# Patient Record
Sex: FEMALE | Race: OTHER | HISPANIC OR LATINO
[De-identification: names, ages, dates, MRNs, and addresses within clinical notes are randomized per-mention and may not be internally consistent; named-entity substitution may affect disease eponyms.]

---

## 2020-01-01 ENCOUNTER — APPOINTMENT (OUTPATIENT)
Dept: GYNECOLOGIC ONCOLOGY | Facility: CLINIC | Age: 25
End: 2020-01-01

## 2020-01-01 ENCOUNTER — LABORATORY RESULT (OUTPATIENT)
Age: 25
End: 2020-01-01

## 2020-01-01 ENCOUNTER — EMERGENCY (EMERGENCY)
Facility: HOSPITAL | Age: 25
LOS: 1 days | Discharge: HOME | End: 2020-01-01
Attending: EMERGENCY MEDICINE
Payer: MEDICAID

## 2020-01-01 ENCOUNTER — RESULT REVIEW (OUTPATIENT)
Age: 25
End: 2020-01-01

## 2020-01-01 ENCOUNTER — TRANSCRIPTION ENCOUNTER (OUTPATIENT)
Age: 25
End: 2020-01-01

## 2020-01-01 ENCOUNTER — OUTPATIENT (OUTPATIENT)
Dept: OUTPATIENT SERVICES | Facility: HOSPITAL | Age: 25
LOS: 1 days | Discharge: HOME | End: 2020-01-01
Payer: MEDICAID

## 2020-01-01 ENCOUNTER — OUTPATIENT (OUTPATIENT)
Dept: OUTPATIENT SERVICES | Facility: HOSPITAL | Age: 25
LOS: 1 days | Discharge: HOME | End: 2020-01-01

## 2020-01-01 ENCOUNTER — APPOINTMENT (OUTPATIENT)
Age: 25
End: 2020-01-01

## 2020-01-01 ENCOUNTER — INPATIENT (INPATIENT)
Facility: HOSPITAL | Age: 25
LOS: 57 days | Discharge: HOME | DRG: 829 | End: 2020-05-17
Attending: INTERNAL MEDICINE
Payer: MEDICAID

## 2020-01-01 ENCOUNTER — INPATIENT (INPATIENT)
Facility: HOSPITAL | Age: 25
LOS: 9 days | DRG: 871 | End: 2020-06-05
Attending: INTERNAL MEDICINE | Admitting: STUDENT IN AN ORGANIZED HEALTH CARE EDUCATION/TRAINING PROGRAM
Payer: MEDICAID

## 2020-01-01 ENCOUNTER — APPOINTMENT (OUTPATIENT)
Dept: CT IMAGING | Facility: HOSPITAL | Age: 25
End: 2020-01-01

## 2020-01-01 ENCOUNTER — APPOINTMENT (OUTPATIENT)
Age: 25
End: 2020-01-01
Payer: MEDICAID

## 2020-01-01 ENCOUNTER — APPOINTMENT (OUTPATIENT)
Dept: OBGYN | Facility: CLINIC | Age: 25
End: 2020-01-01

## 2020-01-01 VITALS
SYSTOLIC BLOOD PRESSURE: 60 MMHG | DIASTOLIC BLOOD PRESSURE: 32 MMHG | HEART RATE: 118 BPM | OXYGEN SATURATION: 95 % | RESPIRATION RATE: 32 BRPM | TEMPERATURE: 98 F

## 2020-01-01 VITALS
DIASTOLIC BLOOD PRESSURE: 82 MMHG | HEART RATE: 158 BPM | OXYGEN SATURATION: 91 % | HEIGHT: 64 IN | RESPIRATION RATE: 16 BRPM | TEMPERATURE: 101 F | SYSTOLIC BLOOD PRESSURE: 115 MMHG | WEIGHT: 160.06 LBS

## 2020-01-01 VITALS
RESPIRATION RATE: 18 BRPM | HEART RATE: 111 BPM | TEMPERATURE: 98 F | OXYGEN SATURATION: 99 % | DIASTOLIC BLOOD PRESSURE: 80 MMHG | SYSTOLIC BLOOD PRESSURE: 119 MMHG

## 2020-01-01 VITALS
SYSTOLIC BLOOD PRESSURE: 108 MMHG | TEMPERATURE: 98 F | HEART RATE: 81 BPM | DIASTOLIC BLOOD PRESSURE: 71 MMHG | RESPIRATION RATE: 17 BRPM | OXYGEN SATURATION: 99 %

## 2020-01-01 VITALS
RESPIRATION RATE: 18 BRPM | DIASTOLIC BLOOD PRESSURE: 77 MMHG | OXYGEN SATURATION: 98 % | WEIGHT: 197.09 LBS | HEART RATE: 107 BPM | HEIGHT: 64 IN | SYSTOLIC BLOOD PRESSURE: 127 MMHG | TEMPERATURE: 98 F

## 2020-01-01 VITALS — WEIGHT: 163.8 LBS

## 2020-01-01 DIAGNOSIS — A49.1 STREPTOCOCCAL INFECTION, UNSPECIFIED SITE: ICD-10-CM

## 2020-01-01 DIAGNOSIS — R41.82 ALTERED MENTAL STATUS, UNSPECIFIED: ICD-10-CM

## 2020-01-01 DIAGNOSIS — R11.10 VOMITING, UNSPECIFIED: ICD-10-CM

## 2020-01-01 DIAGNOSIS — R47.1 DYSARTHRIA AND ANARTHRIA: ICD-10-CM

## 2020-01-01 DIAGNOSIS — E83.52 HYPERCALCEMIA: ICD-10-CM

## 2020-01-01 DIAGNOSIS — Z71.89 OTHER SPECIFIED COUNSELING: ICD-10-CM

## 2020-01-01 DIAGNOSIS — R11.0 NAUSEA: ICD-10-CM

## 2020-01-01 DIAGNOSIS — Z29.9 ENCOUNTER FOR PROPHYLACTIC MEASURES, UNSPECIFIED: ICD-10-CM

## 2020-01-01 DIAGNOSIS — Z51.5 ENCOUNTER FOR PALLIATIVE CARE: ICD-10-CM

## 2020-01-01 DIAGNOSIS — N94.89 OTHER SPECIFIED CONDITIONS ASSOCIATED WITH FEMALE GENITAL ORGANS AND MENSTRUAL CYCLE: ICD-10-CM

## 2020-01-01 DIAGNOSIS — C79.9 SECONDARY MALIGNANT NEOPLASM OF UNSPECIFIED SITE: ICD-10-CM

## 2020-01-01 DIAGNOSIS — E55.9 VITAMIN D DEFICIENCY, UNSPECIFIED: ICD-10-CM

## 2020-01-01 DIAGNOSIS — R00.0 TACHYCARDIA, UNSPECIFIED: ICD-10-CM

## 2020-01-01 DIAGNOSIS — R10.9 UNSPECIFIED ABDOMINAL PAIN: ICD-10-CM

## 2020-01-01 DIAGNOSIS — R06.03 ACUTE RESPIRATORY DISTRESS: ICD-10-CM

## 2020-01-01 DIAGNOSIS — R10.84 GENERALIZED ABDOMINAL PAIN: ICD-10-CM

## 2020-01-01 DIAGNOSIS — F41.9 ANXIETY DISORDER, UNSPECIFIED: ICD-10-CM

## 2020-01-01 DIAGNOSIS — G89.3 NEOPLASM RELATED PAIN (ACUTE) (CHRONIC): ICD-10-CM

## 2020-01-01 DIAGNOSIS — A41.9 SEPSIS, UNSPECIFIED ORGANISM: ICD-10-CM

## 2020-01-01 DIAGNOSIS — E87.1 HYPO-OSMOLALITY AND HYPONATREMIA: ICD-10-CM

## 2020-01-01 DIAGNOSIS — E87.5 HYPERKALEMIA: ICD-10-CM

## 2020-01-01 DIAGNOSIS — K59.00 CONSTIPATION, UNSPECIFIED: ICD-10-CM

## 2020-01-01 DIAGNOSIS — C7A.8 OTHER MALIGNANT NEUROENDOCRINE TUMORS: ICD-10-CM

## 2020-01-01 DIAGNOSIS — Z78.9 OTHER SPECIFIED HEALTH STATUS: ICD-10-CM

## 2020-01-01 DIAGNOSIS — K59.01 SLOW TRANSIT CONSTIPATION: ICD-10-CM

## 2020-01-01 DIAGNOSIS — T45.1X5A NAUSEA: ICD-10-CM

## 2020-01-01 DIAGNOSIS — C80.1 MALIGNANT (PRIMARY) NEOPLASM, UNSPECIFIED: ICD-10-CM

## 2020-01-01 DIAGNOSIS — R18.0 MALIGNANT ASCITES: ICD-10-CM

## 2020-01-01 DIAGNOSIS — J96.01 ACUTE RESPIRATORY FAILURE WITH HYPOXIA: ICD-10-CM

## 2020-01-01 DIAGNOSIS — R50.9 FEVER, UNSPECIFIED: ICD-10-CM

## 2020-01-01 LAB
% ALBUMIN: 35.2 % — SIGNIFICANT CHANGE UP
% ALPHA 1: 11.4 % — SIGNIFICANT CHANGE UP
% ALPHA 2: 13.2 % — SIGNIFICANT CHANGE UP
% BETA: 13.1 % — SIGNIFICANT CHANGE UP
% GAMMA: 27.1 % — SIGNIFICANT CHANGE UP
-  AMIKACIN: SIGNIFICANT CHANGE UP
-  AMPICILLIN/SULBACTAM: SIGNIFICANT CHANGE UP
-  AMPICILLIN: SIGNIFICANT CHANGE UP
-  AZTREONAM: SIGNIFICANT CHANGE UP
-  CEFAZOLIN: SIGNIFICANT CHANGE UP
-  CEFEPIME: SIGNIFICANT CHANGE UP
-  CEFOXITIN: SIGNIFICANT CHANGE UP
-  CEFTRIAXONE: SIGNIFICANT CHANGE UP
-  CIPROFLOXACIN: SIGNIFICANT CHANGE UP
-  DAPTOMYCIN: SIGNIFICANT CHANGE UP
-  ERTAPENEM: SIGNIFICANT CHANGE UP
-  GENTAMICIN: SIGNIFICANT CHANGE UP
-  IMIPENEM: SIGNIFICANT CHANGE UP
-  LEVOFLOXACIN: SIGNIFICANT CHANGE UP
-  LINEZOLID: SIGNIFICANT CHANGE UP
-  MEROPENEM: SIGNIFICANT CHANGE UP
-  NITROFURANTOIN: SIGNIFICANT CHANGE UP
-  PIPERACILLIN/TAZOBACTAM: SIGNIFICANT CHANGE UP
-  TETRACYCLINE: SIGNIFICANT CHANGE UP
-  TIGECYCLINE: SIGNIFICANT CHANGE UP
-  TOBRAMYCIN: SIGNIFICANT CHANGE UP
-  TRIMETHOPRIM/SULFAMETHOXAZOLE: SIGNIFICANT CHANGE UP
-  VANCOMYCIN: SIGNIFICANT CHANGE UP
24R-OH-CALCIDIOL SERPL-MCNC: <5 NG/ML — LOW (ref 30–80)
AFP-TM SERPL-MCNC: <1.8 NG/ML — SIGNIFICANT CHANGE UP
ALBUMIN FLD-MCNC: 1.6 G/DL — SIGNIFICANT CHANGE UP
ALBUMIN FLD-MCNC: 1.6 G/DL — SIGNIFICANT CHANGE UP
ALBUMIN FLD-MCNC: 1.9 G/DL — SIGNIFICANT CHANGE UP
ALBUMIN SERPL ELPH-MCNC: 2 G/DL — LOW (ref 3.3–5)
ALBUMIN SERPL ELPH-MCNC: 2.1 G/DL — LOW (ref 3.3–5)
ALBUMIN SERPL ELPH-MCNC: 2.1 G/DL — LOW (ref 3.3–5)
ALBUMIN SERPL ELPH-MCNC: 2.2 G/DL — LOW (ref 3.3–5)
ALBUMIN SERPL ELPH-MCNC: 2.2 G/DL — LOW (ref 3.6–5.5)
ALBUMIN SERPL ELPH-MCNC: 2.3 G/DL — LOW (ref 3.3–5)
ALBUMIN SERPL ELPH-MCNC: 2.4 G/DL — LOW (ref 3.3–5)
ALBUMIN SERPL ELPH-MCNC: 2.5 G/DL — LOW (ref 3.3–5)
ALBUMIN SERPL ELPH-MCNC: 2.6 G/DL — LOW (ref 3.3–5)
ALBUMIN SERPL ELPH-MCNC: 2.7 G/DL — LOW (ref 3.3–5)
ALBUMIN SERPL ELPH-MCNC: 2.8 G/DL
ALBUMIN SERPL ELPH-MCNC: 2.8 G/DL — LOW (ref 3.3–5)
ALBUMIN SERPL ELPH-MCNC: 2.9 G/DL — LOW (ref 3.3–5)
ALBUMIN SERPL ELPH-MCNC: 2.9 G/DL — LOW (ref 3.3–5)
ALBUMIN SERPL ELPH-MCNC: 3.5 G/DL — SIGNIFICANT CHANGE UP (ref 3.3–5)
ALBUMIN SERPL ELPH-MCNC: 3.8 G/DL — SIGNIFICANT CHANGE UP (ref 3.3–5)
ALBUMIN/GLOB SERPL ELPH: 0.5 RATIO — SIGNIFICANT CHANGE UP
ALP BLD-CCNC: 269 U/L
ALP SERPL-CCNC: 112 U/L — SIGNIFICANT CHANGE UP (ref 40–120)
ALP SERPL-CCNC: 115 U/L — SIGNIFICANT CHANGE UP (ref 40–120)
ALP SERPL-CCNC: 116 U/L — SIGNIFICANT CHANGE UP (ref 40–120)
ALP SERPL-CCNC: 129 U/L — HIGH (ref 40–120)
ALP SERPL-CCNC: 143 U/L — HIGH (ref 40–120)
ALP SERPL-CCNC: 156 U/L — HIGH (ref 40–120)
ALP SERPL-CCNC: 180 U/L — HIGH (ref 40–120)
ALP SERPL-CCNC: 182 U/L — HIGH (ref 40–120)
ALP SERPL-CCNC: 183 U/L — HIGH (ref 40–120)
ALP SERPL-CCNC: 185 U/L — HIGH (ref 40–120)
ALP SERPL-CCNC: 193 U/L — HIGH (ref 40–120)
ALP SERPL-CCNC: 205 U/L — HIGH (ref 40–120)
ALP SERPL-CCNC: 205 U/L — HIGH (ref 40–120)
ALP SERPL-CCNC: 206 U/L — HIGH (ref 40–120)
ALP SERPL-CCNC: 208 U/L — HIGH (ref 40–120)
ALP SERPL-CCNC: 216 U/L — HIGH (ref 40–120)
ALP SERPL-CCNC: 220 U/L — HIGH (ref 40–120)
ALP SERPL-CCNC: 224 U/L — HIGH (ref 40–120)
ALP SERPL-CCNC: 225 U/L — HIGH (ref 40–120)
ALP SERPL-CCNC: 233 U/L — HIGH (ref 40–120)
ALP SERPL-CCNC: 245 U/L — HIGH (ref 40–120)
ALP SERPL-CCNC: 259 U/L — HIGH (ref 40–120)
ALP SERPL-CCNC: 260 U/L — HIGH (ref 40–120)
ALP SERPL-CCNC: 261 U/L — HIGH (ref 40–120)
ALP SERPL-CCNC: 262 U/L — HIGH (ref 40–120)
ALP SERPL-CCNC: 263 U/L — HIGH (ref 40–120)
ALP SERPL-CCNC: 264 U/L — HIGH (ref 40–120)
ALP SERPL-CCNC: 264 U/L — HIGH (ref 40–120)
ALP SERPL-CCNC: 279 U/L — HIGH (ref 40–120)
ALP SERPL-CCNC: 290 U/L — HIGH (ref 40–120)
ALP SERPL-CCNC: 294 U/L — HIGH (ref 40–120)
ALP SERPL-CCNC: 305 U/L — HIGH (ref 40–120)
ALP SERPL-CCNC: 320 U/L — HIGH (ref 40–120)
ALP SERPL-CCNC: 327 U/L — HIGH (ref 40–120)
ALP SERPL-CCNC: 334 U/L — HIGH (ref 40–120)
ALP SERPL-CCNC: 343 U/L — HIGH (ref 40–120)
ALP SERPL-CCNC: 358 U/L — HIGH (ref 40–120)
ALP SERPL-CCNC: 359 U/L — HIGH (ref 40–120)
ALP SERPL-CCNC: 360 U/L — HIGH (ref 40–120)
ALP SERPL-CCNC: 361 U/L — HIGH (ref 40–120)
ALP SERPL-CCNC: 382 U/L — HIGH (ref 40–120)
ALP SERPL-CCNC: 383 U/L — HIGH (ref 40–120)
ALP SERPL-CCNC: 386 U/L — HIGH (ref 40–120)
ALP SERPL-CCNC: 387 U/L — HIGH (ref 40–120)
ALP SERPL-CCNC: 401 U/L — HIGH (ref 40–120)
ALP SERPL-CCNC: 412 U/L — HIGH (ref 40–120)
ALP SERPL-CCNC: 439 U/L — HIGH (ref 40–120)
ALP SERPL-CCNC: 464 U/L — HIGH (ref 40–120)
ALP SERPL-CCNC: 486 U/L — HIGH (ref 40–120)
ALP SERPL-CCNC: 491 U/L — HIGH (ref 40–120)
ALP SERPL-CCNC: 65 U/L — SIGNIFICANT CHANGE UP (ref 40–120)
ALPHA1 GLOB SERPL ELPH-MCNC: 0.7 G/DL — HIGH (ref 0.1–0.4)
ALPHA2 GLOB SERPL ELPH-MCNC: 0.8 G/DL — SIGNIFICANT CHANGE UP (ref 0.5–1)
ALT FLD-CCNC: 10 U/L — SIGNIFICANT CHANGE UP (ref 10–45)
ALT FLD-CCNC: 11 U/L — SIGNIFICANT CHANGE UP (ref 10–45)
ALT FLD-CCNC: 12 U/L — SIGNIFICANT CHANGE UP (ref 10–45)
ALT FLD-CCNC: 17 U/L — SIGNIFICANT CHANGE UP (ref 10–45)
ALT FLD-CCNC: 19 U/L — SIGNIFICANT CHANGE UP (ref 10–45)
ALT FLD-CCNC: 21 U/L — SIGNIFICANT CHANGE UP (ref 10–45)
ALT FLD-CCNC: 22 U/L — SIGNIFICANT CHANGE UP (ref 10–45)
ALT FLD-CCNC: 5 U/L — LOW (ref 10–45)
ALT FLD-CCNC: 6 U/L — LOW (ref 10–45)
ALT FLD-CCNC: 7 U/L — LOW (ref 10–45)
ALT FLD-CCNC: 8 U/L — LOW (ref 10–45)
ALT FLD-CCNC: 9 U/L — LOW (ref 10–45)
ALT FLD-CCNC: <5 U/L — LOW (ref 10–45)
ALT SERPL-CCNC: 36 U/L
AMMONIA BLD-MCNC: 47 UMOL/L — SIGNIFICANT CHANGE UP (ref 11–55)
AMMONIA BLD-MCNC: 55 UMOL/L — SIGNIFICANT CHANGE UP (ref 11–55)
ANION GAP SERPL CALC-SCNC: 10 MMOL/L — SIGNIFICANT CHANGE UP (ref 5–17)
ANION GAP SERPL CALC-SCNC: 11 MMOL/L — SIGNIFICANT CHANGE UP (ref 5–17)
ANION GAP SERPL CALC-SCNC: 12 MMOL/L — SIGNIFICANT CHANGE UP (ref 5–17)
ANION GAP SERPL CALC-SCNC: 13 MMOL/L — SIGNIFICANT CHANGE UP (ref 5–17)
ANION GAP SERPL CALC-SCNC: 14 MMOL/L — SIGNIFICANT CHANGE UP (ref 5–17)
ANION GAP SERPL CALC-SCNC: 15 MMOL/L — SIGNIFICANT CHANGE UP (ref 5–17)
ANION GAP SERPL CALC-SCNC: 16 MMOL/L — SIGNIFICANT CHANGE UP (ref 5–17)
ANION GAP SERPL CALC-SCNC: 17 MMOL/L
ANION GAP SERPL CALC-SCNC: 7 MMOL/L — SIGNIFICANT CHANGE UP (ref 5–17)
ANION GAP SERPL CALC-SCNC: 9 MMOL/L — SIGNIFICANT CHANGE UP (ref 5–17)
ANISOCYTOSIS BLD QL: SIGNIFICANT CHANGE UP
ANISOCYTOSIS BLD QL: SLIGHT — SIGNIFICANT CHANGE UP
APPEARANCE UR: ABNORMAL
APPEARANCE UR: CLEAR — SIGNIFICANT CHANGE UP
APTT BLD: 25.7 SEC — LOW (ref 27.5–36.3)
APTT BLD: 31.8 SEC — SIGNIFICANT CHANGE UP (ref 27.5–36.3)
APTT BLD: 32.4 SEC — SIGNIFICANT CHANGE UP (ref 27.5–36.3)
APTT BLD: 33.3 SEC — SIGNIFICANT CHANGE UP (ref 27.5–36.3)
APTT BLD: 33.5 SEC — SIGNIFICANT CHANGE UP (ref 27.5–36.3)
APTT BLD: 34.8 SEC — SIGNIFICANT CHANGE UP (ref 27.5–36.3)
AST SERPL-CCNC: 14 U/L — SIGNIFICANT CHANGE UP (ref 10–40)
AST SERPL-CCNC: 16 U/L — SIGNIFICANT CHANGE UP (ref 10–40)
AST SERPL-CCNC: 16 U/L — SIGNIFICANT CHANGE UP (ref 10–40)
AST SERPL-CCNC: 18 U/L — SIGNIFICANT CHANGE UP (ref 10–40)
AST SERPL-CCNC: 19 U/L — SIGNIFICANT CHANGE UP (ref 10–40)
AST SERPL-CCNC: 22 U/L — SIGNIFICANT CHANGE UP (ref 10–40)
AST SERPL-CCNC: 23 U/L — SIGNIFICANT CHANGE UP (ref 10–40)
AST SERPL-CCNC: 24 U/L — SIGNIFICANT CHANGE UP (ref 10–40)
AST SERPL-CCNC: 24 U/L — SIGNIFICANT CHANGE UP (ref 10–40)
AST SERPL-CCNC: 25 U/L — SIGNIFICANT CHANGE UP (ref 10–40)
AST SERPL-CCNC: 25 U/L — SIGNIFICANT CHANGE UP (ref 10–40)
AST SERPL-CCNC: 27 U/L — SIGNIFICANT CHANGE UP (ref 10–40)
AST SERPL-CCNC: 27 U/L — SIGNIFICANT CHANGE UP (ref 10–40)
AST SERPL-CCNC: 28 U/L — SIGNIFICANT CHANGE UP (ref 10–40)
AST SERPL-CCNC: 29 U/L — SIGNIFICANT CHANGE UP (ref 10–40)
AST SERPL-CCNC: 30 U/L — SIGNIFICANT CHANGE UP (ref 10–40)
AST SERPL-CCNC: 31 U/L — SIGNIFICANT CHANGE UP (ref 10–40)
AST SERPL-CCNC: 32 U/L — SIGNIFICANT CHANGE UP (ref 10–40)
AST SERPL-CCNC: 32 U/L — SIGNIFICANT CHANGE UP (ref 10–40)
AST SERPL-CCNC: 33 U/L — SIGNIFICANT CHANGE UP (ref 10–40)
AST SERPL-CCNC: 34 U/L — SIGNIFICANT CHANGE UP (ref 10–40)
AST SERPL-CCNC: 35 U/L — SIGNIFICANT CHANGE UP (ref 10–40)
AST SERPL-CCNC: 36 U/L — SIGNIFICANT CHANGE UP (ref 10–40)
AST SERPL-CCNC: 37 U/L — SIGNIFICANT CHANGE UP (ref 10–40)
AST SERPL-CCNC: 38 U/L — SIGNIFICANT CHANGE UP (ref 10–40)
AST SERPL-CCNC: 40 U/L — SIGNIFICANT CHANGE UP (ref 10–40)
AST SERPL-CCNC: 41 U/L — HIGH (ref 10–40)
AST SERPL-CCNC: 43 U/L — HIGH (ref 10–40)
AST SERPL-CCNC: 43 U/L — HIGH (ref 10–40)
AST SERPL-CCNC: 46 U/L — HIGH (ref 10–40)
AST SERPL-CCNC: 53 U/L — HIGH (ref 10–40)
AST SERPL-CCNC: 60 U/L — HIGH (ref 10–40)
AST SERPL-CCNC: 60 U/L — HIGH (ref 10–40)
AST SERPL-CCNC: 78 U/L — HIGH (ref 10–40)
AST SERPL-CCNC: 90 U/L — HIGH (ref 10–40)
AST SERPL-CCNC: 96 U/L
B PERT IGG+IGM PNL SER: ABNORMAL
B-GLOBULIN SERPL ELPH-MCNC: 0.8 G/DL — SIGNIFICANT CHANGE UP (ref 0.5–1)
BACTERIA # UR AUTO: NEGATIVE — SIGNIFICANT CHANGE UP
BACTERIA # UR AUTO: SIGNIFICANT CHANGE UP
BASOPHILS # BLD AUTO: 0 K/UL — SIGNIFICANT CHANGE UP (ref 0–0.2)
BASOPHILS # BLD AUTO: 0.01 K/UL — SIGNIFICANT CHANGE UP (ref 0–0.2)
BASOPHILS # BLD AUTO: 0.02 K/UL — SIGNIFICANT CHANGE UP (ref 0–0.2)
BASOPHILS # BLD AUTO: 0.03 K/UL — SIGNIFICANT CHANGE UP (ref 0–0.2)
BASOPHILS # BLD AUTO: 0.04 K/UL — SIGNIFICANT CHANGE UP (ref 0–0.2)
BASOPHILS # BLD AUTO: 0.05 K/UL — SIGNIFICANT CHANGE UP (ref 0–0.2)
BASOPHILS # BLD AUTO: 0.05 K/UL — SIGNIFICANT CHANGE UP (ref 0–0.2)
BASOPHILS # BLD AUTO: 0.06 K/UL — SIGNIFICANT CHANGE UP (ref 0–0.2)
BASOPHILS # BLD AUTO: 0.07 K/UL — SIGNIFICANT CHANGE UP (ref 0–0.2)
BASOPHILS # BLD AUTO: 0.07 K/UL — SIGNIFICANT CHANGE UP (ref 0–0.2)
BASOPHILS # BLD AUTO: 0.09 K/UL
BASOPHILS # BLD AUTO: 0.12 K/UL — SIGNIFICANT CHANGE UP (ref 0–0.2)
BASOPHILS # BLD AUTO: 0.13 K/UL — SIGNIFICANT CHANGE UP (ref 0–0.2)
BASOPHILS # BLD AUTO: 0.15 K/UL — SIGNIFICANT CHANGE UP (ref 0–0.2)
BASOPHILS # BLD AUTO: 0.19 K/UL — SIGNIFICANT CHANGE UP (ref 0–0.2)
BASOPHILS # BLD AUTO: 0.2 K/UL — SIGNIFICANT CHANGE UP (ref 0–0.2)
BASOPHILS NFR BLD AUTO: 0 % — SIGNIFICANT CHANGE UP (ref 0–2)
BASOPHILS NFR BLD AUTO: 0.1 % — SIGNIFICANT CHANGE UP (ref 0–2)
BASOPHILS NFR BLD AUTO: 0.2 % — SIGNIFICANT CHANGE UP (ref 0–2)
BASOPHILS NFR BLD AUTO: 0.3 % — SIGNIFICANT CHANGE UP (ref 0–2)
BASOPHILS NFR BLD AUTO: 0.8 % — SIGNIFICANT CHANGE UP (ref 0–2)
BASOPHILS NFR BLD AUTO: 0.9 %
BASOPHILS NFR BLD AUTO: 0.9 % — SIGNIFICANT CHANGE UP (ref 0–2)
BILIRUB DIRECT SERPL-MCNC: 0.7 MG/DL — HIGH (ref 0–0.2)
BILIRUB DIRECT SERPL-MCNC: 0.8 MG/DL — HIGH (ref 0–0.2)
BILIRUB DIRECT SERPL-MCNC: 1 MG/DL — HIGH (ref 0–0.2)
BILIRUB DIRECT SERPL-MCNC: 1 MG/DL — HIGH (ref 0–0.2)
BILIRUB DIRECT SERPL-MCNC: 1.1 MG/DL — HIGH (ref 0–0.2)
BILIRUB INDIRECT FLD-MCNC: 0.4 MG/DL — SIGNIFICANT CHANGE UP (ref 0.2–1)
BILIRUB SERPL-MCNC: 0.2 MG/DL — SIGNIFICANT CHANGE UP (ref 0.2–1.2)
BILIRUB SERPL-MCNC: 0.2 MG/DL — SIGNIFICANT CHANGE UP (ref 0.2–1.2)
BILIRUB SERPL-MCNC: 0.3 MG/DL
BILIRUB SERPL-MCNC: 0.3 MG/DL — SIGNIFICANT CHANGE UP (ref 0.2–1.2)
BILIRUB SERPL-MCNC: 0.4 MG/DL — SIGNIFICANT CHANGE UP (ref 0.2–1.2)
BILIRUB SERPL-MCNC: 0.5 MG/DL — SIGNIFICANT CHANGE UP (ref 0.2–1.2)
BILIRUB SERPL-MCNC: 0.6 MG/DL — SIGNIFICANT CHANGE UP (ref 0.2–1.2)
BILIRUB SERPL-MCNC: 0.7 MG/DL — SIGNIFICANT CHANGE UP (ref 0.2–1.2)
BILIRUB SERPL-MCNC: 0.8 MG/DL — SIGNIFICANT CHANGE UP (ref 0.2–1.2)
BILIRUB SERPL-MCNC: 0.9 MG/DL — SIGNIFICANT CHANGE UP (ref 0.2–1.2)
BILIRUB SERPL-MCNC: 1 MG/DL — SIGNIFICANT CHANGE UP (ref 0.2–1.2)
BILIRUB SERPL-MCNC: 1.1 MG/DL — SIGNIFICANT CHANGE UP (ref 0.2–1.2)
BILIRUB SERPL-MCNC: 1.2 MG/DL — SIGNIFICANT CHANGE UP (ref 0.2–1.2)
BILIRUB SERPL-MCNC: 1.4 MG/DL — HIGH (ref 0.2–1.2)
BILIRUB SERPL-MCNC: 1.4 MG/DL — HIGH (ref 0.2–1.2)
BILIRUB SERPL-MCNC: 1.5 MG/DL — HIGH (ref 0.2–1.2)
BILIRUB SERPL-MCNC: 1.6 MG/DL — HIGH (ref 0.2–1.2)
BILIRUB UR-MCNC: NEGATIVE — SIGNIFICANT CHANGE UP
BLD GP AB SCN SERPL QL: NEGATIVE — SIGNIFICANT CHANGE UP
BUN SERPL-MCNC: 10 MG/DL — SIGNIFICANT CHANGE UP (ref 7–23)
BUN SERPL-MCNC: 11 MG/DL — SIGNIFICANT CHANGE UP (ref 7–23)
BUN SERPL-MCNC: 11 MG/DL — SIGNIFICANT CHANGE UP (ref 7–23)
BUN SERPL-MCNC: 12 MG/DL — SIGNIFICANT CHANGE UP (ref 7–23)
BUN SERPL-MCNC: 13 MG/DL — SIGNIFICANT CHANGE UP (ref 7–23)
BUN SERPL-MCNC: 17 MG/DL — SIGNIFICANT CHANGE UP (ref 7–23)
BUN SERPL-MCNC: 20 MG/DL — SIGNIFICANT CHANGE UP (ref 7–23)
BUN SERPL-MCNC: 22 MG/DL — SIGNIFICANT CHANGE UP (ref 7–23)
BUN SERPL-MCNC: 4 MG/DL — LOW (ref 7–23)
BUN SERPL-MCNC: 5 MG/DL — LOW (ref 7–23)
BUN SERPL-MCNC: 6 MG/DL
BUN SERPL-MCNC: 6 MG/DL — LOW (ref 7–23)
BUN SERPL-MCNC: 7 MG/DL — SIGNIFICANT CHANGE UP (ref 7–23)
BUN SERPL-MCNC: 8 MG/DL — SIGNIFICANT CHANGE UP (ref 7–23)
BUN SERPL-MCNC: 9 MG/DL — SIGNIFICANT CHANGE UP (ref 7–23)
BUN SERPL-MCNC: <4 MG/DL — LOW (ref 7–23)
C DIFF GDH STL QL: NEGATIVE — SIGNIFICANT CHANGE UP
C DIFF GDH STL QL: SIGNIFICANT CHANGE UP
CA-I BLD-SCNC: 1.64 MMOL/L — CRITICAL HIGH (ref 1.12–1.3)
CA-I BLD-SCNC: 1.66 MMOL/L — CRITICAL HIGH (ref 1.12–1.3)
CA-I BLD-SCNC: 1.67 MMOL/L — CRITICAL HIGH (ref 1.12–1.3)
CA-I BLD-SCNC: 1.7 MMOL/L — CRITICAL HIGH (ref 1.12–1.3)
CALCIUM 24H UR-MRATE: 80 MG/24 H — SIGNIFICANT CHANGE UP (ref 50–150)
CALCIUM SERPL-MCNC: 10 MG/DL — SIGNIFICANT CHANGE UP (ref 8.4–10.5)
CALCIUM SERPL-MCNC: 10 MG/DL — SIGNIFICANT CHANGE UP (ref 8.4–10.5)
CALCIUM SERPL-MCNC: 10.1 MG/DL — SIGNIFICANT CHANGE UP (ref 8.4–10.5)
CALCIUM SERPL-MCNC: 10.1 MG/DL — SIGNIFICANT CHANGE UP (ref 8.4–10.5)
CALCIUM SERPL-MCNC: 10.3 MG/DL — SIGNIFICANT CHANGE UP (ref 8.4–10.5)
CALCIUM SERPL-MCNC: 10.5 MG/DL — SIGNIFICANT CHANGE UP (ref 8.4–10.5)
CALCIUM SERPL-MCNC: 10.7 MG/DL — HIGH (ref 8.4–10.5)
CALCIUM SERPL-MCNC: 10.8 MG/DL — HIGH (ref 8.4–10.5)
CALCIUM SERPL-MCNC: 10.9 MG/DL — HIGH (ref 8.4–10.5)
CALCIUM SERPL-MCNC: 11.1 MG/DL — HIGH (ref 8.4–10.5)
CALCIUM SERPL-MCNC: 11.3 MG/DL — HIGH (ref 8.4–10.5)
CALCIUM SERPL-MCNC: 11.3 MG/DL — HIGH (ref 8.4–10.5)
CALCIUM SERPL-MCNC: 11.4 MG/DL — HIGH (ref 8.4–10.5)
CALCIUM SERPL-MCNC: 11.4 MG/DL — HIGH (ref 8.4–10.5)
CALCIUM SERPL-MCNC: 11.5 MG/DL — HIGH (ref 8.4–10.5)
CALCIUM SERPL-MCNC: 11.6 MG/DL — HIGH (ref 8.4–10.5)
CALCIUM SERPL-MCNC: 11.7 MG/DL — HIGH (ref 8.4–10.5)
CALCIUM SERPL-MCNC: 11.8 MG/DL — HIGH (ref 8.4–10.5)
CALCIUM SERPL-MCNC: 11.8 MG/DL — HIGH (ref 8.4–10.5)
CALCIUM SERPL-MCNC: 12.1 MG/DL — HIGH (ref 8.4–10.5)
CALCIUM SERPL-MCNC: 12.1 MG/DL — HIGH (ref 8.4–10.5)
CALCIUM SERPL-MCNC: 12.3 MG/DL — HIGH (ref 8.4–10.5)
CALCIUM SERPL-MCNC: 7.8 MG/DL — LOW (ref 8.4–10.5)
CALCIUM SERPL-MCNC: 7.9 MG/DL — LOW (ref 8.4–10.5)
CALCIUM SERPL-MCNC: 7.9 MG/DL — LOW (ref 8.4–10.5)
CALCIUM SERPL-MCNC: 8.1 MG/DL — LOW (ref 8.4–10.5)
CALCIUM SERPL-MCNC: 8.2 MG/DL — LOW (ref 8.4–10.5)
CALCIUM SERPL-MCNC: 8.2 MG/DL — LOW (ref 8.4–10.5)
CALCIUM SERPL-MCNC: 8.5 MG/DL — SIGNIFICANT CHANGE UP (ref 8.4–10.5)
CALCIUM SERPL-MCNC: 8.5 MG/DL — SIGNIFICANT CHANGE UP (ref 8.4–10.5)
CALCIUM SERPL-MCNC: 8.6 MG/DL — SIGNIFICANT CHANGE UP (ref 8.4–10.5)
CALCIUM SERPL-MCNC: 8.6 MG/DL — SIGNIFICANT CHANGE UP (ref 8.4–10.5)
CALCIUM SERPL-MCNC: 8.7 MG/DL — SIGNIFICANT CHANGE UP (ref 8.4–10.5)
CALCIUM SERPL-MCNC: 8.8 MG/DL — SIGNIFICANT CHANGE UP (ref 8.4–10.5)
CALCIUM SERPL-MCNC: 8.9 MG/DL — SIGNIFICANT CHANGE UP (ref 8.4–10.5)
CALCIUM SERPL-MCNC: 9 MG/DL — SIGNIFICANT CHANGE UP (ref 8.4–10.5)
CALCIUM SERPL-MCNC: 9.1 MG/DL — SIGNIFICANT CHANGE UP (ref 8.4–10.5)
CALCIUM SERPL-MCNC: 9.1 MG/DL — SIGNIFICANT CHANGE UP (ref 8.4–10.5)
CALCIUM SERPL-MCNC: 9.2 MG/DL — SIGNIFICANT CHANGE UP (ref 8.4–10.5)
CALCIUM SERPL-MCNC: 9.2 MG/DL — SIGNIFICANT CHANGE UP (ref 8.4–10.5)
CALCIUM SERPL-MCNC: 9.3 MG/DL
CALCIUM SERPL-MCNC: 9.3 MG/DL — SIGNIFICANT CHANGE UP (ref 8.4–10.5)
CALCIUM SERPL-MCNC: 9.3 MG/DL — SIGNIFICANT CHANGE UP (ref 8.4–10.5)
CALCIUM SERPL-MCNC: 9.4 MG/DL — SIGNIFICANT CHANGE UP (ref 8.4–10.5)
CALCIUM SERPL-MCNC: 9.5 MG/DL — SIGNIFICANT CHANGE UP (ref 8.4–10.5)
CALCIUM SERPL-MCNC: 9.6 MG/DL — SIGNIFICANT CHANGE UP (ref 8.4–10.5)
CALCIUM SERPL-MCNC: 9.6 MG/DL — SIGNIFICANT CHANGE UP (ref 8.4–10.5)
CALCIUM SERPL-MCNC: 9.7 MG/DL — SIGNIFICANT CHANGE UP (ref 8.4–10.5)
CALCIUM SERPL-MCNC: 9.7 MG/DL — SIGNIFICANT CHANGE UP (ref 8.4–10.5)
CALCIUM SERPL-MCNC: 9.8 MG/DL — SIGNIFICANT CHANGE UP (ref 8.4–10.5)
CALCIUM SERPL-MCNC: 9.9 MG/DL — SIGNIFICANT CHANGE UP (ref 8.4–10.5)
CALCIUM SERPL-MCNC: 9.9 MG/DL — SIGNIFICANT CHANGE UP (ref 8.4–10.5)
CANCER AG125 SERPL-ACNC: 1065 U/ML — HIGH
CANCER AG125 SERPL-ACNC: 1163 U/ML — HIGH
CANCER AG125 SERPL-ACNC: 598 U/ML — HIGH
CANCER AG125 SERPL-ACNC: 619 U/ML — HIGH
CANCER AG125 SERPL-ACNC: 888 U/ML
CANCER AG19-9 SERPL-ACNC: 123 U/ML — HIGH
CANCER AG19-9 SERPL-ACNC: 370 U/ML — HIGH
CANCER AG27-29 SERPL-ACNC: 70.8 U/ML — HIGH (ref 0–38.6)
CEA SERPL-MCNC: 44.7 NG/ML — HIGH (ref 0–3.8)
CEA SERPL-MCNC: 70.9 NG/ML — HIGH (ref 0–3.8)
CEA SERPL-MCNC: 74.2 NG/ML — HIGH (ref 0–3.8)
CEA SERPL-MCNC: 83.7 NG/ML
CEA SERPL-MCNC: 90.7 NG/ML — HIGH (ref 0–3.8)
CEA SERPL-MCNC: 91.6 NG/ML — HIGH (ref 0–3.8)
CHLORIDE SERPL-SCNC: 100 MMOL/L — SIGNIFICANT CHANGE UP (ref 96–108)
CHLORIDE SERPL-SCNC: 101 MMOL/L — SIGNIFICANT CHANGE UP (ref 96–108)
CHLORIDE SERPL-SCNC: 102 MMOL/L — SIGNIFICANT CHANGE UP (ref 96–108)
CHLORIDE SERPL-SCNC: 103 MMOL/L — SIGNIFICANT CHANGE UP (ref 96–108)
CHLORIDE SERPL-SCNC: 104 MMOL/L — SIGNIFICANT CHANGE UP (ref 96–108)
CHLORIDE SERPL-SCNC: 104 MMOL/L — SIGNIFICANT CHANGE UP (ref 96–108)
CHLORIDE SERPL-SCNC: 90 MMOL/L — LOW (ref 96–108)
CHLORIDE SERPL-SCNC: 90 MMOL/L — LOW (ref 96–108)
CHLORIDE SERPL-SCNC: 91 MMOL/L — LOW (ref 96–108)
CHLORIDE SERPL-SCNC: 92 MMOL/L
CHLORIDE SERPL-SCNC: 92 MMOL/L — LOW (ref 96–108)
CHLORIDE SERPL-SCNC: 93 MMOL/L — LOW (ref 96–108)
CHLORIDE SERPL-SCNC: 94 MMOL/L — LOW (ref 96–108)
CHLORIDE SERPL-SCNC: 95 MMOL/L — LOW (ref 96–108)
CHLORIDE SERPL-SCNC: 96 MMOL/L — SIGNIFICANT CHANGE UP (ref 96–108)
CHLORIDE SERPL-SCNC: 97 MMOL/L — SIGNIFICANT CHANGE UP (ref 96–108)
CHLORIDE SERPL-SCNC: 98 MMOL/L — SIGNIFICANT CHANGE UP (ref 96–108)
CHLORIDE SERPL-SCNC: 99 MMOL/L — SIGNIFICANT CHANGE UP (ref 96–108)
CHLORIDE UR-SCNC: <35 MMOL/L — SIGNIFICANT CHANGE UP
CK MB BLD-MCNC: 14.1 % — HIGH (ref 0–3.5)
CK MB BLD-MCNC: 14.4 % — HIGH (ref 0–3.5)
CK MB CFR SERPL CALC: 15 NG/ML — HIGH (ref 0–3.8)
CK MB CFR SERPL CALC: 15.2 NG/ML — HIGH (ref 0–3.8)
CK SERPL-CCNC: 104 U/L — SIGNIFICANT CHANGE UP (ref 25–170)
CK SERPL-CCNC: 108 U/L — SIGNIFICANT CHANGE UP (ref 25–170)
CO2 SERPL-SCNC: 19 MMOL/L — LOW (ref 22–31)
CO2 SERPL-SCNC: 20 MMOL/L — LOW (ref 22–31)
CO2 SERPL-SCNC: 20 MMOL/L — LOW (ref 22–31)
CO2 SERPL-SCNC: 21 MMOL/L — LOW (ref 22–31)
CO2 SERPL-SCNC: 21 MMOL/L — LOW (ref 22–31)
CO2 SERPL-SCNC: 22 MMOL/L — SIGNIFICANT CHANGE UP (ref 22–31)
CO2 SERPL-SCNC: 23 MMOL/L — SIGNIFICANT CHANGE UP (ref 22–31)
CO2 SERPL-SCNC: 24 MMOL/L — SIGNIFICANT CHANGE UP (ref 22–31)
CO2 SERPL-SCNC: 25 MMOL/L — SIGNIFICANT CHANGE UP (ref 22–31)
CO2 SERPL-SCNC: 26 MMOL/L — SIGNIFICANT CHANGE UP (ref 22–31)
CO2 SERPL-SCNC: 27 MMOL/L — SIGNIFICANT CHANGE UP (ref 22–31)
CO2 SERPL-SCNC: 28 MMOL/L
CO2 SERPL-SCNC: 28 MMOL/L — SIGNIFICANT CHANGE UP (ref 22–31)
CO2 SERPL-SCNC: 29 MMOL/L — SIGNIFICANT CHANGE UP (ref 22–31)
CO2 SERPL-SCNC: 30 MMOL/L — SIGNIFICANT CHANGE UP (ref 22–31)
CO2 SERPL-SCNC: 31 MMOL/L — SIGNIFICANT CHANGE UP (ref 22–31)
CO2 SERPL-SCNC: 32 MMOL/L — HIGH (ref 22–31)
COLLECT DURATION TIME UR: 24 HR — SIGNIFICANT CHANGE UP
COLOR FLD: SIGNIFICANT CHANGE UP
COLOR SPEC: ABNORMAL
COLOR SPEC: SIGNIFICANT CHANGE UP
COLOR SPEC: YELLOW — SIGNIFICANT CHANGE UP
COMMENT - FLUIDS: SIGNIFICANT CHANGE UP
COMMENT - URINE: SIGNIFICANT CHANGE UP
CORTIS AM PEAK SERPL-MCNC: 28.4 UG/DL — HIGH (ref 6–18.4)
CREAT ?TM UR-MCNC: 112 MG/DL — SIGNIFICANT CHANGE UP
CREAT ?TM UR-MCNC: 47 MG/DL — SIGNIFICANT CHANGE UP
CREAT ?TM UR-MCNC: 72 MG/DL — SIGNIFICANT CHANGE UP
CREAT SERPL-MCNC: 0.34 MG/DL — LOW (ref 0.5–1.3)
CREAT SERPL-MCNC: 0.36 MG/DL — LOW (ref 0.5–1.3)
CREAT SERPL-MCNC: 0.37 MG/DL — LOW (ref 0.5–1.3)
CREAT SERPL-MCNC: 0.37 MG/DL — LOW (ref 0.5–1.3)
CREAT SERPL-MCNC: 0.39 MG/DL — LOW (ref 0.5–1.3)
CREAT SERPL-MCNC: 0.4 MG/DL — LOW (ref 0.5–1.3)
CREAT SERPL-MCNC: 0.4 MG/DL — LOW (ref 0.5–1.3)
CREAT SERPL-MCNC: 0.42 MG/DL — LOW (ref 0.5–1.3)
CREAT SERPL-MCNC: 0.43 MG/DL — LOW (ref 0.5–1.3)
CREAT SERPL-MCNC: 0.43 MG/DL — LOW (ref 0.5–1.3)
CREAT SERPL-MCNC: 0.44 MG/DL — LOW (ref 0.5–1.3)
CREAT SERPL-MCNC: 0.44 MG/DL — LOW (ref 0.5–1.3)
CREAT SERPL-MCNC: 0.45 MG/DL — LOW (ref 0.5–1.3)
CREAT SERPL-MCNC: 0.45 MG/DL — LOW (ref 0.5–1.3)
CREAT SERPL-MCNC: 0.46 MG/DL — LOW (ref 0.5–1.3)
CREAT SERPL-MCNC: 0.47 MG/DL — LOW (ref 0.5–1.3)
CREAT SERPL-MCNC: 0.48 MG/DL — LOW (ref 0.5–1.3)
CREAT SERPL-MCNC: 0.49 MG/DL — LOW (ref 0.5–1.3)
CREAT SERPL-MCNC: 0.5 MG/DL
CREAT SERPL-MCNC: 0.5 MG/DL — SIGNIFICANT CHANGE UP (ref 0.5–1.3)
CREAT SERPL-MCNC: 0.51 MG/DL — SIGNIFICANT CHANGE UP (ref 0.5–1.3)
CREAT SERPL-MCNC: 0.51 MG/DL — SIGNIFICANT CHANGE UP (ref 0.5–1.3)
CREAT SERPL-MCNC: 0.53 MG/DL — SIGNIFICANT CHANGE UP (ref 0.5–1.3)
CREAT SERPL-MCNC: 0.53 MG/DL — SIGNIFICANT CHANGE UP (ref 0.5–1.3)
CREAT SERPL-MCNC: 0.55 MG/DL — SIGNIFICANT CHANGE UP (ref 0.5–1.3)
CREAT SERPL-MCNC: 0.55 MG/DL — SIGNIFICANT CHANGE UP (ref 0.5–1.3)
CREAT SERPL-MCNC: 0.56 MG/DL — SIGNIFICANT CHANGE UP (ref 0.5–1.3)
CREAT SERPL-MCNC: 0.56 MG/DL — SIGNIFICANT CHANGE UP (ref 0.5–1.3)
CREAT SERPL-MCNC: 0.58 MG/DL — SIGNIFICANT CHANGE UP (ref 0.5–1.3)
CREAT SERPL-MCNC: 0.59 MG/DL — SIGNIFICANT CHANGE UP (ref 0.5–1.3)
CREAT SERPL-MCNC: 0.62 MG/DL — SIGNIFICANT CHANGE UP (ref 0.5–1.3)
CREAT SERPL-MCNC: 0.63 MG/DL — SIGNIFICANT CHANGE UP (ref 0.5–1.3)
CREAT SERPL-MCNC: 0.63 MG/DL — SIGNIFICANT CHANGE UP (ref 0.5–1.3)
CREAT SERPL-MCNC: 0.64 MG/DL — SIGNIFICANT CHANGE UP (ref 0.5–1.3)
CREAT SERPL-MCNC: 0.65 MG/DL — SIGNIFICANT CHANGE UP (ref 0.5–1.3)
CREAT SERPL-MCNC: 0.66 MG/DL — SIGNIFICANT CHANGE UP (ref 0.5–1.3)
CREAT SERPL-MCNC: 0.67 MG/DL — SIGNIFICANT CHANGE UP (ref 0.5–1.3)
CREAT SERPL-MCNC: 0.69 MG/DL — SIGNIFICANT CHANGE UP (ref 0.5–1.3)
CREAT SERPL-MCNC: 0.69 MG/DL — SIGNIFICANT CHANGE UP (ref 0.5–1.3)
CREAT SERPL-MCNC: 0.7 MG/DL — SIGNIFICANT CHANGE UP (ref 0.5–1.3)
CREAT SERPL-MCNC: 0.72 MG/DL — SIGNIFICANT CHANGE UP (ref 0.5–1.3)
CREAT SERPL-MCNC: 0.72 MG/DL — SIGNIFICANT CHANGE UP (ref 0.5–1.3)
CREAT SERPL-MCNC: 0.74 MG/DL — SIGNIFICANT CHANGE UP (ref 0.5–1.3)
CREAT SERPL-MCNC: 0.75 MG/DL — SIGNIFICANT CHANGE UP (ref 0.5–1.3)
CREAT SERPL-MCNC: 0.75 MG/DL — SIGNIFICANT CHANGE UP (ref 0.5–1.3)
CREAT SERPL-MCNC: 0.81 MG/DL — SIGNIFICANT CHANGE UP (ref 0.5–1.3)
CREAT SERPL-MCNC: 0.84 MG/DL — SIGNIFICANT CHANGE UP (ref 0.5–1.3)
CREAT SERPL-MCNC: 0.87 MG/DL — SIGNIFICANT CHANGE UP (ref 0.5–1.3)
CREAT SERPL-MCNC: 0.88 MG/DL — SIGNIFICANT CHANGE UP (ref 0.5–1.3)
CREAT SERPL-MCNC: 0.88 MG/DL — SIGNIFICANT CHANGE UP (ref 0.5–1.3)
CREAT SERPL-MCNC: 0.91 MG/DL — SIGNIFICANT CHANGE UP (ref 0.5–1.3)
CREAT SERPL-MCNC: 0.93 MG/DL — SIGNIFICANT CHANGE UP (ref 0.5–1.3)
CREAT SERPL-MCNC: 1.05 MG/DL — SIGNIFICANT CHANGE UP (ref 0.5–1.3)
CREAT SERPL-MCNC: 1.08 MG/DL — SIGNIFICANT CHANGE UP (ref 0.5–1.3)
CREAT SERPL-MCNC: 1.18 MG/DL — SIGNIFICANT CHANGE UP (ref 0.5–1.3)
CREAT SERPL-MCNC: 1.35 MG/DL — HIGH (ref 0.5–1.3)
CREAT SERPL-MCNC: 1.35 MG/DL — HIGH (ref 0.5–1.3)
CREAT SERPL-MCNC: 1.42 MG/DL — HIGH (ref 0.5–1.3)
CREAT SERPL-MCNC: <0.3 MG/DL — LOW (ref 0.5–1.3)
CRP SERPL-MCNC: 38.85 MG/DL — HIGH (ref 0–0.4)
CULTURE RESULTS: NO GROWTH — SIGNIFICANT CHANGE UP
CULTURE RESULTS: SIGNIFICANT CHANGE UP
D DIMER BLD IA.RAPID-MCNC: 2282 NG/ML DDU — HIGH
DACRYOCYTES BLD QL SMEAR: SLIGHT — SIGNIFICANT CHANGE UP
DIFF PNL FLD: ABNORMAL
DIFF PNL FLD: NEGATIVE — SIGNIFICANT CHANGE UP
ELLIPTOCYTES BLD QL SMEAR: SLIGHT — SIGNIFICANT CHANGE UP
EOSINOPHIL # BLD AUTO: 0 K/UL
EOSINOPHIL # BLD AUTO: 0 K/UL — SIGNIFICANT CHANGE UP (ref 0–0.5)
EOSINOPHIL # BLD AUTO: 0.01 K/UL — SIGNIFICANT CHANGE UP (ref 0–0.5)
EOSINOPHIL # BLD AUTO: 0.03 K/UL — SIGNIFICANT CHANGE UP (ref 0–0.5)
EOSINOPHIL # BLD AUTO: 0.04 K/UL — SIGNIFICANT CHANGE UP (ref 0–0.5)
EOSINOPHIL # BLD AUTO: 0.07 K/UL — SIGNIFICANT CHANGE UP (ref 0–0.5)
EOSINOPHIL # BLD AUTO: 0.07 K/UL — SIGNIFICANT CHANGE UP (ref 0–0.5)
EOSINOPHIL # BLD AUTO: 0.09 K/UL — SIGNIFICANT CHANGE UP (ref 0–0.5)
EOSINOPHIL # BLD AUTO: 0.11 K/UL — SIGNIFICANT CHANGE UP (ref 0–0.5)
EOSINOPHIL # BLD AUTO: 0.11 K/UL — SIGNIFICANT CHANGE UP (ref 0–0.5)
EOSINOPHIL # BLD AUTO: 0.14 K/UL — SIGNIFICANT CHANGE UP (ref 0–0.5)
EOSINOPHIL # BLD AUTO: 0.15 K/UL — SIGNIFICANT CHANGE UP (ref 0–0.5)
EOSINOPHIL # BLD AUTO: 0.19 K/UL — SIGNIFICANT CHANGE UP (ref 0–0.5)
EOSINOPHIL # BLD AUTO: 0.21 K/UL — SIGNIFICANT CHANGE UP (ref 0–0.5)
EOSINOPHIL # BLD AUTO: 0.23 K/UL — SIGNIFICANT CHANGE UP (ref 0–0.5)
EOSINOPHIL # BLD AUTO: 0.23 K/UL — SIGNIFICANT CHANGE UP (ref 0–0.5)
EOSINOPHIL # BLD AUTO: 0.28 K/UL — SIGNIFICANT CHANGE UP (ref 0–0.5)
EOSINOPHIL # BLD AUTO: 0.32 K/UL — SIGNIFICANT CHANGE UP (ref 0–0.5)
EOSINOPHIL # BLD AUTO: 0.39 K/UL — SIGNIFICANT CHANGE UP (ref 0–0.5)
EOSINOPHIL # BLD AUTO: 0.42 K/UL — SIGNIFICANT CHANGE UP (ref 0–0.5)
EOSINOPHIL # BLD AUTO: 0.67 K/UL — HIGH (ref 0–0.5)
EOSINOPHIL # FLD: 1 % — SIGNIFICANT CHANGE UP
EOSINOPHIL NFR BLD AUTO: 0 %
EOSINOPHIL NFR BLD AUTO: 0 % — SIGNIFICANT CHANGE UP (ref 0–6)
EOSINOPHIL NFR BLD AUTO: 0.1 % — SIGNIFICANT CHANGE UP (ref 0–6)
EOSINOPHIL NFR BLD AUTO: 0.1 % — SIGNIFICANT CHANGE UP (ref 0–6)
EOSINOPHIL NFR BLD AUTO: 0.2 % — SIGNIFICANT CHANGE UP (ref 0–6)
EOSINOPHIL NFR BLD AUTO: 0.2 % — SIGNIFICANT CHANGE UP (ref 0–6)
EOSINOPHIL NFR BLD AUTO: 0.3 % — SIGNIFICANT CHANGE UP (ref 0–6)
EOSINOPHIL NFR BLD AUTO: 0.6 % — SIGNIFICANT CHANGE UP (ref 0–6)
EOSINOPHIL NFR BLD AUTO: 0.7 % — SIGNIFICANT CHANGE UP (ref 0–6)
EOSINOPHIL NFR BLD AUTO: 0.7 % — SIGNIFICANT CHANGE UP (ref 0–6)
EOSINOPHIL NFR BLD AUTO: 0.8 % — SIGNIFICANT CHANGE UP (ref 0–6)
EOSINOPHIL NFR BLD AUTO: 0.9 % — SIGNIFICANT CHANGE UP (ref 0–6)
EOSINOPHIL NFR BLD AUTO: 1.7 % — SIGNIFICANT CHANGE UP (ref 0–6)
EOSINOPHIL NFR BLD AUTO: 1.8 % — SIGNIFICANT CHANGE UP (ref 0–6)
EOSINOPHIL NFR BLD AUTO: 1.8 % — SIGNIFICANT CHANGE UP (ref 0–6)
EOSINOPHIL NFR BLD AUTO: 2 % — SIGNIFICANT CHANGE UP (ref 0–6)
EOSINOPHIL NFR BLD AUTO: 2.6 % — SIGNIFICANT CHANGE UP (ref 0–6)
EPI CELLS # UR: 0 /HPF — SIGNIFICANT CHANGE UP
EPI CELLS # UR: 1 /HPF — SIGNIFICANT CHANGE UP
EPI CELLS # UR: 1 /HPF — SIGNIFICANT CHANGE UP
EPI CELLS # UR: 1 /HPF — SIGNIFICANT CHANGE UP (ref 0–5)
EPI CELLS # UR: 4 /HPF — SIGNIFICANT CHANGE UP
EPI CELLS # UR: 4 — SIGNIFICANT CHANGE UP
ERYTHROCYTE [SEDIMENTATION RATE] IN BLOOD: 120 MM/HR — HIGH (ref 0–15)
FERRITIN SERPL-MCNC: 163 NG/ML — HIGH (ref 15–150)
FERRITIN SERPL-MCNC: 9297 NG/ML — HIGH (ref 15–150)
FIBRINOGEN PPP-MCNC: 844 MG/DL — HIGH (ref 350–510)
FLUID INTAKE SUBSTANCE CLASS: SIGNIFICANT CHANGE UP
FLUID SEGMENTED GRANULOCYTES: 13 % — SIGNIFICANT CHANGE UP
FLUID SEGMENTED GRANULOCYTES: 35 % — SIGNIFICANT CHANGE UP
FLUID SEGMENTED GRANULOCYTES: 7 % — SIGNIFICANT CHANGE UP
FOLATE SERPL-MCNC: 10 NG/ML — SIGNIFICANT CHANGE UP
FOLATE+VIT B12 SERBLD-IMP: 3 % — SIGNIFICANT CHANGE UP
GAMMA GLOBULIN: 1.7 G/DL — HIGH (ref 0.6–1.6)
GAS PNL BLDV: SIGNIFICANT CHANGE UP
GIANT PLATELETS BLD QL SMEAR: PRESENT — SIGNIFICANT CHANGE UP
GLUCOSE BLDC GLUCOMTR-MCNC: 135 MG/DL — HIGH (ref 70–99)
GLUCOSE BLDC GLUCOMTR-MCNC: 98 MG/DL — SIGNIFICANT CHANGE UP (ref 70–99)
GLUCOSE FLD-MCNC: 105 MG/DL — SIGNIFICANT CHANGE UP
GLUCOSE FLD-MCNC: 107 MG/DL — SIGNIFICANT CHANGE UP
GLUCOSE FLD-MCNC: 43 MG/DL — SIGNIFICANT CHANGE UP
GLUCOSE SERPL-MCNC: 100 MG/DL — HIGH (ref 70–99)
GLUCOSE SERPL-MCNC: 101 MG/DL — HIGH (ref 70–99)
GLUCOSE SERPL-MCNC: 102 MG/DL — HIGH (ref 70–99)
GLUCOSE SERPL-MCNC: 103 MG/DL — HIGH (ref 70–99)
GLUCOSE SERPL-MCNC: 104 MG/DL — HIGH (ref 70–99)
GLUCOSE SERPL-MCNC: 105 MG/DL — HIGH (ref 70–99)
GLUCOSE SERPL-MCNC: 106 MG/DL — HIGH (ref 70–99)
GLUCOSE SERPL-MCNC: 107 MG/DL — HIGH (ref 70–99)
GLUCOSE SERPL-MCNC: 107 MG/DL — HIGH (ref 70–99)
GLUCOSE SERPL-MCNC: 109 MG/DL — HIGH (ref 70–99)
GLUCOSE SERPL-MCNC: 110 MG/DL — HIGH (ref 70–99)
GLUCOSE SERPL-MCNC: 111 MG/DL — HIGH (ref 70–99)
GLUCOSE SERPL-MCNC: 112 MG/DL — HIGH (ref 70–99)
GLUCOSE SERPL-MCNC: 112 MG/DL — HIGH (ref 70–99)
GLUCOSE SERPL-MCNC: 114 MG/DL — HIGH (ref 70–99)
GLUCOSE SERPL-MCNC: 114 MG/DL — HIGH (ref 70–99)
GLUCOSE SERPL-MCNC: 121 MG/DL — HIGH (ref 70–99)
GLUCOSE SERPL-MCNC: 122 MG/DL — HIGH (ref 70–99)
GLUCOSE SERPL-MCNC: 124 MG/DL — HIGH (ref 70–99)
GLUCOSE SERPL-MCNC: 138 MG/DL — HIGH (ref 70–99)
GLUCOSE SERPL-MCNC: 138 MG/DL — HIGH (ref 70–99)
GLUCOSE SERPL-MCNC: 140 MG/DL — HIGH (ref 70–99)
GLUCOSE SERPL-MCNC: 58 MG/DL
GLUCOSE SERPL-MCNC: 76 MG/DL — SIGNIFICANT CHANGE UP (ref 70–99)
GLUCOSE SERPL-MCNC: 79 MG/DL — SIGNIFICANT CHANGE UP (ref 70–99)
GLUCOSE SERPL-MCNC: 80 MG/DL — SIGNIFICANT CHANGE UP (ref 70–99)
GLUCOSE SERPL-MCNC: 85 MG/DL — SIGNIFICANT CHANGE UP (ref 70–99)
GLUCOSE SERPL-MCNC: 86 MG/DL — SIGNIFICANT CHANGE UP (ref 70–99)
GLUCOSE SERPL-MCNC: 87 MG/DL — SIGNIFICANT CHANGE UP (ref 70–99)
GLUCOSE SERPL-MCNC: 87 MG/DL — SIGNIFICANT CHANGE UP (ref 70–99)
GLUCOSE SERPL-MCNC: 88 MG/DL — SIGNIFICANT CHANGE UP (ref 70–99)
GLUCOSE SERPL-MCNC: 89 MG/DL — SIGNIFICANT CHANGE UP (ref 70–99)
GLUCOSE SERPL-MCNC: 91 MG/DL — SIGNIFICANT CHANGE UP (ref 70–99)
GLUCOSE SERPL-MCNC: 92 MG/DL — SIGNIFICANT CHANGE UP (ref 70–99)
GLUCOSE SERPL-MCNC: 92 MG/DL — SIGNIFICANT CHANGE UP (ref 70–99)
GLUCOSE SERPL-MCNC: 93 MG/DL — SIGNIFICANT CHANGE UP (ref 70–99)
GLUCOSE SERPL-MCNC: 94 MG/DL — SIGNIFICANT CHANGE UP (ref 70–99)
GLUCOSE SERPL-MCNC: 95 MG/DL — SIGNIFICANT CHANGE UP (ref 70–99)
GLUCOSE SERPL-MCNC: 96 MG/DL — SIGNIFICANT CHANGE UP (ref 70–99)
GLUCOSE SERPL-MCNC: 97 MG/DL — SIGNIFICANT CHANGE UP (ref 70–99)
GLUCOSE SERPL-MCNC: 98 MG/DL — SIGNIFICANT CHANGE UP (ref 70–99)
GLUCOSE SERPL-MCNC: 98 MG/DL — SIGNIFICANT CHANGE UP (ref 70–99)
GLUCOSE SERPL-MCNC: 99 MG/DL — SIGNIFICANT CHANGE UP (ref 70–99)
GLUCOSE SERPL-MCNC: 99 MG/DL — SIGNIFICANT CHANGE UP (ref 70–99)
GLUCOSE UR QL: NEGATIVE — SIGNIFICANT CHANGE UP
GRAM STN FLD: SIGNIFICANT CHANGE UP
GRAN CASTS # UR COMP ASSIST: ABNORMAL /LPF
HCG SERPL-ACNC: 10.4 MIU/ML — HIGH
HCG SERPL-ACNC: 102.7 MIU/ML — HIGH
HCG SERPL-ACNC: 104 MIU/ML — HIGH
HCG SERPL-ACNC: 168.6 MIU/ML — HIGH
HCG UR QL: NEGATIVE — SIGNIFICANT CHANGE UP
HCG-TM SERPL-ACNC: 54 MIU/ML — HIGH
HCT VFR BLD CALC: 22.8 % — LOW (ref 34.5–45)
HCT VFR BLD CALC: 22.8 % — LOW (ref 34.5–45)
HCT VFR BLD CALC: 23.2 % — LOW (ref 34.5–45)
HCT VFR BLD CALC: 23.5 % — LOW (ref 34.5–45)
HCT VFR BLD CALC: 23.7 % — LOW (ref 34.5–45)
HCT VFR BLD CALC: 24.3 % — LOW (ref 34.5–45)
HCT VFR BLD CALC: 24.4 % — LOW (ref 34.5–45)
HCT VFR BLD CALC: 24.6 % — LOW (ref 34.5–45)
HCT VFR BLD CALC: 24.8 % — LOW (ref 34.5–45)
HCT VFR BLD CALC: 24.8 % — LOW (ref 34.5–45)
HCT VFR BLD CALC: 24.9 % — LOW (ref 34.5–45)
HCT VFR BLD CALC: 25 % — LOW (ref 34.5–45)
HCT VFR BLD CALC: 25.1 % — LOW (ref 34.5–45)
HCT VFR BLD CALC: 25.1 % — LOW (ref 34.5–45)
HCT VFR BLD CALC: 25.2 % — LOW (ref 34.5–45)
HCT VFR BLD CALC: 25.3 % — LOW (ref 34.5–45)
HCT VFR BLD CALC: 25.3 % — LOW (ref 34.5–45)
HCT VFR BLD CALC: 25.5 % — LOW (ref 34.5–45)
HCT VFR BLD CALC: 25.6 % — LOW (ref 34.5–45)
HCT VFR BLD CALC: 25.8 % — LOW (ref 34.5–45)
HCT VFR BLD CALC: 26 % — LOW (ref 34.5–45)
HCT VFR BLD CALC: 26.1 % — LOW (ref 34.5–45)
HCT VFR BLD CALC: 26.1 % — LOW (ref 34.5–45)
HCT VFR BLD CALC: 26.2 % — LOW (ref 34.5–45)
HCT VFR BLD CALC: 26.3 % — LOW (ref 34.5–45)
HCT VFR BLD CALC: 26.3 % — LOW (ref 34.5–45)
HCT VFR BLD CALC: 26.4 % — LOW (ref 34.5–45)
HCT VFR BLD CALC: 26.5 % — LOW (ref 34.5–45)
HCT VFR BLD CALC: 26.6 % — LOW (ref 34.5–45)
HCT VFR BLD CALC: 26.7 % — LOW (ref 34.5–45)
HCT VFR BLD CALC: 26.7 % — LOW (ref 34.5–45)
HCT VFR BLD CALC: 26.8 % — LOW (ref 34.5–45)
HCT VFR BLD CALC: 26.8 % — LOW (ref 34.5–45)
HCT VFR BLD CALC: 26.9 % — LOW (ref 34.5–45)
HCT VFR BLD CALC: 27.1 % — LOW (ref 34.5–45)
HCT VFR BLD CALC: 27.1 % — LOW (ref 34.5–45)
HCT VFR BLD CALC: 27.2 % — LOW (ref 34.5–45)
HCT VFR BLD CALC: 27.2 % — LOW (ref 34.5–45)
HCT VFR BLD CALC: 27.3 % — LOW (ref 34.5–45)
HCT VFR BLD CALC: 27.3 % — LOW (ref 34.5–45)
HCT VFR BLD CALC: 27.5 % — LOW (ref 34.5–45)
HCT VFR BLD CALC: 27.6 % — LOW (ref 34.5–45)
HCT VFR BLD CALC: 27.7 % — LOW (ref 34.5–45)
HCT VFR BLD CALC: 27.8 % — LOW (ref 34.5–45)
HCT VFR BLD CALC: 27.9 % — LOW (ref 34.5–45)
HCT VFR BLD CALC: 28.3 % — LOW (ref 34.5–45)
HCT VFR BLD CALC: 28.4 % — LOW (ref 34.5–45)
HCT VFR BLD CALC: 28.6 % — LOW (ref 34.5–45)
HCT VFR BLD CALC: 28.7 % — LOW (ref 34.5–45)
HCT VFR BLD CALC: 28.9 % — LOW (ref 34.5–45)
HCT VFR BLD CALC: 29.2 % — LOW (ref 34.5–45)
HCT VFR BLD CALC: 29.4 % — LOW (ref 34.5–45)
HCT VFR BLD CALC: 29.7 % — LOW (ref 34.5–45)
HCT VFR BLD CALC: 29.8 % — LOW (ref 34.5–45)
HCT VFR BLD CALC: 30 % — LOW (ref 34.5–45)
HCT VFR BLD CALC: 30 % — LOW (ref 34.5–45)
HCT VFR BLD CALC: 30.2 % — LOW (ref 34.5–45)
HCT VFR BLD CALC: 30.3 % — LOW (ref 34.5–45)
HCT VFR BLD CALC: 31.5 %
HCT VFR BLD CALC: 32.1 % — LOW (ref 34.5–45)
HEMOGLOBIN INTERPRETATION: SIGNIFICANT CHANGE UP
HEMOGLOBIN INTERPRETATION: SIGNIFICANT CHANGE UP
HGB A MFR BLD: 92.8 % — LOW (ref 95.8–98)
HGB A MFR BLD: 93.3 % — LOW (ref 95.8–98)
HGB A2 MFR BLD: 2.5 % — SIGNIFICANT CHANGE UP (ref 2–3.2)
HGB A2 MFR BLD: 2.6 % — SIGNIFICANT CHANGE UP (ref 2–3.2)
HGB BLD-MCNC: 6.7 G/DL — CRITICAL LOW (ref 11.5–15.5)
HGB BLD-MCNC: 6.8 G/DL — CRITICAL LOW (ref 11.5–15.5)
HGB BLD-MCNC: 6.9 G/DL — CRITICAL LOW (ref 11.5–15.5)
HGB BLD-MCNC: 7 G/DL — CRITICAL LOW (ref 11.5–15.5)
HGB BLD-MCNC: 7.1 G/DL — LOW (ref 11.5–15.5)
HGB BLD-MCNC: 7.1 G/DL — LOW (ref 11.5–15.5)
HGB BLD-MCNC: 7.2 G/DL — LOW (ref 11.5–15.5)
HGB BLD-MCNC: 7.3 G/DL — LOW (ref 11.5–15.5)
HGB BLD-MCNC: 7.4 G/DL — LOW (ref 11.5–15.5)
HGB BLD-MCNC: 7.5 G/DL — LOW (ref 11.5–15.5)
HGB BLD-MCNC: 7.6 G/DL — LOW (ref 11.5–15.5)
HGB BLD-MCNC: 7.7 G/DL — LOW (ref 11.5–15.5)
HGB BLD-MCNC: 7.8 G/DL — LOW (ref 11.5–15.5)
HGB BLD-MCNC: 7.9 G/DL — LOW (ref 11.5–15.5)
HGB BLD-MCNC: 8 G/DL — LOW (ref 11.5–15.5)
HGB BLD-MCNC: 8.1 G/DL — LOW (ref 11.5–15.5)
HGB BLD-MCNC: 8.2 G/DL — LOW (ref 11.5–15.5)
HGB BLD-MCNC: 8.3 G/DL — LOW (ref 11.5–15.5)
HGB BLD-MCNC: 8.4 G/DL — LOW (ref 11.5–15.5)
HGB BLD-MCNC: 8.4 G/DL — LOW (ref 11.5–15.5)
HGB BLD-MCNC: 8.5 G/DL — LOW (ref 11.5–15.5)
HGB BLD-MCNC: 8.6 G/DL — LOW (ref 11.5–15.5)
HGB BLD-MCNC: 8.6 G/DL — LOW (ref 11.5–15.5)
HGB BLD-MCNC: 8.7 G/DL — LOW (ref 11.5–15.5)
HGB BLD-MCNC: 8.8 G/DL — LOW (ref 11.5–15.5)
HGB BLD-MCNC: 8.9 G/DL — LOW (ref 11.5–15.5)
HGB BLD-MCNC: 9 G/DL — LOW (ref 11.5–15.5)
HGB BLD-MCNC: 9.1 G/DL
HGB BLD-MCNC: 9.3 G/DL — LOW (ref 11.5–15.5)
HGB BLD-MCNC: 9.3 G/DL — LOW (ref 11.5–15.5)
HGB S BLD QL: NEGATIVE — SIGNIFICANT CHANGE UP
HGB S MFR BLD: 4.1 % — HIGH
HGB S MFR BLD: 4.7 % — HIGH
HPIV4 RNA SPEC QL NAA+PROBE: DETECTED
HYALINE CASTS # UR AUTO: 0 /LPF — SIGNIFICANT CHANGE UP (ref 0–2)
HYALINE CASTS # UR AUTO: 0 /LPF — SIGNIFICANT CHANGE UP (ref 0–2)
HYALINE CASTS # UR AUTO: 3 /LPF — SIGNIFICANT CHANGE UP (ref 0–7)
HYALINE CASTS # UR AUTO: 8 /LPF — HIGH (ref 0–7)
HYPOCHROMIA BLD QL: SIGNIFICANT CHANGE UP
HYPOCHROMIA BLD QL: SLIGHT — SIGNIFICANT CHANGE UP
IMM GRANULOCYTES NFR BLD AUTO: 0.5 % — SIGNIFICANT CHANGE UP (ref 0–1.5)
IMM GRANULOCYTES NFR BLD AUTO: 0.7 % — SIGNIFICANT CHANGE UP (ref 0–1.5)
IMM GRANULOCYTES NFR BLD AUTO: 0.9 % — SIGNIFICANT CHANGE UP (ref 0–1.5)
IMM GRANULOCYTES NFR BLD AUTO: 0.9 % — SIGNIFICANT CHANGE UP (ref 0–1.5)
IMM GRANULOCYTES NFR BLD AUTO: 1 % — SIGNIFICANT CHANGE UP (ref 0–1.5)
IMM GRANULOCYTES NFR BLD AUTO: 1 % — SIGNIFICANT CHANGE UP (ref 0–1.5)
IMM GRANULOCYTES NFR BLD AUTO: 1.1 % — SIGNIFICANT CHANGE UP (ref 0–1.5)
IMM GRANULOCYTES NFR BLD AUTO: 1.1 % — SIGNIFICANT CHANGE UP (ref 0–1.5)
IMM GRANULOCYTES NFR BLD AUTO: 1.2 % — SIGNIFICANT CHANGE UP (ref 0–1.5)
IMM GRANULOCYTES NFR BLD AUTO: 1.2 % — SIGNIFICANT CHANGE UP (ref 0–1.5)
IMM GRANULOCYTES NFR BLD AUTO: 1.3 % — SIGNIFICANT CHANGE UP (ref 0–1.5)
IMM GRANULOCYTES NFR BLD AUTO: 1.3 % — SIGNIFICANT CHANGE UP (ref 0–1.5)
IMM GRANULOCYTES NFR BLD AUTO: 1.5 % — SIGNIFICANT CHANGE UP (ref 0–1.5)
IMM GRANULOCYTES NFR BLD AUTO: 1.5 % — SIGNIFICANT CHANGE UP (ref 0–1.5)
IMM GRANULOCYTES NFR BLD AUTO: 1.6 % — HIGH (ref 0–1.5)
IMM GRANULOCYTES NFR BLD AUTO: 1.9 % — HIGH (ref 0–1.5)
IMM GRANULOCYTES NFR BLD AUTO: 2 % — HIGH (ref 0–1.5)
IMM GRANULOCYTES NFR BLD AUTO: 2.6 % — HIGH (ref 0–1.5)
IMM GRANULOCYTES NFR BLD AUTO: 2.7 % — HIGH (ref 0–1.5)
IMM GRANULOCYTES NFR BLD AUTO: 3.4 % — HIGH (ref 0–1.5)
IMM GRANULOCYTES NFR BLD AUTO: 3.5 % — HIGH (ref 0–1.5)
IMM GRANULOCYTES NFR BLD AUTO: 3.5 % — HIGH (ref 0–1.5)
IMM GRANULOCYTES NFR BLD AUTO: 4 % — HIGH (ref 0–1.5)
IMM GRANULOCYTES NFR BLD AUTO: 4.4 % — HIGH (ref 0–1.5)
IMM GRANULOCYTES NFR BLD AUTO: 4.7 % — HIGH (ref 0–1.5)
INHIBIN B SERUM: 11 PG/ML — SIGNIFICANT CHANGE UP
INR BLD: 1.02 RATIO — SIGNIFICANT CHANGE UP (ref 0.88–1.16)
INR BLD: 1.05 RATIO — SIGNIFICANT CHANGE UP (ref 0.88–1.16)
INR BLD: 1.07 RATIO — SIGNIFICANT CHANGE UP (ref 0.88–1.16)
INR BLD: 1.08 RATIO — SIGNIFICANT CHANGE UP (ref 0.88–1.16)
INR BLD: 1.11 RATIO — SIGNIFICANT CHANGE UP (ref 0.88–1.16)
INR BLD: 1.12 RATIO — SIGNIFICANT CHANGE UP (ref 0.88–1.16)
INR BLD: 1.13 RATIO — SIGNIFICANT CHANGE UP (ref 0.88–1.16)
INR BLD: 1.14 RATIO — SIGNIFICANT CHANGE UP (ref 0.88–1.16)
INR BLD: 1.14 RATIO — SIGNIFICANT CHANGE UP (ref 0.88–1.16)
INR BLD: 1.16 RATIO — SIGNIFICANT CHANGE UP (ref 0.88–1.16)
INR BLD: 1.17 RATIO — HIGH (ref 0.88–1.16)
INR BLD: 1.18 RATIO — HIGH (ref 0.88–1.16)
INR BLD: 1.19 RATIO — HIGH (ref 0.88–1.16)
INR BLD: 1.2 RATIO — HIGH (ref 0.88–1.16)
INR BLD: 1.2 RATIO — HIGH (ref 0.88–1.16)
INR BLD: 1.21 RATIO — HIGH (ref 0.88–1.16)
INR BLD: 1.23 RATIO — HIGH (ref 0.88–1.16)
INR BLD: 1.24 RATIO — HIGH (ref 0.88–1.16)
INR BLD: 1.25 RATIO — HIGH (ref 0.88–1.16)
INR BLD: 1.26 RATIO — HIGH (ref 0.88–1.16)
INR BLD: 1.26 RATIO — HIGH (ref 0.88–1.16)
INR BLD: 1.27 RATIO — HIGH (ref 0.88–1.16)
INR BLD: 1.27 RATIO — HIGH (ref 0.88–1.16)
INR BLD: 1.29 RATIO — HIGH (ref 0.88–1.16)
INR BLD: 1.3 RATIO — HIGH (ref 0.88–1.16)
INR BLD: 1.3 RATIO — HIGH (ref 0.88–1.16)
INR BLD: 1.34 RATIO — HIGH (ref 0.88–1.16)
INR BLD: 1.36 RATIO — HIGH (ref 0.88–1.16)
INR BLD: 1.36 RATIO — HIGH (ref 0.88–1.16)
INR BLD: 1.39 RATIO — HIGH (ref 0.88–1.16)
INR BLD: 1.42 RATIO — HIGH (ref 0.88–1.16)
INR BLD: 1.47 RATIO — HIGH (ref 0.88–1.16)
INR BLD: 1.49 RATIO — HIGH (ref 0.88–1.16)
INR BLD: 1.52 RATIO — HIGH (ref 0.88–1.16)
INR BLD: 1.54 RATIO — HIGH (ref 0.88–1.16)
INR BLD: 1.56 RATIO — HIGH (ref 0.88–1.16)
INR BLD: 1.57 RATIO — HIGH (ref 0.88–1.16)
INR BLD: 1.65 RATIO — HIGH (ref 0.88–1.16)
INR BLD: 1.97 RATIO — HIGH (ref 0.88–1.16)
INTERPRETATION SERPL IFE-IMP: SIGNIFICANT CHANGE UP
IRON SATN MFR SERPL: 10 % — LOW (ref 14–50)
IRON SATN MFR SERPL: 23 UG/DL — LOW (ref 30–160)
IRON SATN MFR SERPL: 45 UG/DL — SIGNIFICANT CHANGE UP (ref 30–160)
IRON SATN MFR SERPL: 63 % — HIGH (ref 14–50)
KETONES UR-MCNC: ABNORMAL
KETONES UR-MCNC: NEGATIVE — SIGNIFICANT CHANGE UP
KETONES UR-MCNC: SIGNIFICANT CHANGE UP
KETONES UR-MCNC: SIGNIFICANT CHANGE UP
LACTATE SERPL-SCNC: 1.9 MMOL/L — SIGNIFICANT CHANGE UP (ref 0.7–2)
LACTATE SERPL-SCNC: 4.4 MMOL/L — CRITICAL HIGH (ref 0.7–2)
LACTATE SERPL-SCNC: 5.4 MMOL/L — CRITICAL HIGH (ref 0.7–2)
LDH SERPL L TO P-CCNC: 1046 U/L — HIGH (ref 50–242)
LDH SERPL L TO P-CCNC: 168 U/L — SIGNIFICANT CHANGE UP (ref 50–242)
LDH SERPL L TO P-CCNC: 176 U/L — SIGNIFICANT CHANGE UP (ref 50–242)
LDH SERPL L TO P-CCNC: 191 U/L — SIGNIFICANT CHANGE UP (ref 50–242)
LDH SERPL L TO P-CCNC: 196 U/L — SIGNIFICANT CHANGE UP (ref 50–242)
LDH SERPL L TO P-CCNC: 223 U/L — SIGNIFICANT CHANGE UP (ref 50–242)
LDH SERPL L TO P-CCNC: 251 U/L — HIGH (ref 50–242)
LDH SERPL L TO P-CCNC: 251 U/L — HIGH (ref 50–242)
LDH SERPL L TO P-CCNC: 263 U/L — HIGH (ref 50–242)
LDH SERPL L TO P-CCNC: 280 U/L — HIGH (ref 50–242)
LDH SERPL L TO P-CCNC: 280 U/L — HIGH (ref 50–242)
LDH SERPL L TO P-CCNC: 282 U/L — HIGH (ref 50–242)
LDH SERPL L TO P-CCNC: 290 U/L — HIGH (ref 50–242)
LDH SERPL L TO P-CCNC: 292 U/L — SIGNIFICANT CHANGE UP
LDH SERPL L TO P-CCNC: 295 U/L — HIGH (ref 50–242)
LDH SERPL L TO P-CCNC: 297 U/L — HIGH (ref 50–242)
LDH SERPL L TO P-CCNC: 298 U/L — HIGH (ref 50–242)
LDH SERPL L TO P-CCNC: 303 U/L — HIGH (ref 50–242)
LDH SERPL L TO P-CCNC: 306 U/L — HIGH (ref 50–242)
LDH SERPL L TO P-CCNC: 320 U/L — HIGH (ref 50–242)
LDH SERPL L TO P-CCNC: 325 U/L — HIGH (ref 50–242)
LDH SERPL L TO P-CCNC: 342 U/L — HIGH (ref 50–242)
LDH SERPL L TO P-CCNC: 342 U/L — HIGH (ref 50–242)
LDH SERPL L TO P-CCNC: 343 U/L — HIGH (ref 50–242)
LDH SERPL L TO P-CCNC: 358 U/L — HIGH (ref 50–242)
LDH SERPL L TO P-CCNC: 360 U/L — HIGH (ref 50–242)
LDH SERPL L TO P-CCNC: 365 U/L — HIGH (ref 50–242)
LDH SERPL L TO P-CCNC: 367 U/L — SIGNIFICANT CHANGE UP
LDH SERPL L TO P-CCNC: 391 U/L — HIGH (ref 50–242)
LDH SERPL L TO P-CCNC: 399 U/L — HIGH (ref 50–242)
LDH SERPL L TO P-CCNC: 414 U/L — HIGH (ref 50–242)
LDH SERPL L TO P-CCNC: 415 U/L — SIGNIFICANT CHANGE UP
LDH SERPL L TO P-CCNC: 423 U/L — HIGH (ref 50–242)
LDH SERPL L TO P-CCNC: 446 U/L — HIGH (ref 50–242)
LDH SERPL L TO P-CCNC: 451 U/L — HIGH (ref 50–242)
LDH SERPL L TO P-CCNC: 477 U/L — HIGH (ref 50–242)
LDH SERPL L TO P-CCNC: 478 U/L — HIGH (ref 50–242)
LDH SERPL L TO P-CCNC: 479 U/L — HIGH (ref 50–242)
LDH SERPL L TO P-CCNC: 479 U/L — HIGH (ref 50–242)
LDH SERPL L TO P-CCNC: 480 U/L — HIGH (ref 50–242)
LDH SERPL L TO P-CCNC: 492 U/L — HIGH (ref 50–242)
LDH SERPL L TO P-CCNC: 496 U/L — HIGH (ref 50–242)
LDH SERPL L TO P-CCNC: 504 U/L — HIGH (ref 50–242)
LDH SERPL L TO P-CCNC: 527 U/L — HIGH (ref 50–242)
LDH SERPL L TO P-CCNC: 532 U/L — HIGH (ref 50–242)
LDH SERPL L TO P-CCNC: 622 U/L — HIGH (ref 50–242)
LDH SERPL L TO P-CCNC: 622 U/L — HIGH (ref 50–242)
LDH SERPL L TO P-CCNC: 666 U/L — HIGH (ref 50–242)
LEUKOCYTE ESTERASE UR-ACNC: ABNORMAL
LEUKOCYTE ESTERASE UR-ACNC: NEGATIVE — SIGNIFICANT CHANGE UP
LG PLATELETS BLD QL AUTO: SLIGHT — SIGNIFICANT CHANGE UP
LG PLATELETS BLD QL AUTO: SLIGHT — SIGNIFICANT CHANGE UP
LIDOCAIN IGE QN: 32 U/L — SIGNIFICANT CHANGE UP (ref 7–60)
LYMPHOCYTES # BLD AUTO: 0.23 K/UL — LOW (ref 1–3.3)
LYMPHOCYTES # BLD AUTO: 0.24 K/UL — LOW (ref 1–3.3)
LYMPHOCYTES # BLD AUTO: 0.25 K/UL — LOW (ref 1–3.3)
LYMPHOCYTES # BLD AUTO: 0.43 K/UL — LOW (ref 1–3.3)
LYMPHOCYTES # BLD AUTO: 0.47 K/UL — LOW (ref 1–3.3)
LYMPHOCYTES # BLD AUTO: 0.56 K/UL — LOW (ref 1–3.3)
LYMPHOCYTES # BLD AUTO: 0.58 K/UL — LOW (ref 1–3.3)
LYMPHOCYTES # BLD AUTO: 0.58 K/UL — LOW (ref 1–3.3)
LYMPHOCYTES # BLD AUTO: 0.62 K/UL — LOW (ref 1–3.3)
LYMPHOCYTES # BLD AUTO: 0.69 K/UL — LOW (ref 1–3.3)
LYMPHOCYTES # BLD AUTO: 0.75 K/UL — LOW (ref 1–3.3)
LYMPHOCYTES # BLD AUTO: 0.75 K/UL — LOW (ref 1–3.3)
LYMPHOCYTES # BLD AUTO: 0.77 K/UL — LOW (ref 1–3.3)
LYMPHOCYTES # BLD AUTO: 0.78 K/UL — LOW (ref 1–3.3)
LYMPHOCYTES # BLD AUTO: 0.81 K/UL — LOW (ref 1–3.3)
LYMPHOCYTES # BLD AUTO: 0.83 K/UL — LOW (ref 1–3.3)
LYMPHOCYTES # BLD AUTO: 0.84 K/UL — LOW (ref 1–3.3)
LYMPHOCYTES # BLD AUTO: 0.87 K/UL — LOW (ref 1–3.3)
LYMPHOCYTES # BLD AUTO: 0.92 K/UL — LOW (ref 1–3.3)
LYMPHOCYTES # BLD AUTO: 0.93 K/UL — LOW (ref 1–3.3)
LYMPHOCYTES # BLD AUTO: 0.95 K/UL — LOW (ref 1–3.3)
LYMPHOCYTES # BLD AUTO: 0.97 K/UL — LOW (ref 1–3.3)
LYMPHOCYTES # BLD AUTO: 0.99 K/UL — LOW (ref 1–3.3)
LYMPHOCYTES # BLD AUTO: 1.02 K/UL — SIGNIFICANT CHANGE UP (ref 1–3.3)
LYMPHOCYTES # BLD AUTO: 1.05 K/UL — SIGNIFICANT CHANGE UP (ref 1–3.3)
LYMPHOCYTES # BLD AUTO: 1.07 K/UL
LYMPHOCYTES # BLD AUTO: 1.09 K/UL — SIGNIFICANT CHANGE UP (ref 1–3.3)
LYMPHOCYTES # BLD AUTO: 1.12 K/UL — SIGNIFICANT CHANGE UP (ref 1–3.3)
LYMPHOCYTES # BLD AUTO: 1.23 K/UL — SIGNIFICANT CHANGE UP (ref 1–3.3)
LYMPHOCYTES # BLD AUTO: 1.24 K/UL — SIGNIFICANT CHANGE UP (ref 1–3.3)
LYMPHOCYTES # BLD AUTO: 1.25 K/UL — SIGNIFICANT CHANGE UP (ref 1–3.3)
LYMPHOCYTES # BLD AUTO: 1.25 K/UL — SIGNIFICANT CHANGE UP (ref 1–3.3)
LYMPHOCYTES # BLD AUTO: 1.28 K/UL — SIGNIFICANT CHANGE UP (ref 1–3.3)
LYMPHOCYTES # BLD AUTO: 1.3 K/UL — SIGNIFICANT CHANGE UP (ref 1–3.3)
LYMPHOCYTES # BLD AUTO: 1.3 K/UL — SIGNIFICANT CHANGE UP (ref 1–3.3)
LYMPHOCYTES # BLD AUTO: 1.37 K/UL — SIGNIFICANT CHANGE UP (ref 1–3.3)
LYMPHOCYTES # BLD AUTO: 1.47 K/UL — SIGNIFICANT CHANGE UP (ref 1–3.3)
LYMPHOCYTES # BLD AUTO: 1.53 K/UL — SIGNIFICANT CHANGE UP (ref 1–3.3)
LYMPHOCYTES # BLD AUTO: 1.58 K/UL — SIGNIFICANT CHANGE UP (ref 1–3.3)
LYMPHOCYTES # BLD AUTO: 1.65 K/UL — SIGNIFICANT CHANGE UP (ref 1–3.3)
LYMPHOCYTES # BLD AUTO: 1.7 % — LOW (ref 13–44)
LYMPHOCYTES # BLD AUTO: 1.7 % — LOW (ref 13–44)
LYMPHOCYTES # BLD AUTO: 1.7 K/UL — SIGNIFICANT CHANGE UP (ref 1–3.3)
LYMPHOCYTES # BLD AUTO: 1.7 K/UL — SIGNIFICANT CHANGE UP (ref 1–3.3)
LYMPHOCYTES # BLD AUTO: 1.75 K/UL — SIGNIFICANT CHANGE UP (ref 1–3.3)
LYMPHOCYTES # BLD AUTO: 1.79 K/UL — SIGNIFICANT CHANGE UP (ref 1–3.3)
LYMPHOCYTES # BLD AUTO: 1.8 % — LOW (ref 13–44)
LYMPHOCYTES # BLD AUTO: 1.8 K/UL — SIGNIFICANT CHANGE UP (ref 1–3.3)
LYMPHOCYTES # BLD AUTO: 1.81 K/UL — SIGNIFICANT CHANGE UP (ref 1–3.3)
LYMPHOCYTES # BLD AUTO: 1.81 K/UL — SIGNIFICANT CHANGE UP (ref 1–3.3)
LYMPHOCYTES # BLD AUTO: 1.88 K/UL — SIGNIFICANT CHANGE UP (ref 1–3.3)
LYMPHOCYTES # BLD AUTO: 1.93 K/UL — SIGNIFICANT CHANGE UP (ref 1–3.3)
LYMPHOCYTES # BLD AUTO: 1.95 K/UL — SIGNIFICANT CHANGE UP (ref 1–3.3)
LYMPHOCYTES # BLD AUTO: 10 % — LOW (ref 13–44)
LYMPHOCYTES # BLD AUTO: 10.1 % — LOW (ref 13–44)
LYMPHOCYTES # BLD AUTO: 10.3 % — LOW (ref 13–44)
LYMPHOCYTES # BLD AUTO: 10.6 % — LOW (ref 13–44)
LYMPHOCYTES # BLD AUTO: 10.7 % — LOW (ref 13–44)
LYMPHOCYTES # BLD AUTO: 11.9 % — LOW (ref 13–44)
LYMPHOCYTES # BLD AUTO: 12.2 % — LOW (ref 13–44)
LYMPHOCYTES # BLD AUTO: 12.3 % — LOW (ref 13–44)
LYMPHOCYTES # BLD AUTO: 13.1 % — SIGNIFICANT CHANGE UP (ref 13–44)
LYMPHOCYTES # BLD AUTO: 13.7 % — SIGNIFICANT CHANGE UP (ref 13–44)
LYMPHOCYTES # BLD AUTO: 14 % — SIGNIFICANT CHANGE UP (ref 13–44)
LYMPHOCYTES # BLD AUTO: 14.3 % — SIGNIFICANT CHANGE UP (ref 13–44)
LYMPHOCYTES # BLD AUTO: 14.8 % — SIGNIFICANT CHANGE UP (ref 13–44)
LYMPHOCYTES # BLD AUTO: 15 % — SIGNIFICANT CHANGE UP (ref 13–44)
LYMPHOCYTES # BLD AUTO: 17.9 % — SIGNIFICANT CHANGE UP (ref 13–44)
LYMPHOCYTES # BLD AUTO: 19.4 % — SIGNIFICANT CHANGE UP (ref 13–44)
LYMPHOCYTES # BLD AUTO: 2 % — LOW (ref 13–44)
LYMPHOCYTES # BLD AUTO: 2.04 K/UL — SIGNIFICANT CHANGE UP (ref 1–3.3)
LYMPHOCYTES # BLD AUTO: 2.04 K/UL — SIGNIFICANT CHANGE UP (ref 1–3.3)
LYMPHOCYTES # BLD AUTO: 2.06 K/UL — SIGNIFICANT CHANGE UP (ref 1–3.3)
LYMPHOCYTES # BLD AUTO: 2.16 K/UL — SIGNIFICANT CHANGE UP (ref 1–3.3)
LYMPHOCYTES # BLD AUTO: 2.34 K/UL — SIGNIFICANT CHANGE UP (ref 1–3.3)
LYMPHOCYTES # BLD AUTO: 2.41 K/UL — SIGNIFICANT CHANGE UP (ref 1–3.3)
LYMPHOCYTES # BLD AUTO: 2.72 K/UL — SIGNIFICANT CHANGE UP (ref 1–3.3)
LYMPHOCYTES # BLD AUTO: 2.85 K/UL — SIGNIFICANT CHANGE UP (ref 1–3.3)
LYMPHOCYTES # BLD AUTO: 25.2 % — SIGNIFICANT CHANGE UP (ref 13–44)
LYMPHOCYTES # BLD AUTO: 3.2 % — LOW (ref 13–44)
LYMPHOCYTES # BLD AUTO: 3.5 % — LOW (ref 13–44)
LYMPHOCYTES # BLD AUTO: 3.5 % — LOW (ref 13–44)
LYMPHOCYTES # BLD AUTO: 4.4 % — LOW (ref 13–44)
LYMPHOCYTES # BLD AUTO: 4.8 % — LOW (ref 13–44)
LYMPHOCYTES # BLD AUTO: 5.1 % — LOW (ref 13–44)
LYMPHOCYTES # BLD AUTO: 5.2 % — LOW (ref 13–44)
LYMPHOCYTES # BLD AUTO: 5.3 % — LOW (ref 13–44)
LYMPHOCYTES # BLD AUTO: 5.5 % — LOW (ref 13–44)
LYMPHOCYTES # BLD AUTO: 5.7 % — LOW (ref 13–44)
LYMPHOCYTES # BLD AUTO: 5.7 % — LOW (ref 13–44)
LYMPHOCYTES # BLD AUTO: 5.9 % — LOW (ref 13–44)
LYMPHOCYTES # BLD AUTO: 6 % — LOW (ref 13–44)
LYMPHOCYTES # BLD AUTO: 6.1 % — LOW (ref 13–44)
LYMPHOCYTES # BLD AUTO: 6.4 % — LOW (ref 13–44)
LYMPHOCYTES # BLD AUTO: 6.7 % — LOW (ref 13–44)
LYMPHOCYTES # BLD AUTO: 6.8 % — LOW (ref 13–44)
LYMPHOCYTES # BLD AUTO: 6.9 % — LOW (ref 13–44)
LYMPHOCYTES # BLD AUTO: 7 % — LOW (ref 13–44)
LYMPHOCYTES # BLD AUTO: 7.2 % — LOW (ref 13–44)
LYMPHOCYTES # BLD AUTO: 7.6 % — LOW (ref 13–44)
LYMPHOCYTES # BLD AUTO: 7.8 % — LOW (ref 13–44)
LYMPHOCYTES # BLD AUTO: 7.9 % — LOW (ref 13–44)
LYMPHOCYTES # BLD AUTO: 8 % — LOW (ref 13–44)
LYMPHOCYTES # BLD AUTO: 8 % — LOW (ref 13–44)
LYMPHOCYTES # BLD AUTO: 8.6 % — LOW (ref 13–44)
LYMPHOCYTES # BLD AUTO: 8.6 % — LOW (ref 13–44)
LYMPHOCYTES # BLD AUTO: 8.8 % — LOW (ref 13–44)
LYMPHOCYTES # BLD AUTO: 9 % — LOW (ref 13–44)
LYMPHOCYTES # BLD AUTO: 9.2 % — LOW (ref 13–44)
LYMPHOCYTES # FLD: 36 % — SIGNIFICANT CHANGE UP
LYMPHOCYTES # FLD: 43 % — SIGNIFICANT CHANGE UP
LYMPHOCYTES # FLD: 66 % — SIGNIFICANT CHANGE UP
LYMPHOCYTES NFR BLD AUTO: 11 %
MACROCYTES BLD QL: SLIGHT — SIGNIFICANT CHANGE UP
MACROCYTES BLD QL: SLIGHT — SIGNIFICANT CHANGE UP
MAGNESIUM SERPL-MCNC: 1.4 MG/DL — LOW (ref 1.6–2.6)
MAGNESIUM SERPL-MCNC: 1.5 MG/DL — LOW (ref 1.6–2.6)
MAGNESIUM SERPL-MCNC: 1.6 MG/DL — SIGNIFICANT CHANGE UP (ref 1.6–2.6)
MAGNESIUM SERPL-MCNC: 1.7 MG/DL — SIGNIFICANT CHANGE UP (ref 1.6–2.6)
MAGNESIUM SERPL-MCNC: 1.8 MG/DL — SIGNIFICANT CHANGE UP (ref 1.6–2.6)
MAGNESIUM SERPL-MCNC: 1.9 MG/DL — SIGNIFICANT CHANGE UP (ref 1.6–2.6)
MAGNESIUM SERPL-MCNC: 2 MG/DL — SIGNIFICANT CHANGE UP (ref 1.6–2.6)
MAGNESIUM SERPL-MCNC: 2.1 MG/DL — SIGNIFICANT CHANGE UP (ref 1.6–2.6)
MAGNESIUM SERPL-MCNC: 2.2 MG/DL — SIGNIFICANT CHANGE UP (ref 1.6–2.6)
MAGNESIUM SERPL-MCNC: 2.3 MG/DL — SIGNIFICANT CHANGE UP (ref 1.6–2.6)
MAGNESIUM SERPL-MCNC: 2.3 MG/DL — SIGNIFICANT CHANGE UP (ref 1.6–2.6)
MAGNESIUM SERPL-MCNC: 2.4 MG/DL — SIGNIFICANT CHANGE UP (ref 1.6–2.6)
MAGNESIUM SERPL-MCNC: 2.6 MG/DL — SIGNIFICANT CHANGE UP (ref 1.6–2.6)
MAN DIFF?: NORMAL
MANUAL SMEAR VERIFICATION: SIGNIFICANT CHANGE UP
MCHC RBC-ENTMCNC: 22 PG — LOW (ref 27–34)
MCHC RBC-ENTMCNC: 22.1 PG — LOW (ref 27–34)
MCHC RBC-ENTMCNC: 22.3 PG — LOW (ref 27–34)
MCHC RBC-ENTMCNC: 22.4 PG — LOW (ref 27–34)
MCHC RBC-ENTMCNC: 22.5 PG — LOW (ref 27–34)
MCHC RBC-ENTMCNC: 22.6 PG — LOW (ref 27–34)
MCHC RBC-ENTMCNC: 22.8 PG — LOW (ref 27–34)
MCHC RBC-ENTMCNC: 22.9 PG — LOW (ref 27–34)
MCHC RBC-ENTMCNC: 22.9 PG — LOW (ref 27–34)
MCHC RBC-ENTMCNC: 23.2 PG — LOW (ref 27–34)
MCHC RBC-ENTMCNC: 23.3 PG — LOW (ref 27–34)
MCHC RBC-ENTMCNC: 23.4 PG — LOW (ref 27–34)
MCHC RBC-ENTMCNC: 23.4 PG — LOW (ref 27–34)
MCHC RBC-ENTMCNC: 23.5 PG — LOW (ref 27–34)
MCHC RBC-ENTMCNC: 23.5 PG — LOW (ref 27–34)
MCHC RBC-ENTMCNC: 23.6 PG — LOW (ref 27–34)
MCHC RBC-ENTMCNC: 23.6 PG — LOW (ref 27–34)
MCHC RBC-ENTMCNC: 23.7 PG — LOW (ref 27–34)
MCHC RBC-ENTMCNC: 23.8 PG — LOW (ref 27–34)
MCHC RBC-ENTMCNC: 23.8 PG — LOW (ref 27–34)
MCHC RBC-ENTMCNC: 23.9 PG — LOW (ref 27–34)
MCHC RBC-ENTMCNC: 23.9 PG — LOW (ref 27–34)
MCHC RBC-ENTMCNC: 24 PG — LOW (ref 27–34)
MCHC RBC-ENTMCNC: 24.2 PG — LOW (ref 27–34)
MCHC RBC-ENTMCNC: 24.3 PG — LOW (ref 27–34)
MCHC RBC-ENTMCNC: 24.4 PG — LOW (ref 27–34)
MCHC RBC-ENTMCNC: 24.8 PG — LOW (ref 27–34)
MCHC RBC-ENTMCNC: 24.9 PG — LOW (ref 27–34)
MCHC RBC-ENTMCNC: 25 PG — LOW (ref 27–34)
MCHC RBC-ENTMCNC: 25.2 PG — LOW (ref 27–34)
MCHC RBC-ENTMCNC: 25.2 PG — LOW (ref 27–34)
MCHC RBC-ENTMCNC: 25.4 PG — LOW (ref 27–34)
MCHC RBC-ENTMCNC: 25.5 PG — LOW (ref 27–34)
MCHC RBC-ENTMCNC: 25.6 PG — LOW (ref 27–34)
MCHC RBC-ENTMCNC: 25.7 PG — LOW (ref 27–34)
MCHC RBC-ENTMCNC: 25.9 PG — LOW (ref 27–34)
MCHC RBC-ENTMCNC: 26 PG — LOW (ref 27–34)
MCHC RBC-ENTMCNC: 26.2 PG — LOW (ref 27–34)
MCHC RBC-ENTMCNC: 26.2 PG — LOW (ref 27–34)
MCHC RBC-ENTMCNC: 26.3 PG — LOW (ref 27–34)
MCHC RBC-ENTMCNC: 26.3 PG — LOW (ref 27–34)
MCHC RBC-ENTMCNC: 26.5 PG — LOW (ref 27–34)
MCHC RBC-ENTMCNC: 26.5 PG — LOW (ref 27–34)
MCHC RBC-ENTMCNC: 26.7 PG — LOW (ref 27–34)
MCHC RBC-ENTMCNC: 26.7 PG — LOW (ref 27–34)
MCHC RBC-ENTMCNC: 26.8 PG — LOW (ref 27–34)
MCHC RBC-ENTMCNC: 26.9 PG — LOW (ref 27–34)
MCHC RBC-ENTMCNC: 27 PG — SIGNIFICANT CHANGE UP (ref 27–34)
MCHC RBC-ENTMCNC: 27.2 PG — SIGNIFICANT CHANGE UP (ref 27–34)
MCHC RBC-ENTMCNC: 27.3 PG — SIGNIFICANT CHANGE UP (ref 27–34)
MCHC RBC-ENTMCNC: 27.4 PG — SIGNIFICANT CHANGE UP (ref 27–34)
MCHC RBC-ENTMCNC: 27.5 PG — SIGNIFICANT CHANGE UP (ref 27–34)
MCHC RBC-ENTMCNC: 27.5 PG — SIGNIFICANT CHANGE UP (ref 27–34)
MCHC RBC-ENTMCNC: 27.6 PG
MCHC RBC-ENTMCNC: 27.6 PG — SIGNIFICANT CHANGE UP (ref 27–34)
MCHC RBC-ENTMCNC: 27.9 PG — SIGNIFICANT CHANGE UP (ref 27–34)
MCHC RBC-ENTMCNC: 28 PG — SIGNIFICANT CHANGE UP (ref 27–34)
MCHC RBC-ENTMCNC: 28.1 GM/DL — LOW (ref 32–36)
MCHC RBC-ENTMCNC: 28.1 PG — SIGNIFICANT CHANGE UP (ref 27–34)
MCHC RBC-ENTMCNC: 28.1 PG — SIGNIFICANT CHANGE UP (ref 27–34)
MCHC RBC-ENTMCNC: 28.2 PG — SIGNIFICANT CHANGE UP (ref 27–34)
MCHC RBC-ENTMCNC: 28.3 GM/DL — LOW (ref 32–36)
MCHC RBC-ENTMCNC: 28.4 GM/DL — LOW (ref 32–36)
MCHC RBC-ENTMCNC: 28.5 GM/DL — LOW (ref 32–36)
MCHC RBC-ENTMCNC: 28.6 GM/DL — LOW (ref 32–36)
MCHC RBC-ENTMCNC: 28.7 GM/DL — LOW (ref 32–36)
MCHC RBC-ENTMCNC: 28.7 GM/DL — LOW (ref 32–36)
MCHC RBC-ENTMCNC: 28.8 GM/DL — LOW (ref 32–36)
MCHC RBC-ENTMCNC: 28.9 GM/DL
MCHC RBC-ENTMCNC: 28.9 GM/DL — LOW (ref 32–36)
MCHC RBC-ENTMCNC: 29 GM/DL — LOW (ref 32–36)
MCHC RBC-ENTMCNC: 29.1 GM/DL — LOW (ref 32–36)
MCHC RBC-ENTMCNC: 29.2 GM/DL — LOW (ref 32–36)
MCHC RBC-ENTMCNC: 29.3 GM/DL — LOW (ref 32–36)
MCHC RBC-ENTMCNC: 29.4 GM/DL — LOW (ref 32–36)
MCHC RBC-ENTMCNC: 29.5 GM/DL — LOW (ref 32–36)
MCHC RBC-ENTMCNC: 29.6 GM/DL — LOW (ref 32–36)
MCHC RBC-ENTMCNC: 29.7 GM/DL — LOW (ref 32–36)
MCHC RBC-ENTMCNC: 29.8 GM/DL — LOW (ref 32–36)
MCHC RBC-ENTMCNC: 29.8 GM/DL — LOW (ref 32–36)
MCHC RBC-ENTMCNC: 29.9 GM/DL — LOW (ref 32–36)
MCHC RBC-ENTMCNC: 29.9 GM/DL — LOW (ref 32–36)
MCHC RBC-ENTMCNC: 30 GM/DL — LOW (ref 32–36)
MCHC RBC-ENTMCNC: 30 GM/DL — LOW (ref 32–36)
MCHC RBC-ENTMCNC: 30.1 GM/DL — LOW (ref 32–36)
MCHC RBC-ENTMCNC: 30.2 GM/DL — LOW (ref 32–36)
MCHC RBC-ENTMCNC: 30.2 GM/DL — LOW (ref 32–36)
MCHC RBC-ENTMCNC: 30.3 GM/DL — LOW (ref 32–36)
MCHC RBC-ENTMCNC: 30.4 GM/DL — LOW (ref 32–36)
MCHC RBC-ENTMCNC: 30.4 GM/DL — LOW (ref 32–36)
MCHC RBC-ENTMCNC: 30.6 GM/DL — LOW (ref 32–36)
MCHC RBC-ENTMCNC: 30.7 GM/DL — LOW (ref 32–36)
MCHC RBC-ENTMCNC: 30.9 GM/DL — LOW (ref 32–36)
MCHC RBC-ENTMCNC: 31.1 GM/DL — LOW (ref 32–36)
MCHC RBC-ENTMCNC: 31.7 GM/DL — LOW (ref 32–36)
MCHC RBC-ENTMCNC: 33.3 GM/DL — SIGNIFICANT CHANGE UP (ref 32–36)
MCV RBC AUTO: 76.4 FL — LOW (ref 80–100)
MCV RBC AUTO: 76.6 FL — LOW (ref 80–100)
MCV RBC AUTO: 76.6 FL — LOW (ref 80–100)
MCV RBC AUTO: 78 FL — LOW (ref 80–100)
MCV RBC AUTO: 78 FL — LOW (ref 80–100)
MCV RBC AUTO: 78.3 FL — LOW (ref 80–100)
MCV RBC AUTO: 78.4 FL — LOW (ref 80–100)
MCV RBC AUTO: 78.6 FL — LOW (ref 80–100)
MCV RBC AUTO: 79.2 FL — LOW (ref 80–100)
MCV RBC AUTO: 79.4 FL — LOW (ref 80–100)
MCV RBC AUTO: 79.5 FL — LOW (ref 80–100)
MCV RBC AUTO: 79.5 FL — LOW (ref 80–100)
MCV RBC AUTO: 79.6 FL — LOW (ref 80–100)
MCV RBC AUTO: 79.7 FL — LOW (ref 80–100)
MCV RBC AUTO: 79.7 FL — LOW (ref 80–100)
MCV RBC AUTO: 79.8 FL — LOW (ref 80–100)
MCV RBC AUTO: 80 FL — SIGNIFICANT CHANGE UP (ref 80–100)
MCV RBC AUTO: 80.1 FL — SIGNIFICANT CHANGE UP (ref 80–100)
MCV RBC AUTO: 80.2 FL — SIGNIFICANT CHANGE UP (ref 80–100)
MCV RBC AUTO: 80.4 FL — SIGNIFICANT CHANGE UP (ref 80–100)
MCV RBC AUTO: 80.6 FL — SIGNIFICANT CHANGE UP (ref 80–100)
MCV RBC AUTO: 80.9 FL — SIGNIFICANT CHANGE UP (ref 80–100)
MCV RBC AUTO: 80.9 FL — SIGNIFICANT CHANGE UP (ref 80–100)
MCV RBC AUTO: 81.1 FL — SIGNIFICANT CHANGE UP (ref 80–100)
MCV RBC AUTO: 81.4 FL — SIGNIFICANT CHANGE UP (ref 80–100)
MCV RBC AUTO: 81.7 FL — SIGNIFICANT CHANGE UP (ref 80–100)
MCV RBC AUTO: 82 FL — SIGNIFICANT CHANGE UP (ref 80–100)
MCV RBC AUTO: 82.9 FL — SIGNIFICANT CHANGE UP (ref 80–100)
MCV RBC AUTO: 84.3 FL — SIGNIFICANT CHANGE UP (ref 80–100)
MCV RBC AUTO: 85.5 FL — SIGNIFICANT CHANGE UP (ref 80–100)
MCV RBC AUTO: 86.1 FL — SIGNIFICANT CHANGE UP (ref 80–100)
MCV RBC AUTO: 86.2 FL — SIGNIFICANT CHANGE UP (ref 80–100)
MCV RBC AUTO: 86.3 FL — SIGNIFICANT CHANGE UP (ref 80–100)
MCV RBC AUTO: 86.3 FL — SIGNIFICANT CHANGE UP (ref 80–100)
MCV RBC AUTO: 86.4 FL — SIGNIFICANT CHANGE UP (ref 80–100)
MCV RBC AUTO: 86.4 FL — SIGNIFICANT CHANGE UP (ref 80–100)
MCV RBC AUTO: 86.5 FL — SIGNIFICANT CHANGE UP (ref 80–100)
MCV RBC AUTO: 86.6 FL — SIGNIFICANT CHANGE UP (ref 80–100)
MCV RBC AUTO: 86.8 FL — SIGNIFICANT CHANGE UP (ref 80–100)
MCV RBC AUTO: 87 FL — SIGNIFICANT CHANGE UP (ref 80–100)
MCV RBC AUTO: 87.2 FL — SIGNIFICANT CHANGE UP (ref 80–100)
MCV RBC AUTO: 87.5 FL — SIGNIFICANT CHANGE UP (ref 80–100)
MCV RBC AUTO: 87.5 FL — SIGNIFICANT CHANGE UP (ref 80–100)
MCV RBC AUTO: 87.7 FL — SIGNIFICANT CHANGE UP (ref 80–100)
MCV RBC AUTO: 87.7 FL — SIGNIFICANT CHANGE UP (ref 80–100)
MCV RBC AUTO: 87.8 FL — SIGNIFICANT CHANGE UP (ref 80–100)
MCV RBC AUTO: 88.1 FL — SIGNIFICANT CHANGE UP (ref 80–100)
MCV RBC AUTO: 88.2 FL — SIGNIFICANT CHANGE UP (ref 80–100)
MCV RBC AUTO: 88.2 FL — SIGNIFICANT CHANGE UP (ref 80–100)
MCV RBC AUTO: 88.3 FL — SIGNIFICANT CHANGE UP (ref 80–100)
MCV RBC AUTO: 88.5 FL — SIGNIFICANT CHANGE UP (ref 80–100)
MCV RBC AUTO: 88.8 FL — SIGNIFICANT CHANGE UP (ref 80–100)
MCV RBC AUTO: 89.1 FL — SIGNIFICANT CHANGE UP (ref 80–100)
MCV RBC AUTO: 89.6 FL — SIGNIFICANT CHANGE UP (ref 80–100)
MCV RBC AUTO: 90 FL — SIGNIFICANT CHANGE UP (ref 80–100)
MCV RBC AUTO: 91.2 FL — SIGNIFICANT CHANGE UP (ref 80–100)
MCV RBC AUTO: 91.3 FL — SIGNIFICANT CHANGE UP (ref 80–100)
MCV RBC AUTO: 91.6 FL — SIGNIFICANT CHANGE UP (ref 80–100)
MCV RBC AUTO: 91.8 FL — SIGNIFICANT CHANGE UP (ref 80–100)
MCV RBC AUTO: 93 FL — SIGNIFICANT CHANGE UP (ref 80–100)
MCV RBC AUTO: 93.6 FL — SIGNIFICANT CHANGE UP (ref 80–100)
MCV RBC AUTO: 94.3 FL — SIGNIFICANT CHANGE UP (ref 80–100)
MCV RBC AUTO: 94.6 FL — SIGNIFICANT CHANGE UP (ref 80–100)
MCV RBC AUTO: 94.6 FL — SIGNIFICANT CHANGE UP (ref 80–100)
MCV RBC AUTO: 95.5 FL
MESOTHL CELL # FLD: 20 % — SIGNIFICANT CHANGE UP
MESOTHL CELL # FLD: 8 % — SIGNIFICANT CHANGE UP
METAMYELOCYTES # FLD: 0.9 % — HIGH (ref 0–0)
METAMYELOCYTES # FLD: 1 % — HIGH (ref 0–0)
METAMYELOCYTES # FLD: 1 % — HIGH (ref 0–0)
METAMYELOCYTES # FLD: 1.7 % — HIGH (ref 0–0)
METHOD TYPE: SIGNIFICANT CHANGE UP
MICROCYTES BLD QL: SIGNIFICANT CHANGE UP
MICROCYTES BLD QL: SLIGHT — SIGNIFICANT CHANGE UP
MONOCYTES # BLD AUTO: 0 K/UL — SIGNIFICANT CHANGE UP (ref 0–0.9)
MONOCYTES # BLD AUTO: 0.14 K/UL — SIGNIFICANT CHANGE UP (ref 0–0.9)
MONOCYTES # BLD AUTO: 0.14 K/UL — SIGNIFICANT CHANGE UP (ref 0–0.9)
MONOCYTES # BLD AUTO: 0.15 K/UL — SIGNIFICANT CHANGE UP (ref 0–0.9)
MONOCYTES # BLD AUTO: 0.22 K/UL — SIGNIFICANT CHANGE UP (ref 0–0.9)
MONOCYTES # BLD AUTO: 0.28 K/UL — SIGNIFICANT CHANGE UP (ref 0–0.9)
MONOCYTES # BLD AUTO: 0.36 K/UL — SIGNIFICANT CHANGE UP (ref 0–0.9)
MONOCYTES # BLD AUTO: 0.36 K/UL — SIGNIFICANT CHANGE UP (ref 0–0.9)
MONOCYTES # BLD AUTO: 0.39 K/UL — SIGNIFICANT CHANGE UP (ref 0–0.9)
MONOCYTES # BLD AUTO: 0.41 K/UL — SIGNIFICANT CHANGE UP (ref 0–0.9)
MONOCYTES # BLD AUTO: 0.44 K/UL — SIGNIFICANT CHANGE UP (ref 0–0.9)
MONOCYTES # BLD AUTO: 0.45 K/UL — SIGNIFICANT CHANGE UP (ref 0–0.9)
MONOCYTES # BLD AUTO: 0.65 K/UL — SIGNIFICANT CHANGE UP (ref 0–0.9)
MONOCYTES # BLD AUTO: 0.72 K/UL — SIGNIFICANT CHANGE UP (ref 0–0.9)
MONOCYTES # BLD AUTO: 0.72 K/UL — SIGNIFICANT CHANGE UP (ref 0–0.9)
MONOCYTES # BLD AUTO: 0.8 K/UL
MONOCYTES # BLD AUTO: 0.81 K/UL — SIGNIFICANT CHANGE UP (ref 0–0.9)
MONOCYTES # BLD AUTO: 0.84 K/UL — SIGNIFICANT CHANGE UP (ref 0–0.9)
MONOCYTES # BLD AUTO: 0.86 K/UL — SIGNIFICANT CHANGE UP (ref 0–0.9)
MONOCYTES # BLD AUTO: 0.91 K/UL — HIGH (ref 0–0.9)
MONOCYTES # BLD AUTO: 0.96 K/UL — HIGH (ref 0–0.9)
MONOCYTES # BLD AUTO: 1.02 K/UL — HIGH (ref 0–0.9)
MONOCYTES # BLD AUTO: 1.08 K/UL — HIGH (ref 0–0.9)
MONOCYTES # BLD AUTO: 1.09 K/UL — HIGH (ref 0–0.9)
MONOCYTES # BLD AUTO: 1.09 K/UL — HIGH (ref 0–0.9)
MONOCYTES # BLD AUTO: 1.33 K/UL — HIGH (ref 0–0.9)
MONOCYTES # BLD AUTO: 1.33 K/UL — HIGH (ref 0–0.9)
MONOCYTES # BLD AUTO: 1.43 K/UL — HIGH (ref 0–0.9)
MONOCYTES # BLD AUTO: 1.45 K/UL — HIGH (ref 0–0.9)
MONOCYTES # BLD AUTO: 1.5 K/UL — HIGH (ref 0–0.9)
MONOCYTES # BLD AUTO: 1.51 K/UL — HIGH (ref 0–0.9)
MONOCYTES # BLD AUTO: 1.53 K/UL — HIGH (ref 0–0.9)
MONOCYTES # BLD AUTO: 1.64 K/UL — HIGH (ref 0–0.9)
MONOCYTES # BLD AUTO: 1.64 K/UL — HIGH (ref 0–0.9)
MONOCYTES # BLD AUTO: 1.72 K/UL — HIGH (ref 0–0.9)
MONOCYTES # BLD AUTO: 1.77 K/UL — HIGH (ref 0–0.9)
MONOCYTES # BLD AUTO: 1.78 K/UL — HIGH (ref 0–0.9)
MONOCYTES # BLD AUTO: 1.79 K/UL — HIGH (ref 0–0.9)
MONOCYTES # BLD AUTO: 1.8 K/UL — HIGH (ref 0–0.9)
MONOCYTES # BLD AUTO: 1.89 K/UL — HIGH (ref 0–0.9)
MONOCYTES # BLD AUTO: 1.89 K/UL — HIGH (ref 0–0.9)
MONOCYTES # BLD AUTO: 1.94 K/UL — HIGH (ref 0–0.9)
MONOCYTES # BLD AUTO: 1.97 K/UL — HIGH (ref 0–0.9)
MONOCYTES # BLD AUTO: 2 K/UL — HIGH (ref 0–0.9)
MONOCYTES # BLD AUTO: 2.05 K/UL — HIGH (ref 0–0.9)
MONOCYTES # BLD AUTO: 2.24 K/UL — HIGH (ref 0–0.9)
MONOCYTES # BLD AUTO: 2.26 K/UL — HIGH (ref 0–0.9)
MONOCYTES # BLD AUTO: 2.37 K/UL — HIGH (ref 0–0.9)
MONOCYTES # BLD AUTO: 2.42 K/UL — HIGH (ref 0–0.9)
MONOCYTES # BLD AUTO: 2.46 K/UL — HIGH (ref 0–0.9)
MONOCYTES # BLD AUTO: 2.76 K/UL — HIGH (ref 0–0.9)
MONOCYTES # BLD AUTO: 2.81 K/UL — HIGH (ref 0–0.9)
MONOCYTES # BLD AUTO: 2.91 K/UL — HIGH (ref 0–0.9)
MONOCYTES # BLD AUTO: 2.99 K/UL — HIGH (ref 0–0.9)
MONOCYTES # BLD AUTO: 3.09 K/UL — HIGH (ref 0–0.9)
MONOCYTES # BLD AUTO: 3.19 K/UL — HIGH (ref 0–0.9)
MONOCYTES # BLD AUTO: 3.32 K/UL — HIGH (ref 0–0.9)
MONOCYTES # BLD AUTO: 6.29 K/UL — HIGH (ref 0–0.9)
MONOCYTES NFR BLD AUTO: 0 % — LOW (ref 2–14)
MONOCYTES NFR BLD AUTO: 0.9 % — LOW (ref 2–14)
MONOCYTES NFR BLD AUTO: 1.4 % — LOW (ref 2–14)
MONOCYTES NFR BLD AUTO: 1.7 % — LOW (ref 2–14)
MONOCYTES NFR BLD AUTO: 1.7 % — LOW (ref 2–14)
MONOCYTES NFR BLD AUTO: 10 % — SIGNIFICANT CHANGE UP (ref 2–14)
MONOCYTES NFR BLD AUTO: 10 % — SIGNIFICANT CHANGE UP (ref 2–14)
MONOCYTES NFR BLD AUTO: 10.2 % — SIGNIFICANT CHANGE UP (ref 2–14)
MONOCYTES NFR BLD AUTO: 10.4 % — SIGNIFICANT CHANGE UP (ref 2–14)
MONOCYTES NFR BLD AUTO: 11 % — SIGNIFICANT CHANGE UP (ref 2–14)
MONOCYTES NFR BLD AUTO: 12 % — SIGNIFICANT CHANGE UP (ref 2–14)
MONOCYTES NFR BLD AUTO: 12.3 % — SIGNIFICANT CHANGE UP (ref 2–14)
MONOCYTES NFR BLD AUTO: 13 % — SIGNIFICANT CHANGE UP (ref 2–14)
MONOCYTES NFR BLD AUTO: 13 % — SIGNIFICANT CHANGE UP (ref 2–14)
MONOCYTES NFR BLD AUTO: 13.5 % — SIGNIFICANT CHANGE UP (ref 2–14)
MONOCYTES NFR BLD AUTO: 13.6 % — SIGNIFICANT CHANGE UP (ref 2–14)
MONOCYTES NFR BLD AUTO: 13.8 % — SIGNIFICANT CHANGE UP (ref 2–14)
MONOCYTES NFR BLD AUTO: 14.5 % — HIGH (ref 2–14)
MONOCYTES NFR BLD AUTO: 14.9 % — HIGH (ref 2–14)
MONOCYTES NFR BLD AUTO: 15 % — HIGH (ref 2–14)
MONOCYTES NFR BLD AUTO: 15.7 % — HIGH (ref 2–14)
MONOCYTES NFR BLD AUTO: 16.5 % — HIGH (ref 2–14)
MONOCYTES NFR BLD AUTO: 16.8 % — HIGH (ref 2–14)
MONOCYTES NFR BLD AUTO: 18 % — HIGH (ref 2–14)
MONOCYTES NFR BLD AUTO: 2.1 % — SIGNIFICANT CHANGE UP (ref 2–14)
MONOCYTES NFR BLD AUTO: 2.3 % — SIGNIFICANT CHANGE UP (ref 2–14)
MONOCYTES NFR BLD AUTO: 2.6 % — SIGNIFICANT CHANGE UP (ref 2–14)
MONOCYTES NFR BLD AUTO: 22.3 % — HIGH (ref 2–14)
MONOCYTES NFR BLD AUTO: 23.7 % — HIGH (ref 2–14)
MONOCYTES NFR BLD AUTO: 24.4 % — HIGH (ref 2–14)
MONOCYTES NFR BLD AUTO: 3.3 % — SIGNIFICANT CHANGE UP (ref 2–14)
MONOCYTES NFR BLD AUTO: 3.5 % — SIGNIFICANT CHANGE UP (ref 2–14)
MONOCYTES NFR BLD AUTO: 4 % — SIGNIFICANT CHANGE UP (ref 2–14)
MONOCYTES NFR BLD AUTO: 4.3 % — SIGNIFICANT CHANGE UP (ref 2–14)
MONOCYTES NFR BLD AUTO: 4.4 % — SIGNIFICANT CHANGE UP (ref 2–14)
MONOCYTES NFR BLD AUTO: 4.9 % — SIGNIFICANT CHANGE UP (ref 2–14)
MONOCYTES NFR BLD AUTO: 5 % — SIGNIFICANT CHANGE UP (ref 2–14)
MONOCYTES NFR BLD AUTO: 5.2 % — SIGNIFICANT CHANGE UP (ref 2–14)
MONOCYTES NFR BLD AUTO: 5.2 % — SIGNIFICANT CHANGE UP (ref 2–14)
MONOCYTES NFR BLD AUTO: 5.3 % — SIGNIFICANT CHANGE UP (ref 2–14)
MONOCYTES NFR BLD AUTO: 5.3 % — SIGNIFICANT CHANGE UP (ref 2–14)
MONOCYTES NFR BLD AUTO: 6.1 % — SIGNIFICANT CHANGE UP (ref 2–14)
MONOCYTES NFR BLD AUTO: 6.1 % — SIGNIFICANT CHANGE UP (ref 2–14)
MONOCYTES NFR BLD AUTO: 6.4 % — SIGNIFICANT CHANGE UP (ref 2–14)
MONOCYTES NFR BLD AUTO: 7 % — SIGNIFICANT CHANGE UP (ref 2–14)
MONOCYTES NFR BLD AUTO: 7.2 % — SIGNIFICANT CHANGE UP (ref 2–14)
MONOCYTES NFR BLD AUTO: 7.8 % — SIGNIFICANT CHANGE UP (ref 2–14)
MONOCYTES NFR BLD AUTO: 7.8 % — SIGNIFICANT CHANGE UP (ref 2–14)
MONOCYTES NFR BLD AUTO: 8 % — SIGNIFICANT CHANGE UP (ref 2–14)
MONOCYTES NFR BLD AUTO: 8.2 %
MONOCYTES NFR BLD AUTO: 8.2 % — SIGNIFICANT CHANGE UP (ref 2–14)
MONOCYTES NFR BLD AUTO: 8.4 % — SIGNIFICANT CHANGE UP (ref 2–14)
MONOCYTES NFR BLD AUTO: 8.6 % — SIGNIFICANT CHANGE UP (ref 2–14)
MONOCYTES NFR BLD AUTO: 8.6 % — SIGNIFICANT CHANGE UP (ref 2–14)
MONOCYTES NFR BLD AUTO: 8.9 % — SIGNIFICANT CHANGE UP (ref 2–14)
MONOCYTES NFR BLD AUTO: 9 % — SIGNIFICANT CHANGE UP (ref 2–14)
MONOCYTES NFR BLD AUTO: 9.6 % — SIGNIFICANT CHANGE UP (ref 2–14)
MONOCYTES NFR BLD AUTO: 9.8 % — SIGNIFICANT CHANGE UP (ref 2–14)
MONOS+MACROS # FLD: 12 % — SIGNIFICANT CHANGE UP
MONOS+MACROS # FLD: 13 % — SIGNIFICANT CHANGE UP
MONOS+MACROS # FLD: 30 % — SIGNIFICANT CHANGE UP
MYELOCYTES NFR BLD: 0.9 % — HIGH (ref 0–0)
MYELOCYTES NFR BLD: 0.9 % — HIGH (ref 0–0)
MYELOCYTES NFR BLD: 1 % — HIGH (ref 0–0)
MYELOCYTES NFR BLD: 1 % — HIGH (ref 0–0)
MYELOCYTES NFR BLD: 1.7 % — HIGH (ref 0–0)
MYELOCYTES NFR BLD: 2.6 % — HIGH (ref 0–0)
MYELOCYTES NFR BLD: 3 % — HIGH (ref 0–0)
NEUTROPHILS # BLD AUTO: 0.93 K/UL — LOW (ref 1.8–7.4)
NEUTROPHILS # BLD AUTO: 1.87 K/UL — SIGNIFICANT CHANGE UP (ref 1.8–7.4)
NEUTROPHILS # BLD AUTO: 10.94 K/UL — HIGH (ref 1.8–7.4)
NEUTROPHILS # BLD AUTO: 11.24 K/UL — HIGH (ref 1.8–7.4)
NEUTROPHILS # BLD AUTO: 11.33 K/UL — HIGH (ref 1.8–7.4)
NEUTROPHILS # BLD AUTO: 12.75 K/UL — HIGH (ref 1.8–7.4)
NEUTROPHILS # BLD AUTO: 13.2 K/UL — HIGH (ref 1.8–7.4)
NEUTROPHILS # BLD AUTO: 13.23 K/UL — HIGH (ref 1.8–7.4)
NEUTROPHILS # BLD AUTO: 13.77 K/UL — HIGH (ref 1.8–7.4)
NEUTROPHILS # BLD AUTO: 13.8 K/UL — HIGH (ref 1.8–7.4)
NEUTROPHILS # BLD AUTO: 14.04 K/UL — HIGH (ref 1.8–7.4)
NEUTROPHILS # BLD AUTO: 14.1 K/UL — HIGH (ref 1.8–7.4)
NEUTROPHILS # BLD AUTO: 14.43 K/UL — HIGH (ref 1.8–7.4)
NEUTROPHILS # BLD AUTO: 14.45 K/UL — HIGH (ref 1.8–7.4)
NEUTROPHILS # BLD AUTO: 15.04 K/UL — HIGH (ref 1.8–7.4)
NEUTROPHILS # BLD AUTO: 16.69 K/UL — HIGH (ref 1.8–7.4)
NEUTROPHILS # BLD AUTO: 16.88 K/UL — HIGH (ref 1.8–7.4)
NEUTROPHILS # BLD AUTO: 17.81 K/UL — HIGH (ref 1.8–7.4)
NEUTROPHILS # BLD AUTO: 17.98 K/UL — HIGH (ref 1.8–7.4)
NEUTROPHILS # BLD AUTO: 18.33 K/UL — HIGH (ref 1.8–7.4)
NEUTROPHILS # BLD AUTO: 18.97 K/UL — HIGH (ref 1.8–7.4)
NEUTROPHILS # BLD AUTO: 19.04 K/UL — HIGH (ref 1.8–7.4)
NEUTROPHILS # BLD AUTO: 19.26 K/UL — HIGH (ref 1.8–7.4)
NEUTROPHILS # BLD AUTO: 2.75 K/UL — SIGNIFICANT CHANGE UP (ref 1.8–7.4)
NEUTROPHILS # BLD AUTO: 20.27 K/UL — HIGH (ref 1.8–7.4)
NEUTROPHILS # BLD AUTO: 20.3 K/UL — HIGH (ref 1.8–7.4)
NEUTROPHILS # BLD AUTO: 22.79 K/UL — HIGH (ref 1.8–7.4)
NEUTROPHILS # BLD AUTO: 23.08 K/UL — HIGH (ref 1.8–7.4)
NEUTROPHILS # BLD AUTO: 23.08 K/UL — HIGH (ref 1.8–7.4)
NEUTROPHILS # BLD AUTO: 24.84 K/UL — HIGH (ref 1.8–7.4)
NEUTROPHILS # BLD AUTO: 26.5 K/UL — HIGH (ref 1.8–7.4)
NEUTROPHILS # BLD AUTO: 27.1 K/UL — HIGH (ref 1.8–7.4)
NEUTROPHILS # BLD AUTO: 27.24 K/UL — HIGH (ref 1.8–7.4)
NEUTROPHILS # BLD AUTO: 28.08 K/UL — HIGH (ref 1.8–7.4)
NEUTROPHILS # BLD AUTO: 28.9 K/UL — HIGH (ref 1.8–7.4)
NEUTROPHILS # BLD AUTO: 29.17 K/UL — HIGH (ref 1.8–7.4)
NEUTROPHILS # BLD AUTO: 3.08 K/UL — SIGNIFICANT CHANGE UP (ref 1.8–7.4)
NEUTROPHILS # BLD AUTO: 3.26 K/UL — SIGNIFICANT CHANGE UP (ref 1.8–7.4)
NEUTROPHILS # BLD AUTO: 3.97 K/UL — SIGNIFICANT CHANGE UP (ref 1.8–7.4)
NEUTROPHILS # BLD AUTO: 30.16 K/UL — HIGH (ref 1.8–7.4)
NEUTROPHILS # BLD AUTO: 31.61 K/UL — HIGH (ref 1.8–7.4)
NEUTROPHILS # BLD AUTO: 31.79 K/UL — HIGH (ref 1.8–7.4)
NEUTROPHILS # BLD AUTO: 34.21 K/UL — HIGH (ref 1.8–7.4)
NEUTROPHILS # BLD AUTO: 34.78 K/UL — HIGH (ref 1.8–7.4)
NEUTROPHILS # BLD AUTO: 4.34 K/UL — SIGNIFICANT CHANGE UP (ref 1.8–7.4)
NEUTROPHILS # BLD AUTO: 4.57 K/UL — SIGNIFICANT CHANGE UP (ref 1.8–7.4)
NEUTROPHILS # BLD AUTO: 4.97 K/UL — SIGNIFICANT CHANGE UP (ref 1.8–7.4)
NEUTROPHILS # BLD AUTO: 6.38 K/UL — SIGNIFICANT CHANGE UP (ref 1.8–7.4)
NEUTROPHILS # BLD AUTO: 6.59 K/UL — SIGNIFICANT CHANGE UP (ref 1.8–7.4)
NEUTROPHILS # BLD AUTO: 6.95 K/UL
NEUTROPHILS # BLD AUTO: 7.13 K/UL — SIGNIFICANT CHANGE UP (ref 1.8–7.4)
NEUTROPHILS # BLD AUTO: 7.53 K/UL — HIGH (ref 1.8–7.4)
NEUTROPHILS # BLD AUTO: 8.46 K/UL — HIGH (ref 1.8–7.4)
NEUTROPHILS # BLD AUTO: 9.3 K/UL — HIGH (ref 1.8–7.4)
NEUTROPHILS # BLD AUTO: 9.55 K/UL — HIGH (ref 1.8–7.4)
NEUTROPHILS # BLD AUTO: 9.72 K/UL — HIGH (ref 1.8–7.4)
NEUTROPHILS # BLD AUTO: 9.83 K/UL — HIGH (ref 1.8–7.4)
NEUTROPHILS # BLD AUTO: 9.86 K/UL — HIGH (ref 1.8–7.4)
NEUTROPHILS NFR BLD AUTO: 42.9 % — LOW (ref 43–77)
NEUTROPHILS NFR BLD AUTO: 50.4 % — SIGNIFICANT CHANGE UP (ref 43–77)
NEUTROPHILS NFR BLD AUTO: 61.4 % — SIGNIFICANT CHANGE UP (ref 43–77)
NEUTROPHILS NFR BLD AUTO: 61.8 % — SIGNIFICANT CHANGE UP (ref 43–77)
NEUTROPHILS NFR BLD AUTO: 67 % — SIGNIFICANT CHANGE UP (ref 43–77)
NEUTROPHILS NFR BLD AUTO: 67.1 % — SIGNIFICANT CHANGE UP (ref 43–77)
NEUTROPHILS NFR BLD AUTO: 68 % — SIGNIFICANT CHANGE UP (ref 43–77)
NEUTROPHILS NFR BLD AUTO: 69.2 % — SIGNIFICANT CHANGE UP (ref 43–77)
NEUTROPHILS NFR BLD AUTO: 71.5 % — SIGNIFICANT CHANGE UP (ref 43–77)
NEUTROPHILS NFR BLD AUTO: 71.6 %
NEUTROPHILS NFR BLD AUTO: 72.9 % — SIGNIFICANT CHANGE UP (ref 43–77)
NEUTROPHILS NFR BLD AUTO: 73.3 % — SIGNIFICANT CHANGE UP (ref 43–77)
NEUTROPHILS NFR BLD AUTO: 73.5 % — SIGNIFICANT CHANGE UP (ref 43–77)
NEUTROPHILS NFR BLD AUTO: 73.5 % — SIGNIFICANT CHANGE UP (ref 43–77)
NEUTROPHILS NFR BLD AUTO: 74.2 % — SIGNIFICANT CHANGE UP (ref 43–77)
NEUTROPHILS NFR BLD AUTO: 75.9 % — SIGNIFICANT CHANGE UP (ref 43–77)
NEUTROPHILS NFR BLD AUTO: 76 % — SIGNIFICANT CHANGE UP (ref 43–77)
NEUTROPHILS NFR BLD AUTO: 76 % — SIGNIFICANT CHANGE UP (ref 43–77)
NEUTROPHILS NFR BLD AUTO: 77.6 % — HIGH (ref 43–77)
NEUTROPHILS NFR BLD AUTO: 77.6 % — HIGH (ref 43–77)
NEUTROPHILS NFR BLD AUTO: 78.1 % — HIGH (ref 43–77)
NEUTROPHILS NFR BLD AUTO: 79 % — HIGH (ref 43–77)
NEUTROPHILS NFR BLD AUTO: 79.3 % — HIGH (ref 43–77)
NEUTROPHILS NFR BLD AUTO: 79.5 % — HIGH (ref 43–77)
NEUTROPHILS NFR BLD AUTO: 80.7 % — HIGH (ref 43–77)
NEUTROPHILS NFR BLD AUTO: 80.8 % — HIGH (ref 43–77)
NEUTROPHILS NFR BLD AUTO: 81 % — HIGH (ref 43–77)
NEUTROPHILS NFR BLD AUTO: 81 % — HIGH (ref 43–77)
NEUTROPHILS NFR BLD AUTO: 81.1 % — HIGH (ref 43–77)
NEUTROPHILS NFR BLD AUTO: 81.2 % — HIGH (ref 43–77)
NEUTROPHILS NFR BLD AUTO: 81.7 % — HIGH (ref 43–77)
NEUTROPHILS NFR BLD AUTO: 82.1 % — HIGH (ref 43–77)
NEUTROPHILS NFR BLD AUTO: 82.1 % — HIGH (ref 43–77)
NEUTROPHILS NFR BLD AUTO: 83 % — HIGH (ref 43–77)
NEUTROPHILS NFR BLD AUTO: 84.4 % — HIGH (ref 43–77)
NEUTROPHILS NFR BLD AUTO: 84.7 % — HIGH (ref 43–77)
NEUTROPHILS NFR BLD AUTO: 84.8 % — HIGH (ref 43–77)
NEUTROPHILS NFR BLD AUTO: 85.2 % — HIGH (ref 43–77)
NEUTROPHILS NFR BLD AUTO: 85.5 % — HIGH (ref 43–77)
NEUTROPHILS NFR BLD AUTO: 86.2 % — HIGH (ref 43–77)
NEUTROPHILS NFR BLD AUTO: 87 % — HIGH (ref 43–77)
NEUTROPHILS NFR BLD AUTO: 87 % — HIGH (ref 43–77)
NEUTROPHILS NFR BLD AUTO: 87.1 % — HIGH (ref 43–77)
NEUTROPHILS NFR BLD AUTO: 87.8 % — HIGH (ref 43–77)
NEUTROPHILS NFR BLD AUTO: 88.2 % — HIGH (ref 43–77)
NEUTROPHILS NFR BLD AUTO: 88.3 % — HIGH (ref 43–77)
NEUTROPHILS NFR BLD AUTO: 88.6 % — HIGH (ref 43–77)
NEUTROPHILS NFR BLD AUTO: 88.6 % — HIGH (ref 43–77)
NEUTROPHILS NFR BLD AUTO: 88.7 % — HIGH (ref 43–77)
NEUTROPHILS NFR BLD AUTO: 89.5 % — HIGH (ref 43–77)
NEUTROPHILS NFR BLD AUTO: 90.4 % — HIGH (ref 43–77)
NEUTROPHILS NFR BLD AUTO: 91.8 % — HIGH (ref 43–77)
NEUTROPHILS NFR BLD AUTO: 92.1 % — HIGH (ref 43–77)
NEUTROPHILS NFR BLD AUTO: 93 % — HIGH (ref 43–77)
NEUTROPHILS NFR BLD AUTO: 93.1 % — HIGH (ref 43–77)
NEUTROPHILS NFR BLD AUTO: 95.6 % — HIGH (ref 43–77)
NEUTS BAND # BLD: 0.8 % — SIGNIFICANT CHANGE UP (ref 0–8)
NEUTS BAND # BLD: 0.9 % — SIGNIFICANT CHANGE UP (ref 0–8)
NEUTS BAND # BLD: 0.9 % — SIGNIFICANT CHANGE UP (ref 0–8)
NEUTS BAND # BLD: 1 % — SIGNIFICANT CHANGE UP (ref 0–8)
NEUTS BAND # BLD: 1 % — SIGNIFICANT CHANGE UP (ref 0–8)
NEUTS BAND # BLD: 2 % — SIGNIFICANT CHANGE UP (ref 0–8)
NEUTS BAND # BLD: 2.6 % — SIGNIFICANT CHANGE UP (ref 0–8)
NEUTS BAND # BLD: 20.5 % — HIGH (ref 0–8)
NEUTS BAND # BLD: 7 % — SIGNIFICANT CHANGE UP (ref 0–8)
NITRITE UR-MCNC: NEGATIVE — SIGNIFICANT CHANGE UP
NON-GYNECOLOGICAL CYTOLOGY STUDY: SIGNIFICANT CHANGE UP
NRBC # BLD: 0 /100 WBCS — SIGNIFICANT CHANGE UP (ref 0–0)
NRBC # BLD: 0 /100 — SIGNIFICANT CHANGE UP (ref 0–0)
NRBC # BLD: 1 /100 WBCS — HIGH (ref 0–0)
NRBC # BLD: 1 /100 WBCS — HIGH (ref 0–0)
NRBC # BLD: 1 /100 — HIGH (ref 0–0)
NRBC # BLD: 2 /100 — HIGH (ref 0–0)
NRBC # BLD: SIGNIFICANT CHANGE UP /100 WBCS (ref 0–0)
NRBC # FLD: 2 % — HIGH (ref 0–0)
NT-PROBNP SERPL-SCNC: 295 PG/ML — SIGNIFICANT CHANGE UP (ref 0–300)
OB PNL STL: NEGATIVE — SIGNIFICANT CHANGE UP
OB PNL STL: POSITIVE
ORGANISM # SPEC MICROSCOPIC CNT: SIGNIFICANT CHANGE UP
OSMOLALITY SERPL: 278 MOSMOL/KG — SIGNIFICANT CHANGE UP (ref 275–300)
OSMOLALITY SERPL: 286 MOSMOL/KG — SIGNIFICANT CHANGE UP (ref 275–300)
OSMOLALITY UR: 254 MOS/KG — LOW (ref 300–900)
OSMOLALITY UR: 409 MOSM/KG — SIGNIFICANT CHANGE UP (ref 50–1200)
OTHER CELLS FLD MANUAL: 1 % — SIGNIFICANT CHANGE UP
OTHER CELLS FLD MANUAL: 6 % — SIGNIFICANT CHANGE UP
OTHER CELLS FLD MANUAL: 6 % — SIGNIFICANT CHANGE UP
OVALOCYTES BLD QL SMEAR: SLIGHT — SIGNIFICANT CHANGE UP
PH FLD: 7.69 — SIGNIFICANT CHANGE UP
PH FLD: 7.91 — SIGNIFICANT CHANGE UP
PH UR: 6 — SIGNIFICANT CHANGE UP (ref 5–8)
PH UR: 6.5 — SIGNIFICANT CHANGE UP (ref 5–8)
PH UR: 7 — SIGNIFICANT CHANGE UP (ref 5–8)
PH UR: 7 — SIGNIFICANT CHANGE UP (ref 5–8)
PH UR: 7.5 — SIGNIFICANT CHANGE UP (ref 5–8)
PHOSPHATE SERPL-MCNC: 0.7 MG/DL — CRITICAL LOW (ref 2.5–4.5)
PHOSPHATE SERPL-MCNC: 1.2 MG/DL — LOW (ref 2.5–4.5)
PHOSPHATE SERPL-MCNC: 1.3 MG/DL — LOW (ref 2.5–4.5)
PHOSPHATE SERPL-MCNC: 1.4 MG/DL — LOW (ref 2.5–4.5)
PHOSPHATE SERPL-MCNC: 1.5 MG/DL — LOW (ref 2.5–4.5)
PHOSPHATE SERPL-MCNC: 1.6 MG/DL — LOW (ref 2.5–4.5)
PHOSPHATE SERPL-MCNC: 1.7 MG/DL — LOW (ref 2.5–4.5)
PHOSPHATE SERPL-MCNC: 1.8 MG/DL — LOW (ref 2.5–4.5)
PHOSPHATE SERPL-MCNC: 1.9 MG/DL — LOW (ref 2.5–4.5)
PHOSPHATE SERPL-MCNC: 2 MG/DL — LOW (ref 2.5–4.5)
PHOSPHATE SERPL-MCNC: 2.1 MG/DL — LOW (ref 2.5–4.5)
PHOSPHATE SERPL-MCNC: 2.1 MG/DL — LOW (ref 2.5–4.5)
PHOSPHATE SERPL-MCNC: 2.2 MG/DL — LOW (ref 2.5–4.5)
PHOSPHATE SERPL-MCNC: 2.3 MG/DL — LOW (ref 2.5–4.5)
PHOSPHATE SERPL-MCNC: 2.4 MG/DL — LOW (ref 2.5–4.5)
PHOSPHATE SERPL-MCNC: 2.5 MG/DL — SIGNIFICANT CHANGE UP (ref 2.5–4.5)
PHOSPHATE SERPL-MCNC: 2.7 MG/DL — SIGNIFICANT CHANGE UP (ref 2.5–4.5)
PHOSPHATE SERPL-MCNC: 2.8 MG/DL — SIGNIFICANT CHANGE UP (ref 2.5–4.5)
PHOSPHATE SERPL-MCNC: 2.8 MG/DL — SIGNIFICANT CHANGE UP (ref 2.5–4.5)
PHOSPHATE SERPL-MCNC: 2.9 MG/DL — SIGNIFICANT CHANGE UP (ref 2.5–4.5)
PHOSPHATE SERPL-MCNC: 3 MG/DL — SIGNIFICANT CHANGE UP (ref 2.5–4.5)
PHOSPHATE SERPL-MCNC: 3.1 MG/DL — SIGNIFICANT CHANGE UP (ref 2.5–4.5)
PHOSPHATE SERPL-MCNC: 3.3 MG/DL — SIGNIFICANT CHANGE UP (ref 2.5–4.5)
PHOSPHATE SERPL-MCNC: 4.9 MG/DL — HIGH (ref 2.5–4.5)
PHOSPHATE SERPL-MCNC: 5.6 MG/DL — HIGH (ref 2.5–4.5)
PHOSPHATE SERPL-MCNC: 8.5 MG/DL — HIGH (ref 2.5–4.5)
PLAT MORPH BLD: ABNORMAL
PLAT MORPH BLD: NORMAL — SIGNIFICANT CHANGE UP
PLATELET # BLD AUTO: 111 K/UL — LOW (ref 150–400)
PLATELET # BLD AUTO: 158 K/UL — SIGNIFICANT CHANGE UP (ref 150–400)
PLATELET # BLD AUTO: 166 K/UL — SIGNIFICANT CHANGE UP (ref 150–400)
PLATELET # BLD AUTO: 204 K/UL — SIGNIFICANT CHANGE UP (ref 150–400)
PLATELET # BLD AUTO: 219 K/UL — SIGNIFICANT CHANGE UP (ref 150–400)
PLATELET # BLD AUTO: 220 K/UL — SIGNIFICANT CHANGE UP (ref 150–400)
PLATELET # BLD AUTO: 249 K/UL — SIGNIFICANT CHANGE UP (ref 150–400)
PLATELET # BLD AUTO: 260 K/UL — SIGNIFICANT CHANGE UP (ref 150–400)
PLATELET # BLD AUTO: 267 K/UL — SIGNIFICANT CHANGE UP (ref 150–400)
PLATELET # BLD AUTO: 277 K/UL — SIGNIFICANT CHANGE UP (ref 150–400)
PLATELET # BLD AUTO: 302 K/UL — SIGNIFICANT CHANGE UP (ref 150–400)
PLATELET # BLD AUTO: 303 K/UL — SIGNIFICANT CHANGE UP (ref 150–400)
PLATELET # BLD AUTO: 321 K/UL — SIGNIFICANT CHANGE UP (ref 150–400)
PLATELET # BLD AUTO: 328 K/UL
PLATELET # BLD AUTO: 332 K/UL — SIGNIFICANT CHANGE UP (ref 150–400)
PLATELET # BLD AUTO: 351 K/UL — SIGNIFICANT CHANGE UP (ref 150–400)
PLATELET # BLD AUTO: 356 K/UL — SIGNIFICANT CHANGE UP (ref 150–400)
PLATELET # BLD AUTO: 362 K/UL — SIGNIFICANT CHANGE UP (ref 150–400)
PLATELET # BLD AUTO: 368 K/UL — SIGNIFICANT CHANGE UP (ref 150–400)
PLATELET # BLD AUTO: 379 K/UL — SIGNIFICANT CHANGE UP (ref 150–400)
PLATELET # BLD AUTO: 382 K/UL — SIGNIFICANT CHANGE UP (ref 150–400)
PLATELET # BLD AUTO: 388 K/UL — SIGNIFICANT CHANGE UP (ref 150–400)
PLATELET # BLD AUTO: 390 K/UL — SIGNIFICANT CHANGE UP (ref 150–400)
PLATELET # BLD AUTO: 396 K/UL — SIGNIFICANT CHANGE UP (ref 150–400)
PLATELET # BLD AUTO: 401 K/UL — HIGH (ref 150–400)
PLATELET # BLD AUTO: 403 K/UL — HIGH (ref 150–400)
PLATELET # BLD AUTO: 406 K/UL — HIGH (ref 150–400)
PLATELET # BLD AUTO: 409 K/UL — HIGH (ref 150–400)
PLATELET # BLD AUTO: 417 K/UL — HIGH (ref 150–400)
PLATELET # BLD AUTO: 418 K/UL — HIGH (ref 150–400)
PLATELET # BLD AUTO: 423 K/UL — HIGH (ref 150–400)
PLATELET # BLD AUTO: 424 K/UL — HIGH (ref 150–400)
PLATELET # BLD AUTO: 430 K/UL — HIGH (ref 150–400)
PLATELET # BLD AUTO: 432 K/UL — HIGH (ref 150–400)
PLATELET # BLD AUTO: 440 K/UL — HIGH (ref 150–400)
PLATELET # BLD AUTO: 440 K/UL — HIGH (ref 150–400)
PLATELET # BLD AUTO: 445 K/UL — HIGH (ref 150–400)
PLATELET # BLD AUTO: 449 K/UL — HIGH (ref 150–400)
PLATELET # BLD AUTO: 463 K/UL — HIGH (ref 150–400)
PLATELET # BLD AUTO: 496 K/UL — HIGH (ref 150–400)
PLATELET # BLD AUTO: 505 K/UL — HIGH (ref 150–400)
PLATELET # BLD AUTO: 506 K/UL — HIGH (ref 150–400)
PLATELET # BLD AUTO: 507 K/UL — HIGH (ref 150–400)
PLATELET # BLD AUTO: 521 K/UL — HIGH (ref 150–400)
PLATELET # BLD AUTO: 521 K/UL — HIGH (ref 150–400)
PLATELET # BLD AUTO: 534 K/UL — HIGH (ref 150–400)
PLATELET # BLD AUTO: 540 K/UL — HIGH (ref 150–400)
PLATELET # BLD AUTO: 549 K/UL — HIGH (ref 150–400)
PLATELET # BLD AUTO: 552 K/UL — HIGH (ref 150–400)
PLATELET # BLD AUTO: 554 K/UL — HIGH (ref 150–400)
PLATELET # BLD AUTO: 555 K/UL — HIGH (ref 150–400)
PLATELET # BLD AUTO: 556 K/UL — HIGH (ref 150–400)
PLATELET # BLD AUTO: 558 K/UL — HIGH (ref 150–400)
PLATELET # BLD AUTO: 571 K/UL — HIGH (ref 150–400)
PLATELET # BLD AUTO: 572 K/UL — HIGH (ref 150–400)
PLATELET # BLD AUTO: 575 K/UL — HIGH (ref 150–400)
PLATELET # BLD AUTO: 582 K/UL — HIGH (ref 150–400)
PLATELET # BLD AUTO: 585 K/UL — HIGH (ref 150–400)
PLATELET # BLD AUTO: 591 K/UL — HIGH (ref 150–400)
PLATELET # BLD AUTO: 593 K/UL — HIGH (ref 150–400)
PLATELET # BLD AUTO: 594 K/UL — HIGH (ref 150–400)
PLATELET # BLD AUTO: 597 K/UL — HIGH (ref 150–400)
PLATELET # BLD AUTO: 608 K/UL — HIGH (ref 150–400)
PLATELET # BLD AUTO: 611 K/UL — HIGH (ref 150–400)
PLATELET # BLD AUTO: 611 K/UL — HIGH (ref 150–400)
PLATELET # BLD AUTO: 615 K/UL — HIGH (ref 150–400)
PLATELET # BLD AUTO: 615 K/UL — HIGH (ref 150–400)
PLATELET # BLD AUTO: 617 K/UL — HIGH (ref 150–400)
PLATELET # BLD AUTO: 621 K/UL — HIGH (ref 150–400)
PLATELET # BLD AUTO: 633 K/UL — HIGH (ref 150–400)
PLATELET # BLD AUTO: 636 K/UL — HIGH (ref 150–400)
POIKILOCYTOSIS BLD QL AUTO: SLIGHT — SIGNIFICANT CHANGE UP
POLYCHROMASIA BLD QL SMEAR: SLIGHT — SIGNIFICANT CHANGE UP
POTASSIUM SERPL-MCNC: 3.2 MMOL/L — LOW (ref 3.5–5.3)
POTASSIUM SERPL-MCNC: 3.2 MMOL/L — LOW (ref 3.5–5.3)
POTASSIUM SERPL-MCNC: 3.3 MMOL/L — LOW (ref 3.5–5.3)
POTASSIUM SERPL-MCNC: 3.3 MMOL/L — LOW (ref 3.5–5.3)
POTASSIUM SERPL-MCNC: 3.4 MMOL/L — LOW (ref 3.5–5.3)
POTASSIUM SERPL-MCNC: 3.5 MMOL/L — SIGNIFICANT CHANGE UP (ref 3.5–5.3)
POTASSIUM SERPL-MCNC: 3.6 MMOL/L — SIGNIFICANT CHANGE UP (ref 3.5–5.3)
POTASSIUM SERPL-MCNC: 3.7 MMOL/L — SIGNIFICANT CHANGE UP (ref 3.5–5.3)
POTASSIUM SERPL-MCNC: 3.8 MMOL/L — SIGNIFICANT CHANGE UP (ref 3.5–5.3)
POTASSIUM SERPL-MCNC: 3.9 MMOL/L — SIGNIFICANT CHANGE UP (ref 3.5–5.3)
POTASSIUM SERPL-MCNC: 4 MMOL/L — SIGNIFICANT CHANGE UP (ref 3.5–5.3)
POTASSIUM SERPL-MCNC: 4.1 MMOL/L — SIGNIFICANT CHANGE UP (ref 3.5–5.3)
POTASSIUM SERPL-MCNC: 4.2 MMOL/L — SIGNIFICANT CHANGE UP (ref 3.5–5.3)
POTASSIUM SERPL-MCNC: 4.3 MMOL/L — SIGNIFICANT CHANGE UP (ref 3.5–5.3)
POTASSIUM SERPL-MCNC: 4.3 MMOL/L — SIGNIFICANT CHANGE UP (ref 3.5–5.3)
POTASSIUM SERPL-MCNC: 4.4 MMOL/L — SIGNIFICANT CHANGE UP (ref 3.5–5.3)
POTASSIUM SERPL-MCNC: 4.6 MMOL/L — SIGNIFICANT CHANGE UP (ref 3.5–5.3)
POTASSIUM SERPL-MCNC: 4.7 MMOL/L — SIGNIFICANT CHANGE UP (ref 3.5–5.3)
POTASSIUM SERPL-MCNC: 4.7 MMOL/L — SIGNIFICANT CHANGE UP (ref 3.5–5.3)
POTASSIUM SERPL-MCNC: 5 MMOL/L — SIGNIFICANT CHANGE UP (ref 3.5–5.3)
POTASSIUM SERPL-MCNC: 5.5 MMOL/L — HIGH (ref 3.5–5.3)
POTASSIUM SERPL-SCNC: 3.2 MMOL/L — LOW (ref 3.5–5.3)
POTASSIUM SERPL-SCNC: 3.2 MMOL/L — LOW (ref 3.5–5.3)
POTASSIUM SERPL-SCNC: 3.3 MMOL/L — LOW (ref 3.5–5.3)
POTASSIUM SERPL-SCNC: 3.3 MMOL/L — LOW (ref 3.5–5.3)
POTASSIUM SERPL-SCNC: 3.4 MMOL/L — LOW (ref 3.5–5.3)
POTASSIUM SERPL-SCNC: 3.5 MMOL/L — SIGNIFICANT CHANGE UP (ref 3.5–5.3)
POTASSIUM SERPL-SCNC: 3.6 MMOL/L — SIGNIFICANT CHANGE UP (ref 3.5–5.3)
POTASSIUM SERPL-SCNC: 3.7 MMOL/L — SIGNIFICANT CHANGE UP (ref 3.5–5.3)
POTASSIUM SERPL-SCNC: 3.8 MMOL/L — SIGNIFICANT CHANGE UP (ref 3.5–5.3)
POTASSIUM SERPL-SCNC: 3.9 MMOL/L — SIGNIFICANT CHANGE UP (ref 3.5–5.3)
POTASSIUM SERPL-SCNC: 4 MMOL/L — SIGNIFICANT CHANGE UP (ref 3.5–5.3)
POTASSIUM SERPL-SCNC: 4.1 MMOL/L
POTASSIUM SERPL-SCNC: 4.1 MMOL/L — SIGNIFICANT CHANGE UP (ref 3.5–5.3)
POTASSIUM SERPL-SCNC: 4.2 MMOL/L — SIGNIFICANT CHANGE UP (ref 3.5–5.3)
POTASSIUM SERPL-SCNC: 4.3 MMOL/L — SIGNIFICANT CHANGE UP (ref 3.5–5.3)
POTASSIUM SERPL-SCNC: 4.3 MMOL/L — SIGNIFICANT CHANGE UP (ref 3.5–5.3)
POTASSIUM SERPL-SCNC: 4.4 MMOL/L — SIGNIFICANT CHANGE UP (ref 3.5–5.3)
POTASSIUM SERPL-SCNC: 4.6 MMOL/L — SIGNIFICANT CHANGE UP (ref 3.5–5.3)
POTASSIUM SERPL-SCNC: 4.7 MMOL/L — SIGNIFICANT CHANGE UP (ref 3.5–5.3)
POTASSIUM SERPL-SCNC: 4.7 MMOL/L — SIGNIFICANT CHANGE UP (ref 3.5–5.3)
POTASSIUM SERPL-SCNC: 5 MMOL/L — SIGNIFICANT CHANGE UP (ref 3.5–5.3)
POTASSIUM SERPL-SCNC: 5.5 MMOL/L — HIGH (ref 3.5–5.3)
PROCALCITONIN SERPL-MCNC: 15.91 NG/ML — HIGH (ref 0.02–0.1)
PROT FLD-MCNC: 3.5 G/DL — SIGNIFICANT CHANGE UP
PROT FLD-MCNC: 3.7 G/DL — SIGNIFICANT CHANGE UP
PROT FLD-MCNC: 4.2 G/DL — SIGNIFICANT CHANGE UP
PROT PATTERN SERPL ELPH-IMP: SIGNIFICANT CHANGE UP
PROT SERPL-MCNC: 6.1 G/DL — SIGNIFICANT CHANGE UP (ref 6–8.3)
PROT SERPL-MCNC: 6.2 G/DL — SIGNIFICANT CHANGE UP (ref 6–8.3)
PROT SERPL-MCNC: 6.3 G/DL — SIGNIFICANT CHANGE UP (ref 6–8.3)
PROT SERPL-MCNC: 6.4 G/DL — SIGNIFICANT CHANGE UP (ref 6–8.3)
PROT SERPL-MCNC: 6.5 G/DL — SIGNIFICANT CHANGE UP (ref 6–8.3)
PROT SERPL-MCNC: 6.6 G/DL — SIGNIFICANT CHANGE UP (ref 6–8.3)
PROT SERPL-MCNC: 6.7 G/DL — SIGNIFICANT CHANGE UP (ref 6–8.3)
PROT SERPL-MCNC: 6.8 G/DL — SIGNIFICANT CHANGE UP (ref 6–8.3)
PROT SERPL-MCNC: 6.9 G/DL — SIGNIFICANT CHANGE UP (ref 6–8.3)
PROT SERPL-MCNC: 7 G/DL
PROT SERPL-MCNC: 7 G/DL — SIGNIFICANT CHANGE UP (ref 6–8.3)
PROT SERPL-MCNC: 7.1 G/DL — SIGNIFICANT CHANGE UP (ref 6–8.3)
PROT SERPL-MCNC: 7.2 G/DL — SIGNIFICANT CHANGE UP (ref 6–8.3)
PROT SERPL-MCNC: 7.5 G/DL — SIGNIFICANT CHANGE UP (ref 6–8.3)
PROT SERPL-MCNC: 7.7 G/DL — SIGNIFICANT CHANGE UP (ref 6–8.3)
PROT SERPL-MCNC: 7.9 G/DL — SIGNIFICANT CHANGE UP (ref 6–8.3)
PROT UR-MCNC: ABNORMAL
PROT UR-MCNC: NEGATIVE — SIGNIFICANT CHANGE UP
PROT UR-MCNC: SIGNIFICANT CHANGE UP
PROT UR-MCNC: SIGNIFICANT CHANGE UP
PROTHROM AB SERPL-ACNC: 11.6 SEC — SIGNIFICANT CHANGE UP (ref 10–12.9)
PROTHROM AB SERPL-ACNC: 12 SEC — SIGNIFICANT CHANGE UP (ref 10–12.9)
PROTHROM AB SERPL-ACNC: 12.3 SEC — SIGNIFICANT CHANGE UP (ref 10–12.9)
PROTHROM AB SERPL-ACNC: 12.5 SEC — SIGNIFICANT CHANGE UP (ref 10–12.9)
PROTHROM AB SERPL-ACNC: 12.7 SEC — SIGNIFICANT CHANGE UP (ref 10–12.9)
PROTHROM AB SERPL-ACNC: 12.8 SEC — SIGNIFICANT CHANGE UP (ref 10–12.9)
PROTHROM AB SERPL-ACNC: 13 SEC — HIGH (ref 10–12.9)
PROTHROM AB SERPL-ACNC: 13 SEC — HIGH (ref 10–12.9)
PROTHROM AB SERPL-ACNC: 13.1 SEC — HIGH (ref 10–12.9)
PROTHROM AB SERPL-ACNC: 13.1 SEC — HIGH (ref 10–12.9)
PROTHROM AB SERPL-ACNC: 13.2 SEC — HIGH (ref 10–12.9)
PROTHROM AB SERPL-ACNC: 13.3 SEC — HIGH (ref 10–12.9)
PROTHROM AB SERPL-ACNC: 13.4 SEC — HIGH (ref 10–12.9)
PROTHROM AB SERPL-ACNC: 13.4 SEC — HIGH (ref 10–12.9)
PROTHROM AB SERPL-ACNC: 13.5 SEC — HIGH (ref 10–12.9)
PROTHROM AB SERPL-ACNC: 13.6 SEC — HIGH (ref 10–12.9)
PROTHROM AB SERPL-ACNC: 13.8 SEC — HIGH (ref 10–12.9)
PROTHROM AB SERPL-ACNC: 13.8 SEC — HIGH (ref 10–12.9)
PROTHROM AB SERPL-ACNC: 14 SEC — HIGH (ref 10–12.9)
PROTHROM AB SERPL-ACNC: 14.1 SEC — HIGH (ref 10–12.9)
PROTHROM AB SERPL-ACNC: 14.2 SEC — HIGH (ref 10–12.9)
PROTHROM AB SERPL-ACNC: 14.3 SEC — HIGH (ref 10–12.9)
PROTHROM AB SERPL-ACNC: 14.4 SEC — HIGH (ref 10–12.9)
PROTHROM AB SERPL-ACNC: 14.5 SEC — HIGH (ref 10–12.9)
PROTHROM AB SERPL-ACNC: 14.5 SEC — HIGH (ref 10–12.9)
PROTHROM AB SERPL-ACNC: 14.6 SEC — HIGH (ref 10–12.9)
PROTHROM AB SERPL-ACNC: 14.9 SEC — HIGH (ref 10–12.9)
PROTHROM AB SERPL-ACNC: 15 SEC — HIGH (ref 10–12.9)
PROTHROM AB SERPL-ACNC: 15.1 SEC — HIGH (ref 10–12.9)
PROTHROM AB SERPL-ACNC: 15.4 SEC — HIGH (ref 10–12.9)
PROTHROM AB SERPL-ACNC: 15.7 SEC — HIGH (ref 10–12.9)
PROTHROM AB SERPL-ACNC: 15.8 SEC — HIGH (ref 10–12.9)
PROTHROM AB SERPL-ACNC: 16 SEC — HIGH (ref 10–12.9)
PROTHROM AB SERPL-ACNC: 16.5 SEC — HIGH (ref 10–12.9)
PROTHROM AB SERPL-ACNC: 17 SEC — HIGH (ref 10–12.9)
PROTHROM AB SERPL-ACNC: 17.4 SEC — HIGH (ref 10–12.9)
PROTHROM AB SERPL-ACNC: 17.7 SEC — HIGH (ref 10–12.9)
PROTHROM AB SERPL-ACNC: 17.8 SEC — HIGH (ref 10–12.9)
PROTHROM AB SERPL-ACNC: 18.2 SEC — HIGH (ref 10–12.9)
PROTHROM AB SERPL-ACNC: 18.2 SEC — HIGH (ref 10–12.9)
PROTHROM AB SERPL-ACNC: 19.1 SEC — HIGH (ref 10–12.9)
PROTHROM AB SERPL-ACNC: 23.1 SEC — HIGH (ref 10–12.9)
PTH RELATED PROT SERPL-MCNC: <2 PMOL/L — SIGNIFICANT CHANGE UP
PTH-INTACT FLD-MCNC: 53 PG/ML — SIGNIFICANT CHANGE UP (ref 15–65)
RAPID RVP RESULT: DETECTED
RAPID RVP RESULT: SIGNIFICANT CHANGE UP
RBC # BLD: 2.56 M/UL — LOW (ref 3.8–5.2)
RBC # BLD: 2.6 M/UL — LOW (ref 3.8–5.2)
RBC # BLD: 2.65 M/UL — LOW (ref 3.8–5.2)
RBC # BLD: 2.66 M/UL — LOW (ref 3.8–5.2)
RBC # BLD: 2.67 M/UL — LOW (ref 3.8–5.2)
RBC # BLD: 2.7 M/UL — LOW (ref 3.8–5.2)
RBC # BLD: 2.7 M/UL — LOW (ref 3.8–5.2)
RBC # BLD: 2.72 M/UL — LOW (ref 3.8–5.2)
RBC # BLD: 2.76 M/UL — LOW (ref 3.8–5.2)
RBC # BLD: 2.83 M/UL — LOW (ref 3.8–5.2)
RBC # BLD: 2.88 M/UL — LOW (ref 3.8–5.2)
RBC # BLD: 2.92 M/UL — LOW (ref 3.8–5.2)
RBC # BLD: 2.94 M/UL — LOW (ref 3.8–5.2)
RBC # BLD: 2.98 M/UL — LOW (ref 3.8–5.2)
RBC # BLD: 2.99 M/UL — LOW (ref 3.8–5.2)
RBC # BLD: 2.99 M/UL — LOW (ref 3.8–5.2)
RBC # BLD: 3.01 M/UL — LOW (ref 3.8–5.2)
RBC # BLD: 3.01 M/UL — LOW (ref 3.8–5.2)
RBC # BLD: 3.03 M/UL — LOW (ref 3.8–5.2)
RBC # BLD: 3.04 M/UL — LOW (ref 3.8–5.2)
RBC # BLD: 3.05 M/UL — LOW (ref 3.8–5.2)
RBC # BLD: 3.06 M/UL — LOW (ref 3.8–5.2)
RBC # BLD: 3.09 M/UL — LOW (ref 3.8–5.2)
RBC # BLD: 3.09 M/UL — LOW (ref 3.8–5.2)
RBC # BLD: 3.11 M/UL — LOW (ref 3.8–5.2)
RBC # BLD: 3.12 M/UL — LOW (ref 3.8–5.2)
RBC # BLD: 3.12 M/UL — LOW (ref 3.8–5.2)
RBC # BLD: 3.13 M/UL — LOW (ref 3.8–5.2)
RBC # BLD: 3.14 M/UL — LOW (ref 3.8–5.2)
RBC # BLD: 3.15 M/UL — LOW (ref 3.8–5.2)
RBC # BLD: 3.16 M/UL — LOW (ref 3.8–5.2)
RBC # BLD: 3.18 M/UL — LOW (ref 3.8–5.2)
RBC # BLD: 3.19 M/UL — LOW (ref 3.8–5.2)
RBC # BLD: 3.21 M/UL — LOW (ref 3.8–5.2)
RBC # BLD: 3.27 M/UL — LOW (ref 3.8–5.2)
RBC # BLD: 3.27 M/UL — LOW (ref 3.8–5.2)
RBC # BLD: 3.28 M/UL — LOW (ref 3.8–5.2)
RBC # BLD: 3.28 M/UL — LOW (ref 3.8–5.2)
RBC # BLD: 3.29 M/UL — LOW (ref 3.8–5.2)
RBC # BLD: 3.3 M/UL
RBC # BLD: 3.3 M/UL — LOW (ref 3.8–5.2)
RBC # BLD: 3.32 M/UL — LOW (ref 3.8–5.2)
RBC # BLD: 3.32 M/UL — LOW (ref 3.8–5.2)
RBC # BLD: 3.35 M/UL — LOW (ref 3.8–5.2)
RBC # BLD: 3.35 M/UL — LOW (ref 3.8–5.2)
RBC # BLD: 3.36 M/UL — LOW (ref 3.8–5.2)
RBC # BLD: 3.38 M/UL — LOW (ref 3.8–5.2)
RBC # BLD: 3.38 M/UL — LOW (ref 3.8–5.2)
RBC # BLD: 3.4 M/UL — LOW (ref 3.8–5.2)
RBC # BLD: 3.41 M/UL — LOW (ref 3.8–5.2)
RBC # BLD: 3.41 M/UL — LOW (ref 3.8–5.2)
RBC # BLD: 3.44 M/UL — LOW (ref 3.8–5.2)
RBC # BLD: 3.44 M/UL — LOW (ref 3.8–5.2)
RBC # BLD: 3.46 M/UL — LOW (ref 3.8–5.2)
RBC # BLD: 3.5 M/UL — LOW (ref 3.8–5.2)
RBC # BLD: 3.55 M/UL — LOW (ref 3.8–5.2)
RBC # BLD: 3.66 M/UL — LOW (ref 3.8–5.2)
RBC # BLD: 3.94 M/UL — SIGNIFICANT CHANGE UP (ref 3.8–5.2)
RBC # BLD: 4.2 M/UL — SIGNIFICANT CHANGE UP (ref 3.8–5.2)
RBC # FLD: 19.6 % — HIGH (ref 10.3–14.5)
RBC # FLD: 19.7 % — HIGH (ref 10.3–14.5)
RBC # FLD: 19.9 % — HIGH (ref 10.3–14.5)
RBC # FLD: 20 % — HIGH (ref 10.3–14.5)
RBC # FLD: 20.1 % — HIGH (ref 10.3–14.5)
RBC # FLD: 20.1 % — HIGH (ref 10.3–14.5)
RBC # FLD: 20.5 % — HIGH (ref 10.3–14.5)
RBC # FLD: 20.7 % — HIGH (ref 10.3–14.5)
RBC # FLD: 20.9 % — HIGH (ref 10.3–14.5)
RBC # FLD: 21 % — HIGH (ref 10.3–14.5)
RBC # FLD: 21.1 % — HIGH (ref 10.3–14.5)
RBC # FLD: 21.1 % — HIGH (ref 10.3–14.5)
RBC # FLD: 21.2 % — HIGH (ref 10.3–14.5)
RBC # FLD: 21.3 % — HIGH (ref 10.3–14.5)
RBC # FLD: 21.5 %
RBC # FLD: 21.7 % — HIGH (ref 10.3–14.5)
RBC # FLD: 21.7 % — HIGH (ref 10.3–14.5)
RBC # FLD: 21.9 % — HIGH (ref 10.3–14.5)
RBC # FLD: 21.9 % — HIGH (ref 10.3–14.5)
RBC # FLD: 22.1 % — HIGH (ref 10.3–14.5)
RBC # FLD: 22.1 % — HIGH (ref 10.3–14.5)
RBC # FLD: 22.3 % — HIGH (ref 10.3–14.5)
RBC # FLD: 22.4 % — HIGH (ref 10.3–14.5)
RBC # FLD: 22.5 % — HIGH (ref 10.3–14.5)
RBC # FLD: 22.6 % — HIGH (ref 10.3–14.5)
RBC # FLD: 22.7 % — HIGH (ref 10.3–14.5)
RBC # FLD: 22.8 % — HIGH (ref 10.3–14.5)
RBC # FLD: 22.8 % — HIGH (ref 10.3–14.5)
RBC # FLD: 22.9 % — HIGH (ref 10.3–14.5)
RBC # FLD: 22.9 % — HIGH (ref 10.3–14.5)
RBC # FLD: 23 % — HIGH (ref 10.3–14.5)
RBC # FLD: 23 % — HIGH (ref 10.3–14.5)
RBC # FLD: 23.1 % — HIGH (ref 10.3–14.5)
RBC # FLD: 23.2 % — HIGH (ref 10.3–14.5)
RBC # FLD: 23.3 % — HIGH (ref 10.3–14.5)
RBC # FLD: 23.3 % — HIGH (ref 10.3–14.5)
RBC # FLD: 23.4 % — HIGH (ref 10.3–14.5)
RBC # FLD: 23.7 % — HIGH (ref 10.3–14.5)
RBC # FLD: 23.9 % — HIGH (ref 10.3–14.5)
RBC # FLD: 23.9 % — HIGH (ref 10.3–14.5)
RBC # FLD: 24.2 % — HIGH (ref 10.3–14.5)
RBC # FLD: 24.4 % — HIGH (ref 10.3–14.5)
RBC # FLD: 24.5 % — HIGH (ref 10.3–14.5)
RBC # FLD: 24.5 % — HIGH (ref 10.3–14.5)
RBC # FLD: 24.6 % — HIGH (ref 10.3–14.5)
RBC # FLD: 24.7 % — HIGH (ref 10.3–14.5)
RBC BLD AUTO: ABNORMAL
RBC BLD AUTO: SIGNIFICANT CHANGE UP
RBC CASTS # UR COMP ASSIST: 0 /HPF — SIGNIFICANT CHANGE UP (ref 0–4)
RBC CASTS # UR COMP ASSIST: 15 /HPF — HIGH (ref 0–4)
RBC CASTS # UR COMP ASSIST: 3 /HPF — SIGNIFICANT CHANGE UP (ref 0–4)
RBC CASTS # UR COMP ASSIST: 3 /HPF — SIGNIFICANT CHANGE UP (ref 0–4)
RBC CASTS # UR COMP ASSIST: 326 /HPF — HIGH (ref 0–4)
RBC CASTS # UR COMP ASSIST: 44 /HPF — HIGH (ref 0–4)
RCV VOL RI: HIGH /UL (ref 0–0)
RETICS #: 72.6 K/UL — SIGNIFICANT CHANGE UP (ref 25–125)
RETICS/RBC NFR: 2.1 % — SIGNIFICANT CHANGE UP (ref 0.5–2.5)
RH IG SCN BLD-IMP: POSITIVE — SIGNIFICANT CHANGE UP
SARS-COV-2 RNA SPEC QL NAA+PROBE: SIGNIFICANT CHANGE UP
SCHISTOCYTES BLD QL AUTO: SLIGHT — SIGNIFICANT CHANGE UP
SMUDGE CELLS # BLD: PRESENT — SIGNIFICANT CHANGE UP
SODIUM SERPL-SCNC: 129 MMOL/L — LOW (ref 135–145)
SODIUM SERPL-SCNC: 130 MMOL/L — LOW (ref 135–145)
SODIUM SERPL-SCNC: 131 MMOL/L — LOW (ref 135–145)
SODIUM SERPL-SCNC: 132 MMOL/L — LOW (ref 135–145)
SODIUM SERPL-SCNC: 133 MMOL/L — LOW (ref 135–145)
SODIUM SERPL-SCNC: 134 MMOL/L — LOW (ref 135–145)
SODIUM SERPL-SCNC: 135 MMOL/L — SIGNIFICANT CHANGE UP (ref 135–145)
SODIUM SERPL-SCNC: 136 MMOL/L — SIGNIFICANT CHANGE UP (ref 135–145)
SODIUM SERPL-SCNC: 137 MMOL/L
SODIUM SERPL-SCNC: 137 MMOL/L — SIGNIFICANT CHANGE UP (ref 135–145)
SODIUM SERPL-SCNC: 138 MMOL/L — SIGNIFICANT CHANGE UP (ref 135–145)
SODIUM SERPL-SCNC: 139 MMOL/L — SIGNIFICANT CHANGE UP (ref 135–145)
SODIUM SERPL-SCNC: 140 MMOL/L — SIGNIFICANT CHANGE UP (ref 135–145)
SODIUM UR-SCNC: 45 MMOL/L — SIGNIFICANT CHANGE UP
SODIUM UR-SCNC: <35 MMOL/L — SIGNIFICANT CHANGE UP
SODIUM UR-SCNC: <35 MMOL/L — SIGNIFICANT CHANGE UP
SOLUBILITY: NEGATIVE — SIGNIFICANT CHANGE UP
SP GR SPEC: 1.01 — LOW (ref 1.01–1.02)
SP GR SPEC: 1.01 — SIGNIFICANT CHANGE UP (ref 1.01–1.02)
SP GR SPEC: 1.02 — SIGNIFICANT CHANGE UP (ref 1.01–1.02)
SP GR SPEC: 1.03 — HIGH (ref 1.01–1.02)
SP GR SPEC: 1.04 — HIGH (ref 1.01–1.02)
SPECIMEN SOURCE FLD: SIGNIFICANT CHANGE UP
SPECIMEN SOURCE: SIGNIFICANT CHANGE UP
T4 FREE SERPL-MCNC: 1.4 NG/DL — SIGNIFICANT CHANGE UP (ref 0.9–1.8)
TARGETS BLD QL SMEAR: SIGNIFICANT CHANGE UP
TARGETS BLD QL SMEAR: SLIGHT — SIGNIFICANT CHANGE UP
TARGETS BLD QL SMEAR: SLIGHT — SIGNIFICANT CHANGE UP
TIBC SERPL-MCNC: 229 UG/DL — SIGNIFICANT CHANGE UP (ref 220–430)
TIBC SERPL-MCNC: 71 UG/DL — LOW (ref 220–430)
TM INTERPRETATION: SIGNIFICANT CHANGE UP
TOTAL NUCLEATED CELL COUNT, BODY FLUID: 260 /UL — SIGNIFICANT CHANGE UP
TOTAL NUCLEATED CELL COUNT, BODY FLUID: 77 /UL — SIGNIFICANT CHANGE UP
TOTAL NUCLEATED CELL COUNT, BODY FLUID: 790 /UL — SIGNIFICANT CHANGE UP
TOTAL VOLUME - 24 HOUR: 500 ML — SIGNIFICANT CHANGE UP
TRANSFERRIN SERPL-MCNC: 162 MG/DL — LOW (ref 200–360)
TROPONIN T, HIGH SENSITIVITY RESULT: 11 NG/L — SIGNIFICANT CHANGE UP (ref 0–51)
TROPONIN T, HIGH SENSITIVITY RESULT: <6 NG/L — SIGNIFICANT CHANGE UP (ref 0–51)
TSH SERPL-MCNC: 1.73 UIU/ML — SIGNIFICANT CHANGE UP (ref 0.27–4.2)
TSH SERPL-MCNC: 2.09 UIU/ML — SIGNIFICANT CHANGE UP (ref 0.27–4.2)
TUBE TYPE: SIGNIFICANT CHANGE UP
UIBC SERPL-MCNC: 205 UG/DL — SIGNIFICANT CHANGE UP (ref 110–370)
UIBC SERPL-MCNC: 26 UG/DL — LOW (ref 110–370)
URATE SERPL-MCNC: 2.1 MG/DL — LOW (ref 2.5–7)
URATE SERPL-MCNC: 2.1 MG/DL — LOW (ref 2.5–7)
URATE SERPL-MCNC: 2.2 MG/DL — LOW (ref 2.5–7)
URATE SERPL-MCNC: 2.3 MG/DL — LOW (ref 2.5–7)
URATE SERPL-MCNC: 2.6 MG/DL — SIGNIFICANT CHANGE UP (ref 2.5–7)
URATE SERPL-MCNC: 2.7 MG/DL — SIGNIFICANT CHANGE UP (ref 2.5–7)
URATE SERPL-MCNC: 2.8 MG/DL — SIGNIFICANT CHANGE UP (ref 2.5–7)
URATE SERPL-MCNC: 2.9 MG/DL — SIGNIFICANT CHANGE UP (ref 2.5–7)
URATE SERPL-MCNC: 3 MG/DL — SIGNIFICANT CHANGE UP (ref 2.5–7)
URATE SERPL-MCNC: 3.1 MG/DL — SIGNIFICANT CHANGE UP (ref 2.5–7)
URATE SERPL-MCNC: 3.2 MG/DL — SIGNIFICANT CHANGE UP (ref 2.5–7)
URATE SERPL-MCNC: 3.2 MG/DL — SIGNIFICANT CHANGE UP (ref 2.5–7)
URATE SERPL-MCNC: 3.3 MG/DL — SIGNIFICANT CHANGE UP (ref 2.5–7)
URATE SERPL-MCNC: 3.3 MG/DL — SIGNIFICANT CHANGE UP (ref 2.5–7)
URATE SERPL-MCNC: 3.6 MG/DL — SIGNIFICANT CHANGE UP (ref 2.5–7)
URATE SERPL-MCNC: 3.8 MG/DL — SIGNIFICANT CHANGE UP (ref 2.5–7)
URATE SERPL-MCNC: 3.8 MG/DL — SIGNIFICANT CHANGE UP (ref 2.5–7)
URATE SERPL-MCNC: 4.1 MG/DL — SIGNIFICANT CHANGE UP (ref 2.5–7)
URATE SERPL-MCNC: 4.4 MG/DL — SIGNIFICANT CHANGE UP (ref 2.5–7)
URATE SERPL-MCNC: 4.4 MG/DL — SIGNIFICANT CHANGE UP (ref 2.5–7)
URATE SERPL-MCNC: 5.1 MG/DL — SIGNIFICANT CHANGE UP (ref 2.5–7)
URATE SERPL-MCNC: 5.3 MG/DL — SIGNIFICANT CHANGE UP (ref 2.5–7)
URATE SERPL-MCNC: 5.9 MG/DL — SIGNIFICANT CHANGE UP (ref 2.5–7)
URATE SERPL-MCNC: 6 MG/DL — SIGNIFICANT CHANGE UP (ref 2.5–7)
URATE SERPL-MCNC: 6.1 MG/DL — SIGNIFICANT CHANGE UP (ref 2.5–7)
URATE SERPL-MCNC: 6.4 MG/DL — SIGNIFICANT CHANGE UP (ref 2.5–7)
URINE CREATININE CALCULATION: 0.1 G/24 H — LOW (ref 0.8–1.8)
URINE CREATININE CALCULATION: 0.1 G/24 H — LOW (ref 0.8–1.8)
UROBILINOGEN FLD QL: ABNORMAL
UROBILINOGEN FLD QL: NEGATIVE — SIGNIFICANT CHANGE UP
UROBILINOGEN FLD QL: SIGNIFICANT CHANGE UP
VANCOMYCIN FLD-MCNC: 12.1 UG/ML — SIGNIFICANT CHANGE UP
VANCOMYCIN TROUGH SERPL-MCNC: 26.2 UG/ML — CRITICAL HIGH (ref 10–20)
VANCOMYCIN TROUGH SERPL-MCNC: 5.2 UG/ML — LOW (ref 10–20)
VANCOMYCIN TROUGH SERPL-MCNC: 8.2 UG/ML — LOW (ref 10–20)
VARIANT LYMPHS # BLD: 0.9 % — SIGNIFICANT CHANGE UP (ref 0–6)
VARIANT LYMPHS # BLD: 1.7 % — SIGNIFICANT CHANGE UP (ref 0–6)
VIT B12 SERPL-MCNC: 800 PG/ML — SIGNIFICANT CHANGE UP (ref 232–1245)
VIT D25+D1,25 OH+D1,25 PNL SERPL-MCNC: <5 PG/ML — LOW (ref 19.9–79.3)
WBC # BLD: 1.85 K/UL — LOW (ref 3.8–10.5)
WBC # BLD: 10.19 K/UL — SIGNIFICANT CHANGE UP (ref 3.8–10.5)
WBC # BLD: 10.26 K/UL — SIGNIFICANT CHANGE UP (ref 3.8–10.5)
WBC # BLD: 10.97 K/UL — HIGH (ref 3.8–10.5)
WBC # BLD: 11.94 K/UL — HIGH (ref 3.8–10.5)
WBC # BLD: 12.71 K/UL — HIGH (ref 3.8–10.5)
WBC # BLD: 12.8 K/UL — HIGH (ref 3.8–10.5)
WBC # BLD: 12.95 K/UL — HIGH (ref 3.8–10.5)
WBC # BLD: 13.67 K/UL — HIGH (ref 3.8–10.5)
WBC # BLD: 13.68 K/UL — HIGH (ref 3.8–10.5)
WBC # BLD: 14.24 K/UL — HIGH (ref 3.8–10.5)
WBC # BLD: 14.52 K/UL — HIGH (ref 3.8–10.5)
WBC # BLD: 15.53 K/UL — HIGH (ref 3.8–10.5)
WBC # BLD: 15.58 K/UL — HIGH (ref 3.8–10.5)
WBC # BLD: 15.61 K/UL — HIGH (ref 3.8–10.5)
WBC # BLD: 15.66 K/UL — HIGH (ref 3.8–10.5)
WBC # BLD: 16.33 K/UL — HIGH (ref 3.8–10.5)
WBC # BLD: 16.33 K/UL — HIGH (ref 3.8–10.5)
WBC # BLD: 16.49 K/UL — HIGH (ref 3.8–10.5)
WBC # BLD: 16.6 K/UL — HIGH (ref 3.8–10.5)
WBC # BLD: 16.61 K/UL — HIGH (ref 3.8–10.5)
WBC # BLD: 16.71 K/UL — HIGH (ref 3.8–10.5)
WBC # BLD: 17.51 K/UL — HIGH (ref 3.8–10.5)
WBC # BLD: 17.8 K/UL — HIGH (ref 3.8–10.5)
WBC # BLD: 18.17 K/UL — HIGH (ref 3.8–10.5)
WBC # BLD: 18.58 K/UL — HIGH (ref 3.8–10.5)
WBC # BLD: 18.88 K/UL — HIGH (ref 3.8–10.5)
WBC # BLD: 19.65 K/UL — HIGH (ref 3.8–10.5)
WBC # BLD: 20.83 K/UL — HIGH (ref 3.8–10.5)
WBC # BLD: 21.31 K/UL — HIGH (ref 3.8–10.5)
WBC # BLD: 21.89 K/UL — HIGH (ref 3.8–10.5)
WBC # BLD: 22.4 K/UL — HIGH (ref 3.8–10.5)
WBC # BLD: 22.63 K/UL — HIGH (ref 3.8–10.5)
WBC # BLD: 22.67 K/UL — HIGH (ref 3.8–10.5)
WBC # BLD: 23.14 K/UL — HIGH (ref 3.8–10.5)
WBC # BLD: 23.93 K/UL — HIGH (ref 3.8–10.5)
WBC # BLD: 24.08 K/UL — HIGH (ref 3.8–10.5)
WBC # BLD: 25.61 K/UL — HIGH (ref 3.8–10.5)
WBC # BLD: 25.96 K/UL — HIGH (ref 3.8–10.5)
WBC # BLD: 26.02 K/UL — HIGH (ref 3.8–10.5)
WBC # BLD: 26.64 K/UL — HIGH (ref 3.8–10.5)
WBC # BLD: 26.91 K/UL — HIGH (ref 3.8–10.5)
WBC # BLD: 26.98 K/UL — HIGH (ref 3.8–10.5)
WBC # BLD: 29.93 K/UL — HIGH (ref 3.8–10.5)
WBC # BLD: 3.04 K/UL — LOW (ref 3.8–10.5)
WBC # BLD: 3.74 K/UL — LOW (ref 3.8–10.5)
WBC # BLD: 30.62 K/UL — HIGH (ref 3.8–10.5)
WBC # BLD: 32.62 K/UL — HIGH (ref 3.8–10.5)
WBC # BLD: 33.05 K/UL — HIGH (ref 3.8–10.5)
WBC # BLD: 33.18 K/UL — HIGH (ref 3.8–10.5)
WBC # BLD: 33.7 K/UL — HIGH (ref 3.8–10.5)
WBC # BLD: 33.99 K/UL — HIGH (ref 3.8–10.5)
WBC # BLD: 34.15 K/UL — HIGH (ref 3.8–10.5)
WBC # BLD: 34.18 K/UL — HIGH (ref 3.8–10.5)
WBC # BLD: 35.4 K/UL — HIGH (ref 3.8–10.5)
WBC # BLD: 36.6 K/UL — HIGH (ref 3.8–10.5)
WBC # BLD: 38.79 K/UL — HIGH (ref 3.8–10.5)
WBC # BLD: 4.2 K/UL — SIGNIFICANT CHANGE UP (ref 3.8–10.5)
WBC # BLD: 4.86 K/UL — SIGNIFICANT CHANGE UP (ref 3.8–10.5)
WBC # BLD: 41.56 K/UL — CRITICAL HIGH (ref 3.8–10.5)
WBC # BLD: 41.9 K/UL — CRITICAL HIGH (ref 3.8–10.5)
WBC # BLD: 5.66 K/UL — SIGNIFICANT CHANGE UP (ref 3.8–10.5)
WBC # BLD: 5.73 K/UL — SIGNIFICANT CHANGE UP (ref 3.8–10.5)
WBC # BLD: 5.81 K/UL — SIGNIFICANT CHANGE UP (ref 3.8–10.5)
WBC # BLD: 6.08 K/UL — SIGNIFICANT CHANGE UP (ref 3.8–10.5)
WBC # BLD: 6.38 K/UL — SIGNIFICANT CHANGE UP (ref 3.8–10.5)
WBC # BLD: 6.83 K/UL — SIGNIFICANT CHANGE UP (ref 3.8–10.5)
WBC # BLD: 9.65 K/UL — SIGNIFICANT CHANGE UP (ref 3.8–10.5)
WBC # BLD: 9.91 K/UL — SIGNIFICANT CHANGE UP (ref 3.8–10.5)
WBC # BLD: 9.98 K/UL — SIGNIFICANT CHANGE UP (ref 3.8–10.5)
WBC # FLD AUTO: 1.85 K/UL — LOW (ref 3.8–10.5)
WBC # FLD AUTO: 10.19 K/UL — SIGNIFICANT CHANGE UP (ref 3.8–10.5)
WBC # FLD AUTO: 10.26 K/UL — SIGNIFICANT CHANGE UP (ref 3.8–10.5)
WBC # FLD AUTO: 10.97 K/UL — HIGH (ref 3.8–10.5)
WBC # FLD AUTO: 11.94 K/UL — HIGH (ref 3.8–10.5)
WBC # FLD AUTO: 12.71 K/UL — HIGH (ref 3.8–10.5)
WBC # FLD AUTO: 12.8 K/UL — HIGH (ref 3.8–10.5)
WBC # FLD AUTO: 12.95 K/UL — HIGH (ref 3.8–10.5)
WBC # FLD AUTO: 13.67 K/UL — HIGH (ref 3.8–10.5)
WBC # FLD AUTO: 13.68 K/UL — HIGH (ref 3.8–10.5)
WBC # FLD AUTO: 14.24 K/UL — HIGH (ref 3.8–10.5)
WBC # FLD AUTO: 14.52 K/UL — HIGH (ref 3.8–10.5)
WBC # FLD AUTO: 15.53 K/UL — HIGH (ref 3.8–10.5)
WBC # FLD AUTO: 15.58 K/UL — HIGH (ref 3.8–10.5)
WBC # FLD AUTO: 15.61 K/UL — HIGH (ref 3.8–10.5)
WBC # FLD AUTO: 15.66 K/UL — HIGH (ref 3.8–10.5)
WBC # FLD AUTO: 16.33 K/UL — HIGH (ref 3.8–10.5)
WBC # FLD AUTO: 16.33 K/UL — HIGH (ref 3.8–10.5)
WBC # FLD AUTO: 16.49 K/UL — HIGH (ref 3.8–10.5)
WBC # FLD AUTO: 16.6 K/UL — HIGH (ref 3.8–10.5)
WBC # FLD AUTO: 16.61 K/UL — HIGH (ref 3.8–10.5)
WBC # FLD AUTO: 16.71 K/UL — HIGH (ref 3.8–10.5)
WBC # FLD AUTO: 17.51 K/UL — HIGH (ref 3.8–10.5)
WBC # FLD AUTO: 17.8 K/UL — HIGH (ref 3.8–10.5)
WBC # FLD AUTO: 18.17 K/UL — HIGH (ref 3.8–10.5)
WBC # FLD AUTO: 18.58 K/UL — HIGH (ref 3.8–10.5)
WBC # FLD AUTO: 18.88 K/UL — HIGH (ref 3.8–10.5)
WBC # FLD AUTO: 19.65 K/UL — HIGH (ref 3.8–10.5)
WBC # FLD AUTO: 20.83 K/UL — HIGH (ref 3.8–10.5)
WBC # FLD AUTO: 21.31 K/UL — HIGH (ref 3.8–10.5)
WBC # FLD AUTO: 21.89 K/UL — HIGH (ref 3.8–10.5)
WBC # FLD AUTO: 22.4 K/UL — HIGH (ref 3.8–10.5)
WBC # FLD AUTO: 22.63 K/UL — HIGH (ref 3.8–10.5)
WBC # FLD AUTO: 22.67 K/UL — HIGH (ref 3.8–10.5)
WBC # FLD AUTO: 23.14 K/UL — HIGH (ref 3.8–10.5)
WBC # FLD AUTO: 23.93 K/UL — HIGH (ref 3.8–10.5)
WBC # FLD AUTO: 24.08 K/UL — HIGH (ref 3.8–10.5)
WBC # FLD AUTO: 25.61 K/UL — HIGH (ref 3.8–10.5)
WBC # FLD AUTO: 25.96 K/UL — HIGH (ref 3.8–10.5)
WBC # FLD AUTO: 26.02 K/UL — HIGH (ref 3.8–10.5)
WBC # FLD AUTO: 26.64 K/UL — HIGH (ref 3.8–10.5)
WBC # FLD AUTO: 26.91 K/UL — HIGH (ref 3.8–10.5)
WBC # FLD AUTO: 26.98 K/UL — HIGH (ref 3.8–10.5)
WBC # FLD AUTO: 29.93 K/UL — HIGH (ref 3.8–10.5)
WBC # FLD AUTO: 3.04 K/UL — LOW (ref 3.8–10.5)
WBC # FLD AUTO: 3.74 K/UL — LOW (ref 3.8–10.5)
WBC # FLD AUTO: 30.62 K/UL — HIGH (ref 3.8–10.5)
WBC # FLD AUTO: 32.62 K/UL — HIGH (ref 3.8–10.5)
WBC # FLD AUTO: 33.05 K/UL — HIGH (ref 3.8–10.5)
WBC # FLD AUTO: 33.18 K/UL — HIGH (ref 3.8–10.5)
WBC # FLD AUTO: 33.7 K/UL — HIGH (ref 3.8–10.5)
WBC # FLD AUTO: 33.99 K/UL — HIGH (ref 3.8–10.5)
WBC # FLD AUTO: 34.15 K/UL — HIGH (ref 3.8–10.5)
WBC # FLD AUTO: 34.18 K/UL — HIGH (ref 3.8–10.5)
WBC # FLD AUTO: 35.4 K/UL — HIGH (ref 3.8–10.5)
WBC # FLD AUTO: 36.6 K/UL — HIGH (ref 3.8–10.5)
WBC # FLD AUTO: 38.79 K/UL — HIGH (ref 3.8–10.5)
WBC # FLD AUTO: 4.2 K/UL — SIGNIFICANT CHANGE UP (ref 3.8–10.5)
WBC # FLD AUTO: 4.86 K/UL — SIGNIFICANT CHANGE UP (ref 3.8–10.5)
WBC # FLD AUTO: 41.56 K/UL — CRITICAL HIGH (ref 3.8–10.5)
WBC # FLD AUTO: 41.9 K/UL — CRITICAL HIGH (ref 3.8–10.5)
WBC # FLD AUTO: 5.66 K/UL — SIGNIFICANT CHANGE UP (ref 3.8–10.5)
WBC # FLD AUTO: 5.73 K/UL — SIGNIFICANT CHANGE UP (ref 3.8–10.5)
WBC # FLD AUTO: 5.81 K/UL — SIGNIFICANT CHANGE UP (ref 3.8–10.5)
WBC # FLD AUTO: 6.08 K/UL — SIGNIFICANT CHANGE UP (ref 3.8–10.5)
WBC # FLD AUTO: 6.38 K/UL — SIGNIFICANT CHANGE UP (ref 3.8–10.5)
WBC # FLD AUTO: 6.83 K/UL — SIGNIFICANT CHANGE UP (ref 3.8–10.5)
WBC # FLD AUTO: 9.65 K/UL — SIGNIFICANT CHANGE UP (ref 3.8–10.5)
WBC # FLD AUTO: 9.71 K/UL
WBC # FLD AUTO: 9.91 K/UL — SIGNIFICANT CHANGE UP (ref 3.8–10.5)
WBC # FLD AUTO: 9.98 K/UL — SIGNIFICANT CHANGE UP (ref 3.8–10.5)
WBC UR QL: 0 /HPF — SIGNIFICANT CHANGE UP (ref 0–5)
WBC UR QL: 13 /HPF — HIGH (ref 0–5)
WBC UR QL: 14 /HPF — HIGH (ref 0–5)
WBC UR QL: 17 /HPF — HIGH (ref 0–5)
WBC UR QL: 3 /HPF — SIGNIFICANT CHANGE UP (ref 0–5)
WBC UR QL: 6 /HPF — HIGH (ref 0–5)

## 2020-01-01 PROCEDURE — 96365 THER/PROPH/DIAG IV INF INIT: CPT

## 2020-01-01 PROCEDURE — 99222 1ST HOSP IP/OBS MODERATE 55: CPT | Mod: 25

## 2020-01-01 PROCEDURE — 99233 SBSQ HOSP IP/OBS HIGH 50: CPT | Mod: GC

## 2020-01-01 PROCEDURE — 93976 VASCULAR STUDY: CPT | Mod: 26,59

## 2020-01-01 PROCEDURE — 99232 SBSQ HOSP IP/OBS MODERATE 35: CPT | Mod: GC

## 2020-01-01 PROCEDURE — 99232 SBSQ HOSP IP/OBS MODERATE 35: CPT

## 2020-01-01 PROCEDURE — 99233 SBSQ HOSP IP/OBS HIGH 50: CPT

## 2020-01-01 PROCEDURE — 88342 IMHCHEM/IMCYTCHM 1ST ANTB: CPT | Mod: 26,59

## 2020-01-01 PROCEDURE — 71045 X-RAY EXAM CHEST 1 VIEW: CPT | Mod: 26

## 2020-01-01 PROCEDURE — 76856 US EXAM PELVIC COMPLETE: CPT | Mod: 26

## 2020-01-01 PROCEDURE — 74018 RADEX ABDOMEN 1 VIEW: CPT | Mod: 26

## 2020-01-01 PROCEDURE — 99205 OFFICE O/P NEW HI 60 MIN: CPT | Mod: 95

## 2020-01-01 PROCEDURE — 88305 TISSUE EXAM BY PATHOLOGIST: CPT | Mod: 26

## 2020-01-01 PROCEDURE — 86900 BLOOD TYPING SEROLOGIC ABO: CPT

## 2020-01-01 PROCEDURE — 83935 ASSAY OF URINE OSMOLALITY: CPT

## 2020-01-01 PROCEDURE — 93306 TTE W/DOPPLER COMPLETE: CPT | Mod: 26

## 2020-01-01 PROCEDURE — 84100 ASSAY OF PHOSPHORUS: CPT

## 2020-01-01 PROCEDURE — 74177 CT ABD & PELVIS W/CONTRAST: CPT | Mod: 26

## 2020-01-01 PROCEDURE — 83930 ASSAY OF BLOOD OSMOLALITY: CPT

## 2020-01-01 PROCEDURE — 20220 BONE BIOPSY TROCAR/NDL SUPFC: CPT

## 2020-01-01 PROCEDURE — 99215 OFFICE O/P EST HI 40 MIN: CPT

## 2020-01-01 PROCEDURE — 93005 ELECTROCARDIOGRAM TRACING: CPT

## 2020-01-01 PROCEDURE — 82306 VITAMIN D 25 HYDROXY: CPT

## 2020-01-01 PROCEDURE — 83540 ASSAY OF IRON: CPT

## 2020-01-01 PROCEDURE — 83020 HEMOGLOBIN ELECTROPHORESIS: CPT | Mod: 26

## 2020-01-01 PROCEDURE — 83735 ASSAY OF MAGNESIUM: CPT

## 2020-01-01 PROCEDURE — 32557 INSERT CATH PLEURA W/ IMAGE: CPT | Mod: RT

## 2020-01-01 PROCEDURE — P9047: CPT

## 2020-01-01 PROCEDURE — 74018 RADEX ABDOMEN 1 VIEW: CPT

## 2020-01-01 PROCEDURE — 80053 COMPREHEN METABOLIC PANEL: CPT

## 2020-01-01 PROCEDURE — 99231 SBSQ HOSP IP/OBS SF/LOW 25: CPT

## 2020-01-01 PROCEDURE — 99291 CRITICAL CARE FIRST HOUR: CPT

## 2020-01-01 PROCEDURE — 93010 ELECTROCARDIOGRAM REPORT: CPT

## 2020-01-01 PROCEDURE — 49083 ABD PARACENTESIS W/IMAGING: CPT

## 2020-01-01 PROCEDURE — 93010 ELECTROCARDIOGRAM REPORT: CPT | Mod: 76

## 2020-01-01 PROCEDURE — 82378 CARCINOEMBRYONIC ANTIGEN: CPT

## 2020-01-01 PROCEDURE — 76856 US EXAM PELVIC COMPLETE: CPT

## 2020-01-01 PROCEDURE — 89051 BODY FLUID CELL COUNT: CPT

## 2020-01-01 PROCEDURE — 74177 CT ABD & PELVIS W/CONTRAST: CPT

## 2020-01-01 PROCEDURE — 86334 IMMUNOFIX E-PHORESIS SERUM: CPT

## 2020-01-01 PROCEDURE — 85027 COMPLETE CBC AUTOMATED: CPT

## 2020-01-01 PROCEDURE — 82435 ASSAY OF BLOOD CHLORIDE: CPT

## 2020-01-01 PROCEDURE — 99223 1ST HOSP IP/OBS HIGH 75: CPT

## 2020-01-01 PROCEDURE — 82330 ASSAY OF CALCIUM: CPT

## 2020-01-01 PROCEDURE — 70551 MRI BRAIN STEM W/O DYE: CPT

## 2020-01-01 PROCEDURE — 81025 URINE PREGNANCY TEST: CPT

## 2020-01-01 PROCEDURE — 71275 CT ANGIOGRAPHY CHEST: CPT | Mod: 26

## 2020-01-01 PROCEDURE — 76830 TRANSVAGINAL US NON-OB: CPT | Mod: 26

## 2020-01-01 PROCEDURE — 83970 ASSAY OF PARATHORMONE: CPT

## 2020-01-01 PROCEDURE — U0003: CPT

## 2020-01-01 PROCEDURE — 93306 TTE W/DOPPLER COMPLETE: CPT

## 2020-01-01 PROCEDURE — 71045 X-RAY EXAM CHEST 1 VIEW: CPT

## 2020-01-01 PROCEDURE — 77262 THER RADIOLOGY TX PLNG INTRM: CPT

## 2020-01-01 PROCEDURE — 87186 SC STD MICRODIL/AGAR DIL: CPT

## 2020-01-01 PROCEDURE — 99244 OFF/OP CNSLTJ NEW/EST MOD 40: CPT

## 2020-01-01 PROCEDURE — 99221 1ST HOSP IP/OBS SF/LOW 40: CPT | Mod: GC

## 2020-01-01 PROCEDURE — 99222 1ST HOSP IP/OBS MODERATE 55: CPT | Mod: GC

## 2020-01-01 PROCEDURE — 87040 BLOOD CULTURE FOR BACTERIA: CPT

## 2020-01-01 PROCEDURE — 88108 CYTOPATH CONCENTRATE TECH: CPT | Mod: 26,59

## 2020-01-01 PROCEDURE — 88305 TISSUE EXAM BY PATHOLOGIST: CPT

## 2020-01-01 PROCEDURE — 99497 ADVNCD CARE PLAN 30 MIN: CPT

## 2020-01-01 PROCEDURE — 93975 VASCULAR STUDY: CPT | Mod: 26

## 2020-01-01 PROCEDURE — 82947 ASSAY GLUCOSE BLOOD QUANT: CPT

## 2020-01-01 PROCEDURE — 85610 PROTHROMBIN TIME: CPT

## 2020-01-01 PROCEDURE — 82945 GLUCOSE OTHER FLUID: CPT

## 2020-01-01 PROCEDURE — 71250 CT THORAX DX C-: CPT | Mod: 26

## 2020-01-01 PROCEDURE — 71275 CT ANGIOGRAPHY CHEST: CPT

## 2020-01-01 PROCEDURE — 87102 FUNGUS ISOLATION CULTURE: CPT

## 2020-01-01 PROCEDURE — 88112 CYTOPATH CELL ENHANCE TECH: CPT | Mod: 26

## 2020-01-01 PROCEDURE — 84165 PROTEIN E-PHORESIS SERUM: CPT

## 2020-01-01 PROCEDURE — 87086 URINE CULTURE/COLONY COUNT: CPT

## 2020-01-01 PROCEDURE — 99498 ADVNCD CARE PLAN ADDL 30 MIN: CPT | Mod: 25

## 2020-01-01 PROCEDURE — 76705 ECHO EXAM OF ABDOMEN: CPT | Mod: 26

## 2020-01-01 PROCEDURE — 77334 RADIATION TREATMENT AID(S): CPT | Mod: 26

## 2020-01-01 PROCEDURE — 99285 EMERGENCY DEPT VISIT HI MDM: CPT

## 2020-01-01 PROCEDURE — 99231 SBSQ HOSP IP/OBS SF/LOW 25: CPT | Mod: GC

## 2020-01-01 PROCEDURE — 82652 VIT D 1 25-DIHYDROXY: CPT

## 2020-01-01 PROCEDURE — 81001 URINALYSIS AUTO W/SCOPE: CPT

## 2020-01-01 PROCEDURE — 82803 BLOOD GASES ANY COMBINATION: CPT

## 2020-01-01 PROCEDURE — 99223 1ST HOSP IP/OBS HIGH 75: CPT | Mod: GC

## 2020-01-01 PROCEDURE — C1729: CPT

## 2020-01-01 PROCEDURE — 84702 CHORIONIC GONADOTROPIN TEST: CPT

## 2020-01-01 PROCEDURE — 93975 VASCULAR STUDY: CPT

## 2020-01-01 PROCEDURE — 77307 TELETHX ISODOSE PLAN CPLX: CPT | Mod: 26

## 2020-01-01 PROCEDURE — 84295 ASSAY OF SERUM SODIUM: CPT

## 2020-01-01 PROCEDURE — 82340 ASSAY OF CALCIUM IN URINE: CPT

## 2020-01-01 PROCEDURE — 76705 ECHO EXAM OF ABDOMEN: CPT

## 2020-01-01 PROCEDURE — 83690 ASSAY OF LIPASE: CPT

## 2020-01-01 PROCEDURE — 82533 TOTAL CORTISOL: CPT

## 2020-01-01 PROCEDURE — 99255 IP/OBS CONSLTJ NEW/EST HI 80: CPT | Mod: GC

## 2020-01-01 PROCEDURE — 93970 EXTREMITY STUDY: CPT | Mod: 26

## 2020-01-01 PROCEDURE — 88108 CYTOPATH CONCENTRATE TECH: CPT | Mod: 26

## 2020-01-01 PROCEDURE — 76830 TRANSVAGINAL US NON-OB: CPT

## 2020-01-01 PROCEDURE — 99285 EMERGENCY DEPT VISIT HI MDM: CPT | Mod: 25

## 2020-01-01 PROCEDURE — 83615 LACTATE (LD) (LDH) ENZYME: CPT

## 2020-01-01 PROCEDURE — 88360 TUMOR IMMUNOHISTOCHEM/MANUAL: CPT | Mod: 26

## 2020-01-01 PROCEDURE — 96367 TX/PROPH/DG ADDL SEQ IV INF: CPT

## 2020-01-01 PROCEDURE — 87635 SARS-COV-2 COVID-19 AMP PRB: CPT

## 2020-01-01 PROCEDURE — 82436 ASSAY OF URINE CHLORIDE: CPT

## 2020-01-01 PROCEDURE — 96375 TX/PRO/DX INJ NEW DRUG ADDON: CPT

## 2020-01-01 PROCEDURE — 80202 ASSAY OF VANCOMYCIN: CPT

## 2020-01-01 PROCEDURE — 71260 CT THORAX DX C+: CPT | Mod: 26

## 2020-01-01 PROCEDURE — 70551 MRI BRAIN STEM W/O DYE: CPT | Mod: 26

## 2020-01-01 PROCEDURE — 88341 IMHCHEM/IMCYTCHM EA ADD ANTB: CPT | Mod: 26

## 2020-01-01 PROCEDURE — 83519 RIA NONANTIBODY: CPT

## 2020-01-01 PROCEDURE — 80048 BASIC METABOLIC PNL TOTAL CA: CPT

## 2020-01-01 PROCEDURE — 71045 X-RAY EXAM CHEST 1 VIEW: CPT | Mod: 26,59

## 2020-01-01 PROCEDURE — 80076 HEPATIC FUNCTION PANEL: CPT

## 2020-01-01 PROCEDURE — 87205 SMEAR GRAM STAIN: CPT

## 2020-01-01 PROCEDURE — 99284 EMERGENCY DEPT VISIT MOD MDM: CPT | Mod: 25

## 2020-01-01 PROCEDURE — 76856 US EXAM PELVIC COMPLETE: CPT | Mod: 26,59

## 2020-01-01 PROCEDURE — 88173 CYTOPATH EVAL FNA REPORT: CPT | Mod: 26

## 2020-01-01 PROCEDURE — G0452: CPT | Mod: 26

## 2020-01-01 PROCEDURE — 83880 ASSAY OF NATRIURETIC PEPTIDE: CPT

## 2020-01-01 PROCEDURE — 87070 CULTURE OTHR SPECIMN AEROBIC: CPT

## 2020-01-01 PROCEDURE — 86850 RBC ANTIBODY SCREEN: CPT

## 2020-01-01 PROCEDURE — 88313 SPECIAL STAINS GROUP 2: CPT | Mod: 26

## 2020-01-01 PROCEDURE — 83605 ASSAY OF LACTIC ACID: CPT

## 2020-01-01 PROCEDURE — 99497 ADVNCD CARE PLAN 30 MIN: CPT | Mod: 25

## 2020-01-01 PROCEDURE — 84132 ASSAY OF SERUM POTASSIUM: CPT

## 2020-01-01 PROCEDURE — 85730 THROMBOPLASTIN TIME PARTIAL: CPT

## 2020-01-01 PROCEDURE — 99223 1ST HOSP IP/OBS HIGH 75: CPT | Mod: 25

## 2020-01-01 PROCEDURE — 88341 IMHCHEM/IMCYTCHM EA ADD ANTB: CPT | Mod: 26,59

## 2020-01-01 PROCEDURE — 84157 ASSAY OF PROTEIN OTHER: CPT

## 2020-01-01 PROCEDURE — 82042 OTHER SOURCE ALBUMIN QUAN EA: CPT

## 2020-01-01 PROCEDURE — 88112 CYTOPATH CELL ENHANCE TECH: CPT

## 2020-01-01 PROCEDURE — 32557 INSERT CATH PLEURA W/ IMAGE: CPT | Mod: LT

## 2020-01-01 PROCEDURE — 99498 ADVNCD CARE PLAN ADDL 30 MIN: CPT

## 2020-01-01 PROCEDURE — 72131 CT LUMBAR SPINE W/O DYE: CPT | Mod: 26

## 2020-01-01 PROCEDURE — 99254 IP/OBS CNSLTJ NEW/EST MOD 60: CPT

## 2020-01-01 PROCEDURE — 82310 ASSAY OF CALCIUM: CPT

## 2020-01-01 PROCEDURE — 84155 ASSAY OF PROTEIN SERUM: CPT

## 2020-01-01 PROCEDURE — 82728 ASSAY OF FERRITIN: CPT

## 2020-01-01 PROCEDURE — 99238 HOSP IP/OBS DSCHRG MGMT 30/<: CPT

## 2020-01-01 PROCEDURE — 77290 THER RAD SIMULAJ FIELD CPLX: CPT | Mod: 26

## 2020-01-01 PROCEDURE — 86901 BLOOD TYPING SEROLOGIC RH(D): CPT

## 2020-01-01 PROCEDURE — 87075 CULTR BACTERIA EXCEPT BLOOD: CPT

## 2020-01-01 PROCEDURE — 77012 CT SCAN FOR NEEDLE BIOPSY: CPT | Mod: 26

## 2020-01-01 PROCEDURE — 83550 IRON BINDING TEST: CPT

## 2020-01-01 PROCEDURE — 86304 IMMUNOASSAY TUMOR CA 125: CPT

## 2020-01-01 PROCEDURE — 88189 FLOWCYTOMETRY/READ 16 & >: CPT

## 2020-01-01 PROCEDURE — 84443 ASSAY THYROID STIM HORMONE: CPT

## 2020-01-01 PROCEDURE — 82570 ASSAY OF URINE CREATININE: CPT

## 2020-01-01 PROCEDURE — 84300 ASSAY OF URINE SODIUM: CPT

## 2020-01-01 PROCEDURE — 85014 HEMATOCRIT: CPT

## 2020-01-01 RX ORDER — LEVOFLOXACIN 500 MG/1
500 TABLET, FILM COATED ORAL DAILY
Qty: 4 | Refills: 0 | Status: ACTIVE | COMMUNITY
Start: 2020-01-01 | End: 1900-01-01

## 2020-01-01 RX ORDER — POTASSIUM PHOSPHATE, MONOBASIC POTASSIUM PHOSPHATE, DIBASIC 236; 224 MG/ML; MG/ML
15 INJECTION, SOLUTION INTRAVENOUS ONCE
Refills: 0 | Status: COMPLETED | OUTPATIENT
Start: 2020-01-01 | End: 2020-01-01

## 2020-01-01 RX ORDER — SODIUM CHLORIDE 9 MG/ML
1000 INJECTION INTRAMUSCULAR; INTRAVENOUS; SUBCUTANEOUS
Refills: 0 | Status: DISCONTINUED | OUTPATIENT
Start: 2020-01-01 | End: 2020-01-01

## 2020-01-01 RX ORDER — HYDROMORPHONE HYDROCHLORIDE 2 MG/ML
1 INJECTION INTRAMUSCULAR; INTRAVENOUS; SUBCUTANEOUS ONCE
Refills: 0 | Status: DISCONTINUED | OUTPATIENT
Start: 2020-01-01 | End: 2020-01-01

## 2020-01-01 RX ORDER — CEFEPIME 1 G/1
2000 INJECTION, POWDER, FOR SOLUTION INTRAMUSCULAR; INTRAVENOUS ONCE
Refills: 0 | Status: COMPLETED | OUTPATIENT
Start: 2020-01-01 | End: 2020-01-01

## 2020-01-01 RX ORDER — ONDANSETRON 8 MG/1
16 TABLET, FILM COATED ORAL EVERY 24 HOURS
Refills: 0 | Status: COMPLETED | OUTPATIENT
Start: 2020-01-01 | End: 2020-01-01

## 2020-01-01 RX ORDER — VANCOMYCIN HCL 1 G
1250 VIAL (EA) INTRAVENOUS EVERY 12 HOURS
Refills: 0 | Status: DISCONTINUED | OUTPATIENT
Start: 2020-01-01 | End: 2020-01-01

## 2020-01-01 RX ORDER — PROCHLORPERAZINE MALEATE 5 MG
10 TABLET ORAL EVERY 6 HOURS
Refills: 0 | Status: DISCONTINUED | OUTPATIENT
Start: 2020-01-01 | End: 2020-01-01

## 2020-01-01 RX ORDER — HYDROMORPHONE HYDROCHLORIDE 2 MG/ML
0.5 INJECTION INTRAMUSCULAR; INTRAVENOUS; SUBCUTANEOUS
Qty: 100 | Refills: 0 | Status: DISCONTINUED | OUTPATIENT
Start: 2020-01-01 | End: 2020-01-01

## 2020-01-01 RX ORDER — SODIUM,POTASSIUM PHOSPHATES 278-250MG
1 POWDER IN PACKET (EA) ORAL
Refills: 0 | Status: COMPLETED | OUTPATIENT
Start: 2020-01-01 | End: 2020-01-01

## 2020-01-01 RX ORDER — FUROSEMIDE 40 MG
20 TABLET ORAL ONCE
Refills: 0 | Status: COMPLETED | OUTPATIENT
Start: 2020-01-01 | End: 2020-01-01

## 2020-01-01 RX ORDER — VANCOMYCIN HCL 1 G
1250 VIAL (EA) INTRAVENOUS ONCE
Refills: 0 | Status: COMPLETED | OUTPATIENT
Start: 2020-01-01 | End: 2020-01-01

## 2020-01-01 RX ORDER — ACETAMINOPHEN 500 MG
650 TABLET ORAL ONCE
Refills: 0 | Status: COMPLETED | OUTPATIENT
Start: 2020-01-01 | End: 2020-01-01

## 2020-01-01 RX ORDER — METOCLOPRAMIDE HCL 10 MG
5 TABLET ORAL EVERY 6 HOURS
Refills: 0 | Status: DISCONTINUED | OUTPATIENT
Start: 2020-01-01 | End: 2020-01-01

## 2020-01-01 RX ORDER — IBUPROFEN 200 MG
600 TABLET ORAL EVERY 6 HOURS
Refills: 0 | Status: DISCONTINUED | OUTPATIENT
Start: 2020-01-01 | End: 2020-01-01

## 2020-01-01 RX ORDER — MORPHINE SULFATE 50 MG/1
4 CAPSULE, EXTENDED RELEASE ORAL ONCE
Refills: 0 | Status: DISCONTINUED | OUTPATIENT
Start: 2020-01-01 | End: 2020-01-01

## 2020-01-01 RX ORDER — OXYCODONE HYDROCHLORIDE 5 MG/1
5 TABLET ORAL EVERY 6 HOURS
Refills: 0 | Status: DISCONTINUED | OUTPATIENT
Start: 2020-01-01 | End: 2020-01-01

## 2020-01-01 RX ORDER — ONDANSETRON 8 MG/1
4 TABLET, FILM COATED ORAL EVERY 6 HOURS
Refills: 0 | Status: DISCONTINUED | OUTPATIENT
Start: 2020-01-01 | End: 2020-01-01

## 2020-01-01 RX ORDER — PHYTONADIONE (VIT K1) 5 MG
10 TABLET ORAL DAILY
Refills: 0 | Status: COMPLETED | OUTPATIENT
Start: 2020-01-01 | End: 2020-01-01

## 2020-01-01 RX ORDER — HEPARIN SODIUM 5000 [USP'U]/ML
5000 INJECTION INTRAVENOUS; SUBCUTANEOUS ONCE
Refills: 0 | Status: COMPLETED | OUTPATIENT
Start: 2020-01-01 | End: 2020-01-01

## 2020-01-01 RX ORDER — MORPHINE SULFATE 30 MG/1
30 TABLET, FILM COATED, EXTENDED RELEASE ORAL
Qty: 60 | Refills: 0 | Status: ACTIVE | COMMUNITY
Start: 2020-01-01 | End: 1900-01-01

## 2020-01-01 RX ORDER — METOCLOPRAMIDE HCL 10 MG
10 TABLET ORAL ONCE
Refills: 0 | Status: COMPLETED | OUTPATIENT
Start: 2020-01-01 | End: 2020-01-01

## 2020-01-01 RX ORDER — LIDOCAINE 4 G/100G
2 CREAM TOPICAL DAILY
Refills: 0 | Status: DISCONTINUED | OUTPATIENT
Start: 2020-01-01 | End: 2020-01-01

## 2020-01-01 RX ORDER — VANCOMYCIN HCL 1 G
VIAL (EA) INTRAVENOUS
Refills: 0 | Status: DISCONTINUED | OUTPATIENT
Start: 2020-01-01 | End: 2020-01-01

## 2020-01-01 RX ORDER — ONDANSETRON 8 MG/1
4 TABLET, FILM COATED ORAL EVERY 8 HOURS
Refills: 0 | Status: DISCONTINUED | OUTPATIENT
Start: 2020-01-01 | End: 2020-01-01

## 2020-01-01 RX ORDER — DILTIAZEM HCL 120 MG
30 CAPSULE, EXT RELEASE 24 HR ORAL ONCE
Refills: 0 | Status: COMPLETED | OUTPATIENT
Start: 2020-01-01 | End: 2020-01-01

## 2020-01-01 RX ORDER — ALBUMIN HUMAN 25 %
50 VIAL (ML) INTRAVENOUS ONCE
Refills: 0 | Status: COMPLETED | OUTPATIENT
Start: 2020-01-01 | End: 2020-01-01

## 2020-01-01 RX ORDER — FAMOTIDINE 10 MG/ML
20 INJECTION INTRAVENOUS ONCE
Refills: 0 | Status: COMPLETED | OUTPATIENT
Start: 2020-01-01 | End: 2020-01-01

## 2020-01-01 RX ORDER — HYDROMORPHONE HYDROCHLORIDE 2 MG/ML
4 INJECTION INTRAMUSCULAR; INTRAVENOUS; SUBCUTANEOUS
Refills: 0 | Status: DISCONTINUED | OUTPATIENT
Start: 2020-01-01 | End: 2020-01-01

## 2020-01-01 RX ORDER — FLUCONAZOLE 150 MG/1
200 TABLET ORAL EVERY 24 HOURS
Refills: 0 | Status: DISCONTINUED | OUTPATIENT
Start: 2020-01-01 | End: 2020-01-01

## 2020-01-01 RX ORDER — ACETAMINOPHEN 500 MG
1000 TABLET ORAL ONCE
Refills: 0 | Status: COMPLETED | OUTPATIENT
Start: 2020-01-01 | End: 2020-01-01

## 2020-01-01 RX ORDER — PIPERACILLIN AND TAZOBACTAM 4; .5 G/20ML; G/20ML
3.38 INJECTION, POWDER, LYOPHILIZED, FOR SOLUTION INTRAVENOUS ONCE
Refills: 0 | Status: COMPLETED | OUTPATIENT
Start: 2020-01-01 | End: 2020-01-01

## 2020-01-01 RX ORDER — MEROPENEM 1 G/30ML
1000 INJECTION INTRAVENOUS EVERY 8 HOURS
Refills: 0 | Status: DISCONTINUED | OUTPATIENT
Start: 2020-01-01 | End: 2020-01-01

## 2020-01-01 RX ORDER — HEPARIN SODIUM 5000 [USP'U]/ML
5000 INJECTION INTRAVENOUS; SUBCUTANEOUS EVERY 8 HOURS
Refills: 0 | Status: DISCONTINUED | OUTPATIENT
Start: 2020-01-01 | End: 2020-01-01

## 2020-01-01 RX ORDER — HYDROMORPHONE HYDROCHLORIDE 2 MG/ML
2 INJECTION INTRAMUSCULAR; INTRAVENOUS; SUBCUTANEOUS EVERY 6 HOURS
Refills: 0 | Status: DISCONTINUED | OUTPATIENT
Start: 2020-01-01 | End: 2020-01-01

## 2020-01-01 RX ORDER — HYDROCORTISONE 20 MG
100 TABLET ORAL ONCE
Refills: 0 | Status: DISCONTINUED | OUTPATIENT
Start: 2020-01-01 | End: 2020-01-01

## 2020-01-01 RX ORDER — MORPHINE SULFATE 50 MG/1
5 CAPSULE, EXTENDED RELEASE ORAL
Refills: 0 | Status: DISCONTINUED | OUTPATIENT
Start: 2020-01-01 | End: 2020-01-01

## 2020-01-01 RX ORDER — MORPHINE SULFATE 50 MG/1
75 CAPSULE, EXTENDED RELEASE ORAL
Refills: 0 | Status: DISCONTINUED | OUTPATIENT
Start: 2020-01-01 | End: 2020-01-01

## 2020-01-01 RX ORDER — MORPHINE SULFATE 50 MG/1
7.5 CAPSULE, EXTENDED RELEASE ORAL
Refills: 0 | Status: DISCONTINUED | OUTPATIENT
Start: 2020-01-01 | End: 2020-01-01

## 2020-01-01 RX ORDER — SODIUM CHLORIDE 9 MG/ML
1000 INJECTION, SOLUTION INTRAVENOUS
Refills: 0 | Status: DISCONTINUED | OUTPATIENT
Start: 2020-01-01 | End: 2020-01-01

## 2020-01-01 RX ORDER — ACETAMINOPHEN 500 MG
650 TABLET ORAL EVERY 6 HOURS
Refills: 0 | Status: DISCONTINUED | OUTPATIENT
Start: 2020-01-01 | End: 2020-01-01

## 2020-01-01 RX ORDER — MORPHINE SULFATE 50 MG/1
1.5 CAPSULE, EXTENDED RELEASE ORAL
Qty: 100 | Refills: 0 | Status: DISCONTINUED | OUTPATIENT
Start: 2020-01-01 | End: 2020-01-01

## 2020-01-01 RX ORDER — POTASSIUM CHLORIDE 20 MEQ
20 PACKET (EA) ORAL ONCE
Refills: 0 | Status: COMPLETED | OUTPATIENT
Start: 2020-01-01 | End: 2020-01-01

## 2020-01-01 RX ORDER — MORPHINE SULFATE 50 MG/1
60 CAPSULE, EXTENDED RELEASE ORAL
Refills: 0 | Status: DISCONTINUED | OUTPATIENT
Start: 2020-01-01 | End: 2020-01-01

## 2020-01-01 RX ORDER — ENOXAPARIN SODIUM 100 MG/ML
40 INJECTION SUBCUTANEOUS DAILY
Refills: 0 | Status: DISCONTINUED | OUTPATIENT
Start: 2020-01-01 | End: 2020-01-01

## 2020-01-01 RX ORDER — MORPHINE SULFATE 50 MG/1
1 CAPSULE, EXTENDED RELEASE ORAL
Qty: 100 | Refills: 0 | Status: DISCONTINUED | OUTPATIENT
Start: 2020-01-01 | End: 2020-01-01

## 2020-01-01 RX ORDER — MORPHINE SULFATE 50 MG/1
30 CAPSULE, EXTENDED RELEASE ORAL
Refills: 0 | Status: DISCONTINUED | OUTPATIENT
Start: 2020-01-01 | End: 2020-01-01

## 2020-01-01 RX ORDER — FILGRASTIM 480MCG/1.6
480 VIAL (ML) INJECTION ONCE
Refills: 0 | Status: COMPLETED | OUTPATIENT
Start: 2020-01-01 | End: 2020-01-01

## 2020-01-01 RX ORDER — MAGNESIUM SULFATE 500 MG/ML
2 VIAL (ML) INJECTION ONCE
Refills: 0 | Status: COMPLETED | OUTPATIENT
Start: 2020-01-01 | End: 2020-01-01

## 2020-01-01 RX ORDER — INFLUENZA VIRUS VACCINE 15; 15; 15; 15 UG/.5ML; UG/.5ML; UG/.5ML; UG/.5ML
0.5 SUSPENSION INTRAMUSCULAR ONCE
Refills: 0 | Status: DISCONTINUED | OUTPATIENT
Start: 2020-01-01 | End: 2020-01-01

## 2020-01-01 RX ORDER — MORPHINE SULFATE 50 MG/1
4 CAPSULE, EXTENDED RELEASE ORAL
Refills: 0 | Status: DISCONTINUED | OUTPATIENT
Start: 2020-01-01 | End: 2020-01-01

## 2020-01-01 RX ORDER — LIDOCAINE 4 G/100G
1 CREAM TOPICAL DAILY
Refills: 0 | Status: DISCONTINUED | OUTPATIENT
Start: 2020-01-01 | End: 2020-01-01

## 2020-01-01 RX ORDER — HYDROMORPHONE HYDROCHLORIDE 2 MG/ML
0.75 INJECTION INTRAMUSCULAR; INTRAVENOUS; SUBCUTANEOUS
Refills: 0 | Status: DISCONTINUED | OUTPATIENT
Start: 2020-01-01 | End: 2020-01-01

## 2020-01-01 RX ORDER — DILTIAZEM HCL 120 MG
10 CAPSULE, EXT RELEASE 24 HR ORAL ONCE
Refills: 0 | Status: COMPLETED | OUTPATIENT
Start: 2020-01-01 | End: 2020-01-01

## 2020-01-01 RX ORDER — MORPHINE SULFATE 50 MG/1
15 CAPSULE, EXTENDED RELEASE ORAL
Refills: 0 | Status: DISCONTINUED | OUTPATIENT
Start: 2020-01-01 | End: 2020-01-01

## 2020-01-01 RX ORDER — NALOXONE HYDROCHLORIDE 4 MG/.1ML
0.1 SPRAY NASAL
Refills: 0 | Status: DISCONTINUED | OUTPATIENT
Start: 2020-01-01 | End: 2020-01-01

## 2020-01-01 RX ORDER — SODIUM CHLORIDE 9 MG/ML
500 INJECTION INTRAMUSCULAR; INTRAVENOUS; SUBCUTANEOUS ONCE
Refills: 0 | Status: COMPLETED | OUTPATIENT
Start: 2020-01-01 | End: 2020-01-01

## 2020-01-01 RX ORDER — SODIUM CHLORIDE 9 MG/ML
500 INJECTION, SOLUTION INTRAVENOUS ONCE
Refills: 0 | Status: COMPLETED | OUTPATIENT
Start: 2020-01-01 | End: 2020-01-01

## 2020-01-01 RX ORDER — RASBURICASE 7.5 MG
3 KIT INTRAVENOUS ONCE
Refills: 0 | Status: COMPLETED | OUTPATIENT
Start: 2020-01-01 | End: 2020-01-01

## 2020-01-01 RX ORDER — DEXAMETHASONE 0.5 MG/5ML
2 ELIXIR ORAL
Refills: 0 | Status: DISCONTINUED | OUTPATIENT
Start: 2020-01-01 | End: 2020-01-01

## 2020-01-01 RX ORDER — ACETAMINOPHEN 500 MG
975 TABLET ORAL ONCE
Refills: 0 | Status: COMPLETED | OUTPATIENT
Start: 2020-01-01 | End: 2020-01-01

## 2020-01-01 RX ORDER — VANCOMYCIN HCL 1 G
1500 VIAL (EA) INTRAVENOUS ONCE
Refills: 0 | Status: COMPLETED | OUTPATIENT
Start: 2020-01-01 | End: 2020-01-01

## 2020-01-01 RX ORDER — HYDROMORPHONE HYDROCHLORIDE 2 MG/ML
2 INJECTION INTRAMUSCULAR; INTRAVENOUS; SUBCUTANEOUS
Refills: 0 | Status: DISCONTINUED | OUTPATIENT
Start: 2020-01-01 | End: 2020-01-01

## 2020-01-01 RX ORDER — DILTIAZEM HCL 120 MG
60 CAPSULE, EXT RELEASE 24 HR ORAL EVERY 8 HOURS
Refills: 0 | Status: DISCONTINUED | OUTPATIENT
Start: 2020-01-01 | End: 2020-01-01

## 2020-01-01 RX ORDER — HYDROMORPHONE HYDROCHLORIDE 2 MG/ML
1.5 INJECTION INTRAMUSCULAR; INTRAVENOUS; SUBCUTANEOUS
Refills: 0 | Status: DISCONTINUED | OUTPATIENT
Start: 2020-01-01 | End: 2020-01-01

## 2020-01-01 RX ORDER — PACLITAXEL 6 MG/ML
326 INJECTION, SOLUTION, CONCENTRATE INTRAVENOUS ONCE
Refills: 0 | Status: COMPLETED | OUTPATIENT
Start: 2020-01-01 | End: 2020-01-01

## 2020-01-01 RX ORDER — PANTOPRAZOLE SODIUM 20 MG/1
1 TABLET, DELAYED RELEASE ORAL
Qty: 30 | Refills: 0
Start: 2020-01-01 | End: 2020-06-15

## 2020-01-01 RX ORDER — HYDROMORPHONE HYDROCHLORIDE 2 MG/ML
0.5 INJECTION INTRAMUSCULAR; INTRAVENOUS; SUBCUTANEOUS
Refills: 0 | Status: DISCONTINUED | OUTPATIENT
Start: 2020-01-01 | End: 2020-01-01

## 2020-01-01 RX ORDER — POLYETHYLENE GLYCOL 3350 17 G/17G
17 POWDER, FOR SOLUTION ORAL DAILY
Refills: 0 | Status: DISCONTINUED | OUTPATIENT
Start: 2020-01-01 | End: 2020-01-01

## 2020-01-01 RX ORDER — PANTOPRAZOLE SODIUM 20 MG/1
40 TABLET, DELAYED RELEASE ORAL
Refills: 0 | Status: DISCONTINUED | OUTPATIENT
Start: 2020-01-01 | End: 2020-01-01

## 2020-01-01 RX ORDER — MORPHINE SULFATE 50 MG/1
4 CAPSULE, EXTENDED RELEASE ORAL EVERY 4 HOURS
Refills: 0 | Status: DISCONTINUED | OUTPATIENT
Start: 2020-01-01 | End: 2020-01-01

## 2020-01-01 RX ORDER — METOCLOPRAMIDE HCL 10 MG
10 TABLET ORAL EVERY 6 HOURS
Refills: 0 | Status: DISCONTINUED | OUTPATIENT
Start: 2020-01-01 | End: 2020-01-01

## 2020-01-01 RX ORDER — HYDROMORPHONE HYDROCHLORIDE 2 MG/ML
0.5 INJECTION INTRAMUSCULAR; INTRAVENOUS; SUBCUTANEOUS
Qty: 10 | Refills: 0 | Status: DISCONTINUED | OUTPATIENT
Start: 2020-01-01 | End: 2020-01-01

## 2020-01-01 RX ORDER — LANOLIN ALCOHOL/MO/W.PET/CERES
5 CREAM (GRAM) TOPICAL ONCE
Refills: 0 | Status: COMPLETED | OUTPATIENT
Start: 2020-01-01 | End: 2020-01-01

## 2020-01-01 RX ORDER — MORPHINE SULFATE 50 MG/1
2 CAPSULE, EXTENDED RELEASE ORAL ONCE
Refills: 0 | Status: DISCONTINUED | OUTPATIENT
Start: 2020-01-01 | End: 2020-01-01

## 2020-01-01 RX ORDER — HYDROMORPHONE HYDROCHLORIDE 2 MG/ML
30 INJECTION INTRAMUSCULAR; INTRAVENOUS; SUBCUTANEOUS
Refills: 0 | Status: DISCONTINUED | OUTPATIENT
Start: 2020-01-01 | End: 2020-01-01

## 2020-01-01 RX ORDER — ALTEPLASE 100 MG
2 KIT INTRAVENOUS ONCE
Refills: 0 | Status: COMPLETED | OUTPATIENT
Start: 2020-01-01 | End: 2020-01-01

## 2020-01-01 RX ORDER — METOCLOPRAMIDE 10 MG/1
10 TABLET ORAL EVERY 6 HOURS
Qty: 30 | Refills: 1 | Status: ACTIVE | COMMUNITY
Start: 2020-01-01 | End: 1900-01-01

## 2020-01-01 RX ORDER — DEXAMETHASONE 0.5 MG/5ML
20 ELIXIR ORAL ONCE
Refills: 0 | Status: COMPLETED | OUTPATIENT
Start: 2020-01-01 | End: 2020-01-01

## 2020-01-01 RX ORDER — LEUCOVORIN CALCIUM 5 MG
760 TABLET ORAL ONCE
Refills: 0 | Status: COMPLETED | OUTPATIENT
Start: 2020-01-01 | End: 2020-01-01

## 2020-01-01 RX ORDER — SODIUM CHLORIDE 9 MG/ML
1000 INJECTION INTRAMUSCULAR; INTRAVENOUS; SUBCUTANEOUS ONCE
Refills: 0 | Status: COMPLETED | OUTPATIENT
Start: 2020-01-01 | End: 2020-01-01

## 2020-01-01 RX ORDER — SODIUM CHLORIDE 9 MG/ML
1000 INJECTION INTRAMUSCULAR; INTRAVENOUS; SUBCUTANEOUS
Refills: 0 | Status: COMPLETED | OUTPATIENT
Start: 2020-01-01 | End: 2020-01-01

## 2020-01-01 RX ORDER — DEXAMETHASONE 0.5 MG/5ML
12 ELIXIR ORAL ONCE
Refills: 0 | Status: COMPLETED | OUTPATIENT
Start: 2020-01-01 | End: 2020-01-01

## 2020-01-01 RX ORDER — ALLOPURINOL 300 MG
300 TABLET ORAL DAILY
Refills: 0 | Status: DISCONTINUED | OUTPATIENT
Start: 2020-01-01 | End: 2020-01-01

## 2020-01-01 RX ORDER — SENNA PLUS 8.6 MG/1
2 TABLET ORAL AT BEDTIME
Refills: 0 | Status: DISCONTINUED | OUTPATIENT
Start: 2020-01-01 | End: 2020-01-01

## 2020-01-01 RX ORDER — PIPERACILLIN AND TAZOBACTAM 4; .5 G/20ML; G/20ML
3.38 INJECTION, POWDER, LYOPHILIZED, FOR SOLUTION INTRAVENOUS EVERY 8 HOURS
Refills: 0 | Status: DISCONTINUED | OUTPATIENT
Start: 2020-01-01 | End: 2020-01-01

## 2020-01-01 RX ORDER — PHYTONADIONE (VIT K1) 5 MG
5 TABLET ORAL ONCE
Refills: 0 | Status: DISCONTINUED | OUTPATIENT
Start: 2020-01-01 | End: 2020-01-01

## 2020-01-01 RX ORDER — SODIUM CHLORIDE 9 MG/ML
2300 INJECTION INTRAMUSCULAR; INTRAVENOUS; SUBCUTANEOUS ONCE
Refills: 0 | Status: COMPLETED | OUTPATIENT
Start: 2020-01-01 | End: 2020-01-01

## 2020-01-01 RX ORDER — FLUCONAZOLE 150 MG/1
200 TABLET ORAL DAILY
Refills: 0 | Status: DISCONTINUED | OUTPATIENT
Start: 2020-01-01 | End: 2020-01-01

## 2020-01-01 RX ORDER — FLUOROURACIL 50 MG/ML
2280 INJECTION, SOLUTION INTRAVENOUS EVERY 24 HOURS
Refills: 0 | Status: COMPLETED | OUTPATIENT
Start: 2020-01-01 | End: 2020-01-01

## 2020-01-01 RX ORDER — ACETAMINOPHEN 500 MG
1000 TABLET ORAL
Refills: 0 | Status: COMPLETED | OUTPATIENT
Start: 2020-01-01 | End: 2020-01-01

## 2020-01-01 RX ORDER — CARBOPLATIN 50 MG
750 VIAL (EA) INTRAVENOUS ONCE
Refills: 0 | Status: COMPLETED | OUTPATIENT
Start: 2020-01-01 | End: 2020-01-01

## 2020-01-01 RX ORDER — VANCOMYCIN HCL 1 G
1500 VIAL (EA) INTRAVENOUS EVERY 12 HOURS
Refills: 0 | Status: DISCONTINUED | OUTPATIENT
Start: 2020-01-01 | End: 2020-01-01

## 2020-01-01 RX ORDER — OXYCODONE HYDROCHLORIDE 5 MG/1
1 TABLET ORAL
Qty: 10 | Refills: 0
Start: 2020-01-01 | End: 2020-01-01

## 2020-01-01 RX ORDER — VANCOMYCIN HCL 1 G
1000 VIAL (EA) INTRAVENOUS EVERY 12 HOURS
Refills: 0 | Status: DISCONTINUED | OUTPATIENT
Start: 2020-01-01 | End: 2020-01-01

## 2020-01-01 RX ORDER — SODIUM CHLORIDE 9 MG/ML
500 INJECTION INTRAMUSCULAR; INTRAVENOUS; SUBCUTANEOUS
Refills: 0 | Status: COMPLETED | OUTPATIENT
Start: 2020-01-01 | End: 2020-01-01

## 2020-01-01 RX ORDER — ACETAMINOPHEN 500 MG
975 TABLET ORAL EVERY 6 HOURS
Refills: 0 | Status: DISCONTINUED | OUTPATIENT
Start: 2020-01-01 | End: 2020-01-01

## 2020-01-01 RX ORDER — ONDANSETRON 8 MG/1
4 TABLET, FILM COATED ORAL ONCE
Refills: 0 | Status: COMPLETED | OUTPATIENT
Start: 2020-01-01 | End: 2020-01-01

## 2020-01-01 RX ORDER — PAMIDRONATE DISODIUM 9 MG/ML
60 INJECTION, SOLUTION INTRAVENOUS ONCE
Refills: 0 | Status: COMPLETED | OUTPATIENT
Start: 2020-01-01 | End: 2020-01-01

## 2020-01-01 RX ORDER — POTASSIUM PHOSPHATE, MONOBASIC POTASSIUM PHOSPHATE, DIBASIC 236; 224 MG/ML; MG/ML
30 INJECTION, SOLUTION INTRAVENOUS ONCE
Refills: 0 | Status: COMPLETED | OUTPATIENT
Start: 2020-01-01 | End: 2020-01-01

## 2020-01-01 RX ORDER — PIPERACILLIN AND TAZOBACTAM 4; .5 G/20ML; G/20ML
3.75 INJECTION, POWDER, LYOPHILIZED, FOR SOLUTION INTRAVENOUS ONCE
Refills: 0 | Status: DISCONTINUED | OUTPATIENT
Start: 2020-01-01 | End: 2020-01-01

## 2020-01-01 RX ORDER — KETOROLAC TROMETHAMINE 30 MG/ML
15 SYRINGE (ML) INJECTION ONCE
Refills: 0 | Status: DISCONTINUED | OUTPATIENT
Start: 2020-01-01 | End: 2020-01-01

## 2020-01-01 RX ORDER — IRON SUCROSE 20 MG/ML
200 INJECTION, SOLUTION INTRAVENOUS EVERY 24 HOURS
Refills: 0 | Status: DISCONTINUED | OUTPATIENT
Start: 2020-01-01 | End: 2020-01-01

## 2020-01-01 RX ORDER — MORPHINE SULFATE 50 MG/1
6 CAPSULE, EXTENDED RELEASE ORAL
Refills: 0 | Status: DISCONTINUED | OUTPATIENT
Start: 2020-01-01 | End: 2020-01-01

## 2020-01-01 RX ORDER — METOPROLOL TARTRATE 50 MG
5 TABLET ORAL EVERY 12 HOURS
Refills: 0 | Status: DISCONTINUED | OUTPATIENT
Start: 2020-01-01 | End: 2020-01-01

## 2020-01-01 RX ORDER — MORPHINE SULFATE 50 MG/1
3 CAPSULE, EXTENDED RELEASE ORAL EVERY 4 HOURS
Refills: 0 | Status: DISCONTINUED | OUTPATIENT
Start: 2020-01-01 | End: 2020-01-01

## 2020-01-01 RX ORDER — CHOLECALCIFEROL (VITAMIN D3) 125 MCG
4000 CAPSULE ORAL DAILY
Refills: 0 | Status: DISCONTINUED | OUTPATIENT
Start: 2020-01-01 | End: 2020-01-01

## 2020-01-01 RX ORDER — METOPROLOL TARTRATE 50 MG
2.5 TABLET ORAL EVERY 6 HOURS
Refills: 0 | Status: DISCONTINUED | OUTPATIENT
Start: 2020-01-01 | End: 2020-01-01

## 2020-01-01 RX ORDER — MORPHINE SULFATE 50 MG/1
2.5 CAPSULE, EXTENDED RELEASE ORAL
Qty: 100 | Refills: 0 | Status: DISCONTINUED | OUTPATIENT
Start: 2020-01-01 | End: 2020-01-01

## 2020-01-01 RX ORDER — DEXAMETHASONE 0.5 MG/5ML
2 ELIXIR ORAL DAILY
Refills: 0 | Status: DISCONTINUED | OUTPATIENT
Start: 2020-01-01 | End: 2020-01-01

## 2020-01-01 RX ORDER — FOSAPREPITANT DIMEGLUMINE 150 MG/5ML
150 INJECTION, POWDER, LYOPHILIZED, FOR SOLUTION INTRAVENOUS ONCE
Refills: 0 | Status: COMPLETED | OUTPATIENT
Start: 2020-01-01 | End: 2020-01-01

## 2020-01-01 RX ORDER — NALOXONE HYDROCHLORIDE 4 MG/.1ML
0.1 SPRAY NASAL ONCE
Refills: 0 | Status: DISCONTINUED | OUTPATIENT
Start: 2020-01-01 | End: 2020-01-01

## 2020-01-01 RX ORDER — METOPROLOL TARTRATE 50 MG
50 TABLET ORAL
Refills: 0 | Status: DISCONTINUED | OUTPATIENT
Start: 2020-01-01 | End: 2020-01-01

## 2020-01-01 RX ORDER — HYDROMORPHONE HYDROCHLORIDE 2 MG/ML
1.5 INJECTION INTRAMUSCULAR; INTRAVENOUS; SUBCUTANEOUS ONCE
Refills: 0 | Status: DISCONTINUED | OUTPATIENT
Start: 2020-01-01 | End: 2020-01-01

## 2020-01-01 RX ORDER — HYDROMORPHONE HYDROCHLORIDE 2 MG/ML
0.5 INJECTION INTRAMUSCULAR; INTRAVENOUS; SUBCUTANEOUS ONCE
Refills: 0 | Status: DISCONTINUED | OUTPATIENT
Start: 2020-01-01 | End: 2020-01-01

## 2020-01-01 RX ORDER — SODIUM,POTASSIUM PHOSPHATES 278-250MG
1 POWDER IN PACKET (EA) ORAL
Refills: 0 | Status: DISCONTINUED | OUTPATIENT
Start: 2020-01-01 | End: 2020-01-01

## 2020-01-01 RX ORDER — MORPHINE SULFATE 50 MG/1
45 CAPSULE, EXTENDED RELEASE ORAL
Refills: 0 | Status: DISCONTINUED | OUTPATIENT
Start: 2020-01-01 | End: 2020-01-01

## 2020-01-01 RX ORDER — OXALIPLATIN 5 MG/ML
139 INJECTION, SOLUTION INTRAVENOUS ONCE
Refills: 0 | Status: COMPLETED | OUTPATIENT
Start: 2020-01-01 | End: 2020-01-01

## 2020-01-01 RX ORDER — LANOLIN ALCOHOL/MO/W.PET/CERES
3 CREAM (GRAM) TOPICAL AT BEDTIME
Refills: 0 | Status: DISCONTINUED | OUTPATIENT
Start: 2020-01-01 | End: 2020-01-01

## 2020-01-01 RX ORDER — HYDROMORPHONE HYDROCHLORIDE 2 MG/ML
1.5 INJECTION INTRAMUSCULAR; INTRAVENOUS; SUBCUTANEOUS EVERY 4 HOURS
Refills: 0 | Status: DISCONTINUED | OUTPATIENT
Start: 2020-01-01 | End: 2020-01-01

## 2020-01-01 RX ORDER — METOPROLOL TARTRATE 50 MG
50 TABLET ORAL EVERY 8 HOURS
Refills: 0 | Status: DISCONTINUED | OUTPATIENT
Start: 2020-01-01 | End: 2020-01-01

## 2020-01-01 RX ORDER — CIPROFLOXACIN LACTATE 400MG/40ML
1 VIAL (ML) INTRAVENOUS
Qty: 7 | Refills: 0
Start: 2020-01-01 | End: 2020-01-01

## 2020-01-01 RX ORDER — METOPROLOL TARTRATE 50 MG
2.5 TABLET ORAL ONCE
Refills: 0 | Status: COMPLETED | OUTPATIENT
Start: 2020-01-01 | End: 2020-01-01

## 2020-01-01 RX ORDER — SODIUM ZIRCONIUM CYCLOSILICATE 10 G/10G
10 POWDER, FOR SUSPENSION ORAL ONCE
Refills: 0 | Status: DISCONTINUED | OUTPATIENT
Start: 2020-01-01 | End: 2020-01-01

## 2020-01-01 RX ORDER — METOPROLOL TARTRATE 50 MG
12.5 TABLET ORAL
Refills: 0 | Status: DISCONTINUED | OUTPATIENT
Start: 2020-01-01 | End: 2020-01-01

## 2020-01-01 RX ORDER — FILGRASTIM 480MCG/1.6
300 VIAL (ML) INJECTION ONCE
Refills: 0 | Status: COMPLETED | OUTPATIENT
Start: 2020-01-01 | End: 2020-01-01

## 2020-01-01 RX ORDER — SODIUM CHLORIDE 9 MG/ML
250 INJECTION INTRAMUSCULAR; INTRAVENOUS; SUBCUTANEOUS ONCE
Refills: 0 | Status: COMPLETED | OUTPATIENT
Start: 2020-01-01 | End: 2020-01-01

## 2020-01-01 RX ORDER — SODIUM,POTASSIUM PHOSPHATES 278-250MG
1 POWDER IN PACKET (EA) ORAL
Qty: 12 | Refills: 0
Start: 2020-01-01 | End: 2020-01-01

## 2020-01-01 RX ORDER — POTASSIUM CHLORIDE 20 MEQ
20 PACKET (EA) ORAL
Refills: 0 | Status: COMPLETED | OUTPATIENT
Start: 2020-01-01 | End: 2020-01-01

## 2020-01-01 RX ORDER — SIMETHICONE 80 MG/1
1 TABLET, CHEWABLE ORAL
Qty: 0 | Refills: 0 | DISCHARGE
Start: 2020-01-01

## 2020-01-01 RX ORDER — MORPHINE SULFATE 50 MG/1
2 CAPSULE, EXTENDED RELEASE ORAL EVERY 4 HOURS
Refills: 0 | Status: DISCONTINUED | OUTPATIENT
Start: 2020-01-01 | End: 2020-01-01

## 2020-01-01 RX ORDER — SODIUM CHLORIDE 9 MG/ML
3 INJECTION INTRAMUSCULAR; INTRAVENOUS; SUBCUTANEOUS EVERY 8 HOURS
Refills: 0 | Status: DISCONTINUED | OUTPATIENT
Start: 2020-01-01 | End: 2020-01-01

## 2020-01-01 RX ORDER — MORPHINE SULFATE 15 MG/1
15 TABLET, FILM COATED, EXTENDED RELEASE ORAL
Qty: 30 | Refills: 0 | Status: ACTIVE | COMMUNITY
Start: 2020-01-01 | End: 1900-01-01

## 2020-01-01 RX ORDER — FAMOTIDINE 10 MG/ML
20 INJECTION INTRAVENOUS DAILY
Refills: 0 | Status: DISCONTINUED | OUTPATIENT
Start: 2020-01-01 | End: 2020-01-01

## 2020-01-01 RX ORDER — LINEZOLID 600 MG/300ML
600 INJECTION, SOLUTION INTRAVENOUS EVERY 12 HOURS
Refills: 0 | Status: DISCONTINUED | OUTPATIENT
Start: 2020-01-01 | End: 2020-01-01

## 2020-01-01 RX ORDER — METOPROLOL TARTRATE 50 MG
25 TABLET ORAL ONCE
Refills: 0 | Status: COMPLETED | OUTPATIENT
Start: 2020-01-01 | End: 2020-01-01

## 2020-01-01 RX ORDER — SENNA PLUS 8.6 MG/1
2 TABLET ORAL AT BEDTIME
Refills: 0 | Status: COMPLETED | OUTPATIENT
Start: 2020-01-01 | End: 2020-01-01

## 2020-01-01 RX ORDER — ONDANSETRON 8 MG/1
1 TABLET, FILM COATED ORAL
Qty: 63 | Refills: 0
Start: 2020-01-01 | End: 2020-06-06

## 2020-01-01 RX ORDER — DILTIAZEM HCL 120 MG
1 CAPSULE, EXT RELEASE 24 HR ORAL
Qty: 90 | Refills: 0
Start: 2020-01-01 | End: 2020-06-15

## 2020-01-01 RX ORDER — PALONOSETRON HYDROCHLORIDE 0.25 MG/5ML
0.25 INJECTION, SOLUTION INTRAVENOUS ONCE
Refills: 0 | Status: COMPLETED | OUTPATIENT
Start: 2020-01-01 | End: 2020-01-01

## 2020-01-01 RX ORDER — FLUOROURACIL 50 MG/ML
2376 INJECTION, SOLUTION INTRAVENOUS EVERY 24 HOURS
Refills: 0 | Status: COMPLETED | OUTPATIENT
Start: 2020-01-01 | End: 2020-01-01

## 2020-01-01 RX ORDER — PHYTONADIONE (VIT K1) 5 MG
10 TABLET ORAL ONCE
Refills: 0 | Status: COMPLETED | OUTPATIENT
Start: 2020-01-01 | End: 2020-01-01

## 2020-01-01 RX ORDER — ENOXAPARIN SODIUM 100 MG/ML
80 INJECTION SUBCUTANEOUS EVERY 12 HOURS
Refills: 0 | Status: DISCONTINUED | OUTPATIENT
Start: 2020-01-01 | End: 2020-01-01

## 2020-01-01 RX ORDER — METOPROLOL TARTRATE 50 MG
5 TABLET ORAL ONCE
Refills: 0 | Status: COMPLETED | OUTPATIENT
Start: 2020-01-01 | End: 2020-01-01

## 2020-01-01 RX ORDER — LIDOCAINE 4 G/100G
1 CREAM TOPICAL ONCE
Refills: 0 | Status: COMPLETED | OUTPATIENT
Start: 2020-01-01 | End: 2020-01-01

## 2020-01-01 RX ORDER — PHYTONADIONE (VIT K1) 5 MG
5 TABLET ORAL ONCE
Refills: 0 | Status: COMPLETED | OUTPATIENT
Start: 2020-01-01 | End: 2020-01-01

## 2020-01-01 RX ORDER — DIPHENHYDRAMINE HCL 50 MG
50 CAPSULE ORAL ONCE
Refills: 0 | Status: COMPLETED | OUTPATIENT
Start: 2020-01-01 | End: 2020-01-01

## 2020-01-01 RX ORDER — KETOROLAC TROMETHAMINE 30 MG/ML
30 SYRINGE (ML) INJECTION EVERY 6 HOURS
Refills: 0 | Status: DISCONTINUED | OUTPATIENT
Start: 2020-01-01 | End: 2020-01-01

## 2020-01-01 RX ORDER — MORPHINE SULFATE 50 MG/1
15 CAPSULE, EXTENDED RELEASE ORAL EVERY 4 HOURS
Refills: 0 | Status: DISCONTINUED | OUTPATIENT
Start: 2020-01-01 | End: 2020-01-01

## 2020-01-01 RX ORDER — POTASSIUM PHOSPHATE, MONOBASIC POTASSIUM PHOSPHATE, DIBASIC 236; 224 MG/ML; MG/ML
30 INJECTION, SOLUTION INTRAVENOUS ONCE
Refills: 0 | Status: DISCONTINUED | OUTPATIENT
Start: 2020-01-01 | End: 2020-01-01

## 2020-01-01 RX ORDER — HALOPERIDOL DECANOATE 100 MG/ML
0.5 INJECTION INTRAMUSCULAR EVERY 6 HOURS
Refills: 0 | Status: DISCONTINUED | OUTPATIENT
Start: 2020-01-01 | End: 2020-01-01

## 2020-01-01 RX ORDER — VANCOMYCIN HCL 1 G
1000 VIAL (EA) INTRAVENOUS ONCE
Refills: 0 | Status: COMPLETED | OUTPATIENT
Start: 2020-01-01 | End: 2020-01-01

## 2020-01-01 RX ORDER — ONDANSETRON 8 MG/1
8 TABLET, FILM COATED ORAL EVERY 8 HOURS
Refills: 0 | Status: DISCONTINUED | OUTPATIENT
Start: 2020-01-01 | End: 2020-01-01

## 2020-01-01 RX ORDER — IRON SUCROSE 20 MG/ML
200 INJECTION, SOLUTION INTRAVENOUS EVERY 24 HOURS
Refills: 0 | Status: COMPLETED | OUTPATIENT
Start: 2020-01-01 | End: 2020-01-01

## 2020-01-01 RX ORDER — POLYETHYLENE GLYCOL 3350 17 G/17G
17 POWDER, FOR SOLUTION ORAL
Refills: 0 | Status: DISCONTINUED | OUTPATIENT
Start: 2020-01-01 | End: 2020-01-01

## 2020-01-01 RX ORDER — SIMETHICONE 80 MG/1
80 TABLET, CHEWABLE ORAL EVERY 6 HOURS
Refills: 0 | Status: DISCONTINUED | OUTPATIENT
Start: 2020-01-01 | End: 2020-01-01

## 2020-01-01 RX ORDER — MORPHINE SULFATE 50 MG/1
4 CAPSULE, EXTENDED RELEASE ORAL EVERY 6 HOURS
Refills: 0 | Status: DISCONTINUED | OUTPATIENT
Start: 2020-01-01 | End: 2020-01-01

## 2020-01-01 RX ORDER — PANTOPRAZOLE SODIUM 20 MG/1
40 TABLET, DELAYED RELEASE ORAL DAILY
Refills: 0 | Status: DISCONTINUED | OUTPATIENT
Start: 2020-01-01 | End: 2020-01-01

## 2020-01-01 RX ORDER — DIPHENHYDRAMINE HCL 50 MG
50 CAPSULE ORAL ONCE
Refills: 0 | Status: DISCONTINUED | OUTPATIENT
Start: 2020-01-01 | End: 2020-01-01

## 2020-01-01 RX ORDER — ZOLEDRONIC ACID 5 MG/100ML
4 INJECTION, SOLUTION INTRAVENOUS ONCE
Refills: 0 | Status: COMPLETED | OUTPATIENT
Start: 2020-01-01 | End: 2020-01-01

## 2020-01-01 RX ORDER — OXYCODONE 5 MG/1
5 TABLET ORAL
Qty: 100 | Refills: 0 | Status: ACTIVE | COMMUNITY
Start: 2020-01-01 | End: 1900-01-01

## 2020-01-01 RX ORDER — LANOLIN ALCOHOL/MO/W.PET/CERES
3 CREAM (GRAM) TOPICAL ONCE
Refills: 0 | Status: COMPLETED | OUTPATIENT
Start: 2020-01-01 | End: 2020-01-01

## 2020-01-01 RX ORDER — FUROSEMIDE 40 MG
40 TABLET ORAL ONCE
Refills: 0 | Status: COMPLETED | OUTPATIENT
Start: 2020-01-01 | End: 2020-01-01

## 2020-01-01 RX ORDER — POTASSIUM CHLORIDE 20 MEQ
10 PACKET (EA) ORAL ONCE
Refills: 0 | Status: COMPLETED | OUTPATIENT
Start: 2020-01-01 | End: 2020-01-01

## 2020-01-01 RX ORDER — MORPHINE SULFATE 50 MG/1
90 CAPSULE, EXTENDED RELEASE ORAL
Refills: 0 | Status: DISCONTINUED | OUTPATIENT
Start: 2020-01-01 | End: 2020-01-01

## 2020-01-01 RX ORDER — OXYCODONE HYDROCHLORIDE 5 MG/1
1 TABLET ORAL
Qty: 20 | Refills: 0
Start: 2020-01-01 | End: 2020-01-01

## 2020-01-01 RX ORDER — HYDROMORPHONE HYDROCHLORIDE 2 MG/ML
0.8 INJECTION INTRAMUSCULAR; INTRAVENOUS; SUBCUTANEOUS
Refills: 0 | Status: DISCONTINUED | OUTPATIENT
Start: 2020-01-01 | End: 2020-01-01

## 2020-01-01 RX ORDER — METOPROLOL TARTRATE 50 MG
25 TABLET ORAL
Refills: 0 | Status: DISCONTINUED | OUTPATIENT
Start: 2020-01-01 | End: 2020-01-01

## 2020-01-01 RX ORDER — HEPARIN SODIUM 5000 [USP'U]/ML
5000 INJECTION INTRAVENOUS; SUBCUTANEOUS EVERY 8 HOURS
Refills: 0 | Status: COMPLETED | OUTPATIENT
Start: 2020-01-01 | End: 2020-01-01

## 2020-01-01 RX ORDER — METOPROLOL TARTRATE 50 MG
50 TABLET ORAL EVERY 12 HOURS
Refills: 0 | Status: DISCONTINUED | OUTPATIENT
Start: 2020-01-01 | End: 2020-01-01

## 2020-01-01 RX ORDER — DIPHENHYDRAMINE HCL 50 MG
25 CAPSULE ORAL ONCE
Refills: 0 | Status: COMPLETED | OUTPATIENT
Start: 2020-01-01 | End: 2020-01-01

## 2020-01-01 RX ORDER — HEPARIN SODIUM 5000 [USP'U]/ML
5000 INJECTION INTRAVENOUS; SUBCUTANEOUS ONCE
Refills: 0 | Status: DISCONTINUED | OUTPATIENT
Start: 2020-01-01 | End: 2020-01-01

## 2020-01-01 RX ORDER — METOPROLOL TARTRATE 50 MG
25 TABLET ORAL EVERY 12 HOURS
Refills: 0 | Status: DISCONTINUED | OUTPATIENT
Start: 2020-01-01 | End: 2020-01-01

## 2020-01-01 RX ORDER — OXALIPLATIN 5 MG/ML
133 INJECTION, SOLUTION INTRAVENOUS ONCE
Refills: 0 | Status: COMPLETED | OUTPATIENT
Start: 2020-01-01 | End: 2020-01-01

## 2020-01-01 RX ORDER — OXYCODONE HYDROCHLORIDE 5 MG/1
5 TABLET ORAL ONCE
Refills: 0 | Status: DISCONTINUED | OUTPATIENT
Start: 2020-01-01 | End: 2020-01-01

## 2020-01-01 RX ORDER — METOPROLOL TARTRATE 50 MG
5 TABLET ORAL ONCE
Refills: 0 | Status: DISCONTINUED | OUTPATIENT
Start: 2020-01-01 | End: 2020-01-01

## 2020-01-01 RX ORDER — METOPROLOL TARTRATE 50 MG
25 TABLET ORAL ONCE
Refills: 0 | Status: DISCONTINUED | OUTPATIENT
Start: 2020-01-01 | End: 2020-01-01

## 2020-01-01 RX ORDER — LEUCOVORIN CALCIUM 5 MG
792 TABLET ORAL ONCE
Refills: 0 | Status: COMPLETED | OUTPATIENT
Start: 2020-01-01 | End: 2020-01-01

## 2020-01-01 RX ADMIN — ENOXAPARIN SODIUM 40 MILLIGRAM(S): 100 INJECTION SUBCUTANEOUS at 13:17

## 2020-01-01 RX ADMIN — Medication 1 TABLET(S): at 17:54

## 2020-01-01 RX ADMIN — Medication 60 MILLIGRAM(S): at 12:24

## 2020-01-01 RX ADMIN — SODIUM CHLORIDE 3 MILLILITER(S): 9 INJECTION INTRAMUSCULAR; INTRAVENOUS; SUBCUTANEOUS at 21:22

## 2020-01-01 RX ADMIN — SODIUM CHLORIDE 3 MILLILITER(S): 9 INJECTION INTRAMUSCULAR; INTRAVENOUS; SUBCUTANEOUS at 05:58

## 2020-01-01 RX ADMIN — Medication 0.5 MILLIGRAM(S): at 08:46

## 2020-01-01 RX ADMIN — SODIUM CHLORIDE 3 MILLILITER(S): 9 INJECTION INTRAMUSCULAR; INTRAVENOUS; SUBCUTANEOUS at 13:34

## 2020-01-01 RX ADMIN — SODIUM CHLORIDE 3 MILLILITER(S): 9 INJECTION INTRAMUSCULAR; INTRAVENOUS; SUBCUTANEOUS at 13:59

## 2020-01-01 RX ADMIN — Medication 110 MILLIGRAM(S): at 13:37

## 2020-01-01 RX ADMIN — Medication 975 MILLIGRAM(S): at 00:13

## 2020-01-01 RX ADMIN — SODIUM CHLORIDE 3 MILLILITER(S): 9 INJECTION INTRAMUSCULAR; INTRAVENOUS; SUBCUTANEOUS at 13:20

## 2020-01-01 RX ADMIN — Medication 300 MILLIGRAM(S): at 13:18

## 2020-01-01 RX ADMIN — Medication 650 MILLIGRAM(S): at 03:51

## 2020-01-01 RX ADMIN — Medication 600 MILLIGRAM(S): at 18:50

## 2020-01-01 RX ADMIN — POLYETHYLENE GLYCOL 3350 17 GRAM(S): 17 POWDER, FOR SOLUTION ORAL at 11:03

## 2020-01-01 RX ADMIN — ENOXAPARIN SODIUM 40 MILLIGRAM(S): 100 INJECTION SUBCUTANEOUS at 12:42

## 2020-01-01 RX ADMIN — POLYETHYLENE GLYCOL 3350 17 GRAM(S): 17 POWDER, FOR SOLUTION ORAL at 13:22

## 2020-01-01 RX ADMIN — ENOXAPARIN SODIUM 40 MILLIGRAM(S): 100 INJECTION SUBCUTANEOUS at 12:25

## 2020-01-01 RX ADMIN — RASBURICASE 104 MILLIGRAM(S): KIT at 10:40

## 2020-01-01 RX ADMIN — SENNA PLUS 2 TABLET(S): 8.6 TABLET ORAL at 22:10

## 2020-01-01 RX ADMIN — SODIUM CHLORIDE 3 MILLILITER(S): 9 INJECTION INTRAMUSCULAR; INTRAVENOUS; SUBCUTANEOUS at 05:32

## 2020-01-01 RX ADMIN — Medication 1 TABLET(S): at 16:33

## 2020-01-01 RX ADMIN — MORPHINE SULFATE 5 MILLIGRAM(S): 50 CAPSULE, EXTENDED RELEASE ORAL at 13:49

## 2020-01-01 RX ADMIN — MORPHINE SULFATE 6 MILLIGRAM(S): 50 CAPSULE, EXTENDED RELEASE ORAL at 17:17

## 2020-01-01 RX ADMIN — HYDROMORPHONE HYDROCHLORIDE 4 MILLIGRAM(S): 2 INJECTION INTRAMUSCULAR; INTRAVENOUS; SUBCUTANEOUS at 10:18

## 2020-01-01 RX ADMIN — SODIUM CHLORIDE 3 MILLILITER(S): 9 INJECTION INTRAMUSCULAR; INTRAVENOUS; SUBCUTANEOUS at 21:58

## 2020-01-01 RX ADMIN — Medication 600 MILLIGRAM(S): at 01:31

## 2020-01-01 RX ADMIN — Medication 10 MILLIGRAM(S): at 15:45

## 2020-01-01 RX ADMIN — Medication 20 MILLIEQUIVALENT(S): at 11:43

## 2020-01-01 RX ADMIN — HEPARIN SODIUM 5000 UNIT(S): 5000 INJECTION INTRAVENOUS; SUBCUTANEOUS at 22:56

## 2020-01-01 RX ADMIN — LIDOCAINE 1 PATCH: 4 CREAM TOPICAL at 22:14

## 2020-01-01 RX ADMIN — HYDROMORPHONE HYDROCHLORIDE 2 MILLIGRAM(S): 2 INJECTION INTRAMUSCULAR; INTRAVENOUS; SUBCUTANEOUS at 13:45

## 2020-01-01 RX ADMIN — ENOXAPARIN SODIUM 80 MILLIGRAM(S): 100 INJECTION SUBCUTANEOUS at 05:49

## 2020-01-01 RX ADMIN — Medication 63.75 MILLIMOLE(S): at 15:20

## 2020-01-01 RX ADMIN — MORPHINE SULFATE 7.5 MILLIGRAM(S): 50 CAPSULE, EXTENDED RELEASE ORAL at 00:34

## 2020-01-01 RX ADMIN — SENNA PLUS 2 TABLET(S): 8.6 TABLET ORAL at 22:04

## 2020-01-01 RX ADMIN — SODIUM CHLORIDE 3 MILLILITER(S): 9 INJECTION INTRAMUSCULAR; INTRAVENOUS; SUBCUTANEOUS at 14:38

## 2020-01-01 RX ADMIN — MORPHINE SULFATE 15 MILLIGRAM(S): 50 CAPSULE, EXTENDED RELEASE ORAL at 22:29

## 2020-01-01 RX ADMIN — Medication 975 MILLIGRAM(S): at 18:10

## 2020-01-01 RX ADMIN — SODIUM CHLORIDE 3 MILLILITER(S): 9 INJECTION INTRAMUSCULAR; INTRAVENOUS; SUBCUTANEOUS at 06:09

## 2020-01-01 RX ADMIN — SODIUM CHLORIDE 3 MILLILITER(S): 9 INJECTION INTRAMUSCULAR; INTRAVENOUS; SUBCUTANEOUS at 06:02

## 2020-01-01 RX ADMIN — Medication 1 TABLET(S): at 08:55

## 2020-01-01 RX ADMIN — Medication 60 MILLIGRAM(S): at 21:19

## 2020-01-01 RX ADMIN — HYDROMORPHONE HYDROCHLORIDE 1.5 MILLIGRAM(S): 2 INJECTION INTRAMUSCULAR; INTRAVENOUS; SUBCUTANEOUS at 00:45

## 2020-01-01 RX ADMIN — MEROPENEM 100 MILLIGRAM(S): 1 INJECTION INTRAVENOUS at 21:40

## 2020-01-01 RX ADMIN — Medication 0.5 MILLIGRAM(S): at 10:09

## 2020-01-01 RX ADMIN — LIDOCAINE 2 PATCH: 4 CREAM TOPICAL at 21:14

## 2020-01-01 RX ADMIN — SENNA PLUS 2 TABLET(S): 8.6 TABLET ORAL at 22:09

## 2020-01-01 RX ADMIN — Medication 144.8 MILLIGRAM(S): at 15:07

## 2020-01-01 RX ADMIN — FAMOTIDINE 20 MILLIGRAM(S): 10 INJECTION INTRAVENOUS at 12:19

## 2020-01-01 RX ADMIN — SODIUM CHLORIDE 3 MILLILITER(S): 9 INJECTION INTRAMUSCULAR; INTRAVENOUS; SUBCUTANEOUS at 06:24

## 2020-01-01 RX ADMIN — Medication 10 MILLIGRAM(S): at 10:52

## 2020-01-01 RX ADMIN — SODIUM CHLORIDE 3 MILLILITER(S): 9 INJECTION INTRAMUSCULAR; INTRAVENOUS; SUBCUTANEOUS at 22:32

## 2020-01-01 RX ADMIN — ENOXAPARIN SODIUM 40 MILLIGRAM(S): 100 INJECTION SUBCUTANEOUS at 11:34

## 2020-01-01 RX ADMIN — Medication 300 MILLIGRAM(S): at 11:53

## 2020-01-01 RX ADMIN — Medication 85 MILLIMOLE(S): at 12:08

## 2020-01-01 RX ADMIN — Medication 30 MILLIGRAM(S): at 06:41

## 2020-01-01 RX ADMIN — Medication 1000 MILLIGRAM(S): at 14:01

## 2020-01-01 RX ADMIN — Medication 30 MILLIGRAM(S): at 00:35

## 2020-01-01 RX ADMIN — POLYETHYLENE GLYCOL 3350 17 GRAM(S): 17 POWDER, FOR SOLUTION ORAL at 14:04

## 2020-01-01 RX ADMIN — SODIUM CHLORIDE 3 MILLILITER(S): 9 INJECTION INTRAMUSCULAR; INTRAVENOUS; SUBCUTANEOUS at 05:44

## 2020-01-01 RX ADMIN — MORPHINE SULFATE 15 MILLIGRAM(S): 50 CAPSULE, EXTENDED RELEASE ORAL at 14:41

## 2020-01-01 RX ADMIN — Medication 10 MILLIGRAM(S): at 11:56

## 2020-01-01 RX ADMIN — Medication 60 MILLIGRAM(S): at 14:55

## 2020-01-01 RX ADMIN — Medication 300 MILLIGRAM(S): at 11:34

## 2020-01-01 RX ADMIN — HYDROMORPHONE HYDROCHLORIDE 4 MILLIGRAM(S): 2 INJECTION INTRAMUSCULAR; INTRAVENOUS; SUBCUTANEOUS at 10:27

## 2020-01-01 RX ADMIN — Medication 325 MILLIGRAM(S): at 14:12

## 2020-01-01 RX ADMIN — Medication 1 TABLET(S): at 22:05

## 2020-01-01 RX ADMIN — Medication 60 MILLIGRAM(S): at 21:25

## 2020-01-01 RX ADMIN — SENNA PLUS 2 TABLET(S): 8.6 TABLET ORAL at 22:05

## 2020-01-01 RX ADMIN — Medication 1 MILLIGRAM(S): at 20:35

## 2020-01-01 RX ADMIN — Medication 60 MILLIGRAM(S): at 20:56

## 2020-01-01 RX ADMIN — PANTOPRAZOLE SODIUM 40 MILLIGRAM(S): 20 TABLET, DELAYED RELEASE ORAL at 05:16

## 2020-01-01 RX ADMIN — ENOXAPARIN SODIUM 40 MILLIGRAM(S): 100 INJECTION SUBCUTANEOUS at 11:55

## 2020-01-01 RX ADMIN — LIDOCAINE 2 PATCH: 4 CREAM TOPICAL at 10:00

## 2020-01-01 RX ADMIN — Medication 30 MILLIGRAM(S): at 12:40

## 2020-01-01 RX ADMIN — Medication 60 MILLIGRAM(S): at 05:19

## 2020-01-01 RX ADMIN — HYDROMORPHONE HYDROCHLORIDE 0.75 MILLIGRAM(S): 2 INJECTION INTRAMUSCULAR; INTRAVENOUS; SUBCUTANEOUS at 05:37

## 2020-01-01 RX ADMIN — POLYETHYLENE GLYCOL 3350 17 GRAM(S): 17 POWDER, FOR SOLUTION ORAL at 11:21

## 2020-01-01 RX ADMIN — ENOXAPARIN SODIUM 40 MILLIGRAM(S): 100 INJECTION SUBCUTANEOUS at 13:06

## 2020-01-01 RX ADMIN — POLYETHYLENE GLYCOL 3350 17 GRAM(S): 17 POWDER, FOR SOLUTION ORAL at 13:13

## 2020-01-01 RX ADMIN — MORPHINE SULFATE 60 MILLIGRAM(S): 50 CAPSULE, EXTENDED RELEASE ORAL at 13:51

## 2020-01-01 RX ADMIN — Medication 975 MILLIGRAM(S): at 07:06

## 2020-01-01 RX ADMIN — Medication 1 TABLET(S): at 11:01

## 2020-01-01 RX ADMIN — Medication 30 MILLIGRAM(S): at 12:10

## 2020-01-01 RX ADMIN — Medication 60 MILLIGRAM(S): at 05:24

## 2020-01-01 RX ADMIN — Medication 2 MILLIGRAM(S): at 06:22

## 2020-01-01 RX ADMIN — ENOXAPARIN SODIUM 80 MILLIGRAM(S): 100 INJECTION SUBCUTANEOUS at 05:41

## 2020-01-01 RX ADMIN — Medication 60 MILLIGRAM(S): at 22:00

## 2020-01-01 RX ADMIN — Medication 5 MILLIGRAM(S): at 07:24

## 2020-01-01 RX ADMIN — Medication 60 MILLIGRAM(S): at 05:25

## 2020-01-01 RX ADMIN — SODIUM CHLORIDE 3 MILLILITER(S): 9 INJECTION INTRAMUSCULAR; INTRAVENOUS; SUBCUTANEOUS at 21:07

## 2020-01-01 RX ADMIN — PIPERACILLIN AND TAZOBACTAM 200 GRAM(S): 4; .5 INJECTION, POWDER, LYOPHILIZED, FOR SOLUTION INTRAVENOUS at 17:49

## 2020-01-01 RX ADMIN — SIMETHICONE 80 MILLIGRAM(S): 80 TABLET, CHEWABLE ORAL at 12:29

## 2020-01-01 RX ADMIN — CEFEPIME 100 MILLIGRAM(S): 1 INJECTION, POWDER, FOR SOLUTION INTRAMUSCULAR; INTRAVENOUS at 14:40

## 2020-01-01 RX ADMIN — Medication 60 MILLIGRAM(S): at 13:36

## 2020-01-01 RX ADMIN — HYDROMORPHONE HYDROCHLORIDE 0.5 MILLIGRAM(S): 2 INJECTION INTRAMUSCULAR; INTRAVENOUS; SUBCUTANEOUS at 05:44

## 2020-01-01 RX ADMIN — SODIUM CHLORIDE 1000 MILLILITER(S): 9 INJECTION INTRAMUSCULAR; INTRAVENOUS; SUBCUTANEOUS at 15:29

## 2020-01-01 RX ADMIN — Medication 400 MILLIGRAM(S): at 20:20

## 2020-01-01 RX ADMIN — Medication 60 MILLIGRAM(S): at 21:44

## 2020-01-01 RX ADMIN — OXALIPLATIN 263.9 MILLIGRAM(S): 5 INJECTION, SOLUTION INTRAVENOUS at 15:08

## 2020-01-01 RX ADMIN — PIPERACILLIN AND TAZOBACTAM 25 GRAM(S): 4; .5 INJECTION, POWDER, LYOPHILIZED, FOR SOLUTION INTRAVENOUS at 14:03

## 2020-01-01 RX ADMIN — MORPHINE SULFATE 60 MILLIGRAM(S): 50 CAPSULE, EXTENDED RELEASE ORAL at 12:11

## 2020-01-01 RX ADMIN — HYDROMORPHONE HYDROCHLORIDE 4 MILLIGRAM(S): 2 INJECTION INTRAMUSCULAR; INTRAVENOUS; SUBCUTANEOUS at 23:26

## 2020-01-01 RX ADMIN — SODIUM CHLORIDE 3 MILLILITER(S): 9 INJECTION INTRAMUSCULAR; INTRAVENOUS; SUBCUTANEOUS at 22:13

## 2020-01-01 RX ADMIN — LIDOCAINE 2 PATCH: 4 CREAM TOPICAL at 05:43

## 2020-01-01 RX ADMIN — Medication 40 MILLIGRAM(S): at 09:17

## 2020-01-01 RX ADMIN — Medication 300 MILLIGRAM(S): at 11:42

## 2020-01-01 RX ADMIN — PANTOPRAZOLE SODIUM 40 MILLIGRAM(S): 20 TABLET, DELAYED RELEASE ORAL at 08:47

## 2020-01-01 RX ADMIN — Medication 500 MILLIGRAM(S): at 12:17

## 2020-01-01 RX ADMIN — MEROPENEM 100 MILLIGRAM(S): 1 INJECTION INTRAVENOUS at 06:24

## 2020-01-01 RX ADMIN — Medication 30 MILLIGRAM(S): at 15:50

## 2020-01-01 RX ADMIN — Medication 20 MILLIGRAM(S): at 17:58

## 2020-01-01 RX ADMIN — SODIUM CHLORIDE 3 MILLILITER(S): 9 INJECTION INTRAMUSCULAR; INTRAVENOUS; SUBCUTANEOUS at 13:24

## 2020-01-01 RX ADMIN — Medication 975 MILLIGRAM(S): at 23:04

## 2020-01-01 RX ADMIN — Medication 500 MILLIGRAM(S): at 00:07

## 2020-01-01 RX ADMIN — Medication 650 MILLIGRAM(S): at 17:29

## 2020-01-01 RX ADMIN — SODIUM CHLORIDE 3 MILLILITER(S): 9 INJECTION INTRAMUSCULAR; INTRAVENOUS; SUBCUTANEOUS at 05:18

## 2020-01-01 RX ADMIN — Medication 300 MILLIGRAM(S): at 13:41

## 2020-01-01 RX ADMIN — Medication 3 MILLIGRAM(S): at 21:23

## 2020-01-01 RX ADMIN — LIDOCAINE 2 PATCH: 4 CREAM TOPICAL at 08:11

## 2020-01-01 RX ADMIN — LIDOCAINE 2 PATCH: 4 CREAM TOPICAL at 15:13

## 2020-01-01 RX ADMIN — SODIUM CHLORIDE 3 MILLILITER(S): 9 INJECTION INTRAMUSCULAR; INTRAVENOUS; SUBCUTANEOUS at 05:29

## 2020-01-01 RX ADMIN — FAMOTIDINE 20 MILLIGRAM(S): 10 INJECTION INTRAVENOUS at 11:45

## 2020-01-01 RX ADMIN — LIDOCAINE 2 PATCH: 4 CREAM TOPICAL at 21:03

## 2020-01-01 RX ADMIN — ENOXAPARIN SODIUM 80 MILLIGRAM(S): 100 INJECTION SUBCUTANEOUS at 17:01

## 2020-01-01 RX ADMIN — ONDANSETRON 4 MILLIGRAM(S): 8 TABLET, FILM COATED ORAL at 17:08

## 2020-01-01 RX ADMIN — Medication 975 MILLIGRAM(S): at 11:20

## 2020-01-01 RX ADMIN — MORPHINE SULFATE 75 MILLIGRAM(S): 50 CAPSULE, EXTENDED RELEASE ORAL at 05:47

## 2020-01-01 RX ADMIN — Medication 1 TABLET(S): at 08:53

## 2020-01-01 RX ADMIN — MORPHINE SULFATE 5 MILLIGRAM(S): 50 CAPSULE, EXTENDED RELEASE ORAL at 10:39

## 2020-01-01 RX ADMIN — SENNA PLUS 2 TABLET(S): 8.6 TABLET ORAL at 21:42

## 2020-01-01 RX ADMIN — Medication 1 TABLET(S): at 17:29

## 2020-01-01 RX ADMIN — SODIUM CHLORIDE 3 MILLILITER(S): 9 INJECTION INTRAMUSCULAR; INTRAVENOUS; SUBCUTANEOUS at 15:00

## 2020-01-01 RX ADMIN — SODIUM CHLORIDE 3 MILLILITER(S): 9 INJECTION INTRAMUSCULAR; INTRAVENOUS; SUBCUTANEOUS at 05:19

## 2020-01-01 RX ADMIN — SODIUM CHLORIDE 3 MILLILITER(S): 9 INJECTION INTRAMUSCULAR; INTRAVENOUS; SUBCUTANEOUS at 06:42

## 2020-01-01 RX ADMIN — Medication 60 MILLIGRAM(S): at 22:36

## 2020-01-01 RX ADMIN — Medication 250 MILLIMOLE(S): at 09:20

## 2020-01-01 RX ADMIN — SODIUM CHLORIDE 3 MILLILITER(S): 9 INJECTION INTRAMUSCULAR; INTRAVENOUS; SUBCUTANEOUS at 22:03

## 2020-01-01 RX ADMIN — Medication 60 MILLIGRAM(S): at 13:07

## 2020-01-01 RX ADMIN — MORPHINE SULFATE 60 MILLIGRAM(S): 50 CAPSULE, EXTENDED RELEASE ORAL at 00:05

## 2020-01-01 RX ADMIN — Medication 5 MILLIGRAM(S): at 22:39

## 2020-01-01 RX ADMIN — SODIUM CHLORIDE 3 MILLILITER(S): 9 INJECTION INTRAMUSCULAR; INTRAVENOUS; SUBCUTANEOUS at 14:52

## 2020-01-01 RX ADMIN — Medication 60 MILLIGRAM(S): at 05:41

## 2020-01-01 RX ADMIN — Medication 650 MILLIGRAM(S): at 05:39

## 2020-01-01 RX ADMIN — Medication 62.5 MILLIMOLE(S): at 10:39

## 2020-01-01 RX ADMIN — Medication 300 MILLIGRAM(S): at 06:35

## 2020-01-01 RX ADMIN — SODIUM CHLORIDE 3 MILLILITER(S): 9 INJECTION INTRAMUSCULAR; INTRAVENOUS; SUBCUTANEOUS at 14:41

## 2020-01-01 RX ADMIN — HYDROMORPHONE HYDROCHLORIDE 0.5 MILLIGRAM(S): 2 INJECTION INTRAMUSCULAR; INTRAVENOUS; SUBCUTANEOUS at 01:24

## 2020-01-01 RX ADMIN — SIMETHICONE 80 MILLIGRAM(S): 80 TABLET, CHEWABLE ORAL at 18:09

## 2020-01-01 RX ADMIN — MORPHINE SULFATE 75 MILLIGRAM(S): 50 CAPSULE, EXTENDED RELEASE ORAL at 17:00

## 2020-01-01 RX ADMIN — POLYETHYLENE GLYCOL 3350 17 GRAM(S): 17 POWDER, FOR SOLUTION ORAL at 13:18

## 2020-01-01 RX ADMIN — MORPHINE SULFATE 7.5 MILLIGRAM(S): 50 CAPSULE, EXTENDED RELEASE ORAL at 11:02

## 2020-01-01 RX ADMIN — HYDROMORPHONE HYDROCHLORIDE 0.75 MILLIGRAM(S): 2 INJECTION INTRAMUSCULAR; INTRAVENOUS; SUBCUTANEOUS at 08:04

## 2020-01-01 RX ADMIN — PIPERACILLIN AND TAZOBACTAM 200 GRAM(S): 4; .5 INJECTION, POWDER, LYOPHILIZED, FOR SOLUTION INTRAVENOUS at 12:42

## 2020-01-01 RX ADMIN — HYDROMORPHONE HYDROCHLORIDE 4 MILLIGRAM(S): 2 INJECTION INTRAMUSCULAR; INTRAVENOUS; SUBCUTANEOUS at 18:04

## 2020-01-01 RX ADMIN — SODIUM CHLORIDE 3 MILLILITER(S): 9 INJECTION INTRAMUSCULAR; INTRAVENOUS; SUBCUTANEOUS at 13:12

## 2020-01-01 RX ADMIN — HYDROMORPHONE HYDROCHLORIDE 4 MILLIGRAM(S): 2 INJECTION INTRAMUSCULAR; INTRAVENOUS; SUBCUTANEOUS at 13:27

## 2020-01-01 RX ADMIN — FLUCONAZOLE 200 MILLIGRAM(S): 150 TABLET ORAL at 11:58

## 2020-01-01 RX ADMIN — MORPHINE SULFATE 6 MILLIGRAM(S): 50 CAPSULE, EXTENDED RELEASE ORAL at 05:21

## 2020-01-01 RX ADMIN — MORPHINE SULFATE 4 MILLIGRAM(S): 50 CAPSULE, EXTENDED RELEASE ORAL at 15:21

## 2020-01-01 RX ADMIN — SODIUM CHLORIDE 3 MILLILITER(S): 9 INJECTION INTRAMUSCULAR; INTRAVENOUS; SUBCUTANEOUS at 15:34

## 2020-01-01 RX ADMIN — SODIUM CHLORIDE 3 MILLILITER(S): 9 INJECTION INTRAMUSCULAR; INTRAVENOUS; SUBCUTANEOUS at 05:40

## 2020-01-01 RX ADMIN — SODIUM CHLORIDE 3 MILLILITER(S): 9 INJECTION INTRAMUSCULAR; INTRAVENOUS; SUBCUTANEOUS at 13:35

## 2020-01-01 RX ADMIN — Medication 500 MILLIGRAM(S): at 23:19

## 2020-01-01 RX ADMIN — Medication 60 MILLIGRAM(S): at 16:35

## 2020-01-01 RX ADMIN — Medication 60 MILLIGRAM(S): at 05:11

## 2020-01-01 RX ADMIN — POLYETHYLENE GLYCOL 3350 17 GRAM(S): 17 POWDER, FOR SOLUTION ORAL at 11:52

## 2020-01-01 RX ADMIN — PIPERACILLIN AND TAZOBACTAM 25 GRAM(S): 4; .5 INJECTION, POWDER, LYOPHILIZED, FOR SOLUTION INTRAVENOUS at 18:07

## 2020-01-01 RX ADMIN — Medication 975 MILLIGRAM(S): at 20:39

## 2020-01-01 RX ADMIN — HYDROMORPHONE HYDROCHLORIDE 30 MILLILITER(S): 2 INJECTION INTRAMUSCULAR; INTRAVENOUS; SUBCUTANEOUS at 07:41

## 2020-01-01 RX ADMIN — SODIUM CHLORIDE 2000 MILLILITER(S): 9 INJECTION INTRAMUSCULAR; INTRAVENOUS; SUBCUTANEOUS at 22:23

## 2020-01-01 RX ADMIN — Medication 3 MILLIGRAM(S): at 21:57

## 2020-01-01 RX ADMIN — LIDOCAINE 2 PATCH: 4 CREAM TOPICAL at 22:05

## 2020-01-01 RX ADMIN — ONDANSETRON 116 MILLIGRAM(S): 8 TABLET, FILM COATED ORAL at 21:47

## 2020-01-01 RX ADMIN — HEPARIN SODIUM 5000 UNIT(S): 5000 INJECTION INTRAVENOUS; SUBCUTANEOUS at 06:26

## 2020-01-01 RX ADMIN — Medication 3 MILLIGRAM(S): at 21:40

## 2020-01-01 RX ADMIN — Medication 500 MILLIGRAM(S): at 23:37

## 2020-01-01 RX ADMIN — LIDOCAINE 2 PATCH: 4 CREAM TOPICAL at 11:48

## 2020-01-01 RX ADMIN — HYDROMORPHONE HYDROCHLORIDE 0.75 MILLIGRAM(S): 2 INJECTION INTRAMUSCULAR; INTRAVENOUS; SUBCUTANEOUS at 22:18

## 2020-01-01 RX ADMIN — Medication 62.5 MILLIMOLE(S): at 20:32

## 2020-01-01 RX ADMIN — ONDANSETRON 4 MILLIGRAM(S): 8 TABLET, FILM COATED ORAL at 23:44

## 2020-01-01 RX ADMIN — SODIUM CHLORIDE 3 MILLILITER(S): 9 INJECTION INTRAMUSCULAR; INTRAVENOUS; SUBCUTANEOUS at 21:08

## 2020-01-01 RX ADMIN — Medication 300 MILLIGRAM(S): at 12:13

## 2020-01-01 RX ADMIN — SODIUM CHLORIDE 3 MILLILITER(S): 9 INJECTION INTRAMUSCULAR; INTRAVENOUS; SUBCUTANEOUS at 22:42

## 2020-01-01 RX ADMIN — ONDANSETRON 4 MILLIGRAM(S): 8 TABLET, FILM COATED ORAL at 09:51

## 2020-01-01 RX ADMIN — ENOXAPARIN SODIUM 40 MILLIGRAM(S): 100 INJECTION SUBCUTANEOUS at 12:52

## 2020-01-01 RX ADMIN — FAMOTIDINE 20 MILLIGRAM(S): 10 INJECTION INTRAVENOUS at 11:02

## 2020-01-01 RX ADMIN — Medication 20 MILLIEQUIVALENT(S): at 13:34

## 2020-01-01 RX ADMIN — HYDROMORPHONE HYDROCHLORIDE 0.5 MG/HR: 2 INJECTION INTRAMUSCULAR; INTRAVENOUS; SUBCUTANEOUS at 05:38

## 2020-01-01 RX ADMIN — ENOXAPARIN SODIUM 80 MILLIGRAM(S): 100 INJECTION SUBCUTANEOUS at 05:30

## 2020-01-01 RX ADMIN — PANTOPRAZOLE SODIUM 40 MILLIGRAM(S): 20 TABLET, DELAYED RELEASE ORAL at 06:23

## 2020-01-01 RX ADMIN — SODIUM CHLORIDE 3 MILLILITER(S): 9 INJECTION INTRAMUSCULAR; INTRAVENOUS; SUBCUTANEOUS at 22:36

## 2020-01-01 RX ADMIN — Medication 10 MILLIGRAM(S): at 11:35

## 2020-01-01 RX ADMIN — ENOXAPARIN SODIUM 80 MILLIGRAM(S): 100 INJECTION SUBCUTANEOUS at 06:35

## 2020-01-01 RX ADMIN — POTASSIUM PHOSPHATE, MONOBASIC POTASSIUM PHOSPHATE, DIBASIC 62.5 MILLIMOLE(S): 236; 224 INJECTION, SOLUTION INTRAVENOUS at 13:52

## 2020-01-01 RX ADMIN — SODIUM CHLORIDE 3 MILLILITER(S): 9 INJECTION INTRAMUSCULAR; INTRAVENOUS; SUBCUTANEOUS at 21:16

## 2020-01-01 RX ADMIN — Medication 975 MILLIGRAM(S): at 12:10

## 2020-01-01 RX ADMIN — Medication 0.5 MILLIGRAM(S): at 04:20

## 2020-01-01 RX ADMIN — PIPERACILLIN AND TAZOBACTAM 25 GRAM(S): 4; .5 INJECTION, POWDER, LYOPHILIZED, FOR SOLUTION INTRAVENOUS at 14:46

## 2020-01-01 RX ADMIN — MORPHINE SULFATE 6 MILLIGRAM(S): 50 CAPSULE, EXTENDED RELEASE ORAL at 00:04

## 2020-01-01 RX ADMIN — SODIUM CHLORIDE 3 MILLILITER(S): 9 INJECTION INTRAMUSCULAR; INTRAVENOUS; SUBCUTANEOUS at 22:00

## 2020-01-01 RX ADMIN — Medication 5 MILLIGRAM(S): at 13:33

## 2020-01-01 RX ADMIN — Medication 50 MILLIEQUIVALENT(S): at 10:34

## 2020-01-01 RX ADMIN — ONDANSETRON 4 MILLIGRAM(S): 8 TABLET, FILM COATED ORAL at 05:45

## 2020-01-01 RX ADMIN — HYDROMORPHONE HYDROCHLORIDE 1 MILLIGRAM(S): 2 INJECTION INTRAMUSCULAR; INTRAVENOUS; SUBCUTANEOUS at 11:56

## 2020-01-01 RX ADMIN — PIPERACILLIN AND TAZOBACTAM 25 GRAM(S): 4; .5 INJECTION, POWDER, LYOPHILIZED, FOR SOLUTION INTRAVENOUS at 05:02

## 2020-01-01 RX ADMIN — SODIUM CHLORIDE 75 MILLILITER(S): 9 INJECTION, SOLUTION INTRAVENOUS at 00:18

## 2020-01-01 RX ADMIN — PANTOPRAZOLE SODIUM 40 MILLIGRAM(S): 20 TABLET, DELAYED RELEASE ORAL at 05:54

## 2020-01-01 RX ADMIN — MORPHINE SULFATE 4 MILLIGRAM(S): 50 CAPSULE, EXTENDED RELEASE ORAL at 15:30

## 2020-01-01 RX ADMIN — SODIUM CHLORIDE 125 MILLILITER(S): 9 INJECTION, SOLUTION INTRAVENOUS at 00:02

## 2020-01-01 RX ADMIN — MORPHINE SULFATE 4 MILLIGRAM(S): 50 CAPSULE, EXTENDED RELEASE ORAL at 12:23

## 2020-01-01 RX ADMIN — SODIUM CHLORIDE 3 MILLILITER(S): 9 INJECTION INTRAMUSCULAR; INTRAVENOUS; SUBCUTANEOUS at 12:25

## 2020-01-01 RX ADMIN — HYDROMORPHONE HYDROCHLORIDE 1.5 MILLIGRAM(S): 2 INJECTION INTRAMUSCULAR; INTRAVENOUS; SUBCUTANEOUS at 02:12

## 2020-01-01 RX ADMIN — Medication 5 MILLIGRAM(S): at 09:15

## 2020-01-01 RX ADMIN — ENOXAPARIN SODIUM 40 MILLIGRAM(S): 100 INJECTION SUBCUTANEOUS at 11:19

## 2020-01-01 RX ADMIN — FAMOTIDINE 20 MILLIGRAM(S): 10 INJECTION INTRAVENOUS at 11:42

## 2020-01-01 RX ADMIN — Medication 650 MILLIGRAM(S): at 14:13

## 2020-01-01 RX ADMIN — SODIUM CHLORIDE 3 MILLILITER(S): 9 INJECTION INTRAMUSCULAR; INTRAVENOUS; SUBCUTANEOUS at 14:03

## 2020-01-01 RX ADMIN — MEROPENEM 100 MILLIGRAM(S): 1 INJECTION INTRAVENOUS at 13:40

## 2020-01-01 RX ADMIN — ONDANSETRON 4 MILLIGRAM(S): 8 TABLET, FILM COATED ORAL at 15:00

## 2020-01-01 RX ADMIN — Medication 60 MILLIGRAM(S): at 21:41

## 2020-01-01 RX ADMIN — HYDROMORPHONE HYDROCHLORIDE 2 MILLIGRAM(S): 2 INJECTION INTRAMUSCULAR; INTRAVENOUS; SUBCUTANEOUS at 20:55

## 2020-01-01 RX ADMIN — FLUCONAZOLE 100 MILLIGRAM(S): 150 TABLET ORAL at 13:29

## 2020-01-01 RX ADMIN — SODIUM CHLORIDE 3 MILLILITER(S): 9 INJECTION INTRAMUSCULAR; INTRAVENOUS; SUBCUTANEOUS at 21:35

## 2020-01-01 RX ADMIN — SENNA PLUS 2 TABLET(S): 8.6 TABLET ORAL at 21:22

## 2020-01-01 RX ADMIN — Medication 0.5 MILLIGRAM(S): at 18:39

## 2020-01-01 RX ADMIN — Medication 650 MILLIGRAM(S): at 21:35

## 2020-01-01 RX ADMIN — Medication 400 MILLIGRAM(S): at 17:38

## 2020-01-01 RX ADMIN — HYDROMORPHONE HYDROCHLORIDE 0.5 MILLIGRAM(S): 2 INJECTION INTRAMUSCULAR; INTRAVENOUS; SUBCUTANEOUS at 23:36

## 2020-01-01 RX ADMIN — Medication 300 MILLIGRAM(S): at 12:19

## 2020-01-01 RX ADMIN — Medication 1 TABLET(S): at 22:01

## 2020-01-01 RX ADMIN — PACLITAXEL 101.44 MILLIGRAM(S): 6 INJECTION, SOLUTION, CONCENTRATE INTRAVENOUS at 15:17

## 2020-01-01 RX ADMIN — SODIUM CHLORIDE 2300 MILLILITER(S): 9 INJECTION INTRAMUSCULAR; INTRAVENOUS; SUBCUTANEOUS at 13:10

## 2020-01-01 RX ADMIN — SODIUM CHLORIDE 100 MILLILITER(S): 9 INJECTION INTRAMUSCULAR; INTRAVENOUS; SUBCUTANEOUS at 16:37

## 2020-01-01 RX ADMIN — SODIUM CHLORIDE 100 MILLILITER(S): 9 INJECTION INTRAMUSCULAR; INTRAVENOUS; SUBCUTANEOUS at 03:11

## 2020-01-01 RX ADMIN — SODIUM CHLORIDE 3 MILLILITER(S): 9 INJECTION INTRAMUSCULAR; INTRAVENOUS; SUBCUTANEOUS at 13:47

## 2020-01-01 RX ADMIN — Medication 2.5 MILLIGRAM(S): at 01:52

## 2020-01-01 RX ADMIN — LIDOCAINE 2 PATCH: 4 CREAM TOPICAL at 22:35

## 2020-01-01 RX ADMIN — Medication 2 MILLIGRAM(S): at 17:30

## 2020-01-01 RX ADMIN — POLYETHYLENE GLYCOL 3350 17 GRAM(S): 17 POWDER, FOR SOLUTION ORAL at 11:57

## 2020-01-01 RX ADMIN — Medication 100 MILLIEQUIVALENT(S): at 12:52

## 2020-01-01 RX ADMIN — MORPHINE SULFATE 2.5 MG/HR: 50 CAPSULE, EXTENDED RELEASE ORAL at 20:06

## 2020-01-01 RX ADMIN — ENOXAPARIN SODIUM 40 MILLIGRAM(S): 100 INJECTION SUBCUTANEOUS at 11:58

## 2020-01-01 RX ADMIN — FAMOTIDINE 20 MILLIGRAM(S): 10 INJECTION INTRAVENOUS at 13:18

## 2020-01-01 RX ADMIN — HYDROMORPHONE HYDROCHLORIDE 0.5 MILLIGRAM(S): 2 INJECTION INTRAMUSCULAR; INTRAVENOUS; SUBCUTANEOUS at 14:57

## 2020-01-01 RX ADMIN — LIDOCAINE 2 PATCH: 4 CREAM TOPICAL at 21:41

## 2020-01-01 RX ADMIN — ONDANSETRON 4 MILLIGRAM(S): 8 TABLET, FILM COATED ORAL at 06:06

## 2020-01-01 RX ADMIN — Medication 60 MILLIGRAM(S): at 05:10

## 2020-01-01 RX ADMIN — SODIUM CHLORIDE 3 MILLILITER(S): 9 INJECTION INTRAMUSCULAR; INTRAVENOUS; SUBCUTANEOUS at 07:29

## 2020-01-01 RX ADMIN — POLYETHYLENE GLYCOL 3350 17 GRAM(S): 17 POWDER, FOR SOLUTION ORAL at 11:48

## 2020-01-01 RX ADMIN — ENOXAPARIN SODIUM 40 MILLIGRAM(S): 100 INJECTION SUBCUTANEOUS at 11:52

## 2020-01-01 RX ADMIN — SODIUM CHLORIDE 3 MILLILITER(S): 9 INJECTION INTRAMUSCULAR; INTRAVENOUS; SUBCUTANEOUS at 12:57

## 2020-01-01 RX ADMIN — Medication 20 MILLIEQUIVALENT(S): at 15:30

## 2020-01-01 RX ADMIN — Medication 30 MILLIGRAM(S): at 06:43

## 2020-01-01 RX ADMIN — MORPHINE SULFATE 60 MILLIGRAM(S): 50 CAPSULE, EXTENDED RELEASE ORAL at 22:06

## 2020-01-01 RX ADMIN — ENOXAPARIN SODIUM 80 MILLIGRAM(S): 100 INJECTION SUBCUTANEOUS at 17:18

## 2020-01-01 RX ADMIN — Medication 3 MILLIGRAM(S): at 22:04

## 2020-01-01 RX ADMIN — Medication 250 MILLIGRAM(S): at 18:05

## 2020-01-01 RX ADMIN — MEROPENEM 100 MILLIGRAM(S): 1 INJECTION INTRAVENOUS at 13:57

## 2020-01-01 RX ADMIN — PIPERACILLIN AND TAZOBACTAM 25 GRAM(S): 4; .5 INJECTION, POWDER, LYOPHILIZED, FOR SOLUTION INTRAVENOUS at 05:52

## 2020-01-01 RX ADMIN — MEROPENEM 100 MILLIGRAM(S): 1 INJECTION INTRAVENOUS at 22:41

## 2020-01-01 RX ADMIN — MORPHINE SULFATE 4 MILLIGRAM(S): 50 CAPSULE, EXTENDED RELEASE ORAL at 00:34

## 2020-01-01 RX ADMIN — ONDANSETRON 4 MILLIGRAM(S): 8 TABLET, FILM COATED ORAL at 09:19

## 2020-01-01 RX ADMIN — FAMOTIDINE 20 MILLIGRAM(S): 10 INJECTION INTRAVENOUS at 11:34

## 2020-01-01 RX ADMIN — SIMETHICONE 80 MILLIGRAM(S): 80 TABLET, CHEWABLE ORAL at 00:05

## 2020-01-01 RX ADMIN — SODIUM CHLORIDE 3 MILLILITER(S): 9 INJECTION INTRAMUSCULAR; INTRAVENOUS; SUBCUTANEOUS at 21:37

## 2020-01-01 RX ADMIN — Medication 50 MILLILITER(S): at 04:57

## 2020-01-01 RX ADMIN — LIDOCAINE 1 PATCH: 4 CREAM TOPICAL at 20:31

## 2020-01-01 RX ADMIN — PIPERACILLIN AND TAZOBACTAM 25 GRAM(S): 4; .5 INJECTION, POWDER, LYOPHILIZED, FOR SOLUTION INTRAVENOUS at 05:38

## 2020-01-01 RX ADMIN — POLYETHYLENE GLYCOL 3350 17 GRAM(S): 17 POWDER, FOR SOLUTION ORAL at 14:26

## 2020-01-01 RX ADMIN — SODIUM CHLORIDE 3 MILLILITER(S): 9 INJECTION INTRAMUSCULAR; INTRAVENOUS; SUBCUTANEOUS at 22:37

## 2020-01-01 RX ADMIN — ONDANSETRON 4 MILLIGRAM(S): 8 TABLET, FILM COATED ORAL at 17:30

## 2020-01-01 RX ADMIN — POLYETHYLENE GLYCOL 3350 17 GRAM(S): 17 POWDER, FOR SOLUTION ORAL at 12:55

## 2020-01-01 RX ADMIN — ONDANSETRON 4 MILLIGRAM(S): 8 TABLET, FILM COATED ORAL at 09:57

## 2020-01-01 RX ADMIN — HYDROMORPHONE HYDROCHLORIDE 30 MILLILITER(S): 2 INJECTION INTRAMUSCULAR; INTRAVENOUS; SUBCUTANEOUS at 19:27

## 2020-01-01 RX ADMIN — HEPARIN SODIUM 5000 UNIT(S): 5000 INJECTION INTRAVENOUS; SUBCUTANEOUS at 13:47

## 2020-01-01 RX ADMIN — Medication 60 MILLIGRAM(S): at 14:39

## 2020-01-01 RX ADMIN — Medication 10 MILLIGRAM(S): at 08:44

## 2020-01-01 RX ADMIN — Medication 10 MILLIGRAM(S): at 09:39

## 2020-01-01 RX ADMIN — MEROPENEM 100 MILLIGRAM(S): 1 INJECTION INTRAVENOUS at 22:33

## 2020-01-01 RX ADMIN — Medication 975 MILLIGRAM(S): at 22:57

## 2020-01-01 RX ADMIN — Medication 10 MILLIGRAM(S): at 08:58

## 2020-01-01 RX ADMIN — MORPHINE SULFATE 4 MILLIGRAM(S): 50 CAPSULE, EXTENDED RELEASE ORAL at 10:09

## 2020-01-01 RX ADMIN — Medication 1 TABLET(S): at 15:20

## 2020-01-01 RX ADMIN — PIPERACILLIN AND TAZOBACTAM 25 GRAM(S): 4; .5 INJECTION, POWDER, LYOPHILIZED, FOR SOLUTION INTRAVENOUS at 21:39

## 2020-01-01 RX ADMIN — Medication 1 TABLET(S): at 12:04

## 2020-01-01 RX ADMIN — Medication 60 MILLIGRAM(S): at 06:17

## 2020-01-01 RX ADMIN — HYDROMORPHONE HYDROCHLORIDE 2 MILLIGRAM(S): 2 INJECTION INTRAMUSCULAR; INTRAVENOUS; SUBCUTANEOUS at 13:14

## 2020-01-01 RX ADMIN — Medication 30 MILLIGRAM(S): at 09:35

## 2020-01-01 RX ADMIN — Medication 2 MILLIGRAM(S): at 14:29

## 2020-01-01 RX ADMIN — Medication 650 MILLIGRAM(S): at 05:19

## 2020-01-01 RX ADMIN — Medication 300 MILLIGRAM(S): at 11:20

## 2020-01-01 RX ADMIN — MORPHINE SULFATE 2 MILLIGRAM(S): 50 CAPSULE, EXTENDED RELEASE ORAL at 00:05

## 2020-01-01 RX ADMIN — HYDROMORPHONE HYDROCHLORIDE 1.5 MILLIGRAM(S): 2 INJECTION INTRAMUSCULAR; INTRAVENOUS; SUBCUTANEOUS at 14:22

## 2020-01-01 RX ADMIN — Medication 975 MILLIGRAM(S): at 13:11

## 2020-01-01 RX ADMIN — MORPHINE SULFATE 7.5 MILLIGRAM(S): 50 CAPSULE, EXTENDED RELEASE ORAL at 13:25

## 2020-01-01 RX ADMIN — MORPHINE SULFATE 7.5 MILLIGRAM(S): 50 CAPSULE, EXTENDED RELEASE ORAL at 01:45

## 2020-01-01 RX ADMIN — HYDROMORPHONE HYDROCHLORIDE 0.8 MILLIGRAM(S): 2 INJECTION INTRAMUSCULAR; INTRAVENOUS; SUBCUTANEOUS at 11:04

## 2020-01-01 RX ADMIN — Medication 300 MILLIGRAM(S): at 11:45

## 2020-01-01 RX ADMIN — ENOXAPARIN SODIUM 40 MILLIGRAM(S): 100 INJECTION SUBCUTANEOUS at 12:15

## 2020-01-01 RX ADMIN — PIPERACILLIN AND TAZOBACTAM 200 GRAM(S): 4; .5 INJECTION, POWDER, LYOPHILIZED, FOR SOLUTION INTRAVENOUS at 01:00

## 2020-01-01 RX ADMIN — MEROPENEM 100 MILLIGRAM(S): 1 INJECTION INTRAVENOUS at 06:22

## 2020-01-01 RX ADMIN — FAMOTIDINE 20 MILLIGRAM(S): 10 INJECTION INTRAVENOUS at 11:41

## 2020-01-01 RX ADMIN — Medication 12.5 MILLIGRAM(S): at 21:23

## 2020-01-01 RX ADMIN — ONDANSETRON 4 MILLIGRAM(S): 8 TABLET, FILM COATED ORAL at 20:10

## 2020-01-01 RX ADMIN — Medication 480 MICROGRAM(S): at 14:09

## 2020-01-01 RX ADMIN — POTASSIUM PHOSPHATE, MONOBASIC POTASSIUM PHOSPHATE, DIBASIC 83.33 MILLIMOLE(S): 236; 224 INJECTION, SOLUTION INTRAVENOUS at 23:13

## 2020-01-01 RX ADMIN — SODIUM CHLORIDE 3 MILLILITER(S): 9 INJECTION INTRAMUSCULAR; INTRAVENOUS; SUBCUTANEOUS at 22:35

## 2020-01-01 RX ADMIN — LIDOCAINE 2 PATCH: 4 CREAM TOPICAL at 09:07

## 2020-01-01 RX ADMIN — HYDROMORPHONE HYDROCHLORIDE 0.75 MILLIGRAM(S): 2 INJECTION INTRAMUSCULAR; INTRAVENOUS; SUBCUTANEOUS at 17:49

## 2020-01-01 RX ADMIN — SODIUM CHLORIDE 3 MILLILITER(S): 9 INJECTION INTRAMUSCULAR; INTRAVENOUS; SUBCUTANEOUS at 22:30

## 2020-01-01 RX ADMIN — MORPHINE SULFATE 4 MILLIGRAM(S): 50 CAPSULE, EXTENDED RELEASE ORAL at 14:38

## 2020-01-01 RX ADMIN — Medication 3 MILLIGRAM(S): at 22:41

## 2020-01-01 RX ADMIN — LIDOCAINE 2 PATCH: 4 CREAM TOPICAL at 09:00

## 2020-01-01 RX ADMIN — POTASSIUM PHOSPHATE, MONOBASIC POTASSIUM PHOSPHATE, DIBASIC 62.5 MILLIMOLE(S): 236; 224 INJECTION, SOLUTION INTRAVENOUS at 09:26

## 2020-01-01 RX ADMIN — SODIUM CHLORIDE 250 MILLILITER(S): 9 INJECTION INTRAMUSCULAR; INTRAVENOUS; SUBCUTANEOUS at 06:41

## 2020-01-01 RX ADMIN — Medication 60 MILLIGRAM(S): at 21:20

## 2020-01-01 RX ADMIN — FAMOTIDINE 20 MILLIGRAM(S): 10 INJECTION INTRAVENOUS at 11:57

## 2020-01-01 RX ADMIN — SODIUM CHLORIDE 3 MILLILITER(S): 9 INJECTION INTRAMUSCULAR; INTRAVENOUS; SUBCUTANEOUS at 21:59

## 2020-01-01 RX ADMIN — Medication 30 MILLIGRAM(S): at 10:15

## 2020-01-01 RX ADMIN — SODIUM CHLORIDE 3 MILLILITER(S): 9 INJECTION INTRAMUSCULAR; INTRAVENOUS; SUBCUTANEOUS at 05:38

## 2020-01-01 RX ADMIN — Medication 60 MILLIGRAM(S): at 06:07

## 2020-01-01 RX ADMIN — Medication 250 MILLIMOLE(S): at 13:34

## 2020-01-01 RX ADMIN — HYDROMORPHONE HYDROCHLORIDE 1 MILLIGRAM(S): 2 INJECTION INTRAMUSCULAR; INTRAVENOUS; SUBCUTANEOUS at 22:45

## 2020-01-01 RX ADMIN — Medication 600 MILLIGRAM(S): at 13:00

## 2020-01-01 RX ADMIN — HEPARIN SODIUM 5000 UNIT(S): 5000 INJECTION INTRAVENOUS; SUBCUTANEOUS at 23:04

## 2020-01-01 RX ADMIN — HYDROMORPHONE HYDROCHLORIDE 0.75 MILLIGRAM(S): 2 INJECTION INTRAMUSCULAR; INTRAVENOUS; SUBCUTANEOUS at 07:25

## 2020-01-01 RX ADMIN — ONDANSETRON 4 MILLIGRAM(S): 8 TABLET, FILM COATED ORAL at 10:48

## 2020-01-01 RX ADMIN — Medication 500 MILLIGRAM(S): at 11:19

## 2020-01-01 RX ADMIN — ENOXAPARIN SODIUM 80 MILLIGRAM(S): 100 INJECTION SUBCUTANEOUS at 18:02

## 2020-01-01 RX ADMIN — SODIUM CHLORIDE 3 MILLILITER(S): 9 INJECTION INTRAMUSCULAR; INTRAVENOUS; SUBCUTANEOUS at 21:17

## 2020-01-01 RX ADMIN — Medication 2.5 MILLIGRAM(S): at 22:35

## 2020-01-01 RX ADMIN — Medication 975 MILLIGRAM(S): at 06:47

## 2020-01-01 RX ADMIN — Medication 2 MILLIGRAM(S): at 06:23

## 2020-01-01 RX ADMIN — SODIUM CHLORIDE 3 MILLILITER(S): 9 INJECTION INTRAMUSCULAR; INTRAVENOUS; SUBCUTANEOUS at 12:56

## 2020-01-01 RX ADMIN — SODIUM CHLORIDE 3 MILLILITER(S): 9 INJECTION INTRAMUSCULAR; INTRAVENOUS; SUBCUTANEOUS at 06:10

## 2020-01-01 RX ADMIN — MORPHINE SULFATE 30 MILLIGRAM(S): 50 CAPSULE, EXTENDED RELEASE ORAL at 06:31

## 2020-01-01 RX ADMIN — SIMETHICONE 80 MILLIGRAM(S): 80 TABLET, CHEWABLE ORAL at 11:20

## 2020-01-01 RX ADMIN — ENOXAPARIN SODIUM 40 MILLIGRAM(S): 100 INJECTION SUBCUTANEOUS at 12:11

## 2020-01-01 RX ADMIN — LIDOCAINE 2 PATCH: 4 CREAM TOPICAL at 21:55

## 2020-01-01 RX ADMIN — FAMOTIDINE 20 MILLIGRAM(S): 10 INJECTION INTRAVENOUS at 13:20

## 2020-01-01 RX ADMIN — LIDOCAINE 2 PATCH: 4 CREAM TOPICAL at 08:13

## 2020-01-01 RX ADMIN — Medication 62.5 MILLIMOLE(S): at 11:57

## 2020-01-01 RX ADMIN — FLUCONAZOLE 100 MILLIGRAM(S): 150 TABLET ORAL at 14:21

## 2020-01-01 RX ADMIN — SODIUM CHLORIDE 3 MILLILITER(S): 9 INJECTION INTRAMUSCULAR; INTRAVENOUS; SUBCUTANEOUS at 22:38

## 2020-01-01 RX ADMIN — MORPHINE SULFATE 1 MG/HR: 50 CAPSULE, EXTENDED RELEASE ORAL at 10:28

## 2020-01-01 RX ADMIN — MORPHINE SULFATE 4 MILLIGRAM(S): 50 CAPSULE, EXTENDED RELEASE ORAL at 19:42

## 2020-01-01 RX ADMIN — Medication 30 MILLILITER(S): at 17:53

## 2020-01-01 RX ADMIN — MORPHINE SULFATE 60 MILLIGRAM(S): 50 CAPSULE, EXTENDED RELEASE ORAL at 13:33

## 2020-01-01 RX ADMIN — Medication 60 MILLIGRAM(S): at 14:08

## 2020-01-01 RX ADMIN — SODIUM CHLORIDE 250 MILLILITER(S): 9 INJECTION INTRAMUSCULAR; INTRAVENOUS; SUBCUTANEOUS at 00:30

## 2020-01-01 RX ADMIN — Medication 650 MILLIGRAM(S): at 18:27

## 2020-01-01 RX ADMIN — Medication 30 MILLIGRAM(S): at 15:25

## 2020-01-01 RX ADMIN — Medication 60 MILLIGRAM(S): at 05:42

## 2020-01-01 RX ADMIN — LIDOCAINE 2 PATCH: 4 CREAM TOPICAL at 08:08

## 2020-01-01 RX ADMIN — SODIUM CHLORIDE 3 MILLILITER(S): 9 INJECTION INTRAMUSCULAR; INTRAVENOUS; SUBCUTANEOUS at 21:34

## 2020-01-01 RX ADMIN — FAMOTIDINE 20 MILLIGRAM(S): 10 INJECTION INTRAVENOUS at 11:40

## 2020-01-01 RX ADMIN — Medication 600 MILLIGRAM(S): at 23:03

## 2020-01-01 RX ADMIN — Medication 3 MILLIGRAM(S): at 21:41

## 2020-01-01 RX ADMIN — Medication 15 MILLIGRAM(S): at 03:41

## 2020-01-01 RX ADMIN — SODIUM CHLORIDE 3 MILLILITER(S): 9 INJECTION INTRAMUSCULAR; INTRAVENOUS; SUBCUTANEOUS at 06:54

## 2020-01-01 RX ADMIN — MORPHINE SULFATE 30 MILLIGRAM(S): 50 CAPSULE, EXTENDED RELEASE ORAL at 19:01

## 2020-01-01 RX ADMIN — IRON SUCROSE 110 MILLIGRAM(S): 20 INJECTION, SOLUTION INTRAVENOUS at 12:07

## 2020-01-01 RX ADMIN — MORPHINE SULFATE 75 MILLIGRAM(S): 50 CAPSULE, EXTENDED RELEASE ORAL at 16:44

## 2020-01-01 RX ADMIN — MORPHINE SULFATE 5 MILLIGRAM(S): 50 CAPSULE, EXTENDED RELEASE ORAL at 23:06

## 2020-01-01 RX ADMIN — Medication 2.5 MILLIGRAM(S): at 17:33

## 2020-01-01 RX ADMIN — Medication 30 MILLIGRAM(S): at 17:07

## 2020-01-01 RX ADMIN — Medication 30 MILLILITER(S): at 21:20

## 2020-01-01 RX ADMIN — ONDANSETRON 4 MILLIGRAM(S): 8 TABLET, FILM COATED ORAL at 12:01

## 2020-01-01 RX ADMIN — Medication 30 MILLIGRAM(S): at 20:30

## 2020-01-01 RX ADMIN — Medication 250 MILLIGRAM(S): at 13:30

## 2020-01-01 RX ADMIN — LIDOCAINE 2 PATCH: 4 CREAM TOPICAL at 05:52

## 2020-01-01 RX ADMIN — SENNA PLUS 2 TABLET(S): 8.6 TABLET ORAL at 21:19

## 2020-01-01 RX ADMIN — MEROPENEM 100 MILLIGRAM(S): 1 INJECTION INTRAVENOUS at 21:09

## 2020-01-01 RX ADMIN — Medication 2.5 MILLIGRAM(S): at 05:23

## 2020-01-01 RX ADMIN — Medication 300 MILLIGRAM(S): at 12:06

## 2020-01-01 RX ADMIN — Medication 650 MILLIGRAM(S): at 02:53

## 2020-01-01 RX ADMIN — Medication 10 MILLIGRAM(S): at 00:52

## 2020-01-01 RX ADMIN — PIPERACILLIN AND TAZOBACTAM 25 GRAM(S): 4; .5 INJECTION, POWDER, LYOPHILIZED, FOR SOLUTION INTRAVENOUS at 14:40

## 2020-01-01 RX ADMIN — PANTOPRAZOLE SODIUM 40 MILLIGRAM(S): 20 TABLET, DELAYED RELEASE ORAL at 06:31

## 2020-01-01 RX ADMIN — MORPHINE SULFATE 7.5 MILLIGRAM(S): 50 CAPSULE, EXTENDED RELEASE ORAL at 03:02

## 2020-01-01 RX ADMIN — Medication 1 TABLET(S): at 20:56

## 2020-01-01 RX ADMIN — HYDROMORPHONE HYDROCHLORIDE 2 MILLIGRAM(S): 2 INJECTION INTRAMUSCULAR; INTRAVENOUS; SUBCUTANEOUS at 23:52

## 2020-01-01 RX ADMIN — POLYETHYLENE GLYCOL 3350 17 GRAM(S): 17 POWDER, FOR SOLUTION ORAL at 13:06

## 2020-01-01 RX ADMIN — HYDROMORPHONE HYDROCHLORIDE 1 MILLIGRAM(S): 2 INJECTION INTRAMUSCULAR; INTRAVENOUS; SUBCUTANEOUS at 09:40

## 2020-01-01 RX ADMIN — MORPHINE SULFATE 4 MILLIGRAM(S): 50 CAPSULE, EXTENDED RELEASE ORAL at 14:27

## 2020-01-01 RX ADMIN — ENOXAPARIN SODIUM 80 MILLIGRAM(S): 100 INJECTION SUBCUTANEOUS at 16:44

## 2020-01-01 RX ADMIN — FAMOTIDINE 20 MILLIGRAM(S): 10 INJECTION INTRAVENOUS at 12:05

## 2020-01-01 RX ADMIN — LIDOCAINE 1 PATCH: 4 CREAM TOPICAL at 09:32

## 2020-01-01 RX ADMIN — MORPHINE SULFATE 1.5 MG/HR: 50 CAPSULE, EXTENDED RELEASE ORAL at 18:56

## 2020-01-01 RX ADMIN — Medication 650 MILLIGRAM(S): at 19:30

## 2020-01-01 RX ADMIN — ENOXAPARIN SODIUM 80 MILLIGRAM(S): 100 INJECTION SUBCUTANEOUS at 17:24

## 2020-01-01 RX ADMIN — MORPHINE SULFATE 60 MILLIGRAM(S): 50 CAPSULE, EXTENDED RELEASE ORAL at 00:33

## 2020-01-01 RX ADMIN — MORPHINE SULFATE 4 MILLIGRAM(S): 50 CAPSULE, EXTENDED RELEASE ORAL at 02:55

## 2020-01-01 RX ADMIN — PIPERACILLIN AND TAZOBACTAM 25 GRAM(S): 4; .5 INJECTION, POWDER, LYOPHILIZED, FOR SOLUTION INTRAVENOUS at 22:04

## 2020-01-01 RX ADMIN — Medication 2.5 MILLIGRAM(S): at 00:55

## 2020-01-01 RX ADMIN — SENNA PLUS 2 TABLET(S): 8.6 TABLET ORAL at 21:03

## 2020-01-01 RX ADMIN — Medication 400 MILLIGRAM(S): at 04:56

## 2020-01-01 RX ADMIN — MORPHINE SULFATE 60 MILLIGRAM(S): 50 CAPSULE, EXTENDED RELEASE ORAL at 14:04

## 2020-01-01 RX ADMIN — SODIUM CHLORIDE 999 MILLILITER(S): 9 INJECTION INTRAMUSCULAR; INTRAVENOUS; SUBCUTANEOUS at 12:34

## 2020-01-01 RX ADMIN — Medication 60 MILLIGRAM(S): at 15:22

## 2020-01-01 RX ADMIN — Medication 10 MILLIGRAM(S): at 20:16

## 2020-01-01 RX ADMIN — Medication 975 MILLIGRAM(S): at 23:53

## 2020-01-01 RX ADMIN — MORPHINE SULFATE 6 MILLIGRAM(S): 50 CAPSULE, EXTENDED RELEASE ORAL at 10:50

## 2020-01-01 RX ADMIN — Medication 10 MILLIGRAM(S): at 19:09

## 2020-01-01 RX ADMIN — SODIUM CHLORIDE 3 MILLILITER(S): 9 INJECTION INTRAMUSCULAR; INTRAVENOUS; SUBCUTANEOUS at 14:13

## 2020-01-01 RX ADMIN — Medication 1 TABLET(S): at 13:12

## 2020-01-01 RX ADMIN — ENOXAPARIN SODIUM 80 MILLIGRAM(S): 100 INJECTION SUBCUTANEOUS at 05:58

## 2020-01-01 RX ADMIN — MORPHINE SULFATE 15 MILLIGRAM(S): 50 CAPSULE, EXTENDED RELEASE ORAL at 16:26

## 2020-01-01 RX ADMIN — Medication 5 MILLIGRAM(S): at 08:04

## 2020-01-01 RX ADMIN — Medication 3 MILLIGRAM(S): at 22:12

## 2020-01-01 RX ADMIN — ONDANSETRON 4 MILLIGRAM(S): 8 TABLET, FILM COATED ORAL at 05:23

## 2020-01-01 RX ADMIN — Medication 3 MILLIGRAM(S): at 22:08

## 2020-01-01 RX ADMIN — Medication 1 TABLET(S): at 11:58

## 2020-01-01 RX ADMIN — Medication 30 MILLIGRAM(S): at 12:22

## 2020-01-01 RX ADMIN — MORPHINE SULFATE 2.5 MG/HR: 50 CAPSULE, EXTENDED RELEASE ORAL at 01:37

## 2020-01-01 RX ADMIN — Medication 500 MILLIGRAM(S): at 23:22

## 2020-01-01 RX ADMIN — Medication 166.67 MILLIGRAM(S): at 05:00

## 2020-01-01 RX ADMIN — SODIUM CHLORIDE 3 MILLILITER(S): 9 INJECTION INTRAMUSCULAR; INTRAVENOUS; SUBCUTANEOUS at 13:22

## 2020-01-01 RX ADMIN — MORPHINE SULFATE 60 MILLIGRAM(S): 50 CAPSULE, EXTENDED RELEASE ORAL at 21:32

## 2020-01-01 RX ADMIN — LIDOCAINE 2 PATCH: 4 CREAM TOPICAL at 09:38

## 2020-01-01 RX ADMIN — MEROPENEM 100 MILLIGRAM(S): 1 INJECTION INTRAVENOUS at 05:00

## 2020-01-01 RX ADMIN — Medication 975 MILLIGRAM(S): at 01:28

## 2020-01-01 RX ADMIN — FAMOTIDINE 20 MILLIGRAM(S): 10 INJECTION INTRAVENOUS at 11:20

## 2020-01-01 RX ADMIN — POTASSIUM PHOSPHATE, MONOBASIC POTASSIUM PHOSPHATE, DIBASIC 62.5 MILLIMOLE(S): 236; 224 INJECTION, SOLUTION INTRAVENOUS at 08:19

## 2020-01-01 RX ADMIN — Medication 1 TABLET(S): at 22:21

## 2020-01-01 RX ADMIN — Medication 5 MILLIGRAM(S): at 00:03

## 2020-01-01 RX ADMIN — SODIUM CHLORIDE 3 MILLILITER(S): 9 INJECTION INTRAMUSCULAR; INTRAVENOUS; SUBCUTANEOUS at 13:16

## 2020-01-01 RX ADMIN — SODIUM CHLORIDE 100 MILLILITER(S): 9 INJECTION INTRAMUSCULAR; INTRAVENOUS; SUBCUTANEOUS at 13:00

## 2020-01-01 RX ADMIN — SIMETHICONE 80 MILLIGRAM(S): 80 TABLET, CHEWABLE ORAL at 13:19

## 2020-01-01 RX ADMIN — SODIUM CHLORIDE 3 MILLILITER(S): 9 INJECTION INTRAMUSCULAR; INTRAVENOUS; SUBCUTANEOUS at 22:21

## 2020-01-01 RX ADMIN — POTASSIUM PHOSPHATE, MONOBASIC POTASSIUM PHOSPHATE, DIBASIC 62.5 MILLIMOLE(S): 236; 224 INJECTION, SOLUTION INTRAVENOUS at 08:27

## 2020-01-01 RX ADMIN — SODIUM CHLORIDE 3 MILLILITER(S): 9 INJECTION INTRAMUSCULAR; INTRAVENOUS; SUBCUTANEOUS at 13:02

## 2020-01-01 RX ADMIN — SODIUM CHLORIDE 3 MILLILITER(S): 9 INJECTION INTRAMUSCULAR; INTRAVENOUS; SUBCUTANEOUS at 05:08

## 2020-01-01 RX ADMIN — LIDOCAINE 2 PATCH: 4 CREAM TOPICAL at 22:00

## 2020-01-01 RX ADMIN — MORPHINE SULFATE 60 MILLIGRAM(S): 50 CAPSULE, EXTENDED RELEASE ORAL at 16:34

## 2020-01-01 RX ADMIN — MORPHINE SULFATE 60 MILLIGRAM(S): 50 CAPSULE, EXTENDED RELEASE ORAL at 21:03

## 2020-01-01 RX ADMIN — Medication 650 MILLIGRAM(S): at 21:37

## 2020-01-01 RX ADMIN — PIPERACILLIN AND TAZOBACTAM 25 GRAM(S): 4; .5 INJECTION, POWDER, LYOPHILIZED, FOR SOLUTION INTRAVENOUS at 23:04

## 2020-01-01 RX ADMIN — SODIUM CHLORIDE 3 MILLILITER(S): 9 INJECTION INTRAMUSCULAR; INTRAVENOUS; SUBCUTANEOUS at 12:03

## 2020-01-01 RX ADMIN — FAMOTIDINE 20 MILLIGRAM(S): 10 INJECTION INTRAVENOUS at 11:55

## 2020-01-01 RX ADMIN — Medication 1 TABLET(S): at 21:12

## 2020-01-01 RX ADMIN — HYDROMORPHONE HYDROCHLORIDE 0.8 MILLIGRAM(S): 2 INJECTION INTRAMUSCULAR; INTRAVENOUS; SUBCUTANEOUS at 08:04

## 2020-01-01 RX ADMIN — SENNA PLUS 2 TABLET(S): 8.6 TABLET ORAL at 21:25

## 2020-01-01 RX ADMIN — Medication 4000 UNIT(S): at 13:28

## 2020-01-01 RX ADMIN — SODIUM CHLORIDE 125 MILLILITER(S): 9 INJECTION, SOLUTION INTRAVENOUS at 06:42

## 2020-01-01 RX ADMIN — ENOXAPARIN SODIUM 40 MILLIGRAM(S): 100 INJECTION SUBCUTANEOUS at 13:03

## 2020-01-01 RX ADMIN — Medication 975 MILLIGRAM(S): at 13:16

## 2020-01-01 RX ADMIN — SIMETHICONE 80 MILLIGRAM(S): 80 TABLET, CHEWABLE ORAL at 21:25

## 2020-01-01 RX ADMIN — Medication 1 TABLET(S): at 21:09

## 2020-01-01 RX ADMIN — HYDROMORPHONE HYDROCHLORIDE 0.5 MG/HR: 2 INJECTION INTRAMUSCULAR; INTRAVENOUS; SUBCUTANEOUS at 06:07

## 2020-01-01 RX ADMIN — Medication 60 MILLIGRAM(S): at 13:24

## 2020-01-01 RX ADMIN — ENOXAPARIN SODIUM 40 MILLIGRAM(S): 100 INJECTION SUBCUTANEOUS at 11:39

## 2020-01-01 RX ADMIN — HEPARIN SODIUM 5000 UNIT(S): 5000 INJECTION INTRAVENOUS; SUBCUTANEOUS at 05:30

## 2020-01-01 RX ADMIN — PIPERACILLIN AND TAZOBACTAM 25 GRAM(S): 4; .5 INJECTION, POWDER, LYOPHILIZED, FOR SOLUTION INTRAVENOUS at 06:23

## 2020-01-01 RX ADMIN — Medication 60 MILLIGRAM(S): at 05:58

## 2020-01-01 RX ADMIN — SODIUM CHLORIDE 3 MILLILITER(S): 9 INJECTION INTRAMUSCULAR; INTRAVENOUS; SUBCUTANEOUS at 13:13

## 2020-01-01 RX ADMIN — PIPERACILLIN AND TAZOBACTAM 25 GRAM(S): 4; .5 INJECTION, POWDER, LYOPHILIZED, FOR SOLUTION INTRAVENOUS at 06:19

## 2020-01-01 RX ADMIN — Medication 1 TABLET(S): at 21:51

## 2020-01-01 RX ADMIN — MORPHINE SULFATE 75 MILLIGRAM(S): 50 CAPSULE, EXTENDED RELEASE ORAL at 07:01

## 2020-01-01 RX ADMIN — POLYETHYLENE GLYCOL 3350 17 GRAM(S): 17 POWDER, FOR SOLUTION ORAL at 11:30

## 2020-01-01 RX ADMIN — POLYETHYLENE GLYCOL 3350 17 GRAM(S): 17 POWDER, FOR SOLUTION ORAL at 15:24

## 2020-01-01 RX ADMIN — PANTOPRAZOLE SODIUM 40 MILLIGRAM(S): 20 TABLET, DELAYED RELEASE ORAL at 05:19

## 2020-01-01 RX ADMIN — LINEZOLID 300 MILLIGRAM(S): 600 INJECTION, SOLUTION INTRAVENOUS at 16:34

## 2020-01-01 RX ADMIN — Medication 60 MILLIGRAM(S): at 14:05

## 2020-01-01 RX ADMIN — Medication 2 MILLIGRAM(S): at 05:44

## 2020-01-01 RX ADMIN — ENOXAPARIN SODIUM 80 MILLIGRAM(S): 100 INJECTION SUBCUTANEOUS at 18:46

## 2020-01-01 RX ADMIN — LIDOCAINE 2 PATCH: 4 CREAM TOPICAL at 07:03

## 2020-01-01 RX ADMIN — SODIUM CHLORIDE 3 MILLILITER(S): 9 INJECTION INTRAMUSCULAR; INTRAVENOUS; SUBCUTANEOUS at 06:15

## 2020-01-01 RX ADMIN — SENNA PLUS 2 TABLET(S): 8.6 TABLET ORAL at 22:39

## 2020-01-01 RX ADMIN — ONDANSETRON 4 MILLIGRAM(S): 8 TABLET, FILM COATED ORAL at 12:44

## 2020-01-01 RX ADMIN — POTASSIUM PHOSPHATE, MONOBASIC POTASSIUM PHOSPHATE, DIBASIC 62.5 MILLIMOLE(S): 236; 224 INJECTION, SOLUTION INTRAVENOUS at 10:08

## 2020-01-01 RX ADMIN — ONDANSETRON 4 MILLIGRAM(S): 8 TABLET, FILM COATED ORAL at 06:24

## 2020-01-01 RX ADMIN — SENNA PLUS 2 TABLET(S): 8.6 TABLET ORAL at 22:12

## 2020-01-01 RX ADMIN — MORPHINE SULFATE 60 MILLIGRAM(S): 50 CAPSULE, EXTENDED RELEASE ORAL at 15:15

## 2020-01-01 RX ADMIN — POLYETHYLENE GLYCOL 3350 17 GRAM(S): 17 POWDER, FOR SOLUTION ORAL at 12:11

## 2020-01-01 RX ADMIN — Medication 60 MILLIGRAM(S): at 22:18

## 2020-01-01 RX ADMIN — Medication 400 MILLIGRAM(S): at 05:25

## 2020-01-01 RX ADMIN — Medication 60 MILLIGRAM(S): at 04:53

## 2020-01-01 RX ADMIN — MORPHINE SULFATE 90 MILLIGRAM(S): 50 CAPSULE, EXTENDED RELEASE ORAL at 18:27

## 2020-01-01 RX ADMIN — ONDANSETRON 4 MILLIGRAM(S): 8 TABLET, FILM COATED ORAL at 08:37

## 2020-01-01 RX ADMIN — Medication 50 MILLIGRAM(S): at 13:41

## 2020-01-01 RX ADMIN — ONDANSETRON 4 MILLIGRAM(S): 8 TABLET, FILM COATED ORAL at 21:24

## 2020-01-01 RX ADMIN — Medication 3 MILLIGRAM(S): at 22:35

## 2020-01-01 RX ADMIN — Medication 1 TABLET(S): at 10:00

## 2020-01-01 RX ADMIN — SIMETHICONE 80 MILLIGRAM(S): 80 TABLET, CHEWABLE ORAL at 11:58

## 2020-01-01 RX ADMIN — SIMETHICONE 80 MILLIGRAM(S): 80 TABLET, CHEWABLE ORAL at 12:07

## 2020-01-01 RX ADMIN — MORPHINE SULFATE 4 MILLIGRAM(S): 50 CAPSULE, EXTENDED RELEASE ORAL at 08:47

## 2020-01-01 RX ADMIN — POLYETHYLENE GLYCOL 3350 17 GRAM(S): 17 POWDER, FOR SOLUTION ORAL at 17:54

## 2020-01-01 RX ADMIN — Medication 1 TABLET(S): at 11:20

## 2020-01-01 RX ADMIN — Medication 1000 MILLIGRAM(S): at 13:54

## 2020-01-01 RX ADMIN — MORPHINE SULFATE 60 MILLIGRAM(S): 50 CAPSULE, EXTENDED RELEASE ORAL at 14:47

## 2020-01-01 RX ADMIN — ONDANSETRON 4 MILLIGRAM(S): 8 TABLET, FILM COATED ORAL at 13:51

## 2020-01-01 RX ADMIN — Medication 400 MILLIGRAM(S): at 02:07

## 2020-01-01 RX ADMIN — MORPHINE SULFATE 90 MILLIGRAM(S): 50 CAPSULE, EXTENDED RELEASE ORAL at 17:32

## 2020-01-01 RX ADMIN — Medication 3 MILLIGRAM(S): at 21:20

## 2020-01-01 RX ADMIN — Medication 975 MILLIGRAM(S): at 17:59

## 2020-01-01 RX ADMIN — Medication 1 TABLET(S): at 16:44

## 2020-01-01 RX ADMIN — POTASSIUM PHOSPHATE, MONOBASIC POTASSIUM PHOSPHATE, DIBASIC 62.5 MILLIMOLE(S): 236; 224 INJECTION, SOLUTION INTRAVENOUS at 09:30

## 2020-01-01 RX ADMIN — Medication 650 MILLIGRAM(S): at 00:59

## 2020-01-01 RX ADMIN — Medication 300 MILLIGRAM(S): at 11:40

## 2020-01-01 RX ADMIN — Medication 650 MILLIGRAM(S): at 13:20

## 2020-01-01 RX ADMIN — ENOXAPARIN SODIUM 80 MILLIGRAM(S): 100 INJECTION SUBCUTANEOUS at 05:11

## 2020-01-01 RX ADMIN — Medication 30 MILLIGRAM(S): at 05:26

## 2020-01-01 RX ADMIN — MORPHINE SULFATE 2 MILLIGRAM(S): 50 CAPSULE, EXTENDED RELEASE ORAL at 01:40

## 2020-01-01 RX ADMIN — Medication 300 MILLIGRAM(S): at 11:57

## 2020-01-01 RX ADMIN — Medication 10 MILLIGRAM(S): at 11:52

## 2020-01-01 RX ADMIN — Medication 650 MILLIGRAM(S): at 18:31

## 2020-01-01 RX ADMIN — MORPHINE SULFATE 4 MILLIGRAM(S): 50 CAPSULE, EXTENDED RELEASE ORAL at 00:51

## 2020-01-01 RX ADMIN — Medication 300 MILLIGRAM(S): at 11:48

## 2020-01-01 RX ADMIN — SODIUM CHLORIDE 3 MILLILITER(S): 9 INJECTION INTRAMUSCULAR; INTRAVENOUS; SUBCUTANEOUS at 05:59

## 2020-01-01 RX ADMIN — MORPHINE SULFATE 60 MILLIGRAM(S): 50 CAPSULE, EXTENDED RELEASE ORAL at 02:00

## 2020-01-01 RX ADMIN — SODIUM CHLORIDE 250 MILLILITER(S): 9 INJECTION INTRAMUSCULAR; INTRAVENOUS; SUBCUTANEOUS at 01:50

## 2020-01-01 RX ADMIN — Medication 166.67 MILLIGRAM(S): at 07:34

## 2020-01-01 RX ADMIN — FAMOTIDINE 20 MILLIGRAM(S): 10 INJECTION INTRAVENOUS at 10:52

## 2020-01-01 RX ADMIN — Medication 500 MILLIGRAM(S): at 13:12

## 2020-01-01 RX ADMIN — LIDOCAINE 2 PATCH: 4 CREAM TOPICAL at 20:33

## 2020-01-01 RX ADMIN — Medication 1 TABLET(S): at 12:13

## 2020-01-01 RX ADMIN — Medication 60 MILLIGRAM(S): at 14:03

## 2020-01-01 RX ADMIN — Medication 170 MILLIMOLE(S): at 09:19

## 2020-01-01 RX ADMIN — Medication 60 MILLIGRAM(S): at 06:24

## 2020-01-01 RX ADMIN — SODIUM CHLORIDE 3 MILLILITER(S): 9 INJECTION INTRAMUSCULAR; INTRAVENOUS; SUBCUTANEOUS at 13:54

## 2020-01-01 RX ADMIN — Medication 60 MILLIGRAM(S): at 06:09

## 2020-01-01 RX ADMIN — ENOXAPARIN SODIUM 40 MILLIGRAM(S): 100 INJECTION SUBCUTANEOUS at 13:36

## 2020-01-01 RX ADMIN — SENNA PLUS 2 TABLET(S): 8.6 TABLET ORAL at 00:04

## 2020-01-01 RX ADMIN — FAMOTIDINE 20 MILLIGRAM(S): 10 INJECTION INTRAVENOUS at 13:28

## 2020-01-01 RX ADMIN — SODIUM CHLORIDE 3 MILLILITER(S): 9 INJECTION INTRAMUSCULAR; INTRAVENOUS; SUBCUTANEOUS at 05:22

## 2020-01-01 RX ADMIN — Medication 30 MILLILITER(S): at 10:00

## 2020-01-01 RX ADMIN — LIDOCAINE 2 PATCH: 4 CREAM TOPICAL at 08:43

## 2020-01-01 RX ADMIN — LIDOCAINE 2 PATCH: 4 CREAM TOPICAL at 09:57

## 2020-01-01 RX ADMIN — Medication 170 MILLIMOLE(S): at 09:25

## 2020-01-01 RX ADMIN — FAMOTIDINE 20 MILLIGRAM(S): 10 INJECTION INTRAVENOUS at 11:53

## 2020-01-01 RX ADMIN — FAMOTIDINE 20 MILLIGRAM(S): 10 INJECTION INTRAVENOUS at 13:40

## 2020-01-01 RX ADMIN — LINEZOLID 300 MILLIGRAM(S): 600 INJECTION, SOLUTION INTRAVENOUS at 17:33

## 2020-01-01 RX ADMIN — SODIUM CHLORIDE 3 MILLILITER(S): 9 INJECTION INTRAMUSCULAR; INTRAVENOUS; SUBCUTANEOUS at 07:12

## 2020-01-01 RX ADMIN — SODIUM CHLORIDE 3 MILLILITER(S): 9 INJECTION INTRAMUSCULAR; INTRAVENOUS; SUBCUTANEOUS at 12:26

## 2020-01-01 RX ADMIN — SODIUM CHLORIDE 3 MILLILITER(S): 9 INJECTION INTRAMUSCULAR; INTRAVENOUS; SUBCUTANEOUS at 13:40

## 2020-01-01 RX ADMIN — HYDROMORPHONE HYDROCHLORIDE 0.5 MILLIGRAM(S): 2 INJECTION INTRAMUSCULAR; INTRAVENOUS; SUBCUTANEOUS at 22:00

## 2020-01-01 RX ADMIN — Medication 60 MILLIGRAM(S): at 22:41

## 2020-01-01 RX ADMIN — Medication 300 MICROGRAM(S): at 09:55

## 2020-01-01 RX ADMIN — LIDOCAINE 2 PATCH: 4 CREAM TOPICAL at 22:34

## 2020-01-01 RX ADMIN — MEROPENEM 100 MILLIGRAM(S): 1 INJECTION INTRAVENOUS at 13:52

## 2020-01-01 RX ADMIN — MORPHINE SULFATE 75 MILLIGRAM(S): 50 CAPSULE, EXTENDED RELEASE ORAL at 05:10

## 2020-01-01 RX ADMIN — MEROPENEM 100 MILLIGRAM(S): 1 INJECTION INTRAVENOUS at 21:20

## 2020-01-01 RX ADMIN — SODIUM CHLORIDE 3 MILLILITER(S): 9 INJECTION INTRAMUSCULAR; INTRAVENOUS; SUBCUTANEOUS at 06:58

## 2020-01-01 RX ADMIN — Medication 85 MILLIMOLE(S): at 09:35

## 2020-01-01 RX ADMIN — Medication 106 MILLIGRAM(S): at 13:18

## 2020-01-01 RX ADMIN — HYDROMORPHONE HYDROCHLORIDE 2 MILLIGRAM(S): 2 INJECTION INTRAMUSCULAR; INTRAVENOUS; SUBCUTANEOUS at 10:01

## 2020-01-01 RX ADMIN — Medication 25 MILLIGRAM(S): at 19:02

## 2020-01-01 RX ADMIN — MEROPENEM 100 MILLIGRAM(S): 1 INJECTION INTRAVENOUS at 06:06

## 2020-01-01 RX ADMIN — Medication 60 MILLIGRAM(S): at 22:08

## 2020-01-01 RX ADMIN — MORPHINE SULFATE 15 MILLIGRAM(S): 50 CAPSULE, EXTENDED RELEASE ORAL at 02:50

## 2020-01-01 RX ADMIN — SODIUM CHLORIDE 3 MILLILITER(S): 9 INJECTION INTRAMUSCULAR; INTRAVENOUS; SUBCUTANEOUS at 21:30

## 2020-01-01 RX ADMIN — ONDANSETRON 4 MILLIGRAM(S): 8 TABLET, FILM COATED ORAL at 22:17

## 2020-01-01 RX ADMIN — MORPHINE SULFATE 7.5 MILLIGRAM(S): 50 CAPSULE, EXTENDED RELEASE ORAL at 00:00

## 2020-01-01 RX ADMIN — LIDOCAINE 1 PATCH: 4 CREAM TOPICAL at 19:21

## 2020-01-01 RX ADMIN — PIPERACILLIN AND TAZOBACTAM 25 GRAM(S): 4; .5 INJECTION, POWDER, LYOPHILIZED, FOR SOLUTION INTRAVENOUS at 06:18

## 2020-01-01 RX ADMIN — Medication 3 MILLIGRAM(S): at 21:25

## 2020-01-01 RX ADMIN — MORPHINE SULFATE 4 MILLIGRAM(S): 50 CAPSULE, EXTENDED RELEASE ORAL at 16:37

## 2020-01-01 RX ADMIN — ONDANSETRON 4 MILLIGRAM(S): 8 TABLET, FILM COATED ORAL at 17:05

## 2020-01-01 RX ADMIN — ENOXAPARIN SODIUM 80 MILLIGRAM(S): 100 INJECTION SUBCUTANEOUS at 17:33

## 2020-01-01 RX ADMIN — POLYETHYLENE GLYCOL 3350 17 GRAM(S): 17 POWDER, FOR SOLUTION ORAL at 13:03

## 2020-01-01 RX ADMIN — PANTOPRAZOLE SODIUM 40 MILLIGRAM(S): 20 TABLET, DELAYED RELEASE ORAL at 05:10

## 2020-01-01 RX ADMIN — FAMOTIDINE 20 MILLIGRAM(S): 10 INJECTION INTRAVENOUS at 12:42

## 2020-01-01 RX ADMIN — MORPHINE SULFATE 75 MILLIGRAM(S): 50 CAPSULE, EXTENDED RELEASE ORAL at 06:06

## 2020-01-01 RX ADMIN — MORPHINE SULFATE 7.5 MILLIGRAM(S): 50 CAPSULE, EXTENDED RELEASE ORAL at 22:45

## 2020-01-01 RX ADMIN — SIMETHICONE 80 MILLIGRAM(S): 80 TABLET, CHEWABLE ORAL at 22:42

## 2020-01-01 RX ADMIN — Medication 50 GRAM(S): at 09:57

## 2020-01-01 RX ADMIN — MORPHINE SULFATE 5 MILLIGRAM(S): 50 CAPSULE, EXTENDED RELEASE ORAL at 22:30

## 2020-01-01 RX ADMIN — MORPHINE SULFATE 2 MILLIGRAM(S): 50 CAPSULE, EXTENDED RELEASE ORAL at 10:58

## 2020-01-01 RX ADMIN — Medication 1 TABLET(S): at 21:07

## 2020-01-01 RX ADMIN — Medication 30 MILLIGRAM(S): at 23:49

## 2020-01-01 RX ADMIN — LIDOCAINE 1 PATCH: 4 CREAM TOPICAL at 05:08

## 2020-01-01 RX ADMIN — LIDOCAINE 2 PATCH: 4 CREAM TOPICAL at 21:57

## 2020-01-01 RX ADMIN — FAMOTIDINE 20 MILLIGRAM(S): 10 INJECTION INTRAVENOUS at 11:52

## 2020-01-01 RX ADMIN — Medication 1 TABLET(S): at 09:33

## 2020-01-01 RX ADMIN — Medication 1 TABLET(S): at 12:10

## 2020-01-01 RX ADMIN — Medication 30 MILLIGRAM(S): at 03:45

## 2020-01-01 RX ADMIN — ONDANSETRON 4 MILLIGRAM(S): 8 TABLET, FILM COATED ORAL at 09:33

## 2020-01-01 RX ADMIN — MORPHINE SULFATE 5 MILLIGRAM(S): 50 CAPSULE, EXTENDED RELEASE ORAL at 16:09

## 2020-01-01 RX ADMIN — Medication 975 MILLIGRAM(S): at 17:24

## 2020-01-01 RX ADMIN — MORPHINE SULFATE 45 MILLIGRAM(S): 50 CAPSULE, EXTENDED RELEASE ORAL at 20:31

## 2020-01-01 RX ADMIN — MORPHINE SULFATE 60 MILLIGRAM(S): 50 CAPSULE, EXTENDED RELEASE ORAL at 13:53

## 2020-01-01 RX ADMIN — SODIUM CHLORIDE 3 MILLILITER(S): 9 INJECTION INTRAMUSCULAR; INTRAVENOUS; SUBCUTANEOUS at 13:36

## 2020-01-01 RX ADMIN — Medication 1 TABLET(S): at 17:38

## 2020-01-01 RX ADMIN — MORPHINE SULFATE 4 MILLIGRAM(S): 50 CAPSULE, EXTENDED RELEASE ORAL at 08:55

## 2020-01-01 RX ADMIN — Medication 30 MILLILITER(S): at 13:51

## 2020-01-01 RX ADMIN — FAMOTIDINE 20 MILLIGRAM(S): 10 INJECTION INTRAVENOUS at 12:29

## 2020-01-01 RX ADMIN — MORPHINE SULFATE 75 MILLIGRAM(S): 50 CAPSULE, EXTENDED RELEASE ORAL at 06:17

## 2020-01-01 RX ADMIN — Medication 975 MILLIGRAM(S): at 23:19

## 2020-01-01 RX ADMIN — Medication 62.5 MILLIMOLE(S): at 01:21

## 2020-01-01 RX ADMIN — PANTOPRAZOLE SODIUM 40 MILLIGRAM(S): 20 TABLET, DELAYED RELEASE ORAL at 05:26

## 2020-01-01 RX ADMIN — Medication 400 MILLIGRAM(S): at 12:39

## 2020-01-01 RX ADMIN — SENNA PLUS 2 TABLET(S): 8.6 TABLET ORAL at 22:33

## 2020-01-01 RX ADMIN — Medication 1 TABLET(S): at 13:29

## 2020-01-01 RX ADMIN — HYDROMORPHONE HYDROCHLORIDE 1 MILLIGRAM(S): 2 INJECTION INTRAMUSCULAR; INTRAVENOUS; SUBCUTANEOUS at 12:40

## 2020-01-01 RX ADMIN — Medication 400 MILLIGRAM(S): at 13:02

## 2020-01-01 RX ADMIN — Medication 1 TABLET(S): at 11:19

## 2020-01-01 RX ADMIN — Medication 500 MILLIGRAM(S): at 23:29

## 2020-01-01 RX ADMIN — ONDANSETRON 116 MILLIGRAM(S): 8 TABLET, FILM COATED ORAL at 13:11

## 2020-01-01 RX ADMIN — HEPARIN SODIUM 5000 UNIT(S): 5000 INJECTION INTRAVENOUS; SUBCUTANEOUS at 15:04

## 2020-01-01 RX ADMIN — HYDROMORPHONE HYDROCHLORIDE 0.75 MILLIGRAM(S): 2 INJECTION INTRAMUSCULAR; INTRAVENOUS; SUBCUTANEOUS at 00:18

## 2020-01-01 RX ADMIN — MORPHINE SULFATE 60 MILLIGRAM(S): 50 CAPSULE, EXTENDED RELEASE ORAL at 21:40

## 2020-01-01 RX ADMIN — LIDOCAINE 2 PATCH: 4 CREAM TOPICAL at 21:39

## 2020-01-01 RX ADMIN — PANTOPRAZOLE SODIUM 40 MILLIGRAM(S): 20 TABLET, DELAYED RELEASE ORAL at 06:07

## 2020-01-01 RX ADMIN — Medication 30 MILLIGRAM(S): at 18:49

## 2020-01-01 RX ADMIN — Medication 975 MILLIGRAM(S): at 12:17

## 2020-01-01 RX ADMIN — SENNA PLUS 2 TABLET(S): 8.6 TABLET ORAL at 21:04

## 2020-01-01 RX ADMIN — FLUOROURACIL 23.81 MILLIGRAM(S): 50 INJECTION, SOLUTION INTRAVENOUS at 17:17

## 2020-01-01 RX ADMIN — Medication 1 TABLET(S): at 16:58

## 2020-01-01 RX ADMIN — MORPHINE SULFATE 75 MILLIGRAM(S): 50 CAPSULE, EXTENDED RELEASE ORAL at 05:52

## 2020-01-01 RX ADMIN — MORPHINE SULFATE 4 MILLIGRAM(S): 50 CAPSULE, EXTENDED RELEASE ORAL at 15:29

## 2020-01-01 RX ADMIN — MORPHINE SULFATE 60 MILLIGRAM(S): 50 CAPSULE, EXTENDED RELEASE ORAL at 14:03

## 2020-01-01 RX ADMIN — PIPERACILLIN AND TAZOBACTAM 200 GRAM(S): 4; .5 INJECTION, POWDER, LYOPHILIZED, FOR SOLUTION INTRAVENOUS at 12:41

## 2020-01-01 RX ADMIN — Medication 975 MILLIGRAM(S): at 23:49

## 2020-01-01 RX ADMIN — ZOLEDRONIC ACID 400 MILLIGRAM(S): 5 INJECTION, SOLUTION INTRAVENOUS at 16:36

## 2020-01-01 RX ADMIN — MORPHINE SULFATE 45 MILLIGRAM(S): 50 CAPSULE, EXTENDED RELEASE ORAL at 21:59

## 2020-01-01 RX ADMIN — Medication 125 MILLIMOLE(S): at 14:37

## 2020-01-01 RX ADMIN — Medication 600 MILLIGRAM(S): at 06:26

## 2020-01-01 RX ADMIN — MORPHINE SULFATE 30 MILLIGRAM(S): 50 CAPSULE, EXTENDED RELEASE ORAL at 05:23

## 2020-01-01 RX ADMIN — Medication 30 MILLIGRAM(S): at 00:10

## 2020-01-01 RX ADMIN — Medication 60 MILLIGRAM(S): at 05:30

## 2020-01-01 RX ADMIN — HYDROMORPHONE HYDROCHLORIDE 0.75 MILLIGRAM(S): 2 INJECTION INTRAMUSCULAR; INTRAVENOUS; SUBCUTANEOUS at 01:59

## 2020-01-01 RX ADMIN — LIDOCAINE 2 PATCH: 4 CREAM TOPICAL at 05:29

## 2020-01-01 RX ADMIN — SODIUM CHLORIDE 3 MILLILITER(S): 9 INJECTION INTRAMUSCULAR; INTRAVENOUS; SUBCUTANEOUS at 21:15

## 2020-01-01 RX ADMIN — MORPHINE SULFATE 30 MILLIGRAM(S): 50 CAPSULE, EXTENDED RELEASE ORAL at 05:32

## 2020-01-01 RX ADMIN — HYDROMORPHONE HYDROCHLORIDE 0.5 MG/HR: 2 INJECTION INTRAMUSCULAR; INTRAVENOUS; SUBCUTANEOUS at 10:59

## 2020-01-01 RX ADMIN — ENOXAPARIN SODIUM 80 MILLIGRAM(S): 100 INJECTION SUBCUTANEOUS at 18:40

## 2020-01-01 RX ADMIN — PANTOPRAZOLE SODIUM 40 MILLIGRAM(S): 20 TABLET, DELAYED RELEASE ORAL at 05:46

## 2020-01-01 RX ADMIN — MORPHINE SULFATE 30 MILLIGRAM(S): 50 CAPSULE, EXTENDED RELEASE ORAL at 08:21

## 2020-01-01 RX ADMIN — SODIUM CHLORIDE 250 MILLILITER(S): 9 INJECTION INTRAMUSCULAR; INTRAVENOUS; SUBCUTANEOUS at 04:56

## 2020-01-01 RX ADMIN — Medication 1 TABLET(S): at 08:59

## 2020-01-01 RX ADMIN — ENOXAPARIN SODIUM 40 MILLIGRAM(S): 100 INJECTION SUBCUTANEOUS at 12:05

## 2020-01-01 RX ADMIN — Medication 500 MILLIGRAM(S): at 11:21

## 2020-01-01 RX ADMIN — Medication 60 MILLIGRAM(S): at 05:49

## 2020-01-01 RX ADMIN — Medication 1 TABLET(S): at 13:28

## 2020-01-01 RX ADMIN — OXYCODONE HYDROCHLORIDE 5 MILLIGRAM(S): 5 TABLET ORAL at 05:17

## 2020-01-01 RX ADMIN — Medication 60 MILLIGRAM(S): at 22:12

## 2020-01-01 RX ADMIN — PIPERACILLIN AND TAZOBACTAM 25 GRAM(S): 4; .5 INJECTION, POWDER, LYOPHILIZED, FOR SOLUTION INTRAVENOUS at 09:32

## 2020-01-01 RX ADMIN — Medication 1 TABLET(S): at 09:40

## 2020-01-01 RX ADMIN — MORPHINE SULFATE 15 MILLIGRAM(S): 50 CAPSULE, EXTENDED RELEASE ORAL at 08:27

## 2020-01-01 RX ADMIN — Medication 500 MILLIGRAM(S): at 13:15

## 2020-01-01 RX ADMIN — Medication 1 TABLET(S): at 17:08

## 2020-01-01 RX ADMIN — SENNA PLUS 2 TABLET(S): 8.6 TABLET ORAL at 21:52

## 2020-01-01 RX ADMIN — Medication 975 MILLIGRAM(S): at 13:00

## 2020-01-01 RX ADMIN — IRON SUCROSE 110 MILLIGRAM(S): 20 INJECTION, SOLUTION INTRAVENOUS at 15:48

## 2020-01-01 RX ADMIN — MORPHINE SULFATE 15 MILLIGRAM(S): 50 CAPSULE, EXTENDED RELEASE ORAL at 12:02

## 2020-01-01 RX ADMIN — FAMOTIDINE 20 MILLIGRAM(S): 10 INJECTION INTRAVENOUS at 12:06

## 2020-01-01 RX ADMIN — Medication 975 MILLIGRAM(S): at 11:21

## 2020-01-01 RX ADMIN — Medication 650 MILLIGRAM(S): at 05:11

## 2020-01-01 RX ADMIN — ONDANSETRON 4 MILLIGRAM(S): 8 TABLET, FILM COATED ORAL at 22:42

## 2020-01-01 RX ADMIN — Medication 30 MILLIGRAM(S): at 16:37

## 2020-01-01 RX ADMIN — LIDOCAINE 2 PATCH: 4 CREAM TOPICAL at 08:55

## 2020-01-01 RX ADMIN — Medication 60 MILLIGRAM(S): at 21:13

## 2020-01-01 RX ADMIN — Medication 30 MILLILITER(S): at 17:14

## 2020-01-01 RX ADMIN — PIPERACILLIN AND TAZOBACTAM 25 GRAM(S): 4; .5 INJECTION, POWDER, LYOPHILIZED, FOR SOLUTION INTRAVENOUS at 13:17

## 2020-01-01 RX ADMIN — Medication 400 MILLIGRAM(S): at 21:24

## 2020-01-01 RX ADMIN — PIPERACILLIN AND TAZOBACTAM 25 GRAM(S): 4; .5 INJECTION, POWDER, LYOPHILIZED, FOR SOLUTION INTRAVENOUS at 21:02

## 2020-01-01 RX ADMIN — ENOXAPARIN SODIUM 40 MILLIGRAM(S): 100 INJECTION SUBCUTANEOUS at 12:29

## 2020-01-01 RX ADMIN — Medication 4000 UNIT(S): at 09:32

## 2020-01-01 RX ADMIN — SODIUM CHLORIDE 3 MILLILITER(S): 9 INJECTION INTRAMUSCULAR; INTRAVENOUS; SUBCUTANEOUS at 12:51

## 2020-01-01 RX ADMIN — Medication 1 TABLET(S): at 11:43

## 2020-01-01 RX ADMIN — LINEZOLID 300 MILLIGRAM(S): 600 INJECTION, SOLUTION INTRAVENOUS at 05:10

## 2020-01-01 RX ADMIN — LIDOCAINE 2 PATCH: 4 CREAM TOPICAL at 22:13

## 2020-01-01 RX ADMIN — PIPERACILLIN AND TAZOBACTAM 25 GRAM(S): 4; .5 INJECTION, POWDER, LYOPHILIZED, FOR SOLUTION INTRAVENOUS at 06:31

## 2020-01-01 RX ADMIN — MORPHINE SULFATE 30 MILLIGRAM(S): 50 CAPSULE, EXTENDED RELEASE ORAL at 06:30

## 2020-01-01 RX ADMIN — Medication 62.5 MILLIMOLE(S): at 10:32

## 2020-01-01 RX ADMIN — MEROPENEM 100 MILLIGRAM(S): 1 INJECTION INTRAVENOUS at 05:45

## 2020-01-01 RX ADMIN — Medication 50 MILLIEQUIVALENT(S): at 08:47

## 2020-01-01 RX ADMIN — SODIUM CHLORIDE 3 MILLILITER(S): 9 INJECTION INTRAMUSCULAR; INTRAVENOUS; SUBCUTANEOUS at 23:07

## 2020-01-01 RX ADMIN — Medication 30 MILLIGRAM(S): at 23:10

## 2020-01-01 RX ADMIN — Medication 250 MILLIMOLE(S): at 14:43

## 2020-01-01 RX ADMIN — FAMOTIDINE 20 MILLIGRAM(S): 10 INJECTION INTRAVENOUS at 11:21

## 2020-01-01 RX ADMIN — MORPHINE SULFATE 15 MILLIGRAM(S): 50 CAPSULE, EXTENDED RELEASE ORAL at 04:00

## 2020-01-01 RX ADMIN — POLYETHYLENE GLYCOL 3350 17 GRAM(S): 17 POWDER, FOR SOLUTION ORAL at 09:32

## 2020-01-01 RX ADMIN — Medication 10 MILLIGRAM(S): at 02:00

## 2020-01-01 RX ADMIN — SODIUM CHLORIDE 3 MILLILITER(S): 9 INJECTION INTRAMUSCULAR; INTRAVENOUS; SUBCUTANEOUS at 21:12

## 2020-01-01 RX ADMIN — PANTOPRAZOLE SODIUM 40 MILLIGRAM(S): 20 TABLET, DELAYED RELEASE ORAL at 06:11

## 2020-01-01 RX ADMIN — Medication 60 MILLIGRAM(S): at 22:42

## 2020-01-01 RX ADMIN — Medication 10 MILLIGRAM(S): at 16:46

## 2020-01-01 RX ADMIN — SENNA PLUS 2 TABLET(S): 8.6 TABLET ORAL at 21:27

## 2020-01-01 RX ADMIN — SENNA PLUS 2 TABLET(S): 8.6 TABLET ORAL at 21:36

## 2020-01-01 RX ADMIN — HYDROMORPHONE HYDROCHLORIDE 1.5 MILLIGRAM(S): 2 INJECTION INTRAMUSCULAR; INTRAVENOUS; SUBCUTANEOUS at 04:00

## 2020-01-01 RX ADMIN — ONDANSETRON 4 MILLIGRAM(S): 8 TABLET, FILM COATED ORAL at 14:27

## 2020-01-01 RX ADMIN — ALTEPLASE 2 MILLIGRAM(S): KIT at 05:48

## 2020-01-01 RX ADMIN — Medication 10 MILLIGRAM(S): at 09:41

## 2020-01-01 RX ADMIN — Medication 60 MILLIGRAM(S): at 13:22

## 2020-01-01 RX ADMIN — Medication 975 MILLIGRAM(S): at 23:27

## 2020-01-01 RX ADMIN — FAMOTIDINE 20 MILLIGRAM(S): 10 INJECTION INTRAVENOUS at 13:31

## 2020-01-01 RX ADMIN — FOSAPREPITANT DIMEGLUMINE 300 MILLIGRAM(S): 150 INJECTION, POWDER, LYOPHILIZED, FOR SOLUTION INTRAVENOUS at 14:22

## 2020-01-01 RX ADMIN — Medication 4000 UNIT(S): at 13:36

## 2020-01-01 RX ADMIN — ENOXAPARIN SODIUM 40 MILLIGRAM(S): 100 INJECTION SUBCUTANEOUS at 17:51

## 2020-01-01 RX ADMIN — LIDOCAINE 2 PATCH: 4 CREAM TOPICAL at 13:21

## 2020-01-01 RX ADMIN — SODIUM CHLORIDE 3 MILLILITER(S): 9 INJECTION INTRAMUSCULAR; INTRAVENOUS; SUBCUTANEOUS at 21:05

## 2020-01-01 RX ADMIN — LIDOCAINE 2 PATCH: 4 CREAM TOPICAL at 21:25

## 2020-01-01 RX ADMIN — MORPHINE SULFATE 5 MILLIGRAM(S): 50 CAPSULE, EXTENDED RELEASE ORAL at 04:14

## 2020-01-01 RX ADMIN — HYDROMORPHONE HYDROCHLORIDE 0.8 MILLIGRAM(S): 2 INJECTION INTRAMUSCULAR; INTRAVENOUS; SUBCUTANEOUS at 00:08

## 2020-01-01 RX ADMIN — FAMOTIDINE 20 MILLIGRAM(S): 10 INJECTION INTRAVENOUS at 12:10

## 2020-01-01 RX ADMIN — LIDOCAINE 2 PATCH: 4 CREAM TOPICAL at 10:01

## 2020-01-01 RX ADMIN — IRON SUCROSE 110 MILLIGRAM(S): 20 INJECTION, SOLUTION INTRAVENOUS at 12:52

## 2020-01-01 RX ADMIN — FLUOROURACIL 23.81 MILLIGRAM(S): 50 INJECTION, SOLUTION INTRAVENOUS at 16:06

## 2020-01-01 RX ADMIN — POLYETHYLENE GLYCOL 3350 17 GRAM(S): 17 POWDER, FOR SOLUTION ORAL at 12:13

## 2020-01-01 RX ADMIN — Medication 0.25 MILLIGRAM(S): at 09:53

## 2020-01-01 RX ADMIN — ONDANSETRON 4 MILLIGRAM(S): 8 TABLET, FILM COATED ORAL at 16:56

## 2020-01-01 RX ADMIN — SODIUM CHLORIDE 3 MILLILITER(S): 9 INJECTION INTRAMUSCULAR; INTRAVENOUS; SUBCUTANEOUS at 05:50

## 2020-01-01 RX ADMIN — HYDROMORPHONE HYDROCHLORIDE 0.75 MILLIGRAM(S): 2 INJECTION INTRAMUSCULAR; INTRAVENOUS; SUBCUTANEOUS at 13:25

## 2020-01-01 RX ADMIN — Medication 500 MILLIGRAM(S): at 23:49

## 2020-01-01 RX ADMIN — FAMOTIDINE 20 MILLIGRAM(S): 10 INJECTION INTRAVENOUS at 14:04

## 2020-01-01 RX ADMIN — HYDROMORPHONE HYDROCHLORIDE 2 MILLIGRAM(S): 2 INJECTION INTRAMUSCULAR; INTRAVENOUS; SUBCUTANEOUS at 11:23

## 2020-01-01 RX ADMIN — ENOXAPARIN SODIUM 40 MILLIGRAM(S): 100 INJECTION SUBCUTANEOUS at 18:03

## 2020-01-01 RX ADMIN — ENOXAPARIN SODIUM 80 MILLIGRAM(S): 100 INJECTION SUBCUTANEOUS at 06:09

## 2020-01-01 RX ADMIN — Medication 10 MILLIGRAM(S): at 12:20

## 2020-01-01 RX ADMIN — Medication 1 TABLET(S): at 11:22

## 2020-01-01 RX ADMIN — ENOXAPARIN SODIUM 80 MILLIGRAM(S): 100 INJECTION SUBCUTANEOUS at 05:39

## 2020-01-01 RX ADMIN — Medication 3 MILLIGRAM(S): at 22:33

## 2020-01-01 RX ADMIN — Medication 400 MILLIGRAM(S): at 01:04

## 2020-01-01 RX ADMIN — PALONOSETRON HYDROCHLORIDE 0.25 MILLIGRAM(S): 0.25 INJECTION, SOLUTION INTRAVENOUS at 13:41

## 2020-01-01 RX ADMIN — HYDROMORPHONE HYDROCHLORIDE 2 MILLIGRAM(S): 2 INJECTION INTRAMUSCULAR; INTRAVENOUS; SUBCUTANEOUS at 07:48

## 2020-01-01 RX ADMIN — Medication 300 MILLIGRAM(S): at 13:02

## 2020-01-01 RX ADMIN — MORPHINE SULFATE 4 MILLIGRAM(S): 50 CAPSULE, EXTENDED RELEASE ORAL at 22:23

## 2020-01-01 RX ADMIN — SODIUM CHLORIDE 3 MILLILITER(S): 9 INJECTION INTRAMUSCULAR; INTRAVENOUS; SUBCUTANEOUS at 21:20

## 2020-01-01 RX ADMIN — ENOXAPARIN SODIUM 40 MILLIGRAM(S): 100 INJECTION SUBCUTANEOUS at 18:05

## 2020-01-01 RX ADMIN — Medication 500 MILLIGRAM(S): at 23:52

## 2020-01-01 RX ADMIN — MORPHINE SULFATE 60 MILLIGRAM(S): 50 CAPSULE, EXTENDED RELEASE ORAL at 14:16

## 2020-01-01 RX ADMIN — Medication 250 MILLIMOLE(S): at 12:30

## 2020-01-01 RX ADMIN — MORPHINE SULFATE 4 MILLIGRAM(S): 50 CAPSULE, EXTENDED RELEASE ORAL at 15:00

## 2020-01-01 RX ADMIN — ENOXAPARIN SODIUM 80 MILLIGRAM(S): 100 INJECTION SUBCUTANEOUS at 17:38

## 2020-01-01 RX ADMIN — HYDROMORPHONE HYDROCHLORIDE 2 MILLIGRAM(S): 2 INJECTION INTRAMUSCULAR; INTRAVENOUS; SUBCUTANEOUS at 08:26

## 2020-01-01 RX ADMIN — LINEZOLID 300 MILLIGRAM(S): 600 INJECTION, SOLUTION INTRAVENOUS at 05:23

## 2020-01-01 RX ADMIN — MEROPENEM 100 MILLIGRAM(S): 1 INJECTION INTRAVENOUS at 08:06

## 2020-01-01 RX ADMIN — POTASSIUM PHOSPHATE, MONOBASIC POTASSIUM PHOSPHATE, DIBASIC 62.5 MILLIMOLE(S): 236; 224 INJECTION, SOLUTION INTRAVENOUS at 18:48

## 2020-01-01 RX ADMIN — LIDOCAINE 2 PATCH: 4 CREAM TOPICAL at 22:54

## 2020-01-01 RX ADMIN — POLYETHYLENE GLYCOL 3350 17 GRAM(S): 17 POWDER, FOR SOLUTION ORAL at 12:19

## 2020-01-01 RX ADMIN — Medication 1 TABLET(S): at 21:44

## 2020-01-01 RX ADMIN — ENOXAPARIN SODIUM 80 MILLIGRAM(S): 100 INJECTION SUBCUTANEOUS at 17:15

## 2020-01-01 RX ADMIN — MORPHINE SULFATE 2.5 MG/HR: 50 CAPSULE, EXTENDED RELEASE ORAL at 17:37

## 2020-01-01 RX ADMIN — MORPHINE SULFATE 7.5 MILLIGRAM(S): 50 CAPSULE, EXTENDED RELEASE ORAL at 16:41

## 2020-01-01 RX ADMIN — Medication 30 MILLIGRAM(S): at 02:53

## 2020-01-01 RX ADMIN — SODIUM CHLORIDE 3 MILLILITER(S): 9 INJECTION INTRAMUSCULAR; INTRAVENOUS; SUBCUTANEOUS at 22:28

## 2020-01-01 RX ADMIN — SENNA PLUS 2 TABLET(S): 8.6 TABLET ORAL at 22:41

## 2020-01-01 RX ADMIN — ENOXAPARIN SODIUM 80 MILLIGRAM(S): 100 INJECTION SUBCUTANEOUS at 16:55

## 2020-01-01 RX ADMIN — Medication 30 MILLIGRAM(S): at 11:52

## 2020-01-01 RX ADMIN — Medication 500 MILLIGRAM(S): at 13:28

## 2020-01-01 RX ADMIN — HEPARIN SODIUM 5000 UNIT(S): 5000 INJECTION INTRAVENOUS; SUBCUTANEOUS at 12:02

## 2020-01-01 RX ADMIN — HYDROMORPHONE HYDROCHLORIDE 0.5 MG/HR: 2 INJECTION INTRAMUSCULAR; INTRAVENOUS; SUBCUTANEOUS at 17:01

## 2020-01-01 RX ADMIN — Medication 60 MILLIGRAM(S): at 13:46

## 2020-01-01 RX ADMIN — PIPERACILLIN AND TAZOBACTAM 25 GRAM(S): 4; .5 INJECTION, POWDER, LYOPHILIZED, FOR SOLUTION INTRAVENOUS at 01:22

## 2020-01-01 RX ADMIN — MEROPENEM 100 MILLIGRAM(S): 1 INJECTION INTRAVENOUS at 12:56

## 2020-01-01 RX ADMIN — HYDROMORPHONE HYDROCHLORIDE 0.75 MILLIGRAM(S): 2 INJECTION INTRAMUSCULAR; INTRAVENOUS; SUBCUTANEOUS at 10:52

## 2020-01-01 RX ADMIN — Medication 60 MILLIGRAM(S): at 11:19

## 2020-01-01 RX ADMIN — Medication 60 MILLIGRAM(S): at 22:07

## 2020-01-01 RX ADMIN — Medication 1 TABLET(S): at 17:06

## 2020-01-01 RX ADMIN — Medication 975 MILLIGRAM(S): at 18:50

## 2020-01-01 RX ADMIN — HEPARIN SODIUM 5000 UNIT(S): 5000 INJECTION INTRAVENOUS; SUBCUTANEOUS at 05:39

## 2020-01-01 RX ADMIN — MORPHINE SULFATE 60 MILLIGRAM(S): 50 CAPSULE, EXTENDED RELEASE ORAL at 13:17

## 2020-01-01 RX ADMIN — ONDANSETRON 116 MILLIGRAM(S): 8 TABLET, FILM COATED ORAL at 13:50

## 2020-01-01 RX ADMIN — Medication 60 MILLIGRAM(S): at 22:35

## 2020-01-01 RX ADMIN — SODIUM CHLORIDE 3 MILLILITER(S): 9 INJECTION INTRAMUSCULAR; INTRAVENOUS; SUBCUTANEOUS at 21:02

## 2020-01-01 RX ADMIN — Medication 60 MILLIGRAM(S): at 13:52

## 2020-01-01 RX ADMIN — MEROPENEM 100 MILLIGRAM(S): 1 INJECTION INTRAVENOUS at 12:25

## 2020-01-01 RX ADMIN — Medication 250 MILLIGRAM(S): at 06:18

## 2020-01-01 RX ADMIN — HYDROMORPHONE HYDROCHLORIDE 1.5 MILLIGRAM(S): 2 INJECTION INTRAMUSCULAR; INTRAVENOUS; SUBCUTANEOUS at 06:20

## 2020-01-01 RX ADMIN — HYDROMORPHONE HYDROCHLORIDE 1.5 MILLIGRAM(S): 2 INJECTION INTRAMUSCULAR; INTRAVENOUS; SUBCUTANEOUS at 09:56

## 2020-01-01 RX ADMIN — Medication 975 MILLIGRAM(S): at 13:29

## 2020-01-01 RX ADMIN — MORPHINE SULFATE 4 MILLIGRAM(S): 50 CAPSULE, EXTENDED RELEASE ORAL at 04:42

## 2020-01-01 RX ADMIN — MEROPENEM 100 MILLIGRAM(S): 1 INJECTION INTRAVENOUS at 04:53

## 2020-01-01 RX ADMIN — Medication 166.67 MILLIGRAM(S): at 10:30

## 2020-01-01 RX ADMIN — MORPHINE SULFATE 90 MILLIGRAM(S): 50 CAPSULE, EXTENDED RELEASE ORAL at 17:38

## 2020-01-01 RX ADMIN — Medication 1 MILLIGRAM(S): at 17:37

## 2020-01-01 RX ADMIN — PANTOPRAZOLE SODIUM 40 MILLIGRAM(S): 20 TABLET, DELAYED RELEASE ORAL at 05:59

## 2020-01-01 RX ADMIN — Medication 975 MILLIGRAM(S): at 17:08

## 2020-01-01 RX ADMIN — SODIUM CHLORIDE 3 MILLILITER(S): 9 INJECTION INTRAMUSCULAR; INTRAVENOUS; SUBCUTANEOUS at 06:14

## 2020-01-01 RX ADMIN — MORPHINE SULFATE 2 MILLIGRAM(S): 50 CAPSULE, EXTENDED RELEASE ORAL at 15:30

## 2020-01-01 RX ADMIN — Medication 1 TABLET(S): at 23:29

## 2020-01-01 RX ADMIN — Medication 1000 MILLIGRAM(S): at 17:00

## 2020-01-01 RX ADMIN — Medication 85 MILLIMOLE(S): at 18:46

## 2020-01-01 RX ADMIN — MORPHINE SULFATE 60 MILLIGRAM(S): 50 CAPSULE, EXTENDED RELEASE ORAL at 21:54

## 2020-01-01 RX ADMIN — SODIUM CHLORIDE 200 MILLILITER(S): 9 INJECTION INTRAMUSCULAR; INTRAVENOUS; SUBCUTANEOUS at 12:05

## 2020-01-01 RX ADMIN — SODIUM CHLORIDE 125 MILLILITER(S): 9 INJECTION, SOLUTION INTRAVENOUS at 06:32

## 2020-01-01 RX ADMIN — POLYETHYLENE GLYCOL 3350 17 GRAM(S): 17 POWDER, FOR SOLUTION ORAL at 10:44

## 2020-01-01 RX ADMIN — MORPHINE SULFATE 60 MILLIGRAM(S): 50 CAPSULE, EXTENDED RELEASE ORAL at 13:02

## 2020-01-01 RX ADMIN — FLUCONAZOLE 100 MILLIGRAM(S): 150 TABLET ORAL at 10:21

## 2020-01-01 RX ADMIN — Medication 1 TABLET(S): at 08:50

## 2020-01-01 RX ADMIN — POLYETHYLENE GLYCOL 3350 17 GRAM(S): 17 POWDER, FOR SOLUTION ORAL at 12:24

## 2020-01-01 RX ADMIN — MORPHINE SULFATE 60 MILLIGRAM(S): 50 CAPSULE, EXTENDED RELEASE ORAL at 22:00

## 2020-01-01 RX ADMIN — HYDROMORPHONE HYDROCHLORIDE 1 MILLIGRAM(S): 2 INJECTION INTRAMUSCULAR; INTRAVENOUS; SUBCUTANEOUS at 22:33

## 2020-01-01 RX ADMIN — HYDROMORPHONE HYDROCHLORIDE 0.5 MILLIGRAM(S): 2 INJECTION INTRAMUSCULAR; INTRAVENOUS; SUBCUTANEOUS at 12:15

## 2020-01-01 RX ADMIN — HYDROMORPHONE HYDROCHLORIDE 2 MILLIGRAM(S): 2 INJECTION INTRAMUSCULAR; INTRAVENOUS; SUBCUTANEOUS at 11:58

## 2020-01-01 RX ADMIN — POTASSIUM PHOSPHATE, MONOBASIC POTASSIUM PHOSPHATE, DIBASIC 62.5 MILLIMOLE(S): 236; 224 INJECTION, SOLUTION INTRAVENOUS at 17:23

## 2020-01-01 RX ADMIN — FAMOTIDINE 20 MILLIGRAM(S): 10 INJECTION INTRAVENOUS at 11:39

## 2020-01-01 RX ADMIN — MORPHINE SULFATE 4 MILLIGRAM(S): 50 CAPSULE, EXTENDED RELEASE ORAL at 02:40

## 2020-01-01 RX ADMIN — Medication 0.25 MILLIGRAM(S): at 04:14

## 2020-01-01 RX ADMIN — ENOXAPARIN SODIUM 80 MILLIGRAM(S): 100 INJECTION SUBCUTANEOUS at 17:10

## 2020-01-01 RX ADMIN — SODIUM CHLORIDE 3 MILLILITER(S): 9 INJECTION INTRAMUSCULAR; INTRAVENOUS; SUBCUTANEOUS at 21:45

## 2020-01-01 RX ADMIN — MORPHINE SULFATE 75 MILLIGRAM(S): 50 CAPSULE, EXTENDED RELEASE ORAL at 05:41

## 2020-01-01 RX ADMIN — HYDROMORPHONE HYDROCHLORIDE 0.5 MILLIGRAM(S): 2 INJECTION INTRAMUSCULAR; INTRAVENOUS; SUBCUTANEOUS at 23:47

## 2020-01-01 RX ADMIN — Medication 650 MILLIGRAM(S): at 10:30

## 2020-01-01 RX ADMIN — Medication 50 MILLIEQUIVALENT(S): at 08:36

## 2020-01-01 RX ADMIN — SENNA PLUS 2 TABLET(S): 8.6 TABLET ORAL at 21:41

## 2020-01-01 RX ADMIN — Medication 250 MILLIGRAM(S): at 18:00

## 2020-01-01 RX ADMIN — Medication 300 MILLIGRAM(S): at 13:31

## 2020-01-01 RX ADMIN — Medication 1 TABLET(S): at 21:57

## 2020-01-01 RX ADMIN — Medication 1 TABLET(S): at 23:21

## 2020-01-01 RX ADMIN — Medication 30 MILLIGRAM(S): at 18:19

## 2020-01-01 RX ADMIN — SODIUM CHLORIDE 3 MILLILITER(S): 9 INJECTION INTRAMUSCULAR; INTRAVENOUS; SUBCUTANEOUS at 21:28

## 2020-01-01 RX ADMIN — SODIUM CHLORIDE 3 MILLILITER(S): 9 INJECTION INTRAMUSCULAR; INTRAVENOUS; SUBCUTANEOUS at 06:08

## 2020-01-01 RX ADMIN — ONDANSETRON 4 MILLIGRAM(S): 8 TABLET, FILM COATED ORAL at 06:46

## 2020-01-01 RX ADMIN — HALOPERIDOL DECANOATE 0.5 MILLIGRAM(S): 100 INJECTION INTRAMUSCULAR at 18:38

## 2020-01-01 RX ADMIN — PIPERACILLIN AND TAZOBACTAM 25 GRAM(S): 4; .5 INJECTION, POWDER, LYOPHILIZED, FOR SOLUTION INTRAVENOUS at 21:12

## 2020-01-01 RX ADMIN — Medication 500 MILLIGRAM(S): at 13:16

## 2020-01-01 RX ADMIN — ONDANSETRON 4 MILLIGRAM(S): 8 TABLET, FILM COATED ORAL at 12:55

## 2020-01-01 RX ADMIN — POLYETHYLENE GLYCOL 3350 17 GRAM(S): 17 POWDER, FOR SOLUTION ORAL at 12:29

## 2020-01-01 RX ADMIN — HEPARIN SODIUM 5000 UNIT(S): 5000 INJECTION INTRAVENOUS; SUBCUTANEOUS at 14:35

## 2020-01-01 RX ADMIN — ENOXAPARIN SODIUM 40 MILLIGRAM(S): 100 INJECTION SUBCUTANEOUS at 17:05

## 2020-01-01 RX ADMIN — MORPHINE SULFATE 60 MILLIGRAM(S): 50 CAPSULE, EXTENDED RELEASE ORAL at 21:13

## 2020-01-01 RX ADMIN — ENOXAPARIN SODIUM 80 MILLIGRAM(S): 100 INJECTION SUBCUTANEOUS at 18:32

## 2020-01-01 RX ADMIN — Medication 4000 UNIT(S): at 11:19

## 2020-01-01 RX ADMIN — MEROPENEM 100 MILLIGRAM(S): 1 INJECTION INTRAVENOUS at 05:57

## 2020-01-01 RX ADMIN — Medication 106 MILLIGRAM(S): at 14:49

## 2020-01-01 RX ADMIN — Medication 60 MILLIGRAM(S): at 15:58

## 2020-01-01 RX ADMIN — ENOXAPARIN SODIUM 40 MILLIGRAM(S): 100 INJECTION SUBCUTANEOUS at 13:28

## 2020-01-01 RX ADMIN — Medication 300 MILLIGRAM(S): at 13:24

## 2020-01-01 RX ADMIN — PIPERACILLIN AND TAZOBACTAM 25 GRAM(S): 4; .5 INJECTION, POWDER, LYOPHILIZED, FOR SOLUTION INTRAVENOUS at 22:22

## 2020-01-01 RX ADMIN — ENOXAPARIN SODIUM 80 MILLIGRAM(S): 100 INJECTION SUBCUTANEOUS at 05:52

## 2020-01-01 RX ADMIN — Medication 60 MILLIGRAM(S): at 05:52

## 2020-01-01 RX ADMIN — Medication 60 MILLIGRAM(S): at 05:54

## 2020-01-01 RX ADMIN — MORPHINE SULFATE 60 MILLIGRAM(S): 50 CAPSULE, EXTENDED RELEASE ORAL at 14:36

## 2020-01-01 RX ADMIN — LIDOCAINE 2 PATCH: 4 CREAM TOPICAL at 09:30

## 2020-01-01 RX ADMIN — ENOXAPARIN SODIUM 80 MILLIGRAM(S): 100 INJECTION SUBCUTANEOUS at 05:25

## 2020-01-01 RX ADMIN — LIDOCAINE 1 PATCH: 4 CREAM TOPICAL at 18:06

## 2020-01-01 RX ADMIN — Medication 60 MILLIGRAM(S): at 21:07

## 2020-01-01 RX ADMIN — SODIUM CHLORIDE 3 MILLILITER(S): 9 INJECTION INTRAMUSCULAR; INTRAVENOUS; SUBCUTANEOUS at 13:28

## 2020-01-01 RX ADMIN — SODIUM CHLORIDE 3 MILLILITER(S): 9 INJECTION INTRAMUSCULAR; INTRAVENOUS; SUBCUTANEOUS at 23:23

## 2020-01-01 RX ADMIN — MORPHINE SULFATE 4 MILLIGRAM(S): 50 CAPSULE, EXTENDED RELEASE ORAL at 03:08

## 2020-01-01 RX ADMIN — Medication 50 MILLIEQUIVALENT(S): at 12:44

## 2020-01-01 RX ADMIN — Medication 400 MILLIGRAM(S): at 10:35

## 2020-01-01 RX ADMIN — Medication 60 MILLIGRAM(S): at 13:33

## 2020-01-01 RX ADMIN — MORPHINE SULFATE 30 MILLIGRAM(S): 50 CAPSULE, EXTENDED RELEASE ORAL at 18:03

## 2020-01-01 RX ADMIN — SODIUM CHLORIDE 3 MILLILITER(S): 9 INJECTION INTRAMUSCULAR; INTRAVENOUS; SUBCUTANEOUS at 13:30

## 2020-01-01 RX ADMIN — HYDROMORPHONE HYDROCHLORIDE 1.5 MILLIGRAM(S): 2 INJECTION INTRAMUSCULAR; INTRAVENOUS; SUBCUTANEOUS at 13:30

## 2020-01-01 RX ADMIN — SODIUM CHLORIDE 3 MILLILITER(S): 9 INJECTION INTRAMUSCULAR; INTRAVENOUS; SUBCUTANEOUS at 05:14

## 2020-01-01 RX ADMIN — SODIUM CHLORIDE 125 MILLILITER(S): 9 INJECTION, SOLUTION INTRAVENOUS at 08:42

## 2020-01-01 RX ADMIN — MORPHINE SULFATE 4 MILLIGRAM(S): 50 CAPSULE, EXTENDED RELEASE ORAL at 17:00

## 2020-01-01 RX ADMIN — Medication 650 MILLIGRAM(S): at 22:35

## 2020-01-01 RX ADMIN — Medication 250 MILLIMOLE(S): at 13:01

## 2020-01-01 RX ADMIN — SENNA PLUS 2 TABLET(S): 8.6 TABLET ORAL at 22:08

## 2020-01-01 RX ADMIN — SODIUM CHLORIDE 3 MILLILITER(S): 9 INJECTION INTRAMUSCULAR; INTRAVENOUS; SUBCUTANEOUS at 05:27

## 2020-01-01 RX ADMIN — Medication 30 MILLILITER(S): at 16:44

## 2020-01-01 RX ADMIN — Medication 1 TABLET(S): at 17:59

## 2020-01-01 RX ADMIN — MEROPENEM 100 MILLIGRAM(S): 1 INJECTION INTRAVENOUS at 05:24

## 2020-01-01 RX ADMIN — MORPHINE SULFATE 90 MILLIGRAM(S): 50 CAPSULE, EXTENDED RELEASE ORAL at 17:24

## 2020-01-01 RX ADMIN — Medication 5 MILLIGRAM(S): at 05:24

## 2020-01-01 RX ADMIN — SENNA PLUS 2 TABLET(S): 8.6 TABLET ORAL at 22:53

## 2020-01-01 RX ADMIN — SODIUM CHLORIDE 3 MILLILITER(S): 9 INJECTION INTRAMUSCULAR; INTRAVENOUS; SUBCUTANEOUS at 22:31

## 2020-01-01 RX ADMIN — MORPHINE SULFATE 7.5 MILLIGRAM(S): 50 CAPSULE, EXTENDED RELEASE ORAL at 04:43

## 2020-01-01 RX ADMIN — MORPHINE SULFATE 60 MILLIGRAM(S): 50 CAPSULE, EXTENDED RELEASE ORAL at 15:14

## 2020-01-01 RX ADMIN — FAMOTIDINE 20 MILLIGRAM(S): 10 INJECTION INTRAVENOUS at 13:06

## 2020-01-01 RX ADMIN — Medication 650 MILLIGRAM(S): at 13:23

## 2020-01-01 RX ADMIN — Medication 60 MILLIGRAM(S): at 21:57

## 2020-01-01 RX ADMIN — Medication 60 MILLIGRAM(S): at 22:54

## 2020-01-01 RX ADMIN — SENNA PLUS 2 TABLET(S): 8.6 TABLET ORAL at 22:34

## 2020-01-01 RX ADMIN — POTASSIUM PHOSPHATE, MONOBASIC POTASSIUM PHOSPHATE, DIBASIC 62.5 MILLIMOLE(S): 236; 224 INJECTION, SOLUTION INTRAVENOUS at 10:14

## 2020-01-01 RX ADMIN — Medication 10 MILLIGRAM(S): at 14:03

## 2020-01-01 RX ADMIN — POLYETHYLENE GLYCOL 3350 17 GRAM(S): 17 POWDER, FOR SOLUTION ORAL at 12:07

## 2020-01-01 RX ADMIN — Medication 1 TABLET(S): at 17:32

## 2020-01-01 RX ADMIN — HYDROMORPHONE HYDROCHLORIDE 1.5 MILLIGRAM(S): 2 INJECTION INTRAMUSCULAR; INTRAVENOUS; SUBCUTANEOUS at 20:45

## 2020-01-01 RX ADMIN — Medication 975 MILLIGRAM(S): at 12:25

## 2020-01-01 RX ADMIN — Medication 600 MILLIGRAM(S): at 12:10

## 2020-01-01 RX ADMIN — Medication 1 TABLET(S): at 12:24

## 2020-01-01 RX ADMIN — Medication 10 MILLIGRAM(S): at 13:32

## 2020-01-01 RX ADMIN — LIDOCAINE 2 PATCH: 4 CREAM TOPICAL at 10:41

## 2020-01-01 RX ADMIN — Medication 1 TABLET(S): at 17:18

## 2020-01-01 RX ADMIN — MORPHINE SULFATE 4 MILLIGRAM(S): 50 CAPSULE, EXTENDED RELEASE ORAL at 11:20

## 2020-01-01 RX ADMIN — SODIUM CHLORIDE 3 MILLILITER(S): 9 INJECTION INTRAMUSCULAR; INTRAVENOUS; SUBCUTANEOUS at 22:39

## 2020-01-01 RX ADMIN — MORPHINE SULFATE 4 MILLIGRAM(S): 50 CAPSULE, EXTENDED RELEASE ORAL at 12:30

## 2020-01-01 RX ADMIN — Medication 10 MILLIGRAM(S): at 21:37

## 2020-01-01 RX ADMIN — SODIUM CHLORIDE 75 MILLILITER(S): 9 INJECTION INTRAMUSCULAR; INTRAVENOUS; SUBCUTANEOUS at 06:18

## 2020-01-01 RX ADMIN — MORPHINE SULFATE 30 MILLIGRAM(S): 50 CAPSULE, EXTENDED RELEASE ORAL at 08:10

## 2020-01-01 RX ADMIN — IRON SUCROSE 110 MILLIGRAM(S): 20 INJECTION, SOLUTION INTRAVENOUS at 12:39

## 2020-01-01 RX ADMIN — SODIUM CHLORIDE 3 MILLILITER(S): 9 INJECTION INTRAMUSCULAR; INTRAVENOUS; SUBCUTANEOUS at 23:54

## 2020-01-01 RX ADMIN — PIPERACILLIN AND TAZOBACTAM 25 GRAM(S): 4; .5 INJECTION, POWDER, LYOPHILIZED, FOR SOLUTION INTRAVENOUS at 05:18

## 2020-01-01 RX ADMIN — MORPHINE SULFATE 6 MILLIGRAM(S): 50 CAPSULE, EXTENDED RELEASE ORAL at 20:45

## 2020-01-01 RX ADMIN — Medication 10 MILLIGRAM(S): at 11:03

## 2020-01-01 RX ADMIN — MORPHINE SULFATE 60 MILLIGRAM(S): 50 CAPSULE, EXTENDED RELEASE ORAL at 13:46

## 2020-01-01 RX ADMIN — CEFEPIME 2000 MILLIGRAM(S): 1 INJECTION, POWDER, FOR SOLUTION INTRAMUSCULAR; INTRAVENOUS at 15:10

## 2020-01-01 RX ADMIN — Medication 0.25 MILLIGRAM(S): at 20:17

## 2020-01-01 RX ADMIN — HYDROMORPHONE HYDROCHLORIDE 1.5 MILLIGRAM(S): 2 INJECTION INTRAMUSCULAR; INTRAVENOUS; SUBCUTANEOUS at 07:56

## 2020-01-01 RX ADMIN — ENOXAPARIN SODIUM 80 MILLIGRAM(S): 100 INJECTION SUBCUTANEOUS at 06:07

## 2020-01-01 RX ADMIN — HEPARIN SODIUM 5000 UNIT(S): 5000 INJECTION INTRAVENOUS; SUBCUTANEOUS at 23:10

## 2020-01-01 RX ADMIN — IRON SUCROSE 110 MILLIGRAM(S): 20 INJECTION, SOLUTION INTRAVENOUS at 13:21

## 2020-01-01 RX ADMIN — MEROPENEM 100 MILLIGRAM(S): 1 INJECTION INTRAVENOUS at 21:54

## 2020-01-01 RX ADMIN — Medication 1 TABLET(S): at 16:38

## 2020-01-01 RX ADMIN — PIPERACILLIN AND TAZOBACTAM 25 GRAM(S): 4; .5 INJECTION, POWDER, LYOPHILIZED, FOR SOLUTION INTRAVENOUS at 13:22

## 2020-01-01 RX ADMIN — Medication 300 MILLIGRAM(S): at 11:38

## 2020-01-01 RX ADMIN — MORPHINE SULFATE 75 MILLIGRAM(S): 50 CAPSULE, EXTENDED RELEASE ORAL at 05:58

## 2020-01-01 RX ADMIN — IRON SUCROSE 110 MILLIGRAM(S): 20 INJECTION, SOLUTION INTRAVENOUS at 14:04

## 2020-01-01 RX ADMIN — MORPHINE SULFATE 30 MILLIGRAM(S): 50 CAPSULE, EXTENDED RELEASE ORAL at 05:55

## 2020-01-01 RX ADMIN — SENNA PLUS 2 TABLET(S): 8.6 TABLET ORAL at 21:07

## 2020-01-01 RX ADMIN — POLYETHYLENE GLYCOL 3350 17 GRAM(S): 17 POWDER, FOR SOLUTION ORAL at 11:41

## 2020-01-01 RX ADMIN — Medication 1 TABLET(S): at 21:37

## 2020-01-01 RX ADMIN — Medication 50 MILLIGRAM(S): at 06:46

## 2020-01-01 RX ADMIN — Medication 975 MILLIGRAM(S): at 05:46

## 2020-01-01 RX ADMIN — Medication 1 TABLET(S): at 22:12

## 2020-01-01 RX ADMIN — PANTOPRAZOLE SODIUM 40 MILLIGRAM(S): 20 TABLET, DELAYED RELEASE ORAL at 06:25

## 2020-01-01 RX ADMIN — ONDANSETRON 4 MILLIGRAM(S): 8 TABLET, FILM COATED ORAL at 23:42

## 2020-01-01 RX ADMIN — PANTOPRAZOLE SODIUM 40 MILLIGRAM(S): 20 TABLET, DELAYED RELEASE ORAL at 08:22

## 2020-01-01 RX ADMIN — Medication 30 MILLIGRAM(S): at 14:51

## 2020-01-01 RX ADMIN — LIDOCAINE 2 PATCH: 4 CREAM TOPICAL at 21:21

## 2020-01-01 RX ADMIN — Medication 10 MILLIGRAM(S): at 09:21

## 2020-01-01 RX ADMIN — Medication 650 MILLIGRAM(S): at 13:19

## 2020-01-01 RX ADMIN — MORPHINE SULFATE 2 MILLIGRAM(S): 50 CAPSULE, EXTENDED RELEASE ORAL at 07:57

## 2020-01-01 RX ADMIN — HEPARIN SODIUM 5000 UNIT(S): 5000 INJECTION INTRAVENOUS; SUBCUTANEOUS at 05:17

## 2020-01-01 RX ADMIN — FLUOROURACIL 22.73 MILLIGRAM(S): 50 INJECTION, SOLUTION INTRAVENOUS at 13:17

## 2020-01-01 RX ADMIN — ONDANSETRON 4 MILLIGRAM(S): 8 TABLET, FILM COATED ORAL at 01:43

## 2020-01-01 RX ADMIN — SODIUM CHLORIDE 1000 MILLILITER(S): 9 INJECTION INTRAMUSCULAR; INTRAVENOUS; SUBCUTANEOUS at 17:51

## 2020-01-01 RX ADMIN — Medication 3 MILLIGRAM(S): at 21:53

## 2020-01-01 RX ADMIN — POLYETHYLENE GLYCOL 3350 17 GRAM(S): 17 POWDER, FOR SOLUTION ORAL at 11:40

## 2020-01-01 RX ADMIN — MORPHINE SULFATE 4 MILLIGRAM(S): 50 CAPSULE, EXTENDED RELEASE ORAL at 12:03

## 2020-01-01 RX ADMIN — LIDOCAINE 2 PATCH: 4 CREAM TOPICAL at 10:04

## 2020-01-01 RX ADMIN — Medication 1 TABLET(S): at 17:34

## 2020-01-01 RX ADMIN — Medication 2.5 MILLIGRAM(S): at 12:51

## 2020-01-01 RX ADMIN — Medication 500 MILLIGRAM(S): at 12:54

## 2020-01-01 RX ADMIN — Medication 3 MILLIGRAM(S): at 23:20

## 2020-01-01 RX ADMIN — POTASSIUM PHOSPHATE, MONOBASIC POTASSIUM PHOSPHATE, DIBASIC 62.5 MILLIMOLE(S): 236; 224 INJECTION, SOLUTION INTRAVENOUS at 08:59

## 2020-01-01 RX ADMIN — ENOXAPARIN SODIUM 40 MILLIGRAM(S): 100 INJECTION SUBCUTANEOUS at 15:18

## 2020-01-01 RX ADMIN — POTASSIUM PHOSPHATE, MONOBASIC POTASSIUM PHOSPHATE, DIBASIC 62.5 MILLIMOLE(S): 236; 224 INJECTION, SOLUTION INTRAVENOUS at 15:28

## 2020-01-01 RX ADMIN — Medication 300 MILLIGRAM(S): at 11:41

## 2020-01-01 RX ADMIN — MORPHINE SULFATE 15 MILLIGRAM(S): 50 CAPSULE, EXTENDED RELEASE ORAL at 12:13

## 2020-01-01 RX ADMIN — PIPERACILLIN AND TAZOBACTAM 25 GRAM(S): 4; .5 INJECTION, POWDER, LYOPHILIZED, FOR SOLUTION INTRAVENOUS at 21:36

## 2020-01-01 RX ADMIN — HYDROMORPHONE HYDROCHLORIDE 0.5 MILLIGRAM(S): 2 INJECTION INTRAMUSCULAR; INTRAVENOUS; SUBCUTANEOUS at 23:51

## 2020-01-01 RX ADMIN — SODIUM CHLORIDE 3 MILLILITER(S): 9 INJECTION INTRAMUSCULAR; INTRAVENOUS; SUBCUTANEOUS at 14:00

## 2020-01-01 RX ADMIN — Medication 85 MILLIMOLE(S): at 21:18

## 2020-01-01 RX ADMIN — HYDROMORPHONE HYDROCHLORIDE 1.5 MILLIGRAM(S): 2 INJECTION INTRAMUSCULAR; INTRAVENOUS; SUBCUTANEOUS at 09:25

## 2020-01-01 RX ADMIN — PIPERACILLIN AND TAZOBACTAM 25 GRAM(S): 4; .5 INJECTION, POWDER, LYOPHILIZED, FOR SOLUTION INTRAVENOUS at 21:44

## 2020-01-01 RX ADMIN — MORPHINE SULFATE 60 MILLIGRAM(S): 50 CAPSULE, EXTENDED RELEASE ORAL at 22:09

## 2020-01-01 RX ADMIN — SIMETHICONE 80 MILLIGRAM(S): 80 TABLET, CHEWABLE ORAL at 22:39

## 2020-01-01 RX ADMIN — Medication 50 MILLIEQUIVALENT(S): at 10:55

## 2020-01-01 RX ADMIN — MORPHINE SULFATE 30 MILLIGRAM(S): 50 CAPSULE, EXTENDED RELEASE ORAL at 05:49

## 2020-01-01 RX ADMIN — Medication 600 MILLIGRAM(S): at 18:11

## 2020-01-01 RX ADMIN — Medication 500 MILLIGRAM(S): at 23:20

## 2020-01-01 RX ADMIN — Medication 300 MILLIGRAM(S): at 11:02

## 2020-01-01 RX ADMIN — MEROPENEM 100 MILLIGRAM(S): 1 INJECTION INTRAVENOUS at 12:20

## 2020-01-01 RX ADMIN — ENOXAPARIN SODIUM 80 MILLIGRAM(S): 100 INJECTION SUBCUTANEOUS at 05:45

## 2020-01-01 RX ADMIN — PIPERACILLIN AND TAZOBACTAM 25 GRAM(S): 4; .5 INJECTION, POWDER, LYOPHILIZED, FOR SOLUTION INTRAVENOUS at 21:37

## 2020-01-01 RX ADMIN — FAMOTIDINE 20 MILLIGRAM(S): 10 INJECTION INTRAVENOUS at 18:05

## 2020-01-01 RX ADMIN — SODIUM CHLORIDE 3 MILLILITER(S): 9 INJECTION INTRAMUSCULAR; INTRAVENOUS; SUBCUTANEOUS at 22:50

## 2020-01-01 RX ADMIN — FAMOTIDINE 20 MILLIGRAM(S): 10 INJECTION INTRAVENOUS at 13:03

## 2020-01-01 RX ADMIN — SODIUM CHLORIDE 3 MILLILITER(S): 9 INJECTION INTRAMUSCULAR; INTRAVENOUS; SUBCUTANEOUS at 05:25

## 2020-01-01 RX ADMIN — LIDOCAINE 2 PATCH: 4 CREAM TOPICAL at 21:17

## 2020-01-01 RX ADMIN — FOSAPREPITANT DIMEGLUMINE 300 MILLIGRAM(S): 150 INJECTION, POWDER, LYOPHILIZED, FOR SOLUTION INTRAVENOUS at 13:18

## 2020-01-01 RX ADMIN — Medication 975 MILLIGRAM(S): at 17:29

## 2020-01-01 RX ADMIN — SODIUM CHLORIDE 3 MILLILITER(S): 9 INJECTION INTRAMUSCULAR; INTRAVENOUS; SUBCUTANEOUS at 05:09

## 2020-01-01 RX ADMIN — Medication 975 MILLIGRAM(S): at 13:15

## 2020-01-01 RX ADMIN — MORPHINE SULFATE 2 MILLIGRAM(S): 50 CAPSULE, EXTENDED RELEASE ORAL at 00:20

## 2020-01-01 RX ADMIN — POLYETHYLENE GLYCOL 3350 17 GRAM(S): 17 POWDER, FOR SOLUTION ORAL at 11:17

## 2020-01-01 RX ADMIN — Medication 60 MILLIGRAM(S): at 05:16

## 2020-01-01 RX ADMIN — HYDROMORPHONE HYDROCHLORIDE 1.5 MILLIGRAM(S): 2 INJECTION INTRAMUSCULAR; INTRAVENOUS; SUBCUTANEOUS at 12:27

## 2020-01-01 RX ADMIN — Medication 600 MILLIGRAM(S): at 00:03

## 2020-01-01 RX ADMIN — Medication 20 MILLIGRAM(S): at 17:00

## 2020-01-01 RX ADMIN — Medication 650 MILLIGRAM(S): at 02:15

## 2020-01-01 RX ADMIN — ONDANSETRON 4 MILLIGRAM(S): 8 TABLET, FILM COATED ORAL at 00:00

## 2020-01-01 RX ADMIN — ENOXAPARIN SODIUM 80 MILLIGRAM(S): 100 INJECTION SUBCUTANEOUS at 05:42

## 2020-01-01 RX ADMIN — MEROPENEM 100 MILLIGRAM(S): 1 INJECTION INTRAVENOUS at 22:27

## 2020-01-01 RX ADMIN — FLUOROURACIL 22.73 MILLIGRAM(S): 50 INJECTION, SOLUTION INTRAVENOUS at 16:48

## 2020-01-01 RX ADMIN — Medication 975 MILLIGRAM(S): at 13:18

## 2020-01-01 RX ADMIN — HYDROMORPHONE HYDROCHLORIDE 0.75 MILLIGRAM(S): 2 INJECTION INTRAMUSCULAR; INTRAVENOUS; SUBCUTANEOUS at 04:24

## 2020-01-01 RX ADMIN — Medication 400 MILLIGRAM(S): at 00:25

## 2020-01-01 RX ADMIN — Medication 60 MILLIGRAM(S): at 21:38

## 2020-01-01 RX ADMIN — Medication 5 MILLIGRAM(S): at 09:41

## 2020-01-01 RX ADMIN — Medication 1 TABLET(S): at 18:06

## 2020-01-01 RX ADMIN — Medication 10 MILLIGRAM(S): at 17:46

## 2020-01-01 RX ADMIN — Medication 250 MILLIGRAM(S): at 17:18

## 2020-01-01 RX ADMIN — Medication 975 MILLIGRAM(S): at 05:31

## 2020-01-01 RX ADMIN — ONDANSETRON 4 MILLIGRAM(S): 8 TABLET, FILM COATED ORAL at 09:30

## 2020-01-01 RX ADMIN — Medication 1 TABLET(S): at 17:57

## 2020-01-01 RX ADMIN — Medication 62.5 MILLIMOLE(S): at 10:00

## 2020-01-01 RX ADMIN — MORPHINE SULFATE 30 MILLIGRAM(S): 50 CAPSULE, EXTENDED RELEASE ORAL at 06:03

## 2020-01-01 RX ADMIN — ENOXAPARIN SODIUM 80 MILLIGRAM(S): 100 INJECTION SUBCUTANEOUS at 05:18

## 2020-01-01 RX ADMIN — HYDROMORPHONE HYDROCHLORIDE 2 MILLIGRAM(S): 2 INJECTION INTRAMUSCULAR; INTRAVENOUS; SUBCUTANEOUS at 22:27

## 2020-01-01 RX ADMIN — PANTOPRAZOLE SODIUM 40 MILLIGRAM(S): 20 TABLET, DELAYED RELEASE ORAL at 04:53

## 2020-01-01 RX ADMIN — HYDROMORPHONE HYDROCHLORIDE 30 MILLILITER(S): 2 INJECTION INTRAMUSCULAR; INTRAVENOUS; SUBCUTANEOUS at 15:13

## 2020-01-01 RX ADMIN — Medication 60 MILLIGRAM(S): at 14:00

## 2020-01-01 RX ADMIN — Medication 975 MILLIGRAM(S): at 17:00

## 2020-01-01 RX ADMIN — ENOXAPARIN SODIUM 40 MILLIGRAM(S): 100 INJECTION SUBCUTANEOUS at 15:54

## 2020-01-01 RX ADMIN — PIPERACILLIN AND TAZOBACTAM 25 GRAM(S): 4; .5 INJECTION, POWDER, LYOPHILIZED, FOR SOLUTION INTRAVENOUS at 17:04

## 2020-01-01 RX ADMIN — MORPHINE SULFATE 30 MILLIGRAM(S): 50 CAPSULE, EXTENDED RELEASE ORAL at 08:59

## 2020-01-01 RX ADMIN — HYDROMORPHONE HYDROCHLORIDE 1.5 MILLIGRAM(S): 2 INJECTION INTRAMUSCULAR; INTRAVENOUS; SUBCUTANEOUS at 03:57

## 2020-01-01 RX ADMIN — Medication 300 MILLIGRAM(S): at 12:20

## 2020-01-01 RX ADMIN — ALTEPLASE 2 MILLIGRAM(S): KIT at 18:45

## 2020-01-01 RX ADMIN — Medication 650 MILLIGRAM(S): at 21:10

## 2020-01-01 RX ADMIN — LIDOCAINE 1 PATCH: 4 CREAM TOPICAL at 16:49

## 2020-01-01 RX ADMIN — POLYETHYLENE GLYCOL 3350 17 GRAM(S): 17 POWDER, FOR SOLUTION ORAL at 11:53

## 2020-01-01 RX ADMIN — Medication 10 MILLIGRAM(S): at 23:20

## 2020-01-01 RX ADMIN — SODIUM CHLORIDE 3 MILLILITER(S): 9 INJECTION INTRAMUSCULAR; INTRAVENOUS; SUBCUTANEOUS at 05:24

## 2020-01-01 RX ADMIN — HYDROMORPHONE HYDROCHLORIDE 30 MILLILITER(S): 2 INJECTION INTRAMUSCULAR; INTRAVENOUS; SUBCUTANEOUS at 07:18

## 2020-01-01 RX ADMIN — MEROPENEM 100 MILLIGRAM(S): 1 INJECTION INTRAVENOUS at 13:28

## 2020-01-01 RX ADMIN — SENNA PLUS 2 TABLET(S): 8.6 TABLET ORAL at 22:43

## 2020-01-01 RX ADMIN — Medication 25 MILLIGRAM(S): at 20:40

## 2020-01-01 RX ADMIN — HYDROMORPHONE HYDROCHLORIDE 2 MILLIGRAM(S): 2 INJECTION INTRAMUSCULAR; INTRAVENOUS; SUBCUTANEOUS at 21:57

## 2020-01-01 RX ADMIN — POLYETHYLENE GLYCOL 3350 17 GRAM(S): 17 POWDER, FOR SOLUTION ORAL at 12:06

## 2020-01-01 RX ADMIN — ENOXAPARIN SODIUM 80 MILLIGRAM(S): 100 INJECTION SUBCUTANEOUS at 06:22

## 2020-01-01 RX ADMIN — Medication 60 MILLIGRAM(S): at 13:10

## 2020-01-01 RX ADMIN — LIDOCAINE 2 PATCH: 4 CREAM TOPICAL at 01:28

## 2020-01-01 RX ADMIN — HEPARIN SODIUM 5000 UNIT(S): 5000 INJECTION INTRAVENOUS; SUBCUTANEOUS at 21:13

## 2020-01-01 RX ADMIN — Medication 30 MILLILITER(S): at 13:17

## 2020-01-01 RX ADMIN — MORPHINE SULFATE 4 MILLIGRAM(S): 50 CAPSULE, EXTENDED RELEASE ORAL at 23:15

## 2020-01-01 RX ADMIN — LIDOCAINE 2 PATCH: 4 CREAM TOPICAL at 07:12

## 2020-01-01 RX ADMIN — Medication 1 TABLET(S): at 22:04

## 2020-01-01 RX ADMIN — OXYCODONE HYDROCHLORIDE 5 MILLIGRAM(S): 5 TABLET ORAL at 05:04

## 2020-01-01 RX ADMIN — MORPHINE SULFATE 1.5 MG/HR: 50 CAPSULE, EXTENDED RELEASE ORAL at 19:41

## 2020-01-01 RX ADMIN — Medication 60 MILLIGRAM(S): at 21:12

## 2020-01-01 RX ADMIN — Medication 60 MILLIGRAM(S): at 13:58

## 2020-01-01 RX ADMIN — MORPHINE SULFATE 4 MILLIGRAM(S): 50 CAPSULE, EXTENDED RELEASE ORAL at 22:22

## 2020-01-01 RX ADMIN — Medication 10 MILLIGRAM(S): at 14:16

## 2020-01-01 RX ADMIN — MORPHINE SULFATE 60 MILLIGRAM(S): 50 CAPSULE, EXTENDED RELEASE ORAL at 22:54

## 2020-01-01 RX ADMIN — FAMOTIDINE 20 MILLIGRAM(S): 10 INJECTION INTRAVENOUS at 12:13

## 2020-01-01 RX ADMIN — Medication 3 MILLIGRAM(S): at 22:53

## 2020-01-01 RX ADMIN — SODIUM CHLORIDE 3 MILLILITER(S): 9 INJECTION INTRAMUSCULAR; INTRAVENOUS; SUBCUTANEOUS at 06:35

## 2020-01-01 RX ADMIN — LINEZOLID 300 MILLIGRAM(S): 600 INJECTION, SOLUTION INTRAVENOUS at 17:32

## 2020-01-01 RX ADMIN — SODIUM CHLORIDE 2300 MILLILITER(S): 9 INJECTION INTRAMUSCULAR; INTRAVENOUS; SUBCUTANEOUS at 14:10

## 2020-01-01 RX ADMIN — HYDROMORPHONE HYDROCHLORIDE 30 MILLILITER(S): 2 INJECTION INTRAMUSCULAR; INTRAVENOUS; SUBCUTANEOUS at 07:51

## 2020-01-01 RX ADMIN — MEROPENEM 100 MILLIGRAM(S): 1 INJECTION INTRAVENOUS at 13:58

## 2020-01-01 RX ADMIN — Medication 60 MILLIGRAM(S): at 22:05

## 2020-01-01 RX ADMIN — ENOXAPARIN SODIUM 40 MILLIGRAM(S): 100 INJECTION SUBCUTANEOUS at 11:53

## 2020-01-01 RX ADMIN — MORPHINE SULFATE 30 MILLIGRAM(S): 50 CAPSULE, EXTENDED RELEASE ORAL at 06:27

## 2020-01-01 RX ADMIN — Medication 260 MILLIGRAM(S): at 07:38

## 2020-01-01 RX ADMIN — Medication 3 MILLIGRAM(S): at 21:13

## 2020-01-01 RX ADMIN — ENOXAPARIN SODIUM 80 MILLIGRAM(S): 100 INJECTION SUBCUTANEOUS at 17:00

## 2020-01-01 RX ADMIN — MEROPENEM 100 MILLIGRAM(S): 1 INJECTION INTRAVENOUS at 13:03

## 2020-01-01 RX ADMIN — MORPHINE SULFATE 60 MILLIGRAM(S): 50 CAPSULE, EXTENDED RELEASE ORAL at 21:25

## 2020-01-01 RX ADMIN — SENNA PLUS 2 TABLET(S): 8.6 TABLET ORAL at 21:57

## 2020-01-01 RX ADMIN — Medication 2.5 MILLIGRAM(S): at 10:00

## 2020-01-01 RX ADMIN — HYDROMORPHONE HYDROCHLORIDE 0.75 MILLIGRAM(S): 2 INJECTION INTRAMUSCULAR; INTRAVENOUS; SUBCUTANEOUS at 03:16

## 2020-01-01 RX ADMIN — MEROPENEM 100 MILLIGRAM(S): 1 INJECTION INTRAVENOUS at 14:08

## 2020-01-01 RX ADMIN — POLYETHYLENE GLYCOL 3350 17 GRAM(S): 17 POWDER, FOR SOLUTION ORAL at 11:20

## 2020-01-01 RX ADMIN — LIDOCAINE 1 PATCH: 4 CREAM TOPICAL at 12:23

## 2020-01-01 RX ADMIN — Medication 400 MILLIGRAM(S): at 23:48

## 2020-01-01 RX ADMIN — SODIUM CHLORIDE 3 MILLILITER(S): 9 INJECTION INTRAMUSCULAR; INTRAVENOUS; SUBCUTANEOUS at 17:25

## 2020-01-01 RX ADMIN — ONDANSETRON 4 MILLIGRAM(S): 8 TABLET, FILM COATED ORAL at 23:41

## 2020-01-01 RX ADMIN — Medication 3 MILLIGRAM(S): at 22:05

## 2020-01-01 RX ADMIN — SENNA PLUS 2 TABLET(S): 8.6 TABLET ORAL at 20:56

## 2020-01-01 RX ADMIN — LIDOCAINE 2 PATCH: 4 CREAM TOPICAL at 07:00

## 2020-01-01 RX ADMIN — ENOXAPARIN SODIUM 40 MILLIGRAM(S): 100 INJECTION SUBCUTANEOUS at 10:52

## 2020-01-01 RX ADMIN — Medication 650 MILLIGRAM(S): at 15:07

## 2020-01-01 RX ADMIN — Medication 650 MILLIGRAM(S): at 03:57

## 2020-01-01 RX ADMIN — Medication 12.5 MILLIGRAM(S): at 05:18

## 2020-01-01 RX ADMIN — ENOXAPARIN SODIUM 80 MILLIGRAM(S): 100 INJECTION SUBCUTANEOUS at 05:19

## 2020-01-01 RX ADMIN — Medication 3 MILLIGRAM(S): at 21:19

## 2020-01-01 RX ADMIN — SODIUM CHLORIDE 3 MILLILITER(S): 9 INJECTION INTRAMUSCULAR; INTRAVENOUS; SUBCUTANEOUS at 13:10

## 2020-01-01 RX ADMIN — MORPHINE SULFATE 60 MILLIGRAM(S): 50 CAPSULE, EXTENDED RELEASE ORAL at 21:27

## 2020-01-01 RX ADMIN — ENOXAPARIN SODIUM 80 MILLIGRAM(S): 100 INJECTION SUBCUTANEOUS at 17:22

## 2020-01-01 RX ADMIN — Medication 250 MILLIMOLE(S): at 18:40

## 2020-01-01 RX ADMIN — Medication 10 MILLIGRAM(S): at 10:56

## 2020-01-01 RX ADMIN — POTASSIUM PHOSPHATE, MONOBASIC POTASSIUM PHOSPHATE, DIBASIC 62.5 MILLIMOLE(S): 236; 224 INJECTION, SOLUTION INTRAVENOUS at 09:54

## 2020-01-01 RX ADMIN — MORPHINE SULFATE 15 MILLIGRAM(S): 50 CAPSULE, EXTENDED RELEASE ORAL at 15:14

## 2020-01-01 RX ADMIN — LIDOCAINE 2 PATCH: 4 CREAM TOPICAL at 09:24

## 2020-01-01 RX ADMIN — IRON SUCROSE 110 MILLIGRAM(S): 20 INJECTION, SOLUTION INTRAVENOUS at 11:06

## 2020-01-01 RX ADMIN — HEPARIN SODIUM 5000 UNIT(S): 5000 INJECTION INTRAVENOUS; SUBCUTANEOUS at 23:53

## 2020-01-01 RX ADMIN — HYDROMORPHONE HYDROCHLORIDE 4 MILLIGRAM(S): 2 INJECTION INTRAMUSCULAR; INTRAVENOUS; SUBCUTANEOUS at 01:33

## 2020-01-01 RX ADMIN — HYDROMORPHONE HYDROCHLORIDE 0.5 MILLIGRAM(S): 2 INJECTION INTRAMUSCULAR; INTRAVENOUS; SUBCUTANEOUS at 09:32

## 2020-01-01 RX ADMIN — MORPHINE SULFATE 6 MILLIGRAM(S): 50 CAPSULE, EXTENDED RELEASE ORAL at 12:30

## 2020-01-01 RX ADMIN — LIDOCAINE 2 PATCH: 4 CREAM TOPICAL at 22:09

## 2020-01-01 RX ADMIN — Medication 300 MILLIGRAM(S): at 13:06

## 2020-01-01 RX ADMIN — Medication 4000 UNIT(S): at 14:40

## 2020-01-01 RX ADMIN — Medication 250 MILLIGRAM(S): at 05:30

## 2020-01-01 RX ADMIN — IRON SUCROSE 110 MILLIGRAM(S): 20 INJECTION, SOLUTION INTRAVENOUS at 11:59

## 2020-01-01 RX ADMIN — ENOXAPARIN SODIUM 80 MILLIGRAM(S): 100 INJECTION SUBCUTANEOUS at 17:23

## 2020-01-01 RX ADMIN — MORPHINE SULFATE 30 MILLIGRAM(S): 50 CAPSULE, EXTENDED RELEASE ORAL at 05:26

## 2020-01-01 RX ADMIN — Medication 170 MILLIMOLE(S): at 09:50

## 2020-01-01 RX ADMIN — Medication 3 MILLIGRAM(S): at 21:54

## 2020-01-01 RX ADMIN — SODIUM CHLORIDE 1000 MILLILITER(S): 9 INJECTION INTRAMUSCULAR; INTRAVENOUS; SUBCUTANEOUS at 20:42

## 2020-01-01 RX ADMIN — Medication 1 TABLET(S): at 16:55

## 2020-01-01 RX ADMIN — Medication 60 MILLIGRAM(S): at 06:10

## 2020-01-01 RX ADMIN — ONDANSETRON 4 MILLIGRAM(S): 8 TABLET, FILM COATED ORAL at 14:16

## 2020-01-01 RX ADMIN — ENOXAPARIN SODIUM 40 MILLIGRAM(S): 100 INJECTION SUBCUTANEOUS at 11:57

## 2020-01-01 RX ADMIN — ONDANSETRON 4 MILLIGRAM(S): 8 TABLET, FILM COATED ORAL at 06:22

## 2020-01-01 RX ADMIN — MORPHINE SULFATE 2 MILLIGRAM(S): 50 CAPSULE, EXTENDED RELEASE ORAL at 14:56

## 2020-01-01 RX ADMIN — SODIUM CHLORIDE 3 MILLILITER(S): 9 INJECTION INTRAMUSCULAR; INTRAVENOUS; SUBCUTANEOUS at 14:24

## 2020-01-01 RX ADMIN — FAMOTIDINE 20 MILLIGRAM(S): 10 INJECTION INTRAVENOUS at 11:48

## 2020-01-01 RX ADMIN — Medication 30 MILLIGRAM(S): at 05:30

## 2020-01-01 RX ADMIN — Medication 50 MILLIEQUIVALENT(S): at 08:28

## 2020-01-01 RX ADMIN — LIDOCAINE 2 PATCH: 4 CREAM TOPICAL at 22:06

## 2020-01-01 RX ADMIN — Medication 60 MILLIGRAM(S): at 13:28

## 2020-01-01 RX ADMIN — MORPHINE SULFATE 90 MILLIGRAM(S): 50 CAPSULE, EXTENDED RELEASE ORAL at 18:38

## 2020-01-01 RX ADMIN — SIMETHICONE 80 MILLIGRAM(S): 80 TABLET, CHEWABLE ORAL at 12:04

## 2020-01-01 RX ADMIN — SODIUM CHLORIDE 1000 MILLILITER(S): 9 INJECTION, SOLUTION INTRAVENOUS at 16:32

## 2020-01-01 RX ADMIN — LIDOCAINE 2 PATCH: 4 CREAM TOPICAL at 14:19

## 2020-01-01 RX ADMIN — FAMOTIDINE 20 MILLIGRAM(S): 10 INJECTION INTRAVENOUS at 13:12

## 2020-01-01 RX ADMIN — Medication 5 MILLIGRAM(S): at 21:02

## 2020-01-01 RX ADMIN — ENOXAPARIN SODIUM 80 MILLIGRAM(S): 100 INJECTION SUBCUTANEOUS at 17:31

## 2020-01-01 RX ADMIN — HYDROMORPHONE HYDROCHLORIDE 30 MILLILITER(S): 2 INJECTION INTRAMUSCULAR; INTRAVENOUS; SUBCUTANEOUS at 19:34

## 2020-01-01 RX ADMIN — Medication 60 MILLIGRAM(S): at 21:53

## 2020-01-01 RX ADMIN — HYDROMORPHONE HYDROCHLORIDE 0.5 MILLIGRAM(S): 2 INJECTION INTRAMUSCULAR; INTRAVENOUS; SUBCUTANEOUS at 11:17

## 2020-01-01 RX ADMIN — MEROPENEM 100 MILLIGRAM(S): 1 INJECTION INTRAVENOUS at 05:19

## 2020-01-01 RX ADMIN — LIDOCAINE 2 PATCH: 4 CREAM TOPICAL at 07:43

## 2020-01-01 RX ADMIN — MORPHINE SULFATE 2.5 MG/HR: 50 CAPSULE, EXTENDED RELEASE ORAL at 14:41

## 2020-01-01 RX ADMIN — ENOXAPARIN SODIUM 40 MILLIGRAM(S): 100 INJECTION SUBCUTANEOUS at 13:20

## 2020-01-01 RX ADMIN — POLYETHYLENE GLYCOL 3350 17 GRAM(S): 17 POWDER, FOR SOLUTION ORAL at 13:41

## 2020-01-01 RX ADMIN — FAMOTIDINE 20 MILLIGRAM(S): 10 INJECTION INTRAVENOUS at 12:25

## 2020-01-01 RX ADMIN — Medication 30 MILLIGRAM(S): at 20:13

## 2020-01-01 RX ADMIN — HYDROMORPHONE HYDROCHLORIDE 1.5 MILLIGRAM(S): 2 INJECTION INTRAMUSCULAR; INTRAVENOUS; SUBCUTANEOUS at 21:36

## 2020-01-01 RX ADMIN — Medication 25 MILLIGRAM(S): at 06:29

## 2020-01-01 RX ADMIN — MEROPENEM 100 MILLIGRAM(S): 1 INJECTION INTRAVENOUS at 05:40

## 2020-01-01 RX ADMIN — Medication 4000 UNIT(S): at 12:05

## 2020-01-01 RX ADMIN — ONDANSETRON 4 MILLIGRAM(S): 8 TABLET, FILM COATED ORAL at 14:19

## 2020-01-01 RX ADMIN — SODIUM CHLORIDE 10 MILLILITER(S): 9 INJECTION INTRAMUSCULAR; INTRAVENOUS; SUBCUTANEOUS at 08:10

## 2020-01-01 RX ADMIN — Medication 1 TABLET(S): at 07:35

## 2020-01-01 RX ADMIN — Medication 650 MILLIGRAM(S): at 17:20

## 2020-01-01 RX ADMIN — HYDROMORPHONE HYDROCHLORIDE 30 MILLILITER(S): 2 INJECTION INTRAMUSCULAR; INTRAVENOUS; SUBCUTANEOUS at 16:51

## 2020-01-01 RX ADMIN — Medication 60 MILLIGRAM(S): at 12:56

## 2020-01-01 RX ADMIN — Medication 2.5 MILLIGRAM(S): at 20:54

## 2020-01-01 RX ADMIN — MORPHINE SULFATE 5 MILLIGRAM(S): 50 CAPSULE, EXTENDED RELEASE ORAL at 00:45

## 2020-01-01 RX ADMIN — HYDROMORPHONE HYDROCHLORIDE 0.75 MILLIGRAM(S): 2 INJECTION INTRAMUSCULAR; INTRAVENOUS; SUBCUTANEOUS at 16:01

## 2020-01-01 RX ADMIN — SENNA PLUS 2 TABLET(S): 8.6 TABLET ORAL at 22:35

## 2020-01-01 RX ADMIN — Medication 1 MILLIGRAM(S): at 21:53

## 2020-01-01 RX ADMIN — Medication 166.67 MILLIGRAM(S): at 18:39

## 2020-01-01 RX ADMIN — HYDROMORPHONE HYDROCHLORIDE 0.5 MILLIGRAM(S): 2 INJECTION INTRAMUSCULAR; INTRAVENOUS; SUBCUTANEOUS at 00:20

## 2020-01-01 RX ADMIN — Medication 300 MILLIGRAM(S): at 06:22

## 2020-01-01 RX ADMIN — IRON SUCROSE 110 MILLIGRAM(S): 20 INJECTION, SOLUTION INTRAVENOUS at 15:54

## 2020-01-01 RX ADMIN — LIDOCAINE 2 PATCH: 4 CREAM TOPICAL at 07:40

## 2020-01-01 RX ADMIN — MORPHINE SULFATE 30 MILLIGRAM(S): 50 CAPSULE, EXTENDED RELEASE ORAL at 05:12

## 2020-01-01 RX ADMIN — HYDROMORPHONE HYDROCHLORIDE 2 MILLIGRAM(S): 2 INJECTION INTRAMUSCULAR; INTRAVENOUS; SUBCUTANEOUS at 10:09

## 2020-01-01 RX ADMIN — Medication 25 MILLIGRAM(S): at 05:17

## 2020-01-01 RX ADMIN — Medication 650 MILLIGRAM(S): at 18:12

## 2020-01-01 RX ADMIN — Medication 4000 UNIT(S): at 11:44

## 2020-01-01 RX ADMIN — PIPERACILLIN AND TAZOBACTAM 25 GRAM(S): 4; .5 INJECTION, POWDER, LYOPHILIZED, FOR SOLUTION INTRAVENOUS at 15:29

## 2020-01-01 RX ADMIN — MORPHINE SULFATE 60 MILLIGRAM(S): 50 CAPSULE, EXTENDED RELEASE ORAL at 13:52

## 2020-01-01 RX ADMIN — MORPHINE SULFATE 60 MILLIGRAM(S): 50 CAPSULE, EXTENDED RELEASE ORAL at 23:45

## 2020-01-01 RX ADMIN — HYDROMORPHONE HYDROCHLORIDE 30 MILLILITER(S): 2 INJECTION INTRAMUSCULAR; INTRAVENOUS; SUBCUTANEOUS at 07:19

## 2020-01-01 RX ADMIN — SODIUM CHLORIDE 3 MILLILITER(S): 9 INJECTION INTRAMUSCULAR; INTRAVENOUS; SUBCUTANEOUS at 13:01

## 2020-01-01 RX ADMIN — Medication 1 TABLET(S): at 17:36

## 2020-01-01 RX ADMIN — ENOXAPARIN SODIUM 40 MILLIGRAM(S): 100 INJECTION SUBCUTANEOUS at 12:20

## 2020-01-01 RX ADMIN — SENNA PLUS 2 TABLET(S): 8.6 TABLET ORAL at 22:01

## 2020-01-01 RX ADMIN — Medication 975 MILLIGRAM(S): at 00:03

## 2020-01-01 RX ADMIN — Medication 12.5 MILLIGRAM(S): at 14:16

## 2020-01-01 RX ADMIN — FAMOTIDINE 20 MILLIGRAM(S): 10 INJECTION INTRAVENOUS at 12:11

## 2020-01-01 RX ADMIN — FAMOTIDINE 20 MILLIGRAM(S): 10 INJECTION INTRAVENOUS at 13:24

## 2020-01-01 RX ADMIN — Medication 25 MILLIGRAM(S): at 20:57

## 2020-01-01 RX ADMIN — MORPHINE SULFATE 60 MILLIGRAM(S): 50 CAPSULE, EXTENDED RELEASE ORAL at 21:23

## 2020-01-01 RX ADMIN — SODIUM CHLORIDE 3 MILLILITER(S): 9 INJECTION INTRAMUSCULAR; INTRAVENOUS; SUBCUTANEOUS at 13:41

## 2020-01-01 RX ADMIN — Medication 1 TABLET(S): at 13:49

## 2020-01-01 RX ADMIN — MORPHINE SULFATE 75 MILLIGRAM(S): 50 CAPSULE, EXTENDED RELEASE ORAL at 17:20

## 2020-01-01 RX ADMIN — Medication 125 MILLIMOLE(S): at 15:15

## 2020-01-01 RX ADMIN — SODIUM CHLORIDE 3 MILLILITER(S): 9 INJECTION INTRAMUSCULAR; INTRAVENOUS; SUBCUTANEOUS at 22:11

## 2020-01-01 RX ADMIN — SIMETHICONE 80 MILLIGRAM(S): 80 TABLET, CHEWABLE ORAL at 04:02

## 2020-01-01 RX ADMIN — POTASSIUM PHOSPHATE, MONOBASIC POTASSIUM PHOSPHATE, DIBASIC 62.5 MILLIMOLE(S): 236; 224 INJECTION, SOLUTION INTRAVENOUS at 08:04

## 2020-01-01 RX ADMIN — MEROPENEM 100 MILLIGRAM(S): 1 INJECTION INTRAVENOUS at 05:49

## 2020-01-01 RX ADMIN — POLYETHYLENE GLYCOL 3350 17 GRAM(S): 17 POWDER, FOR SOLUTION ORAL at 12:23

## 2020-01-01 RX ADMIN — Medication 30 MILLIGRAM(S): at 04:00

## 2020-01-01 RX ADMIN — MORPHINE SULFATE 60 MILLIGRAM(S): 50 CAPSULE, EXTENDED RELEASE ORAL at 21:21

## 2020-01-01 RX ADMIN — MEROPENEM 100 MILLIGRAM(S): 1 INJECTION INTRAVENOUS at 21:50

## 2020-01-01 RX ADMIN — ENOXAPARIN SODIUM 40 MILLIGRAM(S): 100 INJECTION SUBCUTANEOUS at 13:30

## 2020-01-01 RX ADMIN — Medication 1 TABLET(S): at 08:36

## 2020-01-01 RX ADMIN — POTASSIUM PHOSPHATE, MONOBASIC POTASSIUM PHOSPHATE, DIBASIC 62.5 MILLIMOLE(S): 236; 224 INJECTION, SOLUTION INTRAVENOUS at 09:56

## 2020-01-01 RX ADMIN — Medication 650 MILLIGRAM(S): at 01:24

## 2020-01-01 RX ADMIN — MORPHINE SULFATE 30 MILLIGRAM(S): 50 CAPSULE, EXTENDED RELEASE ORAL at 21:20

## 2020-01-01 RX ADMIN — MORPHINE SULFATE 60 MILLIGRAM(S): 50 CAPSULE, EXTENDED RELEASE ORAL at 21:57

## 2020-01-01 RX ADMIN — ENOXAPARIN SODIUM 80 MILLIGRAM(S): 100 INJECTION SUBCUTANEOUS at 17:20

## 2020-01-01 RX ADMIN — Medication 400 MILLIGRAM(S): at 05:26

## 2020-01-01 RX ADMIN — HYDROMORPHONE HYDROCHLORIDE 4 MILLIGRAM(S): 2 INJECTION INTRAMUSCULAR; INTRAVENOUS; SUBCUTANEOUS at 17:58

## 2020-01-01 RX ADMIN — Medication 1 MILLIGRAM(S): at 17:38

## 2020-01-01 RX ADMIN — MEROPENEM 100 MILLIGRAM(S): 1 INJECTION INTRAVENOUS at 13:45

## 2020-01-01 RX ADMIN — MEROPENEM 100 MILLIGRAM(S): 1 INJECTION INTRAVENOUS at 16:34

## 2020-01-01 RX ADMIN — Medication 85 MILLIMOLE(S): at 11:00

## 2020-01-01 RX ADMIN — SENNA PLUS 2 TABLET(S): 8.6 TABLET ORAL at 21:32

## 2020-01-01 RX ADMIN — LIDOCAINE 2 PATCH: 4 CREAM TOPICAL at 07:05

## 2020-01-01 RX ADMIN — MORPHINE SULFATE 30 MILLIGRAM(S): 50 CAPSULE, EXTENDED RELEASE ORAL at 08:44

## 2020-01-01 RX ADMIN — Medication 1 TABLET(S): at 22:40

## 2020-01-01 RX ADMIN — Medication 250 MILLIGRAM(S): at 06:38

## 2020-01-01 RX ADMIN — Medication 1 TABLET(S): at 09:32

## 2020-01-01 RX ADMIN — Medication 60 MILLIGRAM(S): at 13:37

## 2020-01-01 RX ADMIN — MEROPENEM 100 MILLIGRAM(S): 1 INJECTION INTRAVENOUS at 21:56

## 2020-01-01 RX ADMIN — Medication 60 MILLIGRAM(S): at 05:48

## 2020-01-01 RX ADMIN — Medication 10 MILLIGRAM(S): at 17:32

## 2020-01-01 RX ADMIN — Medication 60 MILLIGRAM(S): at 05:00

## 2020-01-01 RX ADMIN — POLYETHYLENE GLYCOL 3350 17 GRAM(S): 17 POWDER, FOR SOLUTION ORAL at 22:05

## 2020-01-01 RX ADMIN — Medication 300 MILLIGRAM(S): at 13:20

## 2020-01-01 RX ADMIN — Medication 30 MILLIGRAM(S): at 01:29

## 2020-01-01 RX ADMIN — LIDOCAINE 2 PATCH: 4 CREAM TOPICAL at 05:49

## 2020-01-01 RX ADMIN — Medication 1 TABLET(S): at 23:41

## 2020-01-01 RX ADMIN — Medication 1 TABLET(S): at 09:16

## 2020-01-01 RX ADMIN — SODIUM CHLORIDE 200 MILLILITER(S): 9 INJECTION INTRAMUSCULAR; INTRAVENOUS; SUBCUTANEOUS at 17:19

## 2020-01-01 RX ADMIN — MORPHINE SULFATE 4 MILLIGRAM(S): 50 CAPSULE, EXTENDED RELEASE ORAL at 22:57

## 2020-01-01 RX ADMIN — Medication 975 MILLIGRAM(S): at 06:26

## 2020-01-01 RX ADMIN — Medication 650 MILLIGRAM(S): at 05:25

## 2020-01-01 RX ADMIN — MORPHINE SULFATE 60 MILLIGRAM(S): 50 CAPSULE, EXTENDED RELEASE ORAL at 15:47

## 2020-01-01 RX ADMIN — POLYETHYLENE GLYCOL 3350 17 GRAM(S): 17 POWDER, FOR SOLUTION ORAL at 12:20

## 2020-01-01 RX ADMIN — POLYETHYLENE GLYCOL 3350 17 GRAM(S): 17 POWDER, FOR SOLUTION ORAL at 11:42

## 2020-01-01 RX ADMIN — SENNA PLUS 2 TABLET(S): 8.6 TABLET ORAL at 21:13

## 2020-01-01 RX ADMIN — Medication 300 MILLIGRAM(S): at 10:40

## 2020-01-01 RX ADMIN — SODIUM CHLORIDE 3 MILLILITER(S): 9 INJECTION INTRAMUSCULAR; INTRAVENOUS; SUBCUTANEOUS at 06:07

## 2020-01-01 RX ADMIN — Medication 60 MILLIGRAM(S): at 14:01

## 2020-01-01 RX ADMIN — ENOXAPARIN SODIUM 40 MILLIGRAM(S): 100 INJECTION SUBCUTANEOUS at 11:43

## 2020-01-01 RX ADMIN — Medication 975 MILLIGRAM(S): at 17:45

## 2020-01-01 RX ADMIN — SODIUM CHLORIDE 3 MILLILITER(S): 9 INJECTION INTRAMUSCULAR; INTRAVENOUS; SUBCUTANEOUS at 21:09

## 2020-01-01 RX ADMIN — POTASSIUM PHOSPHATE, MONOBASIC POTASSIUM PHOSPHATE, DIBASIC 62.5 MILLIMOLE(S): 236; 224 INJECTION, SOLUTION INTRAVENOUS at 08:12

## 2020-01-01 RX ADMIN — Medication 400 MILLIGRAM(S): at 13:31

## 2020-01-01 RX ADMIN — Medication 650 MILLIGRAM(S): at 01:00

## 2020-01-01 RX ADMIN — HYDROMORPHONE HYDROCHLORIDE 1.5 MILLIGRAM(S): 2 INJECTION INTRAMUSCULAR; INTRAVENOUS; SUBCUTANEOUS at 22:18

## 2020-01-01 RX ADMIN — PIPERACILLIN AND TAZOBACTAM 25 GRAM(S): 4; .5 INJECTION, POWDER, LYOPHILIZED, FOR SOLUTION INTRAVENOUS at 14:56

## 2020-01-01 RX ADMIN — Medication 10 MILLIGRAM(S): at 01:55

## 2020-01-01 RX ADMIN — HYDROMORPHONE HYDROCHLORIDE 30 MILLILITER(S): 2 INJECTION INTRAMUSCULAR; INTRAVENOUS; SUBCUTANEOUS at 20:02

## 2020-01-01 RX ADMIN — POTASSIUM PHOSPHATE, MONOBASIC POTASSIUM PHOSPHATE, DIBASIC 62.5 MILLIMOLE(S): 236; 224 INJECTION, SOLUTION INTRAVENOUS at 12:14

## 2020-01-01 RX ADMIN — HYDROMORPHONE HYDROCHLORIDE 0.75 MILLIGRAM(S): 2 INJECTION INTRAMUSCULAR; INTRAVENOUS; SUBCUTANEOUS at 19:53

## 2020-01-01 RX ADMIN — SODIUM CHLORIDE 3 MILLILITER(S): 9 INJECTION INTRAMUSCULAR; INTRAVENOUS; SUBCUTANEOUS at 13:17

## 2020-01-01 RX ADMIN — ONDANSETRON 116 MILLIGRAM(S): 8 TABLET, FILM COATED ORAL at 14:10

## 2020-01-01 RX ADMIN — Medication 650 MILLIGRAM(S): at 17:44

## 2020-01-01 RX ADMIN — Medication 60 MILLIGRAM(S): at 05:45

## 2020-01-01 RX ADMIN — Medication 60 MILLIGRAM(S): at 22:27

## 2020-01-01 RX ADMIN — POLYETHYLENE GLYCOL 3350 17 GRAM(S): 17 POWDER, FOR SOLUTION ORAL at 11:45

## 2020-01-01 RX ADMIN — FAMOTIDINE 20 MILLIGRAM(S): 10 INJECTION INTRAVENOUS at 13:41

## 2020-01-01 RX ADMIN — MORPHINE SULFATE 7.5 MILLIGRAM(S): 50 CAPSULE, EXTENDED RELEASE ORAL at 11:58

## 2020-01-01 RX ADMIN — Medication 10 MILLIGRAM(S): at 12:13

## 2020-01-01 RX ADMIN — PIPERACILLIN AND TAZOBACTAM 25 GRAM(S): 4; .5 INJECTION, POWDER, LYOPHILIZED, FOR SOLUTION INTRAVENOUS at 05:37

## 2020-01-01 RX ADMIN — SODIUM CHLORIDE 3 MILLILITER(S): 9 INJECTION INTRAMUSCULAR; INTRAVENOUS; SUBCUTANEOUS at 05:49

## 2020-01-01 RX ADMIN — MORPHINE SULFATE 4 MILLIGRAM(S): 50 CAPSULE, EXTENDED RELEASE ORAL at 17:29

## 2020-01-01 RX ADMIN — Medication 0.5 MILLIGRAM(S): at 13:45

## 2020-01-01 RX ADMIN — MEROPENEM 100 MILLIGRAM(S): 1 INJECTION INTRAVENOUS at 22:18

## 2020-01-01 RX ADMIN — MEROPENEM 100 MILLIGRAM(S): 1 INJECTION INTRAVENOUS at 05:21

## 2020-01-01 RX ADMIN — Medication 1 TABLET(S): at 13:36

## 2020-01-01 RX ADMIN — MORPHINE SULFATE 60 MILLIGRAM(S): 50 CAPSULE, EXTENDED RELEASE ORAL at 14:19

## 2020-01-01 RX ADMIN — MORPHINE SULFATE 15 MILLIGRAM(S): 50 CAPSULE, EXTENDED RELEASE ORAL at 08:16

## 2020-01-01 RX ADMIN — Medication 400 MILLIGRAM(S): at 17:05

## 2020-01-01 RX ADMIN — PANTOPRAZOLE SODIUM 40 MILLIGRAM(S): 20 TABLET, DELAYED RELEASE ORAL at 12:22

## 2020-01-01 RX ADMIN — Medication 3 MILLIGRAM(S): at 22:18

## 2020-01-01 RX ADMIN — HYDROMORPHONE HYDROCHLORIDE 30 MILLILITER(S): 2 INJECTION INTRAMUSCULAR; INTRAVENOUS; SUBCUTANEOUS at 08:03

## 2020-01-01 RX ADMIN — Medication 10 MILLIGRAM(S): at 08:16

## 2020-01-01 RX ADMIN — Medication 10 MILLIGRAM(S): at 03:39

## 2020-01-01 RX ADMIN — MORPHINE SULFATE 30 MILLIGRAM(S): 50 CAPSULE, EXTENDED RELEASE ORAL at 17:38

## 2020-01-01 RX ADMIN — Medication 10 MILLIGRAM(S): at 11:38

## 2020-01-01 RX ADMIN — MORPHINE SULFATE 45 MILLIGRAM(S): 50 CAPSULE, EXTENDED RELEASE ORAL at 23:14

## 2020-01-01 RX ADMIN — Medication 60 MILLIGRAM(S): at 21:54

## 2020-01-01 RX ADMIN — SIMETHICONE 80 MILLIGRAM(S): 80 TABLET, CHEWABLE ORAL at 08:49

## 2020-01-01 RX ADMIN — Medication 144 MILLIGRAM(S): at 13:17

## 2020-01-01 RX ADMIN — HYDROMORPHONE HYDROCHLORIDE 0.75 MILLIGRAM(S): 2 INJECTION INTRAMUSCULAR; INTRAVENOUS; SUBCUTANEOUS at 23:05

## 2020-01-01 RX ADMIN — MORPHINE SULFATE 7.5 MILLIGRAM(S): 50 CAPSULE, EXTENDED RELEASE ORAL at 08:19

## 2020-01-01 RX ADMIN — ONDANSETRON 4 MILLIGRAM(S): 8 TABLET, FILM COATED ORAL at 17:34

## 2020-01-01 RX ADMIN — ENOXAPARIN SODIUM 80 MILLIGRAM(S): 100 INJECTION SUBCUTANEOUS at 17:32

## 2020-01-01 RX ADMIN — LIDOCAINE 1 PATCH: 4 CREAM TOPICAL at 19:00

## 2020-01-01 RX ADMIN — Medication 975 MILLIGRAM(S): at 22:23

## 2020-01-01 RX ADMIN — ONDANSETRON 4 MILLIGRAM(S): 8 TABLET, FILM COATED ORAL at 14:45

## 2020-01-01 RX ADMIN — LIDOCAINE 2 PATCH: 4 CREAM TOPICAL at 21:23

## 2020-01-01 RX ADMIN — PIPERACILLIN AND TAZOBACTAM 3.38 GRAM(S): 4; .5 INJECTION, POWDER, LYOPHILIZED, FOR SOLUTION INTRAVENOUS at 01:31

## 2020-01-01 RX ADMIN — Medication 50 MILLIEQUIVALENT(S): at 12:12

## 2020-01-01 RX ADMIN — Medication 30 MILLIGRAM(S): at 23:54

## 2020-01-01 RX ADMIN — HYDROMORPHONE HYDROCHLORIDE 0.5 MILLIGRAM(S): 2 INJECTION INTRAMUSCULAR; INTRAVENOUS; SUBCUTANEOUS at 01:05

## 2020-01-01 RX ADMIN — SODIUM CHLORIDE 3 MILLILITER(S): 9 INJECTION INTRAMUSCULAR; INTRAVENOUS; SUBCUTANEOUS at 12:04

## 2020-01-01 RX ADMIN — Medication 650 MILLIGRAM(S): at 05:37

## 2020-01-01 RX ADMIN — MORPHINE SULFATE 60 MILLIGRAM(S): 50 CAPSULE, EXTENDED RELEASE ORAL at 01:19

## 2020-01-01 RX ADMIN — LIDOCAINE 2 PATCH: 4 CREAM TOPICAL at 10:51

## 2020-01-01 RX ADMIN — SODIUM CHLORIDE 3 MILLILITER(S): 9 INJECTION INTRAMUSCULAR; INTRAVENOUS; SUBCUTANEOUS at 13:11

## 2020-01-01 RX ADMIN — MEROPENEM 100 MILLIGRAM(S): 1 INJECTION INTRAVENOUS at 22:43

## 2020-01-01 RX ADMIN — HYDROMORPHONE HYDROCHLORIDE 2 MILLIGRAM(S): 2 INJECTION INTRAMUSCULAR; INTRAVENOUS; SUBCUTANEOUS at 09:18

## 2020-01-01 RX ADMIN — Medication 1 TABLET(S): at 07:56

## 2020-01-01 RX ADMIN — MORPHINE SULFATE 15 MILLIGRAM(S): 50 CAPSULE, EXTENDED RELEASE ORAL at 17:21

## 2020-01-01 RX ADMIN — SODIUM CHLORIDE 100 MILLILITER(S): 9 INJECTION INTRAMUSCULAR; INTRAVENOUS; SUBCUTANEOUS at 19:41

## 2020-01-01 RX ADMIN — POTASSIUM PHOSPHATE, MONOBASIC POTASSIUM PHOSPHATE, DIBASIC 62.5 MILLIMOLE(S): 236; 224 INJECTION, SOLUTION INTRAVENOUS at 16:45

## 2020-01-01 RX ADMIN — HYDROMORPHONE HYDROCHLORIDE 2 MILLIGRAM(S): 2 INJECTION INTRAMUSCULAR; INTRAVENOUS; SUBCUTANEOUS at 21:37

## 2020-01-01 RX ADMIN — SIMETHICONE 80 MILLIGRAM(S): 80 TABLET, CHEWABLE ORAL at 21:39

## 2020-01-01 RX ADMIN — HYDROMORPHONE HYDROCHLORIDE 30 MILLILITER(S): 2 INJECTION INTRAMUSCULAR; INTRAVENOUS; SUBCUTANEOUS at 19:24

## 2020-01-01 RX ADMIN — Medication 400 MILLIGRAM(S): at 05:43

## 2020-01-01 RX ADMIN — HYDROMORPHONE HYDROCHLORIDE 0.5 MILLIGRAM(S): 2 INJECTION INTRAMUSCULAR; INTRAVENOUS; SUBCUTANEOUS at 10:00

## 2020-01-01 RX ADMIN — LINEZOLID 300 MILLIGRAM(S): 600 INJECTION, SOLUTION INTRAVENOUS at 05:52

## 2020-01-01 RX ADMIN — Medication 300 MILLIGRAM(S): at 17:51

## 2020-01-01 RX ADMIN — Medication 650 MILLIGRAM(S): at 15:43

## 2020-01-01 RX ADMIN — Medication 300 MILLIGRAM(S): at 12:05

## 2020-01-01 RX ADMIN — Medication 250 MILLIMOLE(S): at 09:32

## 2020-01-01 RX ADMIN — HYDROMORPHONE HYDROCHLORIDE 1.5 MILLIGRAM(S): 2 INJECTION INTRAMUSCULAR; INTRAVENOUS; SUBCUTANEOUS at 16:37

## 2020-01-01 RX ADMIN — ONDANSETRON 4 MILLIGRAM(S): 8 TABLET, FILM COATED ORAL at 15:59

## 2020-01-01 RX ADMIN — OXALIPLATIN 263.3 MILLIGRAM(S): 5 INJECTION, SOLUTION INTRAVENOUS at 13:16

## 2020-01-01 RX ADMIN — HEPARIN SODIUM 5000 UNIT(S): 5000 INJECTION INTRAVENOUS; SUBCUTANEOUS at 14:27

## 2020-01-01 RX ADMIN — ENOXAPARIN SODIUM 80 MILLIGRAM(S): 100 INJECTION SUBCUTANEOUS at 05:53

## 2020-01-01 RX ADMIN — HYDROMORPHONE HYDROCHLORIDE 30 MILLILITER(S): 2 INJECTION INTRAMUSCULAR; INTRAVENOUS; SUBCUTANEOUS at 19:47

## 2020-01-01 RX ADMIN — ONDANSETRON 4 MILLIGRAM(S): 8 TABLET, FILM COATED ORAL at 08:43

## 2020-01-01 RX ADMIN — Medication 975 MILLIGRAM(S): at 06:30

## 2020-01-01 RX ADMIN — Medication 50 MILLIEQUIVALENT(S): at 14:25

## 2020-01-01 RX ADMIN — Medication 60 MILLIGRAM(S): at 22:33

## 2020-01-01 RX ADMIN — MORPHINE SULFATE 60 MILLIGRAM(S): 50 CAPSULE, EXTENDED RELEASE ORAL at 23:23

## 2020-01-01 RX ADMIN — ONDANSETRON 4 MILLIGRAM(S): 8 TABLET, FILM COATED ORAL at 11:51

## 2020-01-01 RX ADMIN — ENOXAPARIN SODIUM 80 MILLIGRAM(S): 100 INJECTION SUBCUTANEOUS at 17:46

## 2020-01-01 RX ADMIN — MORPHINE SULFATE 60 MILLIGRAM(S): 50 CAPSULE, EXTENDED RELEASE ORAL at 13:40

## 2020-01-01 RX ADMIN — SODIUM CHLORIDE 75 MILLILITER(S): 9 INJECTION, SOLUTION INTRAVENOUS at 09:34

## 2020-01-01 RX ADMIN — Medication 60 MILLIGRAM(S): at 13:27

## 2020-01-01 RX ADMIN — Medication 20 MILLIEQUIVALENT(S): at 08:52

## 2020-01-01 RX ADMIN — PIPERACILLIN AND TAZOBACTAM 25 GRAM(S): 4; .5 INJECTION, POWDER, LYOPHILIZED, FOR SOLUTION INTRAVENOUS at 17:38

## 2020-01-01 RX ADMIN — Medication 30 MILLILITER(S): at 14:43

## 2020-01-01 RX ADMIN — Medication 975 MILLIGRAM(S): at 04:23

## 2020-01-01 RX ADMIN — MORPHINE SULFATE 4 MILLIGRAM(S): 50 CAPSULE, EXTENDED RELEASE ORAL at 10:31

## 2020-01-01 RX ADMIN — ENOXAPARIN SODIUM 40 MILLIGRAM(S): 100 INJECTION SUBCUTANEOUS at 13:11

## 2020-01-01 RX ADMIN — HYDROMORPHONE HYDROCHLORIDE 1.5 MILLIGRAM(S): 2 INJECTION INTRAMUSCULAR; INTRAVENOUS; SUBCUTANEOUS at 18:37

## 2020-01-01 RX ADMIN — Medication 1000 MILLIGRAM(S): at 18:00

## 2020-01-01 RX ADMIN — Medication 1 TABLET(S): at 07:58

## 2020-01-01 RX ADMIN — ENOXAPARIN SODIUM 40 MILLIGRAM(S): 100 INJECTION SUBCUTANEOUS at 11:21

## 2020-01-01 RX ADMIN — SODIUM CHLORIDE 3 MILLILITER(S): 9 INJECTION INTRAMUSCULAR; INTRAVENOUS; SUBCUTANEOUS at 14:18

## 2020-01-01 RX ADMIN — Medication 50 MILLIGRAM(S): at 00:04

## 2020-01-01 RX ADMIN — IRON SUCROSE 110 MILLIGRAM(S): 20 INJECTION, SOLUTION INTRAVENOUS at 13:20

## 2020-01-01 RX ADMIN — SODIUM CHLORIDE 125 MILLILITER(S): 9 INJECTION, SOLUTION INTRAVENOUS at 14:26

## 2020-01-01 RX ADMIN — PANTOPRAZOLE SODIUM 40 MILLIGRAM(S): 20 TABLET, DELAYED RELEASE ORAL at 05:01

## 2020-01-01 RX ADMIN — Medication 60 MILLIGRAM(S): at 22:04

## 2020-01-01 RX ADMIN — ONDANSETRON 4 MILLIGRAM(S): 8 TABLET, FILM COATED ORAL at 05:21

## 2020-01-01 RX ADMIN — PIPERACILLIN AND TAZOBACTAM 200 GRAM(S): 4; .5 INJECTION, POWDER, LYOPHILIZED, FOR SOLUTION INTRAVENOUS at 08:58

## 2020-01-01 RX ADMIN — ONDANSETRON 116 MILLIGRAM(S): 8 TABLET, FILM COATED ORAL at 14:12

## 2020-01-01 RX ADMIN — Medication 600 MILLIGRAM(S): at 06:47

## 2020-01-01 RX ADMIN — SODIUM CHLORIDE 3 MILLILITER(S): 9 INJECTION INTRAMUSCULAR; INTRAVENOUS; SUBCUTANEOUS at 22:34

## 2020-01-01 RX ADMIN — Medication 1 TABLET(S): at 10:08

## 2020-01-01 RX ADMIN — Medication 400 MILLIGRAM(S): at 18:44

## 2020-01-01 RX ADMIN — ONDANSETRON 116 MILLIGRAM(S): 8 TABLET, FILM COATED ORAL at 14:13

## 2020-01-01 RX ADMIN — HYDROMORPHONE HYDROCHLORIDE 0.5 MILLIGRAM(S): 2 INJECTION INTRAMUSCULAR; INTRAVENOUS; SUBCUTANEOUS at 11:45

## 2020-01-01 RX ADMIN — ENOXAPARIN SODIUM 80 MILLIGRAM(S): 100 INJECTION SUBCUTANEOUS at 06:24

## 2020-01-01 RX ADMIN — MORPHINE SULFATE 15 MILLIGRAM(S): 50 CAPSULE, EXTENDED RELEASE ORAL at 11:50

## 2020-01-01 RX ADMIN — IRON SUCROSE 110 MILLIGRAM(S): 20 INJECTION, SOLUTION INTRAVENOUS at 16:23

## 2020-01-01 RX ADMIN — Medication 62.5 MILLIMOLE(S): at 02:08

## 2020-01-01 RX ADMIN — PANTOPRAZOLE SODIUM 40 MILLIGRAM(S): 20 TABLET, DELAYED RELEASE ORAL at 05:24

## 2020-01-01 RX ADMIN — FAMOTIDINE 100 MILLIGRAM(S): 10 INJECTION INTRAVENOUS at 14:26

## 2020-01-01 RX ADMIN — ONDANSETRON 4 MILLIGRAM(S): 8 TABLET, FILM COATED ORAL at 16:39

## 2020-01-01 RX ADMIN — Medication 975 MILLIGRAM(S): at 18:33

## 2020-01-01 RX ADMIN — ONDANSETRON 4 MILLIGRAM(S): 8 TABLET, FILM COATED ORAL at 18:19

## 2020-01-01 RX ADMIN — SODIUM CHLORIDE 125 MILLILITER(S): 9 INJECTION, SOLUTION INTRAVENOUS at 21:37

## 2020-01-01 RX ADMIN — SODIUM CHLORIDE 100 MILLILITER(S): 9 INJECTION INTRAMUSCULAR; INTRAVENOUS; SUBCUTANEOUS at 21:02

## 2020-01-01 RX ADMIN — FAMOTIDINE 20 MILLIGRAM(S): 10 INJECTION INTRAVENOUS at 12:17

## 2020-01-01 RX ADMIN — PIPERACILLIN AND TAZOBACTAM 25 GRAM(S): 4; .5 INJECTION, POWDER, LYOPHILIZED, FOR SOLUTION INTRAVENOUS at 14:21

## 2020-01-01 RX ADMIN — HYDROMORPHONE HYDROCHLORIDE 0.5 MILLIGRAM(S): 2 INJECTION INTRAMUSCULAR; INTRAVENOUS; SUBCUTANEOUS at 05:29

## 2020-01-01 RX ADMIN — SODIUM CHLORIDE 3 MILLILITER(S): 9 INJECTION INTRAMUSCULAR; INTRAVENOUS; SUBCUTANEOUS at 07:04

## 2020-01-01 RX ADMIN — MORPHINE SULFATE 5 MILLIGRAM(S): 50 CAPSULE, EXTENDED RELEASE ORAL at 07:04

## 2020-01-01 RX ADMIN — ONDANSETRON 4 MILLIGRAM(S): 8 TABLET, FILM COATED ORAL at 22:04

## 2020-01-01 RX ADMIN — OXYCODONE HYDROCHLORIDE 5 MILLIGRAM(S): 5 TABLET ORAL at 06:40

## 2020-01-01 RX ADMIN — Medication 400 MILLIGRAM(S): at 12:05

## 2020-01-01 RX ADMIN — HYDROMORPHONE HYDROCHLORIDE 30 MILLILITER(S): 2 INJECTION INTRAMUSCULAR; INTRAVENOUS; SUBCUTANEOUS at 22:48

## 2020-01-01 RX ADMIN — IRON SUCROSE 110 MILLIGRAM(S): 20 INJECTION, SOLUTION INTRAVENOUS at 13:05

## 2020-01-01 RX ADMIN — Medication 60 MILLIGRAM(S): at 05:21

## 2020-01-01 RX ADMIN — PAMIDRONATE DISODIUM 65 MILLIGRAM(S): 9 INJECTION, SOLUTION INTRAVENOUS at 13:21

## 2020-01-01 RX ADMIN — Medication 400 MILLIGRAM(S): at 16:30

## 2020-01-01 RX ADMIN — Medication 650 MILLIGRAM(S): at 13:06

## 2020-01-01 RX ADMIN — Medication 30 MILLIGRAM(S): at 21:13

## 2020-01-01 RX ADMIN — Medication 650 MILLIGRAM(S): at 05:55

## 2020-01-01 RX ADMIN — Medication 60 MILLIGRAM(S): at 06:35

## 2020-01-01 RX ADMIN — Medication 60 MILLIGRAM(S): at 06:23

## 2020-01-01 RX ADMIN — Medication 975 MILLIGRAM(S): at 23:20

## 2020-01-01 RX ADMIN — HYDROMORPHONE HYDROCHLORIDE 2 MILLIGRAM(S): 2 INJECTION INTRAMUSCULAR; INTRAVENOUS; SUBCUTANEOUS at 23:22

## 2020-01-01 RX ADMIN — ONDANSETRON 4 MILLIGRAM(S): 8 TABLET, FILM COATED ORAL at 11:39

## 2020-01-01 RX ADMIN — Medication 1000 MILLIGRAM(S): at 14:30

## 2020-01-01 RX ADMIN — ENOXAPARIN SODIUM 40 MILLIGRAM(S): 100 INJECTION SUBCUTANEOUS at 19:41

## 2020-01-01 RX ADMIN — Medication 10 MILLIGRAM(S): at 22:12

## 2020-01-01 RX ADMIN — MORPHINE SULFATE 60 MILLIGRAM(S): 50 CAPSULE, EXTENDED RELEASE ORAL at 21:42

## 2020-01-01 RX ADMIN — ONDANSETRON 4 MILLIGRAM(S): 8 TABLET, FILM COATED ORAL at 08:04

## 2020-01-01 RX ADMIN — LIDOCAINE 2 PATCH: 4 CREAM TOPICAL at 10:38

## 2020-01-01 RX ADMIN — Medication 25 MILLIGRAM(S): at 10:37

## 2020-01-01 RX ADMIN — Medication 1 TABLET(S): at 13:56

## 2020-01-01 RX ADMIN — Medication 10 MILLIGRAM(S): at 07:41

## 2020-01-01 RX ADMIN — Medication 50 GRAM(S): at 00:55

## 2020-01-01 RX ADMIN — ENOXAPARIN SODIUM 80 MILLIGRAM(S): 100 INJECTION SUBCUTANEOUS at 06:17

## 2020-03-07 NOTE — ED PROVIDER NOTE - NS ED ROS FT
GENERAL: No fever or chills, EYES: no change in vision, HEENT: no trouble speaking, CARDIAC: no chest pain, palpitation PULMONARY: no cough or SOB, GI: + abdominal pain, + nausea, no vomiting, no diarrhea or constipation, : +vaginal bleeding No changes in urination, SKIN: no rashes, NEURO: no headache,  MSK: +leg pain ~Waldemar Vargas MD

## 2020-03-07 NOTE — ED PROVIDER NOTE - OBJECTIVE STATEMENT
Waldemar Vargas MD: 25 yo F no PMH p/w RLq abd radiating to the right leg x 1month. Pt  state the she went to a OSH ED 5 days ago and was told the she had a mass on the R. ovary. Pt state that she was told that they would removed the mass in 3 weeks and return to the ED if pain got worst. Pt also report vaginal bleeding x 5 days and 2-3 pad day. LMP 2/18-2/26. Report palpitation x 1 day. denies fever, cp, sob, vomiting, diarrhea blood in stool, weight loss. No GYN.

## 2020-03-07 NOTE — ED PROVIDER NOTE - CLINICAL SUMMARY MEDICAL DECISION MAKING FREE TEXT BOX
Waldemar Vargas MD: 23 yo F no PMH p/w RLq abd radiating to the right leg x 1month. Pt  state the she went to a OSH ED 5 days ago and was told the she had a mass on the R. ovary. Will r/o ovarian torsion vs ectopic. Will get basic labs hcg, ua tranvag u/s

## 2020-03-07 NOTE — ED ADULT NURSE NOTE - CHPI ED NUR SYMPTOMS NEG
no abdominal distension/no hematuria/no burning urination/no blood in stool/no chills/no vomiting/no fever/no nausea

## 2020-03-07 NOTE — ED ADULT NURSE NOTE - CINV DISCH MEDS REVIEWED YN
Take tylenol 1000 mg as needed for mild pain every 6 hours. Take oxycodone 5 mg every 6 hours for severe pain. do not drink/drive while taking that medication,/Yes

## 2020-03-07 NOTE — ED ADULT NURSE NOTE - OBJECTIVE STATEMENT
23 y/o female with no PMH presents to the ED from home c/o right sided abdominal pain. Patient states that on Tuesday she had went to an ED in [place] and was told that she has a mass on her right ovary. Patient states that she was told to come to the ED closer to her if the pain is worsening. Patient states that today the pain was bad enough that she had come to the ED. States pain is in the right lower abdomen and radiates down the right leg. Patient states that she was having palpitations last night and this morning, but they had since resolved. Patient states that she has been having vaginal bleeding since Tuesday after they had performed the intravaginal US. Patient states that she had passed one clot. States LMP was on 2/18/2020. Denies ever being pregnant. Denies birth control use. Patient states that she has been feeling increasingly lethargic over the last two days. Denies taking medication for pain. Denies sick contacts at home. Denies recent travel.  Denies fever, chills, n/v, diarrhea/constipation, numbness/tingling, urinary s/s. Patient A&Ox3, in no respiratory distress, and denies chest pain. Abdomen soft and non distended. Tenderness to palpation of the right lower quadrant. Patient ambulated into ED.

## 2020-03-07 NOTE — ED PROVIDER NOTE - PATIENT PORTAL LINK FT
You can access the FollowMyHealth Patient Portal offered by [facility] by registering at the following website: [email or web address]. By joining [facility]’s FollowMyHealth portal, you will also be able to view your health information using other applications (apps) compatible with our system.

## 2020-03-07 NOTE — ED PROVIDER NOTE - PHYSICAL EXAMINATION
Gen: AAOx3, non-toxic  Head: NCAT  HEENT: EOMI, PERRLA, oral mucosa moist, normal conjunctiva  Lung: CTAB, no respiratory distress, no wheezes/rhonchi/rales B/L, speaking in full sentences  CV: RRR, no murmurs, rubs or gallops  Abd: soft, right quadrant upper and lower TTP ND, no guarding, right CVA tenderness, no rebound tenderness  MSK: no visible deformities, full range of motion of all 4 exts  Neuro: No focal sensory or motor deficits  Skin: Warm, well perfused, no rash  Psych: normal affect.   ~Waldemar Vargas MD Gen: AAOx3, non-toxic  Head: NCAT  HEENT: EOMI, PERRLA, oral mucosa moist, normal conjunctiva  Lung: CTAB, no respiratory distress, no wheezes/rhonchi/rales B/L, speaking in full sentences  CV: RRR, no murmurs, rubs or gallops  Abd: soft, + right quadrant upper and lower TTP ND, no guarding, right CVA tenderness, no rebound tenderness  MSK: no visible deformities, full range of motion of all 4 exts  Neuro: No focal sensory or motor deficits  Skin: Warm, well perfused, no rash  Psych: normal affect.   ~Waldemar Vargas MD

## 2020-03-07 NOTE — ED PROVIDER NOTE - NSFOLLOWUPCLINICS_GEN_ALL_ED_FT
HEART FAILURE  You have heart failure.  This means that your heart muscle is weakened and cannot pump blood the way it should.    MEDICATIONS:  · See Medication List (keep list up to date and bring it to your doctor appointments).  · Talk to your doctor if you have problems getting or taking medications.    VACCINES:  Most Recent Immunizations   Administered Date(s) Administered   • Influenza, high dose seasonal, preservative-free 09/27/2019   • Influenza, injectable, quadrivalent 10/02/2018   • Influenza, seasonal, injectable, trivalent 10/11/2011   • Pneumococcal Conjugate 13 valent 05/22/2015   • Pneumococcal polysaccharide, adult, 23 valent 05/16/2013   • Tdap 11/23/2015   ·     ACTIVITY:  · Continue activity as you were doing in the hospital.  You can slowly increase to what you were doing before you were hospitalized.  · Balance activity with rest.  · Elevate legs when resting.  · Daily Weight:  Weigh yourself first thing every morning (at the same time with same amount of clothes on).  · Keep a record of your weights, and bring it to your doctor appointments.    SMOKING:  · Avoid all tobacco products and secondhand smoke.  · Smoking Cessation Counseling offered.  · [place] Toll Free Quit Line: 1-453.621.1057    LOW SALT (SODIUM) DIET:  · Limit salt to help prevent fluid build-up in your body.  This will help prevent shortness of breath and swelling of feet and ankles.  · Avoid adding salt to food at the table and use products labeled \"low sodium\" or \"no salt\" (<300 mg per serving).  · Avoid fran salt foods like canned soup, sauces, bouillon, lunch meat, cheese, fast food, salty snacks, canned and packaged foods.    HEART FAILURE ACTION PLAN:  · Use this plan to help you decide when to change your routine or call the doctor.    CALL 911 IF YOU:  · Have pain or tightness in my chest.  · Have trouble breathing.  · Have dizzy spells or feel faint.  · Feel anxious or like something bad will happen.    CONTACT  YOUR DOCTOR (even on weekends and holidays) IF YOU:  · Have to sleep sitting up.  · Start coughing at night.  · Notice swelling in your ankles or any part of your body.  · Lose your appetite.  · Gain more than 3 pounds in 1-2 days or 5 pounds in a week.  · Lose more than 5 pounds in 2 days.  · Become tired faster or feel yourself losing energy.  · Have noisy breathing.   [facility]  Gynceology/OB  [address] [place] [address]  Phone: [phone]  Fax:   Follow Up Time:

## 2020-03-07 NOTE — ED PROVIDER NOTE - NSFOLLOWUPINSTRUCTIONS_ED_ALL_ED_FT
1) Please follow-up with your primary care doctor in the next 2-3 days.  Please call tomorrow for an appointment.  If you cannot follow-up with your primary care doctor please return to the ED for any urgent issues.  2) You were given a copy of the tests performed today.  Please bring the results with you and review them with your primary care doctor.  3) If you have any worsening of symptoms or any other concerns please return to the ED immediately. Such as but not limited to uncontrolled pain, intractable vomiting, or fever > 100.4, blood in stool or black stools.  4) Please continue taking your home medications as directed. Please take Tylenol (Acetaminophen) 1000 mg every 6 hours as needed for pain. Please do not exceed more than 4,000mg of Tylenol in a day. Please  oxycodone from pharmacy please take 1 tab ever 6 hr as needed for pain

## 2020-03-07 NOTE — ED PROVIDER NOTE - ATTENDING CONTRIBUTION TO CARE
Attending MD Juárez:  I personally have seen and examined this patient.  Resident note reviewed and agree on plan of care and except where noted.  See HPI, PE, and MDM for details.     24F presenting with 1 month of right pelvic pain, reportedly OSH ED diagnosed patient with "ovarian mass" on CT and US, patient p/w persistent pain. Patient with mild b/l lower quadrant abdominal ttp, likely pain related to known ovarian mass but we do not know the specifics of the mass. Will obtain TVUS here, labs, hcg to r/o pregnancy, re-eval. Do not suspect bowel pathology given reportedly negative CT and chronicity of symptoms.

## 2020-03-07 NOTE — ED PROVIDER NOTE - PROGRESS NOTE DETAILS
Waldemar Vargas MD: Spoke with GYN who states no emergent surgical intervention at this time. GYN rec follow up at the clinic in 1 week. Pt well appearing . Pt is ambulatory and tolerating PO. Spoke with pt about return precautions. Pt agrees to follow up with their PCP and gyn. Pt ready for discharge

## 2020-03-08 NOTE — CONSULT NOTE ADULT - ATTENDING COMMENTS
ATTG note    Pt seen at the bedside and agree with above.    In summary, pt is a 25 yo P0 virgin with known large right adnexal mass dx at [facility] 5 days ago after presenting with abd pain.  Pt dc home to f/u with f/u for outpt surgical mngt.  Pt stated that she started to have irreg bleeding ad increase in pain so came back in.  Pt has not been seen since the ED visit for f/u.  Pt given Tylenol and pain has been relieved.      VSS, afebrile  Gen-sitting up in bed with family at bedside  Abd-soft, nd, nt, no rebound, no guarding  -deferred as pt is virgin    Labs - as above,   TAS-15 cm right adnexal mass    25 yo P0 virginal with known right adnexal mass dx at another ED 5 days ago now presents with increasing pain that has now improved with Tylenol.  VSS.  No signs of acute abdomen.  Hemodynamically stable.  +HCG (virgin).  Clinically stable for dc but does need surgical intervention, but no need for GYN intervention at this time.  -Reinforced importance of GYN surgical f/u   -Referral to [facility] GYN clinic  -ED warning signs reviewed    JELANI Murrieta  -

## 2020-03-08 NOTE — CONSULT NOTE ADULT - PROBLEM SELECTOR RECOMMENDATION 9
-Recommend continued outpatient work up and follow up. Per patient has follow up planned with [facility]. However if so desires, may follow up with [facility] GYN clinic, [phone].  -Patient counseled on need for continued outpatient follow up within a single health system to ensure continuity of care  -Pain rx prn    Casi Rodriguez, PGY2  Discussed and evaluated with Dr. Murrieta -Given patient virginal status, +BHCG possibly related to adnexal mass, c/w dysgerminoma. However, definitive classification of mass can only be confirmed by pathology.    -Recommend continued outpatient work up and follow up. Per patient has follow up planned with [facility]. However if so desires, may follow up with [facility] GYN clinic, [phone].  -Patient counseled on need for continued outpatient follow up within a single health system to ensure continuity of care  -Pain rx prn    Casi Rodriguez, PGY2  Discussed and evaluated with Dr. Murrieta

## 2020-03-08 NOTE — CONSULT NOTE ADULT - SUBJECTIVE AND OBJECTIVE BOX
HPI    Patient is a 24y G0 w/ known R adnexal mass who presents with worsening RLQ x1mo. Patient reports RLQ pressure and intermittent pain, pulsating and sharp in nature which radiated to R leg that first started 1 month ago. Patient reports is improved with motrin and tylenol. She was seen in [facility] ED this past Tuesday where she was noted to have a R adnexal mass suspicious for a "germ cell tumor" per patient. Patient was discharged home with instructions to follow up outpatient for further workup and surgical planning.  Since visit, pain has increased. Additionally she reports vaginal spotting since visit, as well as increased fatigue and 1x episode of palpitations overnight.  ROS positive for early satiety. Otherwise patient denies N/V, CP, SOB, dysuria, fevers, chills.    LMP: 20    OB/GYN Provider: None    OBHx: Virgin  GYNHx: Irregular menses (patient reports q1-2months, heavy flow)  PMH: Denies  PSH: Denies  Rx: Tylenol PRN, Motrin PRN  All: Kiwi (tongue itching)  Psych Hx: Denies  Social Hx: Denies  ROS Negative unless otherwise noted in HPI    Vital Signs Last 24 Hrs  T(C): 36.9 (07 Mar 2020 23:36), Max: 37.4 (07 Mar 2020 19:54)  T(F): 98.5 (07 Mar 2020 23:36), Max: 99.4 (07 Mar 2020 19:54)  HR: 86 (07 Mar 2020 23:36) (86 - 107)  BP: 103/62 (07 Mar 2020 23:36) (103/62 - 127/77)  BP(mean): --  RR: 17 (07 Mar 2020 23:36) (17 - 18)  SpO2: 99% (07 Mar 2020 23:36) (96% - 99%)    PHYSICAL EXAM:  Constitutional: alert and oriented x 3  Respiratory: clear  Cardiovascular: regular rate and rhythm  Gastrointestinal: soft, non tender throughout, no rebound or guargind, + bowel sounds.   Genitourinary: NEFG, Cervix closed, minimal blood in the vault, no active bleeding, Minimal TTP in R adnexa    LABS:                        9.3    16.61 )-----------( 406      ( 07 Mar 2020 21:09 )             32.1     03-07    140  |  103  |  10  ----------------------------<  98  4.1   |  23  |  0.72    Ca    9.3      07 Mar 2020 21:09    TPro  7.5  /  Alb  3.8  /  TBili  0.2  /  DBili  x   /  AST  18  /  ALT  5<L>  /  AlkPhos  65  03-07      Urinalysis Basic - ( 07 Mar 2020 23:55 )    Color: Yellow / Appearance: Clear / S.031 / pH: x  Gluc: x / Ketone: Negative  / Bili: Negative / Urobili: Negative   Blood: x / Protein: Trace / Nitrite: Negative   Leuk Esterase: Negative / RBC: 54 /hpf / WBC 2 /HPF   Sq Epi: x / Non Sq Epi: 3 /hpf / Bacteria: Negative        Blood Type: O Positive    [facility]    RADIOLOGY & ADDITIONAL STUDIES:      EXAM:  US TRANSVAGINAL                          EXAM:  US PELVIC COMPLETE                          EXAM:  US DPLX PELVIC                            PROCEDURE DATE:  2020            INTERPRETATION:  CLINICAL INFORMATION: Right lower quadrant abdominal pain with vaginal bleeding. Reported ovarian mass on outside hospital scans.    LMP: 2020    COMPARISON: None available.    TECHNIQUE: Endovaginal and transabdominal pelvic sonogram. Color and Spectral Doppler was performed.     FINDINGS:    Uterus: 9.9 x 5.0 x 6.1 cm. Within normal limits.    Endometrium: Thickened measuring up to 13 mm which likely correlates with the phase of the patient's menstrual cycle.     Right ovary: Not definitely visualized. 15.2 x 9.8 x 14.8 cm heterogeneous mass in the right adnexa with internal vascularity.     Left ovary: Not definitely visualized. 4.6 x 3.3 x 4.1 cm mass in the left adnexa with internal vascularity.     Fluid: Moderate amount of free fluid in bilateral adnexa and anterior and posterior cul-de-sacs.    IMPRESSION:    Large right and small left adnexal masses. A contrast-enhanced CT of the abdomen and pelvis or contrast-enhanced MRI of the pelvis is recommended for further evaluation.                MARIPOSA PHILLIPS M.D., RADIOLOGY RESIDENT  This document has been electronically signed.  AYALA HOWARD M.D., ATTENDING RADIOLOGIST  This document has been electronically signed. Mar  8 2020  1:20AM

## 2020-03-08 NOTE — CONSULT NOTE ADULT - ASSESSMENT
Patient is a 24y G0 w/ known R adnexal mass who presents with worsening RLQ x1mo. Large R adnexal mass re-demonstrated on TVUS. Physical exam benign. Pain now much improved. Patient is currently stable and doing well, therefore no acute GYN interventions warranted at this time.

## 2020-03-09 PROBLEM — Z00.00 ENCOUNTER FOR PREVENTIVE HEALTH EXAMINATION: Status: ACTIVE | Noted: 2020-01-01

## 2020-03-20 NOTE — H&P ADULT - HISTORY OF PRESENT ILLNESS
23 yo G0, LMP 3/18 presents with abd pain x2 months with worsening over the past 2 days and associated n/v x1 week. She has h/o known adnexal masses first diagnosed on 3/5 at [facility] ED where she was discharged with suspected "germ cell tumor" and recommended outpatient follow up. She then presented to [facility] ED on 3/7 with worsening pain and TVUS at that time showed a 15.2 x 9.8 x 14.8 cm heterogenous mass in right adnexa with internal vascularity and a 4.6 x 3.3 x 4.1cm similar left adnexal mass. She was also noted to have +HCG of 104 on 3/7 with ddx including dysgerminoma given virginal status. She was again advised outpatient follow-up and surgical planning, but has not been able to schedule f/u due to unavailable outpatient appointments. She returns today with worsening abdominal pain x2 days associated with n/v x1 week and chills at home. She is currently on her menses and reports irregular periods with heavier flow over the past year. She has never been sexually active. Denies foul vaginal discharge, dysuria, hematuria, frequency, diarrhea, URI symptoms, CP, SOB. She reports baseline constipation and took milk of Mg this morning with a BM this afternoon. She denies sick contacts. She was Morphine for pain and Tylenol for low-grade fever to 38.0 in the ED.     OB/GYN Provider: No established GYN care     OBHx: G0, virginal   GYNHx: bilateral heterogenous adnexal masses, irregular menses (patient reports q1-2months, heavy flow x1 yr)  PMH: Denies  PSH: Denies  Rx: Tylenol, Motrin, Oxycodone   All: Kiwi (tongue itching), NKDA   Psych Hx: Denies  Social Hx: Denies

## 2020-03-20 NOTE — ED PROVIDER NOTE - NS ED ROS FT
GENERAL: No fever or chills, EYES: no change in vision, HEENT: no trouble swallowing or speaking, CARDIAC: no chest pain, PULMONARY: no cough or SOB, GI: +abdominal pain, +nausea, +vomiting, no diarrhea or constipation, : No changes in urination, SKIN: no rashes, NEURO: no headache,  MSK: No joint pain ~Adelfo Dunham M.D. Resident

## 2020-03-20 NOTE — ED PROVIDER NOTE - OBJECTIVE STATEMENT
24F presents  with worsening abdominal pain. Recently evaluated on 3/7/20 and found to have  15.2 x 9.8 x 14.8 cm heterogeneous mass in the right adnexa with internal vascularity. and 4.6 x 3.3 x 4.1 cm mass in the left adnexa with internal vascularity on sono. Presented today due to worsening pain and n/v. No fever, chest pain, sob, urinary or bowel irregularities. 24F presents with worsening abdominal pain of almost 2 months duration. Recently evaluated on 3/7/20 and found to have  15.2 x 9.8 x 14.8 cm heterogeneous mass in the right adnexa with internal vascularity. and 4.6 x 3.3 x 4.1 cm mass in the left adnexa with internal vascularity on sono. Endorse diffuse, sharp, achy intermittent pain worsened with movement without any relieving factors. Presented today due to worsening pain and n/v. No fever, chest pain, sob, urinary or bowel irregularities. 24F G0 presents with worsening abdominal pain of almost 2 months duration. Recently evaluated on 3/7/20 and found to have  15.2 x 9.8 x 14.8 cm heterogeneous mass in the right adnexa with internal vascularity. and 4.6 x 3.3 x 4.1 cm mass in the left adnexa with internal vascularity on sono. Endorse diffuse, sharp, achy intermittent pain worsened with movement without any relieving factors. Presented today due to worsening pain and n/v. No fever, chest pain, sob, urinary or bowel irregularities.    LMP: 2/18-2/26.

## 2020-03-20 NOTE — ED PROVIDER NOTE - CONSTITUTIONAL, MLM
normal... Awake, alert, oriented to person, place, time/situation and in moderate distress from abdom pain

## 2020-03-20 NOTE — ED PROVIDER NOTE - CLINICAL SUMMARY MEDICAL DECISION MAKING FREE TEXT BOX
24F presents  with worsening abdominal pain found to have 15cm  mass the right adnexa and 4.6cm mass in the left adnexa. Will check labs, give fluids, coags, type and screen, coags, serum HCG, Upreg, pain management, trans vag sono, +/- CT and reassess

## 2020-03-20 NOTE — ED PROVIDER NOTE - PROGRESS NOTE DETAILS
Charla ARMAS: OBGYN have been consulted and will come evaluate patient. Charla ARMAS: Plan is to get a CT Abd/pelvis Con. Patients elevated HCG levels have been discussed between patient, OBGYN and ED Team in which we are in agreement that elevated levels are due to masses. received sign out from Dr Omer pending gyn, CTap and symptom control. Gyn saw pt, CT indicative of metastatic disease. gyn discuss with gyn onc and follow, likely admit. DJ

## 2020-03-20 NOTE — H&P ADULT - PROBLEM SELECTOR PLAN 1
-Admit to Ob/Gyn service  -draw tumor markers: CA 19-9, CEA, , AFP, LDH, CA 27-29, Inhibin B  -CT chest non-contrast  -HSQ for DVT prophylaxis  -AM CBC, BMP, Mg, Phos  -d/c Zosyn, fever likely due to tumor burden  -Regular diet  -To be evaluated by gynecology oncology in AM    d/w Dr. Escamilla, Dr. Adelita Jones PGY1 -Admit to Gyn/Oncology service  -draw tumor markers: CA 19-9, CEA, , AFP, LDH, CA 27-29, Inhibin B  -CT chest non-contrast  -HSQ for DVT prophylaxis  -AM CBC, BMP, Mg, Phos  -continue Zosyn, fever likely due to tumor burden; will discontinue antibiotic once confirm cultures negative  -Regular diet  -To be evaluated by gynecology oncology in AM    d/w Dr. Escamilla, Dr. Adelita Jones PGY1

## 2020-03-20 NOTE — H&P ADULT - ASSESSMENT
25 yo G0 virginal female, LMP 2/18 presents with worsening abdominal pain, n/v, found to have + (3/7)->168.6 (3/20) and bilateral heterogenous adnexal masses with interval growth since 3/7. Right adnexal mass previously 15.2 x 9.8 x 14.8cm, now measuring 17.7 x 12.3 x 13.6cm. Left adnexal mass previously 4.6 x 3.3 x 4.1cm, now measuring 6.1 x 6.5cm. Associated internal vascularity noted in both masses. Moderate complex free fluid unchanged from prior TVUS. Patient noted to have low grade fever to 38.0 in the ED with leukocytosis to 26.98. Patient nontoxic appearing without acute abdomen. Pt s/p Morphine x1 in the ED for pain, Tylenol for low grade fever and empiric Zosyn. Rectal temp 37.9 after Tylenol.

## 2020-03-20 NOTE — CONSULT NOTE ADULT - ASSESSMENT
23 yo G0 virginal female, LMP 2/18 presents with worsening abdominal pain, n/v, found to have + (3/7)->168.6 (3/20) and bilateral heterogenous adnexal masses with interval growth since 3/7. Right adnexal mass previously 15.2 x 9.8 x 14.8cm, now measuring 17.7 x 12.3 x 13.6cm. Left adnexal mass previously 4.6 x 3.3 x 4.1cm, now measuring 6.1 x 6.5cm. Associated internal vascularity noted in both masses. Moderate complex free fluid unchanged from prior TVUS. Patient noted to have low grade fever to 38.0 in the ED with leukocytosis to 26.98. Patient nontoxic appearing without acute abdomen. Pt s/p Morphine x1 in the ED for pain, Tylenol for low grade fever and empiric Zosyn. Rectal temp 37.9 after Tylenol.    - Bcx, Ucx, lactate   - F/u CTAP for further recommendations     Esra Kana PGY2  d/w Dr. Garcia

## 2020-03-20 NOTE — ED PROVIDER NOTE - ATTENDING CONTRIBUTION TO CARE
Nemes - 23yo F w recent dg of R ovarian mass (15cm) and L ovarian mass (5cm), in the ER for worsening abdom pain in the last week, lower, R>L, a/w vomiting today, unable to tolerate any fluids or meds. Last BM today. Supposed to take Tylenol/Ibuprofen and Oxy. No other stx. VS wnl except tachy to 111, in moderate lungs clear, cardiac wnl, no JVD. Moist mucosae, pink conjunctivae. Abdomen soft, TTP to RLQ more than LLQ, no CVAT. No pedal edema, no calf TTP. Poss worsening compression mass effects vs torsion vs appy. Will get US, treat pain, get GYN consult, consider CT abdomen for further eval, r/o GI complications.

## 2020-03-20 NOTE — CONSULT NOTE ADULT - SUBJECTIVE AND OBJECTIVE BOX
23 yo G0, LMP 3/18 presents with abd pain x2 months with worsening over the past 2 days and associated n/v x1 week. She has h/o known adnexal masses first diagnosed on 3/5 at [facility] ED where she was discharged with suspected "germ cell tumor" and recommended outpatient follow up. She then presented to [facility] ED on 3/7 with worsening pain and TVUS at that time showed a 15.2 x 9.8 x 14.8 cm heterogenous mass in right adnexa with internal vascularity and a 4.6 x 3.3 x 4.1cm similar left adnexal mass. She was also noted to have +HCG of 104 on 3/7 with ddx including dysgerminoma given virginal status. She was again advised outpatient follow-up and surgical planning, but has not been able to schedule f/u due to unavailable outpatient appointments. She returns today with worsening abdominal pain x2 days associated with n/v x1 week and chills at home. She is currently on her menses and reports irregular periods with heavier flow over the past year. She has never been sexually active. Denies foul vaginal discharge, dysuria, hematuria, frequency, diarrhea, URI symptoms, CP, SOB. She reports baseline constipation and took milk of Mg this morning with a BM this afternoon. She denies sick contacts. She was Morphine for pain and Tylenol for low-grade fever to 38.0 in the ED.     OB/GYN Provider: No established GYN care     OBHx: G0, virginal   GYNHx: bilateral heterogenous adnexal masses, irregular menses (patient reports q1-2months, heavy flow x1 yr)  PMH: Denies  PSH: Denies  Rx: Tylenol, Motrin, Oxycodone   All: Kiwi (tongue itching), NKDA   Psych Hx: Denies  Social Hx: Denies      Vital Signs Last 24 Hrs  T(C): 37.8 (20 Mar 2020 17:53), Max: 38 (20 Mar 2020 15:34)  T(F): 100.1 (20 Mar 2020 17:53), Max: 100.4 (20 Mar 2020 15:34)  HR: 98 (20 Mar 2020 17:29) (98 - 111)  BP: 122/77 (20 Mar 2020 17:29) (108/68 - 122/77)  BP(mean): --  RR: 18 (20 Mar 2020 17:29) (17 - 18)  SpO2: 96% (20 Mar 2020 17:29) (96% - 99%)    Physical Exam:   General: sitting comfortably in bed, nontoxic appearing, ambulatory with NAD   CV: RRR S1S2 no m/r/g  Lungs: CTA b/l, good air flow b/l   Back: No CVA tenderness b/l  Abd: Soft, non-distended. Palpable 15cm right adnexal mass, mildly tender to palpation, no rebound or guarding       Pelvic: Scant vaginal bleeding consistent with current menses, no foul vaginal discharge, no CMT, palpable nodularity on anterior vaginal wall   Ext: non-tender b/l, no edema       LABS:                        8.9    26.98 )-----------( 615      ( 20 Mar 2020 15:35 )             30.2     03-20    140  |  101  |  10  ----------------------------<  104<H>  4.0   |  25  |  0.64    Ca    10.3      20 Mar 2020 15:35    TPro  7.9  /  Alb  3.5  /  TBili  0.3  /  DBili  x   /  AST  37  /  ALT  21  /  AlkPhos  116  03-20    I&O's Detail    PT/INR - ( 20 Mar 2020 15:35 )   PT: 13.1 sec;   INR: 1.13 ratio    PTT - ( 20 Mar 2020 15:35 )  PTT:31.8 sec      RADIOLOGY & ADDITIONAL STUDIES:    EXAM:  US DPLX PELVIC                          EXAM:  US TRANSVAGINAL                          EXAM:  US PELVIC COMPLETE                            PROCEDURE DATE:  03/20/2020            INTERPRETATION:  CLINICAL INFORMATION: Known right adnexal mass with worsening abdominal pain.    LMP: 2/18/2020.    COMPARISON: Pelvic sonogram 3/7/2020.    TECHNIQUE:     Endovaginal pelvic sonogram as per order. Transabdominal pelvic sonogram was also performed as part of our standard protocol. Color and Spectral Doppler was performed.    FINDINGS:    Uterus: 8.3 x 4.7 x 5.3 cm. Within normal limits.    Endometrium: 9 mm. Within normal limits.    Right adnexa is replaced by a large heterogeneous mass measuring 17.7 x 12.3 x 13.6 cm. Associated internal vascularity. Normal right ovary not definitely visualized.    Left adnexa is replaced with a mass measuring 6.1 x 6.5 x 0.8 cm. Associated internal vascularity. Normal left ovary is not definitely identified.    Small to moderate amount of complex free fluid within the pelvis.      IMPRESSION:     Re-demonstration of bilateral adnexal masses measuring up to 17.7 cm on the right and 6.5 cm on the left. Normal ovarian tissue is not definitely visualized.     Small to moderate amount of complex free fluid.  -----------------------------------------------------------------------------------------------------------------    EXAM:  US TRANSVAGINAL                          EXAM:  US PELVIC COMPLETE                          EXAM:  US DPLX PELVIC                            PROCEDURE DATE:  03/07/2020            INTERPRETATION:  CLINICAL INFORMATION: Right lower quadrant abdominal pain with vaginal bleeding. Reported ovarian mass on outside hospital scans.    LMP: 2/18/2020    COMPARISON: None available.    TECHNIQUE: Endovaginal and transabdominal pelvic sonogram. Color and Spectral Doppler was performed.     FINDINGS:    Uterus: 9.9 x 5.0 x 6.1 cm. Within normal limits.    Endometrium: Thickened measuring up to 13 mm which likely correlates with the phase of the patient's menstrual cycle.     Right ovary: Not definitely visualized. 15.2 x 9.8 x 14.8 cm heterogeneous mass in the right adnexa with internal vascularity.     Left ovary: Not definitely visualized. 4.6 x 3.3 x 4.1 cm mass in the left adnexa with internal vascularity.     Fluid: Moderate amount of free fluid in bilateral adnexa and anterior and posterior cul-de-sacs.    IMPRESSION:    Large right and small left adnexal masses. A contrast-enhanced CT of the abdomen and pelvis or contrast-enhanced MRI of the pelvis is recommended for further evaluation.

## 2020-03-20 NOTE — ED ADULT NURSE NOTE - OBJECTIVE STATEMENT
24y old female no PMH walk in from triage c/o abdominal pain. A&Ox3. Patient is presenting today w/ worsening abdomen pain, lower abdomen pain, feels sharp and radiates down to pelvis, pain has been persistent for 2 months, was seen at another facility and sonogram showed mass on right ovary, OBGYN appt outpatient was cancelled for follow up. Patient denies fever, chills, CP, SOB, sick contact, numbness/tingling, urinary s/s, vaginal bleeding. Patient appears pale, lung sounds clear b/t, abdomen soft, pain upon palpation in lower quadrant,. bowel sounds present in all 4 quadrants. IV inserted, medication administered as per MD order.

## 2020-03-20 NOTE — H&P ADULT - NSHPPHYSICALEXAM_GEN_ALL_CORE
Vital Signs Last 24 Hrs  T(C): 37.3 (20 Mar 2020 19:12), Max: 38 (20 Mar 2020 15:34)  T(F): 99.2 (20 Mar 2020 19:12), Max: 100.4 (20 Mar 2020 15:34)  HR: 97 (20 Mar 2020 19:12) (97 - 111)  BP: 112/71 (20 Mar 2020 19:12) (108/68 - 122/77)  BP(mean): --  RR: 17 (20 Mar 2020 19:12) (17 - 18)  SpO2: 94% (20 Mar 2020 19:12) (94% - 99%)    Physical Exam:   General: sitting comfortably in bed, nontoxic appearing, ambulatory with NAD   CV: RRR S1S2 no m/r/g  Lungs: CTA b/l, good air flow b/l   Back: No CVA tenderness b/l  Abd: Soft, non-distended. Palpable 15cm right adnexal mass, mildly tender to palpation, no rebound or guarding       Pelvic: Scant vaginal bleeding consistent with current menses, no foul vaginal discharge, no CMT, palpable nodularity on anterior vaginal wall   Ext: non-tender b/l, no edema

## 2020-03-20 NOTE — H&P ADULT - NSHPLABSRESULTS_GEN_ALL_CORE
8.9    26.98 )-----------( 615      ( 20 Mar 2020 15:35 )             30.2     03-20    140  |  101  |  10  ----------------------------<  104<H>  4.0   |  25  |  0.64    Ca    10.3      20 Mar 2020 15:35    TPro  7.9  /  Alb  3.5  /  TBili  0.3  /  DBili  x   /  AST  37  /  ALT  21  /  AlkPhos  116  03-20    I&O's Detail    PT/INR - ( 20 Mar 2020 15:35 )   PT: 13.1 sec;   INR: 1.13 ratio    PTT - ( 20 Mar 2020 15:35 )  PTT:31.8 sec    EXAM:  CT ABDOMEN AND PELVIS IC                            PROCEDURE DATE:  03/20/2020      INTERPRETATION:  CLINICAL INFORMATION: Abdominal pain    COMPARISON: Pelvic sonography performed earlier the same day, 3/20/2020.    PROCEDURE:   CT of the Abdomen and Pelvis was performed with intravenous contrast.   Intravenous contrast: 90 ml Omnipaque 350. 10 ml discarded.  Oral contrast: None.  Sagittal and coronal reformats were performed.    FINDINGS:    LOWER CHEST: Bilateral pulmonary nodules, the largest in the left lower lobe measuring 2.0 x 1.6 cm. Small left pleural effusion.    LIVER: Bilobar subcentimeter hypodense foci  BILE DUCTS: Normal caliber.  GALLBLADDER: Within normal limits.  SPLEEN: Within normal limits.  PANCREAS: Within normal limits.  ADRENALS: Within normal limits.  KIDNEYS/URETERS: Within normal limits.    BLADDER: Within normal limits.  REPRODUCTIVE ORGANS: Bilateral heterogeneous adnexal masses measuring 14.7 x 10.7 cm on the right and 6.6 x 5.8 cm on the left.    BOWEL: No bowel obstruction. Appendix is normal.  PERITONEUM: Small moderate volume ascites. Associated omental seeding is noted.  VESSELS: Within normal limits.  RETROPERITONEUM/LYMPH NODES: Upper abdominal, retroperitoneal, and pelvic lymphadenopathy. For example:  *  Celiac axis lymph node (2:34) measuring 1.4 x 1.2 cm.  *  Left para-aortic lymph node (2:54) measuring 1.4 x 1.0 cm.  *  Right external iliac lymph node (2:98) measuring 2.0 x 1.5 cm.    ABDOMINAL WALL: Within normal limits.  BONES: Metastatic lucent focus in the right iliac wing.     IMPRESSION:     Bilateral heterogeneous pelvic masses concerning for primary malignancy with metastatic disease to the lungs, liver, lymph nodes, as described above. Associated omental seeding is noted.    Small ascites.     DAISY PHELAN M.D., RADIOLOGY RESIDENT  This document has been electronically signed.  JOANA GIL M.D., ATTENDING RADIOLOGIST  This document has been electronically signed. Mar 20 2020  6:55PM

## 2020-03-21 NOTE — CONSULT NOTE ADULT - ASSESSMENT
24F w/ no PMH presenting with abdominal pain, found to have bilateral pelvic masses and extensive pelvic lymphadenopathy concerning for metastatic malignancy.    # Concern for malignancy:   - CT A/P with bilateral heterogeneous adnexal masses, as well as upper abdominal, RP and pelvic lymphadenopathy  - CT Chest with multiple solid pulmonary nodules  - tumor markers elevated: Cancer Antigen, Ca 27.29      Cancer Antigen, 125: 619      Carcinoembryonic Antigen: 44.7  - LDH: 479  - discussed with GYN, patient is not a surgical candidate given evidence of metastatic disease    - patient needs biopsy for final tissue diagnosis - if IR is consulted, recommend getting a CORE biopsy  - will follow      Jazz Sanchez MD  Hematology/Oncology Fellow, PGY-5  pager: [phone]  After 5pm or on weekends, please page the on-call fellow. 24F w/ no PMH presenting with abdominal pain, found to have bilateral pelvic masses and extensive pelvic lymphadenopathy concerning for metastatic malignancy.    # Concern for malignancy:   - CT A/P with bilateral heterogeneous adnexal masses, as well as upper abdominal, RP, possible bone lesions and pelvic lymphadenopathy  - CT Chest with multiple solid pulmonary nodules  - tumor markers elevated: Cancer Antigen, Ca 27.29      Cancer Antigen, 125: 619      Carcinoembryonic Antigen: 44.7 beta   - LDH: 479  - discussed with GYN, patient is not a surgical candidate given evidence of metastatic disease    - patient needs biopsy for final tissue diagnosis - if IR is consulted, recommend getting a CORE biopsy  - will follow      Jazz Sanchez MD  Hematology/Oncology Fellow, PGY-5  pager: [phone]  After 5pm or on weekends, please page the on-call fellow.

## 2020-03-21 NOTE — PROGRESS NOTE ADULT - SUBJECTIVE AND OBJECTIVE BOX
Patient seen and examined at bedside. No fevers overnight. Pain controlled with PO analgesics. Tolerating PO. Passing flatus and voiding. Denies chills, nausea, emesis. OOB.     Reviewed history further: Seen at [facility] and had CT pelvis, was told that she needed surgery, but it was cancelled. Has no family history of malignancy. Has never had pap, breast exam, colonoscopy.   Has occasional nausea and emesis, pelvic pain that is relieved with OTC analgesics. Has occasional right sided leg pain when she exerts herself.    MEDICATIONS  (STANDING):  acetaminophen   Tablet .. 975 milliGRAM(s) Oral every 6 hours  heparin  Injectable 5000 Unit(s) SubCutaneous every 8 hours  ibuprofen  Tablet. 600 milliGRAM(s) Oral every 6 hours  influenza   Vaccine 0.5 milliLiter(s) IntraMuscular once  sodium chloride 0.9% lock flush 3 milliLiter(s) IV Push every 8 hours    MEDICATIONS  (PRN):  oxyCODONE    IR 5 milliGRAM(s) Oral every 6 hours PRN Severe Pain (7 - 10)      Allergies    No Known Allergies    Intolerances        12 point ROS negative except as outlined above    Vital Signs Last 24 Hrs  T(C): 36.8 (21 Mar 2020 08:25), Max: 38 (20 Mar 2020 15:34)  T(F): 98.2 (21 Mar 2020 08:25), Max: 100.4 (20 Mar 2020 15:34)  HR: 90 (21 Mar 2020 08:25) (90 - 111)  BP: 104/67 (21 Mar 2020 08:25) (102/66 - 128/68)  BP(mean): 85 (21 Mar 2020 01:32) (85 - 85)  RR: 18 (21 Mar 2020 08:25) (17 - 18)  SpO2: 96% (21 Mar 2020 08:25) (94% - 99%)      PHYSICAL EXAM:    GA: NAD, A+0 x 3  CV: RRR  Pulm: CTA BL  Breasts: soft, nontender, no palpable masses  Abd:  +  BS, soft, tender to deep palpation in RLQ and LLQ, palpable mass, nondistended, no rebound or guarding,   Extremities: no swelling or calf tenderness, reflexes +2 bilaterally          LABS:             7.3    24.08 )-----------( 521      ( -21 @ 06:54 )             25.6                8.9    26.98 )-----------( 615      (  @ 15:35 )             30.2        @ 06:45    137  |  101  |  10  ----------------------------<  79  3.7   |  24  |  0.64     @ 15:35    140  |  101  |  10  ----------------------------<  104  4.0   |  25  |  0.64      Phos  3.1      @ 06:45  Mg     2.2      @ 06:45    TPro  7.9  /  Alb  3.5  /  TBili  0.3  /  DBili  x   /  AST  37  /  ALT  21  /  AlkPhos  116   @ 15:35    PT/INR - ( 20 Mar 2020 15:35 )   PT: 13.1 sec;   INR: 1.13 ratio         PTT - ( 20 Mar 2020 15:35 )  PTT:31.8 sec  Urinalysis Basic - ( 20 Mar 2020 18:03 )    Color: Yellow / Appearance: Slightly Turbid / S.029 / pH: x  Gluc: x / Ketone: Moderate  / Bili: Negative / Urobili: Negative   Blood: x / Protein: 30 mg/dL / Nitrite: Negative   Leuk Esterase: Negative / RBC: 15 /hpf / WBC 3 /HPF   Sq Epi: x / Non Sq Epi: 4 /hpf / Bacteria: Negative        RADIOLOGY & ADDITIONAL TESTS: Patient seen and examined at bedside. No fevers overnight. Pain controlled with PO analgesics. Tolerating PO. Passing flatus and voiding. Denies chills, nausea, emesis. OOB.     Reviewed history further: Seen at [facility] and had CT pelvis, was told that she needed surgery, but it was cancelled. Has no family history of malignancy, however she has no knowledge of her paternal family history.   Has never had pap, breast exam, or colonoscopy.   Has occasional nausea and emesis, pelvic pain that is relieved with OTC analgesics. Has occasional right sided leg pain when she exerts herself.    MEDICATIONS  (STANDING):  acetaminophen   Tablet .. 975 milliGRAM(s) Oral every 6 hours  heparin  Injectable 5000 Unit(s) SubCutaneous every 8 hours  ibuprofen  Tablet. 600 milliGRAM(s) Oral every 6 hours  influenza   Vaccine 0.5 milliLiter(s) IntraMuscular once  sodium chloride 0.9% lock flush 3 milliLiter(s) IV Push every 8 hours    MEDICATIONS  (PRN):  oxyCODONE    IR 5 milliGRAM(s) Oral every 6 hours PRN Severe Pain (7 - 10)    Allergies    No Known Allergies    Intolerances    12 point ROS negative except as outlined above    Vital Signs Last 24 Hrs  T(C): 36.8 (21 Mar 2020 08:25), Max: 38 (20 Mar 2020 15:34)  T(F): 98.2 (21 Mar 2020 08:25), Max: 100.4 (20 Mar 2020 15:34)  HR: 90 (21 Mar 2020 08:25) (90 - 111)  BP: 104/67 (21 Mar 2020 08:25) (102/66 - 128/68)  BP(mean): 85 (21 Mar 2020 01:32) (85 - 85)  RR: 18 (21 Mar 2020 08:25) (17 - 18)  SpO2: 96% (21 Mar 2020 08:25) (94% - 99%)    PHYSICAL EXAM:    GA: NAD, A+0 x 3  CV: RRR  Pulm: CTA BL  Breasts: soft, nontender, no palpable masses  Abd:  +  BS, soft, tender to deep palpation in RLQ and LLQ, palpable mass, nondistended, no rebound or guarding,   Extremities: no swelling or calf tenderness, reflexes +2 bilaterally    LABS:             7.3    24.08 )-----------( 521      ( 03-21 @ 06:54 )             25.6                8.9    26.98 )-----------( 615      (  @ 15:35 )             30.2        @ 06:45    137  |  101  |  10  ----------------------------<  79  3.7   |  24  |  0.64     @ 15:35    140  |  101  |  10  ----------------------------<  104  4.0   |  25  |  0.64      Phos  3.1      @ 06:45  Mg     2.2      @ 06:45    TPro  7.9  /  Alb  3.5  /  TBili  0.3  /  DBili  x   /  AST  37  /  ALT  21  /  AlkPhos  116   @ 15:35    PT/INR - ( 20 Mar 2020 15:35 )   PT: 13.1 sec;   INR: 1.13 ratio         PTT - ( 20 Mar 2020 15:35 )  PTT:31.8 sec  Urinalysis Basic - ( 20 Mar 2020 18:03 )    Color: Yellow / Appearance: Slightly Turbid / S.029 / pH: x  Gluc: x / Ketone: Moderate  / Bili: Negative / Urobili: Negative   Blood: x / Protein: 30 mg/dL / Nitrite: Negative   Leuk Esterase: Negative / RBC: 15 /hpf / WBC 3 /HPF   Sq Epi: x / Non Sq Epi: 4 /hpf / Bacteria: Negative

## 2020-03-21 NOTE — PROGRESS NOTE ADULT - PROBLEM SELECTOR PLAN 1
CV: HD stable, VSS  Pulm: Saturating well on RA, IS/OOB  GI: Reg diet as tolerated. Monitor GI function  ID: Monitor off antibiotics. Daily CBC + Diff  Heme: SQH for VTE ppx  Neuro: PO analgesics prn    I discussed all physical, laboratory, and radiologic findings with Ramila. 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 Tumor markers have all been elevated. Discussed that given the widespread disease that she would not be a surgical candidate at this time. We discussed that the primary treatment would likely be chemotherapy, but that a confirming tissue diagnosis is required before starting treatment. We discussed that in order to do this we will consult IR on Monday morning for biopsy of pulmonary nodule. Discussed that we will keep her in house until Monday to monitor fever curve and leukocytosis. Appreciate Med/Onc consultation.     Francine ARMAS CV: HD stable, VSS  Pulm: Saturating well on RA, IS/OOB  GI: Reg diet as tolerated. Monitor GI function; check FOBT.  Consider GI consult for endoscopic evaluation.  ID: Resume Zosyn until confirm blood/urine cultures are negative; check daily CBC + Diff  Heme: SQH & Venodynes for VTE ppx; check Fe studies  Neuro: PO analgesics prn    I discussed all physical, laboratory, and radiologic findings with Ramila. All findings suggest malignancy of either gynecologic (ovarian) or GI origin. Tumor markers are all elevated. Discussed that given the widespread disease that she would not be a surgical candidate at this time. We discussed that the primary treatment would likely be chemotherapy, but that confirming tissue diagnosis is required before starting treatment. We discussed that in order to do this we will consult IR on Monday morning for biopsy of pulmonary nodule. Discussed that we will keep her in house until Monday to monitor fever curve and leukocytosis.  Appreciate Med/Onc consultation.     Francine ARMAS

## 2020-03-21 NOTE — PROGRESS NOTE ADULT - ASSESSMENT
24 year old presenting with RLQ pain found to have bilateral pelvic masses, extensive lymphadenopathy, liver and lung lesions suspicious for metastasis. Presented with low grade fever and leukocytosis. 24 year old presenting with RLQ pain found to have bilateral pelvic masses, extensive lymphadenopathy, liver and lung lesions suspicious for metastasis; suspect primary GI vs Gyn origin.  Microcytic anemia with target cells suggestive of Fe deficiency.  Presented with low grade fever and leukocytosis.

## 2020-03-21 NOTE — CONSULT NOTE ADULT - SUBJECTIVE AND OBJECTIVE BOX
HPI:  25 yo G0, LMP 3/18 presents with abd pain x2 months with worsening over the past 2 days and associated n/v x1 week. She has h/o known adnexal masses first diagnosed on 3/5 at [facility] ED where she was discharged with suspected "germ cell tumor" and recommended outpatient follow up. She then presented to [facility] ED on 3/7 with worsening pain and TVUS at that time showed a 15.2 x 9.8 x 14.8 cm heterogenous mass in right adnexa with internal vascularity and a 4.6 x 3.3 x 4.1cm similar left adnexal mass. She was also noted to have +HCG of 104 on 3/7 with ddx including dysgerminoma given virginal status. She was again advised outpatient follow-up and surgical planning, but has not been able to schedule f/u due to unavailable outpatient appointments. She returns today with worsening abdominal pain x2 days associated with n/v x1 week and chills at home. She is currently on her menses and reports irregular periods with heavier flow over the past year. She has never been sexually active. Denies foul vaginal discharge, dysuria, hematuria, frequency, diarrhea, URI symptoms, CP, SOB. She reports baseline constipation and took milk of Mg this morning with a BM this afternoon. She denies sick contacts. She was Morphine for pain and Tylenol for low-grade fever to 38.0 in the ED.     OB/GYN Provider: No established GYN care     OBHx: G0, virginal   GYNHx: bilateral heterogenous adnexal masses, irregular menses (patient reports q1-2months, heavy flow x1 yr)  PMH: Denies  PSH: Denies  Rx: Tylenol, Motrin, Oxycodone   All: Kiwi (tongue itching), NKDA   Psych Hx: Denies  Social Hx: Denies       PAST MEDICAL & SURGICAL HISTORY:  No pertinent past medical history  No significant past surgical history      Allergies  No Known Allergies  Intolerances        MEDICATIONS  (STANDING):  acetaminophen   Tablet .. 975 milliGRAM(s) Oral every 6 hours  heparin  Injectable 5000 Unit(s) SubCutaneous every 8 hours  ibuprofen  Tablet. 600 milliGRAM(s) Oral every 6 hours  influenza   Vaccine 0.5 milliLiter(s) IntraMuscular once  sodium chloride 0.9% lock flush 3 milliLiter(s) IV Push every 8 hours    MEDICATIONS  (PRN):  oxyCODONE    IR 5 milliGRAM(s) Oral every 6 hours PRN Severe Pain (7 - 10)      FAMILY HISTORY:      SOCIAL HISTORY: No EtOH, no tobacco      REVIEW OF SYSTEMS:  CONSTITUTIONAL: No weakness, fevers or chills  EYES/ENT: No visual changes;  No vertigo or throat pain   NECK: No pain or stiffness  RESPIRATORY: No cough, wheezing, hemoptysis; No shortness of breath  CARDIOVASCULAR: No chest pain or palpitations  GASTROINTESTINAL: +ABDOMINAL PAIN   GENITOURINARY: No dysuria, frequency or hematuria  NEUROLOGICAL: No numbness or weakness  SKIN: No itching, burning, rashes, or lesions   All other review of systems is negative unless indicated above.      Height (cm): 162.6 (03-21 @ 03:15)  Weight (kg): 84.4 (03-21 @ 03:15)  BMI (kg/m2): 31.9 (03-21 @ 03:15)  BSA (m2): 1.9 (03-21 @ 03:15)    T(F): 98.2 (03-21-20 @ 08:25), Max: 100.4 (03-20-20 @ 15:34)  HR: 90 (03-21-20 @ 08:25)  BP: 104/67 (03-21-20 @ 08:25)  RR: 18 (03-21-20 @ 08:25)  SpO2: 96% (03-21-20 @ 08:25)      GENERAL: NAD, well-developed  HEAD:  Atraumatic, Normocephalic  EYES: EOMI, PERRLA, conjunctiva and sclera clear  NECK: Supple, No JVD  CHEST/LUNG: Clear to auscultation bilaterally; No wheeze  HEART: Regular rate and rhythm; No murmurs, rubs, or gallops  ABDOMEN: Soft, Nontender, Nondistended; Bowel sounds present  EXTREMITIES:  2+ Peripheral Pulses, No clubbing, cyanosis, or edema  NEUROLOGY: non-focal  SKIN: No rashes or lesions                          7.3    24.08 )-----------( 521      ( 21 Mar 2020 06:54 )             25.6       03-21    137  |  101  |  10  ----------------------------<  79  3.7   |  24  |  0.64    Ca    9.5      21 Mar 2020 06:45  Phos  3.1     03-21  Mg     2.2     03-21    TPro  7.9  /  Alb  3.5  /  TBili  0.3  /  DBili  x   /  AST  37  /  ALT  21  /  AlkPhos  116  03-20      Magnesium, Serum: 2.2 mg/dL (03-21 @ 06:45)  Phosphorus Level, Serum: 3.1 mg/dL (03-21 @ 06:45)  Lactate Dehydrogenase, Serum: 479 U/L (03-20 @ 23:00)      PT/INR - ( 20 Mar 2020 15:35 )   PT: 13.1 sec;   INR: 1.13 ratio         PTT - ( 20 Mar 2020 15:35 )  PTT:31.8 sec    Cancer Antigen, Ca 27.29  Cancer Antigen, 125: 619  Carcinoembryonic Antigen: 44.7  Alpha Fetoprotein - Tumor Marker <1.8      RADIOLOGY:  EXAM:  CT ABDOMEN AND PELVIS IC                        PROCEDURE DATE:  03/20/2020      INTERPRETATION:  CLINICAL INFORMATION: Abdominal pain    COMPARISON: Pelvic sonography performed earlier the same day, 3/20/2020.    PROCEDURE:   CT of the Abdomen and Pelvis was performed with intravenous contrast.   Intravenous contrast: 90 ml Omnipaque 350. 10 ml discarded.  Oral contrast: None.  Sagittal and coronal reformats were performed.    FINDINGS:    LOWER CHEST: Bilateral pulmonary nodules, the largest in the left lower lobe measuring 2.0 x 1.6 cm. Small left pleural effusion.    LIVER: Bilobar subcentimeter hypodense foci  BILE DUCTS: Normal caliber.  GALLBLADDER: Within normal limits.  SPLEEN: Within normal limits.  PANCREAS: Within normal limits.  ADRENALS: Within normal limits.  KIDNEYS/URETERS: Within normal limits.    BLADDER: Within normal limits.  REPRODUCTIVE ORGANS: Bilateral heterogeneous adnexal masses measuring 14.7 x 10.7 cm on the right and 6.6 x 5.8 cm on the left.    BOWEL: No bowel obstruction. Appendix is normal.  PERITONEUM: Small moderate volume ascites. Associated omental seeding is noted.  VESSELS: Within normal limits.  RETROPERITONEUM/LYMPH NODES: Upper abdominal, retroperitoneal, and pelvic lymphadenopathy. For example:  *  Celiac axis lymph node (2:34) measuring 1.4 x 1.2 cm.  *  Left para-aortic lymph node (2:54) measuring 1.4 x 1.0 cm.  *  Right external iliac lymph node (2:98) measuring 2.0 x 1.5 cm.    ABDOMINAL WALL: Within normal limits.  BONES: Metastatic lucent focus in the right iliac wing.     IMPRESSION:     Bilateral heterogeneous pelvic masses concerning for primary malignancy with metastatic disease to the lungs, liver, lymph nodes, as described above. Associated omental seeding is noted.    Small ascites.       DAISY PHELAN M.D., RADIOLOGY RESIDENT  This document has been electronically signed.  JOANA GIL M.D., ATTENDING RADIOLOGIST  This document has been electronically signed. Mar 20 2020  6:55PM        EXAM:  CT CHEST                        PROCEDURE DATE:  03/20/2020      INTERPRETATION:  CLINICAL INFORMATION: Abdominal pain found to have pelvic masses, evaluate for metastatic disease    COMPARISON: CT abdomen pelvis performed earlier the same day.    PROCEDURE: CT of the Chest was performed without intravenous contrast. Sagittal and coronal reformats were performed.    FINDINGS:    LUNGS AND AIRWAYS: Bilateral solid nodules measuring up to 2 cm are concerning for metastatic disease. Left lower lobe subsegmental atelectasis.    PLEURA: Small left pleural effusion.    MEDIASTINUM AND BANG: Small mediastinal lymph nodes.    VESSELS: Within normal limits.    HEART: Heart size is normal. No pericardial effusion.    CHEST WALL AND LOWER NECK: Within normal limits.  VISUALIZED UPPER ABDOMEN: Bilobar hypodense hepatic lesions are less well evaluated on this study. Small to moderate volume perihepatic and perisplenic ascites.    BONES: Lytic lesion in the sternum measuring 7 mm.    IMPRESSION:     Numerous bilateral solid pulmonary nodules in keeping with metastatic disease. Lytic osseous metastatic disease in the sternum.      DAISY PHELAN M.D., RADIOLOGY RESIDENT  This document has been electronically signed.  ISABELLA ALVA M.D., ATTENDING RADIOLOGIST  This document has been electronically signed. Mar 20 2020  9:44PM

## 2020-03-21 NOTE — ED ADULT NURSE REASSESSMENT NOTE - NS ED NURSE REASSESS COMMENT FT1
Patient states her pain increased to a 5. Patient states she feels "okay" at this pain level, but is worried about the increase pain. Administered Motrin- will reassess. Patient refuses food and water at this time.

## 2020-03-22 NOTE — CONSULT NOTE ADULT - SUBJECTIVE AND OBJECTIVE BOX
Chief Complaint:  Patient is a 24y old  Female who presents with a chief complaint of b/l pelvic masses (21 Mar 2020 10:14)    HPI:  24 year old female with no significant past medical history presents with worsening abdominal pain over the past two days. Patient has had abdominal pain for the past 2 months, which has worsened over two days associated with nausea and vomiting. She went to [facility] ED and was discharged with a suspected germ cell tumor on 3/5. She later presented to the [facility] on 3/7 and underwent a TVUS showing a 15.2 x 9.8 x 14.8 cm heterogenous mass in right adnexa with internal vascularity and a 4.6 x 3.3 x 4.1cm similar left adnexal mass. She was advised to follow up as an outpatient, but could not find available appointments. Given her worsening abdominal pain, she came to the ED. She is currently on her menses and reports irregular periods with heavier flow over the past year. She has never been sexually active. She reports fevers, chills, nausea and vomiting. Denies foul vaginal discharge, dysuria, hematuria, frequency, diarrhea, URI symptoms, CP, SOB. GI consulted for worsening anemia since admission without melena, hematochezia or hematemesis. Patient reports that she is currently menstruating.    Allergies:  No Known Allergies    Home Medications:  Reviewed    Hospital Medications:  acetaminophen   Tablet .. 975 milliGRAM(s) Oral every 6 hours  heparin  Injectable 5000 Unit(s) SubCutaneous every 8 hours  ibuprofen  Tablet. 600 milliGRAM(s) Oral every 6 hours  influenza   Vaccine 0.5 milliLiter(s) IntraMuscular once  oxyCODONE    IR 5 milliGRAM(s) Oral every 6 hours PRN  piperacillin/tazobactam IVPB.. 3.375 Gram(s) IV Intermittent every 8 hours  sodium chloride 0.9% lock flush 3 milliLiter(s) IV Push every 8 hours    PMHX/PSHX:  No pertinent past medical history  No significant past surgical history    Family history:    Family hx of prostate cancer    Social History:   No history of tobacco use, EtOH use or illicit drug use     ROS:   General:  No fevers, chills   ENT:  No sore throat or dysphagia  CV:  No pain or palpitations  Resp:  No dyspnea, cough, wheezing  GI:  + pain, No nausea, No vomiting,  No rectal bleeding, No tarry stools  Skin:  No rash or edema    PHYSICAL EXAM:   GENERAL:  NAD, Appears stated age  HEENT:  NC/AT,  conjunctivae clear and pink, sclera -anicteric  CHEST:  CTA B/L, Normal effort  HEART:  RRR S1/S2  ABDOMEN:  Soft, tender in lower abdomen, non-distended, BS+  EXTEREMITIES:  No cyanosis  SKIN:  Warm & Dry  NEURO:  Alert, oriented    Vital Signs:  Vital Signs Last 24 Hrs  T(C): 37.2 (22 Mar 2020 00:22), Max: 37.2 (21 Mar 2020 16:48)  T(F): 98.9 (22 Mar 2020 00:22), Max: 99 (21 Mar 2020 16:48)  HR: 87 (22 Mar 2020 00:22) (87 - 100)  BP: 102/63 (22 Mar 2020 00:22) (102/60 - 104/67)  BP(mean): --  RR: 17 (22 Mar 2020 00:22) (17 - 18)  SpO2: 96% (22 Mar 2020 00:22) (95% - 96%)  Daily     Daily     LABS:                        7.3    24.08 )-----------( 521      ( 21 Mar 2020 06:54 )             25.6     Mean Cell Volume: 78.0 fl (-20 @ 06:54)    -    137  |  101  |  10  ----------------------------<  79  3.7   |  24  |  0.64    Ca    9.5      21 Mar 2020 06:45  Phos  3.1     -  Mg     2.2     -    TPro  7.9  /  Alb  3.5  /  TBili  0.3  /  DBili  x   /  AST  37  /  ALT  21  /  AlkPhos  116  03-20    LIVER FUNCTIONS - ( 20 Mar 2020 15:35 )  Alb: 3.5 g/dL / Pro: 7.9 g/dL / ALK PHOS: 116 U/L / ALT: 21 U/L / AST: 37 U/L / GGT: x           PT/INR - ( 20 Mar 2020 15:35 )   PT: 13.1 sec;   INR: 1.13 ratio         PTT - ( 20 Mar 2020 15:35 )  PTT:31.8 sec  Urinalysis Basic - ( 20 Mar 2020 18:03 )    Color: Yellow / Appearance: Slightly Turbid / S.029 / pH: x  Gluc: x / Ketone: Moderate  / Bili: Negative / Urobili: Negative   Blood: x / Protein: 30 mg/dL / Nitrite: Negative   Leuk Esterase: Negative / RBC: 15 /hpf / WBC 3 /HPF   Sq Epi: x / Non Sq Epi: 4 /hpf / Bacteria: Negative                          7.3    24.08 )-----------( 521      ( 21 Mar 2020 06:54 )             25.6                         8.9    26.98 )-----------( 615      ( 20 Mar 2020 15:35 )             30.2     Imaging:

## 2020-03-22 NOTE — PROGRESS NOTE ADULT - SUBJECTIVE AND OBJECTIVE BOX
INTERVAL HPI/OVERNIGHT EVENTS:  Patient S&E at bedside. No o/n events,     VITAL SIGNS:  T(F): 97.6 (20 @ 07:49)  HR: 106 (20 @ 07:49)  BP: 112/73 (20 @ 07:49)  RR: 18 (20 @ 07:49)  SpO2: 98% (20 @ 07:49)  Wt(kg): --    PHYSICAL EXAM:    Constitutional: NAD  Eyes: EOMI, sclera non-icteric  Neck: supple, no masses, no JVD  Respiratory: CTA b/l, good air entry b/l  Cardiovascular: RRR, no M/R/G  Gastrointestinal: soft, NTND, no masses palpable, + BS, no hepatosplenomegaly  Extremities: no c/c/e  Neurological: AAOx3      MEDICATIONS  (STANDING):  acetaminophen   Tablet .. 975 milliGRAM(s) Oral every 6 hours  heparin  Injectable 5000 Unit(s) SubCutaneous every 8 hours  ibuprofen  Tablet. 600 milliGRAM(s) Oral every 6 hours  influenza   Vaccine 0.5 milliLiter(s) IntraMuscular once  piperacillin/tazobactam IVPB.. 3.375 Gram(s) IV Intermittent every 8 hours  sodium chloride 0.9% lock flush 3 milliLiter(s) IV Push every 8 hours    MEDICATIONS  (PRN):  oxyCODONE    IR 5 milliGRAM(s) Oral every 6 hours PRN Severe Pain (7 - 10)      Allergies    No Known Allergies    Intolerances        LABS:                        8.1    26.91 )-----------( 582      ( 22 Mar 2020 10:40 )             27.2         140  |  103  |  9   ----------------------------<  80  4.1   |  24  |  0.67    Ca    9.5      22 Mar 2020 07:07  Phos  3.0       Mg     2.1         TPro  7.9  /  Alb  3.5  /  TBili  0.3  /  DBili  x   /  AST  37  /  ALT  21  /  AlkPhos  116  -    PT/INR - ( 20 Mar 2020 15:35 )   PT: 13.1 sec;   INR: 1.13 ratio         PTT - ( 20 Mar 2020 15:35 )  PTT:31.8 sec  Urinalysis Basic - ( 20 Mar 2020 18:03 )    Color: Yellow / Appearance: Slightly Turbid / S.029 / pH: x  Gluc: x / Ketone: Moderate  / Bili: Negative / Urobili: Negative   Blood: x / Protein: 30 mg/dL / Nitrite: Negative   Leuk Esterase: Negative / RBC: 15 /hpf / WBC 3 /HPF   Sq Epi: x / Non Sq Epi: 4 /hpf / Bacteria: Negative        RADIOLOGY & ADDITIONAL TESTS:  Studies reviewed.

## 2020-03-22 NOTE — PROGRESS NOTE ADULT - PROBLEM SELECTOR PLAN 1
CV: HD stable. Hgb 8.1- continue to trend  Pulm: Saturating well on RA, IS/OOB  GI: Reg diet as tolerated. Monitor GI function; FOBT negative.  Appreciate GI consultation and recommendations. IV antiemetics prn. NPO at midnight.  ID: Continue Zosyn until confirm blood/urine cultures are negative; check daily CBC + Diff  Heme: SQH & Venodynes for VTE ppx  Neuro: IV analgesics prn    I had a discussion with Ramila's aunt Brittney at the patient's request. Discussed all findings to date and plan.   Discussed with IR Attending on Call and imaging reviewed. Pulm lesion not ideal 2/2 location will defer to their judgment either biopsy of bone lesion vs. ovary based on safer site of biopsy that is likely to yield diagnostic tissue. She will be made NPO at midnight with labs tomorrow morning and we will attempt to add on to IR schedule pending their availability.  Discussed with Dr. Baugh from Hematology/Oncology- will likely keep patient in-house until biopsy results and possibly start systemic chemotherapy in-house. Patient and Aunt agreeable to this plan.    Francine ARMAS CV: HD stable. Hgb 8.1- continue to trend  Pulm: Saturating well on RA, IS/OOB  GI: Reg diet as tolerated. Monitor GI function; FOBT negative.  Appreciate GI consultation and recommendations. IV antiemetics prn. NPO at midnight.  ID: Continue Zosyn until confirm blood/urine cultures are negative; check daily CBC + Diff  Heme: SQH & Venodynes for VTE ppx  Neuro: IV analgesics prn    I had a discussion with Ramila's aunt Brittney at the patient's request. Discussed all findings to date and plan.   Discussed with IR Attending on Call and imaging reviewed. Pulm lesion not ideal 2/2 location will defer to their judgment either biopsy of bone lesion vs. ovary based on safer site of biopsy that is likely to yield core diagnostic tissue. She will be made NPO at midnight with labs tomorrow morning and we will attempt to add on to IR schedule pending their availability.    Discussed with Dr. Baugh from Hematology/Oncology- will likely keep patient in-house until biopsy results and possibly start systemic chemotherapy in-house due to extensive disease burden & associated symptoms.    Patient and Aunt agreeable to this plan.    Francine ARMAS

## 2020-03-22 NOTE — PROGRESS NOTE ADULT - ASSESSMENT
JAQUAN Masters is a 25 year old female admitted for the evaluation and treatment ob new bilateral ovarian masses with retroperitoneal lymph node enlargement and small bone lesions. There is no prior history of cancer gastrointestinal or gynecological).She is not pregnant and she is not known to have hypertension of hirsutism There is a moderate elevation of the beta HCG and she has experienced nausea.   She is planned fo IR directed biopsy to see if a neoplasm may be identified. She has elevation od other cancer markers and suspicion of dysgerminoma is present. Discussion of her current status with her aunt who was contacted by telephone

## 2020-03-22 NOTE — PROGRESS NOTE ADULT - SUBJECTIVE AND OBJECTIVE BOX
Patient seen and examined at bedside at 9:30 AM and I returned at approximately 10:30 AM to have phone discussion with patient's Aunt (Brittney)    Overnight patient had pain that was moderately controlled with PO regimen, however, she would like to try IV analgesics now. One episode of small volume emesis that she states has been happening at home with she does not take analgesics. OOB, tolerating PO, voiding. No CP/SOB, dizziness, fatigue, palpitations, fevers, chills.     MEDICATIONS  (STANDING):  acetaminophen   Tablet .. 975 milliGRAM(s) Oral every 6 hours  heparin  Injectable 5000 Unit(s) SubCutaneous every 8 hours  ibuprofen  Tablet. 600 milliGRAM(s) Oral every 6 hours  influenza   Vaccine 0.5 milliLiter(s) IntraMuscular once  piperacillin/tazobactam IVPB.. 3.375 Gram(s) IV Intermittent every 8 hours  sodium chloride 0.9% lock flush 3 milliLiter(s) IV Push every 8 hours    MEDICATIONS  (PRN):  oxyCODONE    IR 5 milliGRAM(s) Oral every 6 hours PRN Severe Pain (7 - 10)      Allergies    No Known Allergies    Intolerances        12 point ROS negative except as outlined above    Vital Signs Last 24 Hrs  T(C): 36.4 (22 Mar 2020 07:49), Max: 37.2 (21 Mar 2020 16:48)  T(F): 97.6 (22 Mar 2020 07:49), Max: 99 (21 Mar 2020 16:48)  HR: 106 (22 Mar 2020 07:49) (87 - 106)  BP: 112/73 (22 Mar 2020 07:49) (102/60 - 112/73)  BP(mean): --  RR: 18 (22 Mar 2020 07:49) (17 - 18)  SpO2: 98% (22 Mar 2020 07:49) (95% - 98%)    PHYSICAL EXAM:    GA: NAD, A+0 x 3  CV: Tachycardic, nl s1 s2 no mrg  Pulm: CTA BL  Abd:  +  BS, soft, nontender, nondistended, no rebound or guarding, palpable mass in lower quadrants  Extremities: no swelling or calf tenderness          LABS:             8.1    26.91 )-----------( 582      ( 03-22 @ 10:40 )             27.2                7.3    24.08 )-----------( 521      (  @ 06:54 )             25.6                8.9    26.98 )-----------( 615      (  @ 15:35 )             30.2        @ 07:07    140  |  103  |  9   ----------------------------<  80  4.1   |  24  |  0.67     @ 06:45    137  |  101  |  10  ----------------------------<  79  3.7   |  24  |  0.64     @ 15:35    140  |  101  |  10  ----------------------------<  104  4.0   |  25  |  0.64      Phos  3.0      @ 07:07  Phos  3.1      06:45  Mg     2.1      @ 07:07  Mg     2.2      @ 06:45    TPro  7.9  /  Alb  3.5  /  TBili  0.3  /  DBili  x   /  AST  37  /  ALT  21  /  AlkPhos  116   @ 15:35    PT/INR - ( 20 Mar 2020 15:35 )   PT: 13.1 sec;   INR: 1.13 ratio         PTT - ( 20 Mar 2020 15:35 )  PTT:31.8 sec  Urinalysis Basic - ( 20 Mar 2020 18:03 )    Color: Yellow / Appearance: Slightly Turbid / S.029 / pH: x  Gluc: x / Ketone: Moderate  / Bili: Negative / Urobili: Negative   Blood: x / Protein: 30 mg/dL / Nitrite: Negative   Leuk Esterase: Negative / RBC: 15 /hpf / WBC 3 /HPF   Sq Epi: x / Non Sq Epi: 4 /hpf / Bacteria: Negative        RADIOLOGY & ADDITIONAL TESTS: Patient seen and examined at bedside at 9:30 AM and I returned at approximately 10:30 AM to have phone discussion with patient's Aunt (Brittney)    Overnight patient had pain that was moderately controlled with PO regimen, however, she would like to try IV analgesics now. One episode of small volume emesis that she states has been happening at home with she does not take analgesics. OOB, tolerating PO, voiding. No CP/SOB, dizziness, fatigue, palpitations, fevers, chills.     MEDICATIONS  (STANDING):  acetaminophen   Tablet .. 975 milliGRAM(s) Oral every 6 hours  heparin  Injectable 5000 Unit(s) SubCutaneous every 8 hours  ibuprofen  Tablet. 600 milliGRAM(s) Oral every 6 hours  influenza   Vaccine 0.5 milliLiter(s) IntraMuscular once  piperacillin/tazobactam IVPB.. 3.375 Gram(s) IV Intermittent every 8 hours  sodium chloride 0.9% lock flush 3 milliLiter(s) IV Push every 8 hours    MEDICATIONS  (PRN):  oxyCODONE    IR 5 milliGRAM(s) Oral every 6 hours PRN Severe Pain (7 - 10)      Allergies    No Known Allergies    Intolerances        12 point ROS negative except as outlined above    Vital Signs Last 24 Hrs  T(C): 36.4 (22 Mar 2020 07:49), Max: 37.2 (21 Mar 2020 16:48)  T(F): 97.6 (22 Mar 2020 07:49), Max: 99 (21 Mar 2020 16:48)  HR: 106 (22 Mar 2020 07:49) (87 - 106)  BP: 112/73 (22 Mar 2020 07:49) (102/60 - 112/73)  BP(mean): --  RR: 18 (22 Mar 2020 07:49) (17 - 18)  SpO2: 98% (22 Mar 2020 07:49) (95% - 98%)    PHYSICAL EXAM:    GA: NAD, A+0 x 3  CV: Tachycardic, nl s1 s2 no mrg  Pulm: CTA BL  Abd:  +  BS, soft, nontender, nondistended, no rebound or guarding, palpable mass in lower quadrants  Extremities: no swelling or calf tenderness          LABS:             8.1    26.91 )-----------( 582      ( 03-22 @ 10:40 )             27.2                7.3    24.08 )-----------( 521      (  @ 06:54 )             25.6                8.9    26.98 )-----------( 615      (  @ 15:35 )             30.2        @ 07:07    140  |  103  |  9   ----------------------------<  80  4.1   |  24  |  0.67     @ 06:45    137  |  101  |  10  ----------------------------<  79  3.7   |  24  |  0.64     @ 15:35    140  |  101  |  10  ----------------------------<  104  4.0   |  25  |  0.64      Phos  3.0      @ 07:07  Phos  3.1      06:45  Mg     2.1      @ 07:07  Mg     2.2      @ 06:45    TPro  7.9  /  Alb  3.5  /  TBili  0.3  /  DBili  x   /  AST  37  /  ALT  21  /  AlkPhos  116   @ 15:35    PT/INR - ( 20 Mar 2020 15:35 )   PT: 13.1 sec;   INR: 1.13 ratio         PTT - ( 20 Mar 2020 15:35 )  PTT:31.8 sec  Urinalysis Basic - ( 20 Mar 2020 18:03 )    Color: Yellow / Appearance: Slightly Turbid / S.029 / pH: x  Gluc: x / Ketone: Moderate  / Bili: Negative / Urobili: Negative   Blood: x / Protein: 30 mg/dL / Nitrite: Negative   Leuk Esterase: Negative / RBC: 15 /hpf / WBC 3 /HPF   Sq Epi: x / Non Sq Epi: 4 /hpf / Bacteria: Negative

## 2020-03-22 NOTE — CONSULT NOTE ADULT - ASSESSMENT
Impression:  # Worsening microcytic anemia: No overt signs of GI bleeding at this time. Suspect related to current menstruation. Potential GI bleed differential includes PUD, AVMs. Less likely CRC, but given metastatic disease - remains of differential  # Ovarian Masses with likely metastatic disease. Plan for IR guided biopsy tomorrow (lung).    Recommendation:  - Trend Hb  - Follow up IR regarding biopsy  - If Hb continues to trend down, can consider EGD/Colonoscopy to evaluate for potential GI bleed later this week  - Supportive care per primary team    Corie Bass MD  Gastroenterology Fellow  [phone]  Please page on call fellow on weekends and after 5pm on weekdays

## 2020-03-22 NOTE — PROVIDER CONTACT NOTE (OTHER) - ACTION/TREATMENT ORDERED:
Notified MD Jerry of pt nausea & vomiting. Ordered 4mg of zofran. Administered as ordered. Will continue to monitor pt.

## 2020-03-22 NOTE — PROGRESS NOTE ADULT - ASSESSMENT
24 year old presenting with RLQ pain found to have bilateral pelvic masses, extensive lymphadenopathy, liver and lung lesions suspicious for metastasis; suspect primary GI vs Gyn origin.  Microcytic anemia with target cells suggestive of Fe deficiency.  Presented with low grade fever and leukocytosis. 24 year old presenting with RLQ pain found to have bilateral pelvic masses, extensive lymphadenopathy, liver, lung & bone lesions suspicious for metastasis; suspect primary Gyn vs less likely GI origin.  Microcytic anemia with target cells suggestive of Fe deficiency.  Presented with low grade fever and leukocytosis.

## 2020-03-23 NOTE — PROGRESS NOTE ADULT - ATTENDING COMMENTS
No role for endoscopic evaluation at this time  Pending pathology or active GI rather than GYN bleeding, can reconsider role of endoscopy  GI will follow

## 2020-03-23 NOTE — PROGRESS NOTE ADULT - ASSESSMENT
24 year old presenting with RLQ pain found to have bilateral pelvic masses, extensive lymphadenopathy, liver, lung & bone lesions suspicious for metastasis; suspect primary Gyn vs less likely GI origin.  Microcytic anemia with target cells suggestive of Fe deficiency.  Presented with low grade fever and leukocytosis.  HD#4    Spoke w GANESH Lopez this AM regarding biopsy, can do biopsy on Wednesday due to availability of CT booking time.  Will likely biopsy R iliac.  NPO, labs and hold AC on Tuesday night.

## 2020-03-23 NOTE — PROGRESS NOTE ADULT - PROBLEM SELECTOR PLAN 1
CV: HD stable. Hgb 8.1->8.2 this AM, continue to trend  Pulm: Saturating well on RA, encourage OOB and incentive spirometer use  GI: Reg diet as tolerated. Monitor GI function; FOBT negative.  Appreciate GI consultation and recommendations. IV antiemetics prn.  ID: Zosyn (3/20-3/22), discontinued yesterday, urine culture negative, blood cultures NGTD, f/u final; check daily CBC + Diff  Heme: SQH & Venodynes for VTE ppx  Neuro: IV analgesics prn    JAQUAN Yuen PGY3

## 2020-03-23 NOTE — PROGRESS NOTE ADULT - ASSESSMENT
Impression:  # Worsening microcytic anemia: No overt signs of GI bleeding at this time. Suspect related to current menstruation.   # Ovarian Masses with likely metastatic disease. Plan for IR guided biopsy tomorrow (lung).    Recommendation:  - Trend Hb  - Follow up IR regarding biopsy  - If Hb continues to downtrend after cessation of menses, will consider endoscopic evaluation  - Supportive care per primary team    Corie Bass MD  Gastroenterology Fellow  [phone]  Please page on call fellow on weekends and after 5pm on weekdays

## 2020-03-23 NOTE — PROGRESS NOTE ADULT - ATTENDING COMMENTS
Communicated with R team during the day and pt seen ~630p. Resting comfortably. We disc her planned bx, likely on Wed due to IR schedule. GI recs noted.

## 2020-03-23 NOTE — PROGRESS NOTE ADULT - SUBJECTIVE AND OBJECTIVE BOX
Chief Complaint:  Patient is a 24y old  Female who presents with a chief complaint of b/l pelvic masses (23 Mar 2020 09:24)    Interval Events:   Complains of abdominal discomfort  Continues to have vaginal bleeding    Allergies:  No Known Allergies    Hospital Medications:  acetaminophen  IVPB .. 1000 milliGRAM(s) IV Intermittent once  heparin  Injectable 5000 Unit(s) SubCutaneous every 8 hours  influenza   Vaccine 0.5 milliLiter(s) IntraMuscular once  ketorolac   Injectable 30 milliGRAM(s) IV Push every 6 hours  lactated ringers. 1000 milliLiter(s) IV Continuous <Continuous>  morphine  - Injectable 2 milliGRAM(s) IV Push every 4 hours PRN  ondansetron Injectable 4 milliGRAM(s) IV Push every 6 hours PRN  sodium chloride 0.9% lock flush 3 milliLiter(s) IV Push every 8 hours    PMHX/PSHX:  No pertinent past medical history  No significant past surgical history    ROS:   General:  No fevers, chills  ENT:  No sore throat or dysphagia  CV:  No pain or palpitations  Resp:  No dyspnea, cough or  wheezing  GI:  + pain, No nausea, No vomiting,  No rectal bleeding, No tarry stools,  Skin:  No rash or edema    PHYSICAL EXAM:   Vital Signs:  Vital Signs Last 24 Hrs  T(C): 37 (23 Mar 2020 08:36), Max: 37.2 (23 Mar 2020 00:23)  T(F): 98.6 (23 Mar 2020 08:36), Max: 98.9 (23 Mar 2020 00:23)  HR: 80 (23 Mar 2020 08:36) (80 - 111)  BP: 101/61 (23 Mar 2020 08:36) (101/61 - 116/75)  BP(mean): --  RR: 18 (23 Mar 2020 08:36) (17 - 18)  SpO2: 96% (23 Mar 2020 08:36) (96% - 97%)  Daily     Daily     GENERAL:  NAD, Appears stated age  HEENT:  NC/AT,  conjunctivae clear and pink, sclera -anicteric  CHEST:  Normal Effort,  EXTEREMITIES:  no cyanosis  SKIN:  No rash or erythema  NEURO:  Alert, oriented    LABS:                        8.2    25.61 )-----------( 555      ( 23 Mar 2020 05:26 )             28.7     Mean Cell Volume: 78.4 fl (03-23-20 @ 05:26)    03-23    139  |  104  |  9   ----------------------------<  94  4.3   |  26  |  0.67    Ca    9.8      23 Mar 2020 05:26  Phos  2.9     03-23  Mg     2.0     03-23        PT/INR - ( 23 Mar 2020 05:26 )   PT: 13.6 sec;   INR: 1.18 ratio         PTT - ( 23 Mar 2020 05:26 )  PTT:34.8 sec                            8.2    25.61 )-----------( 555      ( 23 Mar 2020 05:26 )             28.7                         8.1    26.91 )-----------( 582      ( 22 Mar 2020 10:40 )             27.2                         7.3    24.08 )-----------( 521      ( 21 Mar 2020 06:54 )             25.6                         8.9    26.98 )-----------( 615      ( 20 Mar 2020 15:35 )             30.2       Imaging:

## 2020-03-23 NOTE — PROGRESS NOTE ADULT - SUBJECTIVE AND OBJECTIVE BOX
R3 GYN ONC Progress Note    INTERVAL HPI/OVERNIGHT EVENTS: Pt seen and examined at bedside.  Pt complains of some abdominal discomfort, however just received IV medication, and is feeling better.  Denies fevers, chills, CP, SOB.  Still bleeding vaginally from her menses.  Is tolerating PO, ambulating, voiding without issue.    Has been NPO at midnight for IR biopsy today.    MEDICATIONS  (STANDING):  acetaminophen  IVPB .. 1000 milliGRAM(s) IV Intermittent once  heparin  Injectable 5000 Unit(s) SubCutaneous every 8 hours  influenza   Vaccine 0.5 milliLiter(s) IntraMuscular once  ketorolac   Injectable 30 milliGRAM(s) IV Push every 6 hours  lactated ringers. 1000 milliLiter(s) (125 mL/Hr) IV Continuous <Continuous>  sodium chloride 0.9% lock flush 3 milliLiter(s) IV Push every 8 hours    MEDICATIONS  (PRN):  morphine  - Injectable 2 milliGRAM(s) IV Push every 4 hours PRN Severe Pain (7 - 10)  ondansetron Injectable 4 milliGRAM(s) IV Push every 6 hours PRN Nausea and/or Vomiting      Allergies    No Known Allergies        12 point ROS negative except as outlined above    Vital Signs Last 24 Hrs  T(C): 37 (23 Mar 2020 08:36), Max: 37.2 (23 Mar 2020 00:23)  T(F): 98.6 (23 Mar 2020 08:36), Max: 98.9 (23 Mar 2020 00:23)  HR: 80 (23 Mar 2020 08:36) (80 - 111)  BP: 101/61 (23 Mar 2020 08:36) (101/61 - 116/75)  RR: 18 (23 Mar 2020 08:36) (17 - 18)  SpO2: 96% (23 Mar 2020 08:36) (96% - 97%)    PHYSICAL EXAM:    GA: NAD, A+0 x 3  CV: RRR, nl S1/S2  Pulm: CTA BL  Abd:  + BS, soft, mildly tender to palpation in lower quadrants, nondistended, no rebound or guarding, palpable mass in lower quadrants  Extremities: no swelling or calf tenderness      LABS:                        8.2    25.61 )-----------( 555      ( 23 Mar 2020 05:26 )             28.7     03-23    139  |  104  |  9   ----------------------------<  94  4.3   |  26  |  0.67    Ca    9.8      23 Mar 2020 05:26  Phos  2.9     03-23  Mg     2.0     03-23      PT/INR - ( 23 Mar 2020 05:26 )   PT: 13.6 sec;   INR: 1.18 ratio         PTT - ( 23 Mar 2020 05:26 )  PTT:34.8 sec      RADIOLOGY & ADDITIONAL TESTS:

## 2020-03-24 NOTE — PROGRESS NOTE ADULT - ASSESSMENT
24 year old presenting with RLQ pain found to have bilateral pelvic masses, extensive lymphadenopathy, liver, lung & bone lesions suspicious for metastasis; suspect primary Gyn vs less likely GI origin.  Microcytic anemia with target cells suggestive of Fe deficiency.  Presented with low grade fever and leukocytosis.  IR biopsy scheduled for Wednesday at 9:30am.  HD#5

## 2020-03-24 NOTE — PROVIDER CONTACT NOTE (OTHER) - ACTION/TREATMENT ORDERED:
Notified MD Lugo of pt temp. Gyn onc MD Joshua Torres put in order for 975mg of Tylenol. Will administer as ordered. Will continue to monitor pt. Notified MD Lugo of pt temp. MD Joshua Torres put in order for 975mg of Tylenol. Will administer as ordered. Will continue to monitor pt. Notified MD Brittney Lundberg of pt temp. MD Joshua Torres put in order for 975mg of Tylenol. Will administer as ordered. Will continue to monitor pt.

## 2020-03-24 NOTE — CHART NOTE - NSCHARTNOTEFT_GEN_A_CORE
S: Pt seen at bedside, called by RN, tachy to 140s.    Pt states feels tired, wants to shower before her mother gets here but feels tired.  Denies CP, states occasionally gets SOB w hot and cold flashes.  Also endorses headache, which she thinks is associated w hot and cold flashes.   Had a small bowel movement earlier w miralax, but still feels constipated and bloated.  Has not been passing much gas.    Also has not eaten much today, had some grapes but felt nauseous, has not eaten anything for lunch, not drinking much.    Is out of bed and ambulating, but not much.  Will bring incentive spirometer to bedside, encourage use.    Physical Exam  Vitals  T(C): 37.5 (03-24-20 @ 15:27), Max: 37.9 (03-24-20 @ 00:20)  HR: 142 (03-24-20 @ 15:27) (91 - 142)  BP: 105/67 (03-24-20 @ 15:27) (104/67 - 118/64)  RR: 20 (03-24-20 @ 15:27) (18 - 20)  SpO2: 95% (03-24-20 @ 15:27) (94% - 98%)  Gen: appears tired, NAD  Cardio: tachy to 120, nl S1/S2, no m/r/g appreciated  Pulm: CTABL w good inspiratory effort  Abd: soft, non distended, tender to palpation in lower quadrants  Extr: non tender to palpation b/l, no edema or erythema noted    A/P: 24 year old w bilateral pelvic masses, extensive lymphadenopathy, liver, lung & bone lesions suspicious for metastasis; suspect primary Gyn vs less likely GI origin.  IR biopsy scheduled for Wednesday at 9:30am.  HD#5  Tachycardia likely component of dehydration and pain, will give fluids and pain meds and continue to monitor.  - IV tylenol for pain, pt declining morphine and likely to exacerbate constipation; c/w toradol as prescribed  - will start bowel regimen  - GI ppx, antiemetics prn, encourage PO hydration, bolus of 500cc and restart fluids LR@125  - repeat VS, continue close monitoring  - IR biopsy tomorrow EBONIE Yuen PGY3 S: Pt seen at bedside, called by RN, tachy to 140s.    Pt states feels tired, wants to shower before her mother gets here but feels tired.  Denies CP, states occasionally gets SOB w hot and cold flashes.  Also endorses headache, which she thinks is associated w hot and cold flashes.   Had a small bowel movement earlier w miralax, but still feels constipated and bloated.  Has not been passing much gas.    Also has not eaten much today, had some grapes but felt nauseous, has not eaten anything for lunch, not drinking much.    Is out of bed and ambulating, but not much.  Will bring incentive spirometer to bedside, encourage use.    Physical Exam  Vitals  T(C): 37.5 (03-24-20 @ 15:27), Max: 37.9 (03-24-20 @ 00:20)  HR: 142 (03-24-20 @ 15:27) (91 - 142)  BP: 105/67 (03-24-20 @ 15:27) (104/67 - 118/64)  RR: 20 (03-24-20 @ 15:27) (18 - 20)  SpO2: 95% (03-24-20 @ 15:27) (94% - 98%)  Gen: appears tired, NAD  Cardio: tachy to 120, nl S1/S2, no m/r/g appreciated  Pulm: CTABL w good inspiratory effort  Abd: soft, non distended, tender to palpation in lower quadrants  Extr: non tender to palpation b/l, no edema or erythema noted    A/P: 24 year old w bilateral pelvic masses, extensive lymphadenopathy, liver, lung & bone lesions suspicious for metastasis; suspect primary Gyn vs less likely GI origin.  IR biopsy scheduled for Wednesday at 9:30am.  HD#5  Tachycardia likely component of dehydration and pain, will give fluids and pain meds and continue to monitor VS, O2 Sa.  If persistent tachycardia, will consider CTAngio, given pt is in a hypercoagulable state.  - IV tylenol for pain, pt declining morphine and likely to exacerbate constipation; c/w toradol as prescribed  - will start bowel regimen  - GI ppx, antiemetics prn, encourage PO hydration, bolus of 500cc and restart fluids LR@125  - repeat VS, continue close monitoring  - IR biopsy tomorrow EBONIE Yuen PGY3 S: Pt seen at bedside, called by RN, tachy to 140s.    Pt states feels tired, wants to shower before her mother gets here but feels tired.  Denies CP, states occasionally gets SOB w hot and cold flashes.  Also endorses headache, which she thinks is associated w hot and cold flashes.   Had a small bowel movement earlier w miralax, but still feels constipated and bloated.  Has not been passing much gas.    Also has not eaten much today, had some grapes but felt nauseous, has not eaten anything for lunch, not drinking much.    Is out of bed and ambulating, but not much.  Will bring incentive spirometer to bedside, encourage use.    Physical Exam  Vitals  T(C): 37.5 (03-24-20 @ 15:27), Max: 37.9 (03-24-20 @ 00:20)  HR: 142 (03-24-20 @ 15:27) (91 - 142)  BP: 105/67 (03-24-20 @ 15:27) (104/67 - 118/64)  RR: 20 (03-24-20 @ 15:27) (18 - 20)  SpO2: 95% (03-24-20 @ 15:27) (94% - 98%)  Gen: appears tired, NAD  Cardio: tachy to 120, nl S1/S2, no m/r/g appreciated  Pulm: CTABL w good inspiratory effort  Abd: soft, non distended, tender to palpation in lower quadrants  Extr: non tender to palpation b/l, no edema or erythema noted    A/P: 24 year old w bilateral pelvic masses, extensive lymphadenopathy, liver, lung & bone lesions suspicious for metastasis; suspect primary Gyn vs less likely GI origin.  IR biopsy scheduled for Wednesday at 9:30am.  HD#5  Tachycardia likely component of dehydration and pain, will give fluids and pain meds and continue to monitor VS, O2 Sa.  If persistent tachycardia, will consider CTAngio, given pt is in a hypercoagulable state.  - IV tylenol for pain, pt declining morphine and likely to exacerbate constipation; c/w toradol as prescribed  - will start bowel regimen  - GI ppx, antiemetics prn, encourage PO hydration, bolus of 500cc and restart fluids LR@125  - repeat VS, continue close monitoring  - IR biopsy tomorrow EBONIE Yuen PGY3      ADDENDUM:     Saw pt again at bedside, around 5:30pm.  Pt sitting in bed, s/p mother visiting her.  Able to tolerate mary smoothies.  Denies feeling palpitations, SOB, CP, nausea, vomiting.  On IV fluids now.  Manual pulse at bedside 104bpm, pt comfortable speaking in long sentences, denies dyspnea.  Brought incentive spirometer at bedside, taught pt how to use.  Will continue to monitor VS, if persistent tachycardia, dyspneic, tachypneic, will order CT angio.  Continue close monitoring for now.    Pt seen and d/w Dr. Teresita Yuen PGY3

## 2020-03-24 NOTE — PROVIDER CONTACT NOTE (OTHER) - NAME OF MD/NP/PA/DO NOTIFIED:
Gyn Onc MD Lugo Gyn Onc MD Lugo, #[id number] Gynecological MD Lugo, #[id number] Gynecological MD Brittney Lundberg, #[id number]

## 2020-03-24 NOTE — PROGRESS NOTE ADULT - ATTENDING COMMENTS
Patient seen and examined with Dr. Yuen on 3/24 afternoon rounds.   Feels comfortable.   Moderate pain today was controlled with non-narcotic analgesia.   Episode of mild tachycardia today that has now improved.   Agree with assessment and plan as above.   Will be NPO after midnight for CT guided biopsy today.

## 2020-03-24 NOTE — PROGRESS NOTE ADULT - SUBJECTIVE AND OBJECTIVE BOX
Chief Complaint:  Patient is a 24y old  Female who presents with a chief complaint of b/l pelvic masses (24 Mar 2020 06:55)      Interval Events: No new events. Patient feeling hot and cold flashed. No melena or hematochezia. Denies overt GI bleeding.    Allergies:  No Known Allergies      Hospital Medications:  heparin  Injectable 5000 Unit(s) SubCutaneous every 8 hours  influenza   Vaccine 0.5 milliLiter(s) IntraMuscular once  ketorolac   Injectable 30 milliGRAM(s) IV Push every 6 hours  morphine  - Injectable 2 milliGRAM(s) IV Push every 4 hours PRN  ondansetron Injectable 4 milliGRAM(s) IV Push every 6 hours PRN  sodium chloride 0.9% lock flush 3 milliLiter(s) IV Push every 8 hours      PMHX/PSHX:  No pertinent past medical history  No significant past surgical history      Family history:      ROS:     General:  No wt loss, fevers, chills, night sweats, fatigue,   Eyes:  Good vision, no reported pain  ENT:  No sore throat, pain, runny nose, dysphagia  CV:  No pain, palpitations, hypo/hypertension  Resp:  No dyspnea, cough, tachypnea, wheezing  GI:  See HPI  :  No pain, bleeding, incontinence, nocturia  Muscle:  No pain, weakness  Neuro:  No weakness, tingling, memory problems  Psych:  No fatigue, insomnia, mood problems, depression  Endocrine:  No polyuria, polydipsia, cold/heat intolerance  Heme:  No petechiae, ecchymosis, easy bruisability  Skin:  No rash, edema      PHYSICAL EXAM:     Vital Signs:  Vital Signs Last 24 Hrs  T(C): 37.3 (24 Mar 2020 04:35), Max: 37.9 (24 Mar 2020 00:20)  T(F): 99.2 (24 Mar 2020 04:35), Max: 100.3 (24 Mar 2020 00:20)  HR: 91 (24 Mar 2020 04:35) (80 - 115)  BP: 116/74 (24 Mar 2020 04:35) (101/58 - 118/64)  BP(mean): --  RR: 18 (24 Mar 2020 04:35) (18 - 19)  SpO2: 97% (24 Mar 2020 04:35) (94% - 97%)  Daily     Daily     GENERAL:  appears comfortable, no acute distress  HEENT:  NC/AT,  conjunctivae clear, sclera -anicteric  CHEST:  no increased effort  HEART:  Regular rate and rhythm  ABDOMEN:  Soft, non-tender, non-distended,  no masses ,no hepato-splenomegaly,   EXTREMITIES:  no cyanosis, clubbing or edema  SKIN:  No rash/erythema/ecchymoses/petechiae/wounds  NEURO:  Alert, oriented    LABS:                        7.6    21.31 )-----------( 554      ( 24 Mar 2020 06:22 )             26.3     03-23    139  |  104  |  9   ----------------------------<  94  4.3   |  26  |  0.67    Ca    9.8      23 Mar 2020 05:26  Phos  2.9     03-23  Mg     2.0     03-23        PT/INR - ( 23 Mar 2020 05:26 )   PT: 13.6 sec;   INR: 1.18 ratio         PTT - ( 23 Mar 2020 05:26 )  PTT:34.8 sec        Imaging:  < from: CT Abdomen and Pelvis w/ IV Cont (03.20.20 @ 18:28) >    IMPRESSION:     Bilateral heterogeneous pelvic masses concerning for primary malignancy with metastatic disease to the lungs, liver, lymph nodes, as described above. Associated omental seeding is noted.    Small ascites.     < end of copied text >

## 2020-03-24 NOTE — PROGRESS NOTE ADULT - ASSESSMENT
Impression:  # Microcytic anemia: Iron studies consistent with mixed DAMON/AOCD. No overt signs of GI bleeding at this time. Suspect element of anemia related to menstruation +/- malignancy.   # Ovarian Masses with likely metastatic disease: Plan for IR guided biopsy Wednesday    Recommendation:  - Trend Hb and monitor BMs  - Follow up pathology results  - pending path results and clinical course, will consider endoscopic evaluation of GI primary source of malignancy although low suspicion  - Supportive care per primary team

## 2020-03-24 NOTE — PROGRESS NOTE ADULT - PROBLEM SELECTOR PLAN 1
CV: tachycardic overnight, w HR now in 90s, BPs normotensive and stable, Hgb 8.2->7.6 this AM, continue to trend, monitor VS closely  Pulm: Saturating well on RA, encourage OOB and incentive spirometer use  GI: Reg diet as tolerated. Monitor GI function; FOBT negative.  Appreciate GI consultation and recommendations. IV antiemetics prn.  ID: Zosyn (3/20-3/22), urine culture negative, blood cultures NGTD, f/u final; check daily CBC + Diff, afebrile overnight w temp of 37.9, continue to trend fever curve  Heme: SQH & Venodynes for VTE ppx  Neuro: IV analgesics prn  Dispo: IR biopsy planned for tomorrow (3/25), will be NPO tonight w AM labs    JAQUAN Yuen PGY3

## 2020-03-25 NOTE — PROGRESS NOTE ADULT - SUBJECTIVE AND OBJECTIVE BOX
25 yo admitted with abdominal pain for 2 months and adnexal mass found to have Bilateral heterogeneous pelvic masses concerning for primary malignancy with metastatic disease to the lungs, liver, lymph nodes referred to IR for Bone biopsy.     NPO status: NPO past midnight.   Anticoagulation: SQ heparin at 14:00 on 3/24.      Allergies: No Known Allergies      PAST MEDICAL & SURGICAL HISTORY:  No pertinent past medical history  No significant past surgical history        Pertinent labs:                      7.2    23.93 )-----------( 534      ( 25 Mar 2020 05:59 )             25.6   03-25    139  |  104  |  7   ----------------------------<  104<H>  4.2   |  24  |  0.64    Ca    9.7      25 Mar 2020 05:59  Phos  2.8     03-25  Mg     1.8     03-25    TPro  6.7  /  Alb  2.9<L>  /  TBili  0.2  /  DBili  x   /  AST  33  /  ALT  12  /  AlkPhos  220<H>  03-25  PT/INR - ( 25 Mar 2020 05:59 )   PT: 13.6 sec;   INR: 1.18 ratio         PTT - ( 25 Mar 2020 05:59 )  PTT:33.3 sec    Consent: Procedure/risks/ Benefits explained. Informed consent obtained. Pt verbalizes understanding.

## 2020-03-25 NOTE — PROGRESS NOTE ADULT - SUBJECTIVE AND OBJECTIVE BOX
Interventional Radiology  Pre-Procedure Note    This is a 24y  Female with pelvic masses as well as lung, liver, and osseous masses.     HPI:  25 yo G0, LMP 3/18 presents with abd pain x2 months with worsening over the past 2 days and associated n/v x1 week. She has h/o known adnexal masses first diagnosed on 3/5 at [facility] ED where she was discharged with suspected "germ cell tumor" and recommended outpatient follow up. She then presented to [facility] ED on 3/7 with worsening pain and TVUS at that time showed a 15.2 x 9.8 x 14.8 cm heterogenous mass in right adnexa with internal vascularity and a 4.6 x 3.3 x 4.1cm similar left adnexal mass. She was also noted to have +HCG of 104 on 3/7 with ddx including dysgerminoma given virginal status. She was again advised outpatient follow-up and surgical planning, but has not been able to schedule f/u due to unavailable outpatient appointments. She returns today with worsening abdominal pain x2 days associated with n/v x1 week and chills at home. She is currently on her menses and reports irregular periods with heavier flow over the past year. She has never been sexually active. Denies foul vaginal discharge, dysuria, hematuria, frequency, diarrhea, URI symptoms, CP, SOB. She reports baseline constipation and took milk of Mg this morning with a BM this afternoon. She denies sick contacts. She was Morphine for pain and Tylenol for low-grade fever to 38.0 in the ED.     OB/GYN Provider: No established GYN care     OBHx: G0, virginal   GYNHx: bilateral heterogenous adnexal masses, irregular menses (patient reports q1-2months, heavy flow x1 yr)  PMH: Denies  PSH: Denies  Rx: Tylenol, Motrin, Oxycodone   All: Kiwi (tongue itching), NKDA   Psych Hx: Denies  Social Hx: Denies (20 Mar 2020 19:54)      PAST MEDICAL & SURGICAL HISTORY:  No pertinent past medical history  No significant past surgical history      Social History:     FAMILY HISTORY:      Allergies: No Known Allergies      Current Medications: famotidine  IVPB 20 milliGRAM(s) IV Intermittent daily  influenza   Vaccine 0.5 milliLiter(s) IntraMuscular once  ketorolac   Injectable 30 milliGRAM(s) IV Push every 6 hours  lactated ringers. 1000 milliLiter(s) IV Continuous <Continuous>  morphine  - Injectable 2 milliGRAM(s) IV Push every 4 hours PRN  ondansetron Injectable 4 milliGRAM(s) IV Push every 6 hours PRN  polyethylene glycol 3350 17 Gram(s) Oral daily  senna 2 Tablet(s) Oral at bedtime  simethicone 80 milliGRAM(s) Chew every 6 hours PRN  sodium chloride 0.9% lock flush 3 milliLiter(s) IV Push every 8 hours      Labs:                         7.2    23.93 )-----------( 534      ( 25 Mar 2020 05:59 )             25.6       03-25    139  |  104  |  7   ----------------------------<  104<H>  4.2   |  24  |  0.64    Ca    9.7      25 Mar 2020 05:59  Phos  2.8     03-25  Mg     1.8     03-25    TPro  6.7  /  Alb  2.9<L>  /  TBili  0.2  /  DBili  x   /  AST  33  /  ALT  12  /  AlkPhos  220<H>  03-25      HCG:       Assessment/Plan:   This is a 23yo Female  presents with multiple masses  Patient presents to IR for biopsy. Right iliac lytic lesion selected.  Procedure/ risks/ benefits/ goals/ alternatives were explained. All questions answered. Informed content obtained from patient. Consent placed in chart.

## 2020-03-25 NOTE — PROGRESS NOTE ADULT - PROBLEM SELECTOR PLAN 1
CV: tachycardic overnight, BPs normotensive and stable, Hgb 7.6->7.2 this AM, continue to trend, monitor VS closely; if persistently tachycardic this afternoon, will consider CTAngio and COVID testing  Pulm: Saturating well on RA, encourage OOB and incentive spirometer use  GI: Reg diet as tolerated. Monitor GI function; FOBT negative.  Appreciate GI consultation and recommendations. Bowel regimen for constipation.  IV antiemetics prn.  ID: Zosyn (3/20-3/22), urine culture negative, blood cultures remain NGTD, f/u final; check daily CBC + Diff, febrile overnight to 38.1, continue to trend fever curve  Heme: Venodynes for VTE ppx, heparin held this AM  Neuro: IV analgesics prn  Dispo: IR biopsy planned for this AM, has been NPO since midnight, AC held, AM labs drawn    JAQUAN Yuen PGY3

## 2020-03-25 NOTE — CHART NOTE - NSCHARTNOTEFT_GEN_A_CORE
IR Pre-Procedure Note    Patient age: 24    Patient gender: female    Procedure: CT guided IR bone biopsy    Diagnosis/Indication: tissue diagnosis for cancer    Ordering Attending Physician: Adelita    Pertinent medical history: b/l adnexal masses, lung, liver and bone lesions    Pertinent labs:                         7.2    23.93 )-----------( 534      ( 25 Mar 2020 05:59 )             25.6     Prothrombin Time and INR, Plasma in AM (03.25.20 @ 05:59)    Prothrombin Time, Plasma: 13.6: Effective October 30th, 2018 the reference range for PT has changed. sec    INR: 1.18: Recommended ranges for therapeutic INR:    2.0-3.0 for most medical and surgical thromboembolic states    2.0-3.0 for atrial fibrillation    2.0-3.0 for bileaflet mechanical valve in aortic position    2.5-3.5 for mechanical heart valves    Chest 2004;[id number]  The presence of direct thrombin inhibitors (argatroban, refludan)  may falsely increase results. ratio      03-25    139  |  104  |  7   ----------------------------<  104<H>  4.2   |  24  |  0.64    Ca    9.7      25 Mar 2020 05:59  Phos  2.8     03-25  Mg     1.8     03-25    TPro  6.7  /  Alb  2.9<L>  /  TBili  0.2  /  DBili  x   /  AST  33  /  ALT  12  /  AlkPhos  220<H>  03-25            Patient and family aware? [ x ] Yes  [ ] No  IR Check list is in chart, which was prepared by the RN on shift  Patient is aware of procedure and has had the opportunity to ask questions.  IR Consent to be obtained by IR staff.  Pt has been NPO since midnight, all anticoagulation has been discontinued for past  7  hours.      JAQUAN Yuen PGY3  Pager #[phone]

## 2020-03-25 NOTE — PROGRESS NOTE ADULT - SUBJECTIVE AND OBJECTIVE BOX
Interventional Radiology Brief- Operative Note    Procedure: CT guided right iliac biopsy    Operators: Jessica    Anesthesia (type): Sedation administered by anesth attg    Contrast: none    EBL: minimal    Findings/Follow up Plan of Care: CT guided biopsy of right iliac lytic lesion performed. FNA and core specimens obtained. Cytopathology technologist present and specimen adequacy confirmed. Pt tolerated procedure without difficulty. Full report to follow    Specimens Removed: given directly to path    Implants: none    Complications: none    Condition/Disposition: stable / pacu    Please call Interventional Radiology x [phone] with any questions, concerns, or issues.

## 2020-03-25 NOTE — PROGRESS NOTE ADULT - ATTENDING COMMENTS
Seen in f/u with R ~4p. Labs and flow reviewed. S/P IR proc, note reviewed. COVID pending then (neg resulted). D/w pt rec for Txn 1u. Further w/u (T/C CT angio) pending response of VS to txn. [place] PO. Await path. All Q/A.

## 2020-03-25 NOTE — CHART NOTE - NSCHARTNOTEFT_GEN_A_CORE
S: Pt seen at bedside s/p IR biopsy.    Pt requesting toradol, no other complaints.    T(C): 36.9 (03-25-20 @ 13:54), Max: 38.1 (03-24-20 @ 21:02)  HR: 122 (03-25-20 @ 13:54) (113 - 142)  BP: 120/78 (03-25-20 @ 13:54) (100/64 - 120/78)  RR: 19 (03-25-20 @ 13:54) (18 - 20)  SpO2: 91% (03-25-20 @ 13:54) (91% - 96%)    Pt was satting 91% when returned from floor.  Repeated vitals at bedside at around 2pm.  Satting 94-96% RA, -122bpm, /77    Gen: NAD  Cardio: tachycardic, nl S1/S2, no m/r/g appreciated  Pulm: CTABL, good inspiratory effort  Abd: mildly tender to palpation, hypoactive BS, no rebound or guarding, non distended  Extr: non tender to palpation b/l, no edema or erythema noted    Plan:   - CBC, BCx, UCx, covid 19 pcr  - continue to monitor closely    D/w Dr. Giuliana Yuen PGY3 S: Pt seen at bedside s/p IR biopsy.    Pt requesting toradol, no other complaints.    T(C): 36.9 (03-25-20 @ 13:54), Max: 38.1 (03-24-20 @ 21:02)  HR: 122 (03-25-20 @ 13:54) (113 - 142)  BP: 120/78 (03-25-20 @ 13:54) (100/64 - 120/78)  RR: 19 (03-25-20 @ 13:54) (18 - 20)  SpO2: 91% (03-25-20 @ 13:54) (91% - 96%)    Pt was satting 91% when returned from floor.  Repeated vitals at bedside at around 2pm.  Satting 94-96% RA, -122bpm, /77    Gen: NAD  Cardio: tachycardic, nl S1/S2, no m/r/g appreciated  Pulm: CTABL, good inspiratory effort  Abd: mildly tender to palpation, hypoactive BS, no rebound or guarding, non distended  Extr: non tender to palpation b/l, no edema or erythema noted    Plan:   - CBC, BCx, UCx, covid 19 pcr  - continue to monitor closely    D/w Dr. Giuliana Yuen PGY3    ADDENDUM: Patient seen at bedside w Dr. Giordano.  Pt w no new complaints.  Denies nausea, vomiting, fevers, chills, SOB, CP, still has mild abdominal pain.  Spoke w pt, given tachycardia, fevers, will give 1u pRBC.  If tachycardia persists, will order CT angio.    JAQUAN Yuen PGY3

## 2020-03-25 NOTE — PROVIDER CONTACT NOTE (CHANGE IN STATUS NOTIFICATION) - ASSESSMENT
pt with 100.4 temp  pending blood transfusion   tachycardic  all other vss  pt resting comfortably in bed

## 2020-03-25 NOTE — PROGRESS NOTE ADULT - SUBJECTIVE AND OBJECTIVE BOX
R3 GYN ONC Progress Note    INTERVAL HPI/OVERNIGHT EVENTS: Pt seen and examined at bedside, overnight had a temp of 38.1, tachycardia persisted.  Was seen by night team, received 500cc bolus.  UOP has been adequate.  Pt w no acute complaints.  States felt hot and cold flushes, and a little SOB at that time, but that it has since resolved.  Also states she still feels constipated, is passing small amounts of flatus, pain overnight was worse than usual, and still having her period.  Had dinner overnight, tolerated well.    Currently, pt is comfortable, denies fevers, chills, nausea, vomiting, SOB, CP, pain is tolerable, pt is able to sleep.      MEDICATIONS  (STANDING):  influenza   Vaccine 0.5 milliLiter(s) IntraMuscular once  ketorolac   Injectable 30 milliGRAM(s) IV Push every 6 hours  lactated ringers. 1000 milliLiter(s) (125 mL/Hr) IV Continuous <Continuous>  polyethylene glycol 3350 17 Gram(s) Oral daily  sodium chloride 0.9% lock flush 3 milliLiter(s) IV Push every 8 hours    MEDICATIONS  (PRN):  morphine  - Injectable 2 milliGRAM(s) IV Push every 4 hours PRN Severe Pain (7 - 10)  ondansetron Injectable 4 milliGRAM(s) IV Push every 6 hours PRN Nausea and/or Vomiting  simethicone 80 milliGRAM(s) Chew every 6 hours PRN Gas      Allergies    No Known Allergies    Intolerances        12 point ROS negative except as outlined above    Vital Signs Last 24 Hrs  T(C): 37.6 (25 Mar 2020 05:35), Max: 38.1 (24 Mar 2020 21:02)  T(F): 99.6 (25 Mar 2020 05:35), Max: 100.5 (24 Mar 2020 21:02)  HR: 113 (25 Mar 2020 05:35) (95 - 142)  BP: 100/64 (25 Mar 2020 05:35) (100/64 - 115/70)  RR: 18 (25 Mar 2020 05:35) (18 - 20)  SpO2: 95% (25 Mar 2020 05:35) (93% - 98%)    PHYSICAL EXAM:    GA: NAD, A+0 x 3  CV: RRR, nl S1/S2  Pulm: CTA BL  Abd:  +minimal BS, soft, tender to palpation in lower quadrants, nondistended, no rebound or guarding, palpable mass in lower quadrants  Extremities: no swelling or calf tenderness      LABS:                        7.2    23.93 )-----------( 534      ( 25 Mar 2020 05:59 )             25.6     03-24    137  |  103  |  9   ----------------------------<  109<H>  4.0   |  23  |  0.66    Ca    10.1      24 Mar 2020 13:14  Phos  1.6     03-24  Mg     1.9     03-24      PT/INR - ( 25 Mar 2020 05:59 )   PT: 13.6 sec;   INR: 1.18 ratio         PTT - ( 25 Mar 2020 05:59 )  PTT:33.3 sec      RADIOLOGY & ADDITIONAL TESTS:

## 2020-03-25 NOTE — PROGRESS NOTE ADULT - ASSESSMENT
24 year old presenting with RLQ pain found to have bilateral pelvic masses, extensive lymphadenopathy, liver, lung & bone lesions suspicious for metastasis; suspect primary Gyn vs less likely GI origin.  Microcytic anemia with target cells suggestive of Fe deficiency.  Presented with low grade fever and leukocytosis.  IR biopsy scheduled for today at 8am.  HD#6

## 2020-03-26 NOTE — DIETITIAN INITIAL EVALUATION ADULT. - REASON INDICATOR FOR ASSESSMENT
Pt seen for length of stay. Pt is a 24 year old female admitted with 2 months of abdominal pain, N+V x 1 week found to have adnexal mass with liver, lung and bone lesion suspicious for metastasis, suspect gynecological origin-S/P IR biopsy yesterday.

## 2020-03-26 NOTE — PROGRESS NOTE ADULT - PROBLEM SELECTOR PLAN 1
CV: persistently tachycardic x36h, BPs normotensive and stable, s/p 1u pRBC (3/25), f/u AM labs, monitor VS closely; will order CTAngio chest after h/h result  Pulm: Saturating well on RA, encourage OOB and incentive spirometer use  GI: Reg diet as tolerated. Monitor GI function; FOBT negative.  Appreciate GI consultation and recommendations. Bowel regimen for constipation.  IV antiemetics prn.  ID: Zosyn (3/20-3/22), urine culture negative, blood cultures NGTD (final); repeat cultures done 3/25, f/u results, COVID19 PCR neg (3/25), check daily CBC + Diff, febrile to 38 at 1815, continue to trend fever curve  Heme: SQH, venodynes for VTE ppx, encourage ambulation as tolerated  Neuro: IV analgesics prn  Dispo: s/p IR biopsy (3/25), awaiting pathology    JAQUAN Yuen PGY3 CV: persistently tachycardic x36h, BPs normotensive and stable, s/p 1u pRBC (3/25), f/u AM labs, monitor VS closely; will order CT Angio chest after h/h result  Pulm: Saturating well on RA, encourage OOB and incentive spirometer use  GI: Reg diet as tolerated. Monitor GI function; FOBT negative.  Appreciate GI consultation and recommendations. Bowel regimen for constipation.  IV antiemetics prn.  ID: Zosyn (3/20-3/22), urine culture negative, blood cultures NGTD (final); repeat cultures done 3/25, f/u results, COVID19 PCR neg (3/25), check daily CBC + Diff, febrile to 38 at 1815, continue to trend fever curve  Heme: SQH, venodynes for VTE ppx, encourage ambulation as tolerated  Neuro: IV analgesics prn  Dispo: s/p IR biopsy (3/25), awaiting pathology    JAQUAN Yuen PGY3

## 2020-03-26 NOTE — DIETITIAN INITIAL EVALUATION ADULT. - ADD RECOMMEND
1. Continue to encourage po intake and obtain/honor food preferences as able-provided alternative cold menu items list and reviewed ordering procedure, 2. Continue to encourage po intake and obtain/honor food preferences as able

## 2020-03-26 NOTE — PROGRESS NOTE ADULT - SUBJECTIVE AND OBJECTIVE BOX
R3 GYN ONC Progress Note    INTERVAL HPI/OVERNIGHT EVENTS: Pt seen and examined at bedside.  Received 1u pRBC overnight.  No acute events.  Pt w no new complaints.  Still states she has chills, still states she has abd pain but the toradol relieves it.  Denies nausea, vomiting, SOB, CP, urinary complaints, cough.  Is tolerating PO, voiding without issue.      MEDICATIONS  (STANDING):  famotidine  IVPB 20 milliGRAM(s) IV Intermittent daily  heparin  Injectable 5000 Unit(s) SubCutaneous every 8 hours  influenza   Vaccine 0.5 milliLiter(s) IntraMuscular once  ketorolac   Injectable 30 milliGRAM(s) IV Push every 6 hours  polyethylene glycol 3350 17 Gram(s) Oral daily  senna 2 Tablet(s) Oral at bedtime  sodium chloride 0.9% lock flush 3 milliLiter(s) IV Push every 8 hours  sodium chloride 0.9% lock flush 3 milliLiter(s) IV Push every 8 hours    MEDICATIONS  (PRN):  morphine  - Injectable 2 milliGRAM(s) IV Push every 4 hours PRN Severe Pain (7 - 10)  ondansetron Injectable 4 milliGRAM(s) IV Push every 6 hours PRN Nausea and/or Vomiting  simethicone 80 milliGRAM(s) Chew every 6 hours PRN Gas      Allergies    No Known Allergies    Intolerances        12 point ROS negative except as outlined above    Vital Signs Last 24 Hrs  T(C): 37 (26 Mar 2020 04:37), Max: 38 (25 Mar 2020 18:18)  T(F): 98.6 (26 Mar 2020 04:37), Max: 100.4 (25 Mar 2020 18:18)  HR: 124 (26 Mar 2020 04:37) (114 - 130)  BP: 122/74 (26 Mar 2020 04:37) (104/63 - 122/74)  RR: 18 (26 Mar 2020 00:45) (16 - 19)  SpO2: 95% (26 Mar 2020 04:37) (91% - 99%)      PHYSICAL EXAM:    PHYSICAL EXAM:    GA: NAD, A+0 x 3  CV: RRR, nl S1/S2  Pulm: CTA BL  Abd:  +minimal BS, soft, tender to palpation in lower quadrants, nondistended, no rebound or guarding, palpable mass in lower quadrants  Extremities: no swelling or calf tenderness, venodynes in place      LABS:                        7.2    26.64 )-----------( 506      ( 25 Mar 2020 16:38 )             24.9         139  |  104  |  7   ----------------------------<  104<H>  4.2   |  24  |  0.64    Ca    9.7      25 Mar 2020 05:59  Phos  2.8       Mg     1.8         TPro  6.7  /  Alb  2.9<L>  /  TBili  0.2  /  DBili  x   /  AST  33  /  ALT  12  /  AlkPhos  220<H>      PT/INR - ( 25 Mar 2020 05:59 )   PT: 13.6 sec;   INR: 1.18 ratio         PTT - ( 25 Mar 2020 05:59 )  PTT:33.3 sec  Urinalysis Basic - ( 25 Mar 2020 15:42 )    Color: Light Yellow / Appearance: Clear / S.008 / pH: x  Gluc: x / Ketone: Negative  / Bili: Negative / Urobili: Negative   Blood: x / Protein: Negative / Nitrite: Negative   Leuk Esterase: Small / RBC: 24 /hpf / WBC 2 /HPF   Sq Epi: x / Non Sq Epi: 1 /hpf / Bacteria: Negative        RADIOLOGY & ADDITIONAL TESTS: R3 GYN ONC Progress Note    INTERVAL HPI/OVERNIGHT EVENTS: Pt seen and examined at bedside.  Received 1u pRBC overnight.  No acute events.  Pt w no new complaints.  Still states she has chills, still states she has abd pain but the toradol relieves it.  Denies nausea, vomiting, SOB, CP, urinary complaints, cough.  Is tolerating PO, voiding without issue.      MEDICATIONS  (STANDING):  famotidine  IVPB 20 milliGRAM(s) IV Intermittent daily  heparin  Injectable 5000 Unit(s) SubCutaneous every 8 hours  influenza   Vaccine 0.5 milliLiter(s) IntraMuscular once  ketorolac   Injectable 30 milliGRAM(s) IV Push every 6 hours  polyethylene glycol 3350 17 Gram(s) Oral daily  senna 2 Tablet(s) Oral at bedtime  sodium chloride 0.9% lock flush 3 milliLiter(s) IV Push every 8 hours  sodium chloride 0.9% lock flush 3 milliLiter(s) IV Push every 8 hours    MEDICATIONS  (PRN):  morphine  - Injectable 2 milliGRAM(s) IV Push every 4 hours PRN Severe Pain (7 - 10)  ondansetron Injectable 4 milliGRAM(s) IV Push every 6 hours PRN Nausea and/or Vomiting  simethicone 80 milliGRAM(s) Chew every 6 hours PRN Gas      Allergies    No Known Allergies    Intolerances        12 point ROS negative except as outlined above    Vital Signs Last 24 Hrs  T(C): 37 (26 Mar 2020 04:37), Max: 38 (25 Mar 2020 18:18)  T(F): 98.6 (26 Mar 2020 04:37), Max: 100.4 (25 Mar 2020 18:18)  HR: 124 (26 Mar 2020 04:37) (114 - 130)  BP: 122/74 (26 Mar 2020 04:37) (104/63 - 122/74)  RR: 18 (26 Mar 2020 00:45) (16 - 19)  SpO2: 95% (26 Mar 2020 04:37) (91% - 99%)      PHYSICAL EXAM:    PHYSICAL EXAM:    GA: NAD, A+0 x 3  CV: RRR, nl S1/S2  Pulm: CTA BL  Abd:  +BS, soft, tender to palpation in lower quadrants, nondistended, no rebound or guarding, palpable mass in lower quadrants  Extremities: no swelling or calf tenderness, venodynes in place      LABS:                        7.2    26.64 )-----------( 506      ( 25 Mar 2020 16:38 )             24.9     -    139  |  104  |  7   ----------------------------<  104<H>  4.2   |  24  |  0.64    Ca    9.7      25 Mar 2020 05:59  Phos  2.8       Mg     1.8         TPro  6.7  /  Alb  2.9<L>  /  TBili  0.2  /  DBili  x   /  AST  33  /  ALT  12  /  AlkPhos  220<H>      PT/INR - ( 25 Mar 2020 05:59 )   PT: 13.6 sec;   INR: 1.18 ratio         PTT - ( 25 Mar 2020 05:59 )  PTT:33.3 sec  Urinalysis Basic - ( 25 Mar 2020 15:42 )    Color: Light Yellow / Appearance: Clear / S.008 / pH: x  Gluc: x / Ketone: Negative  / Bili: Negative / Urobili: Negative   Blood: x / Protein: Negative / Nitrite: Negative   Leuk Esterase: Small / RBC: 24 /hpf / WBC 2 /HPF   Sq Epi: x / Non Sq Epi: 1 /hpf / Bacteria: Negative        RADIOLOGY & ADDITIONAL TESTS:

## 2020-03-26 NOTE — CHART NOTE - NSCHARTNOTEFT_GEN_A_CORE
Interventional Radiology Follow- Up Note    This is a 24y Female s/p right iliac lesion biopsy on 3/25 in Interventional Radiology with Dr. Lopez.     IR chart rounded. No events overnight. Patient hemodynamically stable. R/O COVID. Spoke with LISA Stewart patient doing well. No new complaints. Biopsy site with dressing c/d/i.     PAST MEDICAL & SURGICAL HISTORY:  No pertinent past medical history  No significant past surgical history    Allergies: No Known Allergies    LABS:                        7.8    22.40 )-----------( 507      ( 26 Mar 2020 06:21 )             26.1     03-26    139  |  102  |  7   ----------------------------<  93  4.4   |  25  |  0.67    Ca    9.9      26 Mar 2020 06:21  Phos  1.8     03-26  Mg     1.8     03-26    TPro  6.7  /  Alb  2.9<L>  /  TBili  0.2  /  DBili  x   /  AST  33  /  ALT  12  /  AlkPhos  220<H>  03-25    PT/INR - ( 25 Mar 2020 05:59 )   PT: 13.6 sec;   INR: 1.18 ratio         PTT - ( 25 Mar 2020 05:59 )  PTT:33.3 sec      Biopsy results pending    Vitals: T(F): 99.8 (03-26-20 @ 09:11), Max: 100.4 (03-25-20 @ 18:18)  HR: 111 (03-26-20 @ 09:11) (111 - 130)  BP: 118/73 (03-26-20 @ 09:11) (104/63 - 122/74)  RR: 18 (03-26-20 @ 09:11) (16 - 19)  SpO2: 93% (03-26-20 @ 09:11) (91% - 99%)      A/P: 24y Female admitted with Abdominal pain s/p right iliac lesion bx with Dr. Lopez on 3/25.    -F/U biopsy results  -Continue care per primary team  -IR referral appreciated, will sign off. Please reconsult as needed  -Please call IR at extension [phone] with any questions, concerns, or issues regarding above.

## 2020-03-26 NOTE — DIETITIAN INITIAL EVALUATION ADULT. - PHYSICAL APPEARANCE
Pt with no overt signs of muscle mass or body fat loss/obese/other (specify) Ht: 5'4", Wt: 201 lbs, BMI: 34.5 kg/m2, IBW: 120 lbs +/- 10%, %IBW: 168%  No edema, Skin intact per nursing flowsheets

## 2020-03-26 NOTE — CHART NOTE - NSCHARTNOTEFT_GEN_A_CORE
R2 GYN Onc Progress Note: HD#7    Patient seen at bedside following a report from the nurse of an elevated temperature to 38.4. Patient endorses feeling the same abdominal pain she usually has related to her "ovary and mass". She is due for Naproxen and has dilaudid prn ordered for severe pain. She reports no new symptoms at this time. No CP/SOB. Denies nausea. Had a small episode of emesis during the day, which has not recurred.        Vital Signs Last 24 Hours  T(C): 38.4 (03-26-20 @ 21:26), Max: 38.4 (03-26-20 @ 21:26)  HR: 118 (03-26-20 @ 21:26) (110 - 125)  BP: 119/79 (03-26-20 @ 21:26) (115/71 - 130/75)  RR: 18 (03-26-20 @ 21:26) (18 - 18)  SpO2: 97% (03-26-20 @ 21:26) (93% - 97%)    I&O's Summary    25 Mar 2020 07:01  -  26 Mar 2020 07:00  --------------------------------------------------------  IN: 1800 mL / OUT: 1550 mL / NET: 250 mL    26 Mar 2020 07:01  -  26 Mar 2020 22:22  --------------------------------------------------------  IN: 240 mL / OUT: 600 mL / NET: -360 mL        Physical Exam:  General: uncomfortable, waiting for her pain medication   CV: RR, S1, S2  Lungs: CTA b/l  Abdomen: Soft, tender in RLQ, no rebound, no guarding  Ext: No LE edema/tenderness        Labs:                        7.8    22.40 )-----------( 507      ( 26 Mar 2020 06:21 )             26.1   baso 0.2    eos 0.6    imm gran 1.6    lymph 9.2    mono 8.9    poly 79.5                         7.2    26.64 )-----------( 506      ( 25 Mar 2020 16:38 )             24.9   baso x      eos x      imm gran x      lymph x      mono x      poly x                            7.2    23.93 )-----------( 534      ( 25 Mar 2020 05:59 )             25.6   baso 0.1    eos 0.8    imm gran 1.5    lymph 10.1   mono 8.2    poly 79.3                         7.6    21.31 )-----------( 554      ( 24 Mar 2020 06:22 )             26.3   baso 0.9    eos 0.9    imm gran x      lymph 6.1    mono 1.7    poly 89.5       MEDICATIONS  (STANDING):  acetaminophen   Tablet .. 975 milliGRAM(s) Oral every 6 hours  enoxaparin Injectable 40 milliGRAM(s) SubCutaneous daily  famotidine    Tablet 20 milliGRAM(s) Oral daily  influenza   Vaccine 0.5 milliLiter(s) IntraMuscular once  naproxen 500 milliGRAM(s) Oral every 12 hours  polyethylene glycol 3350 17 Gram(s) Oral daily  senna 2 Tablet(s) Oral at bedtime  sodium chloride 0.9% lock flush 3 milliLiter(s) IV Push every 8 hours  sodium chloride 0.9% lock flush 3 milliLiter(s) IV Push every 8 hours    MEDICATIONS  (PRN):  HYDROmorphone   Tablet 2 milliGRAM(s) Oral every 6 hours PRN Severe Pain (7 - 10)  ondansetron Injectable 4 milliGRAM(s) IV Push every 6 hours PRN Nausea and/or Vomiting  simethicone 80 milliGRAM(s) Chew every 6 hours PRN Gas      A/P:   25 y/o presenting with RLQ pain found to have bilateral pelvic masses, extensive lymphadenopathy, liver, lung & bone lesions suspicious for metastasis; suspect primary Gyn vs less likely GI origin.  Microcytic anemia with target cells suggestive of Fe deficiency.  Presented with low grade fever and leukocytosis, initially on Zosyn, no d/c since 3/22. Fever likely 2/2 tumor burden.  S/p IR biopsy 3/25, path pending.  Patient persistently tachycardia, CT angio x2 inadequate study, unable to assess for PE. Patient s/p 1u pRBC 3/25/20, without improvement in tachycardia. She was febrile this evening to 38.4. Denies new symptoms. Continuing work-up of anemia.     -Tylenol for the fever when it is next scheduled for  -Patient due for naproxen for pain; nurse to also give dilaudid 2mg for severe pain prn  -Continue monitoring VS   -Strict Is/Os     Dr. Lo aware  Brittney Lundberg PGY-2

## 2020-03-26 NOTE — DIETITIAN INITIAL EVALUATION ADULT. - PROBLEM SELECTOR PLAN 1
-Admit to Gyn/Oncology service  -draw tumor markers: CA 19-9, CEA, , AFP, LDH, CA 27-29, Inhibin B  -CT chest non-contrast  -HSQ for DVT prophylaxis  -AM CBC, BMP, Mg, Phos  -continue Zosyn, fever likely due to tumor burden; will discontinue antibiotic once confirm cultures negative  -Regular diet  -To be evaluated by gynecology oncology in AM    d/w Dr. Escamilla, Dr. Adelita Jones PGY1

## 2020-03-26 NOTE — DIETITIAN INITIAL EVALUATION ADULT. - OTHER INFO
Diet PTA: Pt reports progressively worsening appetite/intake for the past month, pt was still making an effort to eat but feels she was eating <50% of her baseline and noticing a decrease in portion sizes. Pt would try to snack in between meals as able. Pt with no formal dietary restrictions.     Weight: Pt reports UBW of 200 lbs and does not feel her weight has changed despite decreased po intake, noted admission weight via bed scale 186 lbs (3/21) however pt feels this is inaccurate, recent standing weight 201 lbs (3/25) which pt feels is more accurate. Pt unable to quantify change in fit of clothing as pt has been wearing looser clothing for comfort.     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 No micronutrient supplementation. Pt reports intermittent nausea but no reports of vomiting, last BM yesterday-pt reports constipation at times and stated stool has been smaller volume-on miralax. Pt with no difficulty chewing/swallowing.     Diet education: RD discussed importance of adequate nutrition with overall goal for weight maintenance with emphasis on consuming protein foods first. Discussed ordering nutrient dense snacks with meals to consume in between to mimic smaller more frequent meals in house.

## 2020-03-26 NOTE — DIETITIAN INITIAL EVALUATION ADULT. - ENERGY INTAKE
Pt reports consuming ~50% of entree but is more able to consume items on the periphery of the tray such as bread, pt receptive to ensure supplement/Fair (>50%)

## 2020-03-26 NOTE — PROGRESS NOTE ADULT - ASSESSMENT
24 year old presenting with RLQ pain found to have bilateral pelvic masses, extensive lymphadenopathy, liver, lung & bone lesions suspicious for metastasis; suspect primary Gyn vs less likely GI origin.  Microcytic anemia with target cells suggestive of Fe deficiency.  Presented with low grade fever and leukocytosis.  S/p IR biopsy 3/25.  S/p 1u pRBC.  HD#7

## 2020-03-26 NOTE — CHART NOTE - NSCHARTNOTEFT_GEN_A_CORE
S: Pt seen at bedside s/p CT Angio.  Spoke w radiologist regarding non-diagnostic scan.  At this time, given pt w nl O2Sa and asymptomatic, no evidence of R heart strain on EKG or CT, will continue to monitor, continue w ppx lovenox.    No change in clinical status, pt denies any new complaints.    O:   T(C): 36.9 (03-26-20 @ 13:17), Max: 38 (03-25-20 @ 18:18)  HR: 120 (03-26-20 @ 13:17) (111 - 130)  BP: 130/75 (03-26-20 @ 13:17) (104/63 - 130/75)  RR: 18 (03-26-20 @ 13:17) (16 - 18)  SpO2: 96% (03-26-20 @ 13:17) (93% - 99%)    Gen: NAD  Card: tachycardic  Pulm: CTABL  Abd: soft, diffusely tender in lower quadrant, +BS  : minimal bleeding noted on pad  Extr: non tender, non distended, no erythema noted    A/P: 23yo w b/l adnexal masses, liver, bone and lung lesions on CT, continue w/u of lesions  - f/u IR biopsy  - continue to trend VS, clinical status  - transition to oral meds  - lovenox, venodynes for VTE ppx  - GI ppx pepcid, bowel regimen  - AM labs, continue to trend CBC, BMP given contrast and electrolyte repletion prn, will add labs for further w/u of anemia re med onc recs, appreciate further recs    JAQUAN Yuen PGY3

## 2020-03-27 NOTE — CONSULT NOTE ADULT - PROBLEM SELECTOR RECOMMENDATION 9
- on dilaudid mg Q6 PO PRN, will increase to Q3 PRN  - will add dilaudid 0.5mg IV Q3 PRN for severe breakthrough pain unresponsive to oral  - can c/w naproxen for now, monitor renal function, would only use for 3 days max  - monitor BMs  - c/w miralax

## 2020-03-27 NOTE — PROGRESS NOTE ADULT - ASSESSMENT
Impression:  # Microcytic anemia: No overt GI bleeding. Iron studies consistent with mixed DAMON/AOCD. No overt signs of GI bleeding at this time. Suspect element of anemia related to menstruation +/- malignancy. Possible occult GI bleed  # Ovarian Masses with metastatic disease: s/p biopsy with prelim diagnosis of malignant cells with poorly differentiated carcinoma  # Sepsis: persistent fever with leukocytosis. Unclear etiology, workup thus far negative. Infectious vs fevers secondary to tumor burden    Recommendation:  - Trend Hb and monitor BMs  - consider ID consultation for sepsis and undifferentiated fevers  - at this point appears patient not optimized for endoscopic intervention as no obvious emergent needed (no overt brisk bleeding or hemodynamic instability in the setting of bleeding)  -  if patient respiratory status improved and fever curve improves, will consider colonoscopy +/- EGD next week pending clinical course  - can tentatively plan for colonoscopy +/- EGD monday  - keep on clear liquid diet sunday and NPO sunday night  - GI fellow to evaluate and place bowel prep order pending clinical course  - antibiotics per primary team  - pain control  - Supportive care per primary team Impression:  # Microcytic anemia: No overt GI bleeding. Iron studies consistent with mixed DAMON/AOCD. No overt signs of GI bleeding at this time. Suspect element of anemia related to menstruation +/- malignancy. Possible occult GI bleed  # Ovarian Masses with metastatic disease: s/p biopsy with prelim diagnosis of malignant cells with poorly differentiated carcinoma  # Sepsis: persistent fever with leukocytosis. Unclear etiology, workup thus far negative. Infectious vs fevers secondary to tumor burden    Recommendation:  - Trend Hb and monitor BMs  - consider ID consultation for sepsis and undifferentiated fevers  - at this point appears patient not optimized for endoscopic intervention as no obvious emergent needed (no overt brisk bleeding or hemodynamic instability in the setting of bleeding)  - if patient respiratory status improved and fever curve improves, will consider colonoscopy +/- EGD next week pending clinical course  - await further staining of biopsy  - antibiotics per primary team  - pain control  - Supportive care per primary team

## 2020-03-27 NOTE — PROGRESS NOTE ADULT - ATTENDING COMMENTS
Agree with above. 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 Awaiting IHC stains to confirm origin of cancer.   Suspect GI primary, possible gastric given positive occult blood. d/w GI. Ideally would like to avoid EGD. Upper GI series will be done to eval for any gastric masses. IHC stains should be back by Monday and then will determine the chemotherapy regimen. Plan for in patient chemotherapy.   Discussed with pt preliminary diagnosis and location of cancer. Did not tell patient yet that she has Stage IV cancer. Will review this on Monday when full tx plan is available.

## 2020-03-27 NOTE — PROCEDURE NOTE - NSPOSTPRCRAD_GEN_A_CORE
central line located in the superior vena cava/post-procedure radiography performed/line adjusted to depth of insertion/no pneumothorax

## 2020-03-27 NOTE — PROGRESS NOTE ADULT - ATTENDING COMMENTS
Path with poorly differentiated carcinoma - likely GYN primary but no confirmed location  Given microcytic anemia and fobt+, may warrant GI workup however endoscopy capabilities for non emergent cases severely limited by COVID19 emergency and patient not yet medically optimized for GI procedures/sedation.  Will await further infectious workup and resulting of tumor stains at this time    If urgent GI workup (within next 2 weeks) will  and prognosis, can consider endoscopic workup when medially stable.    Alternatively can consider:  CT Colonography to r/o colonic mass (good sens/spec for mass)  Upper GI series to r/o gastric, small bowel lesion       GI to follow

## 2020-03-27 NOTE — PROGRESS NOTE ADULT - PROBLEM SELECTOR PLAN 1
CV: tachycardia resolves as of this morning, BPs normotensive and stable, s/p 1u pRBC (3/25), f/u AM labs, monitor VS closely; CT Angio chest non diagnostic, will continue to monitor VS closely  Pulm: Desatting overnight to 87%, on 2L overnight, this AM, on RA and speaking comfortably saturating 93-97%, encourage OOB and incentive spirometer use  GI: Reg diet as tolerated. Monitor GI function; FOBT negative (3/22), repeat FOBT post (3/26).  Appreciate GI consultation and recommendations. Bowel regimen for constipation.  IV antiemetics prn.  ID: Zosyn (3/20-3/22), urine culture negative, blood cultures NGTD (final); repeat cultures done 3/25, prelim NGTD, COVID19 PCR neg (3/25), check daily CBC + Diff, febrile overnight 38.4 @ 2130, 38.8@0130, afebrile now, f/u AM CBC  Heme: lovenox qd, venodynes for VTE ppx, encourage ambulation as tolerated  Neuro: standing tylenol q6h, naproxen q12h, dilaudid for breakthrough pain  Dispo: s/p IR biopsy (3/25), path results show metastatic poorly differentiated carcinoma, pending IHC, will speak w med onc today regarding chemo, dispo    JAQUAN Yuen PGY3 CV: tachycardia resolved as of this morning, BPs normotensive and stable, s/p 1u pRBC (3/25), f/u AM labs, monitor VS closely; CT Angio chest non diagnostic, will continue to monitor VS closely  Pulm: Desatting overnight to 87%, on 2L overnight, this AM, on RA and speaking comfortably saturating 93-97%, encourage OOB and incentive spirometer use  GI: Reg diet as tolerated. Monitor GI function; FOBT negative (3/22), repeat FOBT post (3/26).  Appreciate GI consultation and recommendations. Bowel regimen for constipation.  IV antiemetics prn.  ID: Zosyn (3/20-3/22), urine culture negative, blood cultures NGTD (final); repeat cultures done 3/25, prelim NGTD, COVID19 PCR neg (3/25), check daily CBC + Diff, febrile overnight 38.4 @ 2130, 38.8@0130, afebrile now, f/u AM CBC  Heme: lovenox qd, venodynes for VTE ppx, encourage ambulation as tolerated  Neuro: standing tylenol q6h, naproxen q12h, dilaudid for breakthrough pain  Dispo: s/p IR biopsy (3/25), path results show metastatic poorly differentiated carcinoma, pending IHC, will speak w med onc today regarding chemo, dispo    JAQUAN Yuen PGY3

## 2020-03-27 NOTE — PROGRESS NOTE ADULT - ASSESSMENT
24 year old presenting with RLQ pain found to have bilateral pelvic masses, extensive lymphadenopathy, liver, lung & bone lesions suspicious for metastasis; suspect primary Gyn vs less likely GI origin.  Microcytic anemia with target cells suggestive of Fe deficiency.  Presented with low grade fever and leukocytosis.  S/p IR biopsy 3/25, prelim path results metastatic poorly differentiated carcinoma, IHC pending.  Has been persistently tachycardic the past couple of days, s/p 1u pRBC (3/26) with inappropriate rise in h/h, repeat FOBT was performed to r/o occult bleeding, result positive, will speak w GI this AM.  This AM, tachycardia now resolved, AM labs pending.  Was desatting overnight and placed on supplemental O2, but is now on RA and satting well, CTAngio yesterday was non-diagnostic, will continue to monitor closely.  HD#8

## 2020-03-27 NOTE — CONSULT NOTE ADULT - PROBLEM SELECTOR RECOMMENDATION 2
- final pathology pending, but prelim indicates mets malignancy  - no surgical options, chemo based on pathology

## 2020-03-27 NOTE — PROGRESS NOTE ADULT - SUBJECTIVE AND OBJECTIVE BOX
R3 GYN ONC Progress Note    INTERVAL HPI/OVERNIGHT EVENTS: Pt seen and examined at bedside.  Overnight, pt states she tried to eat, felt nauseous and threw up.  States received naproxen, tylenol, but the pain came back, was very severe.  Received PO dilaudid, which did not relieve the pain, and eventually received IV dilaudid.  Desatted to 87%, was put on 2L NC overnight, O2 Sat went to 96-99%.  States pain subsided after the IV dilaudid, feels it worked better than IV morphine has been working.   was able to sleep from 2-5am.  States she feels better now, denies nausea, vomiting, fevers, chills, SOB, CP, pain is 2 out of 10, vaginal bleeding is getting much lighter.        MEDICATIONS  (STANDING):  acetaminophen   Tablet .. 975 milliGRAM(s) Oral every 6 hours  enoxaparin Injectable 40 milliGRAM(s) SubCutaneous daily  famotidine    Tablet 20 milliGRAM(s) Oral daily  influenza   Vaccine 0.5 milliLiter(s) IntraMuscular once  lactated ringers. 1000 milliLiter(s) (125 mL/Hr) IV Continuous <Continuous>  naproxen 500 milliGRAM(s) Oral every 12 hours  polyethylene glycol 3350 17 Gram(s) Oral daily  senna 2 Tablet(s) Oral at bedtime  sodium chloride 0.9% lock flush 3 milliLiter(s) IV Push every 8 hours  sodium chloride 0.9% lock flush 3 milliLiter(s) IV Push every 8 hours    MEDICATIONS  (PRN):  HYDROmorphone   Tablet 2 milliGRAM(s) Oral every 6 hours PRN Severe Pain (7 - 10)  ondansetron Injectable 4 milliGRAM(s) IV Push every 6 hours PRN Nausea and/or Vomiting  simethicone 80 milliGRAM(s) Chew every 6 hours PRN Gas      Allergies    No Known Allergies        12 point ROS negative except as outlined above    Vital Signs Last 24 Hrs  T(C): 36.4 (27 Mar 2020 05:59), Max: 38.8 (27 Mar 2020 01:36)  T(F): 97.5 (27 Mar 2020 05:59), Max: 101.8 (27 Mar 2020 01:36)  HR: 89 (27 Mar 2020 05:59) (74 - 120)  BP: 115/69 (27 Mar 2020 05:34) (115/69 - 130/75)  RR: 17 (27 Mar 2020 05:34) (17 - 19)  SpO2: 99% (27 Mar 2020 05:34) (91% - 99%)    PHYSICAL EXAM:    GA: NAD, A+0 x 3  CV: RRR, nl S1/S2  Pulm: CTA BL, good inspiratory effort  Abd:  +BS, soft, tender to palpation in lower quadrants, nondistended, no rebound or guarding, palpable mass in lower quadrants  : minimal bleeding on pad  Extremities: no swelling or calf tenderness, venodynes placed on patient        LABS:                        7.8    22.40 )-----------( 507      ( 26 Mar 2020 06:21 )             26.1         139  |  102  |  7   ----------------------------<  93  4.4   |  25  |  0.67    Ca    9.9      26 Mar 2020 06:21  Phos  1.8       Mg     1.8             Urinalysis Basic - ( 25 Mar 2020 15:42 )    Color: Light Yellow / Appearance: Clear / S.008 / pH: x  Gluc: x / Ketone: Negative  / Bili: Negative / Urobili: Negative   Blood: x / Protein: Negative / Nitrite: Negative   Leuk Esterase: Small / RBC: 24 /hpf / WBC 2 /HPF   Sq Epi: x / Non Sq Epi: 1 /hpf / Bacteria: Negative        RADIOLOGY & ADDITIONAL TESTS:    < from: CT Angio Chest w/ IV Cont (20 @ 13:00) >    EXAM:  CT ANGIO CHEST (W)AW IC                          PROCEDURE DATE:  2020      INTERPRETATION:  CLINICAL INFORMATION: Shortness of breath and fever     COMPARISON: CT chest 3/20/2020    PROCEDURE:   CT Angiography of the Chest.  90 ml of Omnipaque 350 was injected intravenously. 10 ml were discarded.  Sagittal and coronal reformats were performed as well as 3D (MIP) reconstructions.      FINDINGS:    LUNGS AND AIRWAYS: No endobronchial lesion.  There are multiple bilateral pulmonary nodules. For example, a left upper lobe nodule measures 7 mm (series 5, image 53) and a right upper lobe nodule measures 7 mm (series 5, image 56). There is partial atelectasis of the bilateral lower lobes.    PLEURA: Small left pleural effusion and trace right pleural effusion.    MEDIASTINUM AND BANG: No lymphadenopathy.    VESSELS: Nondiagnostic evaluation of the pulmonary arteries secondary to lack of contrast opacification.    HEART: Heart size is normal. No pericardial effusion.    CHEST WALL AND LOWER NECK: Within normal limits.    VISUALIZED UPPER ABDOMEN: Small volume of ascites. Subcentimeter hypodense lesions within the liver too small to characterize.    BONES: Unchanged lucent lesion in the manubrium.     IMPRESSION:     Nondiagnostic evaluation of the pulmonary arteries secondary to lack of contrast opacification.    Bilateral pulmonary nodules likely secondary to metastatic disease.    Small left and trace right pleural effusions with partial atelectasis of the bilateral lower lobes.    LISA GOMEZ M.D., RADIOLOGY RESIDENT  This document has been electronically signed.  JOANA GIL M.D., ATTENDING RADIOLOGIST  This document has been electronically signed. Mar 26 2020  2:12PM      < end of copied text >      Cytopathology - Non Gyn Report (20 @ 12:53)    Cytopathology - Non Gyn Report:   ACCESSION No:  [id number]    Final Diagnosis  BONE, ILIAC, RIGHT, CT GUIDED CORE BIOPSY AND FNA  POSITIVE FOR MALIGNANT CELLS.  Metastatic poorly differentiated carcinoma.    Cytology smears, touch preps, cell block, display a cellular  specimen comprised of crowded 3-dimensional groups and single  lying malignant cells with enlarged nuclei containing prominent  nucleoli and vacuolated to dense cytoplasm in the background of  extensive necrosis.  Core biopsy: reveals an effacement of bone parenchyma by the  presence of cluster of malignant cells with enlarged nuclei,  prominent nucleoli and scant cytoplasm. Extensive areas of  necrosis present.    Immunohistochemical stains are in progress for further  classification. Results will be reported in an addendum.    This case was reviewed for  with staff  cytopathologist who concurs with the diagnosis.  Case reported to Tumor Registry.    Screened by: Esther Fajardo CT(ASCP)  Verified by: Marni Lyons MD  (Electronic Signature)  Reported on: 20 17:56 EDT, [facility]. [address] [address]  Phone: [phone]   Fax: [phone]  Cytology technical processing performed at [address] [place] [id number]  _________________________________________________________________      Specimen(s) Submitted  BONE, ILIAC, RIGHT, CT GUIDED CORE BIOPSY AND FNA      Statement of Adequacy  Immediate cytologic study for adequacy of specimen using a Diff-  Quik stain was performed at [facility], [address] [place] [id number] FNA  acceptable for further evaluation by ANNE Larry(ASCP).      Clinical Information  ICD 10: R10.9      24 year old female, with multiple liver and multiple lung  nodules, a large pelvic mass, and multiple osseous lesions.  Concern for GYN primary.Rule out cancer.      Gross Description  Core Biopsy:  Tissue collected: Specimen container has been inspected to  confirm patient’s name and date of birth, contains 2  tan  cores/blood clots measuring 5.0, 1.0 cm in length. Entire  specimen submitted in 2 cassette(s) labeled 1B and 1C.    2 Touch prep slides ( 2 air dried )    FNA:  Received: 4 air dried and 2 fixed slides  Needle rinses in 30 ml formalin  Submitted: cell block in one cassette labeled 1A    Prepared:  2 Touch Prep, 4 Diff Quick, 2 Pap Stained slides  1 cell block  labeled 1A  1 core biopsy labeled 1B  1 core biopsy labeled 1C  11 Total slides

## 2020-03-27 NOTE — PROGRESS NOTE ADULT - ASSESSMENT
24F w/ no PMH presenting with abdominal pain, found to have bilateral pelvic masses and extensive pelvic lymphadenopathy concerning for metastatic malignancy.    #Metastatic carcinoma  - Discussed preliminary results with patient today confirming malignancy, final pathology pending. Reached out to pathologist to check on preliminary results  - CT A/P with bilateral heterogeneous adnexal masses, as well as upper abdominal, RP, possible bone lesions and pelvic lymphadenopathy; CT Chest with multiple solid pulmonary nodules  - tumor markers elevated: Cancer Antigen, Ca 27.29      Cancer Antigen, 125: 619      Carcinoembryonic Antigen: 44.7 beta   - LDH: 479  - Uric acid normal  - Discussed with GYN, patient is not a surgical candidate given evidence of metastatic disease  - May need to start chemotherapy as an inpatient given rapid progression and burden of disease; will await further pathology results  - Leukocytosis and fevers likely 2/2 tumor  - Will continue to follow    #Microcytic anemia  -reviewed peripheral smear, shows polychromasia despite reticulocyte count inappropriately low  -LDH elevated; no signs of schistocytes on peripheral blood smear  -Suspect component of iron deficiency given low FeSat; occult blood positive, and also endorses ongoing vaginal bleeding; ferritin elevated in setting inflammatory process, likely paraneoplastic  -Given microcytosis, recommended checking Hgb electrophoresis to r/o concurrent thalassemia  -B12, folate normal    Luis Peng MD, MPH  Hematology/Oncology Fellow, PGY-5  pager: [phone]  After 5pm or on weekends, please page the on-call fellow.

## 2020-03-27 NOTE — CONSULT NOTE ADULT - ASSESSMENT
24F here with worsening abdominal pain in the setting of known adnexal massess, found to have for metastatic carcinoma with final pathology pending. Given evidence of metastatic disease, patient is not a surgical candidate. Has been having worsening adnexal pain, markedly last night after taking senna. States pain is 7-8/10 at its worst, located in adnexal area with radiation down to legs, worse with movement, and tolerable with opioids around 2/10. Palliative care called to help with pain.

## 2020-03-27 NOTE — CHART NOTE - NSCHARTNOTEFT_GEN_A_CORE
R2 Event Note:   (late entry due to clinical activity)    Approximately 1.5 hours following dilaudid 1mg IVP, patient continued to complain of pain. PCA reported patient was satting 87% on room air. Nurse went to the bedside and stated when patient began talking she was satting 91% on room air. NC 2L placed and saturation improved to 96%. Writer went to the bedside to assess the patient. She was noted to be saturating 95% on 2L NC. She did not complain of any dyspnea, SOB or chest pain. Speaking in full sentences. Alert. She continues to complain of RLQ pain. States the pain is 5/10 when still, and worsens with movement. It radiates down her leg and she knows "this is my ovary causing pain". Ordered patient for an additional IV dilaudid 0.5mg. Patient was re-assessed shortly after and found to be sleeping. When a pulse ox was used to evaluate saturation, she was saturating 95% on NC 2L. Will continue to monitor pain response closely.     Brittney Lundberg PGY-2

## 2020-03-27 NOTE — PROGRESS NOTE ADULT - SUBJECTIVE AND OBJECTIVE BOX
Oncology Follow-up    INTERVAL HPI/OVERNIGHT EVENTS: Denies overnight events, but continues to endorse abdominal pain, better controlled. Endorses intermittent fevers, no night sweats, no chills. Denies cough, shortness of breath, chest pain, headaches. Reports that when she came in she was on her period, but it has been getting lighter.  Reviewed results of bone biopsy today, showing carcinoma, and that further characterization was pending.    Review of Systems: ROS otherwise negative.    VITAL SIGNS:  T(F): 98.3 (20 @ 08:55)  HR: 90 (20 @ 08:55)  BP: 113/71 (20 @ 08:55)  RR: 18 (20 @ 08:55)  SpO2: 94% (20 @ 08:55)    20 @ 07:01  -  20 @ 07:00  --------------------------------------------------------  IN: 1685 mL / OUT: 1275 mL / NET: 410 mL    20 @ 07:01  -  20 @ 13:01  --------------------------------------------------------  IN: 0 mL / OUT: 200 mL / NET: -200 mL    PHYSICAL EXAM:    Constitutional: AAOx3, NAD, ambulatory, walks to the bathroom, obese  Eyes: PERRL, EOMI, sclera non-icteric  Neck: supple  Extremities:  no edema  MSK: no obvious abnormalities  Neurological: Grossly intact  Skin: Normal temperature, no rash, no ecchymoses, no petechiae  Psych: normal affect    Deferred laying on hands during physical examination to minimize risk of transmitting COVID-19 to patient or to other patients in the oncology service.    MEDICATIONS  (STANDING):  acetaminophen   Tablet .. 975 milliGRAM(s) Oral every 6 hours  enoxaparin Injectable 40 milliGRAM(s) SubCutaneous daily  famotidine    Tablet 20 milliGRAM(s) Oral daily  influenza   Vaccine 0.5 milliLiter(s) IntraMuscular once  lactated ringers. 1000 milliLiter(s) (125 mL/Hr) IV Continuous <Continuous>  naproxen 500 milliGRAM(s) Oral every 12 hours  polyethylene glycol 3350 17 Gram(s) Oral daily  senna 2 Tablet(s) Oral at bedtime  sodium chloride 0.9% lock flush 3 milliLiter(s) IV Push every 8 hours  sodium chloride 0.9% lock flush 3 milliLiter(s) IV Push every 8 hours    MEDICATIONS  (PRN):  HYDROmorphone   Tablet 2 milliGRAM(s) Oral every 6 hours PRN Severe Pain (7 - 10)  ondansetron Injectable 4 milliGRAM(s) IV Push every 6 hours PRN Nausea and/or Vomiting  simethicone 80 milliGRAM(s) Chew every 6 hours PRN Gas      No Known Allergies      LABS:                        7.9    26.02 )-----------( 521      ( 27 Mar 2020 07:49 )             27.2         139  |  102  |  7   ----------------------------<  114<H>  4.4   |  27  |  0.58    Ca    10.1      27 Mar 2020 07:49  Phos  2.9       Mg     2.0              Urinalysis Basic - ( 25 Mar 2020 15:42 )    Color: Light Yellow / Appearance: Clear / S.008 / pH: x  Gluc: x / Ketone: Negative  / Bili: Negative / Urobili: Negative   Blood: x / Protein: Negative / Nitrite: Negative   Leuk Esterase: Small / RBC: 24 /hpf / WBC 2 /HPF   Sq Epi: x / Non Sq Epi: 1 /hpf / Bacteria: Negative    Folate, Serum: 10.0 ng/mL (20 @ 09:51)  Vitamin B12, Serum: 800 pg/mL (20 @ 09:51)    Reticulocyte Count (20 @ 07:49)    RBC Count: 3.41 M/uL    Reticulocyte Percent: 2.1 %    Absolute Reticulocytes: 72.6 K/uL    Iron with Total Binding Capacity (20 @ 10:01)    Iron - Total Binding Capacity.: 229 ug/dL    % Saturation, Iron: 10 %    Iron Total, Serum: 23 ug/dL    Unsaturated Iron Binding Capacity: 205 ug/dL    Ferritin, Serum: 163 ng/mL (20 @ 09:54)          RADIOLOGY & ADDITIONAL TESTS:  Studies reviewed.    < from: CT Angio Chest w/ IV Cont (20 @ 13:00) >    EXAM:  CT ANGIO CHEST (W)AW IC                            PROCEDURE DATE:  2020            INTERPRETATION:  CLINICAL INFORMATION: Shortness of breath and fever     COMPARISON: CT chest 3/20/2020    PROCEDURE:   CT Angiography of the Chest.  90 ml of Omnipaque 350 was injected intravenously. 10 ml were discarded.  Sagittal and coronal reformats were performed as well as 3D (MIP) reconstructions.      FINDINGS:    LUNGS AND AIRWAYS: No endobronchial lesion.  There are multiple bilateral pulmonary nodules. For example, a left upper lobe nodule measures 7 mm (series 5, image 53) and a right upper lobe nodule measures 7 mm (series 5, image 56). There is partial atelectasis of the bilateral lower lobes.    PLEURA: Small left pleural effusion and trace right pleural effusion.    MEDIASTINUM AND BANG: No lymphadenopathy.    VESSELS: Nondiagnostic evaluation of the pulmonary arteries secondary to lack of contrast opacification.    HEART: Heart size is normal. No pericardial effusion.    CHEST WALL AND LOWER NECK: Within normal limits.    VISUALIZED UPPER ABDOMEN: Small volume of ascites. Subcentimeter hypodense lesions within the liver too small to characterize.    BONES: Unchanged lucent lesion in the manubrium.     IMPRESSION:     Nondiagnostic evaluation of the pulmonary arteries secondary to lack of contrast opacification.    Bilateral pulmonary nodules likely secondary to metastatic disease.    Small left and trace right pleural effusions with partial atelectasis of the bilateral lower lobes.    LISA GOMEZ M.D., RADIOLOGY RESIDENT  This document has been electronically signed.  JOANA GIL M.D., ATTENDING RADIOLOGIST  This document has been electronically signed. Mar 26 2020  2:12PM    < end of copied text >    Cytopathology - Non Gyn Report:   ACCESSION No:  [id number]    MARYANN VASQUEZ                       2    Cytopathology Report    Final Diagnosis  BONE, ILIAC, RIGHT, CT GUIDED CORE BIOPSY AND FNA  POSITIVE FOR MALIGNANT CELLS.  Metastatic poorly differentiated carcinoma.    Cytology smears, touch preps, cell block, display a cellular  specimen comprised of crowded 3-dimensional groups and single  lying malignant cells with enlarged nuclei containing prominent  nucleoli and vacuolated to dense cytoplasm in the background of  extensive necrosis.  Core biopsy: reveals an effacement of bone parenchyma by the  presence of cluster of malignant cells with enlarged nuclei,  prominent nucleoli and scant cytoplasm. Extensive areas of  necrosis present.    Immunohistochemical stains are in progress for further  classification. Results will be reported in an addendum.    This case was reviewed for  with staff  cytopathologist who concurs with the diagnosis.  Case reported to Tumor Registry.    Screened by: Esther RODRÍGUEZ(ASCP)  Verified by: Marni Lyons MD  (Electronic Signature)  Reported on: 20 17:56 EDT, [address] [facility]. [address],  [place] [address]  Phone: [phone]   Fax: [phone]  Cytology technical processing performed at [address] [place] [id number]  _________________________________________________________________      Specimen(s) Submitted  BONE, ILIAC, RIGHT, CT GUIDED CORE BIOPSY AND FNA      Statement of Adequacy  Immediate cytologic study for adequacy of specimen using a Diff-  Quik stain was performed at [facility], [address] [place] [id number] FNA  acceptable for further evaluation by ANNE Larry(ASCP).      Clinical Information  ICD 10: R10.9    MARYANN VASQUEZ                       2    Cytopathology Report    24 year old female, with multiple liver and multiple lung  nodules, a large pelvic mass, and multiple osseous lesions.  Concern for GYN primary.Rule out cancer.    Gross Description  Core Biopsy:  Tissue collected: Specimen container has been inspected to  confirm patient’s name and date of birth, contains 2  tan  cores/blood clots measuring 5.0, 1.0 cm in length. Entire  specimen submitted in 2 cassette(s) labeled 1B and 1C.    2 Touch prep slides ( 2 air dried )    FNA:  Received: 4 air dried and 2 fixed slides  Needle rinses in 30 ml formalin  Submitted: cell block in one cassette labeled 1A    Prepared:  2 Touch Prep, 4 Diff Quick, 2 Pap Stained slides  1 cell block  labeled 1A  1 core biopsy labeled 1B  1 core biopsy labeled 1C  11 Total slides (20 @ 12:53)    < from: CT Abdomen and Pelvis w/ IV Cont (20 @ 18:28) >    EXAM:  CT ABDOMEN AND PELVIS IC                          PROCEDURE DATE:  2020      INTERPRETATION:  CLINICAL INFORMATION: Abdominal pain    COMPARISON: Pelvic sonography performed earlier the same day, 3/20/2020.    PROCEDURE:   CT of the Abdomen and Pelvis was performed with intravenous contrast.   Intravenous contrast: 90 ml Omnipaque 350. 10 ml discarded.  Oral contrast: None.  Sagittal and coronal reformats were performed.    FINDINGS:    LOWER CHEST: Bilateral pulmonary nodules, the largest in the left lower lobe measuring 2.0 x 1.6 cm. Small left pleural effusion.    LIVER: Bilobar subcentimeter hypodense foci  BILE DUCTS: Normal caliber.  GALLBLADDER: Within normal limits.  SPLEEN: Within normal limits.  PANCREAS: Within normal limits.  ADRENALS: Within normal limits.  KIDNEYS/URETERS: Within normal limits.    BLADDER: Within normal limits.  REPRODUCTIVE ORGANS: Bilateral heterogeneous adnexal masses measuring 14.7 x 10.7 cm on the right and 6.6 x 5.8 cm on the left.    BOWEL: No bowel obstruction. Appendix is normal.  PERITONEUM: Small moderate volume ascites. Associated omental seeding is noted.  VESSELS: Within normal limits.  RETROPERITONEUM/LYMPH NODES: Upper abdominal, retroperitoneal, and pelvic lymphadenopathy. For example:  *  Celiac axis lymph node (2:34) measuring 1.4 x 1.2 cm.  *  Left para-aortic lymph node (2:54) measuring 1.4 x 1.0 cm.  *  Right external iliac lymph node (2:98) measuring 2.0 x 1.5 cm.    ABDOMINAL WALL: Within normal limits.  BONES: Metastatic lucent focus in the right iliac wing.     IMPRESSION:     Bilateral heterogeneous pelvic masses concerning for primary malignancy with metastatic disease to the lungs, liver, lymph nodes, as described above. Associated omental seeding is noted.    Small ascites.     DAISY PHELAN M.D., RADIOLOGY RESIDENT  This document has been electronically signed.  JOANA GIL M.D., ATTENDING RADIOLOGIST  This document has been electronically signed. Mar 20 2020  6:55PM    < end of copied text >

## 2020-03-27 NOTE — PROGRESS NOTE ADULT - SUBJECTIVE AND OBJECTIVE BOX
Chief Complaint:  Patient is a 24y old  Female who presents with a chief complaint of b/l pelvic masses (27 Mar 2020 07:25)      Interval Events: Pt with persistent fevers and abdominal pain controlled with naroctics    Allergies:  No Known Allergies      Hospital Medications:  acetaminophen   Tablet .. 975 milliGRAM(s) Oral every 6 hours  enoxaparin Injectable 40 milliGRAM(s) SubCutaneous daily  famotidine    Tablet 20 milliGRAM(s) Oral daily  HYDROmorphone   Tablet 2 milliGRAM(s) Oral every 6 hours PRN  influenza   Vaccine 0.5 milliLiter(s) IntraMuscular once  lactated ringers. 1000 milliLiter(s) IV Continuous <Continuous>  naproxen 500 milliGRAM(s) Oral every 12 hours  ondansetron Injectable 4 milliGRAM(s) IV Push every 6 hours PRN  polyethylene glycol 3350 17 Gram(s) Oral daily  senna 2 Tablet(s) Oral at bedtime  simethicone 80 milliGRAM(s) Chew every 6 hours PRN  sodium chloride 0.9% lock flush 3 milliLiter(s) IV Push every 8 hours  sodium chloride 0.9% lock flush 3 milliLiter(s) IV Push every 8 hours      PMHX/PSHX:  No pertinent past medical history  No significant past surgical history      Family history:      ROS: As per note    PHYSICAL EXAM:     Vital Signs:  Vital Signs Last 24 Hrs  T(C): 36.4 (27 Mar 2020 05:59), Max: 38.8 (27 Mar 2020 01:36)  T(F): 97.5 (27 Mar 2020 05:59), Max: 101.8 (27 Mar 2020 01:36)  HR: 89 (27 Mar 2020 05:59) (74 - 120)  BP: 115/69 (27 Mar 2020 05:34) (115/69 - 130/75)  BP(mean): --  RR: 17 (27 Mar 2020 05:34) (17 - 19)  SpO2: 99% (27 Mar 2020 05:34) (91% - 99%)  Daily     Daily Weight in k.1 (26 Mar 2020 10:06)    GENERAL:  appears comfortable, no acute distress  HEENT:  NC/AT,  conjunctivae clear, sclera -anicteric  CHEST:  no increased effort  HEART:  Regular rate and rhythm  ABDOMEN:  Soft, non-tender, non-distended,  no masses ,no hepato-splenomegaly,   EXTREMITIES:  no cyanosis, clubbing or edema  SKIN:  No rash/erythema/ecchymoses/petechiae/wounds  NEURO:  Alert, oriented    LABS:                        7.9    26.02 )-----------( 521      ( 27 Mar 2020 07:49 )             27.2         139  |  102  |  7   ----------------------------<  114<H>  4.4   |  27  |  0.58    Ca    10.1      27 Mar 2020 07:49  Phos  2.9       Mg     2.0               Urinalysis Basic - ( 25 Mar 2020 15:42 )    Color: Light Yellow / Appearance: Clear / S.008 / pH: x  Gluc: x / Ketone: Negative  / Bili: Negative / Urobili: Negative   Blood: x / Protein: Negative / Nitrite: Negative   Leuk Esterase: Small / RBC: 24 /hpf / WBC 2 /HPF   Sq Epi: x / Non Sq Epi: 1 /hpf / Bacteria: Negative          Imaging:  < from: CT Angio Chest w/ IV Cont (20 @ 13:00) >    IMPRESSION:     Nondiagnostic evaluation of the pulmonary arteries secondary to lack of contrast opacification.    Bilateral pulmonary nodules likely secondary to metastatic disease.    Small left and trace right pleural effusions with partial atelectasis of the bilateral lower lobes.      < end of copied text >    Cytopathology - Non Gyn Report:   ACCESSION No:  [id number]    MARYANN VASQUEZ                       2        Cytopathology Report    Final Diagnosis  BONE, ILIAC, RIGHT, CT GUIDED CORE BIOPSY AND FNA  POSITIVE FOR MALIGNANT CELLS.  Metastatic poorly differentiated carcinoma.

## 2020-03-27 NOTE — PROGRESS NOTE ADULT - ATTENDING COMMENTS
Patient seen & examined at bedside with PGY-3.  Spoke with Pathologist Dr. Lyons and Dr. AARON Miles (Heme/Onc) regarding preliminary path result and plan for upper GI series as unable to perform non urgent endoscopy/colonoscopy in the setting of the COVID 19 crisis.  Appreciate palliative care/pain management recommendations.  Patient advised of analgesia options that are ordered/available.  SaO2 improved on room air today.  Anticipate inpatient chemotherapy early next week once define primary site of origin of this advanced malignancy.  The patient is aware of the plan and is in agreement.    Marlee Ureña MD

## 2020-03-27 NOTE — CONSULT NOTE ADULT - SUBJECTIVE AND OBJECTIVE BOX
HPI: 24F here with worsening abdominal pain in the setting of known adnexal massess, found to have for metastatic carcinoma with final pathology pending. Given evidence of metastatic disease, patient is not a surgical candidate. Has been having worsening adnexal pain, markedly last night after taking senna. States pain is 7-8/10 at its worst, located in adnexal area with radiation down to legs, worse with movement, and tolerable with opioids around 2/10. Palliative care called to help with pain.       PERTINENT PM/SXH:   No pertinent past medical history    No significant past surgical history    FAMILY HISTORY:    ITEMS NOT CHECKED ARE NOT PRESENT    SOCIAL HISTORY:   Significant other/partner[ ]  Children[ ]  Restorationist/Spirituality:  Substance hx:  [ ]   Tobacco hx:  [ ]   Alcohol hx: [ ]   Home Opioid hx:  [ ] I-Stop Reference No: [id number]    	oxycodone hcl 5 mg tablet	6	1	[facility]    Living Situation: [ ]Home  [ ]Long term care  [ ]Rehab [ ]Other    ADVANCE DIRECTIVES:    DNR  MOLST  [ ]  Living Will  [ ]   DECISION MAKER(s):  [ ] Health Care Proxy(s)  [ ] Surrogate(s)  [ ] Guardian           Name(s): Phone Number(s):    BASELINE (I)ADL(s) (prior to admission):  [place]: [ ]Total  [ ] Moderate [ ]Dependent    Allergies    No Known Allergies    Intolerances    MEDICATIONS  (STANDING):  acetaminophen   Tablet .. 975 milliGRAM(s) Oral every 6 hours  enoxaparin Injectable 40 milliGRAM(s) SubCutaneous daily  famotidine    Tablet 20 milliGRAM(s) Oral daily  influenza   Vaccine 0.5 milliLiter(s) IntraMuscular once  lactated ringers. 1000 milliLiter(s) (125 mL/Hr) IV Continuous <Continuous>  naproxen 500 milliGRAM(s) Oral every 12 hours  polyethylene glycol 3350 17 Gram(s) Oral daily  senna 2 Tablet(s) Oral at bedtime  sodium chloride 0.9% lock flush 3 milliLiter(s) IV Push every 8 hours  sodium chloride 0.9% lock flush 3 milliLiter(s) IV Push every 8 hours    MEDICATIONS  (PRN):  HYDROmorphone   Tablet 2 milliGRAM(s) Oral every 6 hours PRN Severe Pain (7 - 10)  ondansetron Injectable 4 milliGRAM(s) IV Push every 6 hours PRN Nausea and/or Vomiting  simethicone 80 milliGRAM(s) Chew every 6 hours PRN Gas    PRESENT SYMPTOMS: [ ]Unable to obtain due to poor mentation   Source if other than patient:  [ ]Family   [ ]Team     Pain: [X ]yes [ ]no  QOL impact -  decreased function  Location - adnexal  Aggravating factors - movement  Quality - burning  Radiation - down legs  Timing- constant  Severity (0-10 scale): 8/10   Minimal acceptable level (0-10 scale): 2/10    CPOT:    https://www.[facility].org/getattachment/vua27m51-5c7o-7e0u-3c9w-9185d0041k3i/Critical-Care-Pain-Observation-Tool-(CPOT)      PAIN AD Score:     http://geriatrictoolkit.[facility]/cog/painad.pdf (press ctrl +  left click to view)    Dyspnea:                           [ ]Mild [ ]Moderate [ ]Severe  Anxiety:                             [ ]Mild [ ]Moderate [ ]Severe  Fatigue:                             [ ]Mild [ ]Moderate [ ]Severe  Nausea:                             [ ]Mild [ ]Moderate [ ]Severe  Loss of appetite:              [ ]Mild [ ]Moderate [ ]Severe  Constipation:                    [ ]Mild [ ]Moderate [ ]Severe    Other Symptoms:  [X ]All other review of systems negative     Palliative Performance Status Version 2:         %    http://npcrc.org/files/news/palliative_performance_scale_ppsv2.pdf  PHYSICAL EXAM:  Vital Signs Last 24 Hrs  T(C): 36.1 (27 Mar 2020 13:05), Max: 38.8 (27 Mar 2020 01:36)  T(F): 96.9 (27 Mar 2020 13:05), Max: 101.8 (27 Mar 2020 01:36)  HR: 88 (27 Mar 2020 13:05) (74 - 118)  BP: 118/76 (27 Mar 2020 13:05) (113/71 - 127/76)  BP(mean): --  RR: 18 (27 Mar 2020 13:05) (17 - 19)  SpO2: 96% (27 Mar 2020 13:05) (91% - 99%) I&O's Summary    26 Mar 2020 07:01  -  27 Mar 2020 07:00  --------------------------------------------------------  IN: 1685 mL / OUT: 1275 mL / NET: 410 mL    27 Mar 2020 07:01  -  27 Mar 2020 14:57  --------------------------------------------------------  IN: 0 mL / OUT: 200 mL / NET: -200 mL    Limited exam for patient comfort and to limit infection risk during COVID-19 outbreak  GENERAL:  [ X]Alert  [ ]Oriented x   [ ]Lethargic  [ ]Cachexia  [ ]Unarousable  [ X]Verbal  [ ]Non-Verbal  Behavioral:   [ ] Anxiety  [ ] Delirium [ ] Agitation [ ] Other  HEENT:  [ X]Normal   [ ]Dry mouth   [ ]ET Tube/Trach  [ ]Oral lesions  PULMONARY:   [ ]Clear [ ]Tachypnea  [ ]Audible excessive secretions   [ ]Rhonchi        [ ]Right [ ]Left [ ]Bilateral  [ ]Crackles        [ ]Right [ ]Left [ ]Bilateral  [ ]Wheezing     [ ]Right [ ]Left [ ]Bilateral  [ ]Diminished breath sounds [ ]right [ ]left [ ]bilateral  CARDIOVASCULAR:    [ ]Regular [ ]Irregular [ ]Tachy  [ ]Quirino [ ]Murmur [ ]Other  GASTROINTESTINAL:  [ ]Soft  [ ]Distended   [ ]+BS  [ ]Non tender [ ]Tender  [ ]PEG [ ]OGT/ NGT  Last BM:     GENITOURINARY:  [ ]Normal [ ] Incontinent   [ ]Oliguria/Anuria   [ ]Cifuentes  MUSCULOSKELETAL:   [ ]Normal   [ ]Weakness  [ ]Bed/Wheelchair bound [ ]Edema  NEUROLOGIC:   [X ]No focal deficits  [ ]Cognitive impairment  [ ]Dysphagia [ ]Dysarthria [ ]Paresis [ ]Other   SKIN:   [ ]Normal    [ ]Rash  [ ]Pressure ulcer(s)       Present on admission [ ]y [ ]n    CRITICAL CARE:  [ ] Shock Present  [ ]Septic [ ]Cardiogenic [ ]Neurologic [ ]Hypovolemic  [ ]  Vasopressors [ ]  Inotropes   [ ]Respiratory failure present [ ]Mechanical ventilation [ ]Non-invasive ventilatory support [ ]High flow  [ ]Acute  [ ]Chronic [ ]Hypoxic  [ ]Hypercarbic [ ]Other  [ ]Other organ failure     LABS: reviewed                        7.9    26.02 )-----------( 521      ( 27 Mar 2020 07:49 )             27.2       139  |  102  |  7   ----------------------------<  114<H>  4.4   |  27  |  0.58    Ca    10.1      27 Mar 2020 07:49  Phos  2.9       Mg     2.0             Urinalysis Basic - ( 25 Mar 2020 15:42 )    Color: Light Yellow / Appearance: Clear / S.008 / pH: x  Gluc: x / Ketone: Negative  / Bili: Negative / Urobili: Negative   Blood: x / Protein: Negative / Nitrite: Negative   Leuk Esterase: Small / RBC: 24 /hpf / WBC 2 /HPF   Sq Epi: x / Non Sq Epi: 1 /hpf / Bacteria: Negative      RADIOLOGY & ADDITIONAL STUDIES:    PROTEIN CALORIE MALNUTRITION PRESENT: [ ]mild [ ]moderate [ ]severe [ ]underweight [ ]morbid obesity  https://www.andeal.org/vault/2440/web/files/ONC/Table_Clinical%20Characteristics%20to%20Document%20Malnutrition-White%20JV%20et%20al%128127.pdf    Height (cm): 162.6 (20 @ 03:15), 162.56 (20 @ 19:18)  Weight (kg): 84.4 (20 @ 03:15), 89.4 (20 @ 19:18)  BMI (kg/m2): 31.9 (20 @ 03:15), 33.8 (20 @ 19:18)    [ ]PPSV2 < or = to 30% [ ]significant weight loss  [ ]poor nutritional intake  [ ]anasarca     Albumin, Serum: 2.9 g/dL (20 @ 05:59)   [ ]Artificial Nutrition      REFERRALS:   [ ]Chaplaincy  [ ]Hospice  [ ]Child Life  [ ]Social Work  [ ]Case management [ ]Holistic Therapy     Goals of Care Document:     ______________  Alex Mack MD   of Geriatric and Palliative Medicine  [facility]     Please page the following number for clinical matters between the hours of 9AM and 5PM   from Monday through Friday : [phone]    After 5PM and on weekends, please page: [phone]. The Geriatric and Palliative Medicine consult service has  coverage for medical recommendations, including for symptom management needs.

## 2020-03-28 NOTE — CHART NOTE - NSCHARTNOTEFT_GEN_A_CORE
Discussed patient's case with Dr. Comer (radiology resident) and Dr. Morel: Upper GI series not being performed currently  as many radiologic tests are severely limited by the COVID19 emergency. Alternative options were offered such as a GI hemorrhage protocol if patient was acutely having a large GI bleed. As patient does not meet criteria, patient will not have this test at this time. H/H is stable today with no BRBPR. Will continue to monitor.    D/w CHASTITY Dutton PGY-5 Gyn Onc fellow and Dr. Giuliana Saldana PGY-2

## 2020-03-28 NOTE — PROGRESS NOTE ADULT - SUBJECTIVE AND OBJECTIVE BOX
R2 GYN ONC PROGRESS NOTE    Patient seen and examined at bedside, no acute overnight events. Pain control improved overnight with new regimen. Patient is ambulating and tolerating regular diet. Denies CP, SOB, N/V, fevers, and chills. Has not noticed bloody stools, rectal bleeding, or vaginal bleeding.     Vital Signs Last 24 Hours  T(C): 36.2 (03-28-20 @ 06:02), Max: 36.9 (03-27-20 @ 21:08)  HR: 101 (03-28-20 @ 06:02) (88 - 105)  BP: 113/73 (03-28-20 @ 06:02) (112/71 - 119/76)  RR: 18 (03-28-20 @ 06:02) (18 - 18)  SpO2: 95% (03-28-20 @ 06:02) (94% - 96%)    I&O's Summary    27 Mar 2020 07:01  -  28 Mar 2020 07:00  --------------------------------------------------------  IN: 2447 mL / OUT: 850 mL / NET: 1597 mL    Physical Exam:  General: NAD  CV: NR, RR, S1, S2, no M/R/G  Lungs: CTA-B  Abdomen: +BS, soft, tender to palpation in lower quadrants, nondistended, no rebound or guarding  Ext: No pain or swelling    Labs:             7.9<L>  26.02<H> )-----------( 521<H>    ( 03-27 @ 07:49 )             27.2<L>               7.8<L>  22.40<H> )-----------( 507<H>    ( 03-26 @ 06:21 )             26.1<L>               7.2<L>  26.64<H> )-----------( 506<H>    ( 03-25 @ 16:38 )             24.9<L>               7.2<L>  23.93<H> )-----------( 534<H>    ( 03-25 @ 05:59 )             25.6<L>               7.6<L>  21.31<H> )-----------( 554<H>    ( 03-24 @ 06:22 )             26.3<L>        MEDICATIONS  (STANDING):  acetaminophen   Tablet .. 975 milliGRAM(s) Oral every 6 hours  enoxaparin Injectable 40 milliGRAM(s) SubCutaneous daily  famotidine    Tablet 20 milliGRAM(s) Oral daily  influenza   Vaccine 0.5 milliLiter(s) IntraMuscular once  naproxen 500 milliGRAM(s) Oral every 12 hours  polyethylene glycol 3350 17 Gram(s) Oral daily  senna 2 Tablet(s) Oral at bedtime  sodium chloride 0.9% lock flush 3 milliLiter(s) IV Push every 8 hours  sodium chloride 0.9% lock flush 3 milliLiter(s) IV Push every 8 hours    MEDICATIONS  (PRN):  HYDROmorphone   Tablet 2 milliGRAM(s) Oral every 3 hours PRN moderate-severe pain  HYDROmorphone  Injectable 0.5 milliGRAM(s) IV Push every 3 hours PRN severe pain unresponsive to oral  ondansetron Injectable 4 milliGRAM(s) IV Push every 6 hours PRN Nausea and/or Vomiting  simethicone 80 milliGRAM(s) Chew every 6 hours PRN Gas

## 2020-03-28 NOTE — PROGRESS NOTE ADULT - ATTENDING COMMENTS
Communicated with IBIS lawton the pt. Seen mid day in 711W. Labs and flow reviewed. She reported feeling improved with better pain control. No new concerns expressed. Await final stains on path. Await further GI testing. Repleting Phos for hypophosphatemia. Instructions dicussed.

## 2020-03-28 NOTE — PROGRESS NOTE ADULT - PROBLEM SELECTOR PLAN 1
CV: tachycardia improving, BPs normotensive and stable, s/p 1u pRBC (3/25), f/u AM labs, monitor VS closely; CT Angio chest non diagnostic for PE, will continue to monitor VS closely  Pulm: On RA and speaking comfortably saturating 93-97%, encourage OOB and incentive spirometer use in setting of atelectasis seen on CTA  GI: Reg diet as tolerated. Monitor GI function; FOBT negative (3/22), repeat FOBT positive(3/26).  Appreciate GI recommendations. Bowel regimen for constipation.  IV antiemetics prn. Plan for small bowel series to expedite evaluation for gastric/small bowel regions.  ID: Zosyn (3/20-3/22), urine culture negative, blood cultures NGTD (final); repeat cultures done 3/25, prelim NGTD, COVID19 PCR neg (3/25), check daily CBC + Diff, afebrile overnight. F/u AM CBC  Heme: lovenox qd, venodynes for VTE ppx, encourage ambulation as tolerated. Polychromasia despite reticulocyte count inappropriately low, LDH elevated; with no schistocytes, ferritin elevated in setting inflammatory process, likely paraneoplastic  Iron deficiency may be contributing to anemia. F/u Hgb electrophoresis to r/o concurrent thalassemia. Appreciate heme/onc recs.  Neuro: Palliative consulted for abdominal pain related to metastatic disease. C/w Dilaudid mg Q3 PO PRN, Dilaudid 0.5mg IV Q3 PRN for severe breakthrough pain, and Naproxen for while monitoring renal function (on second day of use). F/u AM BMP. Appreciate heme recs.  Dispo: s/p IR biopsy (3/25), path results show metastatic poorly differentiated carcinoma, pending IHC. Plan for possible inpatient chemotherapy when IHC is available    FLAQUITA Saldana PGY-2 CV: tachycardia improving, BPs normotensive and stable, s/p 1u pRBC (3/25), f/u AM labs, monitor VS closely; CT Angio chest non diagnostic for PE, will continue to monitor VS closely  Pulm: On RA and speaking comfortably saturating 93-97%, encourage OOB and incentive spirometer use in setting of atelectasis seen on CTA  GI: Reg diet as tolerated. Monitor GI function; FOBT negative (3/22), repeat FOBT positive(3/26).  Appreciate GI recommendations. Bowel regimen for constipation.  IV antiemetics prn. Plan for small bowel series to expedite evaluation for gastric/small bowel regions.  ID: Zosyn (3/20-3/22), urine culture negative, blood cultures NGTD (final); repeat cultures done 3/25, prelim NGTD, COVID19 PCR neg (3/25), check daily CBC + Diff, afebrile overnight. F/u AM CBC  Heme: lovenox qd, venodynes for VTE ppx, encourage ambulation as tolerated. Polychromasia despite reticulocyte count inappropriately low, LDH elevated; with no schistocytes, ferritin elevated in setting inflammatory process, likely paraneoplastic  Iron deficiency may be contributing to anemia. F/u Hgb electrophoresis to r/o concurrent thalassemia. Appreciate heme/onc recs.  Neuro: Palliative consulted for abdominal pain related to metastatic disease. C/w Dilaudid mg Q3 PO PRN, Dilaudid 0.5mg IV Q3 PRN for severe breakthrough pain, and Naproxen while monitoring renal function (on second day of use). F/u AM BMP. Appreciate heme recs.  Dispo: s/p IR biopsy (3/25), path results show metastatic poorly differentiated carcinoma, pending IHC. Plan for possible inpatient chemotherapy when IHC is available    FLAQUITA Saldana PGY-2

## 2020-03-28 NOTE — CHART NOTE - NSCHARTNOTEFT_GEN_A_CORE
Gyn Onc Fellow Note    Spoke with Dr. Westley Little of medical oncology, weekend coverage team.  Informed them of radiology's disposition.    Medical oncology team amenable to deferring upper GI series at this time and awaiting final IHC.  If IHC not definitive, then can readdress possibility of upper GI series.    MD Jimena

## 2020-03-28 NOTE — PROGRESS NOTE ADULT - ASSESSMENT
24 year old presenting with RLQ pain found to have bilateral pelvic masses, extensive lymphadenopathy, liver, lung & bone lesions suspicious for metastasis; suspect primary Gyn vs less likely GI origin.  Microcytic anemia with target cells suggestive of Fe deficiency.  Presented with low grade fever and leukocytosis.  S/p IR biopsy 3/25, prelim path results metastatic poorly differentiated carcinoma, IHC pending. Has been persistently tachycardic the past couple of days, s/p 1u pRBC (3/26) with inappropriate rise in h/h, repeat FOBT was performed to r/o occult bleeding, result positive. Tachycardia improving, H/H stable yesterday. CTAngio (3/26) to r/o PE 2/2 oxygen requirements was non-diagnostic and showed small bl pleural effusions and atelectasis; patient was weaned to room air and is now satting well on room air.

## 2020-03-28 NOTE — CHART NOTE - NSCHARTNOTEFT_GEN_A_CORE
- Chart reviewed. Patient not seen. Events noted.  - Only took Dilaudid 2mg PO x 1 + Dilaudid 0.5mg IV x 1  - No new recommendations. Continue current pain regimen.    Thank you for allowing us to participate in your patient's care. We will continue to follow with you.     Filiberto Pugh  Palliative Medicine WEEKEND COVERAGE TEAM CHART NOTE    - Chart reviewed. Patient not seen. Events noted.  - Only took Dilaudid 2mg PO x 1 + Dilaudid 0.5mg IV x 1  - No new recommendations. Continue current pain regimen.    Thank you for allowing us to participate in your patient's care. We will continue to follow with you.     Filiberto Pugh  Palliative Medicine

## 2020-03-29 NOTE — PROGRESS NOTE ADULT - ATTENDING COMMENTS
Seen in f/u ~10a. Labs and flow reviewed. Pt feels the same, some neck discomfort she thinks relates to how she has slept (occurred during the week at times she notes as well). Still awaiting further testing on biopsy for treatment. Pall Care note reviewed. Instructions disc, incl amb and deep breathing.

## 2020-03-29 NOTE — PROGRESS NOTE ADULT - SUBJECTIVE AND OBJECTIVE BOX
R2 Gyn ONC Progress Note HD#10    Subjective:   Pt seen and examined at bedside. No events overnight. Pain control has improved. Patient ambulating. Passing flatus. Tolerating regular diet. Pt denies fever, chills, chest pain, SOB, nausea, vomiting, lightheadedness, dizziness.  Does complain of right sided neck pain which she thinks is related to sleeping in an awkward position.    Objective:  T(F): 99 (03-29-20 @ 06:15), Max: 99.7 (03-28-20 @ 13:06)  HR: 102 (03-29-20 @ 06:15) (100 - 118)  BP: 117/77 (03-29-20 @ 06:15) (105/66 - 119/76)  RR: 18 (03-29-20 @ 06:15) (18 - 18)  SpO2: 96% (03-29-20 @ 06:15) (92% - 96%)  Wt(kg): --  I&O's Summary    28 Mar 2020 07:01  -  29 Mar 2020 07:00  --------------------------------------------------------  IN: 875 mL / OUT: 1800 mL / NET: -925 mL      CAPILLARY BLOOD GLUCOSE    MEDICATIONS  (STANDING):  acetaminophen   Tablet .. 975 milliGRAM(s) Oral every 6 hours  enoxaparin Injectable 40 milliGRAM(s) SubCutaneous daily  famotidine    Tablet 20 milliGRAM(s) Oral daily  influenza   Vaccine 0.5 milliLiter(s) IntraMuscular once  naproxen 500 milliGRAM(s) Oral every 12 hours  polyethylene glycol 3350 17 Gram(s) Oral daily  potassium acid phosphate/sodium acid phosphate tablet (K-PHOS No. 2) 1 Tablet(s) Oral four times a day with meals  senna 2 Tablet(s) Oral at bedtime  sodium chloride 0.9% lock flush 3 milliLiter(s) IV Push every 8 hours  sodium chloride 0.9% lock flush 3 milliLiter(s) IV Push every 8 hours    MEDICATIONS  (PRN):  HYDROmorphone   Tablet 2 milliGRAM(s) Oral every 3 hours PRN moderate-severe pain  HYDROmorphone  Injectable 0.5 milliGRAM(s) IV Push every 3 hours PRN severe pain unresponsive to oral  ondansetron Injectable 4 milliGRAM(s) IV Push every 6 hours PRN Nausea and/or Vomiting  simethicone 80 milliGRAM(s) Chew every 6 hours PRN Gas      Physical Exam:  Constitutional: NAD, A+O x3  CV: RR S1S2 no m/r/g  Lungs: CTA b/l, good air flow b/l  Abdomen: soft, appropriately-tender which is improved from yesterday. No guarding, no rebound, normal bowel sounds  Incision: clean, dry, intact  Extremities: no lower extremity edema or calf tenderness bilaterally; venodynes in place    LABS:  03-28    139    |  103    |  9      ----------------------------<  91     4.4     |  25     |  0.56     Ca    10.5       28 Mar 2020 09:09  Phos  2.1       03-28  Mg     2.1       03-28

## 2020-03-29 NOTE — PROGRESS NOTE ADULT - PROBLEM SELECTOR PLAN 1
CV: tachycardia improving, BPs normotensive and stable, s/p 1u pRBC (3/25), monitor VS closely; CT Angio chest non diagnostic for PE, will continue to monitor VS closely  Pulm: On RA and speaking comfortably saturating 93-97%, encourage OOB and incentive spirometer use in setting of atelectasis seen on CTA  GI: Reg diet as tolerated. Monitor GI function; FOBT negative (3/22), repeat FOBT positive(3/26).  Appreciate GI recommendations. Bowel regimen for constipation.  IV antiemetics prn. No bowel imaging at this time.  ID: Zosyn (3/20-3/22), urine culture negative, blood cultures NGTD (final); repeat cultures done 3/25, prelim NGTD, COVID19 PCR neg (3/25). Afebrile overnight.  Heme: lovenox qd, venodynes for VTE ppx, encourage ambulation as tolerated. Polychromasia despite reticulocyte count inappropriately low, LDH elevated; with no schistocytes, ferritin elevated in setting inflammatory process, likely paraneoplastic  Iron deficiency may be contributing to anemia. F/u Hgb electrophoresis to r/o concurrent thalassemia. Appreciate heme/onc recs.  Neuro: Palliative consulted for abdominal pain related to metastatic disease. C/w Dilaudid mg Q3 PO PRN, Dilaudid 0.5mg IV Q3 PRN for severe breakthrough pain, and Naproxen while monitoring renal function (on second day of use). F/u AM BMP. Appreciate heme recs.  Dispo: s/p IR biopsy (3/25), path results show metastatic poorly differentiated carcinoma, pending IHC. Plan for possible inpatient chemotherapy when IHC is available    FLAQUITA Saldana PGY-2

## 2020-03-29 NOTE — CHART NOTE - NSCHARTNOTEFT_GEN_A_CORE
- Chart reviewed. Patient not seen. Events noted.  - Only took Dilaudid 2mg PO x 2 + Dilaudid 0.5mg IV x 1 over the past 24h. Usage stable.   - No new recommendations. Continue current pain regimen. Continue bowel regimen.       Thank you for allowing us to participate in your patient's care. We will continue to follow with you.     Filiberto Pugh  Palliative Medicine. WEEKEND COVERAGE TEAM CHART NOTE    - Chart reviewed. Patient not seen. Events noted.  - Only took Dilaudid 2mg PO x 2 + Dilaudid 0.5mg IV x 1 over the past 24h. Usage stable.   - No new recommendations. Continue current pain regimen. Continue bowel regimen.       Thank you for allowing us to participate in your patient's care. We will continue to follow with you.     Filiberto Pugh  Palliative Medicine.

## 2020-03-30 NOTE — CHART NOTE - NSCHARTNOTEFT_GEN_A_CORE
R3 GYN Onc Chart Note    Called pathology and spoke with Dr. Pedroza. IHC results will be resulted today, 3/30. Results will be discussed with hem/onc.    Spoke with GI fellow re: limited capabilities of radiology due to the COVID pandemic. If IHC results are indeterminate for GYN vs. GI primary, will further pursue GI imaging with upper GI series, CT colonography.    Discussed case with radiology, radiology clarified that GI imaging can be obtained if necessary, but will wait for IHC results.    D/w Dr. Nato Osborne PGY3

## 2020-03-30 NOTE — PROGRESS NOTE ADULT - SUBJECTIVE AND OBJECTIVE BOX
HPI: 24F here with worsening abdominal pain in the setting of known adnexal massess, found to have for metastatic carcinoma with final pathology pending. Given evidence of metastatic disease, patient is not a surgical candidate. Has been having worsening adnexal pain, markedly last night after taking senna. States pain is 7-8/10 at its worst, located in adnexal area with radiation down to legs, worse with movement, and tolerable with opioids around 2/10. Palliative care called to help with pain.     INTERVAL EVENTS:  3/30: patient states IV dilaudid works, but PO and naproxen aren't enough to keep her pain at a tolerable level, d/w patient    PERTINENT PM/SXH:   No pertinent past medical history    No significant past surgical history    FAMILY HISTORY:    ITEMS NOT CHECKED ARE NOT PRESENT    SOCIAL HISTORY:   Significant other/partner[ ]  Children[ ]  Scientologist/Spirituality:  Substance hx:  [ ]   Tobacco hx:  [ ]   Alcohol hx: [ ]   Home Opioid hx:  [ ] I-Stop Reference No: [id number]    2020/03/08	2020/03/17	oxycodone hcl 5 mg tablet	6	1	[facility]    Living Situation: [ ]Home  [ ]Long term care  [ ]Rehab [ ]Other    ADVANCE DIRECTIVES:    DNR  MOLST  [ ]  Living Will  [ ]   DECISION MAKER(s):  [ ] Health Care Proxy(s)  [ ] Surrogate(s)  [ ] Guardian           Name(s): Phone Number(s):    BASELINE (I)ADL(s) (prior to admission):  [place]: [ ]Total  [ ] Moderate [ ]Dependent    Allergies    No Known Allergies    Intolerances    MEDICATIONS  (STANDING):  acetaminophen   Tablet .. 975 milliGRAM(s) Oral every 6 hours  enoxaparin Injectable 40 milliGRAM(s) SubCutaneous daily  famotidine    Tablet 20 milliGRAM(s) Oral daily  influenza   Vaccine 0.5 milliLiter(s) IntraMuscular once  naproxen 500 milliGRAM(s) Oral every 12 hours  polyethylene glycol 3350 17 Gram(s) Oral daily  potassium acid phosphate/sodium acid phosphate tablet (K-PHOS No. 2) 1 Tablet(s) Oral four times a day with meals  senna 2 Tablet(s) Oral at bedtime  sodium chloride 0.9% lock flush 3 milliLiter(s) IV Push every 8 hours  sodium chloride 0.9% lock flush 3 milliLiter(s) IV Push every 8 hours    MEDICATIONS  (PRN):  HYDROmorphone   Tablet 4 milliGRAM(s) Oral every 3 hours PRN moderate-severe pain  HYDROmorphone  Injectable 0.5 milliGRAM(s) IV Push every 3 hours PRN severe pain unresponsive to oral  ondansetron Injectable 4 milliGRAM(s) IV Push every 6 hours PRN Nausea and/or Vomiting  simethicone 80 milliGRAM(s) Chew every 6 hours PRN Gas      PRESENT SYMPTOMS: [ ]Unable to obtain due to poor mentation   Source if other than patient:  [ ]Family   [ ]Team     Pain: [X ]yes [ ]no  QOL impact -  decreased function  Location - adnexal  Aggravating factors - movement  Quality - burning  Radiation - down legs  Timing- constant  Severity (0-10 scale): 8/10   Minimal acceptable level (0-10 scale): 2/10    CPOT:    https://www.[facility].org/getattachment/gqd21x27-8z4i-1y6a-1b3v-1854z7294h8b/Critical-Care-Pain-Observation-Tool-(CPOT)      PAIN AD Score:     http://geriatrictoolkit.[facility]/cog/painad.pdf (press ctrl +  left click to view)    Dyspnea:                           [ ]Mild [ ]Moderate [ ]Severe  Anxiety:                             [ ]Mild [ ]Moderate [ ]Severe  Fatigue:                             [ ]Mild [ ]Moderate [ ]Severe  Nausea:                             [ ]Mild [ ]Moderate [ ]Severe  Loss of appetite:              [ ]Mild [ ]Moderate [ ]Severe  Constipation:                    [ ]Mild [ ]Moderate [ ]Severe    Other Symptoms:  [X ]All other review of systems negative     Palliative Performance Status Version 2:         %    http://npcrc.org/files/news/palliative_performance_scale_ppsv2.pdf  PHYSICAL EXAM:  Vital Signs Last 24 Hrs  T(C): 37.3 (30 Mar 2020 14:06), Max: 37.3 (30 Mar 2020 14:06)  T(F): 99.1 (30 Mar 2020 14:06), Max: 99.1 (30 Mar 2020 14:06)  HR: 106 (30 Mar 2020 14:06) (92 - 110)  BP: 117/73 (30 Mar 2020 14:06) (112/72 - 119/80)  BP(mean): --  RR: 18 (30 Mar 2020 14:06) (18 - 18)  SpO2: 96% (30 Mar 2020 14:06) (94% - 98%)    Limited exam for patient safety and to limit infection risk during COVID-19 outbreak. Please refr to primary team's examination for today.  GENERAL:  [ X]Alert  [ ]Oriented x   [ ]Lethargic  [ ]Cachexia  [ ]Unarousable  [ X]Verbal  [ ]Non-Verbal  Behavioral:   [ ] Anxiety  [ ] Delirium [ ] Agitation [ ] Other  HEENT:  [ ]Normal   [ ]Dry mouth   [ ]ET Tube/Trach  [ ]Oral lesions  PULMONARY:   [ ]Clear [ ]Tachypnea  [ ]Audible excessive secretions   [ ]Rhonchi        [ ]Right [ ]Left [ ]Bilateral  [ ]Crackles        [ ]Right [ ]Left [ ]Bilateral  [ ]Wheezing     [ ]Right [ ]Left [ ]Bilateral  [ ]Diminished breath sounds [ ]right [ ]left [ ]bilateral  CARDIOVASCULAR:    [ ]Regular [ ]Irregular [ ]Tachy  [ ]Quirino [ ]Murmur [ ]Other  GASTROINTESTINAL:  [ ]Soft  [ ]Distended   [ ]+BS  [ ]Non tender [ ]Tender  [ ]PEG [ ]OGT/ NGT  Last BM:     GENITOURINARY:  [ ]Normal [ ] Incontinent   [ ]Oliguria/Anuria   [ ]Cifuentes  MUSCULOSKELETAL:   [ ]Normal   [ ]Weakness  [ ]Bed/Wheelchair bound [ ]Edema  NEUROLOGIC:   [ ]No focal deficits  [ ]Cognitive impairment  [ ]Dysphagia [ ]Dysarthria [ ]Paresis [ ]Other   SKIN:   [ ]Normal    [ ]Rash  [ ]Pressure ulcer(s)       Present on admission [ ]y [ ]n    CRITICAL CARE:  [ ] Shock Present  [ ]Septic [ ]Cardiogenic [ ]Neurologic [ ]Hypovolemic  [ ]  Vasopressors [ ]  Inotropes   [ ]Respiratory failure present [ ]Mechanical ventilation [ ]Non-invasive ventilatory support [ ]High flow  [ ]Acute  [ ]Chronic [ ]Hypoxic  [ ]Hypercarbic [ ]Other  [ ]Other organ failure     LABS: reviewed                          7.8    22.67 )-----------( 611      ( 30 Mar 2020 09:20 )             27.1   03-30    138  |  102  |  12  ----------------------------<  96  4.4   |  26  |  0.51    Ca    11.1<H>      30 Mar 2020 09:20  Phos  2.3     03-30  Mg     2.2     03-30      RADIOLOGY & ADDITIONAL STUDIES:    PROTEIN CALORIE MALNUTRITION PRESENT: [ ]mild [ ]moderate [ ]severe [ ]underweight [ ]morbid obesity  https://www.andeal.org/vault/2440/web/files/ONC/Table_Clinical%20Characteristics%20to%20Document%20Malnutrition-White%20JV%20et%20al%202012.pdf    Height (cm): 162.6 (03-21-20 @ 03:15), 162.56 (03-07-20 @ 19:18)  Weight (kg): 84.4 (03-21-20 @ 03:15), 89.4 (03-07-20 @ 19:18)  BMI (kg/m2): 31.9 (03-21-20 @ 03:15), 33.8 (03-07-20 @ 19:18)    [ ]PPSV2 < or = to 30% [ ]significant weight loss  [ ]poor nutritional intake  [ ]anasarca     Albumin, Serum: 2.9 g/dL (03-25-20 @ 05:59)   [ ]Artificial Nutrition      REFERRALS:   [ ]Chaplaincy  [ ]Hospice  [ ]Child Life  [ ]Social Work  [ ]Case management [ ]Holistic Therapy     Goals of Care Document:     ______________  Alxe Mack MD   of Geriatric and Palliative Medicine  [facility]     Please page the following number for clinical matters between the hours of 9AM and 5PM   from Monday through Friday : [phone]    After 5PM and on weekends, please page: [phone]. The Geriatric and Palliative Medicine consult service has 24/7 coverage for medical recommendations, including for symptom management needs.

## 2020-03-30 NOTE — PROGRESS NOTE ADULT - PROBLEM SELECTOR PLAN 1
CV: tachycardia improved (110s), BPs normotensive and stable, s/p 1u pRBC (3/25), monitor VS closely  -F/u AM CBC  -CT Angio chest non diagnostic for PE, will continue to monitor VS closely  Pulm: On RA 98%, encourage OOB and incentive spirometer use in setting of atelectasis seen on CTA  GI: Reg diet as tolerated. Monitor GI function  -FOBT negative (3/22), repeat FOBT positive(3/26).  Appreciate GI recommendations. Bowel regimen for constipation.  IV antiemetics prn. No bowel imaging at this time.  ID: s/p Zosyn (3/20-3/22), urine culture negative, blood cultures NGTD (final); repeat cultures done 3/25, prelim NGTD, COVID19 PCR neg (3/25). Afebrile overnight.  -Tlast 38.8 on 3/27, fever likely 2/2 tumor burden.  Heme: lovenox qd, venodynes for VTE ppx, encourage ambulation as tolerated.  -Polychromasia despite reticulocyte count inappropriately low, LDH elevated; with no schistocytes, ferritin elevated in setting inflammatory process, likely paraneoplastic  -Iron deficiency may be contributing to anemia. F/u Hgb electrophoresis to r/o concurrent thalassemia. Appreciate heme/onc recs.  Neuro: Palliative consulted for abdominal pain related to metastatic disease. C/w Dilaudid mg Q3 PO PRN, Dilaudid 0.5mg IV Q3 PRN for severe breakthrough pain, and Naproxen while monitoring renal function. F/u AM BMP. Appreciate heme recs.  Dispo: s/p IR biopsy (3/25), path results show metastatic poorly differentiated carcinoma, pending IHC. Plan for possible inpatient chemotherapy when IHC is available  Dispo: continue inpatient care    JELANI Osborne PGY3

## 2020-03-30 NOTE — PROGRESS NOTE ADULT - ASSESSMENT
25 yo presenting with RLQ pain found to have bilateral pelvic masses, extensive lymphadenopathy, liver, lung & bone lesions suspicious for metastasis; suspect primary Gyn vs less likely GI origin.  M Presented with low grade fever and leukocytosis.  S/p IR biopsy 3/25, prelim path results metastatic poorly differentiated carcinoma, IHC pending. S/p 1u pRBC (3/26) with inappropriate rise in h/h, repeat FOBT was performed to r/o occult bleeding, result positive.  CTAngio (3/26) to r/o PE 2/2 oxygen requirements was non-diagnostic and showed small bl pleural effusions and atelectasis; patient was weaned to room air and is now satting well on room air. Pain moderately controlled. Remains mildly tachycardia, otherwise afebrile and VSS.

## 2020-03-30 NOTE — CHART NOTE - NSCHARTNOTEFT_GEN_A_CORE
Gastroenterology Brief Note   - If urgent GI workup (within next 2 weeks) will  and prognosis, can consider endoscopic workup when medially stable.   - CT Colonography to r/o colonic mass (good sens/spec for mass) and small bowel series.   - please call GI back with any further questions

## 2020-03-30 NOTE — PROGRESS NOTE ADULT - PROBLEM SELECTOR PLAN 1
- c/w dilaudid 0.5mg IV PRN, 2 doses used/24 hours  - will increase dilaudid PO to 4mg Q3 PRN  - c/w naproxen  - monitor renal function

## 2020-03-30 NOTE — CHART NOTE - NSCHARTNOTEFT_GEN_A_CORE
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 Pathology waiting 1 more additional IHC stain that will result tonight or tomorrow.    Updated GI fellow on call. Pt afebrile >72h, tachycardia resolved, leukocytosis likely 2/2 tumor burden. Now that patient is medically stable, there is a possibility of performing endoscopy/colonoscopy to evaluate for primary mass. Per GI, recommend preemptively plan for GI evaluation by starting clear diet 3/31 and prep 3/31 PM. GI also recommend discussing with heme/onc if endoscopy/colonoscopy necessary prior to initiating inpatient chemotherapy.    Discussed with hem/onc fellow on call. Pending final IHC stains, if primary malignancy is GI but does not identify primary site, endoscopy/colonoscopy will be required prior to starting chemo.    Gyn onc will discuss final pathology with both GI and hem/onc to confirm plan on 3/31    D/w Dr. Nato Osborne PGY3

## 2020-03-31 NOTE — CHART NOTE - NSCHARTNOTEFT_GEN_A_CORE
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 The CDX2 positivity raise the possibility of gastrointestinal origin.    D/w heme/onc. Team plans to transfer patient to their service 4/1. IR consulted for port placement 4/1.   -Lovenox held  -NSAIDs discontinued for procedure  -AM labs with coags  -NPO @ [facility]  -LR@125@ [facility]  -Heme/onc to discuss plan and results with pt    D/w GI that endoscopy/colonoscopy is not needed at this time, per heme/onc.    D/w Dr. Matthew Osborne PGY3

## 2020-03-31 NOTE — PROGRESS NOTE ADULT - SUBJECTIVE AND OBJECTIVE BOX
HPI: 24F here with worsening abdominal pain in the setting of known adnexal massess, found to have for metastatic carcinoma with final pathology pending. Given evidence of metastatic disease, patient is not a surgical candidate. Has been having worsening adnexal pain, markedly last night after taking senna. States pain is 7-8/10 at its worst, located in adnexal area with radiation down to legs, worse with movement, and tolerable with opioids around 2/10. Palliative care called to help with pain.     INTERVAL EVENTS:  3/30: patient states IV dilaudid works, but PO and naproxen aren't enough to keep her pain at a tolerable level, d/w patient  3/31: states having vomiting and going to the bathroom are exacerbating her pain, and the PO appears to last 2 hours. States the IV meds take the pain away but not as much as before.     ADVANCE DIRECTIVES:    DNR  MOLST  [ ]  Living Will  [ ]   DECISION MAKER(s):  [ ] Health Care Proxy(s)  [ ] Surrogate(s)  [ ] Guardian           Name(s): Phone Number(s):    BASELINE (I)ADL(s) (prior to admission):  [place]: [ ]Total  [ ] Moderate [ ]Dependent    Allergies    No Known Allergies    Intolerances    MEDICATIONS  (STANDING):  acetaminophen   Tablet .. 975 milliGRAM(s) Oral every 6 hours  enoxaparin Injectable 40 milliGRAM(s) SubCutaneous daily  famotidine    Tablet 20 milliGRAM(s) Oral daily  influenza   Vaccine 0.5 milliLiter(s) IntraMuscular once  lactated ringers. 1000 milliLiter(s) (75 mL/Hr) IV Continuous <Continuous>  naproxen 500 milliGRAM(s) Oral every 12 hours  polyethylene glycol 3350 17 Gram(s) Oral daily  potassium acid phosphate/sodium acid phosphate tablet (K-PHOS No. 2) 1 Tablet(s) Oral four times a day with meals  senna 2 Tablet(s) Oral at bedtime  sodium chloride 0.9% lock flush 3 milliLiter(s) IV Push every 8 hours  sodium chloride 0.9% lock flush 3 milliLiter(s) IV Push every 8 hours    MEDICATIONS  (PRN):  HYDROmorphone   Tablet 4 milliGRAM(s) Oral every 3 hours PRN moderate-severe pain  HYDROmorphone  Injectable 0.5 milliGRAM(s) IV Push every 3 hours PRN severe pain unresponsive to oral  ondansetron Injectable 4 milliGRAM(s) IV Push every 6 hours PRN Nausea and/or Vomiting  simethicone 80 milliGRAM(s) Chew every 6 hours PRN Gas        PRESENT SYMPTOMS: [ ]Unable to obtain due to poor mentation   Source if other than patient:  [ ]Family   [ ]Team     Pain: [X ]yes [ ]no  QOL impact -  decreased function  Location - adnexal  Aggravating factors - movement  Quality - burning  Radiation - down legs  Timing- constant  Severity (0-10 scale): 8/10   Minimal acceptable level (0-10 scale): 2/10    CPOT:    https://www.[facility].org/getattachment/[email or web address]/Critical-Care-Pain-Observation-Tool-(CPOT)      PAIN AD Score:     http://geriatrictoolkit.[facility]/cog/painad.pdf (press ctrl +  left click to view)    Dyspnea:                           [ ]Mild [ ]Moderate [ ]Severe  Anxiety:                             [ ]Mild [ ]Moderate [ ]Severe  Fatigue:                             [ ]Mild [ ]Moderate [ ]Severe  Nausea:                             [ ]Mild [ ]Moderate [ ]Severe  Loss of appetite:              [ ]Mild [ ]Moderate [ ]Severe  Constipation:                    [ ]Mild [ ]Moderate [ ]Severe    Other Symptoms:  [X ]All other review of systems negative     Palliative Performance Status Version 2:         %    http://[facility].org/files/news/palliative_performance_scale_ppsv2.pdf    PHYSICAL EXAM:  Vital Signs Last 24 Hrs  T(C): 36.9 (31 Mar 2020 10:06), Max: 37.7 (31 Mar 2020 00:48)  T(F): 98.5 (31 Mar 2020 10:06), Max: 99.9 (31 Mar 2020 00:48)  HR: 102 (31 Mar 2020 10:06) (100 - 119)  BP: 120/80 (31 Mar 2020 10:06) (116/76 - 137/85)  BP(mean): --  RR: 18 (31 Mar 2020 10:06) (18 - 18)  SpO2: 95% (31 Mar 2020 10:06) (94% - 96%)    Limited exam for patient safety and to limit infection risk during COVID-19 outbreak. Please refr to primary team's examination for today.  GENERAL:  [ X]Alert  [ ]Oriented x   [ ]Lethargic  [ ]Cachexia  [ ]Unarousable  [ X]Verbal  [ ]Non-Verbal  Behavioral:   [ ] Anxiety  [ ] Delirium [ ] Agitation [ ] Other  HEENT:  [ ]Normal   [ ]Dry mouth   [ ]ET Tube/Trach  [ ]Oral lesions  PULMONARY:   [ ]Clear [ ]Tachypnea  [ ]Audible excessive secretions   [ ]Rhonchi        [ ]Right [ ]Left [ ]Bilateral  [ ]Crackles        [ ]Right [ ]Left [ ]Bilateral  [ ]Wheezing     [ ]Right [ ]Left [ ]Bilateral  [ ]Diminished breath sounds [ ]right [ ]left [ ]bilateral  CARDIOVASCULAR:    [ ]Regular [ ]Irregular [ ]Tachy  [ ]Quirino [ ]Murmur [ ]Other  GASTROINTESTINAL:  [ ]Soft  [ ]Distended   [ ]+BS  [ ]Non tender [ ]Tender  [ ]PEG [ ]OGT/ NGT  Last BM:     GENITOURINARY:  [ ]Normal [ ] Incontinent   [ ]Oliguria/Anuria   [ ]Cifuentes  MUSCULOSKELETAL:   [ ]Normal   [ ]Weakness  [ ]Bed/Wheelchair bound [ ]Edema  NEUROLOGIC:   [ ]No focal deficits  [ ]Cognitive impairment  [ ]Dysphagia [ ]Dysarthria [ ]Paresis [ ]Other   SKIN:   [ ]Normal    [ ]Rash  [ ]Pressure ulcer(s)       Present on admission [ ]y [ ]n    CRITICAL CARE:  [ ] Shock Present  [ ]Septic [ ]Cardiogenic [ ]Neurologic [ ]Hypovolemic  [ ]  Vasopressors [ ]  Inotropes   [ ]Respiratory failure present [ ]Mechanical ventilation [ ]Non-invasive ventilatory support [ ]High flow  [ ]Acute  [ ]Chronic [ ]Hypoxic  [ ]Hypercarbic [ ]Other  [ ]Other organ failure     LABS: reviewed                          7.7    25.96 )-----------( 615      ( 31 Mar 2020 07:22 )             26.2     03-31    138  |  100  |  10  ----------------------------<  95  4.6   |  28  |  0.59    Ca    11.5<H>      31 Mar 2020 07:24  Phos  2.7     03-31  Mg     2.1     03-31          RADIOLOGY & ADDITIONAL STUDIES:    PROTEIN CALORIE MALNUTRITION PRESENT: [ ]mild [ ]moderate [ ]severe [ ]underweight [ ]morbid obesity  https://www.andeal.org/vault/2440/web/files/ONC/Table_Clinical%20Characteristics%20to%20Document%20Malnutrition-White%20JV%20et%20al%202012.pdf    Height (cm): 162.6 (03-21-20 @ 03:15), 162.56 (03-07-20 @ 19:18)  Weight (kg): 84.4 (03-21-20 @ 03:15), 89.4 (03-07-20 @ 19:18)  BMI (kg/m2): 31.9 (03-21-20 @ 03:15), 33.8 (03-07-20 @ 19:18)    [ ]PPSV2 < or = to 30% [ ]significant weight loss  [ ]poor nutritional intake  [ ]anasarca     Albumin, Serum: 2.9 g/dL (03-25-20 @ 05:59)   [ ]Artificial Nutrition      REFERRALS:   [ ]Chaplaincy  [ ]Hospice  [ ]Child Life  [ ]Social Work  [ ]Case management [ ]Holistic Therapy     Goals of Care Document:     ______________  Alex Mack MD   of Geriatric and Palliative Medicine  [facility]     Please page the following number for clinical matters between the hours of 9AM and 5PM   from Monday through Friday : [phone]    After 5PM and on weekends, please page: [phone]. The Geriatric and Palliative Medicine consult service has 24/7 coverage for medical recommendations, including for symptom management needs.

## 2020-03-31 NOTE — PROGRESS NOTE ADULT - SUBJECTIVE AND OBJECTIVE BOX
Oncology Follow-up    INTERVAL HPI/OVERNIGHT EVENTS:  No overnight events. Reports that she still has nausea and some vomiting, but symptoms stable. No fevers or chills. Denies chest pain, palpitations.    Review of Systems: ROS otherwise negative.    VITAL SIGNS:  T(F): 98.8 (03-31-20 @ 17:20)  HR: 118 (03-31-20 @ 17:20)  BP: 98/68 (03-31-20 @ 17:20)  RR: 18 (03-31-20 @ 17:20)  SpO2: 95% (03-31-20 @ 17:20)  Wt(kg): --    03-30-20 @ 07:01  -  03-31-20 @ 07:00  --------------------------------------------------------  IN: 1288 mL / OUT: 1300 mL / NET: -12 mL    03-31-20 @ 07:01  -  03-31-20 @ 18:28  --------------------------------------------------------  IN: 600 mL / OUT: 385 mL / NET: 215 mL        PHYSICAL EXAM:    Constitutional: AAOx3, NAD, obese  Eyes: PERRL, EOMI, sclera non-icteric  Neck: supple, no masses, no JVD, no lymphadenopathy  Extremities:  no edema  MSK: no obvious abnormalities, normal ROM, no lymphadenopathy  Neurological: Grossly intact  Skin: Normal temperature, no rash, no echymoses, no petichiae  Psych: normal affect    Deferred laying on hands during physical examination to minimize risk of COVID-19 transmission to patient or to other patients on oncology service. Physical exam from primary team reviewed. Per exam, CV and pulmonary exam normal; abdomen soft, nonidstended but with mild tenderness; no guarding, no rebound, normal bowel sounds.      MEDICATIONS  (STANDING):  acetaminophen   Tablet .. 975 milliGRAM(s) Oral every 6 hours  famotidine    Tablet 20 milliGRAM(s) Oral daily  influenza   Vaccine 0.5 milliLiter(s) IntraMuscular once  lactated ringers. 1000 milliLiter(s) (125 mL/Hr) IV Continuous <Continuous>  polyethylene glycol 3350 17 Gram(s) Oral daily  senna 2 Tablet(s) Oral at bedtime  sodium chloride 0.9% lock flush 3 milliLiter(s) IV Push every 8 hours  sodium chloride 0.9% lock flush 3 milliLiter(s) IV Push every 8 hours    MEDICATIONS  (PRN):  HYDROmorphone   Tablet 4 milliGRAM(s) Oral every 3 hours PRN moderate-severe pain  HYDROmorphone  Injectable 0.8 milliGRAM(s) IV Push every 3 hours PRN severe pain unresponsive to oral  metoclopramide Injectable 10 milliGRAM(s) IV Push every 6 hours PRN nausea/vomiting  ondansetron Injectable 4 milliGRAM(s) IV Push every 6 hours PRN Nausea and/or Vomiting  simethicone 80 milliGRAM(s) Chew every 6 hours PRN Gas      No Known Allergies      LABS:                        7.7    25.96 )-----------( 615      ( 31 Mar 2020 07:22 )             26.2     03-31    138  |  100  |  10  ----------------------------<  95  4.6   |  28  |  0.59    Ca    11.5<H>      31 Mar 2020 07:24  Phos  2.7     03-31  Mg     2.1     03-31    Folate, Serum: 10.0 ng/mL (03-27-20 @ 09:51)  Vitamin B12, Serum: 800 pg/mL (03-27-20 @ 09:51)    HCG Quantitative, Serum: 103.0        Cytopathology - Non Gyn Report:   ACCESSION No:  [id number]    MARYANN VASQUEZ                       3    Cytopathology Addendum Report    Addendum  REPORTING RESULTS OF IMMUNOHISTOCHEMICAL STAINS    - Immunohistochemical stains: The neoplastic cells are positive  for CK7, VLADIMIR-EP4, MOC-31, JONATHAN-3 (focal), CK19, CDX2 (focal),  CK5/6 (focal), chromogranin and synaptophysin; while negative for  CK20, PAX-8, TTF-1, calretinin, WT1, p40, p63, BHCG and .  Ki-67 reveals a proliferative index of approximately 40%.    - Special stain: Mucicarmine is negative for intracytoplasmic  mucin.    - In summary the cytomorphology and immunophenotype are  consistent with poorly differentiated carcinoma with  neuroendocrine features. The CDX2 positivity raise the  possibilityof gastrointestinal origin. Correlation with clinical  presentation and radiologic studies is essential.    This case was reviewed for  staff pathologist  who concurs with the diagnosis.    Slide(s) with built in immunohistochemical study control(s) and  negative control associated with this case has/have been verified  by the sign out pathologist.  For slide(s) without built in controls positive control slides  has/have been reviewed and approved by immunohistochemistry lab  These immunohistochemical/ in-situ hybridization tests have been  developed and their performance characteristics determined by  [facility], Department of Pathology,  Division of Immunopathology, [address] [place] [address].  It has not been cleared or approved by the U.[place]. Food  and Drug Administration.  The FDA has determined that such  clearance or approval is not necessary.  This test is used for  clinical purposes.  The laboratory is certified under the CLIA-88  as qualified to perform high  complexity clinical testing.    Verified by: Marni yLons MD  (Electronic Signature)  Reported on: 03/31/20 14:20 EDT, [address] [facility] [place] [address]  Phone: [phone]   Fax: [phone]  _________________________________________________________________    MARYANN VASQUEZ                       3    Cytopathology Report    Final Diagnosis  BONE, ILIAC, RIGHT, CT GUIDED CORE BIOPSY AND FNA  POSITIVE FOR MALIGNANT CELLS.  Metastatic poorly differentiated carcinoma.    Cytology smears, touch preps, cell block, display a cellular  specimen comprised of crowded 3-dimensional groups and single  lying malignant cells with enlarged nuclei containing prominent  nucleoli and vacuolated to dense cytoplasm in the background of  extensive necrosis.  Core biopsy: reveals an effacement of bone parenchyma by the  presence of cluster of malignant cells with enlarged nuclei,  prominent nucleoli and scant cytoplasm. Extensive areas of  necrosis present.    Immunohistochemical stains are in progress for further  classification. Results will be reported in an addendum.    This case was reviewed for  with staff  cytopathologist who concurs with the diagnosis.  Case reported to Tumor Registry.    Screened by: Esther RODRÍGUEZ(ASCP)  Verified by: Marni Lyons MD  (Electronic Signature)  Reported on: 03/26/20 17:56 EDT, [address],  [place] [address]  Phone: [phone]  Fax: [phone]  Cytology technical processing performed at [address] [place] [id number]  _________________________________________________________________    Specimen(s) Submitted  BONE, ILIAC, RIGHT, CT GUIDED CORE BIOPSY AND FNA    Statement of Adequacy  Immediate cytologic study for adequacy of specimen using a Diff-  Quik stain was performed at [facility], [address] [place] [id number] FNA  acceptable for further evaluation by ANNE Larry(ASCP).    Clinical Information  ICD 10: R10.9    MARYANN VASQUEZ                       3    Cytopathology Report    24 year old female, with multiple liver and multiple lung  nodules, a large pelvic mass, and multiple osseous lesions.  Concern for GYN primary. Rule out cancer.      Gross Description  Core Biopsy:  Tissue collected: Specimen container has been inspected to  confirm patient’s name and date of birth, contains 2  tan  cores/blood clots measuring 5.0, 1.0 cm in length. Entire  specimen submitted in 2 cassette(s) labeled 1B and 1C.    2 Touch prep slides ( 2 air dried )    FNA:  Received: 4 air dried and 2 fixed slides  Needle rinses in 30 ml formalin  Submitted: cell block in one cassette labeled 1A    Prepared:  2 Touch Prep, 4 Diff Quick,  2 Pap Stained slides  1 cell block  labeled 1A  1 core biopsy labeled 1B  1 core biopsy labeled 1C  11 Total slides (03.25.20 @ 12:53)      RADIOLOGY & ADDITIONAL TESTS:  Studies reviewed.

## 2020-03-31 NOTE — PROGRESS NOTE ADULT - ATTENDING COMMENTS
Seen midday. Disc with resident team. Awaiting final stains and further GI w/u for disposition. Symptom control continues. Seen midday. Disc with resident team. Labs reviewed incl cytopath stain addendum. H/O note reviewed. Plan acknowledged.

## 2020-03-31 NOTE — PROGRESS NOTE ADULT - ASSESSMENT
25 yo presenting with RLQ pain found to have bilateral pelvic masses, extensive lymphadenopathy, liver, lung & bone lesions suspicious for metastasis; Gyn vs GI origin.  Pt found to have a low grade fever and leukocytosis.  S/p IR biopsy 3/25, prelim path results metastatic poorly differentiated carcinoma, IHC pending. S/p 1u pRBC (3/26) with inappropriate rise in h/h, repeat FOBT was performed to r/o occult bleeding, result positive.  CTAngio (3/26) to r/o PE 2/2 oxygen requirements was non-diagnostic and showed small bl pleural effusions and atelectasis; patient was weaned to room air and is now satting well on room air. Pain control improved. Remains mildly tachycardic, otherwise afebrile and VSS.

## 2020-03-31 NOTE — PROGRESS NOTE ADULT - ASSESSMENT
24F w/ no PMH presenting with abdominal pain, found to have bilateral pelvic masses and extensive pelvic lymphadenopathy concerning for metastatic malignancy. Pathology showing likely     #Metastatic carcinoma, cancer of unknown primary  - CT A/P with bilateral heterogeneous adnexal masses, as well as upper abdominal, RP, possible bone lesions and pelvic lymphadenopathy; CT Chest with multiple solid pulmonary nodules  - Cancer of unknown primary (poorly differentiated carcinoma) but suspect likely GI origin given CDX2 positivity on pathology; chromogranin positivity also suggests neuroendocrine features; Ki-67 index 40%.   - tumor markers elevated: Cancer Antigen, Ca 27.29      Cancer Antigen, 125: 619      Carcinoembryonic Antigen: 44.7 beta  on initial evaluation; will re-check with AM labs  - Discussed diagnosis with patient. Given advanced, metastatic stage, will need to be on indefinite treatment to limit progression of disease.  - Will plan to start mFOLFOX6 inpatient. Discussed with IR, tentative plan for mediport placement on 4/1/20. Consent for chemotherapy obtained, placed in chart.  - No plan for GI endoscopy at this time; changed diet back to regular diet, NPO after midnight for port  - Holding anticoagulation prior to port placement  - Notified chemotherapy nurse of plan to start chemotherapy 4/1/20, pending port placement  - LDH: 479; Uric acid normal; unlikely to be lymphoma. Will check in AM prior to starting chemotherapy  - Leukocytosis and fevers likely 2/2 tumor; afebrile overnight. Will check flow cytometry in AM to r/o concurrent CML / CLL  - Discussed with GYN/ONC; medical oncology will follow patient as primary team starting on 4/1/20    #Microcytic anemia  -reviewed peripheral smear, shows polychromasia despite reticulocyte count inappropriately low  -LDH elevated; no signs of schistocytes on peripheral blood smear  -Suspect component of iron deficiency given low FeSat; occult blood positive, and also endorses ongoing vaginal bleeding; ferritin elevated in setting inflammatory process, likely paraneoplastic  -Given microcytosis, recommended checking Hgb electrophoresis to r/o concurrent thalassemia; will check with lab on result since it has not been resulted in EHR   -B12, folate normal    Luis Peng MD, MPH  Hematology/Oncology Fellow, PGY-5  pager: [phone]

## 2020-03-31 NOTE — PROGRESS NOTE ADULT - PROBLEM SELECTOR PLAN 1
- c/w dilaudid 0.5mg IV PRN, 2 doses used/24 hours; will increase to 0.8mg  - c/w dilaudid PO 4mg Q3 PRN (used 3 doses)  - c/w naproxen  - worse with increase intraabdominal pressure (N/V)  - monitor renal function

## 2020-03-31 NOTE — PROGRESS NOTE ADULT - PROBLEM SELECTOR PLAN 1
CV: tachycardia improved (100-110s), BPs normotensive and stable, s/p 1u pRBC (3/25), monitor VS closely  -F/u AM CBC  -CT Angio chest non diagnostic for PE, will continue to monitor VS closely  Pulm: SpO2 94-96% on RA, encourage OOB and incentive spirometer use in setting of atelectasis seen on CTA  GI: Pt transitioned to clear liquid diet in preparation for possible endoscopy/colonoscopy, as prelim stains suggest GI primary. Plan for bowel prep later tonight.  -Will f/u IHC stains  -FOBT negative (3/22), repeat FOBT positive(3/26).  Appreciate GI recommendations.   -Bowel regimen for constipation.  IV antiemetics prn.  ID: s/p Zosyn (3/20-3/22), urine culture negative, blood cultures NGTD (final); repeat cultures done 3/25, prelim NGTD, COVID19 PCR neg (3/25). Afebrile overnight.  -Tlast 38.8 on 3/27, fever likely 2/2 tumor burden.  Heme: lovenox qd, venodynes for VTE ppx, encourage ambulation as tolerated.  -Polychromasia despite reticulocyte count inappropriately low, LDH elevated; with no schistocytes, ferritin elevated in setting inflammatory process, likely paraneoplastic  -Iron deficiency may be contributing to anemia. F/u Hgb electrophoresis to r/o concurrent thalassemia. Appreciate heme/onc recs.  Neuro: Palliative consulted for abdominal pain related to metastatic disease. C/w Dilaudid 4mg Q3 PO PRN mod-severe pain, Dilaudid 0.5mg IV Q3 PRN for severe breakthrough pain, and Naproxen while monitoring renal function. F/u AM BMP. Appreciate heme recs.  Dispo: s/p IR biopsy (3/25), path results show metastatic poorly differentiated carcinoma, pending IHC. Plan for possible inpatient chemotherapy when IHC is available  Dispo: continue inpatient care. Will discuss plan with hem/onc and GI today.    JELANI Osborne PGY3

## 2020-03-31 NOTE — PROGRESS NOTE ADULT - ATTENDING COMMENTS
Agree with above.   Final path c/w carcinoma of unknown primary, poorly differentiated carcinoma with neuroendocrine features, CDX2+ suggestive of GI primary.   Imaging does not reveal any GI mass, however EGD/colo could not be done due to COVID pandemic.  Discussed diagnosis with pt, Stage IV, incurable nature of disease. Goals of treatment are palliative not curative.   Discussed that there is no standard therapy given CUP, but will treat with mFOLFOX6 as it covers most tumors in GI tract.   Plan for in patient chemo for first cycle given pt is symptomatic, young with aggressive disease  Discussed  logistics, rationale, AEs of  regimen. Chemo consent obtained.   Emotional support provided.   Mediport to be placed tomorrow   Antiemetics   labs for am: CMP, LDH, uric acid, hep panel, baseline CA 19-9 and CEA, CBC  d/w gyn onc: there is no evidence of pregnancy (given elevated bHCG), fertility preservation is not possible  Will take over as primary team Agree with above.   Final path c/w carcinoma of unknown primary, poorly differentiated carcinoma with neuroendocrine features, CDX2+ suggestive of GI primary.   Imaging does not reveal any GI mass, however EGD/colo could not be done due to COVID pandemic.  Will add on MMR and PDL1 testing on tumor. Will send for [facility]  Discussed diagnosis with pt, Stage IV, incurable nature of disease. Goals of treatment are palliative not curative.   Discussed that there is no standard therapy given CUP, but will treat with mFOLFOX6 as it covers most tumors in GI tract.   Plan for in patient chemo for first cycle given pt is symptomatic, young with aggressive disease  Discussed  logistics, rationale, AEs of  regimen. Chemo consent obtained.   Emotional support provided.   Mediport to be placed tomorrow   Antiemetics   labs for am: CMP, LDH, uric acid, hep panel, baseline CA 19-9 and CEA, CBC  d/w gyn onc: there is no evidence of pregnancy (given elevated bHCG), fertility preservation is not possible  Will take over as primary team

## 2020-03-31 NOTE — PROGRESS NOTE ADULT - SUBJECTIVE AND OBJECTIVE BOX
R3 Gyn ONC Progress Note HD#12    Subjective:   Pt seen and examined at bedside. No events overnight. Pain better controlled. Patient ambulating. Passing flatus. Tolerating regular diet. Pt denies fever, chills, chest pain, SOB, nausea, vomiting, lightheadedness, dizziness.      Objective:  T(F): 98.6 (03-31-20 @ 05:45), Max: 99.9 (03-31-20 @ 00:48)  HR: 100 (03-31-20 @ 05:45) (95 - 119)  BP: 126/84 (03-31-20 @ 05:45) (112/72 - 137/85)  RR: 18 (03-31-20 @ 05:45) (18 - 18)  SpO2: 94% (03-31-20 @ 05:45) (94% - 96%)  Wt(kg): --  I&O's Summary    29 Mar 2020 07:01  -  30 Mar 2020 07:00  --------------------------------------------------------  IN: 420 mL / OUT: 300 mL / NET: 120 mL    30 Mar 2020 07:01  -  31 Mar 2020 06:24  --------------------------------------------------------  IN: 540 mL / OUT: 1300 mL / NET: -760 mL          MEDICATIONS  (STANDING):  acetaminophen   Tablet .. 975 milliGRAM(s) Oral every 6 hours  enoxaparin Injectable 40 milliGRAM(s) SubCutaneous daily  famotidine    Tablet 20 milliGRAM(s) Oral daily  influenza   Vaccine 0.5 milliLiter(s) IntraMuscular once  lactated ringers. 1000 milliLiter(s) (75 mL/Hr) IV Continuous <Continuous>  naproxen 500 milliGRAM(s) Oral every 12 hours  polyethylene glycol 3350 17 Gram(s) Oral daily  potassium acid phosphate/sodium acid phosphate tablet (K-PHOS No. 2) 1 Tablet(s) Oral four times a day with meals  senna 2 Tablet(s) Oral at bedtime  sodium chloride 0.9% lock flush 3 milliLiter(s) IV Push every 8 hours  sodium chloride 0.9% lock flush 3 milliLiter(s) IV Push every 8 hours    MEDICATIONS  (PRN):  HYDROmorphone   Tablet 4 milliGRAM(s) Oral every 3 hours PRN moderate-severe pain  HYDROmorphone  Injectable 0.5 milliGRAM(s) IV Push every 3 hours PRN severe pain unresponsive to oral  ondansetron Injectable 4 milliGRAM(s) IV Push every 6 hours PRN Nausea and/or Vomiting  simethicone 80 milliGRAM(s) Chew every 6 hours PRN Gas      Physical Exam:  Constitutional: NAD, A+O x3  CV: RR S1S2 no m/r/g  Lungs: CTA b/l, good air flow b/l  Abdomen: soft, nondistended, mildly tender. no guarding, no rebound, normal bowel sounds  Extremities: no lower extremity edema or calf tenderness bilaterally; venodynes in place                 7.8    22.67 )-----------( 611      ( 03-30 @ 09:20 )             27.1                7.9    20.83 )-----------( 552      ( 03-28 @ 09:09 )             27.8       LABS:    03-30    138    |  102    |  12     ----------------------------<  96     4.4     |  26     |  0.51     Ca    11.1<H>      30 Mar 2020 09:20  Phos  2.3       03-30  Mg     2.2       03-30

## 2020-04-01 NOTE — PROGRESS NOTE ADULT - ATTENDING COMMENTS
Agree with above. Patient febrile overnight with HR increasing to 140s. Today hypoxic to 84% on RA. Feels fatigued. Pain poorly controlled, awaiting PCA. Xray suggestive of PNA, started on Zosyn and Vanc.     Recommend:   - CTA to eval for PE, PNA  - COVID testing and RVP Blood cultures drawn  - PCA for pain. Appreciate palliative care follow up   - Hypercalcemia: likely 2/2 malignancy. Cont IV hydration for now. Will likely need Pamidronate soon.   - to defer port placement and chemotherapy for now until clinical improvement.

## 2020-04-01 NOTE — PROGRESS NOTE ADULT - ATTENDING COMMENTS
patient seen and evaluated. Discussed GI origin of tumor and metastatic disease. Discussed minor elevation in HCG likely related to tumor pathology, patient has again confirmed she  has never been sexually active and pelvic US is negative. We also discussed inability to conserve fertility at this time given patient will need to start chemo ASAP.   Patient demonstrated understanding and she is most interested currently to continue starting chemo  FEver overnight - recommend IV Abx, COVID testing discussed with Dr. Miles  Please reconsult with any questions, patient currently transferred to Heme/onc

## 2020-04-01 NOTE — PROGRESS NOTE ADULT - PROBLEM SELECTOR PLAN 2
-poorly differentiated carcinoma with neuroendocrine features, suggestive of GI primary  -bhcg trend 104 (3/7)->168/6 (3/20)->103 (3/31). Unlikely pregnancy given patient is virginal and trend not consistent with normal IUP.  -F/u repeat Ca 19-9, CEA  -F/u AM labs  -pt to start mFOLFOX6 inpatient once mediport placed    Dispo: for port placement today. Pt transferred to heme/onc this AM for management inpatient chemotherapy.    JELANI Osborne PGY3 -poorly differentiated carcinoma with neuroendocrine features, suggestive of GI primary  -bhcg trend 104 (3/7)->168/6 (3/20)->103 (3/31). Unlikely pregnancy given patient is virginal and trend not consistent with normal IUP.  -F/u repeat Ca 19-9, CEA  -F/u AM labs  -pt to start mFOLFOX6 inpatient once mediport placed  -discussed that fertility conservation is not feasible at this time given her Stage VI disease. Pt understand at this time.    Dispo: for port placement today. Pt transferred to heme/onc this AM for management inpatient chemotherapy.    JELANI Osborne PGY3

## 2020-04-01 NOTE — PROGRESS NOTE ADULT - ASSESSMENT
24F w/ no PMH presenting with abdominal pain, found to have bilateral pelvic masses and extensive pelvic lymphadenopathy concerning for metastatic malignancy. 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    #Fever, tachycardia  -suspected 2/2 tumor fever; NSAIDs also stopped overnight in anticipation of port placement, which could have been previously suppressing higher fever that is now unmasked. However, since she meets sepsis criteria, will cover empirically with abx until cultures result. Given tachycardia and malignancy, ddx also includes PE; however, no signs of DVT on examination, has been on ppx (interrupted only today), and has no hypoxia on vital signs, will monitor for now and restart DVT ppx for today  -cultures overnight, will follow up  -started empirically on Zosyn  -will rule out COVID-19 and RVP, PCR testing sent this morning  -port placement on hold, will hold plans for chemotherapy for now    #Metastatic carcinoma, cancer of unknown primary  - CT A/P with bilateral heterogeneous adnexal masses, as well as upper abdominal, RP, possible bone lesions and pelvic lymphadenopathy; CT Chest with multiple solid pulmonary nodules  - Cancer of unknown primary (poorly differentiated carcinoma) but suspect likely GI origin given CDX2 positivity on pathology; chromogranin positivity also suggests neuroendocrine features; Ki-67 index 40%.   - tumor markers elevated: Cancer Antigen, Ca 27.29      Cancer Antigen, 125: 619      Carcinoembryonic Antigen: 44.7 beta  on initial evaluation; checked again 4/1/20  - Discussed diagnosis with patient on 3/31/20. Given advanced, metastatic stage, will need to be on indefinite treatment to limit progression of disease.  - Plan to start mFOLFOX6 inpatient. Discussed with IR, tentative plan for mediport placement on 4/1/20, now on hold given fever. Consent for chemotherapy obtained, placed in chart.  - No plan for GI endoscopy at this time  - Notified chemotherapy nurse of plan to start chemotherapy 4/1/20, pending port placement  - LDH: 479; Uric acid normal; unlikely to be lymphoma. Will check in AM prior to starting chemotherapy  - Leukocytosis and fevers likely 2/2 tumor; afebrile overnight. Flow cytometry sent on 4/1/20 in AM to r/o concurrent CML / CLL  - Discussed with GYN/ONC; medical oncology will follow patient as primary team starting on 4/1/20  - Diet and VTE ppx restarted, port placement on hold given fever work up  - Palliative following for pain control; given that pain control is worse without NSAIDs, will discuss other treatment options pending fever work up    #Microcytic anemia  -reviewed peripheral smear, shows polychromasia despite reticulocyte count inappropriately low  -LDH elevated; no signs of schistocytes on peripheral blood smear  -Suspect component of iron deficiency given low FeSat; occult blood positive, and also endorses ongoing vaginal bleeding; ferritin elevated in setting inflammatory process, likely paraneoplastic  -Given microcytosis, recommended checking Hgb electrophoresis to r/o concurrent thalassemia; will check with lab on result since it has not been resulted in EHR   -B12, folate normal    Luis Peng MD, MPH  Hematology/Oncology Fellow, PGY-5  pager: [phone]

## 2020-04-01 NOTE — CHART NOTE - NSCHARTNOTEFT_GEN_A_CORE
COVID negative, but RVP positive for parainfluenza, moved to isolation.  Discussed pain regimen with palliative care, agreed to start on PCA.   Will hold NSAIDs and standing antipyretics to prevent masking of fever; can get acetaminophen PRN for fever.  Started on zosyn this morning; added vancomycin q12h, will follow trough levels for dosing.  CXR obtained given worsening O2 sat, showed LLL opacities vs consolidation; previously had nondiagnostic CTA for PE; will check CTA to r/o PE and to better evaluate pulmonary consolidation; if suspicious for PNA, will treat with abx prior to starting chemotherapy.  Port placement on hold while infectious work up pending.   Follow up blood cultures.  Looks ill during rounds, fatigued; frequent vital signs.    Luis Peng MD, MPH  Hematology / Oncology Fellow, PGY5  p

## 2020-04-01 NOTE — PROGRESS NOTE ADULT - SUBJECTIVE AND OBJECTIVE BOX
R3 Gyn ONC Progress Note HD#13    Interval events: Final IHC resulted-likely neuroendocrine tumor of GI origin. Pt transferred to heme/onc service to initial inpatient chemo with mFOLFOX6.  Pt febrile overnight to 38.9->38.3 and tachycardic to the 130s-140s while febrile.     Subjective:   Pt seen and examined at bedside. Pain moderately controlled. Pt ambulating, passing flatus. NPO for port placement today. Currently denies fever, chills, chest pain, SOB, nausea, vomiting, lightheadedness, dizziness.      Objective:  T(F): 101 (04-01-20 @ 01:42), Max: 102 (04-01-20 @ 00:47)  HR: 134 (04-01-20 @ 01:42) (88 - 146)  BP: 124/73 (04-01-20 @ 00:47) (98/68 - 138/82)  RR: 18 (04-01-20 @ 00:47) (18 - 18)  SpO2: 95% (04-01-20 @ 00:47) (95% - 95%)  Wt(kg): --  I&O's Summary    30 Mar 2020 07:01  -  31 Mar 2020 07:00  --------------------------------------------------------  IN: 1288 mL / OUT: 1300 mL / NET: -12 mL    31 Mar 2020 07:01  -  01 Apr 2020 06:28  --------------------------------------------------------  IN: 960 mL / OUT: 385 mL / NET: 575 mL      CAPILLARY BLOOD GLUCOSE          MEDICATIONS  (STANDING):  acetaminophen   Tablet .. 975 milliGRAM(s) Oral every 6 hours  famotidine    Tablet 20 milliGRAM(s) Oral daily  influenza   Vaccine 0.5 milliLiter(s) IntraMuscular once  lactated ringers. 1000 milliLiter(s) (125 mL/Hr) IV Continuous <Continuous>  polyethylene glycol 3350 17 Gram(s) Oral daily  senna 2 Tablet(s) Oral at bedtime  sodium chloride 0.9% lock flush 3 milliLiter(s) IV Push every 8 hours  sodium chloride 0.9% lock flush 3 milliLiter(s) IV Push every 8 hours    MEDICATIONS  (PRN):  HYDROmorphone   Tablet 4 milliGRAM(s) Oral every 3 hours PRN moderate-severe pain  HYDROmorphone  Injectable 0.8 milliGRAM(s) IV Push every 3 hours PRN severe pain unresponsive to oral  metoclopramide Injectable 10 milliGRAM(s) IV Push every 6 hours PRN nausea/vomiting  ondansetron Injectable 4 milliGRAM(s) IV Push every 6 hours PRN Nausea and/or Vomiting  simethicone 80 milliGRAM(s) Chew every 6 hours PRN Gas      Physical Exam:  Constitutional: NAD, A+O x3  CV: RR S1S2 no m/r/g  Lungs: CTA b/l, good air flow b/l  Abdomen: soft, non distended, moderately tender. no guarding, no rebound, normal bowel sounds  Extremities: no lower extremity edema or calf tenderness bilaterally; venodynes in place               7.7    25.96 )-----------( 615      ( 03-31 @ 07:22 )             26.2                7.8    22.67 )-----------( 611      ( 03-30 @ 09:20 )             27.1         LABS:    03-31    138    |  100    |  10     ----------------------------<  95     4.6     |  28     |  0.59     Ca    11.5<H>      31 Mar 2020 07:24  Phos  2.7       03-31  Mg     2.1       03-31

## 2020-04-01 NOTE — PROGRESS NOTE ADULT - SUBJECTIVE AND OBJECTIVE BOX
Oncology Follow-up    INTERVAL HPI/OVERNIGHT EVENTS:  Febrile overnight, to 102 F. Also tachycardic. Endorses night sweats, chills. Reports that anti-inflammatories (NSAIDs) were held yesterday in anticipation of planned port placement. Denies any shortness of breath, no chest pain. Tachycardic to 140s overnight. Denies any nausea, vomiting. Swabbed for COVID-19 and RVP this morning, cultures sent overnight, started on abx this AM.    Review of Systems:  General: +fevers, +chills, +night sweats overnight  Head: denies HA  Eyes: denies vision change  ENT: denies oral lesions, rhinorrhea, epistaxes sore throat, dysphagia  Respiratory: denies cough, shortness of breath, pleurisy  Cardiovascular: denies chest pain, palpitaitons, SHARP; +tachycardic  Gastrointestinal: continues to endorse RLQ abdominal pain; having bowel movements, denies any diarrhea, vomiting, nausea, constipation. Reports abdominal pain worse when bearing down urinate or defecateMSK: denies joint pain or muscle pain  Neuro: denies headache, weakness, or parasthesias  Skin: denies rash, petichiae, echymoses  Psych: denies anxiety or sleep disturbances    VITAL SIGNS:  T(F): 99.8 (20 @ 06:34)  HR: 126 (20 @ 06:34)  BP: 112/63 (20 @ 06:34)  RR: 18 (20 @ 06:34)  SpO2: 95% (20 @ 06:34)    20 @ 07:01  -  20 @ 07:00  --------------------------------------------------------  IN: 960 mL / OUT: 385 mL / NET: 575 mL        PHYSICAL EXAM:    Constitutional: AAOx3, calm, appears mildly uncomfortable, worse with movements  Eyes: PERRL, EOMI, sclera non-icteric  Neck: supple, no masses, no JVD, no lymphadenopathy  Respiratory: CTA b/l, no wheezing, rhonchi, rales, with normal respiratory effort  Cardiovascular: tachycardic, normal rhythm, normal S1S2, no M/R/G  Gastrointestinal: palpable masses, quiet bowel sounds, periumbilical and RLQ abdominal tenderness to palpation, no rebound tenderness  Extremities:  no edema  MSK: no obvious abnormalities, normal ROM, no lymphadenopathy  Neurological: Grossly intact  Skin: Normal temperature, no rash, no ecchymoses, no petechiae  Psych: normal affect, calm    MEDICATIONS  (STANDING):  acetaminophen   Tablet .. 975 milliGRAM(s) Oral every 6 hours  enoxaparin Injectable 40 milliGRAM(s) SubCutaneous daily  famotidine    Tablet 20 milliGRAM(s) Oral daily  influenza   Vaccine 0.5 milliLiter(s) IntraMuscular once  lactated ringers. 1000 milliLiter(s) (125 mL/Hr) IV Continuous <Continuous>  piperacillin/tazobactam IVPB. 3.375 Gram(s) IV Intermittent once  piperacillin/tazobactam IVPB.. 3.375 Gram(s) IV Intermittent every 8 hours  polyethylene glycol 3350 17 Gram(s) Oral daily  senna 2 Tablet(s) Oral at bedtime  sodium chloride 0.9% lock flush 3 milliLiter(s) IV Push every 8 hours  sodium chloride 0.9% lock flush 3 milliLiter(s) IV Push every 8 hours    MEDICATIONS  (PRN):  HYDROmorphone   Tablet 4 milliGRAM(s) Oral every 3 hours PRN moderate-severe pain  HYDROmorphone  Injectable 0.8 milliGRAM(s) IV Push every 3 hours PRN severe pain unresponsive to oral  metoclopramide Injectable 10 milliGRAM(s) IV Push every 6 hours PRN nausea/vomiting  ondansetron Injectable 4 milliGRAM(s) IV Push every 6 hours PRN Nausea and/or Vomiting  simethicone 80 milliGRAM(s) Chew every 6 hours PRN Gas      No Known Allergies      LABS:                        7.1    30.62 )-----------( 621      ( 2020 07:31 )             25.1     04-01    137  |  99  |  10  ----------------------------<  94  4.4   |  25  |  0.59    Ca    11.3<H>      2020 07:31  Phos  1.9       Mg     1.9     -    TPro  7.0  /  Alb  2.9<L>  /  TBili  0.3  /  DBili  x   /  AST  29  /  ALT  6<L>  /  AlkPhos  320<H>      PT/INR - ( 2020 07:38 )   PT: 16.0 sec;   INR: 1.39 ratio         PTT - ( 2020 07:38 )  PTT:33.5 sec Lactate Dehydrogenase, Serum: 532 U/L ( @ 07:31)    Urinalysis Basic - ( 2020 06:36 )    Color: Yellow / Appearance: Slightly Turbid / S.036 / pH: x  Gluc: x / Ketone: Negative  / Bili: Negative / Urobili: 6 mg/dL   Blood: x / Protein: 30 mg/dL / Nitrite: Negative   Leuk Esterase: Moderate / RBC: 307 /HPF / WBC 36 /HPF   Sq Epi: x / Non Sq Epi: 7 /HPF / Bacteria: Negative    Folate, Serum: 10.0 ng/mL (20 @ 09:51)  Vitamin B12, Serum: 800 pg/mL (20 @ 09:51)    Bilirubin: Negative (20 @ 06:36)    RADIOLOGY & ADDITIONAL TESTS:  Studies reviewed.

## 2020-04-01 NOTE — PROGRESS NOTE ADULT - PROBLEM SELECTOR PLAN 1
- on dilaudid 0.5mg IV PRN, 3 doses used/24 hours; will increase to 0.8mg  - c/w dilaudid PO 4mg Q3 PRN (used 3 doses)  - given fevers, stopped naproxen  - will transition to dilaudid PCA [0CR/1.5mg DD/30 mins//[id number]], she states 0.8mg dose is not helpful at this point  - d/w team

## 2020-04-01 NOTE — PROGRESS NOTE ADULT - SUBJECTIVE AND OBJECTIVE BOX
HPI: 24F here with worsening abdominal pain in the setting of known adnexal massess, found to have for metastatic carcinoma with final pathology pending. Given evidence of metastatic disease, patient is not a surgical candidate. Has been having worsening adnexal pain, markedly last night after taking senna. States pain is 7-8/10 at its worst, located in adnexal area with radiation down to legs, worse with movement, and tolerable with opioids around 2/10. Palliative care called to help with pain.     INTERVAL EVENTS:  3/30: patient states IV dilaudid works, but PO and naproxen aren't enough to keep her pain at a tolerable level, d/w patient  3/31: states having vomiting and going to the bathroom are exacerbating her pain, and the PO appears to last 2 hours. States the IV meds take the pain away but not as much as before.   4/1: used dilaudid 0.8mg IV x 3, and 4mg PO x 3, with additional 1mg x 2 doses    ADVANCE DIRECTIVES:    DNR  MOLST  [ ]  Living Will  [ ]   DECISION MAKER(s):  [ ] Health Care Proxy(s)  [ ] Surrogate(s)  [ ] Guardian           Name(s): Phone Number(s):    BASELINE (I)ADL(s) (prior to admission):  [place]: [ ]Total  [ ] Moderate [ ]Dependent    Allergies    No Known Allergies    Intolerances    MEDICATIONS  (STANDING):  enoxaparin Injectable 40 milliGRAM(s) SubCutaneous daily  famotidine    Tablet 20 milliGRAM(s) Oral daily  HYDROmorphone PCA (5 mG/mL) 30 milliLiter(s) PCA Continuous PCA Continuous  piperacillin/tazobactam IVPB.. 3.375 Gram(s) IV Intermittent every 8 hours  polyethylene glycol 3350 17 Gram(s) Oral daily  potassium acid phosphate/sodium acid phosphate tablet (K-PHOS No. 2) 1 Tablet(s) Oral four times a day with meals  senna 2 Tablet(s) Oral at bedtime  sodium chloride 0.9% lock flush 3 milliLiter(s) IV Push every 8 hours  sodium chloride 0.9% lock flush 3 milliLiter(s) IV Push every 8 hours  sodium chloride 0.9%. 1000 milliLiter(s) (75 mL/Hr) IV Continuous <Continuous>  vancomycin  IVPB      vancomycin  IVPB 1250 milliGRAM(s) IV Intermittent once    MEDICATIONS  (PRN):  HYDROmorphone   Tablet 4 milliGRAM(s) Oral every 3 hours PRN moderate-severe pain  HYDROmorphone PCA (5 mG/mL) Rescue Clinician Bolus 2 milliGRAM(s) IV Push every 1 hour PRN severe pain unresponsive to bolus dose  metoclopramide Injectable 10 milliGRAM(s) IV Push every 6 hours PRN nausea/vomiting  ondansetron Injectable 4 milliGRAM(s) IV Push every 6 hours PRN Nausea and/or Vomiting  simethicone 80 milliGRAM(s) Chew every 6 hours PRN Gas          PRESENT SYMPTOMS: [ ]Unable to obtain due to poor mentation   Source if other than patient:  [ ]Family   [ ]Team     Pain: [X ]yes [ ]no  QOL impact -  decreased function  Location - adnexal  Aggravating factors - movement  Quality - burning  Radiation - down legs  Timing- constant  Severity (0-10 scale): 8/10   Minimal acceptable level (0-10 scale): 2/10    CPOT:    https://www.[facility].org/getattachment/ccp79e55-8e8i-1a6p-7e9k-7536d4971q0r/Critical-Care-Pain-Observation-Tool-(CPOT)      PAIN AD Score:     http://geriatrictoolkit.[place].[facility]/cog/painad.pdf (press ctrl +  left click to view)    Dyspnea:                           [ ]Mild [ ]Moderate [ ]Severe  Anxiety:                             [ ]Mild [ ]Moderate [ ]Severe  Fatigue:                             [ ]Mild [ ]Moderate [ ]Severe  Nausea:                             [ ]Mild [ ]Moderate [ ]Severe  Loss of appetite:              [ ]Mild [ ]Moderate [ ]Severe  Constipation:                    [ ]Mild [ ]Moderate [ ]Severe    Other Symptoms:  [X ]All other review of systems negative     Palliative Performance Status Version 2:         %    http://npcrc.org/files/news/palliative_performance_scale_ppsv2.pdf    PHYSICAL EXAM:  Vital Signs Last 24 Hrs  T(C): 38.4 (01 Apr 2020 12:17), Max: 38.9 (01 Apr 2020 00:47)  T(F): 101.1 (01 Apr 2020 12:17), Max: 102 (01 Apr 2020 00:47)  HR: 133 (01 Apr 2020 12:17) (88 - 146)  BP: 146/84 (01 Apr 2020 12:17) (112/63 - 146/84)  BP(mean): --  RR: 20 (01 Apr 2020 12:17) (18 - 20)  SpO2: 84% (01 Apr 2020 12:17) (84% - 95%)    Limited exam for patient safety and to limit infection risk during COVID-19 outbreak. Please refr to primary team's examination for today.  GENERAL:  [ X]Alert  [ ]Oriented x   [ ]Lethargic  [ ]Cachexia  [ ]Unarousable  [ X]Verbal  [ ]Non-Verbal  Behavioral:   [ ] Anxiety  [ ] Delirium [ ] Agitation [ ] Other  HEENT:  [ ]Normal   [ ]Dry mouth   [ ]ET Tube/Trach  [ ]Oral lesions  PULMONARY:   [ ]Clear [ ]Tachypnea  [ ]Audible excessive secretions   [ ]Rhonchi        [ ]Right [ ]Left [ ]Bilateral  [ ]Crackles        [ ]Right [ ]Left [ ]Bilateral  [ ]Wheezing     [ ]Right [ ]Left [ ]Bilateral  [ ]Diminished breath sounds [ ]right [ ]left [ ]bilateral  CARDIOVASCULAR:    [ ]Regular [ ]Irregular [ ]Tachy  [ ]Quirino [ ]Murmur [ ]Other  GASTROINTESTINAL:  [ ]Soft  [ ]Distended   [ ]+BS  [ ]Non tender [ ]Tender  [ ]PEG [ ]OGT/ NGT  Last BM:     GENITOURINARY:  [ ]Normal [ ] Incontinent   [ ]Oliguria/Anuria   [ ]Cifuentes  MUSCULOSKELETAL:   [ ]Normal   [ ]Weakness  [ ]Bed/Wheelchair bound [ ]Edema  NEUROLOGIC:   [ ]No focal deficits  [ ]Cognitive impairment  [ ]Dysphagia [ ]Dysarthria [ ]Paresis [ ]Other   SKIN:   [ ]Normal    [ ]Rash  [ ]Pressure ulcer(s)       Present on admission [ ]y [ ]n    CRITICAL CARE:  [ ] Shock Present  [ ]Septic [ ]Cardiogenic [ ]Neurologic [ ]Hypovolemic  [ ]  Vasopressors [ ]  Inotropes   [ ]Respiratory failure present [ ]Mechanical ventilation [ ]Non-invasive ventilatory support [ ]High flow  [ ]Acute  [ ]Chronic [ ]Hypoxic  [ ]Hypercarbic [ ]Other  [ ]Other organ failure     LABS: reviewed                          7.1    30.62 )-----------( 621      ( 01 Apr 2020 07:31 )             25.1     04-01    137  |  99  |  10  ----------------------------<  94  4.4   |  25  |  0.59    Ca    11.3<H>      01 Apr 2020 07:31  Phos  1.9     04-01  Mg     1.9     04-01        RADIOLOGY & ADDITIONAL STUDIES:    PROTEIN CALORIE MALNUTRITION PRESENT: [ ]mild [ ]moderate [ ]severe [ ]underweight [ ]morbid obesity  https://www.andeal.org/vault/2440/web/files/ONC/Table_Clinical%20Characteristics%20to%20Document%20Malnutrition-White%20JV%20et%20al%202012.pdf    Height (cm): 162.6 (03-21-20 @ 03:15), 162.56 (03-07-20 @ 19:18)  Weight (kg): 84.4 (03-21-20 @ 03:15), 89.4 (03-07-20 @ 19:18)  BMI (kg/m2): 31.9 (03-21-20 @ 03:15), 33.8 (03-07-20 @ 19:18)    [ ]PPSV2 < or = to 30% [ ]significant weight loss  [ ]poor nutritional intake  [ ]anasarca     Albumin, Serum: 2.9 g/dL (03-25-20 @ 05:59)   [ ]Artificial Nutrition      REFERRALS:   [ ]Chaplaincy  [ ]Hospice  [ ]Child Life  [ ]Social Work  [ ]Case management [ ]Holistic Therapy     Goals of Care Document:     ______________  Alex Mack MD   of Geriatric and Palliative Medicine  [facility]     Please page the following number for clinical matters between the hours of 9AM and 5PM   from Monday through Friday : [phone]    After 5PM and on weekends, please page: [phone]. The Geriatric and Palliative Medicine consult service has 24/7 coverage for medical recommendations, including for symptom management needs.

## 2020-04-01 NOTE — CHART NOTE - NSCHARTNOTEFT_GEN_A_CORE
Nutrition Follow Up Note  Patient seen for: Nutrition follow up. 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     Source: Pt    Diet : NPO after midnight     Patient reports: + N+V lately with last episode of vomiting yesterday      PO intake : Pt reports her appetite had been improving and consuming >50% of meals however stated yesterday and today she was feeling nauseous and intake decreased, pt vomited after meals, feeling unwell at time of visit. Pt stated she was taking ensure enlive 1-2 daily dependent on her intake.       Weight: 201 lbs (3/25), 196.8 lbs (3/31), weight slightly decreased within the past week, pt deferred physical exam at this time due to feeling unwell.     Pertinent Medications: MEDICATIONS  (STANDING):  acetaminophen   Tablet .. 975 milliGRAM(s) Oral every 6 hours  famotidine    Tablet 20 milliGRAM(s) Oral daily  influenza   Vaccine 0.5 milliLiter(s) IntraMuscular once  lactated ringers. 1000 milliLiter(s) (125 mL/Hr) IV Continuous <Continuous>  polyethylene glycol 3350 17 Gram(s) Oral daily  senna 2 Tablet(s) Oral at bedtime  sodium chloride 0.9% lock flush 3 milliLiter(s) IV Push every 8 hours  sodium chloride 0.9% lock flush 3 milliLiter(s) IV Push every 8 hours    MEDICATIONS  (PRN):  HYDROmorphone   Tablet 4 milliGRAM(s) Oral every 3 hours PRN moderate-severe pain  HYDROmorphone  Injectable 0.8 milliGRAM(s) IV Push every 3 hours PRN severe pain unresponsive to oral  metoclopramide Injectable 10 milliGRAM(s) IV Push every 6 hours PRN nausea/vomiting  ondansetron Injectable 4 milliGRAM(s) IV Push every 6 hours PRN Nausea and/or Vomiting  simethicone 80 milliGRAM(s) Chew every 6 hours PRN Gas    Pertinent Labs: 04-01 @ 07:31: Na 137, BUN 10, Cr 0.59, BG 94, K+ 4.4, Phos 1.9<L>, Mg 1.9, Alk Phos 320<H>, ALT/SGPT 6<L>, AST/SGOT 29, HbA1c --    Finger Sticks: none      Skin per nursing documentation: free of pressure injury   Edema: none     Estimated Needs:   [x ] no change since previous assessment  [ ] recalculated:     Previous Nutrition Diagnosis: Inadequate oral intake   Nutrition Diagnosis is: ongoing, addressed with oral diet and supplements     New Nutrition Diagnosis:  Related to:    As evidenced by:      Interventions:     Recommend  1) As medically feasible re-instate regular diet with ensure enlive 2 daily-will honor flavor preferences   2) Continue to encourage po intake and obtain/honor food preferences as able, reviewed importance of adequate po intake with overall goal for weight maintenance, emphasis on foods with protein     Monitoring and Evaluation:     Continue to monitor Nutritional intake, Tolerance to diet prescription, weights, labs, skin integrity    RD remains available upon request and will follow up per protocol

## 2020-04-01 NOTE — PROGRESS NOTE ADULT - PROBLEM SELECTOR PLAN 1
CV: tachycardic with fever. BPs normotensive and stable, s/p 1u pRBC (3/25), monitor VS closely  -F/u AM CBC  -CT Angio chest non diagnostic for PE, will continue to monitor VS closely  Pulm: SpO2 94-96% on RA, encourage OOB and incentive spirometer use in setting of atelectasis seen on CTA  GI: NPO for port placement. Diet per primary teram  -FOBT negative (3/22), repeat FOBT positive(3/26). Appreciate GI recommendations  -Bowel regimen for constipation.  IV antiemetics prn.  ID: s/p Zosyn (3/20-3/22), urine culture negative, blood cultures NGTD (final); repeat cultures done 3/25, prelim NGTD, COVID19 PCR neg (3/25).   -Pt with persistent leukocytosis, most recently 25.96  -Tlast 38.3 on 4/1 (2am), fever likely 2/2 tumor burden.  Heme: hold anticoagulation for procedure. venodynes for VTE ppx, encourage ambulation as tolerated.  -Polychromasia despite reticulocyte count inappropriately low, LDH elevated; with no schistocytes, ferritin elevated in setting inflammatory process, likely paraneoplastic  -Iron deficiency may be contributing to anemia. F/u Hgb electrophoresis to r/o concurrent thalassemia.   Neuro: Palliative consulted for abdominal pain related to metastatic disease. Recommend: Dilaudid 4mg Q3 PO PRN mod-severe pain, Dilaudid 0.8mg IV Q3 PRN for severe breakthrough pain, and Naproxen while monitoring renal function.  -Naproxen held for IR procedure  -F/u AM BMP.   Dispo: for port placement today. Transfer of care to heme/onc for management of inpatient chemotherapy.     JELANI Osborne PGY3 CV: tachycardic with fever. BPs normotensive and stable, s/p 1u pRBC (3/25), monitor VS closely  -F/u AM CBC  -CT Angio chest non diagnostic for PE, will continue to monitor VS closely  Pulm: SpO2 94-96% on RA, encourage OOB and incentive spirometer use in setting of atelectasis seen on CTA  GI: NPO for port placement. Diet per primary teram  -FOBT negative (3/22), repeat FOBT positive(3/26). Appreciate GI recommendations  -Bowel regimen for constipation.  IV antiemetics prn.  ID: s/p Zosyn (3/20-3/22), urine culture negative, blood cultures NGTD (final); repeat cultures done 3/25, prelim NGTD, COVID19 PCR neg (3/25).   -Pt with persistent leukocytosis, most recently 25.96  -Tlast 38.3 on 4/1 (2am), fever likely 2/2 tumor burden.  Heme: hold anticoagulation for procedure. venodynes for VTE ppx, encourage ambulation as tolerated.  -Polychromasia despite reticulocyte count inappropriately low, LDH elevated; with no schistocytes, ferritin elevated in setting inflammatory process, likely paraneoplastic  -Iron deficiency may be contributing to anemia. F/u Hgb electrophoresis to r/o concurrent thalassemia.   Neuro: Palliative consulted for abdominal pain related to metastatic disease. Recommend: Dilaudid 4mg Q3 PO PRN mod-severe pain, Dilaudid 0.8mg IV Q3 PRN for severe breakthrough pain, and Naproxen while monitoring renal function.  -Naproxen held for IR procedure  -F/u AM BMP.

## 2020-04-01 NOTE — PROGRESS NOTE ADULT - ASSESSMENT
25 yo presenting with RLQ pain found to have bilateral pelvic masses, extensive lymphadenopathy, liver, lung & bone lesions suspicious for metastasis. S/p IR biopsy 3/25, final pathology showing metastatic poorly differentiated carcionoma with neuroendocrine features, likely GI origin. Pain moderately improved. Overnight, pt febrile with associated tachycardia, likely 2/2 to tumor burden.

## 2020-04-02 NOTE — PROGRESS NOTE ADULT - SUBJECTIVE AND OBJECTIVE BOX
Oncology Follow-up    INTERVAL HPI/OVERNIGHT EVENTS:  Feels "wiped out" since last night; feels like she did not get rest. Reports that pain is better controlled with PCA pump. Continues to have intermittent fevers. Poor appetite. Breathing stable, improved with nasal canula.    Review of Systems:  General: "feels wiped out", did not sleep well last night, resting more comfortably now. +fevers, no chills; + night sweats; decreased appetite  Head: denies HA  Eyes: denies vision change  ENT: denies oral lesions, rhinorrhea, epistaxes, sore throat, dysphagia  Respiratory: denies cough or pleurisy; +shortness of breath, improved with nasal canula  Cardiovascular: tachycardiac, but denies any palpitations  Gastrointestinal: continues ot have abdominal pain; no constipation, diarrhea, melena, or hematochezia  MSK: denies joint pain or muscle pain  Neuro: denies headache, weakness, or paresthesias  Skin: denies rash, petechiae ecchymoses  Psych: poor sleep, mood low    VITAL SIGNS:  T(F): 99.9 (20 @ 10:30)  HR: 124 (20 @ 10:30)  BP: 115/72 (20 @ 10:30)  RR: 18 (20 @ 10:30)  SpO2: 97% (20 @ 10:30)    20 @ 07:01  -  20 @ 07:00  --------------------------------------------------------  IN: 1775 mL / OUT: 1 mL / NET: 1774 mL    20 @ 07:01  -  20 @ 10:44  --------------------------------------------------------  IN: 0 mL / OUT: 0 mL / NET: 0 mL    PHYSICAL EXAM:    Constitutional: alert, oriented x3, ill-appearing, more than yesterday, voice volume low, appears uncomfortable  Eyes: PERRL, EOMI, sclera non-icteric  Neck: supple, no masses, no JVD, no lymphadenopathy  Respiratory: CTA b/l, no wheezing, rhonchi, rales, with normal respiratory effort  Cardiovascular: tachycardic, normal S1S2, no M/R/G  Gastrointestinal: obese, unable to tolerate deep palpation on exam, but masses appreciated correlating with what's seen on imaging; quiet bowel sounds  Extremities:  no edema  MSK: no obvious abnormalities, normal ROM, no lymphadenopathy  Neurological: Grossly intact  Skin: Normal temperature, no rash, no echymoses, no petichiae  Psych: appropriate mood and affect    MEDICATIONS  (STANDING):  enoxaparin Injectable 40 milliGRAM(s) SubCutaneous daily  famotidine    Tablet 20 milliGRAM(s) Oral daily  HYDROmorphone PCA (5 mG/mL) 30 milliLiter(s) PCA Continuous PCA Continuous  piperacillin/tazobactam IVPB.. 3.375 Gram(s) IV Intermittent every 8 hours  polyethylene glycol 3350 17 Gram(s) Oral daily  potassium acid phosphate/sodium acid phosphate tablet (K-PHOS No. 2) 1 Tablet(s) Oral four times a day with meals  senna 2 Tablet(s) Oral at bedtime  sodium chloride 0.9% lock flush 3 milliLiter(s) IV Push every 8 hours  sodium chloride 0.9% lock flush 3 milliLiter(s) IV Push every 8 hours  sodium chloride 0.9%. 1000 milliLiter(s) (75 mL/Hr) IV Continuous <Continuous>  vancomycin  IVPB      vancomycin  IVPB 1250 milliGRAM(s) IV Intermittent every 12 hours    MEDICATIONS  (PRN):  HYDROmorphone   Tablet 4 milliGRAM(s) Oral every 3 hours PRN moderate-severe pain  HYDROmorphone PCA (5 mG/mL) Rescue Clinician Bolus 2 milliGRAM(s) IV Push every 1 hour PRN severe pain unresponsive to bolus dose  metoclopramide Injectable 10 milliGRAM(s) IV Push every 6 hours PRN nausea/vomiting  naloxone Injectable 0.1 milliGRAM(s) IV Push once PRN respiratory distress on pca pump  ondansetron Injectable 4 milliGRAM(s) IV Push every 6 hours PRN Nausea and/or Vomiting  simethicone 80 milliGRAM(s) Chew every 6 hours PRN Gas      No Known Allergies      LABS:                        7.1    30.62 )-----------( 621      ( 2020 07:31 )             25.1         137  |  99  |  10  ----------------------------<  94  4.4   |  25  |  0.59    Ca    11.3<H>      2020 07:31  Phos  1.9       Mg     1.9         TPro  7.0  /  Alb  2.9<L>  /  TBili  0.3  /  DBili  x   /  AST  29  /  ALT  6<L>  /  AlkPhos  320<H>      PT/INR - ( 2020 07:38 )   PT: 16.0 sec;   INR: 1.39 ratio         PTT - ( 2020 07:38 )  PTT:33.5 sec   Urinalysis Basic - ( 2020 06:36 )    Color: Yellow / Appearance: Slightly Turbid / S.036 / pH: x  Gluc: x / Ketone: Negative  / Bili: Negative / Urobili: 6 mg/dL   Blood: x / Protein: 30 mg/dL / Nitrite: Negative   Leuk Esterase: Moderate / RBC: 307 /HPF / WBC 36 /HPF   Sq Epi: x / Non Sq Epi: 7 /HPF / Bacteria: Negative    Folate, Serum: 10.0 ng/mL (20 @ 09:51)  Vitamin B12, Serum: 800 pg/mL (20 @ 09:51)    RADIOLOGY & ADDITIONAL TESTS:  Studies reviewed.    < from: CT Angio Chest w/ IV Cont (20 @ 21:40) >    EXAM:  CT ANGIO CHEST (W)AW IC                            PROCEDURE DATE:  2020            INTERPRETATION:  CTA CHEST WITH CONTRAST (CT PULMONARY EMBOLUS)    INDICATION: Shortness of breath, tachycardia, neuroendocrine malignancy    TECHNIQUE: CT angiogram of the chest was performed. Coronal and sagittal images were reconstructed. Maximum intensity projection images were generated. Images were obtained after the uneventful administration of 90 cc nonionic intravenous Omnipaque 350.  10 ccof Omnipaque 350 was discarded.      COMPARISON: None.    FINDINGS:     PULMONARY VESSELS: No central pulmonary embolus. The segmental and subsegmental branches are not evaluated secondary to motion. Main pulmonary artery normal in diameter.    HEARTAND VASCULATURE: Normal heart size without pericardial effusion. No CT evidence of right heart strain.    No aortic aneurysm or dissection.    LUNGS, AIRWAYS, PLEURA: Patent central airways. Bilateral metastatic nodules measuring up to 1 cm within the right middle lobe are unchanged. Small bilateral pleural effusions and atelectasis of basilar left lower lobe are unchanged. No new or enlarging nodule. No new opacity.    No pneumothorax.    MEDIASTINUM: Subcentimeter supraclavicular mediastinal and hilar lymph nodes are unchanged.    UPPER ABDOMEN: Ascites. Hepatomegaly.    BONES AND SOFT TISSUES: No aggressive osseous lesion.    LOWER NECK: Within normal limits.    IMPRESSION:     Limited exam secondary to motion. The segmental and subsegmental branches are not evaluated.    WESTON ANDERSON M.D., ATTENDING RADIOLOGIST  This document has been electronically signed. 2020  9:51PM    < end of copied text >

## 2020-04-02 NOTE — PROGRESS NOTE ADULT - ATTENDING COMMENTS
Agree with above. Pt evaluated at bedside. Pain better controlled with PCA. Overnight febrile with multiple episodes of watery diarrhea and 1 episode of vomiting. SOB is stable, still requiring O2.   Noted to have + parainfluenza positive, worsening anemia today.     Plan:   - to receive 2 units today   - cont Zosyn and Vanc due to pt remaining febrile and hypoxic  - CTA neg for PE, PNA  - plan for PICC placement tomorrow if cultures are negative for 48 hours  - will consider scaling down Ab   - will wean O2 as tolerated  - will hold chemo through the weekend and consider monday treatment if pt is stable

## 2020-04-02 NOTE — CHART NOTE - NSCHARTNOTEFT_GEN_A_CORE
MEDICINE NP  Patient is a 24y old  Female who presents with a chief complaint of b/l pelvic masses.      Event Summary:   Notified by RN patient with fever, Vrac622.6 .   Patient seen and examined patient at bedside.  Patient is alert, denies HA, visual changes, CP, SOB, cough, N/V, dysuria, or abd pain.    >VITAL SIGNS:  T(C): 38.6 (04-02-20 @ 20:53), Max: 38.8 (04-02-20 @ 01:55)  T(F): 101.4 (04-02-20 @ 20:53), Max: 101.9 (04-02-20 @ 01:55)  HR: 122 (04-02-20 @ 20:53) (119 - 136)  BP: 116/70 (04-02-20 @ 20:53) (107/72 - 134/70)  RR: 20 (04-02-20 @ 20:53) (18 - 20)  SpO2: 97% (04-02-20 @ 20:53) (95% - 99%)      >Review Of Systems:   CONSTITUTIONAL:  No fever.   No chills  EYES: No visual changes.    ENT:  No  throat pain.  No neck stiffness  RESPIRATORY: No cough, wheezing, hemoptysis; No shortness of breath  CARDIOVASCULAR: No chest pain or palpitations  GASTROINTESTINAL: No abdominal or epigastric pain.  No diarrhea.    GENITOURINARY: No dysuria, frequency or hematuria  NEUROLOGICAL: No numbness or weakness  SKIN: No itching, burning, rashes, or lesions       >PHYSICAL EXAM:  Constitutional: AOx3. NAD.  Neuro:  Grossly intact   Mouth:   Cardiovascular: +S1 S2. RRR.  No murmurs.  No LE edema.  Respiratory:  Even, unlabored.  Clear lungs bilaterally. No wheezing, rhonchi, or crackles.  Gastrointestinal: +BS X4 active. Soft. Nontender. Nondistended   Extremities/Vascular: +2 pulses bilaterally.   Skin:  +Feverish to touch     >MEDICATIONS:    MEDICATIONS  (STANDING):  famotidine    Tablet 20 milliGRAM(s) Oral daily  HYDROmorphone PCA (5 mG/mL) 30 milliLiter(s) PCA Continuous PCA Continuous  piperacillin/tazobactam IVPB.. 3.375 Gram(s) IV Intermittent every 8 hours  polyethylene glycol 3350 17 Gram(s) Oral daily  potassium acid phosphate/sodium acid phosphate tablet (K-PHOS No. 2) 1 Tablet(s) Oral four times a day with meals  senna 2 Tablet(s) Oral at bedtime  sodium chloride 0.9% lock flush 3 milliLiter(s) IV Push every 8 hours  sodium chloride 0.9% lock flush 3 milliLiter(s) IV Push every 8 hours  vancomycin  IVPB      vancomycin  IVPB 1250 milliGRAM(s) IV Intermittent every 12 hours      >LABORATORY:                          6.7    33.18 )-----------( 617      ( 02 Apr 2020 10:46 )             23.2       04-02    135  |  97  |  7   ----------------------------<  102<H>  4.4   |  27  |  0.59    Ca    11.3<H>      02 Apr 2020 10:46  Phos  2.0     04-02  Mg     1.9     04-02    TPro  7.0  /  Alb  2.7<L>  /  TBili  0.4  /  DBili  x   /  AST  29  /  ALT  5<L>  /  AlkPhos  294<H>  04-02          >MICROBIOLOGY:     Urine Culture:   Blood Culture:    >RADIOLOGY:    CXR:     >ASSESSMENT/PLAN:   HPI:  23 yo G0, LMP 3/18 presents with abd pain x2 months with worsening over the past 2 days and associated n/v x1 week. She has h/o known adnexal masses first diagnosed on 3/5 at [facility], [place], operated by [facility] ED where she was discharged with suspected "germ cell tumor" and recommended outpatient follow up. She then presented to [facility] ED on 3/7 with worsening pain and TVUS at that time showed a 15.2 x 9.8 x 14.8 cm heterogenous mass in right adnexa with internal vascularity and a 4.6 x 3.3 x 4.1cm similar left adnexal mass. She was also noted to have +HCG of 104 on 3/7 with ddx including dysgerminoma given virginal status. She was again advised outpatient follow-up and surgical planning, but has not been able to schedule f/u due to unavailable outpatient appointments. She returns today with worsening abdominal pain x2 days associated with n/v x1 week and chills at home. She is currently on her menses and reports irregular periods with heavier flow over the past year. She has never been sexually active. Denies foul vaginal discharge, dysuria, hematuria, frequency, diarrhea, URI symptoms, CP, SOB. She reports baseline constipation and took milk of Mg this morning with a BM this afternoon. She denies sick contacts. She was Morphine for pain and Tylenol for low-grade fever to 38.0 in the ED.     OB/GYN Provider: No established GYN care     OBHx: G0, virginal   GYNHx: bilateral heterogenous adnexal masses, irregular menses (patient reports q1-2months, heavy flow x1 yr)  PMH: Denies  PSH: Denies  Rx: Tylenol, Motrin, Oxycodone   All: Kiwi (tongue itching), NKDA   Psych Hx: Denies  Social Hx: Denies (20 Mar 2020 19:54)      1) Neutropenic Fever - Recurrent   - Tylenol / Cooling measures prn for Pyrexia  - BC x2, UA/UC  - CXR  - c/w Current Abx/ Antifungal/ IVF regimen   -c/w Monitoring closely   -ID on board, f/u with team  - F/U Primary  care team in AM MEDICINE NP  Patient is a 24y old  Female who presents with a chief complaint of b/l pelvic masses.    Event Summary:   Notified by RN patient with fever, [id number] . Patient seen and examined at bedside.  Patient admits to abdominal pain 2/2 pelvic masses controlled by dilaudid. Denies HA, visual changes, CP, SOB, cough, N/V.    >VITAL SIGNS:  T(C): 38.6 (04-02-20 @ 20:53), Max: 38.8 (04-02-20 @ 01:55)  T(F): 101.4 (04-02-20 @ 20:53), Max: 101.9 (04-02-20 @ 01:55)  HR: 122 (04-02-20 @ 20:53) (119 - 136)  BP: 116/70 (04-02-20 @ 20:53) (107/72 - 134/70)  RR: 20 (04-02-20 @ 20:53) (18 - 20)  SpO2: 97% (04-02-20 @ 20:53) (95% - 99%)           >PHYSICAL EXAM:  Constitutional: AOx3. NAD.  Cardiovascular: +S1 S2. RRR.  No murmurs.  No LE edema.  Respiratory:  Even, unlabored.  Clear lungs bilaterally. No wheezing, rhonchi, or crackles.  Gastrointestinal: tender, +BS  Skin:  +Feverish to touch       >ASSESSMENT/PLAN:   24F w/ no PMH presenting with abdominal pain, found to have bilateral pelvic masses and extensive pelvic lymphadenopathy concerning for metastatic malignancy. Pathology showing likely metastatic carcinoma of GI origin, now presenting with fever, temp 101.6 F.     Fever   - Tylenol / Cooling measures prn for Pyrexia  - BC x2 - ordered 4/1 pending results  - UA/UC- ordered 4/1 pending results  - CXR - 4/1: Impression: Left basilar infiltrate and effusion.  - c/w Current Abx/ IVF regimen   -c/w Monitoring closely   - F/U Primary  care team in EBONIE Dwyer PA-C  Medicine Team  #[id number]

## 2020-04-02 NOTE — PROGRESS NOTE ADULT - ASSESSMENT
24F w/ no PMH presenting with abdominal pain, found to have bilateral pelvic masses and extensive pelvic lymphadenopathy concerning for metastatic malignancy. 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 Discussed with GYN/ONC; medical oncology will follow patient as primary team starting on 4/1/20    #Fever, tachycardia  -suspected 2/2 tumor fever; NSAIDs also stopped overnight on 4/1/20 in anticipation of port placement, which could have been previously suppressing higher fever that is now unmasked. However, since she meet sepsis criteria, started on abx (vancomycin, zosyn) on 4/1/20  -cultures pending  -COVID-19 negative but parainfluenza positive  -Chest CT as above, negative for central PE, but unable to evaluate segmental and subsegmental branches due to motion artifact. Lung imaging shows unchanged metastatic nodules, small bilateral pleural effusions and atelectasis of the basilar LLL (unchanged as well), no new opacity, decreasing the likelihood of PNA  -cultures sent on 4/1/20  -urine cultures growing multiple species, likely contaminated  -port placement on hold, will hold plans for chemotherapy for now, pending blood culture results    #Metastatic carcinoma, cancer of unknown primary  - CT A/P with bilateral heterogeneous adnexal masses, as well as upper abdominal, RP, possible bone lesions and pelvic lymphadenopathy; CT Chest with multiple solid pulmonary nodules  - Cancer of unknown primary (poorly differentiated carcinoma) but suspect likely GI origin given CDX2 positivity on pathology; chromogranin positivity also suggests neuroendocrine features; Ki-67 index 40%.   - tumor markers elevated: Cancer Antigen, Ca 27.29      Cancer Antigen, 125: 619      Carcinoembryonic Antigen: 44.7 beta  on initial evaluation; checked again 4/1/20  - Discussed diagnosis with patient on 3/31/20. Given advanced, metastatic stage, will need to be on indefinite treatment to limit progression of disease.  - Plan to start [id number] inpatient. Discussed with IR, tentative plan for mediport placement on 4/1/20, now on hold given fever. Consent for chemotherapy obtained, placed in chart.  - No plan for GI endoscopy at this time  - Notified chemotherapy nurse of plan to start chemotherapy pending port placement  - LDH: 479; Uric acid normal; unlikely to be lymphoma. Will check in AM prior to starting chemotherapy  - Leukocytosis and fevers likely 2/2 tumor vs infection. Flow cytometry negative for clonal process (CLL / CML)  - Diet and VTE ppx restarted, port placement on hold given fever work up  - Palliative following for pain control; improved with PCA pump. Continue to hold NSAIDs    #Microcytic anemia  -reviewed peripheral smear, shows polychromasia despite reticulocyte count inappropriately low  -LDH elevated; no signs of schistocytes on peripheral blood smear  -Suspect component of iron deficiency given low FeSat; occult blood positive, and also endorses ongoing vaginal bleeding; ferritin elevated in setting inflammatory process, likely paraneoplastic  -Hgb electrophoresis pending to r/o concurrent thalassemia  -B12, folate normal    Luis Peng MD, MPH  Hematology/Oncology Fellow, PGY-5  pager: [phone]

## 2020-04-02 NOTE — PROGRESS NOTE ADULT - PROBLEM SELECTOR PLAN 1
- dilaudid PCA [0CR/1.5mg DD/30 mins//[id number]], she states appears to be better than prior to PCA, continue to monitor, may start continuous rate based on usage  - given fevers, stopped naproxen

## 2020-04-02 NOTE — PROGRESS NOTE ADULT - SUBJECTIVE AND OBJECTIVE BOX
HPI: 24F here with worsening abdominal pain in the setting of known adnexal massess, found to have for metastatic carcinoma with final pathology pending. Given evidence of metastatic disease, patient is not a surgical candidate. Has been having worsening adnexal pain, markedly last night after taking senna. States pain is 7-8/10 at its worst, located in adnexal area with radiation down to legs, worse with movement, and tolerable with opioids around 2/10. Palliative care called to help with pain.     INTERVAL EVENTS:  3/30: patient states IV dilaudid works, but PO and naproxen aren't enough to keep her pain at a tolerable level, d/w patient  3/31: states having vomiting and going to the bathroom are exacerbating her pain, and the PO appears to last 2 hours. States the IV meds take the pain away but not as much as before.   4/1: used dilaudid 0.8mg IV x 3, and 4mg PO x 3, with additional 1mg x 2 doses  4/2: on PCA< used 6 injections and attempts in 12 hours    ADVANCE DIRECTIVES:    DNR  MOLST  [ ]  Living Will  [ ]   DECISION MAKER(s):  [ ] Health Care Proxy(s)  [ ] Surrogate(s)  [ ] Guardian           Name(s): Phone Number(s):    BASELINE (I)ADL(s) (prior to admission):  [place]: [ ]Total  [ ] Moderate [ ]Dependent    Allergies    No Known Allergies    Intolerances    MEDICATIONS  (STANDING):  enoxaparin Injectable 40 milliGRAM(s) SubCutaneous daily  famotidine    Tablet 20 milliGRAM(s) Oral daily  furosemide   Injectable 20 milliGRAM(s) IV Push once  HYDROmorphone PCA (5 mG/mL) 30 milliLiter(s) PCA Continuous PCA Continuous  piperacillin/tazobactam IVPB.. 3.375 Gram(s) IV Intermittent every 8 hours  polyethylene glycol 3350 17 Gram(s) Oral daily  potassium acid phosphate/sodium acid phosphate tablet (K-PHOS No. 2) 1 Tablet(s) Oral four times a day with meals  senna 2 Tablet(s) Oral at bedtime  sodium chloride 0.9% lock flush 3 milliLiter(s) IV Push every 8 hours  sodium chloride 0.9% lock flush 3 milliLiter(s) IV Push every 8 hours  vancomycin  IVPB      vancomycin  IVPB 1250 milliGRAM(s) IV Intermittent every 12 hours    MEDICATIONS  (PRN):  acetaminophen   Tablet .. 650 milliGRAM(s) Oral every 6 hours PRN Temp greater or equal to 38C (100.4F)  HYDROmorphone   Tablet 4 milliGRAM(s) Oral every 3 hours PRN moderate-severe pain  HYDROmorphone PCA (5 mG/mL) Rescue Clinician Bolus 2 milliGRAM(s) IV Push every 1 hour PRN severe pain unresponsive to bolus dose  metoclopramide Injectable 10 milliGRAM(s) IV Push every 6 hours PRN nausea/vomiting  naloxone Injectable 0.1 milliGRAM(s) IV Push once PRN respiratory distress on pca pump  ondansetron Injectable 4 milliGRAM(s) IV Push every 6 hours PRN Nausea and/or Vomiting  simethicone 80 milliGRAM(s) Chew every 6 hours PRN Gas    PRESENT SYMPTOMS: [ ]Unable to obtain due to poor mentation   Source if other than patient:  [ ]Family   [ ]Team     Pain: [X ]yes [ ]no  QOL impact -  decreased function  Location - adnexal  Aggravating factors - movement  Quality - burning  Radiation - down legs  Timing- constant  Severity (0-10 scale): 8/10   Minimal acceptable level (0-10 scale): 2/10    CPOT:    https://www.[facility].org/getattachment/[email or web address]/Critical-Care-Pain-Observation-Tool-(CPOT)      PAIN AD Score:     http://geriatrictoolkit.[place].[facility]/cog/painad.pdf (press ctrl +  left click to view)    Dyspnea:                           [ ]Mild [ ]Moderate [ ]Severe  Anxiety:                             [ ]Mild [ ]Moderate [ ]Severe  Fatigue:                             [ ]Mild [ ]Moderate [ ]Severe  Nausea:                             [ ]Mild [ ]Moderate [ ]Severe  Loss of appetite:              [ ]Mild [ ]Moderate [ ]Severe  Constipation:                    [ ]Mild [ ]Moderate [ ]Severe    Other Symptoms:  [X ]All other review of systems negative     Palliative Performance Status Version 2:         %    http://npcrc.org/files/news/palliative_performance_scale_ppsv2.pdf    PHYSICAL EXAM:  Vital Signs Last 24 Hrs  T(C): 38 (02 Apr 2020 12:35), Max: 38.8 (02 Apr 2020 01:55)  T(F): 100.4 (02 Apr 2020 12:35), Max: 101.9 (02 Apr 2020 01:55)  HR: 119 (02 Apr 2020 12:35) (119 - 136)  BP: 121/81 (02 Apr 2020 12:35) (107/72 - 136/83)  BP(mean): --  RR: 18 (02 Apr 2020 12:35) (18 - 20)  SpO2: 97% (02 Apr 2020 12:35) (95% - 99%)    Limited exam for patient safety and to limit infection risk during COVID-19 outbreak. Please refr to primary team's examination for today.  GENERAL:  [ X]Alert  [ ]Oriented x   [ ]Lethargic  [ ]Cachexia  [ ]Unarousable  [ X]Verbal  [ ]Non-Verbal  Behavioral:   [ ] Anxiety  [ ] Delirium [ ] Agitation [ ] Other  HEENT:  [ ]Normal   [ ]Dry mouth   [ ]ET Tube/Trach  [ ]Oral lesions  PULMONARY:   [ ]Clear [ ]Tachypnea  [ ]Audible excessive secretions   [ ]Rhonchi        [ ]Right [ ]Left [ ]Bilateral  [ ]Crackles        [ ]Right [ ]Left [ ]Bilateral  [ ]Wheezing     [ ]Right [ ]Left [ ]Bilateral  [ ]Diminished breath sounds [ ]right [ ]left [ ]bilateral  CARDIOVASCULAR:    [ ]Regular [ ]Irregular [ ]Tachy  [ ]Quirino [ ]Murmur [ ]Other  GASTROINTESTINAL:  [ ]Soft  [ ]Distended   [ ]+BS  [ ]Non tender [ ]Tender  [ ]PEG [ ]OGT/ NGT  Last BM:     GENITOURINARY:  [ ]Normal [ ] Incontinent   [ ]Oliguria/Anuria   [ ]Cifuentes  MUSCULOSKELETAL:   [ ]Normal   [ ]Weakness  [ ]Bed/Wheelchair bound [ ]Edema  NEUROLOGIC:   [ ]No focal deficits  [ ]Cognitive impairment  [ ]Dysphagia [ ]Dysarthria [ ]Paresis [ ]Other   SKIN:   [ ]Normal    [ ]Rash  [ ]Pressure ulcer(s)       Present on admission [ ]y [ ]n    CRITICAL CARE:  [ ] Shock Present  [ ]Septic [ ]Cardiogenic [ ]Neurologic [ ]Hypovolemic  [ ]  Vasopressors [ ]  Inotropes   [ ]Respiratory failure present [ ]Mechanical ventilation [ ]Non-invasive ventilatory support [ ]High flow  [ ]Acute  [ ]Chronic [ ]Hypoxic  [ ]Hypercarbic [ ]Other  [ ]Other organ failure     LABS: reviewed                                   6.7    33.18 )-----------( 617      ( 02 Apr 2020 10:46 )             23.2   04-02    135  |  97  |  7   ----------------------------<  102<H>  4.4   |  27  |  0.59    Ca    11.3<H>      02 Apr 2020 10:46  Phos  2.0     04-02  Mg     1.9     04-02          RADIOLOGY & ADDITIONAL STUDIES:    PROTEIN CALORIE MALNUTRITION PRESENT: [ ]mild [ ]moderate [ ]severe [ ]underweight [ ]morbid obesity  https://www.andeal.org/vault/2440/web/files/ONC/Table_Clinical%20Characteristics%20to%20Document%20Malnutrition-White%20JV%20et%20al%202012.pdf    Height (cm): 162.6 (03-21-20 @ 03:15), 162.56 (03-07-20 @ 19:18)  Weight (kg): 84.4 (03-21-20 @ 03:15), 89.4 (03-07-20 @ 19:18)  BMI (kg/m2): 31.9 (03-21-20 @ 03:15), 33.8 (03-07-20 @ 19:18)    [ ]PPSV2 < or = to 30% [ ]significant weight loss  [ ]poor nutritional intake  [ ]anasarca     Albumin, Serum: 2.9 g/dL (03-25-20 @ 05:59)   [ ]Artificial Nutrition      REFERRALS:   [ ]Chaplaincy  [ ]Hospice  [ ]Child Life  [ ]Social Work  [ ]Case management [ ]Holistic Therapy     Goals of Care Document:     ______________  Alex Mack MD   of Geriatric and Palliative Medicine  [facility]     Please page the following number for clinical matters between the hours of 9AM and 5PM   from Monday through Friday : [phone]    After 5PM and on weekends, please page: [phone]. The Geriatric and Palliative Medicine consult service has 24/7 coverage for medical recommendations, including for symptom management needs.

## 2020-04-02 NOTE — CHART NOTE - NSCHARTNOTEFT_GEN_A_CORE
MEDICINE NP  Patient is a 24y old  Female who presents with a chief complaint of b/l pelvic masses (01 Apr 2020 17:18)      Event Summary:   Notified by RN patient with fever, [id number] . Patient seen and examined at bedside.  Patient is alert, denies HA, visual changes, CP, SOB, cough, N/V, dysuria, or abd pain.    >VITAL SIGNS:  T(C): 38.8 (04-02-20 @ 01:55), Max: 38.8 (04-02-20 @ 01:55)  T(F): 101.9 (04-02-20 @ 01:55), Max: 101.9 (04-02-20 @ 01:55)  HR: 130 (04-01-20 @ 22:28) (126 - 133)  BP: 107/72 (04-01-20 @ 22:28) (107/72 - 146/84)  RR: 20 (04-01-20 @ 22:28) (18 - 20)  SpO2: 99% (04-01-20 @ 22:28) (84% - 99%)        >PHYSICAL EXAM:  Constitutional: AOx3. NAD.  Cardiovascular: +S1 S2. RRR.  No murmurs.  No LE edema.  Respiratory:  Even, unlabored.  Clear lungs bilaterally. No wheezing, rhonchi, or crackles.  Gastrointestinal: +BS X4 active. Soft. Nontender. Nondistended   Extremities/Vascular: +2 pulses bilaterally.   Skin:  +Feverish to touch           >MICROBIOLOGY:     Urine Culture: Culture - Urine (04.01.20 @ 06:34)    Specimen Source: .Urine Clean Catch (Midstream)    Culture Results:   >=3 organisms. Probable collection contamination.    Blood Culture: BCx pending from 3/31/20    >RADIOLOGY:    CXR: < from: Xray Chest 1 View- PORTABLE-Urgent (04.01.20 @ 14:01) >  Left basilar infiltrate and effusion.    >ASSESSMENT/PLAN:   HPI:  25 yo G0, LMP 3/18 presents with abd pain x2 months with worsening over the past 2 days and associated n/v x1 week. She has h/o known adnexal masses first diagnosed on 3/5 at [facility] [place] ED where she was discharged with suspected "germ cell tumor" and recommended outpatient follow up. She then presented to [facility] ED on 3/7 with worsening pain and TVUS at that time showed a 15.2 x 9.8 x 14.8 cm heterogenous mass in right adnexa with internal vascularity and a 4.6 x 3.3 x 4.1cm similar left adnexal mass. She was also noted to have +HCG of 104 on 3/7 with ddx including dysgerminoma given virginal status. She was again advised outpatient follow-up and surgical planning, but has not been able to schedule f/u due to unavailable outpatient appointments. She returns today with worsening abdominal pain x2 days associated with n/v x1 week and chills at home. She is currently on her menses and reports irregular periods with heavier flow over the past year. She has never been sexually active. Denies foul vaginal discharge, dysuria, hematuria, frequency, diarrhea, URI symptoms, CP, SOB. She reports baseline constipation and took milk of Mg this morning with a BM this afternoon. She denies sick contacts. She was Morphine for pain and Tylenol for low-grade fever to 38.0 in the ED.     1) Fever - Recurrent - 102 and 101  - Tylenol / Cooling measures prn for Pyrexia  - BC x2 pending  - UA/UC  - CXR - see above  - c/w Current Abx/ Antifungal/ IVF regimen   -c/w Monitoring closely   -ID on board, f/u with team  - F/U Primary  care team in     Pat Dwyer PA-C  Medicine Team

## 2020-04-03 NOTE — PROGRESS NOTE ADULT - SUBJECTIVE AND OBJECTIVE BOX
Oncology Follow-up    INTERVAL HPI/OVERNIGHT EVENTS:   Continues to feel very fatigued, very tired, slept poorly. PCA helping with pain. Felt cold this morning, shivering, but not using blankets since she was afraid of getting a fever. Denies any chest pain, palpitations headaches. Denies any dysuria, constipation, diarrhea.    Review of Systems:  General: +fever, +chills, no night sweats; decreased appetite  Head: denies HA  Eyes: denies vision change  ENT: denies oral lesions, rhinorrhea, epistaxys, sore throat, dysphagia  Respiratory: denies cough, shortness of breath, pleurisy; continues to use nasal canula  Cardiovascular: denies chest pain, palpitations SHARP  Gastrointestinal: +abdominal pain, worse with bearing down when having a bowel movement. No nausea or vomiting  MSK: Pain from groin radiates to the right leg  Neuro: denies headache, weakness, or paresthesias  Skin: denies rash, petechiae ecchymoses  Psych: denies anxiety or sleep disturbances    VITAL SIGNS:  T(F): 101.4 (04-03-20 @ 15:55)  HR: 140 (04-03-20 @ 15:55)  BP: 131/83 (04-03-20 @ 15:55)  RR: 20 (04-03-20 @ 15:55)  SpO2: 96% (04-03-20 @ 15:55)    04-02-20 @ 07:01  -  04-03-20 @ 07:00  --------------------------------------------------------  IN: 1790 mL / OUT: 600 mL / NET: 1190 mL    04-03-20 @ 07:01  -  04-03-20 @ 16:13  --------------------------------------------------------  IN: 180 mL / OUT: 0 mL / NET: 180 mL    PHYSICAL EXAM:    Constitutional: alert, oriented x3, appears ill, but participating appropriately in conversation  Eyes: PERRL, EOMI, sclera non-icteric  Neck: supple, no masses, no JVD, no lymphadenopathy  Respiratory: no wheezing, ronchi; +LLL crackles; decreased inspiratory effort due to abdominal pain  Cardiovascular: tachycardic, normal S1S2, no M/R/G  Gastrointestinal: soft, palpable abdominal masses tender to palpation; NBS  Extremities:  no edema  MSK: no obvious abnormalities, normal ROM, no lymphadenopathy  Neurological: Grossly intact  Skin: Normal temperature, no rash, no ecchymoses, no petechiae  Psych: flat affect    MEDICATIONS  (STANDING):  famotidine    Tablet 20 milliGRAM(s) Oral daily  HYDROmorphone PCA (5 mG/mL) 30 milliLiter(s) PCA Continuous PCA Continuous  piperacillin/tazobactam IVPB.. 3.375 Gram(s) IV Intermittent every 8 hours  polyethylene glycol 3350 17 Gram(s) Oral daily  senna 2 Tablet(s) Oral at bedtime  sodium chloride 0.9% lock flush 3 milliLiter(s) IV Push every 8 hours  sodium chloride 0.9% lock flush 3 milliLiter(s) IV Push every 8 hours  vancomycin  IVPB      vancomycin  IVPB 1500 milliGRAM(s) IV Intermittent every 12 hours    MEDICATIONS  (PRN):  acetaminophen   Tablet .. 650 milliGRAM(s) Oral every 6 hours PRN Temp greater or equal to 38C (100.4F)  HYDROmorphone   Tablet 4 milliGRAM(s) Oral every 3 hours PRN moderate-severe pain  HYDROmorphone PCA (5 mG/mL) Rescue Clinician Bolus 2 milliGRAM(s) IV Push every 1 hour PRN severe pain unresponsive to bolus dose  metoclopramide Injectable 10 milliGRAM(s) IV Push every 6 hours PRN nausea/vomiting  naloxone Injectable 0.1 milliGRAM(s) IV Push once PRN respiratory distress on pca pump  ondansetron Injectable 4 milliGRAM(s) IV Push every 6 hours PRN Nausea and/or Vomiting  simethicone 80 milliGRAM(s) Chew every 6 hours PRN Gas      No Known Allergies      LABS:                        7.7    41.90 )-----------( 611      ( 03 Apr 2020 10:03 )             25.6     04-03    130<L>  |  91<L>  |  8   ----------------------------<  92  3.7   |  27  |  0.75    Ca    11.4<H>      03 Apr 2020 10:03  Phos  2.4     04-03  Mg     1.8     04-03    TPro  7.1  /  Alb  2.8<L>  /  TBili  0.6  /  DBili  x   /  AST  25  /  ALT  <5<L>  /  AlkPhos  264<H>  04-03    PT/INR - ( 03 Apr 2020 10:03 )   PT: 19.1 sec;   INR: 1.65 ratio          Lactate Dehydrogenase, Serum: 492 U/L (04-03 @ 10:03)    RADIOLOGY & ADDITIONAL TESTS:  Studies reviewed.    < from: Xray Chest 1 View- PORTABLE-Urgent (04.03.20 @ 13:29) >    EXAM:  XR CHEST PORTABLE URGENT 1V                            PROCEDURE DATE:  04/03/2020            INTERPRETATION:  EXAM:XR CHEST URGENT.     CLINICAL INDICATION: PICC confirmation .     TECHNIQUE: AP view.     PRIOR EXAM: 04/01/2020.     FINDINGS:      New right-sided PICC line with the tip overlying the SVC. Persistent left basilar opacity, possibly improved. Question ill-defined right basilar opacity. Stable cardiac silhouette.      IMPRESSION:     Satisfactory placement of the PICC line.    ELVER ROCHA M.D., ATTENDING RADIOLOGIST  This document has been electronically signed. Apr  3 2020  1:40PM    < end of copied text >

## 2020-04-03 NOTE — PROGRESS NOTE ADULT - SUBJECTIVE AND OBJECTIVE BOX
HPI: 24F here with worsening abdominal pain in the setting of known adnexal massess, found to have for metastatic carcinoma with final pathology pending. Given evidence of metastatic disease, patient is not a surgical candidate. Has been having worsening adnexal pain, markedly last night after taking senna. States pain is 7-8/10 at its worst, located in adnexal area with radiation down to legs, worse with movement, and tolerable with opioids around 2/10. Palliative care called to help with pain.     INTERVAL EVENTS:  3/30: patient states IV dilaudid works, but PO and naproxen aren't enough to keep her pain at a tolerable level, d/w patient  3/31: states having vomiting and going to the bathroom are exacerbating her pain, and the PO appears to last 2 hours. States the IV meds take the pain away but not as much as before.   4/1: used dilaudid 0.8mg IV x 3, and 4mg PO x 3, with additional 1mg x 2 doses  4/2: on PCA< used 6 injections and attempts in 12 hours  4/3: see usage below, feels it is helping with her pain, is having diarrhea    ADVANCE DIRECTIVES:    DNR  MOLST  [ ]  Living Will  [ ]   DECISION MAKER(s):  [ ] Health Care Proxy(s)  [ ] Surrogate(s)  [ ] Guardian           Name(s): Phone Number(s):    BASELINE (I)ADL(s) (prior to admission):  [place]: [ ]Total  [ ] Moderate [ ]Dependent    Allergies    No Known Allergies    Intolerances    MEDICATIONS  (STANDING):  famotidine    Tablet 20 milliGRAM(s) Oral daily  HYDROmorphone PCA (5 mG/mL) 30 milliLiter(s) PCA Continuous PCA Continuous  piperacillin/tazobactam IVPB.. 3.375 Gram(s) IV Intermittent every 8 hours  polyethylene glycol 3350 17 Gram(s) Oral daily  senna 2 Tablet(s) Oral at bedtime  sodium chloride 0.9% lock flush 3 milliLiter(s) IV Push every 8 hours  sodium chloride 0.9% lock flush 3 milliLiter(s) IV Push every 8 hours  vancomycin  IVPB      vancomycin  IVPB 1500 milliGRAM(s) IV Intermittent every 12 hours    MEDICATIONS  (PRN):  acetaminophen   Tablet .. 650 milliGRAM(s) Oral every 6 hours PRN Temp greater or equal to 38C (100.4F)  HYDROmorphone   Tablet 4 milliGRAM(s) Oral every 3 hours PRN moderate-severe pain  HYDROmorphone PCA (5 mG/mL) Rescue Clinician Bolus 2 milliGRAM(s) IV Push every 1 hour PRN severe pain unresponsive to bolus dose  metoclopramide Injectable 10 milliGRAM(s) IV Push every 6 hours PRN nausea/vomiting  naloxone Injectable 0.1 milliGRAM(s) IV Push once PRN respiratory distress on pca pump  ondansetron Injectable 4 milliGRAM(s) IV Push every 6 hours PRN Nausea and/or Vomiting  simethicone 80 milliGRAM(s) Chew every 6 hours PRN Gas      PRESENT SYMPTOMS: [ ]Unable to obtain due to poor mentation   Source if other than patient:  [ ]Family   [ ]Team     Pain: [X ]yes [ ]no  QOL impact -  decreased function  Location - adnexal  Aggravating factors - movement  Quality - burning  Radiation - down legs  Timing- constant  Severity (0-10 scale): 8/10   Minimal acceptable level (0-10 scale): 2/10    CPOT:    https://www.[facility].org/getattachment/[email or web address]-8425k9258n6i/Critical-Care-Pain-Observation-Tool-(CPOT)      PAIN AD Score:     http://geriatrictoolkit.[email or web address]/cog/painad.pdf (press ctrl +  left click to view)    Dyspnea:                           [ ]Mild [ ]Moderate [ ]Severe  Anxiety:                             [ ]Mild [ ]Moderate [ ]Severe  Fatigue:                             [ ]Mild [ ]Moderate [ ]Severe  Nausea:                             [ ]Mild [ ]Moderate [ ]Severe  Loss of appetite:              [ ]Mild [ ]Moderate [ ]Severe  Constipation:                    [ ]Mild [ ]Moderate [ ]Severe    Other Symptoms:  [X ]All other review of systems negative     Palliative Performance Status Version 2:         %    http://npcrc.org/files/news/palliative_performance_scale_ppsv2.pdf    PHYSICAL EXAM:  Vital Signs Last 24 Hrs  T(C): 38.6 (03 Apr 2020 15:55), Max: 39.8 (03 Apr 2020 14:11)  T(F): 101.4 (03 Apr 2020 15:55), Max: 103.6 (03 Apr 2020 14:11)  HR: 140 (03 Apr 2020 15:55) (122 - 143)  BP: 131/83 (03 Apr 2020 15:55) (114/74 - 132/85)  BP(mean): --  RR: 20 (03 Apr 2020 15:55) (18 - 20)  SpO2: 96% (03 Apr 2020 15:55) (95% - 97%)    Limited exam for patient safety and to limit infection risk during COVID-19 outbreak. Please refr to primary team's examination for today.  GENERAL:  [ X]Alert  [ ]Oriented x   [ ]Lethargic  [ ]Cachexia  [ ]Unarousable  [ X]Verbal  [ ]Non-Verbal  Behavioral:   [ ] Anxiety  [ ] Delirium [ ] Agitation [ ] Other  HEENT:  [ ]Normal   [ ]Dry mouth   [ ]ET Tube/Trach  [ ]Oral lesions  PULMONARY:   [ ]Clear [ ]Tachypnea  [ ]Audible excessive secretions   [ ]Rhonchi        [ ]Right [ ]Left [ ]Bilateral  [ ]Crackles        [ ]Right [ ]Left [ ]Bilateral  [ ]Wheezing     [ ]Right [ ]Left [ ]Bilateral  [ ]Diminished breath sounds [ ]right [ ]left [ ]bilateral  CARDIOVASCULAR:    [ ]Regular [ ]Irregular [ ]Tachy  [ ]Quirino [ ]Murmur [ ]Other  GASTROINTESTINAL:  [ ]Soft  [ ]Distended   [ ]+BS  [ ]Non tender [ ]Tender  [ ]PEG [ ]OGT/ NGT  Last BM:     GENITOURINARY:  [ ]Normal [ ] Incontinent   [ ]Oliguria/Anuria   [ ]Cifuentes  MUSCULOSKELETAL:   [ ]Normal   [ ]Weakness  [ ]Bed/Wheelchair bound [ ]Edema  NEUROLOGIC:   [ ]No focal deficits  [ ]Cognitive impairment  [ ]Dysphagia [ ]Dysarthria [ ]Paresis [ ]Other   SKIN:   [ ]Normal    [ ]Rash  [ ]Pressure ulcer(s)       Present on admission [ ]y [ ]n    CRITICAL CARE:  [ ] Shock Present  [ ]Septic [ ]Cardiogenic [ ]Neurologic [ ]Hypovolemic  [ ]  Vasopressors [ ]  Inotropes   [ ]Respiratory failure present [ ]Mechanical ventilation [ ]Non-invasive ventilatory support [ ]High flow  [ ]Acute  [ ]Chronic [ ]Hypoxic  [ ]Hypercarbic [ ]Other  [ ]Other organ failure     LABS: reviewed                          7.7    41.90 )-----------( 611      ( 03 Apr 2020 10:03 )             25.6     04-03    130<L>  |  91<L>  |  8   ----------------------------<  92  3.7   |  27  |  0.75    Ca    11.4<H>      03 Apr 2020 10:03  Phos  2.4     04-03  Mg     1.8     04-03            RADIOLOGY & ADDITIONAL STUDIES:    PROTEIN CALORIE MALNUTRITION PRESENT: [ ]mild [ ]moderate [ ]severe [ ]underweight [ ]morbid obesity  https://www.andeal.org/vault/2440/web/files/ONC/Table_Clinical%20Characteristics%20to%20Document%20Malnutrition-White%20JV%20et%20al%202012.pdf    Height (cm): 162.6 (03-21-20 @ 03:15), 162.56 (03-07-20 @ 19:18)  Weight (kg): 84.4 (03-21-20 @ 03:15), 89.4 (03-07-20 @ 19:18)  BMI (kg/m2): 31.9 (03-21-20 @ 03:15), 33.8 (03-07-20 @ 19:18)    [ ]PPSV2 < or = to 30% [ ]significant weight loss  [ ]poor nutritional intake  [ ]anasarca     Albumin, Serum: 2.9 g/dL (03-25-20 @ 05:59)   [ ]Artificial Nutrition      REFERRALS:   [ ]Chaplaincy  [ ]Hospice  [ ]Child Life  [ ]Social Work  [ ]Case management [ ]Holistic Therapy     Goals of Care Document:     ______________  Alex Mack MD   of Geriatric and Palliative Medicine  [facility]     Please page the following number for clinical matters between the hours of 9AM and 5PM   from Monday through Friday : [phone]    After 5PM and on weekends, please page: [phone]. The Geriatric and Palliative Medicine consult service has 24/7 coverage for medical recommendations, including for symptom management needs.

## 2020-04-03 NOTE — PROGRESS NOTE ADULT - PROBLEM SELECTOR PLAN 1
- dilaudid PCA [0CR/1.5mg DD/30 mins//[id number]], she states pain is controlled, bringing pain from 5/10 to 2/10, used 11 injections/14 attempts in 24 hours  - she is concerned about keeping a track of timing for PCA demand doses; advised her to use it as she needs without looking at time, and the machine will dispense medication at the appropriate time intervals  - given fevers, stopped naproxen

## 2020-04-03 NOTE — PROGRESS NOTE ADULT - ASSESSMENT
24F w/ no PMH presenting with abdominal pain, found to have bilateral pelvic masses and extensive pelvic lymphadenopathy concerning for metastatic malignancy. 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 Discussed with GYN/ONC; medical oncology will follow patient as primary team starting on 4/1/20. Continues to spike fevers.    #Fever, tachycardia  -suspected 2/2 tumor fever; NSAIDs also stopped overnight on 4/1/20 in anticipation of port placement, which could have been previously suppressing higher fever that is now unmasked. However, since she meet sepsis criteria, started on abx (vancomycin, zosyn) on 4/1/20  -cultures negative; spiked another fever today to 103 F, repeat cultures sent  -COVID-19 negative but parainfluenza positive  -Chest CT as above, negative for central PE, but unable to evaluate segmental and subsegmental branches due to motion artifact. Lung imaging shows unchanged metastatic nodules, small bilateral pleural effusions and atelectasis of the basilar LLL (unchanged as well), no new opacity, decreasing the likelihood of PNA  -repeating CT abdomen and pelvis to r/o new occult abdominal infection in setting of progressively worsening leukocytosis  -urine cultures growing multiple species, likely contaminated  -s/p PICC line placement for chemotherapy; no plans for chemotherapy over the weekend, will reassess on Monday 4/6/20    #Metastatic carcinoma, cancer of unknown primary  - CT A/P with bilateral heterogeneous adnexal masses, as well as upper abdominal, RP, possible bone lesions and pelvic lymphadenopathy; CT Chest with multiple solid pulmonary nodules  - Cancer of unknown primary (poorly differentiated carcinoma) but suspect likely GI origin given CDX2 positivity on pathology; chromogranin positivity also suggests neuroendocrine features; Ki-67 index 40%.   - tumor markers elevated: Cancer Antigen, Ca 27.29      Cancer Antigen, 125: 619      Carcinoembryonic Antigen: 44.7 beta  on initial evaluation; checked again 4/1/20  - Discussed diagnosis with patient on 3/31/20. Given advanced, metastatic stage, will need to be on indefinite treatment to limit progression of disease.  - Plan to start mFOLFOX6 inpatient. s/p PICC line placement; chemo on hold for given fever work up. Consent for chemotherapy obtained, placed in chart.  - No plan for GI endoscopy at this time  - Leukocytosis and fevers likely 2/2 tumor vs infection. Flow cytometry negative for clonal process (CLL / CML)  - Diet and VTE ppx restarted, port placement on hold given fever work up  - Palliative following for pain control; improved with PCA pump. Continue to hold NSAIDs    #Hypercalcemia  -likely 2/2 malignancy  -holding on giving steroids or bisphosphonates for now, will continue to monitor  -will check ionized calcium in AM    #Microcytic anemia  -reviewed peripheral smear, shows polychromasia despite reticulocyte count inappropriately low  -LDH elevated; no signs of schistocytes on peripheral blood smear  -Suspect component of iron deficiency given low FeSat; occult blood positive, and also endorses ongoing vaginal bleeding; ferritin elevated in setting inflammatory process, likely paraneoplastic  -Hgb electrophoresis pending to r/o concurrent thalassemia  -B12, folate normal    Luis Peng MD, MPH  Hematology/Oncology Fellow, PGY-5  pager: [phone]

## 2020-04-03 NOTE — PROGRESS NOTE ADULT - ATTENDING COMMENTS
Agree with above.   Patient remains febrile, 103 Tmax today. SOB is unchanged. Pain is relatively controlled with PCA. No further diarrhea. WBC cont to trend up.     - will obtain CT A/P wco to eval for occult abscess  - cont broad spectrum Ab  - consider ID consult if continues to spike high fevers   - PICC line placed today   - chemo on hold until clinical improvement

## 2020-04-04 NOTE — PROGRESS NOTE ADULT - ATTENDING COMMENTS
Agree with above.   Pt has unfortunately clinically declined over the past few days. She was diagnosed with parainfluenza which may be contributing to ongoing fevers, however unclear the etiology of hypoxia. CTA neg for PE or PNA. COVID swab neg x 2. O2 sat in 80s when ambulates. Now also with evidence of QUINN.   Remains on PCA pump.     Recommend:   - consider repeating COVID testing for 3rd time as no clear reason for hypoxia   - will restart IV fluids due to QUINN   - cont broad spectrum Ab for now. will check Vanc levels in am   - repeat cultures pending   - if pt does not clinically improve, palliative chemotherapy cannot be administered due to risk for further harm. Will begin discussions with family and patient tomorrow.

## 2020-04-04 NOTE — PROGRESS NOTE ADULT - SUBJECTIVE AND OBJECTIVE BOX
INTERVAL HPI/OVERNIGHT EVENTS:  Patient S&E at bedside.   Febrile to 100.6F overnight - blood cultures drawn.   Feels "tired," pain is stable.       VITAL SIGNS:  T(F): 100.6 (04-04-20 @ 05:42)  HR: 119 (04-04-20 @ 05:42)  BP: 115/74 (04-04-20 @ 05:42)  RR: 18 (04-04-20 @ 05:42)  SpO2: 94% (04-04-20 @ 05:42)      PHYSICAL EXAM:  Constitutional: NAD, somnolent with able to have a conversation   Eyes: EOMI, sclera non-icteric  Neck: supple  Respiratory: CTAB, no wheezes or crackles   Cardiovascular: RRR  Gastrointestinal: soft, non-distended   Extremities: no cyanosis, clubbing or edema   Neurological: awake and alert      MEDICATIONS  (STANDING):  famotidine    Tablet 20 milliGRAM(s) Oral daily  HYDROmorphone PCA (5 mG/mL) 30 milliLiter(s) PCA Continuous PCA Continuous  piperacillin/tazobactam IVPB.. 3.375 Gram(s) IV Intermittent every 8 hours  polyethylene glycol 3350 17 Gram(s) Oral daily  senna 2 Tablet(s) Oral at bedtime  sodium chloride 0.9% lock flush 3 milliLiter(s) IV Push every 8 hours  sodium chloride 0.9% lock flush 3 milliLiter(s) IV Push every 8 hours  vancomycin  IVPB      vancomycin  IVPB 1500 milliGRAM(s) IV Intermittent every 12 hours    MEDICATIONS  (PRN):  acetaminophen   Tablet .. 650 milliGRAM(s) Oral every 6 hours PRN Temp greater or equal to 38C (100.4F)  HYDROmorphone PCA (5 mG/mL) Rescue Clinician Bolus 2 milliGRAM(s) IV Push every 1 hour PRN severe pain unresponsive to bolus dose  metoclopramide Injectable 10 milliGRAM(s) IV Push every 6 hours PRN nausea/vomiting  naloxone Injectable 0.1 milliGRAM(s) IV Push once PRN respiratory distress on pca pump  ondansetron Injectable 4 milliGRAM(s) IV Push every 6 hours PRN Nausea and/or Vomiting  simethicone 80 milliGRAM(s) Chew every 6 hours PRN Gas      Allergies  No Known Allergies        LABS:                        8.2    33.05 )-----------( 403      ( 04 Apr 2020 07:38 )             24.6     04-04    136  |  95<L>  |  12  ----------------------------<  89  3.8   |  26  |  1.42<H>    Ca    11.4<H>      04 Apr 2020 07:38  Phos  2.9     04-04  Mg     1.9     04-04    TPro  7.1  /  Alb  2.5<L>  /  TBili  0.7  /  DBili  x   /  AST  28  /  ALT  <5<L>  /  AlkPhos  245<H>  04-04    PT/INR - ( 04 Apr 2020 07:38 )   PT: 15.7 sec;   INR: 1.36 ratio               RADIOLOGY & ADDITIONAL TESTS:  Studies reviewed.

## 2020-04-04 NOTE — PROGRESS NOTE ADULT - ASSESSMENT
24F w/ no PMH presenting with abdominal pain, found to have bilateral pelvic masses and extensive pelvic lymphadenopathy concerning for metastatic malignancy. 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 Discussed with GYN/ONC; medical oncology will follow patient as primary team starting on 4/1/20. Continues to spike fevers.    #Fever, tachycardia  -suspected 2/2 tumor fever; NSAIDs stopped 4/1/20 in anticipation of port placement, which could have been previously suppressing higher fever that is now unmasked. However, since she meet sepsis criteria, started on abx (vancomycin, zosyn) on 4/1/20  -cultures negative, 4/1/20 are NGTD.  Febrile again so repeat cultures sent 4/3/20  -COVID-19 negative but parainfluenza positive  -Chest CT as above, negative for central PE, but unable to evaluate segmental and subsegmental branches due to motion artifact. Lung imaging shows unchanged metastatic nodules, small bilateral pleural effusions and atelectasis of the basilar LLL (unchanged as well), no new opacity, decreasing the likelihood of PNA  - CT A/P repeated on 4/3/20: no obvious source of infection   -urine cultures growing multiple species, likely contaminated  -s/p PICC line placement for chemotherapy; no plans for chemotherapy over the weekend, will reassess on Monday 4/6/20    #Metastatic carcinoma, cancer of unknown primary  - CT A/P with bilateral heterogeneous adnexal masses, as well as upper abdominal, RP, possible bone lesions and pelvic lymphadenopathy; CT Chest with multiple solid pulmonary nodules  - Cancer of unknown primary (poorly differentiated carcinoma) but suspect likely GI origin given CDX2 positivity on pathology; chromogranin positivity also suggests neuroendocrine features; Ki-67 index 40%.   - tumor markers elevated: Cancer Antigen, Ca 27.29      Cancer Antigen, 125: 619      Carcinoembryonic Antigen: 44.7 beta  on initial evaluation; checked again 4/1/20  - Discussed diagnosis with patient on 3/31/20. Given advanced, metastatic stage, will need to be on indefinite treatment to limit progression of disease.  - Plan to start mFOLFOX6 inpatient. s/p PICC line placement 4/3/20; chemo on hold for given fever work up. Consent for chemotherapy obtained, placed in chart.  - No plan for GI endoscopy at this time  - Leukocytosis and fevers likely 2/2 tumor vs infection. Flow cytometry negative for clonal process (CLL / CML)  - Diet and VTE ppx restarted, port placement on hold given fever work up  - Palliative following for pain control; improved with PCA pump. Continue to hold NSAIDs    #Hypercalcemia  -likely 2/2 malignancy  -holding on giving steroids or bisphosphonates for now, will continue to monitor  -will check ionized calcium in AM    #Microcytic anemia  -reviewed peripheral smear, shows polychromasia despite reticulocyte count inappropriately low  -LDH elevated; no signs of schistocytes on peripheral blood smear  -Suspect component of iron deficiency given low FeSat; occult blood positive, and also endorses ongoing vaginal bleeding; ferritin elevated in setting inflammatory process, likely paraneoplastic  -Hgb electrophoresis pending to r/o concurrent thalassemia  -B12, folate normal      Jazz Sanchez MD  Hematology/Oncology Fellow, PGY-5  pager: [phone]  After 5pm or on weekends, please page the on-call fellow.

## 2020-04-05 NOTE — PROGRESS NOTE ADULT - ATTENDING COMMENTS
Agree with above.   ID consult today due to ongoing fevers and help with Ab management  D/w palliative, will decrease on demand dose to help with mental status changes   attempted to discuss GOC with pt today if unable to give her chemotherapy, but unable to engage. pt could not focus on the conversation. d/w family.   Will dose pamidronate today 60 mg x 1 to help with hypercalcemia

## 2020-04-05 NOTE — CONSULT NOTE ADULT - SUBJECTIVE AND OBJECTIVE BOX
Patient is a 24y old  Female who presents with a chief complaint of b/l pelvic masses (2020 09:06)    HPI:  23 yo G0, LMP 3/18 presents with abd pain x2 months with worsening over the past 2 days and associated n/v x1 week. She has h/o known adnexal masses first diagnosed on 3/5 at [facility] ED where she was discharged with suspected "germ cell tumor" and recommended outpatient follow up. She then presented to [facility] ED on 3/7 with worsening pain and TVUS at that time showed a 15.2 x 9.8 x 14.8 cm heterogenous mass in right adnexa with internal vascularity and a 4.6 x 3.3 x 4.1cm similar left adnexal mass. She was also noted to have +HCG of 104 on 3/7 with ddx including dysgerminoma given virginal status. She was again advised outpatient follow-up and surgical planning, but has not been able to schedule f/u due to unavailable outpatient appointments. She returns today with worsening abdominal pain x2 days associated with n/v x1 week and chills at home. She is currently on her menses and reports irregular periods with heavier flow over the past year. She has never been sexually active. Denies foul vaginal discharge, dysuria, hematuria, frequency, diarrhea, URI symptoms, CP, SOB. She reports baseline constipation and took milk of Mg this morning with a BM this afternoon. She denies sick contacts. She was Morphine for pain and Tylenol for low-grade fever to 38.0 in the ED.     OB/GYN Provider: No established GYN care     OBHx: G0, virginal   GYNHx: bilateral heterogenous adnexal masses, irregular menses (patient reports q1-2months, heavy flow x1 yr)  PMH: Denies  PSH: Denies  Rx: Tylenol, Motrin, Oxycodone   All: Kiwi (tongue itching), NKDA   Psych Hx: Denies  Social Hx: Denies (20 Mar 2020 19:54)      prior hospital charts reviewed [ x ]  primary team notes reviewed [ x ]  other consultant notes reviewed [ x ]    PAST MEDICAL & SURGICAL HISTORY:  No pertinent past medical history  No significant past surgical history    Allergies  No Known Allergies    ANTIMICROBIALS:    piperacillin/tazobactam IVPB.. 3.375 every 8 hours  vancomycin  IVPB    vancomycin  IVPB 1250 every 12 hours      OTHER MEDS: MEDICATIONS  (STANDING):  acetaminophen   Tablet .. 650 every 6 hours PRN  enoxaparin Injectable 40 daily  famotidine    Tablet 20 daily  HYDROmorphone PCA (5 mG/mL) 30 PCA Continuous  HYDROmorphone PCA (5 mG/mL) Rescue Clinician Bolus 2 every 1 hour PRN  metoclopramide Injectable 10 every 6 hours PRN  ondansetron Injectable 4 every 6 hours PRN  pamidronate IVPB 60 once  polyethylene glycol 3350 17 daily  senna 2 at bedtime  simethicone 80 every 6 hours PRN      SOCIAL HISTORY:  non-smoker    FAMILY HISTORY:      REVIEW OF SYSTEMS  [  ] ROS unobtainable because:    [  ] All other systems negative except as noted below:	    Constitutional:  [ ] fever [ ] chills  [ ] weight loss  [ ] weakness  Skin:  [ ] rash [ ] phlebitis	  Eyes: [ ] icterus [ ] pain  [ ] discharge	  ENMT: [ ] sore throat  [ ] thrush [ ] ulcers [ ] exudates  Respiratory: [ ] dyspnea [ ] hemoptysis [ ] cough [ ] sputum	  Cardiovascular:  [ ] chest pain [ ] palpitations [ ] edema	  Gastrointestinal:  [ ] nausea [ ] vomiting [ ] diarrhea [ ] constipation [ ] pain	  Genitourinary:  [ ] dysuria [ ] frequency [ ] hematuria [ ] discharge [ ] flank pain  [ ] incontinence  Musculoskeletal:  [ ] myalgias [ ] arthralgias [ ] arthritis  [ ] back pain  Neurological:  [ ] headache [ ] seizures  [ ] confusion/altered mental status  Psychiatric:  [ ] anxiety [ ] depression	  Hematology/Lymphatics:  [ ] lymphadenopathy  Endocrine:  [ ] adrenal [ ] thyroid  Allergic/Immunologic:	 [ ] transplant [ ] seasonal    Vital Signs Last 24 Hrs  T(F): 101.1 (20 @ 10:22), Max: 103.6 (20 @ 14:11)    Vital Signs Last 24 Hrs  HR: 133 (20 @ 10:22) (106 - 137)  BP: 137/79 (20 @ 10:22) (113/64 - 148/88)  RR: 18 (20 @ 10:22)  SpO2: 98% (20 @ 10:22) (96% - 98%)  Wt(kg): --    PHYSICAL EXAM:  Constitutional: non-toxic, no distress, PICC line   HEAD/EYES: anicteric, no conjunctival injection  ENT:  supple, no thrush  Cardiovascular:   normal S1, S2, no murmur, no edema  Respiratory:  clear BS bilaterally, no wheezes, no rales  GI:  soft, non-tender, normal bowel sounds  :  no cash, no CVA tenderness  Musculoskeletal:  no synovitis, normal ROM  Neurologic: awake and alert, normal strength, no focal findings  Skin:  no rash, no erythema, no phlebitis  Heme/Onc: no lymphadenopathy   Psychiatric:  awake, alert, appropriate mood                          7.9    34.18 )-----------( 556      ( 2020 07:47 )             26.8     04-05    138  |  101  |  10  ----------------------------<  87  3.7   |  25  |  1.35<H>    Ca    11.6<H>      2020 07:46  Phos  2.1     -  Mg     2.1     -    TPro  6.5  /  Alb  2.4<L>  /  TBili  0.6  /  DBili  x   /  AST  23  /  ALT  <5<L>  /  AlkPhos  216<H>  -      Urinalysis Basic - ( 2020 09:26 )    Color: Yellow / Appearance: Slightly Turbid / S.019 / pH: x  Gluc: x / Ketone: Negative  / Bili: Negative / Urobili: <2 mg/dL   Blood: x / Protein: 30 mg/dL / Nitrite: Negative   Leuk Esterase: Moderate / RBC: 326 /HPF / WBC 13 /HPF   Sq Epi: x / Non Sq Epi: 1 /HPF / Bacteria: Negative        MICROBIOLOGY:  Vancomycin Level, Random: 12.1 ( @ 07:48)  Vancomycin Level, Trough: 26.2 ( @ 17:24)  Vancomycin Level, Trough: 8.2 ( @ 04:10)        Culture - Blood (collected 2020 23:13)  Source: .Blood Blood-Peripheral  Preliminary Report (2020 01:02):    No growth to date.    Culture - Blood (collected 2020 17:11)  Source: .Blood Blood-Catheter  Preliminary Report (2020 18:01):    No growth to date.    Culture - Blood (collected 2020 10:54)  Source: .Blood Blood-Peripheral  Final Report (2020 11:00):    No Growth Final    Culture - Blood (collected 2020 10:54)  Source: .Blood Blood-Peripheral  Final Report (2020 11:00):    No Growth Final    Culture - Urine (collected 2020 06:34)  Source: .Urine Clean Catch (Midstream)  Final Report (2020 04:32):    >=3 organisms. Probable collection contamination.    Rapid RVP Result: Detected ( @ 10:16)      RADIOLOGY:  < from: CT Abdomen and Pelvis w/ IV Cont (20 @ 16:46) >    IMPRESSION:     Widely metastatic neoplasm without significant change from prior abdominal CT 3/20/2020.    No evidence of an infectious source in the abdomen or pelvis. Patient is a 24y old  Female who presents with a chief complaint of b/l pelvic masses (2020 09:06)    HPI:  23 yo G0, LMP 3/18 presents with abd pain x2 months with worsening over the past 2 days and associated n/v x1 week. She has h/o known adnexal masses first diagnosed on 3/5 at [facility] ED where she was discharged with suspected "germ cell tumor" and recommended outpatient follow up. She then presented to [facility] ED on 3/7 with worsening pain and TVUS at that time showed a 15.2 x 9.8 x 14.8 cm heterogenous mass in right adnexa with internal vascularity and a 4.6 x 3.3 x 4.1cm similar left adnexal mass. She was also noted to have +HCG of 104 on 3/7 with ddx including dysgerminoma given virginal status. She was again advised outpatient follow-up and surgical planning, but has not been able to schedule f/u due to unavailable outpatient appointments. She returns today with worsening abdominal pain x2 days associated with n/v x1 week and chills at home. She is currently on her menses and reports irregular periods with heavier flow over the past year. She has never been sexually active. Denies foul vaginal discharge, dysuria, hematuria, frequency, diarrhea, URI symptoms, CP, SOB. She reports baseline constipation and took milk of Mg this morning with a BM this afternoon. She denies sick contacts. She was Morphine for pain and Tylenol for low-grade fever to 38.0 in the ED.     OB/GYN Provider: No established GYN care     Above reviewed. Patient denied SOB, cough, abdominal pain or dysuria.   Reported diarrhea.       prior hospital charts reviewed [ x ]  primary team notes reviewed [ x ]  other consultant notes reviewed [ x ]    PAST MEDICAL & SURGICAL HISTORY:  No pertinent past medical history  No significant past surgical history    Allergies  No Known Allergies    ANTIMICROBIALS:    piperacillin/tazobactam IVPB.. 3.375 every 8 hours  vancomycin  IVPB    vancomycin  IVPB 1250 every 12 hours      OTHER MEDS: MEDICATIONS  (STANDING):  acetaminophen   Tablet .. 650 every 6 hours PRN  enoxaparin Injectable 40 daily  famotidine    Tablet 20 daily  HYDROmorphone PCA (5 mG/mL) 30 PCA Continuous  HYDROmorphone PCA (5 mG/mL) Rescue Clinician Bolus 2 every 1 hour PRN  metoclopramide Injectable 10 every 6 hours PRN  ondansetron Injectable 4 every 6 hours PRN  pamidronate IVPB 60 once  polyethylene glycol 3350 17 daily  senna 2 at bedtime  simethicone 80 every 6 hours PRN      SOCIAL HISTORY:  non-smoker, no alcohol use, lives with family     FAMILY HISTORY:  Mother, thyroid cancer    REVIEW OF SYSTEMS  [  ] ROS unobtainable because:    [ x ] All other systems negative except as noted below:	    Constitutional:  [ x] fever [ ] chills  [ ] weight loss  [ ] weakness  Skin:  [ ] rash [ ] phlebitis	  Eyes: [ ] icterus [ ] pain  [ ] discharge	  ENMT: [ ] sore throat  [ ] thrush [ ] ulcers [ ] exudates  Respiratory: [ ] dyspnea [ ] hemoptysis [ ] cough [ ] sputum	  Cardiovascular:  [ ] chest pain [ ] palpitations [ ] edema	  Gastrointestinal:  [ ] nausea [ ] vomiting [ x] diarrhea [ ] constipation [x ] pain	  Genitourinary:  [ ] dysuria [ ] frequency [ ] hematuria [ ] discharge [ ] flank pain  [ ] incontinence  Musculoskeletal:  [ ] myalgias [ ] arthralgias [ ] arthritis  [ ] back pain  Neurological:  [ ] headache [ ] seizures  [ ] confusion/altered mental status  Psychiatric:  [ ] anxiety [ ] depression	  Hematology/Lymphatics:  [ ] lymphadenopathy  Endocrine:  [ ] adrenal [ ] thyroid  Allergic/Immunologic:	 [ ] transplant [ ] seasonal    Vital Signs Last 24 Hrs  T(F): 101.1 (20 @ 10:22), Max: 103.6 (20 @ 14:11)    Vital Signs Last 24 Hrs  HR: 133 (20 @ 10:22) (106 - 137)  BP: 137/79 (20 @ 10:22) (113/64 - 148/88)  RR: 18 (20 @ 10:22)  SpO2: 98% (20 @ 10:22) (96% - 98%)  Wt(kg): --    PHYSICAL EXAM:  Constitutional: non-toxic, no distress, PICC line   HEAD/EYES: anicteric, no conjunctival injection  ENT:  supple, no thrush  Cardiovascular:   normal S1, S2, no murmur, no edema  Respiratory:  clear BS bilaterally, no wheezes, no rales  GI:  distended, Right lower quandrant fullness and tenderness, no rebound   :  no cash, no CVA tenderness  Musculoskeletal:  no synovitis, normal ROM  Neurologic: awake and alert, normal strength, no focal findings  Skin:  no rash, no erythema, no phlebitis  Heme/Onc: no lymphadenopathy   Psychiatric:  awake, alert, appropriate mood                          7.9    34.18 )-----------( 556      ( 2020 07:47 )             26.8     04-    138  |  101  |  10  ----------------------------<  87  3.7   |  25  |  1.35<H>    Ca    11.6<H>      2020 07:46  Phos  2.1       Mg     2.1         TPro  6.5  /  Alb  2.4<L>  /  TBili  0.6  /  DBili  x   /  AST  23  /  ALT  <5<L>  /  AlkPhos  216<H>        Urinalysis Basic - ( 2020 09:26 )    Color: Yellow / Appearance: Slightly Turbid / S.019 / pH: x  Gluc: x / Ketone: Negative  / Bili: Negative / Urobili: <2 mg/dL   Blood: x / Protein: 30 mg/dL / Nitrite: Negative   Leuk Esterase: Moderate / RBC: 326 /HPF / WBC 13 /HPF   Sq Epi: x / Non Sq Epi: 1 /HPF / Bacteria: Negative        MICROBIOLOGY:  Vancomycin Level, Random: 12.1 ( @ 07:48)  Vancomycin Level, Trough: 26.2 ( @ 17:24)  Vancomycin Level, Trough: 8.2 ( @ 04:10)        Culture - Blood (collected 2020 23:13)  Source: .Blood Blood-Peripheral  Preliminary Report (2020 01:02):    No growth to date.    Culture - Blood (collected 2020 17:11)  Source: .Blood Blood-Catheter  Preliminary Report (2020 18:01):    No growth to date.    Culture - Blood (collected 2020 10:54)  Source: .Blood Blood-Peripheral  Final Report (2020 11:00):    No Growth Final    Culture - Blood (collected 2020 10:54)  Source: .Blood Blood-Peripheral  Final Report (2020 11:00):    No Growth Final    Culture - Urine (collected 2020 06:34)  Source: .Urine Clean Catch (Midstream)  Final Report (2020 04:32):    >=3 organisms. Probable collection contamination.    Rapid RVP Result: Detected ( @ 10:16)      RADIOLOGY:  < from: CT Abdomen and Pelvis w/ IV Cont (20 @ 16:46) >    IMPRESSION:     Widely metastatic neoplasm without significant change from prior abdominal CT 3/20/2020.    No evidence of an infectious source in the abdomen or pelvis. Patient is a 24y old  Female who presents with a chief complaint of b/l pelvic masses (2020 09:06)    HPI:  25 yo G0, LMP 3/18 presents with abd pain x2 months with worsening over the past 2 days and associated n/v x1 week. She has h/o known adnexal masses first diagnosed on 3/5 at [facility] ED where she was discharged with suspected "germ cell tumor" and recommended outpatient follow up. She then presented to [facility] ED on 3/7 with worsening pain and TVUS at that time showed a 15.2 x 9.8 x 14.8 cm heterogenous mass in right adnexa with internal vascularity and a 4.6 x 3.3 x 4.1cm similar left adnexal mass. She was also noted to have +HCG of 104 on 3/7 with ddx including dysgerminoma given virginal status. She was again advised outpatient follow-up and surgical planning, but has not been able to schedule f/u due to unavailable outpatient appointments. She returns today with worsening abdominal pain x2 days associated with n/v x1 week and chills at home. She is currently on her menses and reports irregular periods with heavier flow over the past year. She has never been sexually active. Denies foul vaginal discharge, dysuria, hematuria, frequency, diarrhea, URI symptoms, CP, SOB. She reports baseline constipation and took milk of Mg this morning with a BM this afternoon. She denies sick contacts. She was Morphine for pain and Tylenol for low-grade fever to 38.0 in the ED.     OB/GYN Provider: No established GYN care     Above reviewed. Patient denied SOB, cough, abdominal pain or dysuria.   Reported diarrhea.       prior hospital charts reviewed [ x ]  primary team notes reviewed [ x ]  other consultant notes reviewed [ x ]    PAST MEDICAL & SURGICAL HISTORY:  No pertinent past medical history  No significant past surgical history    Allergies  No Known Allergies    ANTIMICROBIALS:    piperacillin/tazobactam IVPB.. 3.375 every 8 hours  vancomycin  IVPB    vancomycin  IVPB 1250 every 12 hours      OTHER MEDS: MEDICATIONS  (STANDING):  acetaminophen   Tablet .. 650 every 6 hours PRN  enoxaparin Injectable 40 daily  famotidine    Tablet 20 daily  HYDROmorphone PCA (5 mG/mL) 30 PCA Continuous  HYDROmorphone PCA (5 mG/mL) Rescue Clinician Bolus 2 every 1 hour PRN  metoclopramide Injectable 10 every 6 hours PRN  ondansetron Injectable 4 every 6 hours PRN  pamidronate IVPB 60 once  polyethylene glycol 3350 17 daily  senna 2 at bedtime  simethicone 80 every 6 hours PRN      SOCIAL HISTORY:  lives with parents, sister and brother  no smoking, alcohol or drug abuse  no recent travel    FAMILY HISTORY:  Mother, thyroid cancer    REVIEW OF SYSTEMS  [  ] ROS unobtainable because:    [ x ] All other systems negative except as noted below:	    Constitutional:  [ x] fever [ ] chills  [ ] weight loss  [ ] weakness  Skin:  [ ] rash [ ] phlebitis	  Eyes: [ ] icterus [ ] pain  [ ] discharge	  ENMT: [ ] sore throat  [ ] thrush [ ] ulcers [ ] exudates  Respiratory: [ ] dyspnea [ ] hemoptysis [ ] cough [ ] sputum	  Cardiovascular:  [ ] chest pain [ ] palpitations [ ] edema	  Gastrointestinal:  [ ] nausea [ ] vomiting [ x] diarrhea [ ] constipation [x ] pain	  Genitourinary:  [ ] dysuria [ ] frequency [ ] hematuria [ ] discharge [ ] flank pain  [ ] incontinence  Musculoskeletal:  [ ] myalgias [ ] arthralgias [ ] arthritis  [ ] back pain  Neurological:  [ ] headache [ ] seizures  [ ] confusion/altered mental status  Psychiatric:  [ ] anxiety [ ] depression	  Hematology/Lymphatics:  [ ] lymphadenopathy  Endocrine:  [ ] adrenal [ ] thyroid  Allergic/Immunologic:	 [ ] transplant [ ] seasonal    Vital Signs Last 24 Hrs  T(F): 101.1 (20 @ 10:22), Max: 103.6 (20 @ 14:11)    Vital Signs Last 24 Hrs  HR: 133 (20 @ 10:22) (106 - 137)  BP: 137/79 (20 @ 10:22) (113/64 - 148/88)  RR: 18 (20 @ 10:22)  SpO2: 98% (20 @ 10:22) (96% - 98%)  Wt(kg): --    PHYSICAL EXAM:  Constitutional: non-toxic, no distress, PICC line   HEAD: atraumatic normocephalic  EYES: anicteric, no conjunctival injection  ENT:  supple, no thrush  Cardiovascular:   normal S1, S2, no murmur, no edema  Respiratory:  clear BS bilaterally, no wheezes, no rales  GI:  distended, Right lower quandrant fullness and tenderness, no rebound   :  no cash, no CVA tenderness  Musculoskeletal:  no synovitis, normal ROM  Neurologic: awake and alert, normal strength, no focal findings  Skin:  no rash, no erythema, no phlebitis  Heme/Onc: no lymphadenopathy   Psychiatric:  awake, alert, appropriate mood  vascular: RUE PICC                          7.9    34.18 )-----------( 556      ( 2020 07:47 )             26.8         138  |  101  |  10  ----------------------------<  87  3.7   |  25  |  1.35<H>    Ca    11.6<H>      2020 07:46  Phos  2.1       Mg     2.1         TPro  6.5  /  Alb  2.4<L>  /  TBili  0.6  /  DBili  x   /  AST  23  /  ALT  <5<L>  /  AlkPhos  216<H>        Urinalysis Basic - ( 2020 09:26 )    Color: Yellow / Appearance: Slightly Turbid / S.019 / pH: x  Gluc: x / Ketone: Negative  / Bili: Negative / Urobili: <2 mg/dL   Blood: x / Protein: 30 mg/dL / Nitrite: Negative   Leuk Esterase: Moderate / RBC: 326 /HPF / WBC 13 /HPF   Sq Epi: x / Non Sq Epi: 1 /HPF / Bacteria: Negative        MICROBIOLOGY:  Vancomycin Level, Random: 12.1 ( @ 07:48)  Vancomycin Level, Trough: 26.2 ( @ 17:24)  Vancomycin Level, Trough: 8.2 ( @ 04:10)        Culture - Blood (collected 2020 23:13)  Source: .Blood Blood-Peripheral  Preliminary Report (2020 01:02):    No growth to date.    Culture - Blood (collected 2020 17:11)  Source: .Blood Blood-Catheter  Preliminary Report (2020 18:01):    No growth to date.    Culture - Blood (collected 2020 10:54)  Source: .Blood Blood-Peripheral  Final Report (2020 11:00):    No Growth Final    Culture - Blood (collected 2020 10:54)  Source: .Blood Blood-Peripheral  Final Report (2020 11:00):    No Growth Final    Culture - Urine (collected 2020 06:34)  Source: .Urine Clean Catch (Midstream)  Final Report (2020 04:32):    >=3 organisms. Probable collection contamination.    Rapid RVP Result: Detected ( @ 10:16)      RADIOLOGY:  < from: CT Abdomen and Pelvis w/ IV Cont (20 @ 16:46) >    IMPRESSION:     Widely metastatic neoplasm without significant change from prior abdominal CT 3/20/2020.    No evidence of an infectious source in the abdomen or pelvis.

## 2020-04-05 NOTE — PROGRESS NOTE ADULT - SUBJECTIVE AND OBJECTIVE BOX
INTERVAL HPI/OVERNIGHT EVENTS:  Patient S&E at bedside. No o/n events, patient resting comfortably. No complaints at this time. Patient denies fever, chills, dizziness, weakness, CP, palpitations, SOB, cough, N/V/D/C, dysuria, changes in bowel movements, LE edema.    VITAL SIGNS:  T(F): 99 (20 @ 05:20)  HR: 124 (20 @ 05:20)  BP: 113/64 (20 @ 05:20)  RR: 19 (20 @ 05:20)  SpO2: 96% (20 @ 05:20)  Wt(kg): --    PHYSICAL EXAM:  Constitutional: NAD, somnolent with able to have a conversation   Eyes: EOMI, sclera non-icteric  Neck: supple  Respiratory: CTAB, no wheezes or crackles   Cardiovascular: RRR  Gastrointestinal: soft, non-distended   Extremities: no cyanosis, clubbing or edema   Neurological: awake and alert      MEDICATIONS  (STANDING):  enoxaparin Injectable 40 milliGRAM(s) SubCutaneous daily  famotidine    Tablet 20 milliGRAM(s) Oral daily  HYDROmorphone PCA (5 mG/mL) 30 milliLiter(s) PCA Continuous PCA Continuous  piperacillin/tazobactam IVPB.. 3.375 Gram(s) IV Intermittent every 8 hours  polyethylene glycol 3350 17 Gram(s) Oral daily  senna 2 Tablet(s) Oral at bedtime  sodium chloride 0.9% lock flush 3 milliLiter(s) IV Push every 8 hours  sodium chloride 0.9% lock flush 3 milliLiter(s) IV Push every 8 hours  sodium chloride 0.9%. 1000 milliLiter(s) (100 mL/Hr) IV Continuous <Continuous>    MEDICATIONS  (PRN):  acetaminophen   Tablet .. 650 milliGRAM(s) Oral every 6 hours PRN Temp greater or equal to 38C (100.4F)  HYDROmorphone PCA (5 mG/mL) Rescue Clinician Bolus 2 milliGRAM(s) IV Push every 1 hour PRN severe pain unresponsive to bolus dose  metoclopramide Injectable 10 milliGRAM(s) IV Push every 6 hours PRN nausea/vomiting  naloxone Injectable 0.1 milliGRAM(s) IV Push once PRN respiratory distress on pca pump  ondansetron Injectable 4 milliGRAM(s) IV Push every 6 hours PRN Nausea and/or Vomiting  simethicone 80 milliGRAM(s) Chew every 6 hours PRN Gas      Allergies    No Known Allergies    Intolerances        LABS:                        7.9    34.18 )-----------( 556      ( 2020 07:47 )             26.8     04-05    138  |  101  |  10  ----------------------------<  87  3.7   |  25  |  1.35<H>    Ca    11.6<H>      2020 07:46  Phos  2.1     -  Mg     2.1     -    TPro  6.5  /  Alb  2.4<L>  /  TBili  0.6  /  DBili  x   /  AST  23  /  ALT  <5<L>  /  AlkPhos  216<H>  -    PT/INR - ( 2020 07:38 )   PT: 15.7 sec;   INR: 1.36 ratio           Urinalysis Basic - ( 2020 15:00 )    Color: Light Yellow / Appearance: Slightly Turbid / S.018 / pH: x  Gluc: x / Ketone: Negative  / Bili: Negative / Urobili: Negative   Blood: x / Protein: 30 mg/dL / Nitrite: Negative   Leuk Esterase: Large / RBC: 44 /hpf / WBC 17 /HPF   Sq Epi: x / Non Sq Epi: 4 / Bacteria: Negative        RADIOLOGY & ADDITIONAL TESTS:  Studies reviewed.    ASSESSMENT & PLAN: INTERVAL HPI/OVERNIGHT EVENTS:  Patient S&E at bedside. She is still spiking fevers up to 101 despite no clear source of infection. Today, patient reports feeling slightly better than yesterday. Reports mild cough and persistent sob that improves on NC.     VITAL SIGNS:  T(F): 99 (20 @ 05:20)  HR: 124 (20 @ 05:20)  BP: 113/64 (20 @ 05:20)  RR: 19 (20 @ 05:20)  SpO2: 96% (20 @ 05:20)  Wt(kg): --    PHYSICAL EXAM:  Constitutional: NAD, sitting in bed with NC in place  Eyes: EOMI, sclera non-icteric  Neck: supple  Respiratory: CTAB, no wheezes or crackles   Cardiovascular: RRR  Gastrointestinal: soft, non-distended   Extremities: no cyanosis, clubbing or edema   Neurological: awake and alert, following commands    MEDICATIONS  (STANDING):  enoxaparin Injectable 40 milliGRAM(s) SubCutaneous daily  famotidine    Tablet 20 milliGRAM(s) Oral daily  HYDROmorphone PCA (5 mG/mL) 30 milliLiter(s) PCA Continuous PCA Continuous  piperacillin/tazobactam IVPB.. 3.375 Gram(s) IV Intermittent every 8 hours  polyethylene glycol 3350 17 Gram(s) Oral daily  senna 2 Tablet(s) Oral at bedtime  sodium chloride 0.9% lock flush 3 milliLiter(s) IV Push every 8 hours  sodium chloride 0.9% lock flush 3 milliLiter(s) IV Push every 8 hours  sodium chloride 0.9%. 1000 milliLiter(s) (100 mL/Hr) IV Continuous <Continuous>    MEDICATIONS  (PRN):  acetaminophen   Tablet .. 650 milliGRAM(s) Oral every 6 hours PRN Temp greater or equal to 38C (100.4F)  HYDROmorphone PCA (5 mG/mL) Rescue Clinician Bolus 2 milliGRAM(s) IV Push every 1 hour PRN severe pain unresponsive to bolus dose  metoclopramide Injectable 10 milliGRAM(s) IV Push every 6 hours PRN nausea/vomiting  naloxone Injectable 0.1 milliGRAM(s) IV Push once PRN respiratory distress on pca pump  ondansetron Injectable 4 milliGRAM(s) IV Push every 6 hours PRN Nausea and/or Vomiting  simethicone 80 milliGRAM(s) Chew every 6 hours PRN Gas      Allergies    No Known Allergies    Intolerances        LABS:                        7.9    34.18 )-----------( 556      ( 2020 07:47 )             26.8         138  |  101  |  10  ----------------------------<  87  3.7   |  25  |  1.35<H>    Ca    11.6<H>      2020 07:46  Phos  2.1       Mg     2.1         TPro  6.5  /  Alb  2.4<L>  /  TBili  0.6  /  DBili  x   /  AST  23  /  ALT  <5<L>  /  AlkPhos  216<H>      PT/INR - ( 2020 07:38 )   PT: 15.7 sec;   INR: 1.36 ratio           Urinalysis Basic - ( 2020 15:00 )    Color: Light Yellow / Appearance: Slightly Turbid / S.018 / pH: x  Gluc: x / Ketone: Negative  / Bili: Negative / Urobili: Negative   Blood: x / Protein: 30 mg/dL / Nitrite: Negative   Leuk Esterase: Large / RBC: 44 /hpf / WBC 17 /HPF   Sq Epi: x / Non Sq Epi: 4 / Bacteria: Negative        RADIOLOGY & ADDITIONAL TESTS:  Studies reviewed.    ASSESSMENT & PLAN: INTERVAL HPI/OVERNIGHT EVENTS:  Patient S&E at bedside. She is still spiking fevers up to 101 despite no clear source of infection. Today, patient reports feeling slightly better than yesterday. Reports mild cough and persistent sob that improves on NC. Reports difficulty focusing at times, occasional visual hallucinations. Pain is controlled.     VITAL SIGNS:  T(F): 99 (20 @ 05:20)  HR: 124 (20 @ 05:20)  BP: 113/64 (20 @ 05:20)  RR: 19 (20 @ 05:20)  SpO2: 96% (20 @ 05:20)  Wt(kg): --    PHYSICAL EXAM:  Constitutional: NAD, sitting in bed with NC in place  Eyes: EOMI, sclera non-icteric  Neck: supple  Respiratory: CTAB, no wheezes or crackles   Cardiovascular: RRR  Gastrointestinal: soft, non-distended   Extremities: no cyanosis, clubbing or edema   Neurological: awake and alert, following commands    MEDICATIONS  (STANDING):  enoxaparin Injectable 40 milliGRAM(s) SubCutaneous daily  famotidine    Tablet 20 milliGRAM(s) Oral daily  HYDROmorphone PCA (5 mG/mL) 30 milliLiter(s) PCA Continuous PCA Continuous  piperacillin/tazobactam IVPB.. 3.375 Gram(s) IV Intermittent every 8 hours  polyethylene glycol 3350 17 Gram(s) Oral daily  senna 2 Tablet(s) Oral at bedtime  sodium chloride 0.9% lock flush 3 milliLiter(s) IV Push every 8 hours  sodium chloride 0.9% lock flush 3 milliLiter(s) IV Push every 8 hours  sodium chloride 0.9%. 1000 milliLiter(s) (100 mL/Hr) IV Continuous <Continuous>    MEDICATIONS  (PRN):  acetaminophen   Tablet .. 650 milliGRAM(s) Oral every 6 hours PRN Temp greater or equal to 38C (100.4F)  HYDROmorphone PCA (5 mG/mL) Rescue Clinician Bolus 2 milliGRAM(s) IV Push every 1 hour PRN severe pain unresponsive to bolus dose  metoclopramide Injectable 10 milliGRAM(s) IV Push every 6 hours PRN nausea/vomiting  naloxone Injectable 0.1 milliGRAM(s) IV Push once PRN respiratory distress on pca pump  ondansetron Injectable 4 milliGRAM(s) IV Push every 6 hours PRN Nausea and/or Vomiting  simethicone 80 milliGRAM(s) Chew every 6 hours PRN Gas      Allergies    No Known Allergies    Intolerances        LABS:                        7.9    34.18 )-----------( 556      ( 2020 07:47 )             26.8     -    138  |  101  |  10  ----------------------------<  87  3.7   |  25  |  1.35<H>    Ca    11.6<H>      2020 07:46  Phos  2.1       Mg     2.1         TPro  6.5  /  Alb  2.4<L>  /  TBili  0.6  /  DBili  x   /  AST  23  /  ALT  <5<L>  /  AlkPhos  216<H>      PT/INR - ( 2020 07:38 )   PT: 15.7 sec;   INR: 1.36 ratio           Urinalysis Basic - ( 2020 15:00 )    Color: Light Yellow / Appearance: Slightly Turbid / S.018 / pH: x  Gluc: x / Ketone: Negative  / Bili: Negative / Urobili: Negative   Blood: x / Protein: 30 mg/dL / Nitrite: Negative   Leuk Esterase: Large / RBC: 44 /hpf / WBC 17 /HPF   Sq Epi: x / Non Sq Epi: 4 / Bacteria: Negative        RADIOLOGY & ADDITIONAL TESTS:  Studies reviewed.    ASSESSMENT & PLAN:

## 2020-04-05 NOTE — CONSULT NOTE ADULT - ATTENDING COMMENTS
/Attendin yo G0 virginal female, LMP 2 presents with worsening abdominal pain, and n/v, with known bilateral adnexal masses.    Pt was found to have + (3/7)->168.6 (3/20) and bilateral heterogenous adnexal masses with interval growth since 3/7.   On sono, there are internal vascularities seen in both adnexa; Right adnexal mass previously 15.2 x 9.8 x 14.8cm, and has increased in size to 17.7 x 12.3 x 13.6cm. Left adnexal mass previously 4.6 x 3.3 x 4.1cm, now measuring 6.1 x 6.5cm; Moderate complex free fluid.    Pt was febrile in the ED with WBC of 26.98, and was given Tylenol and became afebrile. Morphine given for abd pain.  On exam, pt did not have an acute abdomen with minimal tenderness on palpation.    CT scan ordered and now with metastatic disease noted with omental caking.      A/P  -P0 virginal female with bilateral large adnexal masses with appearance of metastatic disease  -Gyn-Onc called and pt to be admitted to GYN service for further w/u and management by Gyn-Onc    Thank you for the consult.  Please call with any further questions.    Dr. Madrigal
GI consulted for anemia.   Patient with recently diagnosed pelvic masses suspicious for metastatic GYN malignancy.   May have anemia 2/2 menstruation. Cannot rule out GI source at this time, but no overt bleeding.   Awaiting biopsy by IR for diagnosis.   Pending clinical course and Hgb trend, can determine need/timing for endoscopic evaluation.
I have seen this 24 year old female who presents with the history of a single ovarian mass 3 weeks ago and who is now noted to have bilateral ovarian masses with elevation of beta HCG and . Suspicion of germ cell tumor is high given her age and the presenting symptoms of nausea and vomiting. There is no family history of endocrine abnormality; she does not have a history of hirsutism or hypertension and the appearance of the masses is suggestive of neoplastic disease rather than endocrinologic disease.  She will have IR directed biopsy on 03/23/2020 to establish the histology of the ovarian disease.  If germ cell tumor is found , some patients may be cured with combination chemotherapy
24 f with b/l adnexal masses and pelvic LAD, metastatic carcinoma of unknown primary presented 3/20 with abd pain, s/p CT guided biopsy 3/25, PICC line 4/3 to start chemo  pt intermittently febrile and leukocytosis, now febrile to 101.7 and tachy, WBC: 34  QUINN for the last 2 days now Cr: 1.3  blood and urine cx negative  COVID negative x 3 last one 4/4  RVP 4/1 with parainfluenza  now pt with diarrhea    persistent fever and leukocytosis, with tachycardia sepsis  diarrhea, r/o C-diff  parainfluenza URI  metastatic ca with ?tumor fever    * check C-diff  * monitor the WBC and daniela  * DC vanco in view of QUINN and negative blood cx  * c/w zosyn for now but if C-diff positive then will DC    The above assessment and plan was discussed with the primary team    Gina Arenas MD  Pager [phone]  After 5pm and on weekends call [phone]

## 2020-04-05 NOTE — CHART NOTE - NSCHARTNOTEFT_GEN_A_CORE
Discussed case with Dr. Miles  Pt with waxing and waning MS, still wants to have control over pressing when needing pain medication as it's incidental pain especially when moving.  Will adjust PCA settings - Dilaudid PCA demands 1mg, lockout 20min, CB 2mg q1hr PRN.  Please page for uncontrolled symptoms [phone].

## 2020-04-05 NOTE — CONSULT NOTE ADULT - ASSESSMENT
24 y/p F with known adnexal masses (15.2* 9.8 *14.8 in R , 4.6 *3.3 *4.1 in L)  presents with abd pain x2 months with worsening over the past 2 days and associated n/v and abdominal pain and chills for 1 week. Fever to 101.1, leukocytosis to 16, uptrended to 29, persistent fever, parainfluenza positive, hypoxic, CTA negative for PE, CT C/A/P with contrast is negative for infectious process- shows b/l adnexal masses and widespread metastasis including liver.   COVID negative 24 y/p F with known adnexal masses (15.2* 9.8 *14.8 in R , 4.6 *3.3 *4.1 in L)  presents with abd pain x2 months with worsening over the past 2 days and associated n/v and abdominal pain and chills for 1 week. Fever to 101.1, leukocytosis to 16, up trended to 29, persistent fever, parainfluenza positive, hypoxic, CTA negative for PE, CT C/A/P with contrast is negative for infectious process- shows b/l adnexal masses and widespread metastasis including liver.   COVID negative    Fever- tumor fever vs infectious   parainfluenza   leukocytosis   diarrhea   intra abdominal malignancy with metastasis     Suggest:    1) Sepsis   * d/c vancomycin   * c/w zosyn    2) diarrhea    * check C.diff   * if positive start vanco po 125 q6 and d/c zosyn   * if negative, c/w zosyn     3) fever  sepsis/parainfluenza/tumor fever  c.w zosyn  check C diff     4) leukocytosis   multifactorial from tumor/infection     5) parainfluenza    c/w precautions    supportive care     6) Intra-abdominal malignancy with mets   Hold chemo until active infection is ruled out         Above was discussed with team

## 2020-04-05 NOTE — PROGRESS NOTE ADULT - ASSESSMENT
24F w/ no PMH presenting with abdominal pain, found to have bilateral pelvic masses and extensive pelvic lymphadenopathy concerning for metastatic malignancy. 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 Discussed with GYN/ONC; medical oncology will follow patient as primary team starting on 4/1/20. Continues to spike fevers.    #Fever, tachycardia  -suspected 2/2 tumor fever; NSAIDs stopped 4/1/20 in anticipation of port placement, which could have been previously suppressing higher fever that is now unmasked. However, since she meet sepsis criteria, started on abx (vancomycin, zosyn) on 4/1/20  - vanc levels 12 today   -cultures negative, 4/1/20 are NGTD.  Febrile again so repeat cultures sent 4/3/20  -COVID-19 negative x 2 but parainfluenza positive  -Chest CT as above, negative for central PE, but unable to evaluate segmental and subsegmental branches due to motion artifact. Lung imaging shows unchanged metastatic nodules, small bilateral pleural effusions and atelectasis of the basilar LLL (unchanged as well), no new opacity, decreasing the likelihood of PNA  - CT A/P repeated on 4/3/20: no obvious source of infection   -urine cultures growing multiple species, likely contaminated  -s/p PICC line placement for chemotherapy; no plans for chemotherapy over the weekend, will reassess on Monday 4/6/20    #Metastatic carcinoma, cancer of unknown primary  - CT A/P with bilateral heterogeneous adnexal masses, as well as upper abdominal, RP, possible bone lesions and pelvic lymphadenopathy; CT Chest with multiple solid pulmonary nodules  - Cancer of unknown primary (poorly differentiated carcinoma) but suspect likely GI origin given CDX2 positivity on pathology; chromogranin positivity also suggests neuroendocrine features; Ki-67 index 40%.   - tumor markers elevated: Cancer Antigen, Ca 27.29      Cancer Antigen, 125: 619      Carcinoembryonic Antigen: 44.7 beta  on initial evaluation; checked again 4/1/20  - Discussed diagnosis with patient on 3/31/20. Given advanced, metastatic stage, will need to be on indefinite treatment to limit progression of disease.  - Plan to start mFOLFOX6 inpatient. s/p PICC line placement 4/3/20; chemo on hold for given fever work up. Consent for chemotherapy obtained, placed in chart.  - No plan for GI endoscopy at this time  - Leukocytosis and fevers likely 2/2 tumor vs infection. Flow cytometry negative for clonal process (CLL / CML)  - Diet and VTE ppx restarted, port placement on hold given fever work up  - Palliative following for pain control; improved with PCA pump. Continue to hold NSAIDs    #Hypercalcemia  -likely 2/2 malignancy  -holding on giving steroids or bisphosphonates for now, will continue to monitor  -ionized calcium 1.6 this AM    #Microcytic anemia  -reviewed peripheral smear, shows polychromasia despite reticulocyte count inappropriately low  -LDH elevated; no signs of schistocytes on peripheral blood smear  -Suspect component of iron deficiency given low FeSat; occult blood positive, and also endorses ongoing vaginal bleeding; ferritin elevated in setting inflammatory process, likely paraneoplastic  -Hgb electrophoresis pending to r/o concurrent thalassemia  -B12, folate normal    Coral Olazagasti  Hematology-Oncology PGY-5  [phone] 24F w/ no PMH presenting with abdominal pain, found to have bilateral pelvic masses and extensive pelvic lymphadenopathy concerning for metastatic malignancy. 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 Discussed with GYN/ONC; medical oncology will follow patient as primary team starting on 4/1/20. Continues to spike fevers.    #Fever, tachycardia  -suspected 2/2 tumor fever; NSAIDs stopped 4/1/20 in anticipation of port placement, which could have been previously suppressing higher fever that is now unmasked. However, since she meet sepsis criteria, started on abx (vancomycin, zosyn) on 4/1/20  - vanc levels 12 today   -cultures negative, 4/1/20 are NGTD.  Febrile again so repeat cultures sent 4/3/20  -COVID-19 negative x 2 but parainfluenza positive  -Chest CT as above, negative for central PE, but unable to evaluate segmental and subsegmental branches due to motion artifact. Lung imaging shows unchanged metastatic nodules, small bilateral pleural effusions and atelectasis of the basilar LLL (unchanged as well), no new opacity, decreasing the likelihood of PNA  - CT A/P repeated on 4/3/20: no obvious source of infection   -urine cultures growing multiple species, likely contaminated  -s/p PICC line placement for chemotherapy; no plans for chemotherapy over the weekend, will reassess on Monday 4/6/20  - ID consulted today     #Metastatic carcinoma, cancer of unknown primary  - CT A/P with bilateral heterogeneous adnexal masses, as well as upper abdominal, RP, possible bone lesions and pelvic lymphadenopathy; CT Chest with multiple solid pulmonary nodules  - Cancer of unknown primary (poorly differentiated carcinoma) but suspect likely GI origin given CDX2 positivity on pathology; chromogranin positivity also suggests neuroendocrine features; Ki-67 index 40%.   - tumor markers elevated: Cancer Antigen, Ca 27.29      Cancer Antigen, 125: 619      Carcinoembryonic Antigen: 44.7 beta  on initial evaluation; checked again 4/1/20  - Discussed diagnosis with patient on 3/31/20. Given advanced, metastatic stage, will need to be on indefinite treatment to limit progression of disease.  - Plan to start mFOLFOX6 inpatient. s/p PICC line placement 4/3/20; chemo on hold for given fever work up. Consent for chemotherapy obtained, placed in chart.  - No plan for GI endoscopy at this time  - Leukocytosis and fevers likely 2/2 tumor vs infection. Flow cytometry negative for clonal process (CLL / CML)  - Diet and VTE ppx restarted, port placement on hold given fever work up  - Palliative following for pain control; improved with PCA pump. Continue to hold NSAIDs    #Hypercalcemia  -likely 2/2 malignancy  -holding on giving steroids or bisphosphonates for now, will continue to monitor  -ionized calcium 1.6 this AM    #Microcytic anemia  -reviewed peripheral smear, shows polychromasia despite reticulocyte count inappropriately low  -LDH elevated; no signs of schistocytes on peripheral blood smear  -Suspect component of iron deficiency given low FeSat; occult blood positive, and also endorses ongoing vaginal bleeding; ferritin elevated in setting inflammatory process, likely paraneoplastic  -Hgb electrophoresis pending to r/o concurrent thalassemia  -B12, folate normal    Coral Olazagasti  Hematology-Oncology PGY-5  [phone] 24F w/ no PMH presenting with abdominal pain, found to have bilateral pelvic masses and extensive pelvic lymphadenopathy concerning for metastatic malignancy. 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 Pt with ongoing fevers, now hypoxia, overall clinical decline.       #Fever, tachycardia  -suspected 2/2 tumor fever; NSAIDs stopped 4/1/20 in anticipation of port placement, which could have been previously suppressing higher fever that is now unmasked. However, since she meet sepsis criteria, started on abx (vancomycin, zosyn) on 4/1/20  - vanc levels 12 today   -cultures negative, 4/1/20 are NGTD.  Febrile again so repeat cultures sent 4/3/20  -COVID-19 negative x 3 but parainfluenza positive  -Chest CT as above, negative for central PE, but unable to evaluate segmental and subsegmental branches due to motion artifact. Lung imaging shows unchanged metastatic nodules, small bilateral pleural effusions and atelectasis of the basilar LLL (unchanged as well), no new opacity, decreasing the likelihood of PNA  - CT A/P repeated on 4/3/20: no obvious source of infection   -urine cultures growing multiple species, likely contaminated  -s/p PICC line placement for chemotherapy; no plans for chemotherapy over the weekend, will reassess on Monday 4/6/20  - ID consulted today     #Metastatic carcinoma, cancer of unknown primary  - CT A/P with bilateral heterogeneous adnexal masses, as well as upper abdominal, RP, possible bone lesions and pelvic lymphadenopathy; CT Chest with multiple solid pulmonary nodules  - Cancer of unknown primary (poorly differentiated carcinoma) but suspect likely GI origin given CDX2 positivity on pathology; chromogranin positivity also suggests neuroendocrine features; Ki-67 index 40%.   - tumor markers elevated: Cancer Antigen, Ca 27.29      Cancer Antigen, 125: 619      Carcinoembryonic Antigen: 44.7 beta  on initial evaluation; checked again 4/1/20  - Discussed diagnosis with patient on 3/31/20. Given advanced, metastatic stage, will need to be on indefinite treatment to limit progression of disease.  - Plan was to start mFOLFOX6 inpatient. s/p PICC line placement 4/3/20; However chemo on hold for given fever work up and overall clinical decline. Consent for chemotherapy obtained, placed in chart. Will re-evaluate this coming week if she can receive first dose. if continues to decline, will need to have GOC discussion. Attempted to do this today, but pt could not focus on conversation.   - No plan for GI endoscopy at this time  - Leukocytosis and fevers likely 2/2 tumor vs infection. Flow cytometry negative for clonal process (CLL / CML)  - Diet and VTE ppx restarted, port placement on hold given fever work up  - Palliative following for pain control; improved with PCA pump. Continue to hold NSAIDs    #Hypercalcemia  -likely 2/2 malignancy  - will dose Pamidronate 60 mg IV today. Follow calcium levels daily  - may be contributing to MS changes as well     #Microcytic anemia  - reviewed peripheral smear, shows polychromasia despite reticulocyte count inappropriately low  - possible sickle cell trait so baseline Hb may be closer to 10-11  - possible BM involvement with carcinoma. Would defer BM evaluation       Coral Chas  Hematology-Oncology PGY-5  [phone]

## 2020-04-06 NOTE — PROGRESS NOTE ADULT - SUBJECTIVE AND OBJECTIVE BOX
INTERVAL History:    Allergies    No Known Allergies    Intolerances        MEDICATIONS  (STANDING):  enoxaparin Injectable 40 milliGRAM(s) SubCutaneous daily  famotidine    Tablet 20 milliGRAM(s) Oral daily  HYDROmorphone PCA (5 mG/mL) 30 milliLiter(s) PCA Continuous PCA Continuous  piperacillin/tazobactam IVPB.. 3.375 Gram(s) IV Intermittent every 8 hours  polyethylene glycol 3350 17 Gram(s) Oral daily  senna 2 Tablet(s) Oral at bedtime  sodium chloride 0.9% lock flush 3 milliLiter(s) IV Push every 8 hours  sodium chloride 0.9% lock flush 3 milliLiter(s) IV Push every 8 hours    MEDICATIONS  (PRN):  acetaminophen   Tablet .. 650 milliGRAM(s) Oral every 6 hours PRN Temp greater or equal to 38C (100.4F)  HYDROmorphone PCA (5 mG/mL) Rescue Clinician Bolus 2 milliGRAM(s) IV Push every 1 hour PRN severe pain unresponsive to bolus dose  metoclopramide Injectable 10 milliGRAM(s) IV Push every 6 hours PRN nausea/vomiting  naloxone Injectable 0.1 milliGRAM(s) IV Push once PRN respiratory distress on pca pump  ondansetron Injectable 4 milliGRAM(s) IV Push every 6 hours PRN Nausea and/or Vomiting  simethicone 80 milliGRAM(s) Chew every 6 hours PRN Gas      Vital Signs Last 24 Hrs  T(C): 37.9 (2020 11:30), Max: 38.4 (2020 18:11)  T(F): 100.2 (2020 11:30), Max: 101.1 (2020 18:11)  HR: 132 (2020 11:30) (118 - 132)  BP: 117/74 (2020 11:30) (97/57 - 133/88)  BP(mean): --  RR: 18 (2020 11:30) (17 - 20)  SpO2: 97% (2020 11:30) (95% - 98%)    PHYSICAL EXAM:    General: AOX3, no acute distress  EYES: EOMI, PERRLA, conjunctiva and sclera clear  NECK: Supple, No JVD, Normal thyroid  CHEST/LUNG: Clear to auscultation bilaterally; No rales, rhonchi, or wheezing  HEART: Regular rate and rhythm; no murmurs  ABDOMEN: Soft, Nontender. BS+  LYMPH: No lymphadenopathy noted  Extremities: No peripheral edema      LABS:                        7.8    35.40 )-----------( 575      ( 2020 07:03 )             26.6     04-06    137  |  97  |  8   ----------------------------<  88  3.6   |  27  |  1.18    Ca    11.6<H>      2020 07:02  Phos  1.5     04-06  Mg     2.1     04-06    TPro  6.8  /  Alb  2.4<L>  /  TBili  0.6  /  DBili  x   /  AST  23  /  ALT  5<L>  /  AlkPhos  205<H>  04-06    PT/INR - ( 2020 07:02 )   PT: 14.6 sec;   INR: 1.27 ratio           Urinalysis Basic - ( 2020 09:26 )    Color: Yellow / Appearance: Slightly Turbid / S.019 / pH: x  Gluc: x / Ketone: Negative  / Bili: Negative / Urobili: <2 mg/dL   Blood: x / Protein: 30 mg/dL / Nitrite: Negative   Leuk Esterase: Moderate / RBC: 326 /HPF / WBC 13 /HPF   Sq Epi: x / Non Sq Epi: 1 /HPF / Bacteria: Negative          RADIOLOGY & ADDITIONAL STUDIES:    PATHOLOGY: INTERVAL History: No acute events overnight. This morning, pt was lethargic with minimal insight as to why she is the hospital. She is able to state her name and birthdate but was unable remember details of her cancer status and any prior discussions regarding chemotherapy. Per team last week, patient had been AOx3 during discussions of her diagnosis and possible chemo treatment. No acute complaints of pain at this time.     Allergies    No Known Allergies    Intolerances        MEDICATIONS  (STANDING):  enoxaparin Injectable 40 milliGRAM(s) SubCutaneous daily  famotidine    Tablet 20 milliGRAM(s) Oral daily  HYDROmorphone PCA (5 mG/mL) 30 milliLiter(s) PCA Continuous PCA Continuous  piperacillin/tazobactam IVPB.. 3.375 Gram(s) IV Intermittent every 8 hours  polyethylene glycol 3350 17 Gram(s) Oral daily  senna 2 Tablet(s) Oral at bedtime  sodium chloride 0.9% lock flush 3 milliLiter(s) IV Push every 8 hours  sodium chloride 0.9% lock flush 3 milliLiter(s) IV Push every 8 hours    MEDICATIONS  (PRN):  acetaminophen   Tablet .. 650 milliGRAM(s) Oral every 6 hours PRN Temp greater or equal to 38C (100.4F)  HYDROmorphone PCA (5 mG/mL) Rescue Clinician Bolus 2 milliGRAM(s) IV Push every 1 hour PRN severe pain unresponsive to bolus dose  metoclopramide Injectable 10 milliGRAM(s) IV Push every 6 hours PRN nausea/vomiting  naloxone Injectable 0.1 milliGRAM(s) IV Push once PRN respiratory distress on pca pump  ondansetron Injectable 4 milliGRAM(s) IV Push every 6 hours PRN Nausea and/or Vomiting  simethicone 80 milliGRAM(s) Chew every 6 hours PRN Gas      Vital Signs Last 24 Hrs  T(C): 37.9 (2020 11:30), Max: 38.4 (2020 18:11)  T(F): 100.2 (2020 11:30), Max: 101.1 (2020 18:11)  HR: 132 (2020 11:30) (118 - 132)  BP: 117/74 (2020 11:30) (97/57 - 133/88)  BP(mean): --  RR: 18 (2020 11:30) (17 - 20)  SpO2: 97% (2020 11:30) (95% - 98%)    PHYSICAL EXAM:    General: AOX2 to name and birthdate   Overall appears lethargic   Not physically examined in attempt to reduce COVID exposure       LABS:                        7.8    35.40 )-----------( 575      ( 2020 07:03 )             26.6     04-06    137  |  97  |  8   ----------------------------<  88  3.6   |  27  |  1.18    Ca    11.6<H>      2020 07:02  Phos  1.5     04-06  Mg     2.1     04-06    TPro  6.8  /  Alb  2.4<L>  /  TBili  0.6  /  DBili  x   /  AST  23  /  ALT  5<L>  /  AlkPhos  205<H>  04-06    PT/INR - ( 2020 07:02 )   PT: 14.6 sec;   INR: 1.27 ratio           Urinalysis Basic - ( 2020 09:26 )    Color: Yellow / Appearance: Slightly Turbid / S.019 / pH: x  Gluc: x / Ketone: Negative  / Bili: Negative / Urobili: <2 mg/dL   Blood: x / Protein: 30 mg/dL / Nitrite: Negative   Leuk Esterase: Moderate / RBC: 326 /HPF / WBC 13 /HPF   Sq Epi: x / Non Sq Epi: 1 /HPF / Bacteria: Negative          RADIOLOGY & ADDITIONAL STUDIES:    PATHOLOGY:

## 2020-04-06 NOTE — PROGRESS NOTE ADULT - ASSESSMENT
24F w/ no PMH presenting with abdominal pain, found to have bilateral pelvic masses and extensive pelvic lymphadenopathy concerning for metastatic malignancy. 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 Pt with ongoing fevers, now hypoxia, overall clinical decline.     #Fever, tachycardia  -suspected 2/2 tumor fever; NSAIDs stopped 4/1/20 in anticipation of port placement, which could have been previously suppressing higher fever that is now unmasked. However, since she meet sepsis criteria, started on abx (vancomycin, zosyn) on 4/1/20  -cultures negative, 4/1/20 however pt has continued to spike fevers- repeat blood cultures from 4/5 pending   -COVID-19 negative x 3 but parainfluenza positive, however this does not explain pt's encephalopathy and continued fevers   -Chest CT as above, negative for central PE, but unable to evaluate segmental and subsegmental branches due to motion artifact. Lung imaging shows unchanged metastatic nodules, small bilateral pleural effusions and atelectasis of the basilar LLL (unchanged as well), no new opacity, decreasing the likelihood of PNA  - CT A/P repeated on 4/3/20: no obvious source of infection   -urine cultures growing multiple species, likely contaminated  - ID consulted now on board, r/o CDIFF   -s/p PICC line placement for chemotherapy; currently there are no plans for chemotherapy until pt's mental status improves (possibly 2/2 pain meds), extensive conversation was had by attending with patient's brother Thom regarding pt's diagnosis, and reassessing pt over the next couple of days to decide if she will be able to tolerate chemotherapy.   - If mental status continues to deteriorate, please consider brain imaging, though uncommon for GI cancers to metastasize to brain    #Metastatic carcinoma, cancer of unknown primary  - CT A/P with bilateral heterogeneous adnexal masses, as well as upper abdominal, RP, possible bone lesions and pelvic lymphadenopathy; CT Chest with multiple solid pulmonary nodules  - Cancer of unknown primary (poorly differentiated carcinoma) but suspect likely GI origin given CDX2 positivity on pathology; chromogranin positivity also suggests neuroendocrine features; Ki-67 index 40%.   - tumor markers elevated: Cancer Antigen, Ca 27.29      Cancer Antigen, 125: 619      Carcinoembryonic Antigen: 44.7 beta  on initial evaluation; checked again 4/1/20  - Discussed diagnosis with patient on 3/31/20. Given advanced, metastatic stage, will need to be on indefinite treatment to limit progression of disease.  - Plan was to start [id number] inpatient. s/p PICC line placement 4/3/20; However chemo on hold given overall clinical decline. Consent for chemotherapy obtained, placed in chart. Will continue to re-evaluate this week if she can receive first dose. if continues to decline, will need to have GOC discussion. Attempted to do this today, but pt could not focus on conversation.   - No plan for GI endoscopy at this time  - Leukocytosis and fevers likely 2/2 tumor vs infection. Flow cytometry negative for clonal process (CLL / CML)  - Diet and VTE ppx restarted, port placement on hold given fever work up  - Palliative following for pain control; improved with PCA pump. Continue to hold NSAIDs    #Hypercalcemia  -likely 2/2 malignancy  - gave 1 dose Pamidronate 60 mg IV yesterday. Continue to trend daily.   - may be contributing to MS changes as well     #Microcytic anemia  - reviewed peripheral smear, shows polychromasia despite reticulocyte count inappropriately low  - possible sickle cell trait so baseline Hb may be closer to 10-11  - possible BM involvement with carcinoma. Would defer BM evaluation     Cesia Banks MD  Hematology Oncology Fellow, PGY-4  [facility] Pager: [phone]/ [facility] Pager: [phone] 24F w/ no PMH presenting with abdominal pain, found to have bilateral pelvic masses and extensive pelvic lymphadenopathy concerning for metastatic malignancy. 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 Pt with ongoing fevers, now hypoxia, overall clinical decline.     #Fever, tachycardia  -suspected 2/2 tumor fever; NSAIDs stopped 4/1/20 in anticipation of port placement, which could have been previously suppressing higher fever that is now unmasked. However, since she meet sepsis criteria, started on abx (vancomycin, zosyn) on 4/1/20  -cultures negative, 4/1/20 however pt has continued to spike fevers- repeat blood cultures from 4/5 pending   -COVID-19 negative x 3 but parainfluenza positive, however this does not explain pt's encephalopathy and continued fevers   -Chest CT as above, negative for central PE, but unable to evaluate segmental and subsegmental branches due to motion artifact. Lung imaging shows unchanged metastatic nodules, small bilateral pleural effusions and atelectasis of the basilar LLL (unchanged as well), no new opacity, decreasing the likelihood of PNA  - CT A/P repeated on 4/3/20: no obvious source of infection   -urine cultures growing multiple species, likely contaminated  - ID consulted now on board, r/o CDIFF   -s/p PICC line placement for chemotherapy; currently there are no plans for chemotherapy until pt's mental status improves (possibly 2/2 pain meds), extensive conversation was had by attending with patient's brother Thom regarding pt's diagnosis, and reassessing pt over the next couple of days to decide if she will be able to tolerate chemotherapy.   - Check AM ammonia level  - If mental status continues to deteriorate, please consider brain imaging, though uncommon for GI cancers to metastasize to brain    #Metastatic carcinoma, cancer of unknown primary  - CT A/P with bilateral heterogeneous adnexal masses, as well as upper abdominal, RP, possible bone lesions and pelvic lymphadenopathy; CT Chest with multiple solid pulmonary nodules  - Cancer of unknown primary (poorly differentiated carcinoma) but suspect likely GI origin given CDX2 positivity on pathology; chromogranin positivity also suggests neuroendocrine features; Ki-67 index 40%.   - tumor markers elevated: Cancer Antigen, Ca 27.29      Cancer Antigen, 125: 619      Carcinoembryonic Antigen: 44.7 beta  on initial evaluation; checked again 4/1/20  - Discussed diagnosis with patient on 3/31/20. Given advanced, metastatic stage, will need to be on indefinite treatment to limit progression of disease.  - Plan was to start [email or web address] inpatient. s/p PICC line placement 4/3/20; However chemo on hold given overall clinical decline. Consent for chemotherapy obtained, placed in chart. Will continue to re-evaluate this week if she can receive first dose. if continues to decline, will need to have GOC discussion. Attempted to do this today, but pt could not focus on conversation.   - No plan for GI endoscopy at this time  - Leukocytosis and fevers likely 2/2 tumor vs infection. Flow cytometry negative for clonal process (CLL / CML)  - Diet and VTE ppx restarted, port placement on hold given fever work up  - Palliative following for pain control; improved with PCA pump. Continue to hold NSAIDs    #Hypercalcemia  -likely 2/2 malignancy  - gave 1 dose Pamidronate 60 mg IV yesterday. Continue to trend daily.   - may be contributing to MS changes as well     #Microcytic anemia  - reviewed peripheral smear, shows polychromasia despite reticulocyte count inappropriately low  - possible sickle cell trait so baseline Hb may be closer to 10-11  - possible BM involvement with carcinoma. Would defer BM evaluation     Cesia Banks MD  Hematology Oncology Fellow, PGY-4  [facility] Pager: [phone]/ [facility] Pager: [phone]

## 2020-04-06 NOTE — PROGRESS NOTE ADULT - PROBLEM SELECTOR PLAN 1
Pt reports drowsiness  Consistent with reports by primary team  She notices changes including lethargy and speech, as do people who call her  No RR decrease  Discontinue PCA  Begin IVP dilaudid 1.5mg q3H PRN

## 2020-04-06 NOTE — PROGRESS NOTE ADULT - ASSESSMENT
24 f with b/l adnexal masses and pelvic LAD, metastatic carcinoma of unknown primary presented 3/20 with abd pain, s/p CT guided biopsy 3/25, PICC line 4/3 to start chemo  pt intermittently febrile and leukocytosis, now febrile to 101.7 and tachy, WBC: 34  QUINN for the last 2 days now Cr: 1.3  blood and urine cx negative  COVID negative x 3 last one 4/4  RVP 4/1 with parainfluenza  now pt with diarrhea but no BM for 3 days    persistent fever and leukocytosis, with tachycardia sepsis  diarrhea, r/o C-diff, now no BM for 3 days, ?ileus but last CT no colitis  parainfluenza URI  metastatic ca with ?tumor fever    * check C-diff  * monitor the WBC and temp  * c/w zosyn for now but if C-diff positive then will DC    The above assessment and plan was discussed with hem/onc team    Gina Arenas MD  Pager [phone]  After 5pm and on weekends call [phone] .

## 2020-04-06 NOTE — PROGRESS NOTE ADULT - SUBJECTIVE AND OBJECTIVE BOX
Follow Up:  fever, leukocytosis    Interval History: pt still febrile, WBC 35    ROS:      All other systems negative    Constitutional: + fever,  chills  Head: no trauma  Eyes: no vision changes, no eye pain  ENT:  no sore throat, no rhinorrhea  Cardiovascular:  no chest pain, no palpitation  Respiratory:  no SOB, no cough  GI:  + abd pain, no vomiting, pt complained of diarrhea but no BM as per the nurse for 3 days  urinary: no dysuria, no hematuria, no flank pain  musculoskeletal:  no joint pain, no joint swelling  skin:  no rash  neurology:  no headache, no seizure, no change in mental status  psych: no anxiety, no depression         Allergies  No Known Allergies        ANTIMICROBIALS:  piperacillin/tazobactam IVPB.. 3.375 every 8 hours      OTHER MEDS:  acetaminophen   Tablet .. 650 milliGRAM(s) Oral every 6 hours PRN  enoxaparin Injectable 40 milliGRAM(s) SubCutaneous daily  famotidine    Tablet 20 milliGRAM(s) Oral daily  HYDROmorphone PCA (5 mG/mL) 30 milliLiter(s) PCA Continuous PCA Continuous  HYDROmorphone PCA (5 mG/mL) Rescue Clinician Bolus 2 milliGRAM(s) IV Push every 1 hour PRN  metoclopramide Injectable 10 milliGRAM(s) IV Push every 6 hours PRN  naloxone Injectable 0.1 milliGRAM(s) IV Push once PRN  ondansetron Injectable 4 milliGRAM(s) IV Push every 6 hours PRN  polyethylene glycol 3350 17 Gram(s) Oral daily  senna 2 Tablet(s) Oral at bedtime  simethicone 80 milliGRAM(s) Chew every 6 hours PRN  sodium chloride 0.9% lock flush 3 milliLiter(s) IV Push every 8 hours  sodium chloride 0.9% lock flush 3 milliLiter(s) IV Push every 8 hours      Vital Signs Last 24 Hrs  T(C): 36.7 (2020 04:40), Max: 38.7 (2020 12:46)  T(F): 98.1 (2020 04:40), Max: 101.7 (2020 12:46)  HR: 118 (2020 04:40) (118 - 126)  BP: 133/88 (2020 04:40) (97/57 - 133/88)  BP(mean): --  RR: 17 (2020 04:40) (17 - 20)  SpO2: 96% (2020 04:40) (95% - 98%)    Physical Exam:  General:    NAD,  non toxic  Head: atraumatic, normocephalic  Eye: normal sclera and conjunctiva  ENT:    no oropharyngeal lesions,   no LAD,   neck supple  Cardio:    tachy regular S1, S2  Respiratory:    clear b/l,    no wheezing  abd:    palpable mass, soft,   BS +,  slight diffuse tenderness  :   no CVAT,  no suprapubic tenderness,   no  cash  Musculoskeletal:   no joint swelling,   no edema  vascular: RUE picc  Skin:    no rash  Neurologic:     no focal deficit  psych: normal affect                          7.8    35.40 )-----------( 575      ( 2020 07:03 )             26.6       04-    137  |  97  |  8   ----------------------------<  88  3.6   |  27  |  1.18    Ca    11.6<H>      2020 07:02  Phos  1.5     -  Mg     2.1         TPro  6.8  /  Alb  2.4<L>  /  TBili  0.6  /  DBili  x   /  AST  23  /  ALT  5<L>  /  AlkPhos  205<H>        Urinalysis Basic - ( 2020 09:26 )    Color: Yellow / Appearance: Slightly Turbid / S.019 / pH: x  Gluc: x / Ketone: Negative  / Bili: Negative / Urobili: <2 mg/dL   Blood: x / Protein: 30 mg/dL / Nitrite: Negative   Leuk Esterase: Moderate / RBC: 326 /HPF / WBC 13 /HPF   Sq Epi: x / Non Sq Epi: 1 /HPF / Bacteria: Negative        MICROBIOLOGY:  v  .Urine Clean Catch (Midstream)  20   No growth  --  --      .Blood Blood-Peripheral  20   No growth to date.  --  --      .Blood Blood-Catheter  20   No growth to date.  --  --      .Blood Blood-Peripheral  20   No Growth Final  --  --      .Urine Clean Catch (Midstream)  20   >=3 organisms. Probable collection contamination.  --  --      .Blood Blood-Peripheral  20   No Growth Final  --  --      .Blood Blood-Venous  20   No growth at 5 days.  --  --      .Blood Blood-Peripheral  20   No growth at 5 days.  --  --      .Urine Clean Catch (Midstream)  20   <10,000 CFU/mL Normal Urogenital Hui  --  --          Rapid RVP Result: Detected ( @ 10:16)        RADIOLOGY:  Images below independently visualized and reviewed personally, findings as below  < from: CT Abdomen and Pelvis w/ IV Cont (20 @ 16:46) >  IMPRESSION:     Widely metastatic neoplasm without significant change from prior abdominal CT 3/20/2020.    No evidence of an infectious source in the abdomen or pelvis.

## 2020-04-06 NOTE — PROGRESS NOTE ADULT - SUBJECTIVE AND OBJECTIVE BOX
HPI: 24F here with worsening abdominal pain in the setting of known adnexal massess, found to have for metastatic carcinoma with final pathology pending. Given evidence of metastatic disease, patient is not a surgical candidate. Has been having worsening adnexal pain, markedly last night after taking senna. States pain is 7-8/10 at its worst, located in adnexal area with radiation down to legs, worse with movement, and tolerable with opioids around 2/10. Palliative care called to help with pain.     INTERVAL EVENTS:  3/30: patient states IV dilaudid works, but PO and naproxen aren't enough to keep her pain at a tolerable level, d/w patient  3/31: states having vomiting and going to the bathroom are exacerbating her pain, and the PO appears to last 2 hours. States the IV meds take the pain away but not as much as before.   4/1: used dilaudid 0.8mg IV x 3, and 4mg PO x 3, with additional 1mg x 2 doses  4/2: on PCA< used 6 injections and attempts in 12 hours  4/3: see usage below, feels it is helping with her pain, is having diarrhea  4/6    ADVANCE DIRECTIVES:    DNR  MOLST  [ ]  Living Will  [ ]   DECISION MAKER(s):  [ ] Health Care Proxy(s)  [ ] Surrogate(s)  [ ] Guardian           Name(s): Phone Number(s):    BASELINE (I)ADL(s) (prior to admission):  [place]: [ ]Total  [ ] Moderate [ ]Dependent    Allergies    No Known Allergies    Intolerances    MEDICATIONS  (STANDING):  famotidine    Tablet 20 milliGRAM(s) Oral daily  HYDROmorphone PCA (5 mG/mL) 30 milliLiter(s) PCA Continuous PCA Continuous  piperacillin/tazobactam IVPB.. 3.375 Gram(s) IV Intermittent every 8 hours  polyethylene glycol 3350 17 Gram(s) Oral daily  senna 2 Tablet(s) Oral at bedtime  sodium chloride 0.9% lock flush 3 milliLiter(s) IV Push every 8 hours  sodium chloride 0.9% lock flush 3 milliLiter(s) IV Push every 8 hours  vancomycin  IVPB      vancomycin  IVPB 1500 milliGRAM(s) IV Intermittent every 12 hours    MEDICATIONS  (PRN):  acetaminophen   Tablet .. 650 milliGRAM(s) Oral every 6 hours PRN Temp greater or equal to 38C (100.4F)  HYDROmorphone   Tablet 4 milliGRAM(s) Oral every 3 hours PRN moderate-severe pain  HYDROmorphone PCA (5 mG/mL) Rescue Clinician Bolus 2 milliGRAM(s) IV Push every 1 hour PRN severe pain unresponsive to bolus dose  metoclopramide Injectable 10 milliGRAM(s) IV Push every 6 hours PRN nausea/vomiting  naloxone Injectable 0.1 milliGRAM(s) IV Push once PRN respiratory distress on pca pump  ondansetron Injectable 4 milliGRAM(s) IV Push every 6 hours PRN Nausea and/or Vomiting  simethicone 80 milliGRAM(s) Chew every 6 hours PRN Gas      PRESENT SYMPTOMS: [ ]Unable to obtain due to poor mentation   Source if other than patient:  [ ]Family   [ ]Team     Pain: [X ]yes [ ]no  QOL impact -  decreased function  Location - adnexal  Aggravating factors - movement  Quality - burning  Radiation - down legs  Timing- constant  Severity (0-10 scale): 8/10   Minimal acceptable level (0-10 scale): 2/10    CPOT:    https://www.[facility].org/getattachment/[email or web address]/Critical-Care-Pain-Observation-Tool-(CPOT)      PAIN AD Score:     http://geriatrictoolkit.[email or web address]/painad.pdf (press ctrl +  left click to view)    Dyspnea:                           [ ]Mild [ ]Moderate [ ]Severe  Anxiety:                             [ ]Mild [ ]Moderate [ ]Severe  Fatigue:                             [ ]Mild [ ]Moderate [ ]Severe  Nausea:                             [ ]Mild [ ]Moderate [ ]Severe  Loss of appetite:              [ ]Mild [ ]Moderate [ ]Severe  Constipation:                    [ ]Mild [ ]Moderate [ ]Severe    Other Symptoms:  [X ]All other review of systems negative     Palliative Performance Status Version 2:         %    http://npcrc.org/files/news/palliative_performance_scale_ppsv2.pdf    PHYSICAL EXAM:  Vital Signs Last 24 Hrs  T(C): 38.6 (03 Apr 2020 15:55), Max: 39.8 (03 Apr 2020 14:11)  T(F): 101.4 (03 Apr 2020 15:55), Max: 103.6 (03 Apr 2020 14:11)  HR: 140 (03 Apr 2020 15:55) (122 - 143)  BP: 131/83 (03 Apr 2020 15:55) (114/74 - 132/85)  BP(mean): --  RR: 20 (03 Apr 2020 15:55) (18 - 20)  SpO2: 96% (03 Apr 2020 15:55) (95% - 97%)    Limited exam for patient safety and to limit infection risk during COVID-19 outbreak. Please refr to primary team's examination for today.  GENERAL:  [ X]Alert  [ ]Oriented x   [ ]Lethargic  [ ]Cachexia  [ ]Unarousable  [ X]Verbal  [ ]Non-Verbal  Behavioral:   [ ] Anxiety  [ ] Delirium [ ] Agitation [ ] Other  HEENT:  [ ]Normal   [ ]Dry mouth   [ ]ET Tube/Trach  [ ]Oral lesions  PULMONARY:   [ ]Clear [ ]Tachypnea  [ ]Audible excessive secretions   [ ]Rhonchi        [ ]Right [ ]Left [ ]Bilateral  [ ]Crackles        [ ]Right [ ]Left [ ]Bilateral  [ ]Wheezing     [ ]Right [ ]Left [ ]Bilateral  [ ]Diminished breath sounds [ ]right [ ]left [ ]bilateral  CARDIOVASCULAR:    [ ]Regular [ ]Irregular [ ]Tachy  [ ]Quirino [ ]Murmur [ ]Other  GASTROINTESTINAL:  [ ]Soft  [ ]Distended   [ ]+BS  [ ]Non tender [ ]Tender  [ ]PEG [ ]OGT/ NGT  Last BM:     GENITOURINARY:  [ ]Normal [ ] Incontinent   [ ]Oliguria/Anuria   [ ]Cifuentes  MUSCULOSKELETAL:   [ ]Normal   [ ]Weakness  [ ]Bed/Wheelchair bound [ ]Edema  NEUROLOGIC:   [ ]No focal deficits  [ ]Cognitive impairment  [ ]Dysphagia [ ]Dysarthria [ ]Paresis [ ]Other   SKIN:   [ ]Normal    [ ]Rash  [ ]Pressure ulcer(s)       Present on admission [ ]y [ ]n    CRITICAL CARE:  [ ] Shock Present  [ ]Septic [ ]Cardiogenic [ ]Neurologic [ ]Hypovolemic  [ ]  Vasopressors [ ]  Inotropes   [ ]Respiratory failure present [ ]Mechanical ventilation [ ]Non-invasive ventilatory support [ ]High flow  [ ]Acute  [ ]Chronic [ ]Hypoxic  [ ]Hypercarbic [ ]Other  [ ]Other organ failure     LABS: reviewed                          7.7    41.90 )-----------( 611      ( 03 Apr 2020 10:03 )             25.6     04-03    130<L>  |  91<L>  |  8   ----------------------------<  92  3.7   |  27  |  0.75    Ca    11.4<H>      03 Apr 2020 10:03  Phos  2.4     04-03  Mg     1.8     04-03            RADIOLOGY & ADDITIONAL STUDIES:    PROTEIN CALORIE MALNUTRITION PRESENT: [ ]mild [ ]moderate [ ]severe [ ]underweight [ ]morbid obesity  https://www.andeal.org/vault/2440/web/files/ONC/Table_Clinical%20Characteristics%20to%20Document%20Malnutrition-White%20JV%20et%20al%202012.pdf    Height (cm): 162.6 (03-21-20 @ 03:15), 162.56 (03-07-20 @ 19:18)  Weight (kg): 84.4 (03-21-20 @ 03:15), 89.4 (03-07-20 @ 19:18)  BMI (kg/m2): 31.9 (03-21-20 @ 03:15), 33.8 (03-07-20 @ 19:18)    [ ]PPSV2 < or = to 30% [ ]significant weight loss  [ ]poor nutritional intake  [ ]anasarca     Albumin, Serum: 2.9 g/dL (03-25-20 @ 05:59)   [ ]Artificial Nutrition      REFERRALS:   [ ]Chaplaincy  [ ]Hospice  [ ]Child Life  [ ]Social Work  [ ]Case management [ ]Holistic Therapy     Goals of Care Document:     ______________  Alex Mack MD   of Geriatric and Palliative Medicine  [facility]     Please page the following number for clinical matters between the hours of 9AM and 5PM   from Monday through Friday : [phone]    After 5PM and on weekends, please page: [phone]. The Geriatric and Palliative Medicine consult service has 24/7 coverage for medical recommendations, including for symptom management needs.

## 2020-04-07 NOTE — PROGRESS NOTE ADULT - ASSESSMENT
24F w/ no PMH presenting with abdominal pain, found to have bilateral pelvic masses and extensive pelvic lymphadenopathy concerning for metastatic malignancy. 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 Pt with ongoing fevers, now hypoxia, overall clinical decline.     #Fever, tachycardia  -suspected 2/2 tumor fever; NSAIDs stopped 4/1/20 in anticipation of port placement, which could have been previously suppressing higher fever that is now unmasked. However, since she meet sepsis criteria, started on abx (vancomycin, zosyn) on 4/1/20  -cultures negative, 4/1/20 however pt has continued to spike fevers- repeat blood cultures from 4/5 pending   -COVID-19 negative x 3 but parainfluenza positive, however this does not explain pt's encephalopathy and continued fevers   -Chest CT as above, negative for central PE, but unable to evaluate segmental and subsegmental branches due to motion artifact. Lung imaging shows unchanged metastatic nodules, small bilateral pleural effusions and atelectasis of the basilar LLL (unchanged as well), no new opacity, decreasing the likelihood of PNA  - CT A/P repeated on 4/3/20: no obvious source of infection   -urine cultures growing multiple species, likely contaminated  - ID consulted now on board, CDIFF negative    -s/p PICC line placement for chemotherapy; mental status much improved today, however pt still spiking fevers, will hold chemo for now, and reassess tomorrow  - Ammonia level normal     #Metastatic carcinoma, cancer of unknown primary  - CT A/P with bilateral heterogeneous adnexal masses, as well as upper abdominal, RP, possible bone lesions and pelvic lymphadenopathy; CT Chest with multiple solid pulmonary nodules  - Cancer of unknown primary (poorly differentiated carcinoma) but suspect likely GI origin given CDX2 positivity on pathology; chromogranin positivity also suggests neuroendocrine features; Ki-67 index 40%.   - tumor markers elevated: Cancer Antigen, Ca 27.29      Cancer Antigen, 125: 619      Carcinoembryonic Antigen: 44.7 beta  on initial evaluation; checked again 4/1/20  - Discussed diagnosis with patient on 3/31/20. Given advanced, metastatic stage, will need to be on indefinite treatment to limit progression of disease.  - Plan was to start [id number] inpatient. s/p PICC line placement 4/3/20; However chemo on hold.  Consent for chemotherapy obtained, placed in chart. Will continue to re-evaluate this week if she can receive first dose.    - No plan for GI endoscopy at this time  - Leukocytosis and fevers likely 2/2 tumor vs infection. Flow cytometry negative for clonal process (CLL / CML)  - Diet and VTE ppx restarted, port placement on hold given fever work up  - Palliative following for pain control; improved with PCA pump. Continue to hold NSAIDs    #Hypercalcemia  -likely 2/2 malignancy  - gave 1 dose Pamidronate 60 mg IV on 4/5. Continue to trend daily.   - may be contributing to MS changes as well     #Microcytic anemia  - reviewed peripheral smear, shows polychromasia despite reticulocyte count inappropriately low  - possible sickle cell trait so baseline Hb may be closer to 10-11  - possible BM involvement with carcinoma. Would defer BM evaluation     #Pain control   - PCA now discontinued, however pain is now escalating on only Dilaudid pushes PRN  Will touch base with palliative care today if another oral agent can be added       Cesia Banks MD  Hematology Oncology Fellow, PGY-4  [facility] Pager: [phone]/ [facility] Pager: [phone]

## 2020-04-07 NOTE — PROGRESS NOTE ADULT - SUBJECTIVE AND OBJECTIVE BOX
INTERVAL History:    Allergies    No Known Allergies    Intolerances        MEDICATIONS  (STANDING):  enoxaparin Injectable 40 milliGRAM(s) SubCutaneous daily  famotidine    Tablet 20 milliGRAM(s) Oral daily  melatonin 3 milliGRAM(s) Oral once  piperacillin/tazobactam IVPB.. 3.375 Gram(s) IV Intermittent every 8 hours  polyethylene glycol 3350 17 Gram(s) Oral daily  potassium phosphate IVPB 15 milliMole(s) IV Intermittent once  senna 2 Tablet(s) Oral at bedtime  sodium chloride 0.9% lock flush 3 milliLiter(s) IV Push every 8 hours  sodium chloride 0.9% lock flush 3 milliLiter(s) IV Push every 8 hours    MEDICATIONS  (PRN):  acetaminophen   Tablet .. 650 milliGRAM(s) Oral every 6 hours PRN Temp greater or equal to 38C (100.4F)  HYDROmorphone  Injectable 1.5 milliGRAM(s) IV Push every 3 hours PRN Moderate Pain (4 - 6) or severe pain  metoclopramide Injectable 10 milliGRAM(s) IV Push every 6 hours PRN nausea/vomiting  naloxone Injectable 0.1 milliGRAM(s) IV Push once PRN respiratory distress on pca pump  ondansetron Injectable 4 milliGRAM(s) IV Push every 6 hours PRN Nausea and/or Vomiting  simethicone 80 milliGRAM(s) Chew every 6 hours PRN Gas      Vital Signs Last 24 Hrs  T(C): 36.3 (2020 08:59), Max: 38.7 (2020 15:04)  T(F): 97.4 (2020 08:59), Max: 101.7 (2020 15:04)  HR: 106 (2020 08:59) (106 - 142)  BP: 123/79 (2020 08:59) (117/74 - 140/89)  BP(mean): --  RR: 20 (2020 08:59) (18 - 20)  SpO2: 97% (2020 08:59) (94% - 97%)    PHYSICAL EXAM:    General: AOX3, no acute distress  EYES: EOMI, PERRLA, conjunctiva and sclera clear  NECK: Supple, No JVD, Normal thyroid  CHEST/LUNG: Clear to auscultation bilaterally; No rales, rhonchi, or wheezing  HEART: Regular rate and rhythm; no murmurs  ABDOMEN: Soft, Nontender. BS+  LYMPH: No lymphadenopathy noted  Extremities: No peripheral edema      LABS:                        7.6    33.99 )-----------( 585      ( 2020 07:35 )             25.8     04-07    136  |  97  |  9   ----------------------------<  91  3.4<L>   |  28  |  1.08    Ca    10.8<H>      2020 07:35  Phos  1.2     -  Mg     2.2     -    TPro  6.7  /  Alb  2.5<L>  /  TBili  0.5  /  DBili  x   /  AST  28  /  ALT  <5<L>  /  AlkPhos  205<H>  -07    PT/INR - ( 2020 07:35 )   PT: 15.1 sec;   INR: 1.30 ratio           Urinalysis Basic - ( 2020 09:26 )    Color: Yellow / Appearance: Slightly Turbid / S.019 / pH: x  Gluc: x / Ketone: Negative  / Bili: Negative / Urobili: <2 mg/dL   Blood: x / Protein: 30 mg/dL / Nitrite: Negative   Leuk Esterase: Moderate / RBC: 326 /HPF / WBC 13 /HPF   Sq Epi: x / Non Sq Epi: 1 /HPF / Bacteria: Negative          RADIOLOGY & ADDITIONAL STUDIES:    PATHOLOGY: INTERVAL History: Patient seen today at the bedside. She appears more alert and awake compared to yesterday. She still complains of pain on her right side, and sometimes does not get restful sleep because of the pain.     Allergies    No Known Allergies    Intolerances        MEDICATIONS  (STANDING):  enoxaparin Injectable 40 milliGRAM(s) SubCutaneous daily  famotidine    Tablet 20 milliGRAM(s) Oral daily  melatonin 3 milliGRAM(s) Oral once  piperacillin/tazobactam IVPB.. 3.375 Gram(s) IV Intermittent every 8 hours  polyethylene glycol 3350 17 Gram(s) Oral daily  potassium phosphate IVPB 15 milliMole(s) IV Intermittent once  senna 2 Tablet(s) Oral at bedtime  sodium chloride 0.9% lock flush 3 milliLiter(s) IV Push every 8 hours  sodium chloride 0.9% lock flush 3 milliLiter(s) IV Push every 8 hours    MEDICATIONS  (PRN):  acetaminophen   Tablet .. 650 milliGRAM(s) Oral every 6 hours PRN Temp greater or equal to 38C (100.4F)  HYDROmorphone  Injectable 1.5 milliGRAM(s) IV Push every 3 hours PRN Moderate Pain (4 - 6) or severe pain  metoclopramide Injectable 10 milliGRAM(s) IV Push every 6 hours PRN nausea/vomiting  naloxone Injectable 0.1 milliGRAM(s) IV Push once PRN respiratory distress on pca pump  ondansetron Injectable 4 milliGRAM(s) IV Push every 6 hours PRN Nausea and/or Vomiting  simethicone 80 milliGRAM(s) Chew every 6 hours PRN Gas      Vital Signs Last 24 Hrs  T(C): 36.3 (2020 08:59), Max: 38.7 (2020 15:04)  T(F): 97.4 (2020 08:59), Max: 101.7 (2020 15:04)  HR: 106 (2020 08:59) (106 - 142)  BP: 123/79 (2020 08:59) (117/74 - 140/89)  BP(mean): --  RR: 20 (2020 08:59) (18 - 20)  SpO2: 97% (2020 08:59) (94% - 97%)    PHYSICAL EXAM:    General: AOX3  Not physically examined in attempt to decrease COVID exposure to our immunocompromised patient population      LABS:                        7.6    33.99 )-----------( 585      ( 2020 07:35 )             25.8         136  |  97  |  9   ----------------------------<  91  3.4<L>   |  28  |  1.08    Ca    10.8<H>      2020 07:35  Phos  1.2       Mg     2.2         TPro  6.7  /  Alb  2.5<L>  /  TBili  0.5  /  DBili  x   /  AST  28  /  ALT  <5<L>  /  AlkPhos  205<H>      PT/INR - ( 2020 07:35 )   PT: 15.1 sec;   INR: 1.30 ratio           Urinalysis Basic - ( 2020 09:26 )    Color: Yellow / Appearance: Slightly Turbid / S.019 / pH: x  Gluc: x / Ketone: Negative  / Bili: Negative / Urobili: <2 mg/dL   Blood: x / Protein: 30 mg/dL / Nitrite: Negative   Leuk Esterase: Moderate / RBC: 326 /HPF / WBC 13 /HPF   Sq Epi: x / Non Sq Epi: 1 /HPF / Bacteria: Negative          RADIOLOGY & ADDITIONAL STUDIES:    PATHOLOGY:

## 2020-04-07 NOTE — CHART NOTE - NSCHARTNOTEFT_GEN_A_CORE
Called due to uncontrolled pain.  Lethargy has resolved since PCA discontinuation, however her pain is uncontrolled.  I began 1.5mg IV dilaudid q3H prn.  Since it's introduction, she has used 5 doses  5 x 1.5 mg = IV dilaudid 7.5mg total daily dose.  Two attempts using the telephone system to speak with the patient but she did not answer.  ?RRT per UR report, staff/nurse involved  Flowsheets  max pain 10/10  brought down to 6/10 or lower (inferred from visual report)  Consider  IV dilaudid 8mg TDD/4  Consider  IV dilaudid 2mg q6H ATC  IV dilaudid 1.5 mg q2H PRN      In the event of newly developing, evolving, or worsening symptoms, please contact the Palliative Medicine team via pager (if the patient is at [facility] #[phone] or if the patient is at [facility] #[phone]) The Geriatric and Palliative Medicine service has coverage 24 hours a day/ 7 days a week to provide medical recommendations regarding symptom management needs via telephone Called due to uncontrolled pain.  Lethargy has resolved since PCA discontinuation, however her pain is uncontrolled.  I began 1.5mg IV dilaudid q3H prn.  Since it's introduction, she has used 5 doses  6 x 1.5 mg = IV dilaudid 9 mg total daily dose.  Two attempts using the telephone system to speak with the patient but she did not answer.  ?RRT per UR report, staff/nurse involved  Flowsheets  max pain 10/10  brought down to 6/10 or lower (inferred from visual report)  Consider  IV dilaudid 9mg total daily dose divided by 6    IV dilaudid 1.5 mg q4H ATC hold for sedation or RR < 12  IV dilaudid 1.5 mg q2H PRN      In the event of newly developing, evolving, or worsening symptoms, please contact the Palliative Medicine team via pager (if the patient is at [facility] #[phone] or if the patient is at [facility] #[phone]) The Geriatric and Palliative Medicine service has coverage 24 hours a day/ 7 days a week to provide medical recommendations regarding symptom management needs via telephone

## 2020-04-07 NOTE — DISCHARGE NOTE PROVIDER - CARE PROVIDER_API CALL
Kristina Miles (DO)  HematologyOncology; Internal Medicine  [address] [place] [address]  Phone: [phone]  Fax: [phone]  Follow Up Time:

## 2020-04-07 NOTE — PROGRESS NOTE ADULT - ASSESSMENT
24 f with b/l adnexal masses and pelvic LAD, metastatic carcinoma of unknown primary presented 3/20 with abd pain, s/p CT guided biopsy 3/25, PICC line 4/3 to start chemo  pt intermittently febrile and leukocytosis, now febrile to 101.7 and tachy, WBC: 34  QUINN for the last 2 days now Cr: 1.3  blood and urine cx negative  COVID negative x 3 last one 4/4  RVP 4/1 with parainfluenza  now pt with diarrhea but no BM for 3 days    persistent fever and leukocytosis, with tachycardia   parainfluenza URI  metastatic ca with possible tumor fever  SIRS   No obvious source of infection at this time. CT with no evidence of infection, all cx NTD, no improvement in leucocytosis and fever even after 6 days of zosyn and vanco in the beginning.     Plan;   * monitor the WBC and temp  * stopped zosyn, monitor off abx  * supportive care for Paraflu  * COVID negative again   * if diarrhea can check for c diff, if pt not on laxatives.

## 2020-04-07 NOTE — CHART NOTE - NSCHARTNOTEFT_GEN_A_CORE
Nutrition Follow Up Note  Patient seen for: follow up     Interim events noted, chart reviewed. Pt c adnexal masses, metastatic disease, continues to have fevers, noted Palliative Care following for pain management, noted pt is on PCA pump.     Source: pt, PCA     Diet : regular, Ensure Enlive x 2 daily     Patient reports: appetite continues to be suboptimal but she continues to try to take some of her meals. Noted pt did consume all of her cereal c milk this morning, PCA confirms same. Pt reports emesis at times, unable to recall the last time at this time. Reports she has been having Ensure Enlive, discussed taking her time drinking it. Reports she had a BM earlier today and it was soft in consistency but not diarrhea.       Daily wt: 3/25: 201->3/31: 196.8 pounds, no new wt at this time     Pertinent Medications: MEDICATIONS  (STANDING):  enoxaparin Injectable 40 milliGRAM(s) SubCutaneous daily  famotidine    Tablet 20 milliGRAM(s) Oral daily  melatonin 3 milliGRAM(s) Oral once  piperacillin/tazobactam IVPB.. 3.375 Gram(s) IV Intermittent every 8 hours  polyethylene glycol 3350 17 Gram(s) Oral daily  senna 2 Tablet(s) Oral at bedtime  sodium chloride 0.9% lock flush 3 milliLiter(s) IV Push every 8 hours  sodium chloride 0.9% lock flush 3 milliLiter(s) IV Push every 8 hours    MEDICATIONS  (PRN):  acetaminophen   Tablet .. 650 milliGRAM(s) Oral every 6 hours PRN Temp greater or equal to 38C (100.4F)  HYDROmorphone  Injectable 1.5 milliGRAM(s) IV Push every 3 hours PRN Moderate Pain (4 - 6) or severe pain  metoclopramide Injectable 10 milliGRAM(s) IV Push every 6 hours PRN nausea/vomiting  naloxone Injectable 0.1 milliGRAM(s) IV Push once PRN respiratory distress on pca pump  ondansetron Injectable 4 milliGRAM(s) IV Push every 6 hours PRN Nausea and/or Vomiting  simethicone 80 milliGRAM(s) Chew every 6 hours PRN Gas    Pertinent Labs: 04-07 @ 07:35: Na 136, BUN 9, Cr 1.08, BG 91, K+ 3.4<L>, Phos 1.2<L>, Mg 2.2, Alk Phos 205<H>, ALT/SGPT <5<L>, AST/SGOT 28, HbA1c --  04-06 @ 15:25: Na --, BUN --, Cr --, BG --, K+ --, Phos 2.5, Mg --, Alk Phos --, ALT/SGPT --, AST/SGOT --, HbA1c --    Finger Sticks: None pertinent to address at this time.       Skin per nursing documentation: no pressure injuries   Edema: +1 generalized edema     Estimated Needs:   [x] no change since previous assessment      Previous Nutrition Diagnosis: inadequate oral intake   Nutrition Diagnosis continues at this time, addressed c supplements     New Nutrition Diagnosis: none at this time       Recommend  1) Continue c current diet.   2) Continue w/ Ensure Enlive.   3) RD to provide food preferences-pt requested fruit ices c meals, will honor.   4) Encouraged bland foods at this time to prevent nausea and emesis. Discussed binding foods as tolerated. Discussed protein rich foods available on menu. Discussed consuming protein first at meal times and then eating the other foods.     Monitoring and Evaluation:     Continue to monitor Nutritional intake, Tolerance to diet prescription, weights, labs, skin integrity    RD remains available upon request and will follow up per protocol  Linda Chaudhry MS RD CDN [facility],  #[phone] Nutrition Follow Up Note  Patient seen for: follow up     Interim events noted, chart reviewed. Pt c adnexal masses, metastatic disease, continues to have fevers, noted Palliative Care following for pain management, noted pt is on PCA pump.     Source: pt, PCA, pt forgetful at times, sleepy at times during interview    Diet : regular, Ensure Enlive x 2 daily     Patient reports: appetite continues to be suboptimal but she continues to try to take some of her meals. Noted pt did consume all of her cereal c milk this morning, PCA confirms same. Pt reports emesis at times, unable to recall the last time at this time. Reports she has been having Ensure Enlive, discussed taking her time drinking it. Reports she had a BM earlier today and it was soft in consistency but not diarrhea.       Daily wt: 3/25: 201->3/31: 196.8 pounds, no new wt at this time     Pertinent Medications: MEDICATIONS  (STANDING):  enoxaparin Injectable 40 milliGRAM(s) SubCutaneous daily  famotidine    Tablet 20 milliGRAM(s) Oral daily  melatonin 3 milliGRAM(s) Oral once  piperacillin/tazobactam IVPB.. 3.375 Gram(s) IV Intermittent every 8 hours  polyethylene glycol 3350 17 Gram(s) Oral daily  senna 2 Tablet(s) Oral at bedtime  sodium chloride 0.9% lock flush 3 milliLiter(s) IV Push every 8 hours  sodium chloride 0.9% lock flush 3 milliLiter(s) IV Push every 8 hours    MEDICATIONS  (PRN):  acetaminophen   Tablet .. 650 milliGRAM(s) Oral every 6 hours PRN Temp greater or equal to 38C (100.4F)  HYDROmorphone  Injectable 1.5 milliGRAM(s) IV Push every 3 hours PRN Moderate Pain (4 - 6) or severe pain  metoclopramide Injectable 10 milliGRAM(s) IV Push every 6 hours PRN nausea/vomiting  naloxone Injectable 0.1 milliGRAM(s) IV Push once PRN respiratory distress on pca pump  ondansetron Injectable 4 milliGRAM(s) IV Push every 6 hours PRN Nausea and/or Vomiting  simethicone 80 milliGRAM(s) Chew every 6 hours PRN Gas    Pertinent Labs: 04-07 @ 07:35: Na 136, BUN 9, Cr 1.08, BG 91, K+ 3.4<L>, Phos 1.2<L>, Mg 2.2, Alk Phos 205<H>, ALT/SGPT <5<L>, AST/SGOT 28, HbA1c --  04-06 @ 15:25: Na --, BUN --, Cr --, BG --, K+ --, Phos 2.5, Mg --, Alk Phos --, ALT/SGPT --, AST/SGOT --, HbA1c --    Finger Sticks: None pertinent to address at this time.       Skin per nursing documentation: no pressure injuries   Edema: +1 generalized edema     Estimated Needs:   [x] no change since previous assessment      Previous Nutrition Diagnosis: inadequate oral intake   Nutrition Diagnosis continues at this time, addressed c supplements     New Nutrition Diagnosis: none at this time       Recommend  1) Continue c current diet.   2) Continue w/ Ensure Enlive.   3) RD to provide food preferences-pt requested fruit ices c meals, will honor.   4) Encouraged bland foods at this time to prevent nausea and emesis. Discussed binding foods as tolerated. Discussed protein rich foods available on menu. Discussed consuming protein first at meal times and then eating the other foods.     Monitoring and Evaluation:     Continue to monitor Nutritional intake, Tolerance to diet prescription, weights, labs, skin integrity    RD remains available upon request and will follow up per protocol  Linda Chaudhry MS RD CDN [facility],  #[phone]

## 2020-04-07 NOTE — PROGRESS NOTE ADULT - SUBJECTIVE AND OBJECTIVE BOX
24y old  Female who presents with a chief complaint of b/l pelvic masses (07 Apr 2020 13:06)      Interval history:  Febrile this morning, occasional cough, abdominal pain unchanged, vomited overnight.       Allergies:   No Known Allergies    Antimicrobials:  piperacillin/tazobactam IVPB.. 3.375 Gram(s) IV Intermittent every 8 hours      REVIEW OF SYSTEMS:  No chest pain   No dysuria   No rash.     Vital Signs Last 24 Hrs  T(C): 37.6 (04-07-20 @ 18:11), Max: 38.4 (04-07-20 @ 05:00)  T(F): 99.6 (04-07-20 @ 18:11), Max: 101.1 (04-07-20 @ 05:00)  HR: 118 (04-07-20 @ 18:11) (106 - 122)  BP: 115/61 (04-07-20 @ 18:11) (115/61 - 140/89)  BP(mean): --  RR: 20 (04-07-20 @ 18:11) (18 - 20)  SpO2: 98% (04-07-20 @ 18:11) (95% - 98%)      PHYSICAL EXAM:  Patient in no acute distress. Alert, awake  No icterus, no oral ulcers.  Cardiovascular: S1S2 normal, tachy   Lungs: + air entry B/L lung fields.  Gastrointestinal: soft, nontender, nondistended.  Extremities: no edema.  + PICC                             7.6    33.99 )-----------( 585      ( 07 Apr 2020 07:35 )             25.8   04-07    136  |  97  |  9   ----------------------------<  91  3.4<L>   |  28  |  1.08    Ca    10.8<H>      07 Apr 2020 07:35  Phos  1.2     04-07  Mg     2.2     04-07    TPro  6.7  /  Alb  2.5<L>  /  TBili  0.5  /  DBili  x   /  AST  28  /  ALT  <5<L>  /  AlkPhos  205<H>  04-07      LIVER FUNCTIONS - ( 07 Apr 2020 07:35 )  Alb: 2.5 g/dL / Pro: 6.7 g/dL / ALK PHOS: 205 U/L / ALT: <5 U/L / AST: 28 U/L / GGT: x               Culture - Blood (collected 05 Apr 2020 23:39)  Source: .Blood Blood-Peripheral  Preliminary Report (07 Apr 2020 01:02):    No growth to date.    Culture - Blood (collected 05 Apr 2020 23:35)  Source: .Blood Blood-Peripheral  Preliminary Report (07 Apr 2020 01:02):    No growth to date.    Culture - Urine (collected 05 Apr 2020 09:58)  Source: .Urine Clean Catch (Midstream)  Final Report (06 Apr 2020 08:59):    No growth

## 2020-04-07 NOTE — DISCHARGE NOTE PROVIDER - NSDCMRMEDTOKEN_GEN_ALL_CORE_FT
oxyCODONE 5 mg oral tablet: 1 tab(s) orally every 6 hours MDD:4 tabs oxyCODONE 5 mg oral tablet: 1 tab(s) orally every 6 hours MDD:4 tabs  Power picc Heparin 10 units per ml, 3 ml daily to each lumen:   power picc normal saline 10 ml in each lumen weekly: aluminum hydroxide-magnesium hydroxide 200 mg-200 mg/5 mL oral suspension: 30 milliliter(s) orally every 6 hours, As needed, Dyspepsia  oxyCODONE 5 mg oral tablet: 1 tab(s) orally every 6 hours MDD:4 tabs  Power picc Heparin 10 units per ml, 3 ml daily to each lumen:   power picc normal saline 10 ml in each lumen weekly:   simethicone 80 mg oral tablet, chewable: 1 tab(s) orally every 6 hours, As needed, Gas aluminum hydroxide-magnesium hydroxide 200 mg-200 mg/5 mL oral suspension: 30 milliliter(s) orally every 6 hours, As needed, Dyspepsia. Can obtain over the counter  Cardizem 60 mg oral tablet: 1 tab(s) orally every 8 hours   Levaquin 500 mg oral tablet: 1 tab(s) orally once a day   oxyCODONE 5 mg oral tablet: 1 tab(s) orally every 6 hours MDD:20 mg  Protonix 40 mg oral delayed release tablet: 1 tab(s) orally once a day   simethicone 80 mg oral tablet, chewable: 1 tab(s) orally every 6 hours, As needed, Gas  Zofran 4 mg oral tablet: 1 tab(s) orally every 8 hours aluminum hydroxide-magnesium hydroxide 200 mg-200 mg/5 mL oral suspension: 30 milliliter(s) orally every 6 hours, As needed, Dyspepsia. Can obtain over the counter  Cardizem 60 mg oral tablet: 1 tab(s) orally every 8 hours   Levaquin 500 mg oral tablet: 1 tab(s) orally once a day   oxyCODONE 5 mg oral tablet: 1 tab(s) orally every 6 hours MDD:20 mg  potassium phosphate-sodium phosphate 305 mg-700 mg oral tablet: 1 tab(s) orally 4 times a day (with meals and at bedtime)  Protonix 40 mg oral delayed release tablet: 1 tab(s) orally once a day   simethicone 80 mg oral tablet, chewable: 1 tab(s) orally every 6 hours, As needed, Gas  Zofran 4 mg oral tablet: 1 tab(s) orally every 8 hours

## 2020-04-07 NOTE — PROGRESS NOTE ADULT - ATTENDING COMMENTS
Patient slowly recovering from parainfluenza.  Unable to start systemic chemotherapy at this time.  Patient seems improved today, but still with fevers. Will re-evaluate later this week.   Will follow up palliative for pain control.

## 2020-04-07 NOTE — DISCHARGE NOTE PROVIDER - NSDCFUADDAPPT_GEN_ALL_CORE_FT
Follow up with Oncology within one week of discharge Follow up with Dr. Miles within one week of discharge, please call for appointment [phone]

## 2020-04-07 NOTE — DISCHARGE NOTE PROVIDER - HOSPITAL COURSE
Patient is a 24 year old G0 LMP on 3/18 presented with worsening abdominal pain intermittent nausea and vomiting for the past 2 months. Patient was first diagnosed on 3/5 at [facility] ED with bilateral adnexal masses, where she was discharged suspected germ cell tumor. Patient was recommended outpatient follow up. Patient was unable to get a quick appointment. Patient admitted to be a virgin was found to be (+) for Beta HCG. Patient was seen by GYN/Onc since patient had b/l pelvic masses with extensive lymphadenopathy in the liver and lung suspicious for metastatic disease. Patient is a 24 year old G0 LMP on 3/18 presented with worsening abdominal pain intermittent nausea and vomiting for the past 2 months. Patient was first diagnosed on 3/5 at [facility] ED with bilateral adnexal masses, where she was discharged suspected germ cell tumor. Patient was recommended outpatient follow up. Patient was unable to get a quick appointment. Patient admitted to be a virgin was found to be (+) for Beta HCG. Patient was seen by GYN/Onc since patient had b/l pelvic masses with extensive lymphadenopathy in the liver and lung suspicious for metastatic disease.         On admission tumor markers CA =27.9,  =619, CEA = 44.7, Beta Hcg =158 GYN/onc was consulted who recommended a biopsy of the lesions. On 3/26 patient had a biopsy of the Right iliac lesion. Metastatic cancer with unknown primary suspected GI origin given CDX2 positivity on pathology. Chromogranin positivity suggesting neuroendocrine features. Plan to start chemotherapy mFOLFOX6 inpatient. s/p PICC line placement. currently chemotherapy on hold. As per GI no plans for Endoscopy. Patient is a 24 year old G0 LMP on 3/18 presented with worsening abdominal pain intermittent nausea and vomiting for the past 2 months. Patient was first diagnosed on 3/5 at [facility] ED with bilateral adnexal masses, where she was discharged suspected germ cell tumor. Patient was recommended outpatient follow up. Patient was unable to get a quick appointment. Patient admitted to be a virgin was found to be (+) for Beta HCG. Patient was seen by GYN/Onc since patient had b/l pelvic masses with extensive lymphadenopathy in the liver and lung suspicious for metastatic disease.         # Metastatic cancer with unknown primary:     s/p IR guided biopsy of the right iliac lesion.     suspicious to be of GI origin given CDX2 positivity    Chromogranin positivity suggesting neuroendocrine features    s/p PICC line placement.     plan to start palliative chemotherapy mFOLFAX6 q 2 weeks x 6 cycles     excruciating abdominal pain - initially treated with PCA pump     now on IV dilaudid with good pain control     Pain management following.         # ID:     febrile  + leukocytosis     pan cultures negative to date (3/20, 3/25, 4/1, 4/6)    COVID 19 (-) x 4 (3/25, 4/1, 4/4, 4/6 ) Patient is a 24 year old G0 LMP on 3/18 presented with worsening abdominal pain intermittent nausea and vomiting for the past 2 months. Patient was first diagnosed on 3/5 at [facility] ED with bilateral adnexal masses, where she was discharged suspected germ cell tumor. Patient was recommended outpatient follow up. Patient was unable to get a quick appointment. Patient admitted to be a virgin was found to be (+) for Beta HCG. Patient was seen by GYN/Onc since patient had b/l pelvic masses with extensive lymphadenopathy in the liver and lung suspicious for metastatic disease.         # Metastatic cancer with unknown primary:     s/p IR guided biopsy of the right iliac lesion.     suspicious to be of GI origin given CDX2 positivity    Chromogranin positivity suggesting neuroendocrine features    s/p PICC line placement.     plan to start palliative chemotherapy mFOLFAX6 q 2 weeks x 6 cycles     excruciating abdominal pain - initially treated with PCA pump     now on IV dilaudid with good pain control     Pain management following.         # ID:     febrile  + leukocytosis     pan cultures negative to date (3/20, 3/25, 4/1, 4/6)    COVID 19 (-) x 4 (3/25, 4/1, 4/4, 4/6 )            FOLFOX was started on 4/9/20 Patient is a 24 year old G0 LMP on 3/18 presented with worsening abdominal pain intermittent nausea and vomiting for the past 2 months. Patient was first diagnosed on 3/5 at [facility] ED with bilateral adnexal masses, where she was discharged suspected germ cell tumor. Patient was recommended outpatient follow up. Patient was unable to get a quick appointment. Patient admitted to be a virgin was found to be (+) for Beta HCG. Patient was seen by GYN/Onc since patient had b/l pelvic masses with extensive lymphadenopathy in the liver and lung suspicious for metastatic disease.         # Metastatic cancer with unknown primary:     s/p IR guided biopsy of the right iliac lesion.     suspicious to be of GI origin given CDX2 positivity    Chromogranin positivity suggesting neuroendocrine features    s/p PICC line placement.     Patient started on palliative chemotherapy mFOLFAX6 (4/9 - 4/11)    Patient to receive chemo q 2 weeks x 6 cycles     Plan to discharge patient after this cycle of chemotherapy     excruciating abdominal pain - now on Morphine drip     with good pain control. Analgesics need to be weaned off and switched     to oral medications prior to discharge.     bowel regimen with Miralax and Senna     Pain management following.         # ID:     febrile  + leukocytosis     pan cultures negative to date (3/20, 3/25, 4/1, 4/6)    COVID 19 (-) x 4 (3/25, 4/1, 4/4, 4/6 ) Patient is a 24 year old G0 LMP on 3/18 presented with worsening abdominal pain intermittent nausea and vomiting for the past 2 months. Patient was first diagnosed on 3/5 at [facility] ED with bilateral adnexal masses, where she was discharged suspected germ cell tumor. Patient was recommended outpatient follow up. Patient was unable to get a quick appointment. Patient admitted to be a virgin was found to be (+) for Beta HCG. Patient was seen by GYN/Onc since patient had b/l pelvic masses with extensive lymphadenopathy in the liver and lung suspicious for metastatic disease.         # Metastatic cancer with unknown primary:     s/p IR guided biopsy of the right iliac lesion.     suspicious to be of GI origin given CDX2 positivity    Chromogranin positivity suggesting neuroendocrine features    s/p PICC line placement.     Patient received palliative chemotherapy mFOLFAX6 (4/9 - 4/11)    Patient to receive chemo q 2 weeks x 6 cycles     Abdominal pain attributed to malignancy off Dilaudid PCA pain controlled on oral Morphine.     Patient received Iron sucrose in addition to RBC infusion during hospital stay.        # ID:     febrile  + leukocytosis     pan cultures negative to date (3/20, 3/25, 4/1, 4/6)    COVID 19 (-) x 4 (3/25, 4/1, 4/4, 4/6 ) Patient is a 24 year old G0 LMP on 3/18 presented with worsening abdominal pain intermittent nausea and vomiting for the past 2 months. Patient was first diagnosed on 3/5 at [facility] ED with bilateral adnexal masses, where she was discharged suspected germ cell tumor. Patient was recommended outpatient follow up. Patient was unable to get a quick appointment. Patient admitted to be a virgin was found to be (+) for Beta HCG. Patient was seen by GYN/Onc since patient had b/l pelvic masses with extensive lymphadenopathy in the liver and lung suspicious for metastatic disease.         # Metastatic cancer with unknown primary:     s/p IR guided biopsy of the right iliac lesion.     suspicious to be of GI origin given CDX2 positivity    Chromogranin positivity suggesting neuroendocrine features    s/p PICC line placement.     Patient received palliative chemotherapy mFOLFAX6 (4/9 - 4/11)    Patient to receive chemo q 2 weeks x 6 cycles     Abdominal pain attributed to malignancy off Dilaudid PCA pain controlled on oral Morphine.     Patient received Iron sucrose in addition to RBC infusion during hospital stay.    Patient was seen by Pulmonology for bilateral pleural effusions and was recommended a tap.     on 4/17 patient had a thoracentesis after which a Pleurex catheter was placed.         # ID:     febrile  + leukocytosis     pan cultures negative to date (3/20, 3/25, 4/1, 4/6)    COVID 19 (-) x 4 (3/25, 4/1, 4/4, 4/6 ) Patient is a 24 year old G0 LMP on 3/18 presented with worsening abdominal pain intermittent nausea and vomiting for the past 2 months. Patient was first diagnosed on 3/5 at [facility] ED with bilateral adnexal masses, where she was discharged suspected germ cell tumor. Patient was recommended outpatient follow up. Patient was unable to get a quick appointment. Patient admitted to be a virgin was found to be (+) for Beta HCG. Patient was seen by GYN/Onc since patient had b/l pelvic masses with extensive lymphadenopathy in the liver and lung suspicious for metastatic disease.         # Metastatic cancer with unknown primary:     s/p IR guided biopsy of the right iliac lesion.     suspicious to be of GI origin given CDX2 positivity    Chromogranin positivity suggesting neuroendocrine features    s/p PICC line placement.     Patient received palliative chemotherapy mFOLFAX6 (4/9 - 4/11)    Patient to receive chemo q 2 weeks x 6 cycles     Abdominal pain attributed to malignancy off Dilaudid PCA pain controlled on oral Morphine.     Patient received Iron sucrose in addition to RBC infusion during hospital stay.    Patient was seen by Pulmonology for bilateral pleural effusions and was recommended a tap.     on 4/17 patient had a thoracentesis after which a CT was placed. For L sided CT on 4/20        # ID:     febrile  + leukocytosis     pan cultures negative to date (3/20, 3/25, 4/1, 4/6)    COVID 19 (-) x 4 (3/25, 4/1, 4/4, 4/6 )    Febrile on 4/18. Pan Cx completed Patient is a 24 year old G0 LMP on 3/18 presented with worsening abdominal pain intermittent nausea and vomiting for the past 2 months. Patient was first diagnosed on 3/5 at [facility] ED with bilateral adnexal masses, where she was discharged suspected germ cell tumor. Patient was recommended outpatient follow up. Patient was unable to get a quick appointment. Patient admitted to be a virgin was found to be (+) for Beta HCG. Patient was seen by GYN/Onc since patient had b/l pelvic masses with extensive lymphadenopathy in the liver and lung suspicious for metastatic disease.         # Metastatic cancer with unknown primary:     s/p IR guided biopsy of the right iliac lesion.     suspicious to be of GI origin given CDX2 positivity    Chromogranin positivity suggesting neuroendocrine features    s/p PICC line placement.     Patient received palliative chemotherapy mFOLFAX6 (4/9 - 4/11)    Patient to receive chemo q 2 weeks x 6 cycles     Abdominal pain attributed to malignancy off Dilaudid PCA pain controlled on oral Morphine.     Patient received Iron sucrose in addition to RBC infusion during hospital stay.    Patient was seen by Pulmonology for bilateral pleural effusions and was recommended a tap.     4/17 s/p Left thoracentesis with pigtail catheter placement    4/20 s/p Right thoracentesis with pigtail catheter placement    with Peluravac  in place. currently both actively draining.     attempt at weaning off O2 still remains unsuccessful. currently comfortable on 2 liters NC         # ID:     febrile  + leukocytosis     pan cultures negative to date (3/20, 3/25, 4/1, 4/6, 4/8, 4/13,     COVID 19 (-) x 4 (3/25, 4/1, 4/4, 4/6, 4/15)    4/17 Pleural fluid cultures, fungal cultures, Gram stain (-) (L thoracentesis)    4/18 Urine cultures 10 - 49,000 colonies of Carbapenem resistant Klebsiella pneumoniae    4/19 Ucx (+) 50-99,000 colonies of Carbapenem  resistant Klebsiella pneumoniae     D/w ID: Dr. Arenas, since patient has no urinary symptoms will defer antibiotics.     4/20 Pleural studies (-) fungal cultures, gram stain.     4/20 Bcx (-) Ucx (+) Gram negative rods, ID & Sens pending. Patient is a 24 year old G0 LMP on 3/18 presented with worsening abdominal pain intermittent nausea and vomiting for the past 2 months. Patient was first diagnosed on 3/5 at [facility] ED with bilateral adnexal masses, where she was discharged suspected germ cell tumor. Patient was recommended outpatient follow up. Patient was unable to get a quick appointment. Patient admitted to be a virgin was found to be (+) for Beta HCG. Patient was seen by GYN/Onc since patient had b/l pelvic masses with extensive lymphadenopathy in the liver and lung suspicious for metastatic disease.         # Metastatic cancer with unknown primary:     s/p IR guided biopsy of the right iliac lesion.     suspicious to be of GI origin given CDX2 positivity    Chromogranin positivity suggesting neuroendocrine features    s/p PICC line placement.     Patient received palliative chemotherapy mFOLFAX6 (4/9 - 4/11)    Patient to receive chemo q 2 weeks x 6 cycles     Abdominal pain attributed to malignancy off Dilaudid PCA pain controlled on oral Morphine.     Patient received Iron sucrose in addition to RBC infusion during hospital stay.    Patient was seen by Pulmonology for bilateral pleural effusions and was recommended a tap.     4/17 s/p Left thoracentesis with pigtail catheter placement    4/20 s/p Right thoracentesis with pigtail catheter placement    with Peluravac  in place. currently both actively draining.     attempt at weaning off O2 still remains unsuccessful. currently comfortable on 2 liters NC     4/20 Echo - LVEF 50-55% w trace pericardial effusion.             # ID:     febrile  + leukocytosis     pan cultures negative to date (3/20, 3/25, 4/1, 4/6, 4/8, 4/13,     COVID 19 (-) x 4 (3/25, 4/1, 4/4, 4/6, 4/15)    4/17 Pleural fluid cultures, fungal cultures, Gram stain (-) (L thoracentesis)    4/18 Urine cultures 10 - 49,000 colonies of Carbapenem resistant Klebsiella pneumoniae    4/19 Ucx (+) 50-99,000 colonies of Carbapenem  resistant Klebsiella pneumoniae     D/w ID: Dr. Arenas, since patient has no urinary symptoms will defer antibiotics.     4/20 Pleural studies (-) fungal cultures, gram stain.     4/20 Bcx (-) Ucx (+) Gram negative rods, ID & Sens pending. Patient is a 24 year old G0 LMP on 3/18 presented with worsening abdominal pain intermittent nausea and vomiting for the past 2 months. Patient was first diagnosed on 3/5 at [facility] ED with bilateral adnexal masses, where she was discharged suspected germ cell tumor. Patient was recommended outpatient follow up. Patient was unable to get a quick appointment. Patient admitted to be a virgin was found to be (+) for Beta HCG. Patient was seen by GYN/Onc since patient had b/l pelvic masses with extensive lymphadenopathy in the liver and lung suspicious for metastatic disease.         # Metastatic cancer with unknown primary:     s/p IR guided biopsy of the right iliac lesion.     suspicious to be of GI origin given CDX2 positivity    Chromogranin positivity suggesting neuroendocrine features    s/p PICC line placement.     Patient received palliative chemotherapy Cycle 1 mFOLFAX6 (4/9- 4/11) Cycle 2 (4/23-4/25)    Patient to receive chemo q 2 weeks x 6 cycles     Abdominal pain attributed to malignancy off Dilaudid PCA pain controlled on oral Morphine.     Patient received Iron sucrose in addition to RBC infusion during hospital stay.    Patient was seen by Pulmonology for bilateral pleural effusions and was recommended a tap.     4/17 s/p Left thoracentesis with pigtail catheter placement    4/20 s/p Right thoracentesis with pigtail catheter placement    with Peluravac  in place. currently both actively draining.     attempt at weaning off O2 still remains unsuccessful. currently comfortable on 2 liters NC     4/20 Echo - LVEF 50-55% w trace pericardial effusion.             # ID:     febrile  + leukocytosis     pan cultures negative to date (3/20, 3/25, 4/1, 4/6, 4/8, 4/13,     COVID 19 (-) x 4 (3/25, 4/1, 4/4, 4/6, 4/15)    4/17 Pleural fluid cultures, fungal cultures, Gram stain (-) (L thoracentesis)    4/18 Urine cultures 10 - 49,000 colonies of Carbapenem resistant Klebsiella pneumoniae    4/19 Ucx (+) 50-99,000 colonies of Carbapenem  resistant Klebsiella pneumoniae     As per ID: Dr. Arenas, since patient has no urinary symptoms will defer antibiotics.     4/20 Pleural studies (-) fungal cultures, gram stain.     4/20 Bcx (-) Ucx (+) Gram negative rods, ID & Sens pending. Patient is a 24 year old G0 LMP on 3/18 presented with worsening abdominal pain intermittent nausea and vomiting for the past 2 months. Patient was first diagnosed on 3/5 at [facility] ED with bilateral adnexal masses, where she was discharged suspected germ cell tumor. Patient was recommended outpatient follow up. Patient was unable to get a quick appointment. Patient admitted to be a virgin was found to be (+) for Beta HCG. Patient was seen by GYN/Onc since patient had b/l pelvic masses with extensive lymphadenopathy in the liver and lung suspicious for metastatic disease.         # Metastatic cancer with unknown primary:     s/p IR guided biopsy of the right iliac lesion.     suspicious to be of GI origin given CDX2 positivity    Chromogranin positivity suggesting neuroendocrine features    s/p PICC line placement.     Patient received palliative chemotherapy Cycle 1 mFOLFAX6 (4/9- 4/11) Cycle 2 (4/23-4/25)    Patient to receive chemo q 2 weeks x 6 cycles     Abdominal pain attributed to malignancy off Dilaudid PCA pain controlled on oral Morphine.     Patient received Iron sucrose in addition to RBC infusion during hospital stay.    Patient was seen by Pulmonology for bilateral pleural effusions and was recommended a tap.     4/17 s/p Left thoracentesis with pigtail catheter placement    4/20 s/p Right thoracentesis with pigtail catheter placement    with Peluravac  in place. currently both actively draining.     attempt at weaning off O2 still remains unsuccessful. currently comfortable on 2 liters NC     4/20 Echo - LVEF 50-55% w trace pericardial effusion.     4/30 CT A&P after Cycle 1 of chemo showed POD. Chemo plan changed to Carbo and Taxol to start     5/4 AXR was ordered for persistent n/v to rule out obstruction and showed            # ID:     febrile  + leukocytosis     pan cultures negative to date (3/20, 3/25, 4/1, 4/6, 4/8, 4/13,     COVID 19 (-) x 4 (3/25, 4/1, 4/4, 4/6, 4/15)    4/17 Pleural fluid cultures, fungal cultures, Gram stain (-) (L thoracentesis)    4/18 Urine cultures 10 - 49,000 colonies of Carbapenem resistant Klebsiella pneumoniae    4/19 Ucx (+) 50-99,000 colonies of Carbapenem  resistant Klebsiella pneumoniae     As per ID: Dr. Arenas, since patient has no urinary symptoms will defer antibiotics.     4/20 Pleural studies (-) fungal cultures, gram stain.     4/20 Bcx (-) Ucx (+) E. Coli Patient is a 24 year old G0 LMP on 3/18 presented with worsening abdominal pain intermittent nausea and vomiting for the past 2 months. Patient was first diagnosed on 3/5 at [facility] ED with bilateral adnexal masses, where she was discharged suspected germ cell tumor. Patient was recommended outpatient follow up. Patient was unable to get a quick appointment. Patient admitted to be a virgin was found to be (+) for Beta HCG. Patient was seen by GYN/Onc since patient had b/l pelvic masses with extensive lymphadenopathy in the liver and lung suspicious for metastatic disease.         # Metastatic cancer with unknown primary:     s/p IR guided biopsy of the right iliac lesion.     suspicious to be of GI origin given CDX2 positivity    Chromogranin positivity suggesting neuroendocrine features    s/p PICC line placement.     Patient received palliative chemotherapy Cycle 1 mFOLFAX6 (4/9- 4/11) Cycle 2 (4/23-4/25)    Patient to receive chemo q 2 weeks x 6 cycles     Abdominal pain attributed to malignancy off Dilaudid PCA pain controlled on oral Morphine.     Patient received Iron sucrose in addition to RBC infusion during hospital stay.    Patient was seen by Pulmonology for bilateral pleural effusions and was recommended a tap.     4/17 s/p Left thoracentesis with pigtail catheter placement    4/20 s/p Right thoracentesis with pigtail catheter placement    with Peluravac  in place. currently both actively draining.     attempt at weaning off O2 still remains unsuccessful. currently comfortable on 2 liters NC     4/20 Echo - LVEF 50-55% w trace pericardial effusion.     4/30 CT A&P after Cycle 1 of chemo showed POD. Chemo plan changed to Carbo and Taxol and given on 5/5 5/4 AXR was ordered for persistent n/v to rule out obstruction and showed            # ID:     febrile  + leukocytosis     pan cultures negative to date (3/20, 3/25, 4/1, 4/6, 4/8, 4/13,     COVID 19 (-) x 4 (3/25, 4/1, 4/4, 4/6, 4/15)    4/17 Pleural fluid cultures, fungal cultures, Gram stain (-) (L thoracentesis)    4/18 Urine cultures 10 - 49,000 colonies of Carbapenem resistant Klebsiella pneumoniae    4/19 Ucx (+) 50-99,000 colonies of Carbapenem  resistant Klebsiella pneumoniae     As per ID: Dr. Arenas, since patient has no urinary symptoms will defer antibiotics.     4/20 Pleural studies (-) fungal cultures, gram stain.     4/20 Bcx (-) Ucx (+) E. Coli Patient is a 24 year old G0 LMP on 3/18 presented with worsening abdominal pain intermittent nausea and vomiting for the past 2 months. Patient was first diagnosed on 3/5 at [facility] ED with bilateral adnexal masses, where she was discharged suspected germ cell tumor. Patient was recommended outpatient follow up. Patient was unable to get a quick appointment. Patient admitted to be a virgin was found to be (+) for Beta HCG. Patient was seen by GYN/Onc since patient had b/l pelvic masses with extensive lymphadenopathy in the liver and lung suspicious for metastatic disease.         # Metastatic cancer with unknown primary:     s/p IR guided biopsy of the right iliac lesion.     suspicious to be of GI origin given CDX2 positivity    Chromogranin positivity suggesting neuroendocrine features    s/p PICC line placement.     Patient received palliative chemotherapy Cycle 1 [id number] (4/9- 4/11) Cycle 2 (4/23-4/25)    Patient to receive chemo q 2 weeks x 6 cycles     Abdominal pain attributed to malignancy off Dilaudid PCA pain controlled on oral Morphine.     Patient received Iron sucrose in addition to RBC infusion during hospital stay.    Patient was seen by Pulmonology for bilateral pleural effusions and was recommended a tap.     4/17 s/p Left thoracentesis with pigtail catheter placement    4/20 s/p Right thoracentesis with pigtail catheter placement    with Peluravac  in place. currently both actively draining.     attempt at weaning off O2 still remains unsuccessful. currently comfortable on 2 liters NC     4/20 Echo - LVEF 50-55% w trace pericardial effusion.     4/30 CT A&P after Cycle 1 of chemo showed POD. Chemo plan changed to Carbo and Taxol and given on 5/5. Next Cycle due on 5/26 5/4 AXR was ordered for persistent n/v to rule out obstruction and showed            # ID:     febrile  + leukocytosis     pan cultures negative to date (3/20, 3/25, 4/1, 4/6, 4/8, 4/13,     COVID 19 (-) x 4 (3/25, 4/1, 4/4, 4/6, 4/15)    4/17 Pleural fluid cultures, fungal cultures, Gram stain (-) (L thoracentesis)    4/18 Urine cultures 10 - 49,000 colonies of Carbapenem resistant Klebsiella pneumoniae    4/19 Ucx (+) 50-99,000 colonies of Carbapenem  resistant Klebsiella pneumoniae     As per ID: Dr. Arenas, since patient has no urinary symptoms will defer antibiotics.     4/20 Pleural studies (-) fungal cultures, gram stain.     4/20 Bcx (-) Ucx (+) E. Coli again monitored off abx for no symptoms Patient is a 24 year old G0 LMP on 3/18 presented with worsening abdominal pain intermittent nausea and vomiting for the past 2 months. Patient was first diagnosed on 3/5 at [facility] ED with bilateral adnexal masses, where she was discharged suspected germ cell tumor. Patient was recommended outpatient follow up. Patient was unable to get a quick appointment. Patient admitted to be a virgin was found to be (+) for Beta HCG. Patient was seen by GYN/Onc since patient had b/l pelvic masses with extensive lymphadenopathy in the liver and lung suspicious for metastatic disease.         # Metastatic cancer with unknown primary:     s/p IR guided biopsy of the right iliac lesion.     suspicious to be of GI origin given CDX2 positivity    Chromogranin positivity suggesting neuroendocrine features    s/p PICC line placement.     Patient received palliative chemotherapy Cycle 1 mFOLFAX6 (4/9- 4/11) Cycle 2 (4/23-4/25)    Patient to receive chemo q 2 weeks x 6 cycles     Abdominal pain attributed to malignancy off Dilaudid PCA pain controlled on oral Morphine.     Patient received Iron sucrose in addition to RBC infusion during hospital stay.    Patient was seen by Pulmonology for bilateral pleural effusions and was recommended a tap.     4/17 s/p Left thoracentesis with pigtail catheter placement    4/20 s/p Right thoracentesis with pigtail catheter placement    with Peluravac  in place. currently both actively draining.     attempt at weaning off O2 still remains unsuccessful. currently comfortable on 2 liters NC     4/20 Echo - LVEF 50-55% w trace pericardial effusion.     4/30 CT A&P after Cycle 1 of chemo showed POD. Chemo plan changed to Carbo and Taxol and given on 5/5. Next Cycle due on 5/26 5/4 AXR was ordered for persistent n/v to rule out obstruction and was negative            # ID:     febrile  + leukocytosis     COVID 19 (-) x 4 (3/25, 4/1, 4/4, 4/6, 4/15)    4/17 Pleural fluid cultures, fungal cultures, Gram stain (-) (L thoracentesis)    4/18 Urine cultures 10 - 49,000 colonies of Carbapenem resistant Klebsiella pneumoniae    4/19 Ucx (+) 50-99,000 colonies of Carbapenem  resistant Klebsiella pneumoniae     As per ID: Dr. Arenas, since patient has no urinary symptoms will defer antibiotics.     4/20 Pleural studies (-) fungal cultures, gram stain.     4/20 Bcx (-) Ucx (+) E. Coli again monitored off abx for no symptoms    5/8 Fever of 100.6 started on meropenem and Pan Cx and CXR Patient is a 24 year old G0 LMP on 3/18 presented with worsening abdominal pain intermittent nausea and vomiting for the past 2 months. Patient was first diagnosed on 3/5 at [facility] ED with bilateral adnexal masses, where she was discharged suspected germ cell tumor. Patient was recommended outpatient follow up. Patient was unable to get a quick appointment. Patient admitted to be a virgin was found to be (+) for Beta HCG. Patient was seen by GYN/Onc since patient had b/l pelvic masses with extensive lymphadenopathy in the liver and lung suspicious for metastatic disease.         # Metastatic cancer with unknown primary:     s/p IR guided biopsy of the right iliac lesion.     suspicious to be of GI origin given CDX2 positivity    Chromogranin positivity suggesting neuroendocrine features    s/p PICC line placement.     Patient received palliative chemotherapy Cycle 1 mFOLFAX6 (4/9- 4/11) Cycle 2 (4/23-4/25) Cycle 3 (5/5 -5/7)    Patient to receive chemo q 2 weeks x 6 cycles     Abdominal pain attributed to malignancy off Dilaudid PCA pain controlled on oral Morphine.     Patient received Iron sucrose in addition to RBC infusion during hospital stay.    Patient was seen by Pulmonology for bilateral pleural effusions and was recommended a tap.     4/17 s/p Left thoracentesis with pigtail catheter placement    4/20 s/p Right thoracentesis with pigtail catheter placement    Both chest tubes were removed on 4/29 4/20 Echo - LVEF 50-55% w trace pericardial effusion.     4/30 CT A&P after Cycle 1 of chemo showed POD. Chemo plan changed to Carbo and Taxol and given on 5/5. Next Cycle due on 5/26 5/4 AXR was ordered for persistent n/v to rule out obstruction found to be (-)                 # Infectious disease:     febrile  + leukocytosis     COVID 19 (-) x 4 (3/25, 4/1, 4/4, 4/6, 4/15)    4/17 Pleural fluid cultures, fungal cultures, Gram stain (-) (L thoracentesis)    4/18 Urine cultures 10 - 49,000 colonies of Carbapenem resistant Klebsiella pneumoniae    4/19 Ucx (+) 50-99,000 colonies of Carbapenem  resistant Klebsiella pneumoniae     As per ID: Dr. Arenas, since patient has no urinary symptoms will defer antibiotics.     4/20 Pleural studies (-) fungal cultures, gram stain.     4/20 Bcx (-) Ucx (+) E. Coli again monitored off abx for no symptoms    5/8 Fever of 100.6 Urine and blood cultures (-) started on Meropenum Patient is a 24 year old G0 LMP on 3/18 presented with worsening abdominal pain intermittent nausea and vomiting for the past 2 months. Patient was first diagnosed on 3/5 at [facility] ED with bilateral adnexal masses, where she was discharged suspected germ cell tumor. Patient was recommended outpatient follow up. Patient was unable to get a quick appointment. Patient admitted to be a virgin was found to be (+) for Beta HCG. Patient was seen by GYN/Onc since patient had b/l pelvic masses with extensive lymphadenopathy in the liver and lung suspicious for metastatic disease.         # Metastatic cancer with unknown primary:     s/p IR guided biopsy of the right iliac lesion.     suspicious to be of GI origin given CDX2 positivity    Chromogranin positivity suggesting neuroendocrine features    s/p PICC line placement.     Patient received palliative chemotherapy Cycle 1 mFOLFAX6 (4/9- 4/11) Cycle 2 (4/23-4/25) Cycle 3 (5/5 -5/7)    Patient to receive chemo q 2 weeks x 6 cycles     Abdominal pain attributed to malignancy off Dilaudid PCA pain controlled on oral Morphine.     Patient received Iron sucrose in addition to RBC infusion during hospital stay.    Patient was seen by Pulmonology for bilateral pleural effusions and was recommended a tap.     4/17 s/p Left thoracentesis with pigtail catheter placement    4/20 s/p Right thoracentesis with pigtail catheter placement    Both chest tubes were removed on 4/29 4/20 Echo - LVEF 50-55% w trace pericardial effusion.     4/30 CT A&P after Cycle 1 of chemo showed POD. Chemo plan changed to Carbo and Taxol and given on 5/5. Next Cycle due on 5/26 5/4 AXR was ordered for persistent n/v to rule out obstruction found to be (-). Repeated again on 5/12 with no changes and possible ascities. Abdominal US then completed and showed no evidence of ascites    Dexamethasone started for increased nausea/vomiting and symptoms improved. Dex was tapered to once daily on 5/13.                # Infectious disease:     febrile  + leukocytosis     COVID 19 (-) x 4 (3/25, 4/1, 4/4, 4/6, 4/15)    4/17 Pleural fluid cultures, fungal cultures, Gram stain (-) (L thoracentesis)    4/18 Urine cultures 10 - 49,000 colonies of Carbapenem resistant Klebsiella pneumoniae    4/19 Ucx (+) 50-99,000 colonies of Carbapenem  resistant Klebsiella pneumoniae     As per ID: Dr. Arenas, since patient has no urinary symptoms will defer antibiotics.     4/20 Pleural studies (-) fungal cultures, gram stain.     4/20 Bcx (-) Ucx (+) E. Coli again monitored off abx for no symptoms    5/8 Fever of 100.6 Urine and blood cultures (-) started on Meropenum

## 2020-04-07 NOTE — DISCHARGE NOTE PROVIDER - NSDCCPCAREPLAN_GEN_ALL_CORE_FT
PRINCIPAL DISCHARGE DIAGNOSIS  Diagnosis: Metastatic cancer  Assessment and Plan of Treatment: Metastatic cancer      SECONDARY DISCHARGE DIAGNOSES  Diagnosis: Abdominal pain, unspecified abdominal location  Assessment and Plan of Treatment: Abdominal pain, unspecified abdominal location PRINCIPAL DISCHARGE DIAGNOSIS  Diagnosis: Metastatic cancer  Assessment and Plan of Treatment: Maintain comfort and follow up with your Oncologist for your next cycle of chemotherapy. Notify MD and report to the ER for any Temp greater than or equal to 100.4, intractable nausea, vomiting, diarrhea or uncontrollable bleeding.      SECONDARY DISCHARGE DIAGNOSES  Diagnosis: Abdominal pain, unspecified abdominal location  Assessment and Plan of Treatment: Pain management

## 2020-04-08 NOTE — PROGRESS NOTE ADULT - ASSESSMENT
24F w/ no PMH presenting with abdominal pain, found to have bilateral pelvic masses and extensive pelvic lymphadenopathy concerning for metastatic malignancy. 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 Pt with ongoing fevers, now hypoxia, overall clinical decline.     #Fever, tachycardia  -suspected 2/2 tumor fever; NSAIDs stopped 4/1/20 in anticipation of port placement, which could have been previously suppressing higher fever that is now unmasked. However, since she meet sepsis criteria, started on abx (vancomycin, zosyn) on 4/1/20  -cultures negative, 4/1/20 however pt has continued to spike fevers- repeat blood cultures from 4/5 pending   -COVID-19 negative x 3 but parainfluenza positive, however this does not explain pt's encephalopathy and continued fevers   -Chest CT as above, negative for central PE, but unable to evaluate segmental and subsegmental branches due to motion artifact. Lung imaging shows unchanged metastatic nodules, small bilateral pleural effusions and atelectasis of the basilar LLL (unchanged as well), no new opacity, decreasing the likelihood of PNA  - CT A/P repeated on 4/3/20: no obvious source of infection   -urine cultures growing multiple species, likely contaminated  - ID consulted now on board, CDIFF negative    -s/p PICC line placement for chemotherapy; mental status much improved today, and has been afebrile since yesterday, will plan to start modified FOLFOX tomorrow 4/9 if patient remains afebrile. Would avoid Tylenol for pain control for now as it can mask fevers.   - Ammonia level normal     #Metastatic carcinoma, cancer of unknown primary  - CT A/P with bilateral heterogeneous adnexal masses, as well as upper abdominal, RP, possible bone lesions and pelvic lymphadenopathy; CT Chest with multiple solid pulmonary nodules  - Cancer of unknown primary (poorly differentiated carcinoma) but suspect likely GI origin given CDX2 positivity on pathology; chromogranin positivity also suggests neuroendocrine features; Ki-67 index 40%.   - tumor markers elevated: Cancer Antigen, Ca 27.29      Cancer Antigen, 125: 619      Carcinoembryonic Antigen: 44.7 beta  on initial evaluation; checked again 4/1/20  - Discussed diagnosis with patient on 3/31/20. Given advanced, metastatic stage, will need to be on indefinite treatment to limit progression of disease.  - Plan to start mFOLFOX6 inpatient tomorrow 4/8. Consent for chemotherapy obtained, placed in chart. Will discuss plan with brother today.   - No plan for GI endoscopy at this time  - Leukocytosis and fevers likely 2/2 tumor vs infection. Flow cytometry negative for clonal process (CLL / CML)  - Diet and VTE ppx restarted, port placement on hold given fever work up      #Hypercalcemia  -likely 2/2 malignancy  - gave 1 dose Pamidronate 60 mg IV on 4/5. Continue to trend daily.   - may be contributing to MS changes as well     #Microcytic anemia  - reviewed peripheral smear, shows polychromasia despite reticulocyte count inappropriately low  - possible sickle cell trait so baseline Hb may be closer to 10-11  - possible BM involvement with carcinoma. Would defer BM evaluation     #Pain control   - Pain worse today, touched base with palliative care  - Restart PCA pump  - Would hold off on Tylenol for now as it can mask fevers         Cesia aBnks MD  Hematology Oncology Fellow, PGY-4  [facility] Pager: [phone]/ [facility] Pager: [phone] 24F w/ no PMH presenting with abdominal pain, found to have bilateral pelvic masses and extensive pelvic lymphadenopathy concerning for metastatic malignancy. 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 Pt with ongoing fevers, now hypoxia, overall clinical decline.     #Fever, tachycardia  -suspected 2/2 tumor fever; NSAIDs stopped 4/1/20 in anticipation of port placement, which could have been previously suppressing higher fever that is now unmasked. However, since she meet sepsis criteria, started on abx (vancomycin, zosyn) on 4/1/20  -cultures negative, 4/1/20 however pt has continued to spike fevers- repeat blood cultures from 4/5 pending   -COVID-19 negative x 3 but parainfluenza positive, however this does not explain pt's encephalopathy and continued fevers   -Chest CT as above, negative for central PE, but unable to evaluate segmental and subsegmental branches due to motion artifact. Lung imaging shows unchanged metastatic nodules, small bilateral pleural effusions and atelectasis of the basilar LLL (unchanged as well), no new opacity, decreasing the likelihood of PNA  - CT A/P repeated on 4/3/20: no obvious source of infection   -urine cultures growing multiple species, likely contaminated  - ID consulted now on board, CDIFF negative    -s/p PICC line placement for chemotherapy; mental status much improved today, and has been afebrile since yesterday, will plan to start modified FOLFOX tomorrow 4/9 if patient remains afebrile. Would avoid Tylenol for pain control for now as it can mask fevers.   - Ammonia level normal     #Metastatic carcinoma, cancer of unknown primary  - CT A/P with bilateral heterogeneous adnexal masses, as well as upper abdominal, RP, possible bone lesions and pelvic lymphadenopathy; CT Chest with multiple solid pulmonary nodules  - Cancer of unknown primary (poorly differentiated carcinoma) but suspect likely GI origin given CDX2 positivity on pathology; chromogranin positivity also suggests neuroendocrine features; Ki-67 index 40%.   - tumor markers elevated: Cancer Antigen, Ca 27.29      Cancer Antigen, 125: 619      Carcinoembryonic Antigen: 44.7 beta  on initial evaluation; checked again 4/1/20  - Discussed diagnosis with patient on 3/31/20. Given advanced, metastatic stage, will need to be on indefinite treatment to limit progression of disease.  - Plan to start mFOLFOX6 inpatient tomorrow 4/8. Consent for chemotherapy obtained, placed in chart. Will discuss plan with brother today.   - No plan for GI endoscopy at this time  - Leukocytosis and fevers likely 2/2 tumor vs infection. Flow cytometry negative for clonal process (CLL / CML)  - Diet and VTE ppx restarted, port placement on hold given fever work up      #Hypercalcemia  -likely 2/2 malignancy  - gave 1 dose Pamidronate 60 mg IV on 4/5. Continue to trend daily.   - may be contributing to MS changes as well     #Microcytic anemia  - reviewed peripheral smear, shows polychromasia despite reticulocyte count inappropriately low  - possible sickle cell trait so baseline Hb may be closer to 10-11  - possible BM involvement with carcinoma. Would defer BM evaluation     #Pain control   - Pain worse today, touched base with palliative care  - Restart dilaudid infusion with dilaudid prn injections   - Would hold off on Tylenol for now as it can mask fevers        Cesia Banks MD  Hematology Oncology Fellow, PGY-4  [facility] Pager: [phone]/ [facility] Pager: [phone]

## 2020-04-08 NOTE — PROGRESS NOTE ADULT - SUBJECTIVE AND OBJECTIVE BOX
HPI: 24F here with worsening abdominal pain in the setting of known adnexal massess, found to have for metastatic carcinoma with final pathology pending. Given evidence of metastatic disease, patient is not a surgical candidate. Has been having worsening adnexal pain, markedly last night after taking senna. States pain is 7-8/10 at its worst, located in adnexal area with radiation down to legs, worse with movement, and tolerable with opioids around 2/10. Palliative care called to help with pain.     INTERVAL EVENTS:  3/30: patient states IV dilaudid works, but PO and naproxen aren't enough to keep her pain at a tolerable level, d/w patient  3/31: states having vomiting and going to the bathroom are exacerbating her pain, and the PO appears to last 2 hours. States the IV meds take the pain away but not as much as before.   4/1: used dilaudid 0.8mg IV x 3, and 4mg PO x 3, with additional 1mg x 2 doses  4/2: on PCA< used 6 injections and attempts in 12 hours  4/3: see usage below, feels it is helping with her pain, is having diarrhea  4/6: lethargic  4/8: Poor control of pain, d/w with Heme attending; 3 prns used overnight    ADVANCE DIRECTIVES:    DNR  MOLST  [ ]  Living Will  [ ]   DECISION MAKER(s):  [ ] Health Care Proxy(s)  [ ] Surrogate(s)  [ ] Guardian           Name(s): Phone Number(s):    BASELINE (I)ADL(s) (prior to admission):  [place]: [ ]Total  [ ] Moderate [ ]Dependent    Allergies    No Known Allergies    Intolerances       PRESENT SYMPTOMS: [ ]Unable to obtain due to poor mentation   Source if other than patient:  [ ]Family   [ ]Team     Pain: [X ]yes [ ]no  QOL impact -  decreased function  Location - adnexal  Aggravating factors - movement  Quality - burning  Radiation - down legs  Timing- constant  Severity (0-10 scale): 8/10   Minimal acceptable level (0-10 scale): 2/10    CPOT:    https://www.s[facility].org/getattachment/mvq92z69-9y6u-4i1l-5i7a-5853t8263q5s/Critical-Care-Pain-Observation-Tool-(CPOT)      PAIN AD Score:     http://geriatrictoolkit.[facility]/cog/painad.pdf (press ctrl +  left click to view)    Dyspnea:                           [ ]Mild [ ]Moderate [ ]Severe  Anxiety:                             [ ]Mild [ ]Moderate [ ]Severe  Fatigue:                             [ ]Mild [ ]Moderate [ ]Severe  Nausea:                             [ ]Mild [ ]Moderate [ ]Severe  Loss of appetite:              [ ]Mild [ ]Moderate [ ]Severe  Constipation:                    [ ]Mild [ ]Moderate [ ]Severe    Other Symptoms:  [X ]All other review of systems negative     Palliative Performance Status Version 2:   40      %    http://[facility].org/files/news/palliative_performance_scale_ppsv2.pdf    PHYSICAL EXAM:  Vital Signs Last 24 Hrs  T(C): 38.6 (03 Apr 2020 15:55), Max: 39.8 (03 Apr 2020 14:11)  T(F): 101.4 (03 Apr 2020 15:55), Max: 103.6 (03 Apr 2020 14:11)  HR: 140 (03 Apr 2020 15:55) (122 - 143)  BP: 131/83 (03 Apr 2020 15:55) (114/74 - 132/85)  BP(mean): --  RR: 20 (03 Apr 2020 15:55) (18 - 20)  SpO2: 96% (03 Apr 2020 15:55) (95% - 97%)    Limited exam for patient safety and to limit infection risk during COVID-19 outbreak. Please refr to primary team's examination for today.  GENERAL:  [ X]Alert  [ ]Oriented x   [ ]Lethargic  [ ]Cachexia  [ ]Unarousable  [ X]Verbal  [ ]Non-Verbal  Behavioral:   [ ] Anxiety  [ ] Delirium [ ] Agitation [ ] Other  HEENT:  [ ]Normal   [ ]Dry mouth   [ ]ET Tube/Trach  [ ]Oral lesions  PULMONARY:   [ ]Clear [ ]Tachypnea  [ ]Audible excessive secretions   [ ]Rhonchi        [ ]Right [ ]Left [ ]Bilateral  [ ]Crackles        [ ]Right [ ]Left [ ]Bilateral  [ ]Wheezing     [ ]Right [ ]Left [ ]Bilateral  [ ]Diminished breath sounds [ ]right [ ]left [ ]bilateral  CARDIOVASCULAR:    [ ]Regular [ ]Irregular [ ]Tachy  [ ]Quirino [ ]Murmur [ ]Other  GASTROINTESTINAL:  [ ]Soft  [ ]Distended   [ ]+BS  [ ]Non tender [ ]Tender  [ ]PEG [ ]OGT/ NGT  Last BM:     GENITOURINARY:  [ ]Normal [ ] Incontinent   [ ]Oliguria/Anuria   [ ]Cifuentes  MUSCULOSKELETAL:   [ ]Normal   [ x]Weakness  [ ]Bed/Wheelchair bound [ ]Edema  NEUROLOGIC:  Lucid speech  [ ]No focal deficits  [ ]Cognitive impairment  [ ]Dysphagia [ ]Dysarthria [ ]Paresis [ ]Other   SKIN:   [ ]Normal    [ ]Rash  [ ]Pressure ulcer(s)       Present on admission [ ]y [ ]n    CRITICAL CARE:  [ ] Shock Present  [ ]Septic [ ]Cardiogenic [ ]Neurologic [ ]Hypovolemic  [ ]  Vasopressors [ ]  Inotropes   [ ]Respiratory failure present [ ]Mechanical ventilation [ ]Non-invasive ventilatory support [ ]High flow  [ ]Acute  [ ]Chronic [ ]Hypoxic  [ ]Hypercarbic [ ]Other  [ ]Other organ failure     LABS: reviewed                          7.7    41.90 )-----------( 611      ( 03 Apr 2020 10:03 )             25.6     04-03    130<L>  |  91<L>  |  8   ----------------------------<  92  3.7   |  27  |  0.75    Ca    11.4<H>      03 Apr 2020 10:03  Phos  2.4     04-03  Mg     1.8     04-03            RADIOLOGY & ADDITIONAL STUDIES:    PROTEIN CALORIE MALNUTRITION PRESENT: [ ]mild [ ]moderate [ ]severe [ ]underweight [ ]morbid obesity  https://www.andeal.org/vault/8300/web/files/ONC/Table_Clinical%20Characteristics%20to%20Document%20Malnutrition-White%20JV%20et%20al%556667.pdf    Height (cm): 162.6 (03-21-20 @ 03:15), 162.56 (03-07-20 @ 19:18)  Weight (kg): 84.4 (03-21-20 @ 03:15), 89.4 (03-07-20 @ 19:18)  BMI (kg/m2): 31.9 (03-21-20 @ 03:15), 33.8 (03-07-20 @ 19:18)    [ ]PPSV2 < or = to 30% [ ]significant weight loss  [ ]poor nutritional intake  [ ]anasarca     Albumin, Serum: 2.9 g/dL (03-25-20 @ 05:59)   [ ]Artificial Nutrition      REFERRALS:   [ ]Chaplaincy  [ ]Hospice  [ ]Child Life  [ ]Social Work  [ ]Case management [ ]Holistic Therapy     Goals of Care Document:     ______________  Alex Mack MD   of Geriatric and Palliative Medicine  [facility]     Please page the following number for clinical matters between the hours of 9AM and 5PM   from Monday through Friday : [phone]    After 5PM and on weekends, please page: [phone]. The Geriatric and Palliative Medicine consult service has 24/7 coverage for medical recommendations, including for symptom management needs.

## 2020-04-08 NOTE — PROGRESS NOTE ADULT - PROBLEM SELECTOR PLAN 1
Pain is poorly controlled  Link to chart note  likely due to malignancy  discontinue ATC dilaudid IVP  begin dilaudid infusion 0.5mg/hr  Commensurate with IV dilaudid use  IVP dilaudud 0.75mg q2H prn      In the event of newly developing, evolving, or worsening symptoms, please contact the Palliative Medicine team via pager (if the patient is at [facility] #[phone] or if the patient is at [facility] #[phone]) The Geriatric and Palliative Medicine service has coverage 24 hours a day/ 7 days a week to provide medical recommendations regarding symptom management needs via telephone    naloxone prn

## 2020-04-08 NOTE — PROGRESS NOTE ADULT - ATTENDING COMMENTS
Patient slowly recovering from parainfluenza, afebrile today.   Planning to start mFOLFOX tomorrow.   Pain control - still with breakthrough pain, discussed with palliative care, starting infusion.

## 2020-04-08 NOTE — PROGRESS NOTE ADULT - ASSESSMENT
24 f with b/l adnexal masses and pelvic LAD, metastatic carcinoma of unknown primary presented 3/20 with abd pain, s/p CT guided biopsy 3/25, PICC line 4/3 to start chemo  pt intermittently febrile and leukocytosis, now febrile to 101.7 and tachy, WBC: 34  QUINN for the last 2 days now Cr: 1.3  blood and urine cx negative  COVID negative x 3 last one 4/4  RVP 4/1 with parainfluenza  now pt with diarrhea but no BM for 3 days    persistent fever and leukocytosis, with tachycardia sepsis  C-diff negative  parainfluenza URI 4/1  metastatic ca with ?tumor fever    * zosyn was discontinued and pt stable  * infection w/u negative and likely malignancy related  * no absolute contraindications from ID point of view of chemo if no new signs of infections  * monitor the WBC and temp    The above assessment and plan was discussed with hem/onc team    Gina Arenas MD  Pager [phone]  After 5pm and on weekends call [phone]

## 2020-04-08 NOTE — CHART NOTE - NSCHARTNOTEFT_GEN_A_CORE
Spoke with the attending, pt is in pain  Contacted the patient using the HELP line  Pain is uncontrolled  10/10 mostly   30 minutes of relief post prn or ATC dose  No adverse effects noted  Dilaudid prn used 3 X  mg = 4.5mg  Dilaudid ATC used 5 X 1.5 mg =7.5mg  Total 12mg   12mg/24 = 0.5mg qtt  Give 1mg IVP dilaudid prior to infusion to help achieve steady state sooner.  Consider Dilaudid infusion 0.5 mg/hr  PRN IVP dilaudid 0.75 q2H PRN  Please Page palliative medicine if no improvement post 2 PRNs in 4 hours  Naloxone prn        In the event of newly developing, evolving, or worsening symptoms, please contact the Palliative Medicine team via pager (if the patient is at [facility] #[phone] or if the patient is at [facility] #[phone]) The Geriatric and Palliative Medicine service has coverage 24 hours a day/ 7 days a week to provide medical recommendations regarding symptom management needs via telephone

## 2020-04-08 NOTE — PROGRESS NOTE ADULT - SUBJECTIVE AND OBJECTIVE BOX
Follow Up:  fever, leukocytosis    Interval History: C-diff was negative and antibiotics discontinued yesterday, today fever curve better    ROS:      All other systems negative    Constitutional: + fever,  chills  Head: no trauma  Eyes: no vision changes, no eye pain  ENT:  no sore throat, no rhinorrhea  Cardiovascular:  no chest pain, no palpitation  Respiratory:  no SOB, no cough  GI:  + abd pain, no vomiting, no diarrhea   urinary: no dysuria, no hematuria, no flank pain  musculoskeletal:  no joint pain, no joint swelling  skin:  no rash  neurology:  no headache, no seizure, no change in mental status  psych: no anxiety, no depression         Allergies  No Known Allergies        ANTIMICROBIALS:      OTHER MEDS:  acetaminophen   Tablet .. 650 milliGRAM(s) Oral every 6 hours PRN  enoxaparin Injectable 40 milliGRAM(s) SubCutaneous daily  famotidine    Tablet 20 milliGRAM(s) Oral daily  HYDROmorphone  Injectable 0.75 milliGRAM(s) IV Push every 2 hours PRN  HYDROmorphone Infusion 0.5 mG/Hr IV Continuous <Continuous>  metoclopramide Injectable 10 milliGRAM(s) IV Push every 6 hours PRN  naloxone Injectable 0.1 milliGRAM(s) IV Push every 3 minutes PRN  naloxone Injectable 0.1 milliGRAM(s) IV Push once PRN  ondansetron Injectable 4 milliGRAM(s) IV Push every 6 hours PRN  polyethylene glycol 3350 17 Gram(s) Oral daily  senna 2 Tablet(s) Oral at bedtime  simethicone 80 milliGRAM(s) Chew every 6 hours PRN  sodium chloride 0.9% lock flush 3 milliLiter(s) IV Push every 8 hours  sodium chloride 0.9% lock flush 3 milliLiter(s) IV Push every 8 hours      Vital Signs Last 24 Hrs  T(C): 37.8 (2020 09:55), Max: 38.3 (2020 13:02)  T(F): 100.1 (2020 09:55), Max: 100.9 (2020 13:02)  HR: 125 (2020 09:55) (118 - 125)  BP: 119/76 (2020 09:55) (115/61 - 135/80)  BP(mean): --  RR: 20 (2020 09:55) (19 - 20)  SpO2: 96% (2020 09:55) (95% - 98%)    Physical Exam:  General:    NAD,  non toxic  Head: atraumatic, normocephalic  Eye: normal sclera and conjunctiva  ENT:    no oropharyngeal lesions,   no LAD,   neck supple  Cardio:    tachy regular S1, S2  Respiratory:    clear b/l,    no wheezing but slightly tachypneic  abd:    palpable mass, soft,   BS +,  slight diffuse tenderness  :   no CVAT,  no suprapubic tenderness,   no  cash  Musculoskeletal:   no joint swelling,   no edema  vascular: RUE picc  Skin:    no rash  Neurologic:     no focal deficit  psych: normal affect                        7.6    33.70 )-----------( 633      ( 2020 07:28 )             25.8           135  |  96  |  9   ----------------------------<  99  3.8   |  29  |  1.05    Ca    10.0      2020 07:31  Phos  1.2       Mg     2.2         TPro  6.5  /  Alb  2.4<L>  /  TBili  0.4  /  DBili  x   /  AST  27  /  ALT  <5<L>  /  AlkPhos  185<H>        Urinalysis Basic - ( 2020 23:51 )    Color: Yellow / Appearance: Turbid / S.025 / pH: x  Gluc: x / Ketone: Negative  / Bili: Negative / Urobili: <2 mg/dL   Blood: x / Protein: 30 mg/dL / Nitrite: Negative   Leuk Esterase: Small / RBC: 11 /HPF / WBC 14 /HPF   Sq Epi: x / Non Sq Epi: 2 /HPF / Bacteria: Negative        MICROBIOLOGY:  v  .Blood Blood-Peripheral  20   No growth to date.  --  --      .Blood Blood-Peripheral  20   No growth to date.  --  --      .Urine Clean Catch (Midstream)  20   No growth  --  --      .Blood Blood-Peripheral  20   No Growth Final  --  --      .Blood Blood-Catheter  20   No Growth Final  --  --      .Blood Blood-Peripheral  20   No Growth Final  --  --      .Urine Clean Catch (Midstream)  20   >=3 organisms. Probable collection contamination.  --  --      .Blood Blood-Peripheral  20   No Growth Final  --  --      .Blood Blood-Venous  20   No growth at 5 days.  --  --      .Blood Blood-Peripheral  20   No growth at 5 days.  --  --      .Urine Clean Catch (Midstream)  20   <10,000 CFU/mL Normal Urogenital Hui  --  --            Clostridium difficile [facility] Toxins A&amp;B, EIA:   Negative (20 @ 22:12)  Clostridium difficile GD Interpretation: Negative for toxigenic C. Difficile.    RADIOLOGY:  Images below independently visualized and reviewed personally, findings as below  < from: Xray Chest 1 View- PORTABLE-Urgent (20 @ 10:32) >    IMPRESSION:     Worsening bilateral patchy airspace disease.    Unchanged small left pleural effusion.    < from: CT Abdomen and Pelvis w/ IV Cont (20 @ 16:46) >  IMPRESSION:     Widely metastatic neoplasm without significant change from prior abdominal CT 3/20/2020.    No evidence of an infectious source in the abdomen or pelvis.

## 2020-04-08 NOTE — PROGRESS NOTE ADULT - SUBJECTIVE AND OBJECTIVE BOX
INTERVAL History: Pt seen this morning. Patient complains of 10/10 pain and stated that once she receives her breakthrough pain, it does not last for more than 30 minutes. She states she is hesitant to start chemo today and would prefer for tomorrow. Appears more awake and alert today.     Allergies    No Known Allergies    Intolerances        MEDICATIONS  (STANDING):  enoxaparin Injectable 40 milliGRAM(s) SubCutaneous daily  famotidine    Tablet 20 milliGRAM(s) Oral daily  HYDROmorphone Infusion 0.5 mG/Hr (0.5 mL/Hr) IV Continuous <Continuous>  polyethylene glycol 3350 17 Gram(s) Oral daily  senna 2 Tablet(s) Oral at bedtime  sodium chloride 0.9% lock flush 3 milliLiter(s) IV Push every 8 hours  sodium chloride 0.9% lock flush 3 milliLiter(s) IV Push every 8 hours    MEDICATIONS  (PRN):  acetaminophen   Tablet .. 650 milliGRAM(s) Oral every 6 hours PRN Temp greater or equal to 38C (100.4F)  HYDROmorphone  Injectable 0.75 milliGRAM(s) IV Push every 2 hours PRN mild and moderate pain  metoclopramide Injectable 10 milliGRAM(s) IV Push every 6 hours PRN nausea/vomiting  naloxone Injectable 0.1 milliGRAM(s) IV Push every 3 minutes PRN Sedation or RR <10  naloxone Injectable 0.1 milliGRAM(s) IV Push once PRN respiratory distress on pca pump  ondansetron Injectable 4 milliGRAM(s) IV Push every 6 hours PRN Nausea and/or Vomiting  simethicone 80 milliGRAM(s) Chew every 6 hours PRN Gas      Vital Signs Last 24 Hrs  T(C): 37.8 (2020 09:55), Max: 38.3 (2020 13:02)  T(F): 100.1 (2020 09:55), Max: 100.9 (2020 13:02)  HR: 125 (2020 09:55) (118 - 125)  BP: 119/76 (2020 09:55) (115/61 - 135/80)  BP(mean): --  RR: 20 (2020 09:55) (19 - 20)  SpO2: 96% (2020 09:55) (95% - 98%)    PHYSICAL EXAM:    General: AOX3, in moderate distress 2/2 pain  Patient not physically examined to decrease COVID exposure to our already immunocompromised patient population.       LABS:                        7.6    33.70 )-----------( 633      ( 2020 07:28 )             25.8     04-08    135  |  96  |  9   ----------------------------<  99  3.8   |  29  |  1.05    Ca    10.0      2020 07:31  Phos  1.2     04-08  Mg     2.2     04-08    TPro  6.5  /  Alb  2.4<L>  /  TBili  0.4  /  DBili  x   /  AST  27  /  ALT  <5<L>  /  AlkPhos  185<H>  04-08    PT/INR - ( 2020 07:28 )   PT: 15.8 sec;   INR: 1.36 ratio           Urinalysis Basic - ( 2020 23:51 )    Color: Yellow / Appearance: Turbid / S.025 / pH: x  Gluc: x / Ketone: Negative  / Bili: Negative / Urobili: <2 mg/dL   Blood: x / Protein: 30 mg/dL / Nitrite: Negative   Leuk Esterase: Small / RBC: 11 /HPF / WBC 14 /HPF   Sq Epi: x / Non Sq Epi: 2 /HPF / Bacteria: Negative          RADIOLOGY & ADDITIONAL STUDIES:    PATHOLOGY:

## 2020-04-09 NOTE — PROGRESS NOTE ADULT - ATTENDING COMMENTS
Patient slowly recovering from parainfluenza, afebrile.  mFOLFOX today.   Pain control improved today on infusion.

## 2020-04-09 NOTE — CHART NOTE - NSCHARTNOTEFT_GEN_A_CORE
Discussed with the primary team  Pain has improved  Chemo to begin today  Cr. has improved  May rotate to morphine infusion tomorrow  Ensure bowel regimen  senna and miralax qd  Will follow

## 2020-04-09 NOTE — PROGRESS NOTE ADULT - SUBJECTIVE AND OBJECTIVE BOX
Follow Up:  fever, leukocytosis    Interval History: pt afebrile, tmax 100.1 but WBC still 32    ROS:      All other systems negative    Constitutional: no fever,  chills  Head: no trauma  Eyes: no vision changes, no eye pain  ENT:  no sore throat, no rhinorrhea  Cardiovascular:  no chest pain, no palpitation  Respiratory:  no SOB, no cough  GI:  + abd pain, no vomiting, no diarrhea   urinary: no dysuria, no hematuria, no flank pain  musculoskeletal:  no joint pain, no joint swelling  skin:  no rash  neurology:  no headache, no seizure, no change in mental status  psych: no anxiety, no depression     Allergies  No Known Allergies        ANTIMICROBIALS:      OTHER MEDS:  acetaminophen   Tablet .. 650 milliGRAM(s) Oral every 6 hours PRN  aluminum hydroxide/magnesium hydroxide/simethicone Suspension 30 milliLiter(s) Oral every 6 hours PRN  dexAMETHasone  IVPB 12 milliGRAM(s) IV Intermittent once  enoxaparin Injectable 40 milliGRAM(s) SubCutaneous daily  famotidine    Tablet 20 milliGRAM(s) Oral daily  fosaprepitant IVPB 150 milliGRAM(s) IV Intermittent once  HYDROmorphone  Injectable 0.75 milliGRAM(s) IV Push every 2 hours PRN  HYDROmorphone Infusion 0.5 mG/Hr IV Continuous <Continuous>  metoclopramide Injectable 10 milliGRAM(s) IV Push every 6 hours PRN  metoclopramide Injectable 10 milliGRAM(s) IV Push every 6 hours PRN  naloxone Injectable 0.1 milliGRAM(s) IV Push every 3 minutes PRN  naloxone Injectable 0.1 milliGRAM(s) IV Push once PRN  ondansetron  IVPB 16 milliGRAM(s) IV Intermittent every 24 hours  ondansetron Injectable 4 milliGRAM(s) IV Push every 6 hours PRN  phytonadione   Solution 10 milliGRAM(s) Oral daily  polyethylene glycol 3350 17 Gram(s) Oral daily  senna 2 Tablet(s) Oral at bedtime  simethicone 80 milliGRAM(s) Chew every 6 hours PRN  sodium chloride 0.9% lock flush 3 milliLiter(s) IV Push every 8 hours  sodium chloride 0.9% lock flush 3 milliLiter(s) IV Push every 8 hours      Vital Signs Last 24 Hrs  T(C): 36.2 (2020 08:55), Max: 37.8 (2020 17:48)  T(F): 97.2 (2020 08:55), Max: 100.1 (2020 01:34)  HR: 122 (2020 06:13) (112 - 130)  BP: 141/90 (2020 06:13) (122/68 - 141/90)  BP(mean): --  RR: 20 (2020 06:13) (20 - 22)  SpO2: 94% (2020 06:13) (94% - 97%)    Physical Exam:  General:    NAD,  non toxic  Head: atraumatic, normocephalic  Eye: normal sclera and conjunctiva  ENT:    no oropharyngeal lesions,   no LAD,   neck supple  Cardio:    tachy regular S1, S2  Respiratory:    clear b/l,    no wheezing but slightly tachypneic  abd:    palpable mass, soft,   BS +,  slight diffuse tenderness  :   no CVAT,  no suprapubic tenderness,   no  cash  Musculoskeletal:   no joint swelling,   no edema  vascular: RUE picc  Skin:    no rash  Neurologic:     no focal deficit  psych: normal affect                          7.4    32.62 )-----------( 593      ( 2020 06:40 )             25.1       04-    136  |  96  |  10  ----------------------------<  96  4.1   |  27  |  0.88    Ca    9.7      2020 06:39  Phos  1.3     04-  Mg     2.2         TPro  6.5  /  Alb  2.4<L>  /  TBili  0.6  /  DBili  x   /  AST  28  /  ALT  6<L>  /  AlkPhos  182<H>        Urinalysis Basic - ( 2020 23:51 )    Color: Yellow / Appearance: Turbid / S.025 / pH: x  Gluc: x / Ketone: Negative  / Bili: Negative / Urobili: <2 mg/dL   Blood: x / Protein: 30 mg/dL / Nitrite: Negative   Leuk Esterase: Small / RBC: 11 /HPF / WBC 14 /HPF   Sq Epi: x / Non Sq Epi: 2 /HPF / Bacteria: Negative        MICROBIOLOGY:  v  .Urine Clean Catch (Midstream)  20   No growth  --  --      .Blood Blood-Peripheral  20   No growth to date.  --  --      .Blood Blood-Peripheral  20   No growth to date.  --  --      .Urine Clean Catch (Midstream)  20   No growth  --  --      .Blood Blood-Peripheral  20   No Growth Final  --  --      .Blood Blood-Catheter  20   No Growth Final  --  --      .Blood Blood-Peripheral  20   No Growth Final  --  --      .Urine Clean Catch (Midstream)  20   >=3 organisms. Probable collection contamination.  --  --      .Blood Blood-Peripheral  20   No Growth Final  --  --      .Blood Blood-Venous  20   No growth at 5 days.  --  --      .Blood Blood-Peripheral  20   No growth at 5 days.  --  --      .Urine Clean Catch (Midstream)  20   <10,000 CFU/mL Normal Urogenital Hui  --  --            Clostridium difficile [facility] Toxins A&amp;B, EIA:   Negative (20 @ 22:12)  Clostridium difficile GDH Interpretation: Negative for toxigenic C. Difficile.  This specimen is negative for C.  Difficile glutamate dehydrogenase (GDH) antigen and negative for C.  Difficile Toxins A & B, by EIA.  GDH is a highly sensitive screening  marker for C. Difficile that is produced in large amounts by all C.  Difficile strains, both toxigenic and nontoxigenic.  This assay has not  been validated as a test of cure.  Repeat testing during the same episode  of diarrhea is of limited value and is discouraged.  The results of this  assay should always be interpreted in conjunction with pateint's clinical  history. (20 @ 22:12)      RADIOLOGY:  Images below independently visualized and reviewed personally, findings as below  < from: Xray Chest 1 View- PORTABLE-Urgent (20 @ 10:32) >  IMPRESSION:     Worsening bilateral patchy airspace disease.    Unchanged small left pleural effusion.    < from: CT Abdomen and Pelvis w/ IV Cont (20 @ 16:46) >    IMPRESSION:     Widely metastatic neoplasm without significant change from prior abdominal CT 3/20/2020.    No evidence of an infectious source in the abdomen or pelvis.

## 2020-04-09 NOTE — PROGRESS NOTE ADULT - SUBJECTIVE AND OBJECTIVE BOX
INTERVAL History:    Allergies    No Known Allergies    Intolerances        MEDICATIONS  (STANDING):  enoxaparin Injectable 40 milliGRAM(s) SubCutaneous daily  famotidine    Tablet 20 milliGRAM(s) Oral daily  HYDROmorphone Infusion 0.5 mG/Hr (0.5 mL/Hr) IV Continuous <Continuous>  phytonadione   Solution 10 milliGRAM(s) Oral daily  polyethylene glycol 3350 17 Gram(s) Oral daily  senna 2 Tablet(s) Oral at bedtime  sodium chloride 0.9% lock flush 3 milliLiter(s) IV Push every 8 hours  sodium chloride 0.9% lock flush 3 milliLiter(s) IV Push every 8 hours    MEDICATIONS  (PRN):  acetaminophen   Tablet .. 650 milliGRAM(s) Oral every 6 hours PRN Temp greater or equal to 38C (100.4F)  HYDROmorphone  Injectable 0.75 milliGRAM(s) IV Push every 2 hours PRN mild and moderate pain  metoclopramide Injectable 10 milliGRAM(s) IV Push every 6 hours PRN nausea/vomiting  naloxone Injectable 0.1 milliGRAM(s) IV Push every 3 minutes PRN Sedation or RR <10  naloxone Injectable 0.1 milliGRAM(s) IV Push once PRN respiratory distress on pca pump  ondansetron Injectable 4 milliGRAM(s) IV Push every 6 hours PRN Nausea and/or Vomiting  simethicone 80 milliGRAM(s) Chew every 6 hours PRN Gas      Vital Signs Last 24 Hrs  T(C): 37.8 (2020 06:13), Max: 37.8 (2020 09:55)  T(F): 100.1 (2020 06:13), Max: 100.1 (2020 09:55)  HR: 122 (2020 06:13) (112 - 130)  BP: 141/90 (2020 06:13) (119/76 - 141/90)  BP(mean): --  RR: 20 (2020 06:13) (20 - 22)  SpO2: 94% (2020 06:13) (94% - 97%)    PHYSICAL EXAM:    General: AOX3, no acute distress        LABS:                        7.4    32.62 )-----------( 593      ( 2020 06:40 )             25.1     04-09    136  |  96  |  10  ----------------------------<  96  4.1   |  27  |  0.88    Ca    9.7      2020 06:39  Phos  1.3       Mg     2.2         TPro  6.5  /  Alb  2.4<L>  /  TBili  0.6  /  DBili  x   /  AST  28  /  ALT  6<L>  /  AlkPhos  182<H>      PT/INR - ( 2020 06:40 )   PT: 17.4 sec;   INR: 1.49 ratio           Urinalysis Basic - ( 2020 23:51 )    Color: Yellow / Appearance: Turbid / S.025 / pH: x  Gluc: x / Ketone: Negative  / Bili: Negative / Urobili: <2 mg/dL   Blood: x / Protein: 30 mg/dL / Nitrite: Negative   Leuk Esterase: Small / RBC: 11 /HPF / WBC 14 /HPF   Sq Epi: x / Non Sq Epi: 2 /HPF / Bacteria: Negative          RADIOLOGY & ADDITIONAL STUDIES:    PATHOLOGY: INTERVAL History: Patient seen this morning. No acute events overnight. Pt's pain is better controlled today. Pt complained of some heartburn symptoms. No fevers. Patient ready to start chemo today.     Allergies    No Known Allergies    Intolerances        MEDICATIONS  (STANDING):  enoxaparin Injectable 40 milliGRAM(s) SubCutaneous daily  famotidine    Tablet 20 milliGRAM(s) Oral daily  HYDROmorphone Infusion 0.5 mG/Hr (0.5 mL/Hr) IV Continuous <Continuous>  phytonadione   Solution 10 milliGRAM(s) Oral daily  polyethylene glycol 3350 17 Gram(s) Oral daily  senna 2 Tablet(s) Oral at bedtime  sodium chloride 0.9% lock flush 3 milliLiter(s) IV Push every 8 hours  sodium chloride 0.9% lock flush 3 milliLiter(s) IV Push every 8 hours    MEDICATIONS  (PRN):  acetaminophen   Tablet .. 650 milliGRAM(s) Oral every 6 hours PRN Temp greater or equal to 38C (100.4F)  HYDROmorphone  Injectable 0.75 milliGRAM(s) IV Push every 2 hours PRN mild and moderate pain  metoclopramide Injectable 10 milliGRAM(s) IV Push every 6 hours PRN nausea/vomiting  naloxone Injectable 0.1 milliGRAM(s) IV Push every 3 minutes PRN Sedation or RR <10  naloxone Injectable 0.1 milliGRAM(s) IV Push once PRN respiratory distress on pca pump  ondansetron Injectable 4 milliGRAM(s) IV Push every 6 hours PRN Nausea and/or Vomiting  simethicone 80 milliGRAM(s) Chew every 6 hours PRN Gas      Vital Signs Last 24 Hrs  T(C): 37.8 (2020 06:13), Max: 37.8 (2020 09:55)  T(F): 100.1 (2020 06:13), Max: 100.1 (2020 09:55)  HR: 122 (2020 06:13) (112 - 130)  BP: 141/90 (2020 06:13) (119/76 - 141/90)  BP(mean): --  RR: 20 (2020 06:13) (20 - 22)  SpO2: 94% (2020 06:13) (94% - 97%)    PHYSICAL EXAM:    General: AOX3, no acute distress        LABS:                        7.4    32.62 )-----------( 593      ( 2020 06:40 )             25.1         136  |  96  |  10  ----------------------------<  96  4.1   |  27  |  0.88    Ca    9.7      2020 06:39  Phos  1.3     -  Mg     2.2         TPro  6.5  /  Alb  2.4<L>  /  TBili  0.6  /  DBili  x   /  AST  28  /  ALT  6<L>  /  AlkPhos  182<H>      PT/INR - ( 2020 06:40 )   PT: 17.4 sec;   INR: 1.49 ratio           Urinalysis Basic - ( 2020 23:51 )    Color: Yellow / Appearance: Turbid / S.025 / pH: x  Gluc: x / Ketone: Negative  / Bili: Negative / Urobili: <2 mg/dL   Blood: x / Protein: 30 mg/dL / Nitrite: Negative   Leuk Esterase: Small / RBC: 11 /HPF / WBC 14 /HPF   Sq Epi: x / Non Sq Epi: 2 /HPF / Bacteria: Negative          RADIOLOGY & ADDITIONAL STUDIES:    PATHOLOGY: INTERVAL History: Patient seen this morning. No acute events overnight. Pt's pain is better controlled today. Pt complained of some heartburn symptoms. No fevers. Patient ready to start chemo today.     Allergies    No Known Allergies    Intolerances        MEDICATIONS  (STANDING):  enoxaparin Injectable 40 milliGRAM(s) SubCutaneous daily  famotidine    Tablet 20 milliGRAM(s) Oral daily  HYDROmorphone Infusion 0.5 mG/Hr (0.5 mL/Hr) IV Continuous <Continuous>  phytonadione   Solution 10 milliGRAM(s) Oral daily  polyethylene glycol 3350 17 Gram(s) Oral daily  senna 2 Tablet(s) Oral at bedtime  sodium chloride 0.9% lock flush 3 milliLiter(s) IV Push every 8 hours  sodium chloride 0.9% lock flush 3 milliLiter(s) IV Push every 8 hours    MEDICATIONS  (PRN):  acetaminophen   Tablet .. 650 milliGRAM(s) Oral every 6 hours PRN Temp greater or equal to 38C (100.4F)  HYDROmorphone  Injectable 0.75 milliGRAM(s) IV Push every 2 hours PRN mild and moderate pain  metoclopramide Injectable 10 milliGRAM(s) IV Push every 6 hours PRN nausea/vomiting  naloxone Injectable 0.1 milliGRAM(s) IV Push every 3 minutes PRN Sedation or RR <10  naloxone Injectable 0.1 milliGRAM(s) IV Push once PRN respiratory distress on pca pump  ondansetron Injectable 4 milliGRAM(s) IV Push every 6 hours PRN Nausea and/or Vomiting  simethicone 80 milliGRAM(s) Chew every 6 hours PRN Gas      Vital Signs Last 24 Hrs  T(C): 37.8 (2020 06:13), Max: 37.8 (2020 09:55)  T(F): 100.1 (2020 06:13), Max: 100.1 (2020 09:55)  HR: 122 (2020 06:13) (112 - 130)  BP: 141/90 (2020 06:13) (119/76 - 141/90)  BP(mean): --  RR: 20 (2020 06:13) (20 - 22)  SpO2: 94% (2020 06:13) (94% - 97%)    PHYSICAL EXAM:    General: AOX3, no acute distress  Patient not physically examined in attempt to reduce COVID exposure to our already immunocompromised patient population        LABS:                        7.4    32.62 )-----------( 593      ( 2020 06:40 )             25.1         136  |  96  |  10  ----------------------------<  96  4.1   |  27  |  0.88    Ca    9.7      2020 06:39  Phos  1.3       Mg     2.2         TPro  6.5  /  Alb  2.4<L>  /  TBili  0.6  /  DBili  x   /  AST  28  /  ALT  6<L>  /  AlkPhos  182<H>      PT/INR - ( 2020 06:40 )   PT: 17.4 sec;   INR: 1.49 ratio           Urinalysis Basic - ( 2020 23:51 )    Color: Yellow / Appearance: Turbid / S.025 / pH: x  Gluc: x / Ketone: Negative  / Bili: Negative / Urobili: <2 mg/dL   Blood: x / Protein: 30 mg/dL / Nitrite: Negative   Leuk Esterase: Small / RBC: 11 /HPF / WBC 14 /HPF   Sq Epi: x / Non Sq Epi: 2 /HPF / Bacteria: Negative          RADIOLOGY & ADDITIONAL STUDIES:    PATHOLOGY:

## 2020-04-09 NOTE — PROGRESS NOTE ADULT - ASSESSMENT
24 f with b/l adnexal masses and pelvic LAD, metastatic carcinoma of unknown primary presented 3/20 with abd pain, s/p CT guided biopsy 3/25, PICC line 4/3 to start chemo  pt intermittently febrile and leukocytosis, now febrile to 101.7 and tachy, WBC: 34  QUINN for the last 2 days now Cr: 1.3  blood and urine cx negative  COVID negative x 3 last one 4/4  RVP 4/1 with parainfluenza  now pt with diarrhea but no BM for 3 days    persistent fever and leukocytosis, with tachycardia sepsis  C-diff negative  parainfluenza URI 4/1  metastatic ca with ?tumor fever    * zosyn was discontinued and pt stable, tmax 100.1  * infection w/u negative and likely malignancy related  * monitor the WBC and temp  * monitor for new signs of infection    The above assessment and plan was discussed with hem/onc team    Gina Arenas MD  Pager [phone]  After 5pm and on weekends call [phone]

## 2020-04-09 NOTE — PROGRESS NOTE ADULT - ASSESSMENT
24F w/ no PMH presenting with abdominal pain, found to have bilateral pelvic masses and extensive pelvic lymphadenopathy concerning for metastatic malignancy. 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 Pt with ongoing fevers, now hypoxia, overall clinical decline.     #Fever, tachycardia  -suspected 2/2 tumor fever; NSAIDs stopped 4/1/20 in anticipation of port placement, which could have been previously suppressing higher fever that is now unmasked. However, since she meet sepsis criteria, started on abx (vancomycin, zosyn) on 4/1/20  -cultures negative, 4/1/20 however pt has continued to spike fevers- repeat blood cultures from 4/5 pending   -COVID-19 negative x 3 but parainfluenza positive, however this does not explain pt's encephalopathy and continued fevers   -Chest CT as above, negative for central PE, but unable to evaluate segmental and subsegmental branches due to motion artifact. Lung imaging shows unchanged metastatic nodules, small bilateral pleural effusions and atelectasis of the basilar LLL (unchanged as well), no new opacity, decreasing the likelihood of PNA  - CT A/P repeated on 4/3/20: no obvious source of infection   -urine cultures growing multiple species, likely contaminated  - ID consulted now on board, CDIFF negative    -s/p PICC line placement for chemotherapy; will plan to start modified FOLFOX today 4/9. Would avoid Tylenol for pain control for now as it can mask fevers.   - Ammonia level normal     #Metastatic carcinoma, cancer of unknown primary  - CT A/P with bilateral heterogeneous adnexal masses, as well as upper abdominal, RP, possible bone lesions and pelvic lymphadenopathy; CT Chest with multiple solid pulmonary nodules  - Cancer of unknown primary (poorly differentiated carcinoma) but suspect likely GI origin given CDX2 positivity on pathology; chromogranin positivity also suggests neuroendocrine features; Ki-67 index 40%.   - tumor markers elevated: Cancer Antigen, Ca 27.29      Cancer Antigen, 125: 619      Carcinoembryonic Antigen: 44.7 beta  on initial evaluation; checked again 4/1/20  - Discussed diagnosis with patient on 3/31/20. Given advanced, metastatic stage, will need to be on indefinite treatment to limit progression of disease.  - Plan to start mFOLFOX6 inpatient today. Consent for chemotherapy obtained, placed in chart. Will discuss plan with brother today.   - No plan for GI endoscopy at this time  - Leukocytosis and fevers likely 2/2 tumor vs infection. Flow cytometry negative for clonal process (CLL / CML)  - Diet and VTE ppx restarted, port placement on hold given fever work up      #Hypercalcemia  -likely 2/2 malignancy  - gave 1 dose Pamidronate 60 mg IV on 4/5. Continue to trend daily. Now normal  - may be contributing to MS changes as well     #Microcytic anemia  - reviewed peripheral smear, shows polychromasia despite reticulocyte count inappropriately low  - possible sickle cell trait so baseline Hb may be closer to 10-11  - possible BM involvement with carcinoma. Would defer BM evaluation     #Pain control   - Pain worse today, touched base with palliative care  - Restarted dilaudid infusion with dilaudid prn injections   - Would hold off on Tylenol for now as it can mask fevers        Cesia Banks MD  Hematology Oncology Fellow, PGY-4  [facility] Pager: [phone]/ [facility] Pager: [phone]

## 2020-04-09 NOTE — ADVANCED PRACTICE NURSE CONSULT - ASSESSMENT
Cycle 1, day 1/2,Height and weight BSAverified. Lab results as per Md dr luis madrigal of same. Vital signs stable prior to chemotherapy, and  within accepectable parameters, see sunrise. Pt educated on the importance of saving urine, verbalizes good understanding. Pt education done re chemo regimen, drug effects and potential side effects, , pt states understanding. Pt with R PICC line, site free from signs and symptoms of infection, good blood return obtained from red port. Pre- medicated with emend 150 mg ivss zofran 16 mg ivss decadron 12 mg ivss oxalipltin 70 mg/m2= 139 mg ivss infused over 2 hr concurent w/ leucovorin 400 mg/p9=481 mg ivss over 2 hr fluorouracil 1200 mg/q2=2976 mg civi @ 24 ml/hr over 23 hrs

## 2020-04-10 NOTE — PROGRESS NOTE ADULT - ASSESSMENT
24 f with b/l adnexal masses and pelvic LAD, metastatic carcinoma of unknown primary presented 3/20 with abd pain, s/p CT guided biopsy 3/25, PICC line 4/3 to start chemo  pt intermittently febrile and leukocytosis, now febrile to 101.7 and tachy, WBC: 34  QUINN for the last 2 days now Cr: 1.3  blood and urine cx negative  COVID negative x 3 last one 4/4  RVP 4/1 with parainfluenza  now pt with diarrhea but no BM for 3 days    persistent fever and leukocytosis, with tachycardia sepsis  C-diff negative  parainfluenza URI 4/1  metastatic ca with ?tumor fever  * pt on chemo now  * zosyn was discontinued and pt afebrile but WBC still high to 38  * infection w/u negative and likely malignancy related  * monitor the WBC and temp  * monitor for new signs of infection    The above assessment and plan was discussed with hem/onc team    Gina Arenas MD  Pager [phone]  After 5pm and on weekends call [phone]

## 2020-04-10 NOTE — PROGRESS NOTE ADULT - ASSESSMENT
24F w/ no PMH presenting with abdominal pain, found to have bilateral pelvic masses and extensive pelvic lymphadenopathy concerning for metastatic malignancy. 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 Pt with ongoing fevers, now hypoxia, overall clinical decline.     #Fever, tachycardia  -suspected 2/2 tumor fever; NSAIDs stopped 4/1/20 in anticipation of port placement, which could have been previously suppressing higher fever that is now unmasked. However, since she meet sepsis criteria, started on abx (vancomycin, zosyn) on 4/1/20  -cultures negative, 4/1/20 however pt has continued to spike fevers- repeat blood cultures from 4/5 pending   -COVID-19 negative x 3 but parainfluenza positive, however this does not explain pt's encephalopathy and continued fevers   -Chest CT as above, negative for central PE, but unable to evaluate segmental and subsegmental branches due to motion artifact. Lung imaging shows unchanged metastatic nodules, small bilateral pleural effusions and atelectasis of the basilar LLL (unchanged as well), no new opacity, decreasing the likelihood of PNA  - CT A/P repeated on 4/3/20: no obvious source of infection   -urine cultures growing multiple species, likely contaminated  - ID consulted now on board, CDIFF negative    -s/p PICC line placement for chemotherapy;  - Modified Folfox regimen started on 4/9- will be completed tomorrow 4/11.   - Ammonia level normal     #Metastatic carcinoma, cancer of unknown primary  - CT A/P with bilateral heterogeneous adnexal masses, as well as upper abdominal, RP, possible bone lesions and pelvic lymphadenopathy; CT Chest with multiple solid pulmonary nodules  - Cancer of unknown primary (poorly differentiated carcinoma) but suspect likely GI origin given CDX2 positivity on pathology; chromogranin positivity also suggests neuroendocrine features; Ki-67 index 40%.   - tumor markers elevated: Cancer Antigen, Ca 27.29      Cancer Antigen, 125: 619      Carcinoembryonic Antigen: 44.7 beta  on initial evaluation; checked again 4/1/20  - Discussed diagnosis with patient on 3/31/20. Given advanced, metastatic stage, will need to be on indefinite treatment to limit progression of disease.  - Folfox started 4/9, tolerating well. Once chemo is completed, will need to prepare pt for discharge.   - Wean oxygen supplementation as tolerated, out of bed to chair.      #Pain control   -On dilaudid infusion and PRN injections        Cesia Banks MD  Hematology Oncology Fellow, PGY-4  [facility] Pager: [phone]/ [facility] Pager: [phone]

## 2020-04-10 NOTE — PROGRESS NOTE ADULT - SUBJECTIVE AND OBJECTIVE BOX
HPI: 24F here with worsening abdominal pain in the setting of known adnexal massess, found to have for metastatic carcinoma with final pathology pending. Given evidence of metastatic disease, patient is not a surgical candidate. Has been having worsening adnexal pain, markedly last night after taking senna. States pain is 7-8/10 at its worst, located in adnexal area with radiation down to legs, worse with movement, and tolerable with opioids around 2/10. Palliative care called to help with pain.     INTERVAL EVENTS:  3/30: patient states IV dilaudid works, but PO and naproxen aren't enough to keep her pain at a tolerable level, d/w patient  3/31: states having vomiting and going to the bathroom are exacerbating her pain, and the PO appears to last 2 hours. States the IV meds take the pain away but not as much as before.   4/1: used dilaudid 0.8mg IV x 3, and 4mg PO x 3, with additional 1mg x 2 doses  4/2: on PCA< used 6 injections and attempts in 12 hours  4/3: see usage below, feels it is helping with her pain, is having diarrhea  4/6: lethargic  4/8: Poor control of pain, d/w with Heme attending; 3 prns used overnight  4/10 link to chart note from yesterday. d/w nurse. improvement pain control noted. with the bulk of her prn use at night.  ? insomnia/anxiety in differential; dilaudid shortage  8 prns used in 24 hours    ADVANCE DIRECTIVES:    DNR  MOLST  [ ]  Living Will  [ ]   DECISION MAKER(s):  [ ] Health Care Proxy(s)  [ ] Surrogate(s)  [ ] Guardian           Name(s): Phone Number(s):    BASELINE (I)ADL(s) (prior to admission):  [place]: [ ]Total  [ ] Moderate [ ]Dependent    Allergies    No Known Allergies    Intolerances       PRESENT SYMPTOMS: [ ]Unable to obtain due to poor mentation   Source if other than patient:  [ ]Family   [ ]Team     Pain: [X ]yes [ ]no  QOL impact -  decreased function  Location - adnexal  Aggravating factors - movement  Quality - burning  Radiation - down legs  Timing- constant  Severity (0-10 scale): 8/10   Minimal acceptable level (0-10 scale): 2/10    CPOT:    https://www.[facility].org/getattachment/ivc87n16-0b3s-6g0y-2l1s-2337u5300z1z/Critical-Care-Pain-Observation-Tool-(CPOT)      PAIN AD Score:     http://geriatrictoolkit.[email or web address]/painad.pdf (press ctrl +  left click to view)    Dyspnea:                           [ ]Mild [ ]Moderate [ ]Severe  Anxiety:                             [ ]Mild [ ]Moderate [ ]Severe  Fatigue:                             [ ]Mild [ ]Moderate [ ]Severe  Nausea:                             [ ]Mild [ ]Moderate [ ]Severe  Loss of appetite:              [ ]Mild [ ]Moderate [ ]Severe  Constipation:                    [ ]Mild [ ]Moderate [ ]Severe    Other Symptoms:  [X ]All other review of systems negative     Palliative Performance Status Version 2:   40      %    [email or web address]/[email or web address]_performance_[email or web address]_[email or web address]    PHYSICAL EXAM:      Limited exam for patient safety and to limit infection risk during COVID-19 outbreak. Please refr to primary team's examination for today.  GENERAL:  [ X]Alert  [ ]Oriented x   [ ]Lethargic  [ ]Cachexia  [ ]Unarousable  [ X]Verbal  [ ]Non-Verbal  Behavioral:   [ ] Anxiety  [ ] Delirium [ ] Agitation [ ] Other  HEENT:  [ ]Normal   [ ]Dry mouth   [ ]ET Tube/Trach  [ ]Oral lesions  PULMONARY:   [ ]Clear [ ]Tachypnea  [ ]Audible excessive secretions   [ ]Rhonchi        [ ]Right [ ]Left [ ]Bilateral  [ ]Crackles        [ ]Right [ ]Left [ ]Bilateral  [ ]Wheezing     [ ]Right [ ]Left [ ]Bilateral  [ ]Diminished breath sounds [ ]right [ ]left [ ]bilateral  CARDIOVASCULAR:    [ ]Regular [ ]Irregular [ ]Tachy  [ ]Quirino [ ]Murmur [ ]Other  GASTROINTESTINAL:  [ ]Soft  [ ]Distended   [ ]+BS  [ ]Non tender [ ]Tender  [ ]PEG [ ]OGT/ NGT  Last BM:     GENITOURINARY:  [ ]Normal [ ] Incontinent   [ ]Oliguria/Anuria   [ ]Cifuentes  MUSCULOSKELETAL:   [ ]Normal   [ x]Weakness  [ ]Bed/Wheelchair bound [ ]Edema  NEUROLOGIC:  Lucid speech  [ ]No focal deficits  [ ]Cognitive impairment  [ ]Dysphagia [ ]Dysarthria [ ]Paresis [ ]Other   SKIN:   [ ]Normal    [ ]Rash  [ ]Pressure ulcer(s)       Present on admission [ ]y [ ]n    CRITICAL CARE:  [ ] Shock Present  [ ]Septic [ ]Cardiogenic [ ]Neurologic [ ]Hypovolemic  [ ]  Vasopressors [ ]  Inotropes   [ ]Respiratory failure present [ ]Mechanical ventilation [ ]Non-invasive ventilatory support [ ]High flow  [ ]Acute  [ ]Chronic [ ]Hypoxic  [ ]Hypercarbic [ ]Other  [ ]Other organ failure     LABS: reviewed                          7.7    41.90 )-----------( 611      ( 03 Apr 2020 10:03 )             25.6     04-03    130<L>  |  91<L>  |  8   ----------------------------<  92  3.7   |  27  |  0.75    Ca    11.4<H>      03 Apr 2020 10:03  Phos  2.4     04-03  Mg     1.8     04-03            RADIOLOGY & ADDITIONAL STUDIES:    PROTEIN CALORIE MALNUTRITION PRESENT: [ ]mild [ ]moderate [ ]severe [ ]underweight [ ]morbid obesity  https://www.andeal.org/vault/2440/web/files/ONC/Table_Clinical%20Characteristics%20to%20Document%20Malnutrition-White%20JV%20et%20al%308964.pdf    Height (cm): 162.6 (03-21-20 @ 03:15), 162.56 (03-07-20 @ 19:18)  Weight (kg): 84.4 (03-21-20 @ 03:15), 89.4 (03-07-20 @ 19:18)  BMI (kg/m2): 31.9 (03-21-20 @ 03:15), 33.8 (03-07-20 @ 19:18)    [ ]PPSV2 < or = to 30% [ ]significant weight loss  [ ]poor nutritional intake  [ ]anasarca     Albumin, Serum: 2.9 g/dL (03-25-20 @ 05:59)   [ ]Artificial Nutrition      REFERRALS:   [ ]Chaplaincy  [ ]Hospice  [ ]Child Life  [ ]Social Work  [ ]Case management [ ]Holistic Therapy     Goals of Care Document:     ______________  Alex Mack MD   of Geriatric and Palliative Medicine  [facility]     Please page the following number for clinical matters between the hours of 9AM and 5PM   from Monday through Friday : [phone]    After 5PM and on weekends, please page: [phone]. The Geriatric and Palliative Medicine consult service has 24/7 coverage for medical recommendations, including for symptom management needs.

## 2020-04-10 NOTE — PROGRESS NOTE ADULT - SUBJECTIVE AND OBJECTIVE BOX
Follow Up:  fever, leukocytosis    Interval History: pt started on chemo, afebrile but WBD 38    ROS:      All other systems negative    Constitutional: no fever,  chills  Head: no trauma  Eyes: no vision changes, no eye pain  ENT:  no sore throat, no rhinorrhea  Cardiovascular:  no chest pain, no palpitation  Respiratory:  no SOB, no cough  GI:  + abd pain, no vomiting, no diarrhea   urinary: no dysuria, no hematuria, no flank pain  musculoskeletal:  no joint pain, no joint swelling  skin:  no rash  neurology:  no headache, no seizure, no change in mental status  psych: no anxiety, no depression       Allergies  No Known Allergies        ANTIMICROBIALS:      OTHER MEDS:  acetaminophen   Tablet .. 650 milliGRAM(s) Oral every 6 hours PRN  aluminum hydroxide/magnesium hydroxide/simethicone Suspension 30 milliLiter(s) Oral every 6 hours PRN  enoxaparin Injectable 40 milliGRAM(s) SubCutaneous daily  famotidine    Tablet 20 milliGRAM(s) Oral daily  fluorouracil IVPB (eMAR) 2376 milliGRAM(s) IV Intermittent every 24 hours  metoclopramide Injectable 10 milliGRAM(s) IV Push every 6 hours PRN  morphine  - Injectable 5 milliGRAM(s) IV Push every 2 hours PRN  morphine  Infusion 2.5 mG/Hr IV Continuous <Continuous>  naloxone Injectable 0.1 milliGRAM(s) IV Push every 3 minutes PRN  ondansetron  IVPB 16 milliGRAM(s) IV Intermittent every 24 hours  ondansetron Injectable 4 milliGRAM(s) IV Push every 6 hours PRN  phytonadione   Solution 10 milliGRAM(s) Oral daily  polyethylene glycol 3350 17 Gram(s) Oral daily  senna 2 Tablet(s) Oral at bedtime  simethicone 80 milliGRAM(s) Chew every 6 hours PRN  sodium chloride 0.9% lock flush 3 milliLiter(s) IV Push every 8 hours  sodium chloride 0.9% lock flush 3 milliLiter(s) IV Push every 8 hours      Vital Signs Last 24 Hrs  T(C): 37.3 (10 Apr 2020 09:55), Max: 37.3 (09 Apr 2020 17:08)  T(F): 99.2 (10 Apr 2020 09:55), Max: 99.2 (09 Apr 2020 17:08)  HR: 104 (10 Apr 2020 09:55) (104 - 116)  BP: 128/82 (10 Apr 2020 09:55) (123/78 - 140/94)  BP(mean): --  RR: 20 (10 Apr 2020 09:55) (20 - 20)  SpO2: 94% (10 Apr 2020 09:55) (94% - 95%)    Physical Exam:  General:    NAD,  non toxic  Head: atraumatic, normocephalic  Eye: normal sclera and conjunctiva  ENT:    no oropharyngeal lesions,   no LAD,   neck supple  Cardio:    tachy regular S1, S2  Respiratory:    clear b/l,    no wheezing but slightly tachypneic  abd:    palpable mass, soft,   BS +,  slight diffuse tenderness  :   no CVAT,  no suprapubic tenderness,   no  cash  Musculoskeletal:   no joint swelling,   no edema  vascular: RUE picc  Skin:    no rash  Neurologic:     no focal deficit  psych: normal affect                          7.5    38.79 )-----------( 597      ( 10 Apr 2020 07:27 )             24.8       04-10    137  |  97  |  11  ----------------------------<  122<H>  4.7   |  28  |  0.84    Ca    9.4      10 Apr 2020 07:27  Phos  2.7     04-10  Mg     2.3     04-10    TPro  6.8  /  Alb  2.6<L>  /  TBili  0.7  /  DBili  x   /  AST  29  /  ALT  <5<L>  /  AlkPhos  180<H>  04-10          MICROBIOLOGY:  v  .Urine Clean Catch (Midstream)  04-08-20   No growth  --  --      .Blood Blood-Peripheral  04-05-20   No Growth Final  --  --      .Blood Blood-Peripheral  04-05-20   No Growth Final  --  --      .Urine Clean Catch (Midstream)  04-05-20   No growth  --  --      .Blood Blood-Peripheral  04-03-20   No Growth Final  --  --      .Blood Blood-Catheter  04-03-20   No Growth Final  --  --      .Blood Blood-Peripheral  04-01-20   No Growth Final  --  --      .Urine Clean Catch (Midstream)  04-01-20   >=3 organisms. Probable collection contamination.  --  --      .Blood Blood-Peripheral  03-25-20   No Growth Final  --  --      .Blood Blood-Venous  03-20-20   No growth at 5 days.  --  --      .Blood Blood-Peripheral  03-20-20   No growth at 5 days.  --  --      .Urine Clean Catch (Midstream)  03-20-20   <10,000 CFU/mL Normal Urogenital Hui  --  --            Clostridium difficile [facility] Toxins A&amp;B, EIA:   Negative (04-06-20 @ 22:12)  Clostridium difficile [facility] Interpretation: Negative for toxigenic C. Difficile.      RADIOLOGY:  Images below independently visualized and reviewed personally, findings as below  < from: Xray Chest 1 View- PORTABLE-Urgent (04.06.20 @ 10:32) >  IMPRESSION:     Worsening bilateral patchy airspace disease.    Unchanged small left pleural effusion.      < from: CT Abdomen and Pelvis w/ IV Cont (04.03.20 @ 16:46) >    IMPRESSION:     Widely metastatic neoplasm without significant change from prior abdominal CT 3/20/2020.    No evidence of an infectious source in the abdomen or pelvis.

## 2020-04-10 NOTE — PROGRESS NOTE ADULT - SUBJECTIVE AND OBJECTIVE BOX
INTERVAL History:    Allergies    No Known Allergies    Intolerances        MEDICATIONS  (STANDING):  enoxaparin Injectable 40 milliGRAM(s) SubCutaneous daily  famotidine    Tablet 20 milliGRAM(s) Oral daily  fluorouracil IVPB (eMAR) 2376 milliGRAM(s) IV Intermittent every 24 hours  HYDROmorphone Infusion 0.5 mG/Hr (0.5 mL/Hr) IV Continuous <Continuous>  ondansetron  IVPB 16 milliGRAM(s) IV Intermittent every 24 hours  phytonadione   Solution 10 milliGRAM(s) Oral daily  polyethylene glycol 3350 17 Gram(s) Oral daily  senna 2 Tablet(s) Oral at bedtime  sodium chloride 0.9% lock flush 3 milliLiter(s) IV Push every 8 hours  sodium chloride 0.9% lock flush 3 milliLiter(s) IV Push every 8 hours    MEDICATIONS  (PRN):  acetaminophen   Tablet .. 650 milliGRAM(s) Oral every 6 hours PRN Temp greater or equal to 38C (100.4F)  aluminum hydroxide/magnesium hydroxide/simethicone Suspension 30 milliLiter(s) Oral every 6 hours PRN Dyspepsia  HYDROmorphone  Injectable 0.75 milliGRAM(s) IV Push every 2 hours PRN mild and moderate pain  metoclopramide Injectable 10 milliGRAM(s) IV Push every 6 hours PRN Nausea/Vomiting  naloxone Injectable 0.1 milliGRAM(s) IV Push every 3 minutes PRN Sedation or RR <10  ondansetron Injectable 4 milliGRAM(s) IV Push every 6 hours PRN Nausea and/or Vomiting  simethicone 80 milliGRAM(s) Chew every 6 hours PRN Gas      Vital Signs Last 24 Hrs  T(C): 37.3 (10 Apr 2020 09:55), Max: 37.3 (09 Apr 2020 17:08)  T(F): 99.2 (10 Apr 2020 09:55), Max: 99.2 (09 Apr 2020 17:08)  HR: 104 (10 Apr 2020 09:55) (104 - 126)  BP: 128/82 (10 Apr 2020 09:55) (123/78 - 142/86)  BP(mean): --  RR: 20 (10 Apr 2020 09:55) (20 - 22)  SpO2: 94% (10 Apr 2020 09:55) (94% - 95%)    PHYSICAL EXAM:    General: AOX3, no acute distress  EYES: EOMI, PERRLA, conjunctiva and sclera clear  NECK: Supple, No JVD, Normal thyroid  CHEST/LUNG: Clear to auscultation bilaterally; No rales, rhonchi, or wheezing  HEART: Regular rate and rhythm; no murmurs  ABDOMEN: Soft, Nontender. BS+  LYMPH: No lymphadenopathy noted  Extremities: No peripheral edema      LABS:                        7.5    38.79 )-----------( 597      ( 10 Apr 2020 07:27 )             24.8     04-10    137  |  97  |  11  ----------------------------<  122<H>  4.7   |  28  |  0.84    Ca    9.4      10 Apr 2020 07:27  Phos  2.7     04-10  Mg     2.3     04-10    TPro  6.8  /  Alb  2.6<L>  /  TBili  0.7  /  DBili  x   /  AST  29  /  ALT  <5<L>  /  AlkPhos  180<H>  04-10    PT/INR - ( 10 Apr 2020 07:27 )   PT: 15.4 sec;   INR: 1.34 ratio                 RADIOLOGY & ADDITIONAL STUDIES:    PATHOLOGY: INTERVAL History: Patient seen this morning. She is alert and awake and states her pain is well controlled. She is trying to eat more but is afraid of vomiting. No nausea at this time, no diarrhea. Tachycardia improved.     Allergies    No Known Allergies    Intolerances        MEDICATIONS  (STANDING):  enoxaparin Injectable 40 milliGRAM(s) SubCutaneous daily  famotidine    Tablet 20 milliGRAM(s) Oral daily  fluorouracil IVPB (eMAR) 2376 milliGRAM(s) IV Intermittent every 24 hours  HYDROmorphone Infusion 0.5 mG/Hr (0.5 mL/Hr) IV Continuous <Continuous>  ondansetron  IVPB 16 milliGRAM(s) IV Intermittent every 24 hours  phytonadione   Solution 10 milliGRAM(s) Oral daily  polyethylene glycol 3350 17 Gram(s) Oral daily  senna 2 Tablet(s) Oral at bedtime  sodium chloride 0.9% lock flush 3 milliLiter(s) IV Push every 8 hours  sodium chloride 0.9% lock flush 3 milliLiter(s) IV Push every 8 hours    MEDICATIONS  (PRN):  acetaminophen   Tablet .. 650 milliGRAM(s) Oral every 6 hours PRN Temp greater or equal to 38C (100.4F)  aluminum hydroxide/magnesium hydroxide/simethicone Suspension 30 milliLiter(s) Oral every 6 hours PRN Dyspepsia  HYDROmorphone  Injectable 0.75 milliGRAM(s) IV Push every 2 hours PRN mild and moderate pain  metoclopramide Injectable 10 milliGRAM(s) IV Push every 6 hours PRN Nausea/Vomiting  naloxone Injectable 0.1 milliGRAM(s) IV Push every 3 minutes PRN Sedation or RR <10  ondansetron Injectable 4 milliGRAM(s) IV Push every 6 hours PRN Nausea and/or Vomiting  simethicone 80 milliGRAM(s) Chew every 6 hours PRN Gas      Vital Signs Last 24 Hrs  T(C): 37.3 (10 Apr 2020 09:55), Max: 37.3 (09 Apr 2020 17:08)  T(F): 99.2 (10 Apr 2020 09:55), Max: 99.2 (09 Apr 2020 17:08)  HR: 104 (10 Apr 2020 09:55) (104 - 126)  BP: 128/82 (10 Apr 2020 09:55) (123/78 - 142/86)  BP(mean): --  RR: 20 (10 Apr 2020 09:55) (20 - 22)  SpO2: 94% (10 Apr 2020 09:55) (94% - 95%)    PHYSICAL EXAM:    General: AOX3, no acute distress    Not physically examined to decrease COVID exposure to our already immunocompromised patient population       LABS:                        7.5    38.79 )-----------( 597      ( 10 Apr 2020 07:27 )             24.8     04-10    137  |  97  |  11  ----------------------------<  122<H>  4.7   |  28  |  0.84    Ca    9.4      10 Apr 2020 07:27  Phos  2.7     04-10  Mg     2.3     04-10    TPro  6.8  /  Alb  2.6<L>  /  TBili  0.7  /  DBili  x   /  AST  29  /  ALT  <5<L>  /  AlkPhos  180<H>  04-10    PT/INR - ( 10 Apr 2020 07:27 )   PT: 15.4 sec;   INR: 1.34 ratio                 RADIOLOGY & ADDITIONAL STUDIES:    PATHOLOGY:

## 2020-04-10 NOTE — ADVANCED PRACTICE NURSE CONSULT - ASSESSMENT
Pt. seen in bed a/ox4,denies any discomfort at this time. Pt educated on the importance of saving urine, verbalizes good understanding. Pt education done re chemo regimen, drug effects and potential side effects, , pt states understanding. Pt with R PICC line, site free from signs and symptoms of infection, good blood return obtained from red port. Drug verification done by 2 RN.'s.Day 2 started at 1606 with  fluorouracil 1200 mg/n1=7804 mg civi @ 24 ml/hr over 23 hrs to PICC via alaris pump,connected to lowest port of NSS.Ptimary RN aware of present treatment.

## 2020-04-10 NOTE — PROGRESS NOTE ADULT - ATTENDING COMMENTS
s/p cycle #1 of dose reduced FOLFOX.  Will need CBC on day 7 to 9 after the first day to assess counts and tolerance.

## 2020-04-10 NOTE — PROGRESS NOTE ADULT - PROBLEM SELECTOR PLAN 1
24 hour use  IV dilaudid 0.5 X 24 =12 mg  IV dilaudid 0.75 X 8 = 6mg  18mg IV dilaudid without adverse effects noted  18mg IV dilaudid X 12 = 216 mg po morphine=86 mg IV morphine  Decrease by 33 percent due to incomplete cross tolerance =56.7 mg  56.7/24 mg=2.3/hr>>>>2.5mg IV morphine per hour  PRN will be approx 10-20% of total daily dose 5mg IV morphine q2H prn  Ensure bowel regimen senna qd and miralax qd

## 2020-04-11 NOTE — PROGRESS NOTE ADULT - SUBJECTIVE AND OBJECTIVE BOX
INTERVAL History: No acute events overnight. Chemotherapy completed this morning. Patient feels well, pain is well controlled. Pt had some intolerance to cold liquids. No vomiting or diarrhea noted. No fevers.     Allergies    No Known Allergies    Intolerances        MEDICATIONS  (STANDING):  allopurinol 300 milliGRAM(s) Oral daily  enoxaparin Injectable 40 milliGRAM(s) SubCutaneous daily  famotidine    Tablet 20 milliGRAM(s) Oral daily  morphine  Infusion 2.5 mG/Hr (2.5 mL/Hr) IV Continuous <Continuous>  polyethylene glycol 3350 17 Gram(s) Oral daily  senna 2 Tablet(s) Oral at bedtime  sodium chloride 0.9% lock flush 3 milliLiter(s) IV Push every 8 hours  sodium chloride 0.9% lock flush 3 milliLiter(s) IV Push every 8 hours    MEDICATIONS  (PRN):  acetaminophen   Tablet .. 650 milliGRAM(s) Oral every 6 hours PRN Temp greater or equal to 38C (100.4F)  aluminum hydroxide/magnesium hydroxide/simethicone Suspension 30 milliLiter(s) Oral every 6 hours PRN Dyspepsia  metoclopramide Injectable 10 milliGRAM(s) IV Push every 6 hours PRN Nausea/Vomiting  morphine  - Injectable 5 milliGRAM(s) IV Push every 2 hours PRN Moderate Pain (4 - 6) and severe pain  naloxone Injectable 0.1 milliGRAM(s) IV Push every 3 minutes PRN Sedation or RR <10  ondansetron Injectable 4 milliGRAM(s) IV Push every 6 hours PRN Nausea and/or Vomiting  simethicone 80 milliGRAM(s) Chew every 6 hours PRN Gas      Vital Signs Last 24 Hrs  T(C): 36.3 (11 Apr 2020 13:45), Max: 37.1 (10 Apr 2020 17:20)  T(F): 97.3 (11 Apr 2020 13:45), Max: 98.8 (10 Apr 2020 17:20)  HR: 112 (11 Apr 2020 13:45) (108 - 116)  BP: 121/79 (11 Apr 2020 13:45) (113/76 - 124/74)  BP(mean): --  RR: 18 (11 Apr 2020 13:45) (18 - 20)  SpO2: 96% (11 Apr 2020 13:45) (92% - 96%)    PHYSICAL EXAM:    General: AOX3, no acute distress  Not physically examined in attempt to reduce COVID exposure to our already immunocompromised patient population       LABS:                        7.4    34.15 )-----------( 608      ( 11 Apr 2020 08:38 )             24.9     04-11    137  |  97  |  20  ----------------------------<  124<H>  5.0   |  25  |  0.93    Ca    9.2      11 Apr 2020 08:37  Phos  2.3     04-11  Mg     2.2     04-11    TPro  6.9  /  Alb  2.5<L>  /  TBili  0.4  /  DBili  x   /  AST  32  /  ALT  6<L>  /  AlkPhos  156<H>  04-11    PT/INR - ( 11 Apr 2020 08:38 )   PT: 14.5 sec;   INR: 1.26 ratio                 RADIOLOGY & ADDITIONAL STUDIES:    PATHOLOGY:

## 2020-04-11 NOTE — PROGRESS NOTE ADULT - ASSESSMENT
24F w/ no PMH presenting with abdominal pain, found to have bilateral pelvic masses and extensive pelvic lymphadenopathy concerning for metastatic malignancy. 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 Pt with ongoing fevers, now hypoxia, overall clinical decline.     #Fever, tachycardia  -suspected 2/2 tumor fever; NSAIDs stopped 4/1/20 in anticipation of port placement, which could have been previously suppressing higher fever that is now unmasked. However, since she meet sepsis criteria, started on abx (vancomycin, zosyn) on 4/1/20  -cultures negative, 4/1/20 however pt has continued to spike fevers- repeat blood cultures from 4/5 pending   -COVID-19 negative x 3 but parainfluenza positive, however this does not explain pt's encephalopathy and continued fevers   -Chest CT as above, negative for central PE, but unable to evaluate segmental and subsegmental branches due to motion artifact. Lung imaging shows unchanged metastatic nodules, small bilateral pleural effusions and atelectasis of the basilar LLL (unchanged as well), no new opacity, decreasing the likelihood of PNA  - CT A/P repeated on 4/3/20: no obvious source of infection   -urine cultures growing multiple species, likely contaminated  - ID consulted now on board, CDIFF negative    -s/p PICC line placement for chemotherapy;  - Modified Folfox regimen started on 4/9- Completed today 4/11.   - Ammonia level normal     #Metastatic carcinoma, cancer of unknown primary  - CT A/P with bilateral heterogeneous adnexal masses, as well as upper abdominal, RP, possible bone lesions and pelvic lymphadenopathy; CT Chest with multiple solid pulmonary nodules  - Cancer of unknown primary (poorly differentiated carcinoma) but suspect likely GI origin given CDX2 positivity on pathology; chromogranin positivity also suggests neuroendocrine features; Ki-67 index 40%.   - tumor markers elevated: Cancer Antigen, Ca 27.29      Cancer Antigen, 125: 619      Carcinoembryonic Antigen: 44.7 beta  on initial evaluation; checked again 4/1/20  - Discussed diagnosis with patient on 3/31/20. Given advanced, metastatic stage, will need to be on indefinite treatment to limit progression of disease.  - Folfox completed today. Will need to start discharge planning.   - Wean oxygen supplementation as tolerated, follow up with palliative care for pain management/ regimen on discharge. Will need to switch IV pain meds to oral. Out of bed to chair.      #Pain control   -On morphine infusion and PRN injections        Cesia Banks MD  Hematology Oncology Fellow, PGY-4  [facility] Pager: [phone]/ [facility] Pager: [phone]

## 2020-04-12 NOTE — PROGRESS NOTE ADULT - ATTENDING COMMENTS
Patient with somnolence today on morphine infusion. Completed chemotherapy.   Will contact palliative care for re-assessment of pain control.

## 2020-04-12 NOTE — PROGRESS NOTE ADULT - SUBJECTIVE AND OBJECTIVE BOX
Patient is a 24y old  Female who presents with a chief complaint of b/l pelvic masses (11 Apr 2020 15:55)    SUBJECTIVE / OVERNIGHT EVENTS:      MEDICATIONS  (STANDING):  allopurinol 300 milliGRAM(s) Oral daily  enoxaparin Injectable 40 milliGRAM(s) SubCutaneous daily  famotidine    Tablet 20 milliGRAM(s) Oral daily  morphine  Infusion 2.5 mG/Hr (2.5 mL/Hr) IV Continuous <Continuous>  polyethylene glycol 3350 17 Gram(s) Oral daily  senna 2 Tablet(s) Oral at bedtime  sodium chloride 0.9% lock flush 3 milliLiter(s) IV Push every 8 hours  sodium chloride 0.9% lock flush 3 milliLiter(s) IV Push every 8 hours    MEDICATIONS  (PRN):  acetaminophen   Tablet .. 650 milliGRAM(s) Oral every 6 hours PRN Temp greater or equal to 38C (100.4F)  aluminum hydroxide/magnesium hydroxide/simethicone Suspension 30 milliLiter(s) Oral every 6 hours PRN Dyspepsia  metoclopramide Injectable 10 milliGRAM(s) IV Push every 6 hours PRN Nausea/Vomiting  morphine  - Injectable 5 milliGRAM(s) IV Push every 2 hours PRN Moderate Pain (4 - 6) and severe pain  naloxone Injectable 0.1 milliGRAM(s) IV Push every 3 minutes PRN Sedation or RR <10  ondansetron Injectable 4 milliGRAM(s) IV Push every 6 hours PRN Nausea and/or Vomiting  simethicone 80 milliGRAM(s) Chew every 6 hours PRN Gas        CAPILLARY BLOOD GLUCOSE        I&O's Summary    11 Apr 2020 07:01  -  12 Apr 2020 07:00  --------------------------------------------------------  IN: 1271 mL / OUT: 2250 mL / NET: -979 mL    Vital Signs Last 24 Hrs  T(C): 37 (12 Apr 2020 10:11), Max: 37.2 (12 Apr 2020 05:15)  T(F): 98.6 (12 Apr 2020 10:11), Max: 99 (12 Apr 2020 05:15)  HR: 122 (12 Apr 2020 10:11) (112 - 122)  BP: 123/70 (12 Apr 2020 10:11) (114/75 - 141/94)  BP(mean): --  RR: 18 (12 Apr 2020 10:11) (18 - 22)  SpO2: 92% (12 Apr 2020 10:11) (89% - 96%)    PHYSICAL EXAM:  GENERAL: NAD, Laying in bed  HEENT: NC/AT, Slightly dry mucous membranes  NECK: Supple  CHEST/LUNG: Slightly decreased at bases, Otherwise grossly clear  HEART: Tachycardic, Regular rhythm  ABDOMEN: +BS, + Abdominal distention, Mild diffuse TTP  EXTREMITIES: No LE edema  NEUROLOGY: A bit sleepy, but overall awake and alert, Answering questions and following commands   SKIN: Warm and dry  PSYCH: Calm    LABS:                        7.7    15.53 )-----------( 636      ( 12 Apr 2020 07:25 )             26.4     04-12    135  |  95<L>  |  22  ----------------------------<  112<H>  4.7   |  28  |  0.91    Ca    9.2      12 Apr 2020 07:25  Phos  1.9     04-12  Mg     2.6     04-12    TPro  7.1  /  Alb  2.6<L>  /  TBili  0.7  /  DBili  x   /  AST  31  /  ALT  <5<L>  /  AlkPhos  143<H>  04-12    PT/INR - ( 12 Apr 2020 07:25 )   PT: 12.0 sec;   INR: 1.05 ratio      RADIOLOGY & ADDITIONAL TESTS:  Studies reviewed. Patient is a 24y old  Female who presents with a chief complaint of b/l pelvic masses (11 Apr 2020 15:55)    SUBJECTIVE / OVERNIGHT EVENTS:  No acute events overnight. Patient seen and examined. She endorses occasional reflux pain and some R-sided abdominal pain. Her appetite has been a bit poor as well. She denies any complaints of chest pain or shortness of breath. Patient was afebrile overnight.    MEDICATIONS  (STANDING):  allopurinol 300 milliGRAM(s) Oral daily  enoxaparin Injectable 40 milliGRAM(s) SubCutaneous daily  famotidine    Tablet 20 milliGRAM(s) Oral daily  morphine  Infusion 2.5 mG/Hr (2.5 mL/Hr) IV Continuous <Continuous>  polyethylene glycol 3350 17 Gram(s) Oral daily  senna 2 Tablet(s) Oral at bedtime  sodium chloride 0.9% lock flush 3 milliLiter(s) IV Push every 8 hours  sodium chloride 0.9% lock flush 3 milliLiter(s) IV Push every 8 hours    MEDICATIONS  (PRN):  acetaminophen   Tablet .. 650 milliGRAM(s) Oral every 6 hours PRN Temp greater or equal to 38C (100.4F)  aluminum hydroxide/magnesium hydroxide/simethicone Suspension 30 milliLiter(s) Oral every 6 hours PRN Dyspepsia  metoclopramide Injectable 10 milliGRAM(s) IV Push every 6 hours PRN Nausea/Vomiting  morphine  - Injectable 5 milliGRAM(s) IV Push every 2 hours PRN Moderate Pain (4 - 6) and severe pain  naloxone Injectable 0.1 milliGRAM(s) IV Push every 3 minutes PRN Sedation or RR <10  ondansetron Injectable 4 milliGRAM(s) IV Push every 6 hours PRN Nausea and/or Vomiting  simethicone 80 milliGRAM(s) Chew every 6 hours PRN Gas        CAPILLARY BLOOD GLUCOSE        I&O's Summary    11 Apr 2020 07:01  -  12 Apr 2020 07:00  --------------------------------------------------------  IN: 1271 mL / OUT: 2250 mL / NET: -979 mL    Vital Signs Last 24 Hrs  T(C): 37 (12 Apr 2020 10:11), Max: 37.2 (12 Apr 2020 05:15)  T(F): 98.6 (12 Apr 2020 10:11), Max: 99 (12 Apr 2020 05:15)  HR: 122 (12 Apr 2020 10:11) (112 - 122)  BP: 123/70 (12 Apr 2020 10:11) (114/75 - 141/94)  BP(mean): --  RR: 18 (12 Apr 2020 10:11) (18 - 22)  SpO2: 92% (12 Apr 2020 10:11) (89% - 96%)    PHYSICAL EXAM:  GENERAL: NAD, Laying in bed  HEENT: NC/AT, Slightly dry mucous membranes  NECK: Supple  CHEST/LUNG: Slightly decreased at bases, Otherwise grossly clear  HEART: Tachycardic, Regular rhythm  ABDOMEN: +BS, + Abdominal distention, Mild diffuse TTP (R>L)  EXTREMITIES: No LE edema  NEUROLOGY: A bit sleepy, but overall awake and alert, Answering questions and following commands   SKIN: Warm and dry  PSYCH: Calm    LABS:                        7.7    15.53 )-----------( 636      ( 12 Apr 2020 07:25 )             26.4     04-12    135  |  95<L>  |  22  ----------------------------<  112<H>  4.7   |  28  |  0.91    Ca    9.2      12 Apr 2020 07:25  Phos  1.9     04-12  Mg     2.6     04-12    TPro  7.1  /  Alb  2.6<L>  /  TBili  0.7  /  DBili  x   /  AST  31  /  ALT  <5<L>  /  AlkPhos  143<H>  04-12    PT/INR - ( 12 Apr 2020 07:25 )   PT: 12.0 sec;   INR: 1.05 ratio      RADIOLOGY & ADDITIONAL TESTS:  Studies reviewed.

## 2020-04-12 NOTE — PROGRESS NOTE ADULT - ASSESSMENT
*** NOTE INCOMPLETE *** Patient is a 23 y/o F w/ no PMHx who initially p/w abdominal pain, found to have bilateral pelvic masses and extensive pelvic lymphadenopathy concerning for metastatic malignancy with pathology showing likely metastatic carcinoma with neuroendocrine features, possibly of GI origin, s/p C1 mFOLFOX (-).      # Fever, tachycardia  -Suspected 2/2 tumor fever; NSAIDs were stopped on 20 in anticipation of port placement, which could have been previously suppressing higher fevers that became unmasked. However, since she met sepsis criteria, patient was started on abx (Vancomycin, Zosyn). Patient has been off antibiotics since . cultures negative, 20 however pt has continued to spike fevers- repeat blood cultures from  pending   - COVID-19 negative x 3, but parainfluenza positive ().   - CTA Chest () was negative for central PE, but unable to evaluate segmental and subsegmental branches due to motion artifact. Lung imaging showed unchanged metastatic nodules, small bilateral pleural effusions, and atelectasis of the basilar LLL (unchanged as well). No new opacity which decreases the likelihood of PNA  - CT A/P repeated on 4/3/20; no obvious source of infection   - Blood/urine cultures have had NGTD. C. diff negative  - ID following, appreciate recs  - S/p PICC line placement for chemotherapy  - Ammonia level normal   - Patient is s/p C1 modified FOLFOX (-). Further management as noted below     # Metastatic carcinoma, cancer of unknown primary  - CT A/P with bilateral heterogeneous adnexal masses, as well as upper abdominal, RP, possible bone lesions and pelvic lymphadenopathy; CT Chest with multiple solid pulmonary nodules  - Cancer of unknown primary (poorly differentiated carcinoma) but suspect likely GI origin given CDX2 positivity on pathology; chromogranin positivity also suggests neuroendocrine features; Ki-67 index 40%.   - Tumor markers elevated: Cancer Antigen, Ca 27.29: 70.8, Cancer Antigen, 125: 619, Carcinoembryonic Antigen: 44.7, Beta-HC.0 on initial evaluation (3/7); Checked again on 20 and it was 102.7  - Discussed diagnosis with patient on 3/31/20. Given advanced, metastatic stage, will need to be on indefinite treatment to limit progression of disease.  - S/p C1 mFOLFOX (4.9-). Next cycle is due in ~ 2 weeks. Will need to start discharge planning.   - Patient is currently on 2L NC. Will try to wean supplemental oxygen as tolerated. Will also provide an incentive spirometer  - CTA Chest () was negative for central PE, but unable to evaluate segmental and subsegmental branches due to motion artifact. In the setting of her hypoxemia and tachycardia, will check LE dopplers to r/o a DVT.    # Pain control   - On Morphine infusion and PRN injections  - Will try to switch IV pain medications to oral in the near future. Palliative Care following, appreciate recs. Will continue to follow-up with Palliative Care regarding pain management/regimen on discharge.     # Hyperuricemia  - Patient's uric acid was 8.8 yesterday (). She was given IV Rasburicase 3mg x1 on . Her uric acid level is 4.1 today ()  - C/w Allopurinol 300mg daily  - Monitor TLS labs daily (CMP, Phos, LDH, Uric acid)    # DVT PPx  - Lovenox 40mg SQ    # Dispo  - Pending resolution of acute medical issues. Will consult PT    Westley Little, PGY4  Hematology-Oncology Fellow  Pager: [phone] / [phone]

## 2020-04-13 NOTE — PHYSICAL THERAPY INITIAL EVALUATION ADULT - DISCHARGE DISPOSITION, PT EVAL
DC home with assist from mother/brother, +elevator access in apt to 5th floor, recommend rolling walker for long distances and recommend 3:1 commode, CM Latasha/SW to be notified.

## 2020-04-13 NOTE — PHYSICAL THERAPY INITIAL EVALUATION ADULT - PERTINENT HX OF CURRENT PROBLEM, REHAB EVAL
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

## 2020-04-13 NOTE — PROGRESS NOTE ADULT - PROBLEM SELECTOR PLAN 1
Pt seen and evaluated  Pain is much better controlled  Only 2 prn used in 24 hours  On 2.5mg IV morphine per hour  PRN will be approx 10-20% of total daily dose 5mg IV morphine q2H prn  Ensure bowel regimen senna qd and miralax qd  If her pain continues to be well controlled and there are no spikes, I will transition to Morphine ER in the morning, with po morphine IRfor breakthrough

## 2020-04-13 NOTE — PROGRESS NOTE ADULT - SUBJECTIVE AND OBJECTIVE BOX
INTERVAL History:    Allergies    No Known Allergies    Intolerances        MEDICATIONS  (STANDING):  allopurinol 300 milliGRAM(s) Oral daily  enoxaparin Injectable 40 milliGRAM(s) SubCutaneous daily  famotidine    Tablet 20 milliGRAM(s) Oral daily  morphine  Infusion 2.5 mG/Hr (2.5 mL/Hr) IV Continuous <Continuous>  polyethylene glycol 3350 17 Gram(s) Oral daily  potassium acid phosphate/sodium acid phosphate tablet (K-PHOS No. 2) 1 Tablet(s) Oral two times a day with meals  senna 2 Tablet(s) Oral at bedtime  sodium chloride 0.9% lock flush 3 milliLiter(s) IV Push every 8 hours  sodium chloride 0.9% lock flush 3 milliLiter(s) IV Push every 8 hours  sodium phosphate IVPB 15 milliMole(s) IV Intermittent once    MEDICATIONS  (PRN):  acetaminophen   Tablet .. 650 milliGRAM(s) Oral every 6 hours PRN Temp greater or equal to 38C (100.4F)  aluminum hydroxide/magnesium hydroxide/simethicone Suspension 30 milliLiter(s) Oral every 6 hours PRN Dyspepsia  metoclopramide Injectable 10 milliGRAM(s) IV Push every 6 hours PRN Nausea/Vomiting  morphine  - Injectable 5 milliGRAM(s) IV Push every 2 hours PRN Moderate Pain (4 - 6) and severe pain  naloxone Injectable 0.1 milliGRAM(s) IV Push every 3 minutes PRN Sedation or RR <10  ondansetron Injectable 4 milliGRAM(s) IV Push every 6 hours PRN Nausea and/or Vomiting  simethicone 80 milliGRAM(s) Chew every 6 hours PRN Gas      Vital Signs Last 24 Hrs  T(C): 38.1 (13 Apr 2020 05:20), Max: 38.3 (12 Apr 2020 19:23)  T(F): 100.6 (13 Apr 2020 05:20), Max: 101 (12 Apr 2020 19:23)  HR: 128 (13 Apr 2020 05:20) (122 - 134)  BP: 133/86 (13 Apr 2020 05:20) (104/69 - 133/86)  BP(mean): --  RR: 18 (13 Apr 2020 05:20) (18 - 198)  SpO2: 93% (13 Apr 2020 05:20) (89% - 96%)    PHYSICAL EXAM:    General: AOX3, no acute distress  EYES: EOMI, PERRLA, conjunctiva and sclera clear  NECK: Supple, No JVD, Normal thyroid  CHEST/LUNG: Clear to auscultation bilaterally; No rales, rhonchi, or wheezing  HEART: Regular rate and rhythm; no murmurs  ABDOMEN: Soft, Nontender. BS+  LYMPH: No lymphadenopathy noted  Extremities: No peripheral edema      LABS:                        7.5    16.33 )-----------( 571      ( 13 Apr 2020 07:18 )             25.3     04-13    134<L>  |  92<L>  |  17  ----------------------------<  102<H>  4.2   |  30  |  0.88    Ca    8.9      13 Apr 2020 07:18  Phos  1.7     04-13  Mg     2.4     04-13    TPro  6.7  /  Alb  2.6<L>  /  TBili  1.4<H>  /  DBili  x   /  AST  35  /  ALT  6<L>  /  AlkPhos  129<H>  04-13    PT/INR - ( 13 Apr 2020 07:18 )   PT: 13.6 sec;   INR: 1.18 ratio                 RADIOLOGY & ADDITIONAL STUDIES:    PATHOLOGY: INTERVAL History: Overnight, pt spiked a fever to 100.6, otherwise no acute complaints. Pain is relatively well controlled, however pt this morning appears lethargic and sedated.     Allergies    No Known Allergies    Intolerances        MEDICATIONS  (STANDING):  allopurinol 300 milliGRAM(s) Oral daily  enoxaparin Injectable 40 milliGRAM(s) SubCutaneous daily  famotidine    Tablet 20 milliGRAM(s) Oral daily  morphine  Infusion 2.5 mG/Hr (2.5 mL/Hr) IV Continuous <Continuous>  polyethylene glycol 3350 17 Gram(s) Oral daily  potassium acid phosphate/sodium acid phosphate tablet (K-PHOS No. 2) 1 Tablet(s) Oral two times a day with meals  senna 2 Tablet(s) Oral at bedtime  sodium chloride 0.9% lock flush 3 milliLiter(s) IV Push every 8 hours  sodium chloride 0.9% lock flush 3 milliLiter(s) IV Push every 8 hours  sodium phosphate IVPB 15 milliMole(s) IV Intermittent once    MEDICATIONS  (PRN):  acetaminophen   Tablet .. 650 milliGRAM(s) Oral every 6 hours PRN Temp greater or equal to 38C (100.4F)  aluminum hydroxide/magnesium hydroxide/simethicone Suspension 30 milliLiter(s) Oral every 6 hours PRN Dyspepsia  metoclopramide Injectable 10 milliGRAM(s) IV Push every 6 hours PRN Nausea/Vomiting  morphine  - Injectable 5 milliGRAM(s) IV Push every 2 hours PRN Moderate Pain (4 - 6) and severe pain  naloxone Injectable 0.1 milliGRAM(s) IV Push every 3 minutes PRN Sedation or RR <10  ondansetron Injectable 4 milliGRAM(s) IV Push every 6 hours PRN Nausea and/or Vomiting  simethicone 80 milliGRAM(s) Chew every 6 hours PRN Gas      Vital Signs Last 24 Hrs  T(C): 38.1 (13 Apr 2020 05:20), Max: 38.3 (12 Apr 2020 19:23)  T(F): 100.6 (13 Apr 2020 05:20), Max: 101 (12 Apr 2020 19:23)  HR: 128 (13 Apr 2020 05:20) (122 - 134)  BP: 133/86 (13 Apr 2020 05:20) (104/69 - 133/86)  BP(mean): --  RR: 18 (13 Apr 2020 05:20) (18 - 198)  SpO2: 93% (13 Apr 2020 05:20) (89% - 96%)    PHYSICAL EXAM:    General: Lethargic, groggy appearing but answering questions and arousable   Pt not physically examined in attempt to decrease COVID exposure in our already immunocompromised patient population       LABS:                        7.5    16.33 )-----------( 571      ( 13 Apr 2020 07:18 )             25.3     04-13    134<L>  |  92<L>  |  17  ----------------------------<  102<H>  4.2   |  30  |  0.88    Ca    8.9      13 Apr 2020 07:18  Phos  1.7     04-13  Mg     2.4     04-13    TPro  6.7  /  Alb  2.6<L>  /  TBili  1.4<H>  /  DBili  x   /  AST  35  /  ALT  6<L>  /  AlkPhos  129<H>  04-13    PT/INR - ( 13 Apr 2020 07:18 )   PT: 13.6 sec;   INR: 1.18 ratio                 RADIOLOGY & ADDITIONAL STUDIES:    PATHOLOGY:

## 2020-04-13 NOTE — PHYSICAL THERAPY INITIAL EVALUATION ADULT - PRECAUTIONS/LIMITATIONS, REHAB EVAL
Hospital course: Suspected 2/2 tumor fever; NSAIDs were stopped on 4/1/20 in anticipation of port placement, which could have been previously suppressing higher fevers that became unmasked. However, since she met sepsis criteria, patient was started on abx (Vancomycin, Zosyn). Patient has been off antibiotics since 4/7. cultures negative, 4/1/20 however pt has continued to spike fevers; covid (-) x3, parainfluenze (+), CTA Chest (4/1) was negative for central PE, but unable to evaluate segmental and subsegmental branches due to motion artifact. Lung imaging showed unchanged metastatic nodules, small bilateral pleural effusions, and atelectasis of the basilar LLL (unchanged as well). No new opacity which decreases the likelihood of PNA; CT A/P repeated on 4/3/20; no obvious source of infection; CT A/P: bilateral heterogeneous adnexal masses, as well as upper abdominal, RP, possible bone lesions and pelvic lymphadenopathy; CT Chest with multiple solid pulmonary nodules; per MD on 4/12 pt somnolent, morphine infusion. Completed chemotherapy. (-) DVT 4/12 fall precautions/Hospital course: Suspected 2/2 tumor fever; NSAIDs were stopped on 4/1/20 in anticipation of port placement, which could have been previously suppressing higher fevers that became unmasked. However, since she met sepsis criteria, patient was started on abx (Vancomycin, Zosyn). Patient has been off antibiotics since 4/7. cultures negative, 4/1/20 however pt has continued to spike fevers; covid (-) x3, parainfluenze (+), CTA Chest (4/1) was negative for central PE, but unable to evaluate segmental and subsegmental branches due to motion artifact. Lung imaging showed unchanged metastatic nodules, small bilateral pleural effusions, and atelectasis of the basilar LLL (unchanged as well). No new opacity which decreases the likelihood of PNA; CT A/P repeated on 4/3/20; no obvious source of infection; CT A/P: bilateral heterogeneous adnexal masses, as well as upper abdominal, RP, possible bone lesions and pelvic lymphadenopathy; CT Chest with multiple solid pulmonary nodules; per MD on 4/12 pt somnolent, morphine infusion. Completed chemotherapy. (-) DVT 4/12

## 2020-04-13 NOTE — PROGRESS NOTE ADULT - ASSESSMENT
24 f with b/l adnexal masses and pelvic LAD, metastatic carcinoma of unknown primary presented 3/20 with abd pain, s/p CT guided biopsy 3/25, PICC line 4/3 to start chemo  pt intermittently febrile and leukocytosis, now febrile to 101.7 and tachy, WBC: 34  QUINN for the last 2 days now Cr: 1.3  blood and urine cx negative  COVID negative x 3 last one 4/4  RVP 4/1 with parainfluenza  also some diarrhea initially    persistent fever and leukocytosis, with tachycardia sepsis  C-diff negative  parainfluenza URI 4/1  metastatic ca with ?tumor fever  * s/p cycle 1 of folfox now WBC down to 15-16, pt still with a fever to 100.6  * f/u the repeat blood cultures  * infection w/u negative and likely malignancy related  * monitor the WBC and temp  * monitor for new signs of infection    The above assessment and plan was discussed with hem/onc team    Gina Arenas MD  Pager [phone]  After 5pm and on weekends call [phone]

## 2020-04-13 NOTE — PROGRESS NOTE ADULT - ATTENDING COMMENTS
I agree with the assessment and plan as stated above in the note from Dr Banks.  Goals are improved pain control with transition to oral meds. Received modified FolFOX on 4/10.

## 2020-04-13 NOTE — PHYSICAL THERAPY INITIAL EVALUATION ADULT - ADDITIONAL COMMENTS
pt lives at home with mother and brother in apt, +elevator access to 5th floor, PTA ind amb and ADls, no device, reports mother will be able to assist, +wears glasses

## 2020-04-13 NOTE — PROGRESS NOTE ADULT - SUBJECTIVE AND OBJECTIVE BOX
Follow Up:  fever, leukocytosis    Interval History: s/p cycle 1 of folfox, had a temp of 100.6, WBC down to 15-16    ROS:      All other systems negative    Constitutional: no fever,  chills  Head: no trauma  Eyes: no vision changes, no eye pain  ENT:  no sore throat, no rhinorrhea  Cardiovascular:  no chest pain, no palpitation  Respiratory:  + SOB, no cough  GI:  + abd pain, no vomiting, no diarrhea   urinary: no dysuria, no hematuria, no flank pain  musculoskeletal:  no joint pain, no joint swelling  skin:  no rash  neurology:  no headache, no seizure, no change in mental status  psych: no anxiety, no depression         Allergies  No Known Allergies        ANTIMICROBIALS:      OTHER MEDS:  acetaminophen   Tablet .. 650 milliGRAM(s) Oral every 6 hours PRN  allopurinol 300 milliGRAM(s) Oral daily  aluminum hydroxide/magnesium hydroxide/simethicone Suspension 30 milliLiter(s) Oral every 6 hours PRN  enoxaparin Injectable 40 milliGRAM(s) SubCutaneous daily  famotidine    Tablet 20 milliGRAM(s) Oral daily  metoclopramide Injectable 10 milliGRAM(s) IV Push every 6 hours PRN  morphine  - Injectable 5 milliGRAM(s) IV Push every 2 hours PRN  morphine  Infusion 2.5 mG/Hr IV Continuous <Continuous>  naloxone Injectable 0.1 milliGRAM(s) IV Push every 3 minutes PRN  ondansetron Injectable 4 milliGRAM(s) IV Push every 6 hours PRN  polyethylene glycol 3350 17 Gram(s) Oral daily  potassium acid phosphate/sodium acid phosphate tablet (K-PHOS No. 2) 1 Tablet(s) Oral two times a day with meals  senna 2 Tablet(s) Oral at bedtime  simethicone 80 milliGRAM(s) Chew every 6 hours PRN  sodium chloride 0.9% lock flush 3 milliLiter(s) IV Push every 8 hours  sodium chloride 0.9% lock flush 3 milliLiter(s) IV Push every 8 hours      Vital Signs Last 24 Hrs  T(C): 36.1 (13 Apr 2020 12:50), Max: 38.3 (12 Apr 2020 19:23)  T(F): 97 (13 Apr 2020 12:50), Max: 101 (12 Apr 2020 19:23)  HR: 126 (13 Apr 2020 12:50) (119 - 134)  BP: 137/87 (13 Apr 2020 12:50) (104/69 - 137/87)  BP(mean): --  RR: 22 (13 Apr 2020 12:50) (18 - 198)  SpO2: 97% (13 Apr 2020 12:50) (86% - 97%)    Physical Exam:  General:    NAD,  non toxic  Head: atraumatic, normocephalic  Eye: normal sclera and conjunctiva  ENT:    no oropharyngeal lesions,   no LAD,   neck supple  Cardio:    tachy regular S1, S2  Respiratory:    clear b/l,    no wheezing but  tachypneic  abd:    palpable mass, soft,   BS +,  slight diffuse tenderness  :   no CVAT,  no suprapubic tenderness,   no  cash  Musculoskeletal:   no joint swelling,   no edema  vascular: RUE picc  Skin:    no rash  Neurologic:     no focal deficit  psych: normal affect                          7.5    16.33 )-----------( 571      ( 13 Apr 2020 07:18 )             25.3       04-13    134<L>  |  92<L>  |  17  ----------------------------<  102<H>  4.2   |  30  |  0.88    Ca    8.9      13 Apr 2020 07:18  Phos  1.7     04-13  Mg     2.4     04-13    TPro  x   /  Alb  x   /  TBili  x   /  DBili  1.0<H>  /  AST  x   /  ALT  x   /  AlkPhos  x   04-13          MICROBIOLOGY:  v  .Urine Clean Catch (Midstream)  04-08-20   No growth  --  --      .Blood Blood-Peripheral  04-05-20   No Growth Final  --  --      .Blood Blood-Peripheral  04-05-20   No Growth Final  --  --      .Urine Clean Catch (Midstream)  04-05-20   No growth  --  --      .Blood Blood-Peripheral  04-03-20   No Growth Final  --  --      .Blood Blood-Catheter  04-03-20   No Growth Final  --  --      .Blood Blood-Peripheral  04-01-20   No Growth Final  --  --      .Urine Clean Catch (Midstream)  04-01-20   >=3 organisms. Probable collection contamination.  --  --      .Blood Blood-Peripheral  03-25-20   No Growth Final  --  --      .Blood Blood-Venous  03-20-20   No growth at 5 days.  --  --      .Blood Blood-Peripheral  03-20-20   No growth at 5 days.  --  --      .Urine Clean Catch (Midstream)  03-20-20   <10,000 CFU/mL Normal Urogenital Hui  --  --            Clostridium difficile GDH Toxins A&amp;B, EIA:   Negative (04-06-20 @ 22:12)  Clostridium difficile [facility] Interpretation: Negative for toxigenic C. Difficile.     RADIOLOGY:  Images below independently visualized and reviewed personally, findings as below  < from: [facility] Duplex Lower Ext Vein Scan, Bilat (04.12.20 @ 15:06) >  IMPRESSION:     No evidence of deep venous thrombosis in either lower extremity.      < from: CT Abdomen and Pelvis w/ IV Cont (04.03.20 @ 16:46) >  IMPRESSION:     Widely metastatic neoplasm without significant change from prior abdominal CT 3/20/2020.    No evidence of an infectious source in the abdomen or pelvis.

## 2020-04-13 NOTE — PROGRESS NOTE ADULT - SUBJECTIVE AND OBJECTIVE BOX
HPI: 24F here with worsening abdominal pain in the setting of known adnexal massess, found to have for metastatic carcinoma with final pathology pending. Given evidence of metastatic disease, patient is not a surgical candidate. Has been having worsening adnexal pain, markedly last night after taking senna. States pain is 7-8/10 at its worst, located in adnexal area with radiation down to legs, worse with movement, and tolerable with opioids around 2/10. Palliative care called to help with pain.     INTERVAL EVENTS:  3/30: patient states IV dilaudid works, but PO and naproxen aren't enough to keep her pain at a tolerable level, d/w patient  3/31: states having vomiting and going to the bathroom are exacerbating her pain, and the PO appears to last 2 hours. States the IV meds take the pain away but not as much as before.   4/1: used dilaudid 0.8mg IV x 3, and 4mg PO x 3, with additional 1mg x 2 doses  4/2: on PCA< used 6 injections and attempts in 12 hours  4/3: see usage below, feels it is helping with her pain, is having diarrhea  4/6: lethargic  4/8: Poor control of pain, d/w with Heme attending; 3 prns used overnight  4/10 link to chart note from yesterday. d/w nurse. improvement pain control noted. with the bulk of her prn use at night.  ? insomnia/anxiety in differential; dilaudid shortage  8 prns used in 24 hours  4/13 used 2 prns in 24 hours  On morphine infusion  She ranks her average pain over a 24 hour period as 3/10  She seemed fatigued and when I inquired about sedation, she informed me that she was trying to fall asleep immediately prior to my arrival.  I spoke with PT who walked with the patient about 50 feet X2 without complaints of pain.  No nausea, vomiting, or constipation.    Chemo has been completed according to the primary team      ADVANCE DIRECTIVES:    DNR  MOLST  [ ]  Living Will  [ ]   DECISION MAKER(s):  [ ] Health Care Proxy(s)  [ ] Surrogate(s)  [ ] Guardian           Name(s): Phone Number(s):    BASELINE (I)ADL(s) (prior to admission):  [place]: [ ]Total  [ ] Moderate [ ]Dependent    Allergies    No Known Allergies    Intolerances       PRESENT SYMPTOMS: [ ]Unable to obtain due to poor mentation   Source if other than patient:  [ ]Family   [ ]Team     Pain: [X ]yes [ ]no  QOL impact -  decreased function  Location - adnexal  Aggravating factors - movement  Quality - burning  Radiation - down legs  Timing- constant  Severity (0-10 scale): 8/10   Minimal acceptable level (0-10 scale): 2/10    CPOT:    https://www.[email or web address]/getattachment/[email or web address]/Critical-Care-Pain-Observation-Tool-(CPOT)      PAIN AD Score:     [email or web address]/painad.pdf (press ctrl +  left click to view)    Dyspnea:                           [ ]Mild [ ]Moderate [ ]Severe  Anxiety:                             [ ]Mild [ ]Moderate [ ]Severe  Fatigue:                             [ ]Mild [ x]Moderate [ ]Severe  Nausea:                             [ ]Mild [ ]Moderate [ ]Severe  Loss of appetite:              [ ]Mild [ ]Moderate [ ]Severe  Constipation:                    [ ]Mild [ ]Moderate [ ]Severe    Other Symptoms:  [X ]All other review of systems negative     Palliative Performance Status Version 2:   40      %    [email or web address]    PHYSICAL EXAM:      Limited exam for patient safety and to limit infection risk during COVID-19 outbreak. Please refr to primary team's examination for today.  GENERAL:  [ X]Alert  [ ]Oriented x   [ ]Lethargic  [ ]Cachexia  [ ]Unarousable  [ X]Verbal  [ ]Non-Verbal  Behavioral:   [ ] Anxiety  [ ] Delirium [ ] Agitation [ ] Other  HEENT:  [ ]Normal   [ ]Dry mouth   [ ]ET Tube/Trach  [ ]Oral lesions  PULMONARY:   [ ]Clear [ ]Tachypnea  [ ]Audible excessive secretions   [ ]Rhonchi        [ ]Right [ ]Left [ ]Bilateral  [ ]Crackles        [ ]Right [ ]Left [ ]Bilateral  [ ]Wheezing     [ ]Right [ ]Left [ ]Bilateral  [ ]Diminished breath sounds [ ]right [ ]left [ ]bilateral  CARDIOVASCULAR:    [ ]Regular [ ]Irregular [ ]Tachy  [ ]Quirino [ ]Murmur [ ]Other  GASTROINTESTINAL:  [ ]Soft  [ ]Distended   [ ]+BS  [ ]Non tender [ ]Tender  [ ]PEG [ ]OGT/ NGT  Last BM:     GENITOURINARY:  [ ]Normal [ ] Incontinent   [ ]Oliguria/Anuria   [ ]Cifuentes  MUSCULOSKELETAL:   [ ]Normal   [ x]Weakness  [ ]Bed/Wheelchair bound [ ]Edema  NEUROLOGIC:  Lucid speech  [ ]No focal deficits  [ ]Cognitive impairment  [ ]Dysphagia [ ]Dysarthria [ ]Paresis [ ]Other   SKIN:   [ ]Normal    [ ]Rash  [ ]Pressure ulcer(s)       Present on admission [ ]y [ ]n    CRITICAL CARE:  [ ] Shock Present  [ ]Septic [ ]Cardiogenic [ ]Neurologic [ ]Hypovolemic  [ ]  Vasopressors [ ]  Inotropes   [ ]Respiratory failure present [ ]Mechanical ventilation [ ]Non-invasive ventilatory support [ ]High flow  [ ]Acute  [ ]Chronic [ ]Hypoxic  [ ]Hypercarbic [ ]Other  [ ]Other organ failure     LABS: reviewed                          7.7    41.90 )-----------( 611      ( 03 Apr 2020 10:03 )             25.6     04-03    130<L>  |  91<L>  |  8   ----------------------------<  92  3.7   |  27  |  0.75    Ca    11.4<H>      03 Apr 2020 10:03  Phos  2.4     04-03  Mg     1.8     04-03            RADIOLOGY & ADDITIONAL STUDIES:    PROTEIN CALORIE MALNUTRITION PRESENT: [ ]mild [ ]moderate [ ]severe [ ]underweight [ ]morbid obesity  https://www.andeal.org/vault/1160/web/files/ONC/Table_Clinical%20Characteristics%20to%20Document%20Malnutrition-White%20JV%20et%20al%570187.pdf    Height (cm): 162.6 (03-21-20 @ 03:15), 162.56 (03-07-20 @ 19:18)  Weight (kg): 84.4 (03-21-20 @ 03:15), 89.4 (03-07-20 @ 19:18)  BMI (kg/m2): 31.9 (03-21-20 @ 03:15), 33.8 (03-07-20 @ 19:18)    [ ]PPSV2 < or = to 30% [ ]significant weight loss  [ ]poor nutritional intake  [ ]anasarca     Albumin, Serum: 2.9 g/dL (03-25-20 @ 05:59)   [ ]Artificial Nutrition      REFERRALS:   [ ]Chaplaincy  [ ]Hospice  [ ]Child Life  [ ]Social Work  [ ]Case management [ ]Holistic Therapy

## 2020-04-13 NOTE — PROGRESS NOTE ADULT - ASSESSMENT
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      # Fever, tachycardia  -Suspected 2/2 tumor fever; NSAIDs were stopped on 20 in anticipation of port placement, which could have been previously suppressing higher fevers that became unmasked. However, since she met sepsis criteria, patient was started on abx (Vancomycin, Zosyn). Patient has been off antibiotics since . cultures negative, 20 however pt has continued to spike fevers- repeat blood cultures from  pending   - COVID-19 negative x 3, but parainfluenza positive ().   - CTA Chest () was negative for central PE, but unable to evaluate segmental and subsegmental branches due to motion artifact. Lung imaging showed unchanged metastatic nodules, small bilateral pleural effusions, and atelectasis of the basilar LLL (unchanged as well). No new opacity which decreases the likelihood of PNA  - CT A/P repeated on 4/3/20; no obvious source of infection   - Blood/urine cultures have had NGTD. C. diff negative  - ID following, appreciate recs  - S/p PICC line placement for chemotherapy  - Ammonia level normal   - Patient is s/p C1 modified FOLFOX (-). Further management as noted below     # Metastatic carcinoma, cancer of unknown primary  - CT A/P with bilateral heterogeneous adnexal masses, as well as upper abdominal, RP, possible bone lesions and pelvic lymphadenopathy; CT Chest with multiple solid pulmonary nodules  - Cancer of unknown primary (poorly differentiated carcinoma) but suspect likely GI origin given CDX2 positivity on pathology; chromogranin positivity also suggests neuroendocrine features; Ki-67 index 40%.   - Tumor markers elevated: Cancer Antigen, Ca 27.29: 70.8, Cancer Antigen, 125: 619, Carcinoembryonic Antigen: 44.7, Beta-HC.0 on initial evaluation (3/7); Checked again on 20 and it was 102.7  - Discussed diagnosis with patient on 3/31/20. Given advanced, metastatic stage, will need to be on indefinite treatment to limit progression of disease.  - S/p C1 mFOLFOX (4.9-). Next cycle is due in ~ 2 weeks. Will need to start discharge planning.   - Patient is currently on 2L NC. Will try to wean supplemental oxygen as tolerated. Will also provide an incentive spirometer  - CTA Chest () was negative for central PE, but unable to evaluate segmental and subsegmental branches due to motion artifact. In the setting of her hypoxemia and tachycardia, will check LE dopplers to r/o a DVT.  - Fever spike overnight to 100.6, cultures checked but may also be 2/2 tumor, f/u blood cultures       # Pain control   - On Morphine infusion and PRN injections  - Will try to switch IV pain medications to oral in the near future. Palliative Care following, appreciate recs. Will continue to follow-up with Palliative Care regarding pain management/regimen on discharge.     # Hyperuricemia  - Patient's uric acid was 8.8 yesterday (). She was given IV Rasburicase 3mg x1 on . Her uric acid level is 4.1 today ()  - C/w Allopurinol 300mg daily  - Monitor TLS labs daily (CMP, Phos, LDH, Uric acid)    # DVT PPx  - Lovenox 40mg SQ    # Dispo  - Pending resolution of acute medical issues. Pt recs: DC home with assist from mother/brother, +elevator access in apt to 5th floor, recommend rolling walker for long distances and recommend 3:1 commode  - Wean off O2 as tolerated      Cesia Banks MD  Hematology Oncology Fellow, PGY-4  [facility] Pager: [phone]/ [facility] Pager: [phone] Patient is a 23 y/o F w/ no PMHx who initially p/w abdominal pain, found to have bilateral pelvic masses and extensive pelvic lymphadenopathy concerning for metastatic malignancy with pathology showing likely metastatic carcinoma with neuroendocrine features, possibly of GI origin, s/p C1 mFOLFOX (-).      # Fever, tachycardia  -Suspected 2/2 tumor fever; NSAIDs were stopped on 20 in anticipation of port placement, which could have been previously suppressing higher fevers that became unmasked. However, since she met sepsis criteria, patient was started on abx (Vancomycin, Zosyn). Patient has been off antibiotics since . cultures negative, 20 however pt has continued to spike fevers- repeat blood cultures from  pending   - COVID-19 negative x 3, but parainfluenza positive ().   - CTA Chest () was negative for central PE, but unable to evaluate segmental and subsegmental branches due to motion artifact. Lung imaging showed unchanged metastatic nodules, small bilateral pleural effusions, and atelectasis of the basilar LLL (unchanged as well). No new opacity which decreases the likelihood of PNA  - CT A/P repeated on 4/3/20; no obvious source of infection   - Blood/urine cultures have had NGTD. C. diff negative  - ID following, appreciate recs  - S/p PICC line placement for chemotherapy  - Ammonia level normal   - Patient is s/p C1 modified FOLFOX (-). Further management as noted below     # Metastatic carcinoma, cancer of unknown primary  - CT A/P with bilateral heterogeneous adnexal masses, as well as upper abdominal, RP, possible bone lesions and pelvic lymphadenopathy; CT Chest with multiple solid pulmonary nodules  - Cancer of unknown primary (poorly differentiated carcinoma) but suspect likely GI origin given CDX2 positivity on pathology; chromogranin positivity also suggests neuroendocrine features; Ki-67 index 40%.   - Tumor markers elevated: Cancer Antigen, Ca 27.29: 70.8, Cancer Antigen, 125: 619, Carcinoembryonic Antigen: 44.7, Beta-HC.0 on initial evaluation (3/7); Checked again on 20 and it was 102.7  - Discussed diagnosis with patient on 3/31/20. Given advanced, metastatic stage, will need to be on indefinite treatment to limit progression of disease.  - S/p C1 mFOLFOX (4.9-). Next cycle is due in ~ 2 weeks. Will need to start discharge planning.   - Patient is currently on 2L NC. Will try to wean supplemental oxygen as tolerated. Will also provide an incentive spirometer  - CTA Chest () was negative for central PE, but unable to evaluate segmental and subsegmental branches due to motion artifact. In the setting of her hypoxemia and tachycardia, will check LE dopplers to r/o a DVT.  - Fever spike overnight to 100.6, cultures checked but may also be 2/2 tumor, f/u blood cultures       # Pain control   - On Morphine infusion and PRN injections  - Will try to switch IV pain medications to oral in the near future. Palliative Care following, appreciate recs. Will continue to follow-up with Palliative Care regarding pain management/regimen on discharge.     # Hyperuricemia  - Patient's uric acid was 8.8 yesterday (). She was given IV Rasburicase 3mg x1 on . Her uric acid level is 4.1 today ()  - C/w Allopurinol 300mg daily  - Monitor TLS labs daily (CMP, Phos, LDH, Uric acid)    # DVT PPx  - Lovenox 40mg SQ    # Dispo  - Pending resolution of acute medical issues. Pt recs: DC home with assist from mother/brother, +elevator access in apt to 5th floor, recommend rolling walker for long distances and recommend 3:1 commode  - Wean off O2 as tolerated  - Will continue to update mother and brother over phone daily, all of their questions answered to their satisfaction       Cesia Banks MD  Hematology Oncology Fellow, PGY-4  [facility] Pager: [phone]/ [facility] Pager: [phone]

## 2020-04-14 NOTE — PROGRESS NOTE ADULT - PROBLEM SELECTOR PLAN 1
Pt seen and evaluated  Pain is much better controlled  Only 2 prn used in 24 hours  On 2.5mg IV morphine per hour  PRN will be approx 10-20% of total daily dose 5mg IV morphine q2H prn  Ensure bowel regimen senna qd and miralax qd  If her pain continues to be well controlled and there are no spikes, I will transition to Morphine ER in the morning, with po morphine IRfor breakthrough Predominant symptom: abdominal pain  Likely due to malignancy  Degree of control: moderate to well controlled  Relative to previous day: improved  Pain (scale is out of 10) : Avg over 24 hours: 3/10      Duration of PRN: hours of relief  Current treatment regimen: morphine infusion 2.5mg/hr  morphine IVP 5mg q2H prn  Total daily dose (2.5mg X 24) + (5mg X 4)= 60 + 20mg IV morphine = 80 mg IV morphine= 200mg po morphine daily dose  No bradypnea.   Some lethargy, decrease approximately 25 %====> 150   Recommendations: Discontinue IV gtt  Begin morphine ER 30/60/60  maintain IVP PRN morphine, but new dose is 3mg q2H PRN  naloxone PRN  Risk mitigation/Bowel regimen: maintain senna and miralax  Adverse events noted: slight lethargy

## 2020-04-14 NOTE — PROGRESS NOTE ADULT - SUBJECTIVE AND OBJECTIVE BOX
INTERVAL History:    Allergies    No Known Allergies    Intolerances        MEDICATIONS  (STANDING):  allopurinol 300 milliGRAM(s) Oral daily  enoxaparin Injectable 40 milliGRAM(s) SubCutaneous daily  famotidine    Tablet 20 milliGRAM(s) Oral daily  morphine  Infusion 2.5 mG/Hr (2.5 mL/Hr) IV Continuous <Continuous>  polyethylene glycol 3350 17 Gram(s) Oral daily  senna 2 Tablet(s) Oral at bedtime  sodium chloride 0.9% lock flush 3 milliLiter(s) IV Push every 8 hours  sodium chloride 0.9% lock flush 3 milliLiter(s) IV Push every 8 hours    MEDICATIONS  (PRN):  acetaminophen   Tablet .. 650 milliGRAM(s) Oral every 6 hours PRN Temp greater or equal to 38C (100.4F)  aluminum hydroxide/magnesium hydroxide/simethicone Suspension 30 milliLiter(s) Oral every 6 hours PRN Dyspepsia  metoclopramide Injectable 10 milliGRAM(s) IV Push every 6 hours PRN Nausea/Vomiting  morphine  - Injectable 5 milliGRAM(s) IV Push every 2 hours PRN Moderate Pain (4 - 6) and severe pain  naloxone Injectable 0.1 milliGRAM(s) IV Push every 3 minutes PRN Sedation or RR <10  ondansetron Injectable 4 milliGRAM(s) IV Push every 6 hours PRN Nausea and/or Vomiting  simethicone 80 milliGRAM(s) Chew every 6 hours PRN Gas      Vital Signs Last 24 Hrs  T(C): 36.8 (14 Apr 2020 05:01), Max: 37.6 (13 Apr 2020 21:00)  T(F): 98.3 (14 Apr 2020 05:01), Max: 99.7 (13 Apr 2020 21:00)  HR: 124 (14 Apr 2020 05:01) (119 - 126)  BP: 122/86 (14 Apr 2020 05:01) (113/76 - 137/87)  BP(mean): --  RR: 20 (14 Apr 2020 05:01) (20 - 22)  SpO2: 95% (14 Apr 2020 05:01) (86% - 98%)    PHYSICAL EXAM:    General: AOX3, no acute distress  EYES: EOMI, PERRLA, conjunctiva and sclera clear  NECK: Supple, No JVD, Normal thyroid  CHEST/LUNG: Clear to auscultation bilaterally; No rales, rhonchi, or wheezing  HEART: Regular rate and rhythm; no murmurs  ABDOMEN: Soft, Nontender. BS+  LYMPH: No lymphadenopathy noted  Extremities: No peripheral edema      LABS:                        6.8    18.17 )-----------( 463      ( 14 Apr 2020 06:55 )             22.8     04-14    131<L>  |  90<L>  |  11  ----------------------------<  107<H>  3.9   |  32<H>  |  0.81    Ca    8.8      14 Apr 2020 06:55  Phos  1.7     04-14  Mg     2.2     04-14    TPro  6.6  /  Alb  2.6<L>  /  TBili  1.2  /  DBili  0.8<H>  /  AST  30  /  ALT  7<L>  /  AlkPhos  112  04-14    PT/INR - ( 13 Apr 2020 07:18 )   PT: 13.6 sec;   INR: 1.18 ratio                 RADIOLOGY & ADDITIONAL STUDIES:    PATHOLOGY: INTERVAL History: No acute events overnight. Pt remained afebrile. Remains on supplemental O2, ordered for 1 unit prbc.     Allergies    No Known Allergies    Intolerances        MEDICATIONS  (STANDING):  allopurinol 300 milliGRAM(s) Oral daily  enoxaparin Injectable 40 milliGRAM(s) SubCutaneous daily  famotidine    Tablet 20 milliGRAM(s) Oral daily  morphine  Infusion 2.5 mG/Hr (2.5 mL/Hr) IV Continuous <Continuous>  polyethylene glycol 3350 17 Gram(s) Oral daily  senna 2 Tablet(s) Oral at bedtime  sodium chloride 0.9% lock flush 3 milliLiter(s) IV Push every 8 hours  sodium chloride 0.9% lock flush 3 milliLiter(s) IV Push every 8 hours    MEDICATIONS  (PRN):  acetaminophen   Tablet .. 650 milliGRAM(s) Oral every 6 hours PRN Temp greater or equal to 38C (100.4F)  aluminum hydroxide/magnesium hydroxide/simethicone Suspension 30 milliLiter(s) Oral every 6 hours PRN Dyspepsia  metoclopramide Injectable 10 milliGRAM(s) IV Push every 6 hours PRN Nausea/Vomiting  morphine  - Injectable 5 milliGRAM(s) IV Push every 2 hours PRN Moderate Pain (4 - 6) and severe pain  naloxone Injectable 0.1 milliGRAM(s) IV Push every 3 minutes PRN Sedation or RR <10  ondansetron Injectable 4 milliGRAM(s) IV Push every 6 hours PRN Nausea and/or Vomiting  simethicone 80 milliGRAM(s) Chew every 6 hours PRN Gas      Vital Signs Last 24 Hrs  T(C): 36.8 (14 Apr 2020 05:01), Max: 37.6 (13 Apr 2020 21:00)  T(F): 98.3 (14 Apr 2020 05:01), Max: 99.7 (13 Apr 2020 21:00)  HR: 124 (14 Apr 2020 05:01) (119 - 126)  BP: 122/86 (14 Apr 2020 05:01) (113/76 - 137/87)  BP(mean): --  RR: 20 (14 Apr 2020 05:01) (20 - 22)  SpO2: 95% (14 Apr 2020 05:01) (86% - 98%)    PHYSICAL EXAM:    Not physically examined to decrease COVID exposure to our already immunocompromised patient population       LABS:                        6.8    18.17 )-----------( 463      ( 14 Apr 2020 06:55 )             22.8     04-14    131<L>  |  90<L>  |  11  ----------------------------<  107<H>  3.9   |  32<H>  |  0.81    Ca    8.8      14 Apr 2020 06:55  Phos  1.7     04-14  Mg     2.2     04-14    TPro  6.6  /  Alb  2.6<L>  /  TBili  1.2  /  DBili  0.8<H>  /  AST  30  /  ALT  7<L>  /  AlkPhos  112  04-14    PT/INR - ( 13 Apr 2020 07:18 )   PT: 13.6 sec;   INR: 1.18 ratio                 RADIOLOGY & ADDITIONAL STUDIES:      PATHOLOGY: INTERVAL History: No acute events overnight. Pt remained afebrile. Remains on supplemental O2, ordered for 1 unit prbc. Patient seen and states that she has back pain, rates it as 2-5/10. Pt also states that for the past year and a half, her menstruation has been heavier than usual. Otherwise, she endorses working with physical therapy yesterday.     Allergies    No Known Allergies    Intolerances        MEDICATIONS  (STANDING):  allopurinol 300 milliGRAM(s) Oral daily  enoxaparin Injectable 40 milliGRAM(s) SubCutaneous daily  famotidine    Tablet 20 milliGRAM(s) Oral daily  morphine  Infusion 2.5 mG/Hr (2.5 mL/Hr) IV Continuous <Continuous>  polyethylene glycol 3350 17 Gram(s) Oral daily  senna 2 Tablet(s) Oral at bedtime  sodium chloride 0.9% lock flush 3 milliLiter(s) IV Push every 8 hours  sodium chloride 0.9% lock flush 3 milliLiter(s) IV Push every 8 hours    MEDICATIONS  (PRN):  acetaminophen   Tablet .. 650 milliGRAM(s) Oral every 6 hours PRN Temp greater or equal to 38C (100.4F)  aluminum hydroxide/magnesium hydroxide/simethicone Suspension 30 milliLiter(s) Oral every 6 hours PRN Dyspepsia  metoclopramide Injectable 10 milliGRAM(s) IV Push every 6 hours PRN Nausea/Vomiting  morphine  - Injectable 5 milliGRAM(s) IV Push every 2 hours PRN Moderate Pain (4 - 6) and severe pain  naloxone Injectable 0.1 milliGRAM(s) IV Push every 3 minutes PRN Sedation or RR <10  ondansetron Injectable 4 milliGRAM(s) IV Push every 6 hours PRN Nausea and/or Vomiting  simethicone 80 milliGRAM(s) Chew every 6 hours PRN Gas      Vital Signs Last 24 Hrs  T(C): 36.8 (14 Apr 2020 05:01), Max: 37.6 (13 Apr 2020 21:00)  T(F): 98.3 (14 Apr 2020 05:01), Max: 99.7 (13 Apr 2020 21:00)  HR: 124 (14 Apr 2020 05:01) (119 - 126)  BP: 122/86 (14 Apr 2020 05:01) (113/76 - 137/87)  BP(mean): --  RR: 20 (14 Apr 2020 05:01) (20 - 22)  SpO2: 95% (14 Apr 2020 05:01) (86% - 98%)    PHYSICAL EXAM:    Not physically examined to decrease COVID exposure to our already immunocompromised patient population       LABS:                        6.8    18.17 )-----------( 463      ( 14 Apr 2020 06:55 )             22.8     04-14    131<L>  |  90<L>  |  11  ----------------------------<  107<H>  3.9   |  32<H>  |  0.81    Ca    8.8      14 Apr 2020 06:55  Phos  1.7     04-14  Mg     2.2     04-14    TPro  6.6  /  Alb  2.6<L>  /  TBili  1.2  /  DBili  0.8<H>  /  AST  30  /  ALT  7<L>  /  AlkPhos  112  04-14    PT/INR - ( 13 Apr 2020 07:18 )   PT: 13.6 sec;   INR: 1.18 ratio                 RADIOLOGY & ADDITIONAL STUDIES:      PATHOLOGY:

## 2020-04-14 NOTE — PROGRESS NOTE ADULT - ATTENDING COMMENTS
Opioid  Discharge Recommendations    This patient will leave the hospital with a need for opioids to address symptoms.    Prior to discharge, please ensure that the receiving pharmacy  -has the medication on formulary  -has the correct dose and formulation of the medication  -has an adequate supply of the medication to accommodate the patient's prescription  -has a prescription for a bowel regimen    Please ensure that there is a physician who will continue to follow up and prescribe opioids as needed.    Please ensure prior authorization is completed, if necessary, prior to discharge.    Has this patient been identified to receive a naloxone prescription    Yes [x]  No []       Please note the following on patient's discharge summary: "Please see Dr. Katya Savage at the [facility] ([phone]) for supportive oncology and pain and symptom management."     If referring to Dr. Katya Savage, please email her with a summary of the patient's hospitalization and pain treatment at the time of discharge. Thank you.      On discharge, please prescribe Naloxone spray 4 mg/0.1 ml intranasal, Spray 0.1 mL into one nostril. Repeat with second device into other nostril after 2-3 minutes if no or minimal response ([email or web address]). Please be sure the patient's pharmacy has the medication, otherwise, be sure there is a pharmacy were the patient can get the Naloxone inhaled.

## 2020-04-14 NOTE — PROGRESS NOTE ADULT - PROBLEM SELECTOR PLAN 3
-Consider outpatient supportive oncology follow up Actions:  [x] Rapport building     [x] Symptom assessment    [] Eliciting preferences of goals   [] Prognostic understanding    [] Emotional Support  [] Coping skill development  []  Other  Interdisciplinary Referrals: outpatient referral  Communication: d/w with NP  Documentation Review: [x] Primary Team [x] Consultants [] Interdisciplinary team

## 2020-04-14 NOTE — PROGRESS NOTE ADULT - ASSESSMENT
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      # Fever, tachycardia  -Suspected 2/2 tumor fever; NSAIDs were stopped on 20 in anticipation of port placement, which could have been previously suppressing higher fevers that became unmasked. However, since she met sepsis criteria, patient was started on abx (Vancomycin, Zosyn). Patient has been off antibiotics since . cultures negative, 20 however pt has continued to spike fevers- repeat blood cultures from  pending   - COVID-19 negative x 3, but parainfluenza positive ().   - CTA Chest () was negative for central PE, but unable to evaluate segmental and subsegmental branches due to motion artifact. Lung imaging showed unchanged metastatic nodules, small bilateral pleural effusions, and atelectasis of the basilar LLL (unchanged as well). No new opacity which decreases the likelihood of PNA  - CT A/P repeated on 4/3/20; no obvious source of infection   - Blood/urine cultures have had NGTD. C. diff negative  - ID following, appreciate recs  - S/p PICC line placement for chemotherapy  - Ammonia level normal   - Patient is s/p C1 modified FOLFOX (-). Further management as noted below   -- Blood cultures from  neg, afebrile overnight     # Metastatic carcinoma, cancer of unknown primary  - CT A/P with bilateral heterogeneous adnexal masses, as well as upper abdominal, RP, possible bone lesions and pelvic lymphadenopathy; CT Chest with multiple solid pulmonary nodules  - Cancer of unknown primary (poorly differentiated carcinoma) but suspect likely GI origin given CDX2 positivity on pathology; chromogranin positivity also suggests neuroendocrine features; Ki-67 index 40%.   - Tumor markers elevated: Cancer Antigen, Ca 27.29: 70.8, Cancer Antigen, 125: 619, Carcinoembryonic Antigen: 44.7, Beta-HC.0 on initial evaluation (3/7); Checked again on 20 and it was 102.7  - Discussed diagnosis with patient on 3/31/20. Given advanced, metastatic stage, will need to be on indefinite treatment to limit progression of disease.  - S/p C1 mFOLFOX (4.9-). Next cycle is due in ~ 2 weeks. Will need to start discharge planning.   - Patient is currently on 2L NC. Will try to wean supplemental oxygen as tolerated. Will also provide an incentive spirometer  - CTA Chest () was negative for central PE, but unable to evaluate segmental and subsegmental branches due to motion artifact. In the setting of her hypoxemia and tachycardia, will check LE dopplers to r/o a DVT.  - Will start IV Venofer 200mg for the next 3 days, will also transfuse 1 unit prbc today         # Pain control   - On Morphine infusion and PRN injections  - Will try to switch IV pain medications to oral in the near future. Palliative Care following, appreciate recs. Will continue to follow-up with Palliative Care regarding pain management/regimen on discharge.     # Hyperuricemia  - Patient's uric acid was 8.8 yesterday (). She was given IV Rasburicase 3mg x1 on . Her uric acid level is 4.1 today ()  - C/w Allopurinol 300mg daily  - Monitor TLS labs daily (CMP, Phos, LDH, Uric acid)    # DVT PPx  - Lovenox 40mg SQ    # Dispo  - Pending resolution of acute medical issues. Pt recs: DC home with assist from mother/brother, +elevator access in apt to 5th floor, recommend rolling walker for long distances and recommend 3:1 commode  - Wean off O2 as tolerated  - Pain control   - Will continue to update mother and brother over phone daily, all of their questions answered to their satisfaction       Cesia Banks MD  Hematology Oncology Fellow, PGY-4  [facility] Pager: [phone]/ [facility] Pager: [phone]

## 2020-04-14 NOTE — PROGRESS NOTE ADULT - SUBJECTIVE AND OBJECTIVE BOX
HPI: 24F here with worsening abdominal pain in the setting of known adnexal massess, found to have for metastatic carcinoma with final pathology pending. Given evidence of metastatic disease, patient is not a surgical candidate. Has been having worsening adnexal pain, markedly last night after taking senna. States pain is 7-8/10 at its worst, located in adnexal area with radiation down to legs, worse with movement, and tolerable with opioids around 2/10. Palliative care called to help with pain.     INTERVAL EVENTS:    Overnight events: No major events  Staff reports:  Patient:  PRN use: Four prns used in 24 hours        RECENT VITALS/LABS/MEDICATIONS/ASSAYS     04-14-20 @ 11:46    T(C): 37.2 (04-14-20 @ 09:48), Max: 37.6 (04-13-20 @ 21:00)  HR: 112 (04-14-20 @ 09:48) (112 - 126)  BP: 115/77 (04-14-20 @ 09:48) (115/77 - 137/87)  RR: 20 (04-14-20 @ 09:48) (20 - 22)  SpO2: 97% (04-14-20 @ 09:48) (94% - 98%)  Wt(kg): --      13 Apr 2020 07:01  -  14 Apr 2020 07:00  --------------------------------------------------------  IN:    Oral Fluid: 360 mL  Total IN: 360 mL    OUT:    Stool: 2 mL    Voided: 2000 mL  Total OUT: 2002 mL    Total NET: -1642 mL      14 Apr 2020 07:01  -  14 Apr 2020 11:46  --------------------------------------------------------  IN:  Total IN: 0 mL    OUT:    Stool: 1 mL  Total OUT: 1 mL    Total NET: -1 mL          04-13 @ 07:01  -  04-14 @ 07:00  --------------------------------------------------------  IN: 360 mL / OUT: 2002 mL / NET: -1642 mL    04-14 @ 07:01  - 04-14 @ 11:46  --------------------------------------------------------  IN: 0 mL / OUT: 1 mL / NET: -1 mL      CAPILLARY BLOOD GLUCOSE            acetaminophen   Tablet .. 650 milliGRAM(s) Oral every 6 hours PRN  allopurinol 300 milliGRAM(s) Oral daily  aluminum hydroxide/magnesium hydroxide/simethicone Suspension 30 milliLiter(s) Oral every 6 hours PRN  enoxaparin Injectable 40 milliGRAM(s) SubCutaneous daily  famotidine    Tablet 20 milliGRAM(s) Oral daily  iron sucrose IVPB 200 milliGRAM(s) IV Intermittent every 24 hours  metoclopramide Injectable 10 milliGRAM(s) IV Push every 6 hours PRN  morphine  - Injectable 5 milliGRAM(s) IV Push every 2 hours PRN  morphine  Infusion 2.5 mG/Hr IV Continuous <Continuous>  naloxone Injectable 0.1 milliGRAM(s) IV Push every 3 minutes PRN  ondansetron Injectable 4 milliGRAM(s) IV Push every 6 hours PRN  polyethylene glycol 3350 17 Gram(s) Oral daily  senna 2 Tablet(s) Oral at bedtime  simethicone 80 milliGRAM(s) Chew every 6 hours PRN  sodium chloride 0.9% lock flush 3 milliLiter(s) IV Push every 8 hours  sodium chloride 0.9% lock flush 3 milliLiter(s) IV Push every 8 hours          04-14    131<L>  |  90<L>  |  11  ----------------------------<  107<H>  3.9   |  32<H>  |  0.81    Ca    8.8      14 Apr 2020 06:55  Phos  1.7     04-14  Mg     2.2     04-14    TPro  6.6  /  Alb  2.6<L>  /  TBili  1.2  /  DBili  0.8<H>  /  AST  30  /  ALT  7<L>  /  AlkPhos  112  04-14      Procalc  BNP  ABG                          6.8    18.17 )-----------( 463      ( 14 Apr 2020 06:55 )             22.8   PT/INR - ( 14 Apr 2020 07:05 )   PT: 14.9 sec;   INR: 1.30 ratio                 blood and urine cultures          ADVANCE DIRECTIVES:    DNR  MOLST  [ ]  Living Will  [ ]   DECISION MAKER(s):  [ ] Health Care Proxy(s)  [ ] Surrogate(s)  [ ] Guardian           Name(s): Phone Number(s):    BASELINE (I)ADL(s) (prior to admission):  [place]: [ ]Total  [ ] Moderate [ ]Dependent    Allergies    No Known Allergies    Intolerances       PRESENT SYMPTOMS: [ ]Unable to obtain due to poor mentation   Source if other than patient:  [ ]Family   [ ]Team     Pain: [X ]yes [ ]no  QOL impact -  decreased function  Location - adnexal  Aggravating factors - movement  Quality - burning  Radiation - down legs  Timing- constant  Severity (0-10 scale): 8/10   Minimal acceptable level (0-10 scale): 2/10    CPOT:    https://www.[facility].org/getattachment/[email or web address]/Critical-Care-Pain-Observation-Tool-(CPOT)      PAIN AD Score:     http://geriatrictoolkit.[place].[facility]/cog/painad.pdf (press ctrl +  left click to view)    Dyspnea:                           [ ]Mild [ ]Moderate [ ]Severe  Anxiety:                             [ ]Mild [ ]Moderate [ ]Severe  Fatigue:                             [ ]Mild [ x]Moderate [ ]Severe  Nausea:                             [ ]Mild [ ]Moderate [ ]Severe  Loss of appetite:              [ ]Mild [ ]Moderate [ ]Severe  Constipation:                    [ ]Mild [ ]Moderate [ ]Severe    Other Symptoms:  [X ]All other review of systems negative     Palliative Performance Status Version 2:   40      %    http://[email or web address]/news/palliative_performance_scale_[email or web address].pdf    PHYSICAL EXAM:      Limited exam for patient safety and to limit infection risk during COVID-19 outbreak. Please refr to primary team's examination for today.  GENERAL:  [ X]Alert  [ ]Oriented x   [ ]Lethargic  [ ]Cachexia  [ ]Unarousable  [ X]Verbal  [ ]Non-Verbal  Behavioral:   [ ] Anxiety  [ ] Delirium [ ] Agitation [ ] Other  HEENT:  [ ]Normal   [ ]Dry mouth   [ ]ET Tube/Trach  [ ]Oral lesions  PULMONARY:   [ ]Clear [ ]Tachypnea  [ ]Audible excessive secretions   [ ]Rhonchi        [ ]Right [ ]Left [ ]Bilateral  [ ]Crackles        [ ]Right [ ]Left [ ]Bilateral  [ ]Wheezing     [ ]Right [ ]Left [ ]Bilateral  [ ]Diminished breath sounds [ ]right [ ]left [ ]bilateral  CARDIOVASCULAR:    [ ]Regular [ ]Irregular [ ]Tachy  [ ]Quirino [ ]Murmur [ ]Other  GASTROINTESTINAL:  [ ]Soft  [ ]Distended   [ ]+BS  [ ]Non tender [ ]Tender  [ ]PEG [ ]OGT/ NGT  Last BM:     GENITOURINARY:  [ ]Normal [ ] Incontinent   [ ]Oliguria/Anuria   [ ]Cifuentes  MUSCULOSKELETAL:   [ ]Normal   [ x]Weakness  [ ]Bed/Wheelchair bound [ ]Edema  NEUROLOGIC:  Lucid speech  [ ]No focal deficits  [ ]Cognitive impairment  [ ]Dysphagia [ ]Dysarthria [ ]Paresis [ ]Other   SKIN:   [ ]Normal    [ ]Rash  [ ]Pressure ulcer(s)       Present on admission [ ]y [ ]n    CRITICAL CARE:  [ ] Shock Present  [ ]Septic [ ]Cardiogenic [ ]Neurologic [ ]Hypovolemic  [ ]  Vasopressors [ ]  Inotropes   [ ]Respiratory failure present [ ]Mechanical ventilation [ ]Non-invasive ventilatory support [ ]High flow  [ ]Acute  [ ]Chronic [ ]Hypoxic  [ ]Hypercarbic [ ]Other  [ ]Other organ failure     LABS: reviewed                          7.7    41.90 )-----------( 611      ( 03 Apr 2020 10:03 )             25.6     04-03    130<L>  |  91<L>  |  8   ----------------------------<  92  3.7   |  27  |  0.75    Ca    11.4<H>      03 Apr 2020 10:03  Phos  2.4     04-03  Mg     1.8     04-03            RADIOLOGY & ADDITIONAL STUDIES:    PROTEIN CALORIE MALNUTRITION PRESENT: [ ]mild [ ]moderate [ ]severe [ ]underweight [ ]morbid obesity  https://www.andeal.org/vault/2440/web/files/ONC/Table_Clinical%20Characteristics%20to%20Document%20Malnutrition-White%20JV%20et%20al%202012.pdf    Height (cm): 162.6 (03-21-20 @ 03:15), 162.56 (03-07-20 @ 19:18)  Weight (kg): 84.4 (03-21-20 @ 03:15), 89.4 (03-07-20 @ 19:18)  BMI (kg/m2): 31.9 (03-21-20 @ 03:15), 33.8 (03-07-20 @ 19:18)    [ ]PPSV2 < or = to 30% [ ]significant weight loss  [ ]poor nutritional intake  [ ]anasarca     Albumin, Serum: 2.9 g/dL (03-25-20 @ 05:59)   [ ]Artificial Nutrition      REFERRALS:   [ ]Chaplaincy  [ ]Hospice  [ ]Child Life  [ ]Social Work  [ ]Case management [ ]Holistic Therapy HPI: 24F here with worsening abdominal pain in the setting of known adnexal massess, found to have for metastatic carcinoma with final pathology pending. Given evidence of metastatic disease, patient is not a surgical candidate. Has been having worsening adnexal pain, markedly last night after taking senna. States pain is 7-8/10 at its worst, located in adnexal area with radiation down to legs, worse with movement, and tolerable with opioids around 2/10. Palliative care called to help with pain.     INTERVAL EVENTS:    Overnight events: No major events  Staff reports:   Patient:  PRN use: Four prns used in 24 hours        RECENT VITALS/LABS/MEDICATIONS/ASSAYS     04-14-20 @ 11:46    T(C): 37.2 (04-14-20 @ 09:48), Max: 37.6 (04-13-20 @ 21:00)  HR: 112 (04-14-20 @ 09:48) (112 - 126)  BP: 115/77 (04-14-20 @ 09:48) (115/77 - 137/87)  RR: 20 (04-14-20 @ 09:48) (20 - 22)  SpO2: 97% (04-14-20 @ 09:48) (94% - 98%)  Wt(kg): --      13 Apr 2020 07:01  -  14 Apr 2020 07:00  --------------------------------------------------------  IN:    Oral Fluid: 360 mL  Total IN: 360 mL    OUT:    Stool: 2 mL    Voided: 2000 mL  Total OUT: 2002 mL    Total NET: -1642 mL      14 Apr 2020 07:01  -  14 Apr 2020 11:46  --------------------------------------------------------  IN:  Total IN: 0 mL    OUT:    Stool: 1 mL  Total OUT: 1 mL    Total NET: -1 mL          04-13 @ 07:01  -  04-14 @ 07:00  --------------------------------------------------------  IN: 360 mL / OUT: 2002 mL / NET: -1642 mL    04-14 @ 07:01  - 04-14 @ 11:46  --------------------------------------------------------  IN: 0 mL / OUT: 1 mL / NET: -1 mL      CAPILLARY BLOOD GLUCOSE            acetaminophen   Tablet .. 650 milliGRAM(s) Oral every 6 hours PRN  allopurinol 300 milliGRAM(s) Oral daily  aluminum hydroxide/magnesium hydroxide/simethicone Suspension 30 milliLiter(s) Oral every 6 hours PRN  enoxaparin Injectable 40 milliGRAM(s) SubCutaneous daily  famotidine    Tablet 20 milliGRAM(s) Oral daily  iron sucrose IVPB 200 milliGRAM(s) IV Intermittent every 24 hours  metoclopramide Injectable 10 milliGRAM(s) IV Push every 6 hours PRN  morphine  - Injectable 5 milliGRAM(s) IV Push every 2 hours PRN  morphine  Infusion 2.5 mG/Hr IV Continuous <Continuous>  naloxone Injectable 0.1 milliGRAM(s) IV Push every 3 minutes PRN  ondansetron Injectable 4 milliGRAM(s) IV Push every 6 hours PRN  polyethylene glycol 3350 17 Gram(s) Oral daily  senna 2 Tablet(s) Oral at bedtime  simethicone 80 milliGRAM(s) Chew every 6 hours PRN  sodium chloride 0.9% lock flush 3 milliLiter(s) IV Push every 8 hours  sodium chloride 0.9% lock flush 3 milliLiter(s) IV Push every 8 hours          04-14    131<L>  |  90<L>  |  11  ----------------------------<  107<H>  3.9   |  32<H>  |  0.81    Ca    8.8      14 Apr 2020 06:55  Phos  1.7     04-14  Mg     2.2     04-14    TPro  6.6  /  Alb  2.6<L>  /  TBili  1.2  /  DBili  0.8<H>  /  AST  30  /  ALT  7<L>  /  AlkPhos  112  04-14      Procalc  BNP  ABG                          6.8    18.17 )-----------( 463      ( 14 Apr 2020 06:55 )             22.8   PT/INR - ( 14 Apr 2020 07:05 )   PT: 14.9 sec;   INR: 1.30 ratio                 blood and urine cultures          ADVANCE DIRECTIVES:    DNR  MOLST  [ ]  Living Will  [ ]   DECISION MAKER(s):  [ ] Health Care Proxy(s)  [ ] Surrogate(s)  [ ] Guardian           Name(s): Phone Number(s):    BASELINE (I)ADL(s) (prior to admission):  [place]: [ ]Total  [ ] Moderate [ ]Dependent    Allergies    No Known Allergies    Intolerances       PRESENT SYMPTOMS: [ ]Unable to obtain due to poor mentation   Source if other than patient:  [ ]Family   [ ]Team     Pain: [X ]yes [ ]no  QOL impact -  decreased function  Location - adnexal  Aggravating factors - movement  Quality - burning  Radiation - down legs  Timing- constant  Severity (0-10 scale): 8/10   Minimal acceptable level (0-10 scale): 2/10    CPOT:    https://www.[facility].org/getattachment/[email or web address]/Critical-Care-Pain-Observation-Tool-(CPOT)      PAIN AD Score:     http://geriatrictoolkit.[place].[email or web address]/cog/painad.pdf (press ctrl +  left click to view)    Dyspnea:                           [ ]Mild [ ]Moderate [ ]Severe  Anxiety:                             [ ]Mild [ ]Moderate [ ]Severe  Fatigue:                             [ ]Mild [ x]Moderate [ ]Severe  Nausea:                             [ ]Mild [ ]Moderate [ ]Severe  Loss of appetite:              [ ]Mild [ ]Moderate [ ]Severe  Constipation:                    [ ]Mild [ ]Moderate [ ]Severe    Other Symptoms:  [X ]All other review of systems negative     Palliative Performance Status Version 2:   40      %    [email or web address]/files/[email or web address]_performance_scale_[email or web address].pdf    PHYSICAL EXAM:      Limited exam for patient safety and to limit infection risk during COVID-19 outbreak. Please refr to primary team's examination for today.  GENERAL:  [ X]Alert  [ ]Oriented x   [ ]Lethargic  [ ]Cachexia  [ ]Unarousable  [ X]Verbal  [ ]Non-Verbal  Behavioral:   [ ] Anxiety  [ ] Delirium [ ] Agitation [ ] Other  HEENT:  [ ]Normal   [ ]Dry mouth   [ ]ET Tube/Trach  [ ]Oral lesions  PULMONARY:   [ ]Clear [ ]Tachypnea  [ ]Audible excessive secretions   [ ]Rhonchi        [ ]Right [ ]Left [ ]Bilateral  [ ]Crackles        [ ]Right [ ]Left [ ]Bilateral  [ ]Wheezing     [ ]Right [ ]Left [ ]Bilateral  [ ]Diminished breath sounds [ ]right [ ]left [ ]bilateral  CARDIOVASCULAR:    [ ]Regular [ ]Irregular [ ]Tachy  [ ]Quirino [ ]Murmur [ ]Other  GASTROINTESTINAL:  [ ]Soft  [ ]Distended   [ ]+BS  [ ]Non tender [ ]Tender  [ ]PEG [ ]OGT/ NGT  Last BM:     GENITOURINARY:  [ ]Normal [ ] Incontinent   [ ]Oliguria/Anuria   [ ]Cifuentes  MUSCULOSKELETAL:   [ ]Normal   [ x]Weakness  [ ]Bed/Wheelchair bound [ ]Edema  NEUROLOGIC:  Lucid speech  [ ]No focal deficits  [ ]Cognitive impairment  [ ]Dysphagia [ ]Dysarthria [ ]Paresis [ ]Other   SKIN:   [ ]Normal    [ ]Rash  [ ]Pressure ulcer(s)       Present on admission [ ]y [ ]n    CRITICAL CARE:  [ ] Shock Present  [ ]Septic [ ]Cardiogenic [ ]Neurologic [ ]Hypovolemic  [ ]  Vasopressors [ ]  Inotropes   [ ]Respiratory failure present [ ]Mechanical ventilation [ ]Non-invasive ventilatory support [ ]High flow  [ ]Acute  [ ]Chronic [ ]Hypoxic  [ ]Hypercarbic [ ]Other  [ ]Other organ failure     LABS: reviewed                          7.7    41.90 )-----------( 611      ( 03 Apr 2020 10:03 )             25.6     04-03    130<L>  |  91<L>  |  8   ----------------------------<  92  3.7   |  27  |  0.75    Ca    11.4<H>      03 Apr 2020 10:03  Phos  2.4     04-03  Mg     1.8     04-03            RADIOLOGY & ADDITIONAL STUDIES:    PROTEIN CALORIE MALNUTRITION PRESENT: [ ]mild [ ]moderate [ ]severe [ ]underweight [ ]morbid obesity  https://www.andeal.org/vault/2440/web/files/ONC/Table_Clinical%20Characteristics%20to%20Document%20Malnutrition-White%20JV%20et%20al%202012.pdf    Height (cm): 162.6 (03-21-20 @ 03:15), 162.56 (03-07-20 @ 19:18)  Weight (kg): 84.4 (03-21-20 @ 03:15), 89.4 (03-07-20 @ 19:18)  BMI (kg/m2): 31.9 (03-21-20 @ 03:15), 33.8 (03-07-20 @ 19:18)    [ ]PPSV2 < or = to 30% [ ]significant weight loss  [ ]poor nutritional intake  [ ]anasarca     Albumin, Serum: 2.9 g/dL (03-25-20 @ 05:59)   [ ]Artificial Nutrition      REFERRALS:   [ ]Chaplaincy  [ ]Hospice  [ ]Child Life  [ ]Social Work  [ ]Case management [ ]Holistic Therapy HPI: 24F here with worsening abdominal pain in the setting of known adnexal massess, found to have for metastatic carcinoma with final pathology pending. Given evidence of metastatic disease, patient is not a surgical candidate. Has been having worsening adnexal pain, markedly last night after taking senna. States pain is 7-8/10 at its worst, located in adnexal area with radiation down to legs, worse with movement, and tolerable with opioids around 2/10. Palliative care called to help with pain.     INTERVAL EVENTS:    Overnight events: No major events  Patient: upper back pain atop neoplasm abdominal pain  PRN use: Four prns used in 24 hours        RECENT VITALS/LABS/MEDICATIONS/ASSAYS     04-14-20 @ 11:46    T(C): 37.2 (04-14-20 @ 09:48), Max: 37.6 (04-13-20 @ 21:00)  HR: 112 (04-14-20 @ 09:48) (112 - 126)  BP: 115/77 (04-14-20 @ 09:48) (115/77 - 137/87)  RR: 20 (04-14-20 @ 09:48) (20 - 22)  SpO2: 97% (04-14-20 @ 09:48) (94% - 98%)  Wt(kg): --      13 Apr 2020 07:01  -  14 Apr 2020 07:00  --------------------------------------------------------  IN:    Oral Fluid: 360 mL  Total IN: 360 mL    OUT:    Stool: 2 mL    Voided: 2000 mL  Total OUT: 2002 mL    Total NET: -1642 mL      14 Apr 2020 07:01  -  14 Apr 2020 11:46  --------------------------------------------------------  IN:  Total IN: 0 mL    OUT:    Stool: 1 mL  Total OUT: 1 mL    Total NET: -1 mL          04-13 @ 07:01  -  04-14 @ 07:00  --------------------------------------------------------  IN: 360 mL / OUT: 2002 mL / NET: -1642 mL    04-14 @ 07:01  - 04-14 @ 11:46  --------------------------------------------------------  IN: 0 mL / OUT: 1 mL / NET: -1 mL      CAPILLARY BLOOD GLUCOSE            acetaminophen   Tablet .. 650 milliGRAM(s) Oral every 6 hours PRN  allopurinol 300 milliGRAM(s) Oral daily  aluminum hydroxide/magnesium hydroxide/simethicone Suspension 30 milliLiter(s) Oral every 6 hours PRN  enoxaparin Injectable 40 milliGRAM(s) SubCutaneous daily  famotidine    Tablet 20 milliGRAM(s) Oral daily  iron sucrose IVPB 200 milliGRAM(s) IV Intermittent every 24 hours  metoclopramide Injectable 10 milliGRAM(s) IV Push every 6 hours PRN  morphine  - Injectable 5 milliGRAM(s) IV Push every 2 hours PRN  morphine  Infusion 2.5 mG/Hr IV Continuous <Continuous>  naloxone Injectable 0.1 milliGRAM(s) IV Push every 3 minutes PRN  ondansetron Injectable 4 milliGRAM(s) IV Push every 6 hours PRN  polyethylene glycol 3350 17 Gram(s) Oral daily  senna 2 Tablet(s) Oral at bedtime  simethicone 80 milliGRAM(s) Chew every 6 hours PRN  sodium chloride 0.9% lock flush 3 milliLiter(s) IV Push every 8 hours  sodium chloride 0.9% lock flush 3 milliLiter(s) IV Push every 8 hours          04-14    131<L>  |  90<L>  |  11  ----------------------------<  107<H>  3.9   |  32<H>  |  0.81    Ca    8.8      14 Apr 2020 06:55  Phos  1.7     04-14  Mg     2.2     04-14    TPro  6.6  /  Alb  2.6<L>  /  TBili  1.2  /  DBili  0.8<H>  /  AST  30  /  ALT  7<L>  /  AlkPhos  112  04-14      Procalc  BNP  ABG                          6.8    18.17 )-----------( 463      ( 14 Apr 2020 06:55 )             22.8   PT/INR - ( 14 Apr 2020 07:05 )   PT: 14.9 sec;   INR: 1.30 ratio                 blood and urine cultures          ADVANCE DIRECTIVES:    DNR  MOLST  [ ]  Living Will  [ ]   DECISION MAKER(s):  [ ] Health Care Proxy(s)  [ ] Surrogate(s)  [ ] Guardian           Name(s): Phone Number(s):    BASELINE (I)ADL(s) (prior to admission):  [place]: [ ]Total  [ ] Moderate [ ]Dependent    Allergies    No Known Allergies    Intolerances       PRESENT SYMPTOMS: [ ]Unable to obtain due to poor mentation   Source if other than patient:  [ ]Family   [ ]Team     Pain: [X ]yes [ ]no  QOL impact -  decreased function  Location - adnexal  Aggravating factors - movement  Quality - burning  Radiation - down legs  Timing- constant  Severity (0-10 scale): 8/10   Minimal acceptable level (0-10 scale): 2/10    CPOT:    https://www.[facility].org/getattachment/[email or web address]/Critical-Care-Pain-Observation-Tool-(CPOT)      PAIN AD Score:     http://geriatrictoolkit.[facility]/cog/painad.pdf (press ctrl +  left click to view)    Dyspnea:                           [ ]Mild [ ]Moderate [ ]Severe  Anxiety:                             [ ]Mild [ ]Moderate [ ]Severe  Fatigue:                             [ ]Mild [ x]Moderate [ ]Severe  Nausea:                             [ ]Mild [ ]Moderate [ ]Severe  Loss of appetite:              [ ]Mild [ ]Moderate [ ]Severe  Constipation:                    [ ]Mild [ ]Moderate [ ]Severe    Other Symptoms:  [X ]All other review of systems negative     Palliative Performance Status Version 2:   40      %    [email or web address]/files/news/palliative_performance_scale_[email or web address].pdf    PHYSICAL EXAM:      Limited exam for patient safety and to limit infection risk during COVID-19 outbreak. Please refr to primary team's examination for today.  GENERAL:  [ X]Alert  [ ]Oriented x   [ ]Lethargic  [ ]Cachexia  [ ]Unarousable  [ X]Verbal  [ ]Non-Verbal  Behavioral:   [ ] Anxiety  [ ] Delirium [ ] Agitation [ ] Other  HEENT:  [ ]Normal   [ ]Dry mouth   [ ]ET Tube/Trach  [ ]Oral lesions  PULMONARY:   [ ]Clear [ ]Tachypnea  [ ]Audible excessive secretions   [ ]Rhonchi        [ ]Right [ ]Left [ ]Bilateral  [ ]Crackles        [ ]Right [ ]Left [ ]Bilateral  [ ]Wheezing     [ ]Right [ ]Left [ ]Bilateral  [ ]Diminished breath sounds [ ]right [ ]left [ ]bilateral  CARDIOVASCULAR:    [ ]Regular [ ]Irregular [ ]Tachy  [ ]Quirino [ ]Murmur [ ]Other  GASTROINTESTINAL:  [ ]Soft  [ ]Distended   [ ]+BS  [ ]Non tender [ ]Tender  [ ]PEG [ ]OGT/ NGT  Last BM:     GENITOURINARY:  [ ]Normal [ ] Incontinent   [ ]Oliguria/Anuria   [ ]Cifuentes  MUSCULOSKELETAL:   [ ]Normal   [ x]Weakness  [ ]Bed/Wheelchair bound [ ]Edema  NEUROLOGIC:  Lucid speech  [ ]No focal deficits  [ ]Cognitive impairment  [ ]Dysphagia [ ]Dysarthria [ ]Paresis [ ]Other   SKIN:   [ ]Normal    [ ]Rash  [ ]Pressure ulcer(s)       Present on admission [ ]y [ ]n    CRITICAL CARE:  [ ] Shock Present  [ ]Septic [ ]Cardiogenic [ ]Neurologic [ ]Hypovolemic  [ ]  Vasopressors [ ]  Inotropes   [ ]Respiratory failure present [ ]Mechanical ventilation [ ]Non-invasive ventilatory support [ ]High flow  [ ]Acute  [ ]Chronic [ ]Hypoxic  [ ]Hypercarbic [ ]Other  [ ]Other organ failure     LABS: reviewed                          7.7    41.90 )-----------( 611      ( 03 Apr 2020 10:03 )             25.6     04-03    130<L>  |  91<L>  |  8   ----------------------------<  92  3.7   |  27  |  0.75    Ca    11.4<H>      03 Apr 2020 10:03  Phos  2.4     04-03  Mg     1.8     04-03            RADIOLOGY & ADDITIONAL STUDIES:    PROTEIN CALORIE MALNUTRITION PRESENT: [ ]mild [ ]moderate [ ]severe [ ]underweight [ ]morbid obesity  https://www.andeal.org/vault/2440/web/files/ONC/Table_Clinical%20Characteristics%20to%20Document%20Malnutrition-White%20JV%20et%20al%202012.pdf    Height (cm): 162.6 (03-21-20 @ 03:15), 162.56 (03-07-20 @ 19:18)  Weight (kg): 84.4 (03-21-20 @ 03:15), 89.4 (03-07-20 @ 19:18)  BMI (kg/m2): 31.9 (03-21-20 @ 03:15), 33.8 (03-07-20 @ 19:18)    [ ]PPSV2 < or = to 30% [ ]significant weight loss  [ ]poor nutritional intake  [ ]anasarca     Albumin, Serum: 2.9 g/dL (03-25-20 @ 05:59)   [ ]Artificial Nutrition      REFERRALS:   [ ]Chaplaincy  [ ]Hospice  [ ]Child Life  [ ]Social Work  [ ]Case management [ ]Holistic Therapy

## 2020-04-14 NOTE — PROGRESS NOTE ADULT - ATTENDING COMMENTS
Patient seen along with Dr Banks. her pain is mostly in the back at this time, continues on morphine drip at 2.5 mg/hour with bolus morphine for breakthrough pains. Dr Gomez is managing her pain. She is much more awake today, was able to participate with PT. Received modified FolFOX over the weekend. NO mouth sores, no diarrheal stools. NO fevers/chills overnight. I agree with the assessment and plan as in Dr Banks's note

## 2020-04-15 NOTE — PROGRESS NOTE ADULT - ASSESSMENT
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      # Fever, tachycardia  -Suspected 2/2 tumor fever; NSAIDs were stopped on 20 in anticipation of port placement, which could have been previously suppressing higher fevers that became unmasked. However, since she met sepsis criteria, patient was started on abx (Vancomycin, Zosyn). Patient has been off antibiotics since . cultures negative, 20 however pt has continued to spike fevers- repeat blood cultures from  pending   - COVID-19 negative x 3, but parainfluenza positive ().   - CTA Chest () was negative for central PE, but unable to evaluate segmental and subsegmental branches due to motion artifact. Lung imaging showed unchanged metastatic nodules, small bilateral pleural effusions, and atelectasis of the basilar LLL (unchanged as well). No new opacity which decreases the likelihood of PNA  - CT A/P repeated on 4/3/20; no obvious source of infection   - Blood/urine cultures have had NGTD. C. diff negative  - ID following, appreciate recs  - S/p PICC line placement for chemotherapy  - Ammonia level normal   - Patient is s/p C1 modified FOLFOX (-). Further management as noted below   -- Blood cultures from  neg, afebrile overnight     # Metastatic carcinoma, cancer of unknown primary  - CT A/P with bilateral heterogeneous adnexal masses, as well as upper abdominal, RP, possible bone lesions and pelvic lymphadenopathy; CT Chest with multiple solid pulmonary nodules  - Cancer of unknown primary (poorly differentiated carcinoma) but suspect likely GI origin given CDX2 positivity on pathology; chromogranin positivity also suggests neuroendocrine features; Ki-67 index 40%.   - Tumor markers elevated: Cancer Antigen, Ca 27.29: 70.8, Cancer Antigen, 125: 619, Carcinoembryonic Antigen: 44.7, Beta-HC.0 on initial evaluation (3/7); Checked again on 20 and it was 102.7  - Discussed diagnosis with patient on 3/31/20. Given advanced, metastatic stage, will need to be on indefinite treatment to limit progression of disease.  - S/p C1 mFOLFOX (4.9-). Next cycle is due in ~ 2 weeks. Will need to start discharge planning.   - Patient is currently on 2L NC. Will try to wean supplemental oxygen as tolerated. Will also provide an incentive spirometer  - CTA Chest () was negative for central PE, but unable to evaluate segmental and subsegmental branches due to motion artifact. In the setting of her hypoxemia and tachycardia, will check LE dopplers to r/o a DVT.  - Continue Venofer until discharge         # Pain control   - On Morphine infusion and PRN injections  - Pain medications now switched to MS contin, pain is adequately controlled per patient. Will continue to follow-up with Palliative Care regarding pain management/regimen on discharge.     # Hyperuricemia  - Patient's uric acid was 8.8 yesterday (). She was given IV Rasburicase 3mg x1 on . Her uric acid level is 4.1 today ()  - C/w Allopurinol 300mg daily  - Monitor TLS labs daily (CMP, Phos, LDH, Uric acid)    # DVT PPx  - Lovenox 40mg SQ    # Dispo  - Pending resolution of acute medical issues. Pt recs: DC home with assist from mother/brother, +elevator access in apt to 5th floor, recommend rolling walker for long distances and recommend 3:1 commode  - Wean off O2 as tolerated  - Pain control   - Will continue to update mother and brother over phone daily, all of their questions answered to their satisfaction       Cesia Banks MD  Hematology Oncology Fellow, PGY-4  [facility] Pager: [phone]/ [facility] Pager: [phone]

## 2020-04-15 NOTE — PROGRESS NOTE ADULT - PROBLEM SELECTOR PLAN 1
Predominant symptom: abdominal pain  Likely due to malignancy  maintain morphine ER 30/60/60  discontinue IVP PRN morphine,   Begin po morphine 7.5mg q3H PRN  naloxone PRN  Risk mitigation/Bowel regimen: maintain senna and miralax  Adverse events noted: none  Ensure Supportive Oncology referral prior to discharge

## 2020-04-15 NOTE — PROGRESS NOTE ADULT - ATTENDING COMMENTS
Pt seen along with Dr Banks. On oral pain meds now, back pain is improved, less radiation at this time. Still on oxygen, has parainfluenza, had CTA done. In no distress. I agree with the assessment and plan as noted above in Dr Banks's note

## 2020-04-15 NOTE — PROGRESS NOTE ADULT - ATTENDING COMMENTS
Opioid  Discharge Recommendations    This patient will leave the hospital with a need for opioids to address symptoms.    Prior to discharge, please ensure that the receiving pharmacy  -has the medication on formulary  -has the correct dose and formulation of the medication  -has an adequate supply of the medication to accommodate the patient's prescription  -has a prescription for a bowel regimen    Please ensure that there is a physician who will continue to follow up and prescribe opioids as needed.    Please ensure prior authorization is completed, if necessary, prior to discharge.    Has this patient been identified to receive a naloxone prescription    Yes [x]  No []       Please note the following on patient's discharge summary: "Please see Dr. Katya Savage at the [facility] ([phone]) for supportive oncology and pain and symptom management."     If referring to Dr. Katya Savage, please email her with a summary of the patient's hospitalization and pain treatment at the time of discharge. Thank you.      On discharge, please prescribe Naloxone spray 4 mg/0.1 ml intranasal, Spray 0.1 mL into one nostril. Repeat with second device into other nostril after 2-3 minutes if no or minimal response ([email or web address]). Please be sure the patient's pharmacy has the medication, otherwise, be sure there is a pharmacy were the patient can get the Naloxone inhaled.

## 2020-04-15 NOTE — PROGRESS NOTE ADULT - PROBLEM SELECTOR PLAN 3
Actions:  [ ] Rapport building     [x] Symptom assessment    [] Eliciting preferences of goals   [] Prognostic understanding    [] Emotional Support  [] Coping skill development  [x]  Other Supportive Oncology referral  Interdisciplinary Referrals: outpatient referral  Communication: d/w with NP  Documentation Review: [x] Primary Team [x] Consultants [] Interdisciplinary team

## 2020-04-15 NOTE — PROGRESS NOTE ADULT - SUBJECTIVE AND OBJECTIVE BOX
HPI: 24F here with worsening abdominal pain in the setting of known adnexal massess, found to have for metastatic carcinoma with final pathology pending. Given evidence of metastatic disease, patient is not a surgical candidate. Has been having worsening adnexal pain, markedly last night after taking senna. States pain is 7-8/10 at its worst, located in adnexal area with radiation down to legs, worse with movement, and tolerable with opioids around 2/10. Palliative care called to help with pain.     INTERVAL EVENTS:      Overnight events: No major events  Staff reports: no major events reported  Patient: She feels a marked improvement in her pain.  Her neoplasm pain in the abdomen is well controlled. Her principal issue is back pain which she likens to discomfort or ache associated with intense exercise.  This discomfort improves with repositioning.  PRN use: No prns overnight        RECENT VITALS/LABS/MEDICATIONS/ASSAYS     04-15-20 @ 11:12    T(C): 36.3 (04-15-20 @ 09:12), Max: 37.6 (04-14-20 @ 12:50)  HR: 126 (04-15-20 @ 09:12) (113 - 126)  BP: 128/84 (04-15-20 @ 09:12) (118/84 - 146/75)  RR: 20 (04-15-20 @ 09:12) (18 - 20)  SpO2: 94% (04-15-20 @ 09:12) (92% - 99%)  Wt(kg): --      14 Apr 2020 07:01  -  15 Apr 2020 07:00  --------------------------------------------------------  IN:    Oral Fluid: 360 mL    Packed Red Blood Cells: 350 mL    Solution: 500 mL    Solution: 455 mL  Total IN: 1665 mL    OUT:    Stool: 2 mL    Voided: 700 mL  Total OUT: 702 mL    Total NET: 963 mL          04-14 @ 07:01  -  04-15 @ 07:00  --------------------------------------------------------  IN: 1665 mL / OUT: 702 mL / NET: 963 mL      CAPILLARY BLOOD GLUCOSE            acetaminophen   Tablet .. 650 milliGRAM(s) Oral every 6 hours PRN  allopurinol 300 milliGRAM(s) Oral daily  aluminum hydroxide/magnesium hydroxide/simethicone Suspension 30 milliLiter(s) Oral every 6 hours PRN  enoxaparin Injectable 40 milliGRAM(s) SubCutaneous daily  famotidine    Tablet 20 milliGRAM(s) Oral daily  iron sucrose IVPB 200 milliGRAM(s) IV Intermittent every 24 hours  lidocaine   Patch 2 Patch Transdermal daily  metoclopramide Injectable 10 milliGRAM(s) IV Push every 6 hours PRN  morphine  IR 7.5 milliGRAM(s) Oral every 3 hours PRN  morphine ER Tablet 30 milliGRAM(s) Oral <User Schedule>  morphine ER Tablet 60 milliGRAM(s) Oral <User Schedule>  naloxone Injectable 0.1 milliGRAM(s) IV Push every 3 minutes PRN  ondansetron Injectable 4 milliGRAM(s) IV Push every 6 hours PRN  polyethylene glycol 3350 17 Gram(s) Oral daily  senna 2 Tablet(s) Oral at bedtime  simethicone 80 milliGRAM(s) Chew every 6 hours PRN  sodium chloride 0.9% lock flush 3 milliLiter(s) IV Push every 8 hours  sodium chloride 0.9% lock flush 3 milliLiter(s) IV Push every 8 hours  sodium phosphate IVPB 15 milliMole(s) IV Intermittent once          04-15    132<L>  |  91<L>  |  9   ----------------------------<  105<H>  3.8   |  29  |  0.74    Ca    8.1<L>      15 Apr 2020 07:13  Phos  2.0     04-15  Mg     2.0     04-15    TPro  6.6  /  Alb  2.4<L>  /  TBili  1.5<H>  /  DBili  1.1<H>  /  AST  29  /  ALT  7<L>  /  AlkPhos  115  04-15      Procalc  BNP  ABG                          7.6    16.33 )-----------( 382      ( 15 Apr 2020 07:13 )             25.0   PT/INR - ( 15 Apr 2020 07:13 )   PT: 16.5 sec;   INR: 1.42 ratio                 blood and urine cultures                ADVANCE DIRECTIVES:    DNR  MOLST  [ ]  Living Will  [ ]   DECISION MAKER(s):  [ ] Health Care Proxy(s)  [ ] Surrogate(s)  [ ] Guardian           Name(s): Phone Number(s):    BASELINE (I)ADL(s) (prior to admission):  [place]: [ ]Total  [ ] Moderate [ ]Dependent    Allergies    No Known Allergies    Intolerances       PRESENT SYMPTOMS: [ ]Unable to obtain due to poor mentation   Source if other than patient:  [ ]Family   [ ]Team     Pain: [X ]yes [ ]no  QOL impact -  decreased function  Location - adnexal  Aggravating factors - movement  Quality - burning  Radiation - down legs  Timing- constant  Severity (0-10 scale): 8/10   Minimal acceptable level (0-10 scale): 2/10    CPOT:    https://www.[facility].org/getattachment/[email or web address]-[email or web address]-[email or web address]/Critical-Care-Pain-Observation-Tool-(CPOT)      PAIN AD Score:     http://geriatrictoolkit.[email or web address]/cog/painad.pdf (press ctrl +  left click to view)    Dyspnea:                           [ ]Mild [ ]Moderate [ ]Severe  Anxiety:                             [ ]Mild [ ]Moderate [ ]Severe  Fatigue:                             [ ]Mild [ x]Moderate [ ]Severe  Nausea:                             [ ]Mild [ ]Moderate [ ]Severe  Loss of appetite:              [ ]Mild [ ]Moderate [ ]Severe  Constipation:                    [ ]Mild [ ]Moderate [ ]Severe    Other Symptoms:  [X ]All other review of systems negative     Palliative Performance Status Version 2:   40      %    http://npcrc.org/files/news/palliative_performance_scale_ppsv2.pdf    PHYSICAL EXAM:      Limited exam for patient safety and to limit infection risk during COVID-19 outbreak. Please refr to primary team's examination for today.  GENERAL:  [ X]Alert  [ ]Oriented x   [ ]Lethargic  [ ]Cachexia  [ ]Unarousable  [ X]Verbal  [ ]Non-Verbal  Behavioral:   [ ] Anxiety  [ ] Delirium [ ] Agitation [ ] Other  HEENT:  [ ]Normal   [ ]Dry mouth   [ ]ET Tube/Trach  [ ]Oral lesions  PULMONARY:   [ ]Clear [ ]Tachypnea  [ ]Audible excessive secretions   [ ]Rhonchi        [ ]Right [ ]Left [ ]Bilateral  [ ]Crackles        [ ]Right [ ]Left [ ]Bilateral  [ ]Wheezing     [ ]Right [ ]Left [ ]Bilateral  [ ]Diminished breath sounds [ ]right [ ]left [ ]bilateral  CARDIOVASCULAR:    [ ]Regular [ ]Irregular [ ]Tachy  [ ]Quirino [ ]Murmur [ ]Other  GASTROINTESTINAL:  [ ]Soft  [ ]Distended   [ ]+BS  [ ]Non tender [ ]Tender  [ ]PEG [ ]OGT/ NGT  Last BM:     GENITOURINARY:  [ ]Normal [ ] Incontinent   [ ]Oliguria/Anuria   [ ]Cifuentes  MUSCULOSKELETAL:   [ ]Normal   [ x]Weakness  [ ]Bed/Wheelchair bound [ ]Edema  NEUROLOGIC:  Lucid speech  [ ]No focal deficits  [ ]Cognitive impairment  [ ]Dysphagia [ ]Dysarthria [ ]Paresis [ ]Other   SKIN:   [ ]Normal    [ ]Rash  [ ]Pressure ulcer(s)       Present on admission [ ]y [ ]n    CRITICAL CARE:  [ ] Shock Present  [ ]Septic [ ]Cardiogenic [ ]Neurologic [ ]Hypovolemic  [ ]  Vasopressors [ ]  Inotropes   [ ]Respiratory failure present [ ]Mechanical ventilation [ ]Non-invasive ventilatory support [ ]High flow  [ ]Acute  [ ]Chronic [ ]Hypoxic  [ ]Hypercarbic [ ]Other  [ ]Other organ failure     LABS: reviewed                          7.7    41.90 )-----------( 611      ( 03 Apr 2020 10:03 )             25.6     04-03    130<L>  |  91<L>  |  8   ----------------------------<  92  3.7   |  27  |  0.75    Ca    11.4<H>      03 Apr 2020 10:03  Phos  2.4     04-03  Mg     1.8     04-03            RADIOLOGY & ADDITIONAL STUDIES:    PROTEIN CALORIE MALNUTRITION PRESENT: [ ]mild [ ]moderate [ ]severe [ ]underweight [ ]morbid obesity  https://www.andeal.org/vault/2440/web/files/ONC/Table_Clinical%20Characteristics%20to%20Document%20Malnutrition-White%20JV%20et%20al%202012.pdf    Height (cm): 162.6 (03-21-20 @ 03:15), 162.56 (03-07-20 @ 19:18)  Weight (kg): 84.4 (03-21-20 @ 03:15), 89.4 (03-07-20 @ 19:18)  BMI (kg/m2): 31.9 (03-21-20 @ 03:15), 33.8 (03-07-20 @ 19:18)    [ ]PPSV2 < or = to 30% [ ]significant weight loss  [ ]poor nutritional intake  [ ]anasarca     Albumin, Serum: 2.9 g/dL (03-25-20 @ 05:59)   [ ]Artificial Nutrition      REFERRALS:   [ ]Chaplaincy  [ ]Hospice  [ ]Child Life  [ ]Social Work  [ ]Case management [ ]Holistic Therapy

## 2020-04-15 NOTE — PROGRESS NOTE ADULT - SUBJECTIVE AND OBJECTIVE BOX
Follow Up:  fever, leukocytosis    Interval History: s/p cycle 1 of folfox, now off, WBC 16 and afebrile    ROS:      All other systems negative    Constitutional: no fever,  chills  Head: no trauma  Eyes: no vision changes, no eye pain  ENT:  no sore throat, no rhinorrhea  Cardiovascular:  no chest pain, no palpitation  Respiratory:  + SOB, no cough  GI:  + abd pain, no vomiting, no diarrhea   urinary: no dysuria, no hematuria, no flank pain  musculoskeletal:  no joint pain, no joint swelling  skin:  no rash  neurology:  no headache, no seizure, no change in mental status  psych: no anxiety, no depression           Allergies  No Known Allergies        ANTIMICROBIALS:      OTHER MEDS:  acetaminophen   Tablet .. 650 milliGRAM(s) Oral every 6 hours PRN  allopurinol 300 milliGRAM(s) Oral daily  aluminum hydroxide/magnesium hydroxide/simethicone Suspension 30 milliLiter(s) Oral every 6 hours PRN  enoxaparin Injectable 40 milliGRAM(s) SubCutaneous daily  famotidine    Tablet 20 milliGRAM(s) Oral daily  iron sucrose IVPB 200 milliGRAM(s) IV Intermittent every 24 hours  lidocaine   Patch 2 Patch Transdermal daily  metoclopramide Injectable 10 milliGRAM(s) IV Push every 6 hours PRN  morphine  IR 7.5 milliGRAM(s) Oral every 3 hours PRN  morphine ER Tablet 30 milliGRAM(s) Oral <User Schedule>  morphine ER Tablet 60 milliGRAM(s) Oral <User Schedule>  naloxone Injectable 0.1 milliGRAM(s) IV Push every 3 minutes PRN  ondansetron Injectable 4 milliGRAM(s) IV Push every 6 hours PRN  polyethylene glycol 3350 17 Gram(s) Oral daily  senna 2 Tablet(s) Oral at bedtime  simethicone 80 milliGRAM(s) Chew every 6 hours PRN  sodium chloride 0.9% lock flush 3 milliLiter(s) IV Push every 8 hours  sodium chloride 0.9% lock flush 3 milliLiter(s) IV Push every 8 hours  sodium phosphate IVPB 15 milliMole(s) IV Intermittent once      Vital Signs Last 24 Hrs  T(C): 36.2 (15 Apr 2020 13:25), Max: 37.4 (14 Apr 2020 22:00)  T(F): 97.1 (15 Apr 2020 13:25), Max: 99.3 (14 Apr 2020 22:00)  HR: 118 (15 Apr 2020 13:25) (114 - 126)  BP: 124/83 (15 Apr 2020 13:25) (123/87 - 146/75)  BP(mean): --  RR: 20 (15 Apr 2020 13:25) (18 - 20)  SpO2: 95% (15 Apr 2020 13:25) (92% - 99%)    Physical Exam:  General:    NAD,  non toxic  Head: atraumatic, normocephalic  Eye: normal sclera and conjunctiva  ENT:    no oropharyngeal lesions,   no LAD,   neck supple  Cardio:    tachy regular S1, S2  Respiratory:    clear b/l,    no wheezing but  tachypneic  abd:    palpable mass, soft,   BS +,  slight diffuse tenderness  :   no CVAT,  no suprapubic tenderness,   no  cash  Musculoskeletal:   no joint swelling,   no edema  vascular: RUE picc  Skin:    no rash  Neurologic:     no focal deficit  psych: normal affect                          7.6    16.33 )-----------( 382      ( 15 Apr 2020 07:13 )             25.0       04-15    132<L>  |  91<L>  |  9   ----------------------------<  105<H>  3.8   |  29  |  0.74    Ca    8.1<L>      15 Apr 2020 07:13  Phos  2.0     04-15  Mg     2.0     04-15    TPro  6.6  /  Alb  2.4<L>  /  TBili  1.5<H>  /  DBili  1.1<H>  /  AST  29  /  ALT  7<L>  /  AlkPhos  115  04-15          MICROBIOLOGY:  v  .Blood Blood-Peripheral  04-13-20   No growth to date.  --  --      .Blood PICC/PERC Double Lumen BLUE  04-13-20   No growth to date.  --  --      .Urine Clean Catch (Midstream)  04-08-20   No growth  --  --      .Blood Blood-Peripheral  04-05-20   No Growth Final  --  --      .Blood Blood-Peripheral  04-05-20   No Growth Final  --  --      .Urine Clean Catch (Midstream)  04-05-20   No growth  --  --      .Blood Blood-Peripheral  04-03-20   No Growth Final  --  --      .Blood Blood-Catheter  04-03-20   No Growth Final  --  --      .Blood Blood-Peripheral  04-01-20   No Growth Final  --  --      .Urine Clean Catch (Midstream)  04-01-20   >=3 organisms. Probable collection contamination.  --  --      .Blood Blood-Peripheral  03-25-20   No Growth Final  --  --      .Blood Blood-Venous  03-20-20   No growth at 5 days.  --  --      .Blood Blood-Peripheral  03-20-20   No growth at 5 days.  --  --      .Urine Clean Catch (Midstream)  03-20-20   <10,000 CFU/mL Normal Urogenital Hui  --  --                RADIOLOGY:  Images below independently visualized and reviewed personally, findings as below  < from: [facility] Duplex Lower Ext Vein Scan, Bilat (04.12.20 @ 15:06) >    IMPRESSION:     No evidence of deep venous thrombosis in either lower extremity.    < from: Xray Chest 1 View- PORTABLE-Urgent (04.06.20 @ 10:32) >  IMPRESSION:     Worsening bilateral patchy airspace disease.    Unchanged small left pleural effusion.      < from: CT Abdomen and Pelvis w/ IV Cont (04.03.20 @ 16:46) >  IMPRESSION:     Widely metastatic neoplasm without significant change from prior abdominal CT 3/20/2020.    No evidence of an infectious source in the abdomen or pelvis.

## 2020-04-15 NOTE — PROGRESS NOTE ADULT - ASSESSMENT
24 f with b/l adnexal masses and pelvic LAD, metastatic carcinoma of unknown primary presented 3/20 with abd pain, s/p CT guided biopsy 3/25, PICC line 4/3 to start chemo  pt intermittently febrile and leukocytosis, now febrile to 101.7 and tachy, WBC: 34  QUINN for the last 2 days now Cr: 1.3  blood and urine cx negative  COVID negative x 3 last one 4/4  RVP 4/1 with parainfluenza  also some diarrhea initially    persistent fever and leukocytosis, with tachycardia sepsis  C-diff negative  parainfluenza URI 4/1  metastatic ca with ?tumor fever  * s/p cycle 1 of folfox now WBC down to 16 and afebrile  * infection w/u negative and likely malignancy related  * monitor the WBC and temp  * monitor for new signs of infection    The above assessment and plan was discussed with hem/onc team    Gina Arenas MD  Pager [phone]  After 5pm and on weekends call [phone]

## 2020-04-15 NOTE — CHART NOTE - NSCHARTNOTEFT_GEN_A_CORE
Nutrition Follow Up Note  Patient seen for: Nutrition follow up. Interim events noted, chart reviewed. Pt c adnexal masses, metastatic disease, continues to have fevers, noted Palliative Care following for pain management.    Source: Pt    Diet : Regular diet with ensure enlive 2 daily     Patient reports: improving nausea, no episodes of vomiting, pt reports bowel movements have been loose at times but feels she does not eat a lot of solid foods.      PO intake : Pt has been making an effort to eat at least 1 complete meal per day and will try to eat at least half of a second meal, pt will supplement with ensure enlive 1 daily and at times 2, will honor flavor requests.    Weight: 201 lbs (3/25), 201.5 lbs (4/15 standing), weight remains stable in house.     Pertinent Medications: MEDICATIONS  (STANDING):  allopurinol 300 milliGRAM(s) Oral daily  enoxaparin Injectable 40 milliGRAM(s) SubCutaneous daily  famotidine    Tablet 20 milliGRAM(s) Oral daily  iron sucrose IVPB 200 milliGRAM(s) IV Intermittent every 24 hours  lidocaine   Patch 2 Patch Transdermal daily  morphine ER Tablet 30 milliGRAM(s) Oral <User Schedule>  morphine ER Tablet 60 milliGRAM(s) Oral <User Schedule>  polyethylene glycol 3350 17 Gram(s) Oral daily  senna 2 Tablet(s) Oral at bedtime  sodium chloride 0.9% lock flush 3 milliLiter(s) IV Push every 8 hours  sodium chloride 0.9% lock flush 3 milliLiter(s) IV Push every 8 hours    MEDICATIONS  (PRN):  acetaminophen   Tablet .. 650 milliGRAM(s) Oral every 6 hours PRN Temp greater or equal to 38C (100.4F)  aluminum hydroxide/magnesium hydroxide/simethicone Suspension 30 milliLiter(s) Oral every 6 hours PRN Dyspepsia  metoclopramide Injectable 10 milliGRAM(s) IV Push every 6 hours PRN Nausea/Vomiting  morphine  IR 7.5 milliGRAM(s) Oral every 3 hours PRN Moderate Pain (4 - 6) or severe  naloxone Injectable 0.1 milliGRAM(s) IV Push every 3 minutes PRN Sedation or RR <10  ondansetron Injectable 4 milliGRAM(s) IV Push every 6 hours PRN Nausea and/or Vomiting  simethicone 80 milliGRAM(s) Chew every 6 hours PRN Gas    Pertinent Labs: 04-15 @ 07:13: Na 132<L>, BUN 9, Cr 0.74, <H>, K+ 3.8, Phos 2.0<L>, Mg 2.0, Alk Phos 115, ALT/SGPT 7<L>, AST/SGOT 29, HbA1c --  04-14 @ 17:54: Na --, BUN --, Cr --, BG --, K+ --, Phos 2.7, Mg --, Alk Phos --, ALT/SGPT --, AST/SGOT --, HbA1c --    Finger Sticks: none       Skin per nursing documentation: free of pressure injury   Edema: none     Estimated Needs:   [x ] no change since previous assessment  [ ] recalculated:     Previous Nutrition Diagnosis: Inadequate oral intake   Nutrition Diagnosis is: ongoing, addressed with supplements     New Nutrition Diagnosis:  Related to:    As evidenced by:      Interventions:     Recommend  1) Continue regular diet with ensure enlive 2 daily as ordered (350 Kcal, 20g protein per 8 oz serving)   2) Continue to encourage po intake and obtain/honor food preferences as able, pt requesting fruit for breakfast and lunch and Italian Ices for dinner-will accommodate    Monitoring and Evaluation:     Continue to monitor Nutritional intake, Tolerance to diet prescription, weights, labs, skin integrity    RD remains available upon request and will follow up per protocol    Kala Jernigan R.D., [facility], Pager #[phone]

## 2020-04-15 NOTE — PROGRESS NOTE ADULT - SUBJECTIVE AND OBJECTIVE BOX
INTERVAL History:    Allergies    No Known Allergies    Intolerances        MEDICATIONS  (STANDING):  allopurinol 300 milliGRAM(s) Oral daily  enoxaparin Injectable 40 milliGRAM(s) SubCutaneous daily  famotidine    Tablet 20 milliGRAM(s) Oral daily  iron sucrose IVPB 200 milliGRAM(s) IV Intermittent every 24 hours  lidocaine   Patch 2 Patch Transdermal daily  morphine ER Tablet 30 milliGRAM(s) Oral <User Schedule>  morphine ER Tablet 60 milliGRAM(s) Oral <User Schedule>  polyethylene glycol 3350 17 Gram(s) Oral daily  senna 2 Tablet(s) Oral at bedtime  sodium chloride 0.9% lock flush 3 milliLiter(s) IV Push every 8 hours  sodium chloride 0.9% lock flush 3 milliLiter(s) IV Push every 8 hours  sodium phosphate IVPB 15 milliMole(s) IV Intermittent once    MEDICATIONS  (PRN):  acetaminophen   Tablet .. 650 milliGRAM(s) Oral every 6 hours PRN Temp greater or equal to 38C (100.4F)  aluminum hydroxide/magnesium hydroxide/simethicone Suspension 30 milliLiter(s) Oral every 6 hours PRN Dyspepsia  metoclopramide Injectable 10 milliGRAM(s) IV Push every 6 hours PRN Nausea/Vomiting  morphine  - Injectable 3 milliGRAM(s) IV Push every 4 hours PRN Moderate Pain (4 - 6) or severe  naloxone Injectable 0.1 milliGRAM(s) IV Push every 3 minutes PRN Sedation or RR <10  ondansetron Injectable 4 milliGRAM(s) IV Push every 6 hours PRN Nausea and/or Vomiting  simethicone 80 milliGRAM(s) Chew every 6 hours PRN Gas      Vital Signs Last 24 Hrs  T(C): 36.3 (15 Apr 2020 09:12), Max: 37.6 (14 Apr 2020 12:50)  T(F): 97.3 (15 Apr 2020 09:12), Max: 99.7 (14 Apr 2020 12:50)  HR: 126 (15 Apr 2020 09:12) (113 - 126)  BP: 128/84 (15 Apr 2020 09:12) (118/84 - 146/75)  BP(mean): --  RR: 20 (15 Apr 2020 09:12) (18 - 20)  SpO2: 94% (15 Apr 2020 09:12) (92% - 99%)    PHYSICAL EXAM:    General: AOX3, no acute distress  EYES: EOMI, PERRLA, conjunctiva and sclera clear  NECK: Supple, No JVD, Normal thyroid  CHEST/LUNG: Clear to auscultation bilaterally; No rales, rhonchi, or wheezing  HEART: Regular rate and rhythm; no murmurs  ABDOMEN: Soft, Nontender. BS+  LYMPH: No lymphadenopathy noted  Extremities: No peripheral edema      LABS:                        7.6    16.33 )-----------( 382      ( 15 Apr 2020 07:13 )             25.0     04-15    132<L>  |  91<L>  |  9   ----------------------------<  105<H>  3.8   |  29  |  0.74    Ca    8.1<L>      15 Apr 2020 07:13  Phos  2.0     04-15  Mg     2.0     04-15    TPro  6.6  /  Alb  2.4<L>  /  TBili  1.5<H>  /  DBili  1.1<H>  /  AST  29  /  ALT  7<L>  /  AlkPhos  115  04-15    PT/INR - ( 15 Apr 2020 07:13 )   PT: 16.5 sec;   INR: 1.42 ratio                 RADIOLOGY & ADDITIONAL STUDIES:    PATHOLOGY: INTERVAL History: No acute events overnight. Pt afebrile. Pt seen this morning and appears much more awake and alert. She is sitting up in bed. She states that she is working with physical therapy but her breathing is still labored when she walks around. Otherwise, the oral pills are controlling her pain, still has some mild back pain.     Allergies    No Known Allergies    Intolerances        MEDICATIONS  (STANDING):  allopurinol 300 milliGRAM(s) Oral daily  enoxaparin Injectable 40 milliGRAM(s) SubCutaneous daily  famotidine    Tablet 20 milliGRAM(s) Oral daily  iron sucrose IVPB 200 milliGRAM(s) IV Intermittent every 24 hours  lidocaine   Patch 2 Patch Transdermal daily  morphine ER Tablet 30 milliGRAM(s) Oral <User Schedule>  morphine ER Tablet 60 milliGRAM(s) Oral <User Schedule>  polyethylene glycol 3350 17 Gram(s) Oral daily  senna 2 Tablet(s) Oral at bedtime  sodium chloride 0.9% lock flush 3 milliLiter(s) IV Push every 8 hours  sodium chloride 0.9% lock flush 3 milliLiter(s) IV Push every 8 hours  sodium phosphate IVPB 15 milliMole(s) IV Intermittent once    MEDICATIONS  (PRN):  acetaminophen   Tablet .. 650 milliGRAM(s) Oral every 6 hours PRN Temp greater or equal to 38C (100.4F)  aluminum hydroxide/magnesium hydroxide/simethicone Suspension 30 milliLiter(s) Oral every 6 hours PRN Dyspepsia  metoclopramide Injectable 10 milliGRAM(s) IV Push every 6 hours PRN Nausea/Vomiting  morphine  - Injectable 3 milliGRAM(s) IV Push every 4 hours PRN Moderate Pain (4 - 6) or severe  naloxone Injectable 0.1 milliGRAM(s) IV Push every 3 minutes PRN Sedation or RR <10  ondansetron Injectable 4 milliGRAM(s) IV Push every 6 hours PRN Nausea and/or Vomiting  simethicone 80 milliGRAM(s) Chew every 6 hours PRN Gas      Vital Signs Last 24 Hrs  T(C): 36.3 (15 Apr 2020 09:12), Max: 37.6 (14 Apr 2020 12:50)  T(F): 97.3 (15 Apr 2020 09:12), Max: 99.7 (14 Apr 2020 12:50)  HR: 126 (15 Apr 2020 09:12) (113 - 126)  BP: 128/84 (15 Apr 2020 09:12) (118/84 - 146/75)  BP(mean): --  RR: 20 (15 Apr 2020 09:12) (18 - 20)  SpO2: 94% (15 Apr 2020 09:12) (92% - 99%)    PHYSICAL EXAM:    Not physically examined to decrease COVID exposure to our already immunocompromised patient population     LABS:                        7.6    16.33 )-----------( 382      ( 15 Apr 2020 07:13 )             25.0     04-15    132<L>  |  91<L>  |  9   ----------------------------<  105<H>  3.8   |  29  |  0.74    Ca    8.1<L>      15 Apr 2020 07:13  Phos  2.0     04-15  Mg     2.0     04-15    TPro  6.6  /  Alb  2.4<L>  /  TBili  1.5<H>  /  DBili  1.1<H>  /  AST  29  /  ALT  7<L>  /  AlkPhos  115  04-15    PT/INR - ( 15 Apr 2020 07:13 )   PT: 16.5 sec;   INR: 1.42 ratio                 RADIOLOGY & ADDITIONAL STUDIES:    PATHOLOGY:

## 2020-04-16 NOTE — CONSULT NOTE ADULT - ASSESSMENT
24 year old woman with bilateral pleural effusions in setting of metastatic carcinoma.  CT shows pleural implants on the left and bilateral tiny pleural nodules.  She also has signs of CHR with bilateral septal thickening- given age should not have cardiac issue, however recently received FOLFOX and she has been tachycardic.    I would try to drain the right sided effusion first as it is larger, and send for a diagnostic workup including cell count, TP, LDH, ph and cytology.  Would ask IR if this can be drained tomorrow.    Would then aim to drain the left effusion next week.  If effusions recur and depending on characteristics of the fluid she may require a PleuRx catheter.      Please obtain echocardiogram  Consider lasix to diurese and see if effusions ( at least right sided) responds to diuretics.  Left effusion has pleural implants and likely malignant.    Will follow.

## 2020-04-16 NOTE — CONSULT NOTE ADULT - SUBJECTIVE AND OBJECTIVE BOX
CHIEF COMPLAINT: tachycardia    HPI:  Asked to see patient with bilateral pleural effusions and metastatic cancer. 24 f with b/l adnexal masses and pelvic LAD, metastatic carcinoma of unknown primary presented 3/20 with abd pain, s/p CT guided biopsy 3/25, PICC line 4/3 to start chemo.   blood and urine cx negative  COVID negative x 3 last one 4/4  RVP 4/1 with parainfluenza    Recently developed persistent tachycardia.  CTPA today showed large bilateral pleural effusions with evidence of pleural implants in the left pleura.  There is no PE.  Lung fields show bilateral nodules and septal thickening.  dopplers on 4/12/20 were negative for DVT.      PAST MEDICAL & SURGICAL HISTORY:  No pertinent past medical history  No significant past surgical history      FAMILY HISTORY:      SOCIAL HISTORY:  Smoking: __ packs x ___ years  EtOH Use:  Marital Status:  Occupation:  Recent Travel:  Country of Birth:  Advance Directives:    Allergies    No Known Allergies    Intolerances        HOME MEDICATIONS:    REVIEW OF SYSTEMS:  Constitutional:   Eyes:  ENT:  CV:  Resp:  GI:  :  MSK:  Integumentary:  Neurological:  Psychiatric:  Endocrine:  Hematologic/Lymphatic:  Allergic/Immunologic:  [ ] All other systems negative  [ ] Unable to assess ROS because ________    OBJECTIVE:  ICU Vital Signs Last 24 Hrs  T(C): 37.4 (16 Apr 2020 13:39), Max: 37.4 (16 Apr 2020 13:39)  T(F): 99.3 (16 Apr 2020 13:39), Max: 99.3 (16 Apr 2020 13:39)  HR: 128 (16 Apr 2020 13:39) (122 - 128)  BP: 129/78 (16 Apr 2020 13:39) (118/69 - 129/78)  BP(mean): --  ABP: --  ABP(mean): --  RR: 18 (16 Apr 2020 13:39) (18 - 18)  SpO2: 97% (16 Apr 2020 13:39) (82% - 100%)        04-15 @ 07:01  -  04-16 @ 07:00  --------------------------------------------------------  IN: 240 mL / OUT: 600 mL / NET: -360 mL    04-16 @ 07:01  -  04-16 @ 16:51  --------------------------------------------------------  IN: 1347 mL / OUT: 800 mL / NET: 547 mL      CAPILLARY BLOOD GLUCOSE          PHYSICAL EXAM:  General:   HEENT:   Lymph Nodes:  Neck:   Respiratory:   Cardiovascular:   Abdomen:   Extremities:   Skin:   Neurological:  Psychiatry:    HOSPITAL MEDICATIONS:  MEDICATIONS  (STANDING):  allopurinol 300 milliGRAM(s) Oral daily  enoxaparin Injectable 40 milliGRAM(s) SubCutaneous daily  famotidine    Tablet 20 milliGRAM(s) Oral daily  lidocaine   Patch 2 Patch Transdermal daily  morphine ER Tablet 30 milliGRAM(s) Oral <User Schedule>  morphine ER Tablet 60 milliGRAM(s) Oral <User Schedule>  polyethylene glycol 3350 17 Gram(s) Oral daily  senna 2 Tablet(s) Oral at bedtime  sodium chloride 0.9% lock flush 3 milliLiter(s) IV Push every 8 hours  sodium chloride 0.9% lock flush 3 milliLiter(s) IV Push every 8 hours    MEDICATIONS  (PRN):  acetaminophen   Tablet .. 650 milliGRAM(s) Oral every 6 hours PRN Temp greater or equal to 38C (100.4F)  aluminum hydroxide/magnesium hydroxide/simethicone Suspension 30 milliLiter(s) Oral every 6 hours PRN Dyspepsia  metoclopramide Injectable 10 milliGRAM(s) IV Push every 6 hours PRN Nausea/Vomiting  morphine  IR 7.5 milliGRAM(s) Oral every 3 hours PRN Moderate Pain (4 - 6) or severe  naloxone Injectable 0.1 milliGRAM(s) IV Push every 3 minutes PRN Sedation or RR <10  ondansetron Injectable 4 milliGRAM(s) IV Push every 6 hours PRN Nausea and/or Vomiting  simethicone 80 milliGRAM(s) Chew every 6 hours PRN Gas      LABS:                        7.3    15.61 )-----------( 396      ( 16 Apr 2020 07:53 )             24.4     04-16    132<L>  |  90<L>  |  7   ----------------------------<  105<H>  3.5   |  32<H>  |  0.72    Ca    8.2<L>      16 Apr 2020 07:54  Phos  2.0     04-16  Mg     1.9     04-16    TPro  6.6  /  Alb  2.6<L>  /  TBili  1.4<H>  /  DBili  1.0<H>  /  AST  43<H>  /  ALT  9<L>  /  AlkPhos  183<H>  04-16    PT/INR - ( 16 Apr 2020 07:53 )   PT: 17.0 sec;   INR: 1.47 ratio                   MICROBIOLOGY:     RADIOLOGY:  [x ] Reviewed and interpreted by me- large bilateral pleural effusions, pleural implants on left pleura    PULMONARY FUNCTION TESTS:    EKG: CHIEF COMPLAINT: tachycardia    HPI:  Asked to see patient with bilateral pleural effusions and metastatic cancer. 24 f with b/l adnexal masses and pelvic LAD, metastatic carcinoma of unknown primary presented 3/20 with abd pain, s/p CT guided biopsy 3/25, PICC line 4/3 to start chemo.   blood and urine cx negative  COVID negative x 3 last one 4/4  RVP 4/1 with parainfluenza    Recently developed persistent tachycardia.  CTPA today showed large bilateral pleural effusions with evidence of pleural implants in the left pleura.  There is no PE.  Lung fields show bilateral nodules and septal thickening.  dopplers on 4/12/20 were negative for DVT.  she has not had a baseline echo.  Recent episode of QUINN with peak creatinine 1.45, now normal      PAST MEDICAL & SURGICAL HISTORY:  No pertinent past medical history  No significant past surgical history      FAMILY HISTORY:      SOCIAL HISTORY:  Smoking:negative  EtOH Use:  Marital Status:  Occupation:  Recent Travel:  Country of Birth:  Advance Directives:    Allergies kiwi    No Known Allergies    Intolerances        HOME MEDICATIONS:    REVIEW OF SYSTEMS:  Constitutional: fatigue  Eyes:  ENT:  CV:  Resp: dyspnea on exertion  GI:  :  MSK:  Integumentary:  Neurological:  Psychiatric:  Endocrine:  Hematologic/Lymphatic:  Allergic/Immunologic:  [x ] All other systems negative  [ ] Unable to assess ROS because ________    OBJECTIVE:  ICU Vital Signs Last 24 Hrs  T(C): 37.4 (16 Apr 2020 13:39), Max: 37.4 (16 Apr 2020 13:39)  T(F): 99.3 (16 Apr 2020 13:39), Max: 99.3 (16 Apr 2020 13:39)  HR: 128 (16 Apr 2020 13:39) (122 - 128)  BP: 129/78 (16 Apr 2020 13:39) (118/69 - 129/78)  BP(mean): --  ABP: --  ABP(mean): --  RR: 18 (16 Apr 2020 13:39) (18 - 18)  SpO2: 97% (16 Apr 2020 13:39) (82% - 100%)        04-15 @ 07:01  -  04-16 @ 07:00  --------------------------------------------------------  IN: 240 mL / OUT: 600 mL / NET: -360 mL    04-16 @ 07:01 - 04-16 @ 16:51  --------------------------------------------------------  IN: 1347 mL / OUT: 800 mL / NET: 547 mL      CAPILLARY BLOOD GLUCOSE          PHYSICAL EXAM:  General: Appears well  HEENT:   Lymph Nodes:  Neck:   Respiratory: decreased breath sounds in both lung bases no wheezing  Cardiovascular: tachycardia  Abdomen:   Extremities: bilateral 1+ bekah  Skin:   Neurological: nonfocal  Psychiatry: alert and oriented    HOSPITAL MEDICATIONS:  MEDICATIONS  (STANDING):  allopurinol 300 milliGRAM(s) Oral daily  enoxaparin Injectable 40 milliGRAM(s) SubCutaneous daily  famotidine    Tablet 20 milliGRAM(s) Oral daily  lidocaine   Patch 2 Patch Transdermal daily  morphine ER Tablet 30 milliGRAM(s) Oral <User Schedule>  morphine ER Tablet 60 milliGRAM(s) Oral <User Schedule>  polyethylene glycol 3350 17 Gram(s) Oral daily  senna 2 Tablet(s) Oral at bedtime  sodium chloride 0.9% lock flush 3 milliLiter(s) IV Push every 8 hours  sodium chloride 0.9% lock flush 3 milliLiter(s) IV Push every 8 hours    MEDICATIONS  (PRN):  acetaminophen   Tablet .. 650 milliGRAM(s) Oral every 6 hours PRN Temp greater or equal to 38C (100.4F)  aluminum hydroxide/magnesium hydroxide/simethicone Suspension 30 milliLiter(s) Oral every 6 hours PRN Dyspepsia  metoclopramide Injectable 10 milliGRAM(s) IV Push every 6 hours PRN Nausea/Vomiting  morphine  IR 7.5 milliGRAM(s) Oral every 3 hours PRN Moderate Pain (4 - 6) or severe  naloxone Injectable 0.1 milliGRAM(s) IV Push every 3 minutes PRN Sedation or RR <10  ondansetron Injectable 4 milliGRAM(s) IV Push every 6 hours PRN Nausea and/or Vomiting  simethicone 80 milliGRAM(s) Chew every 6 hours PRN Gas      LABS:                        7.3    15.61 )-----------( 396      ( 16 Apr 2020 07:53 )             24.4     04-16    132<L>  |  90<L>  |  7   ----------------------------<  105<H>  3.5   |  32<H>  |  0.72    Ca    8.2<L>      16 Apr 2020 07:54  Phos  2.0     04-16  Mg     1.9     04-16    TPro  6.6  /  Alb  2.6<L>  /  TBili  1.4<H>  /  DBili  1.0<H>  /  AST  43<H>  /  ALT  9<L>  /  AlkPhos  183<H>  04-16    PT/INR - ( 16 Apr 2020 07:53 )   PT: 17.0 sec;   INR: 1.47 ratio                   MICROBIOLOGY:     RADIOLOGY:  [x ] Reviewed and interpreted by me- large bilateral pleural effusions, pleural implants on left pleura    PULMONARY FUNCTION TESTS:    EKG:

## 2020-04-16 NOTE — CHART NOTE - NSCHARTNOTEFT_GEN_A_CORE
< from: CT Angio Chest w/ IV Cont (04.16.20 @ 14:34) >    INTERPRETATION:  CLINICAL INFORMATION: Metastatic carcinoma of unknown primary. Hypoxia and tachycardia.   FINDINGS:  LUNGS AND AIRWAYS: Patent central airways.  Groundglass opacities and bilateral interlobular septal thickening consistent with pulmonary edema. Numerous bilateral pulmonary nodules consistent with metastatic disease.  PLEURA: Large bilateral pleural effusions increased. Left pleural thickening/nodularity consistent with a malignant effusion.  MEDIASTINUM AND BANG: No lymphadenopathy.  VESSELS: No pulmonary embolism. Right-sided central line with the tip in the SVC.  HEART: Heart size is normal. No pericardial effusion.  CHEST WALL AND LOWER NECK: Right internal mammary nodularity.  VISUALIZED UPPER ABDOMEN: Hepatic metastatic disease better characterize a prior abdominal imaging. Small ascites.  BONES: Within normallimits.    IMPRESSION:   No pulmonary embolism.   Pulmonary edema.  Large bilateral pleural effusions with interval increase. Left pleural nodularity consistent with a malignant effusion.  Pulmonary and hepatic metastatic disease.    COVID-19 PCR . (04.15.20 @ 12:42)    [facility]    D/w primary team Dr. Banks - requesting pulmonary consultation for worsening bilateral malignant pleural effusions.

## 2020-04-16 NOTE — PROGRESS NOTE ADULT - ASSESSMENT
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      # tachycardia  - COVID-19 negative x 3, but parainfluenza positive ().   - CTA Chest () was negative for central PE, but unable to evaluate segmental and subsegmental branches due to motion artifact. Lung imaging showed unchanged metastatic nodules, small bilateral pleural effusions, and atelectasis of the basilar LLL (unchanged as well). No new opacity which decreases the likelihood of PNA  - CT A/P repeated on 4/3/20; no obvious source of infection   - Blood/urine cultures have had NGTD. C. diff negative  - ID following, appreciate recs  - S/p PICC line placement for chemotherapy  - Patient is s/p C1 modified FOLFOX (-). Further management as noted below   -- Blood cultures from  neg, afebrile overnight   - Repeat CTA chest w IV contrast today as pt still requiring O2 with tachycardia- r/o PE     # Metastatic carcinoma, cancer of unknown primary  - CT A/P with bilateral heterogeneous adnexal masses, as well as upper abdominal, RP, possible bone lesions and pelvic lymphadenopathy; CT Chest with multiple solid pulmonary nodules  - Cancer of unknown primary (poorly differentiated carcinoma) but suspect likely GI origin given CDX2 positivity on pathology; chromogranin positivity also suggests neuroendocrine features; Ki-67 index 40%.   - Tumor markers elevated: Cancer Antigen, Ca 27.29: 70.8, Cancer Antigen, 125: 619, Carcinoembryonic Antigen: 44.7, Beta-HC.0 on initial evaluation (3/7); Checked again on 20 and it was 102.7  - Discussed diagnosis with patient on 3/31/20. Given advanced, metastatic stage, will need to be on indefinite treatment to limit progression of disease.  - S/p C1 mFOLFOX (-). Next cycle is due in ~ 2 weeks. Will need to start discharge planning.   - Patient is currently on 2L NC. Will try to wean supplemental oxygen as tolerated. Will also provide an incentive spirometer  - CTA Chest () was negative for central PE, but unable to evaluate segmental and subsegmental branches due to motion artifact. In the setting of her hypoxemia and tachycardia, will check LE dopplers to r/o a DVT.  - Continue Venofer until discharge         # Pain control   - On Morphine infusion and PRN injections  - Pain medications now switched to MS contin, pain is adequately controlled per patient. Will continue to follow-up with Palliative Care regarding pain management/regimen on discharge.     # Hyperuricemia  - Patient's uric acid was 8.8 yesterday (). She was given IV Rasburicase 3mg x1 on . Her uric acid level is 4.1 today ()  - C/w Allopurinol 300mg daily  - Monitor TLS labs daily (CMP, Phos, LDH, Uric acid)    # DVT PPx  - Lovenox 40mg SQ    # Dispo  - Pending resolution of acute medical issues. Pt recs: DC home with assist from mother/brother, +elevator access in apt to 5th floor, recommend rolling walker for long distances and recommend 3:1 commode  - Wean off O2 as tolerated- Check CTA chest to r/o PE   - Pain control   - Will continue to update mother and brother over phone daily, all of their questions answered to their satisfaction       Cesia Banks MD  Hematology Oncology Fellow, PGY-4  [facility] Pager: [phone]/ [facility] Pager: [phone]

## 2020-04-16 NOTE — PROGRESS NOTE ADULT - ATTENDING COMMENTS
Ensure opioids, naloxone, and bowel regimen, see below   Ensure Supportive Oncology referral, see below    Palliative Medicine Service      The patient's symptoms are well controlled.  The patient's care plan has been delineated.  Thank you for the consult, feel free to reconsult when clinical needs arise      In the event of newly developing, evolving, or worsening symptoms, please contact the Palliative Medicine team via pager (if the patient is at [facility] #[phone] or if the patient is at [facility] #[phone]) The Geriatric and Palliative Medicine service has coverage 24 hours a day/ 7 days a week to provide medical recommendations regarding symptom management needs via telephone        Baldomero Gomez MD   of Geriatric and Palliative Medicine  [facility]      Please page the following number for clinical matters between the hours of 9 am and 5 pm   from Monday through Friday : [phone]    After 5pm and on weekends, please see the contact information below:    In the event of newly developing, evolving, or worsening symptoms, please contact the Palliative Medicine team via pager (if the patient is at [facility] #[phone] or if the patient is at [facility] #[phone]) The Geriatric and Palliative Medicine service has coverage 24 hours a day/ 7 days a week to provide medical recommendations regarding symptom management needs via telephone          Opioid  Discharge Recommendations    This patient will leave the hospital with a need for opioids to address symptoms.    Prior to discharge, please ensure that the receiving pharmacy  -has the medication on formulary  -has the correct dose and formulation of the medication  -has an adequate supply of the medication to accommodate the patient's prescription  -has a prescription for a bowel regimen    Please ensure that there is a physician who will continue to follow up and prescribe opioids as needed.    Please ensure prior authorization is completed, if necessary, prior to discharge.    Has this patient been identified to receive a naloxone prescription    Yes [x]  No []       Please note the following on patient's discharge summary: "Please see Dr. Katya Savage at the [facility] ([phone]) for supportive oncology and pain and symptom management."     If referring to Dr. Katya Savage, please email her with a summary of the patient's hospitalization and pain treatment at the time of discharge. Thank you.      On discharge, please prescribe Naloxone spray 4 mg/0.1 ml intranasal, Spray 0.1 mL into one nostril. Repeat with second device into other nostril after 2-3 minutes if no or minimal response ([email or web address]). Please be sure the patient's pharmacy has the medication, otherwise, be sure there is a pharmacy were the patient can get the Naloxone inhaled.

## 2020-04-16 NOTE — PROGRESS NOTE ADULT - SUBJECTIVE AND OBJECTIVE BOX
Follow Up:  fever, leukocytosis    Interval History: s/p cycle 1 of folfox, now off, WBC 15 and afebrile but still tachy    ROS:      All other systems negative    Constitutional: no fever,  chills  Head: no trauma  Eyes: no vision changes, no eye pain  ENT:  no sore throat, no rhinorrhea  Cardiovascular:  no chest pain, no palpitation  Respiratory:  + SOB, no cough  GI:  + abd pain, no vomiting, no diarrhea   urinary: no dysuria, no hematuria, no flank pain  musculoskeletal:  no joint pain, no joint swelling  skin:  no rash  neurology:  no headache, no seizure, no change in mental status  psych: no anxiety, no depression       Allergies  No Known Allergies        ANTIMICROBIALS:      OTHER MEDS:  acetaminophen   Tablet .. 650 milliGRAM(s) Oral every 6 hours PRN  allopurinol 300 milliGRAM(s) Oral daily  aluminum hydroxide/magnesium hydroxide/simethicone Suspension 30 milliLiter(s) Oral every 6 hours PRN  enoxaparin Injectable 40 milliGRAM(s) SubCutaneous daily  famotidine    Tablet 20 milliGRAM(s) Oral daily  lidocaine   Patch 2 Patch Transdermal daily  metoclopramide Injectable 10 milliGRAM(s) IV Push every 6 hours PRN  morphine  IR 7.5 milliGRAM(s) Oral every 3 hours PRN  morphine ER Tablet 30 milliGRAM(s) Oral <User Schedule>  morphine ER Tablet 60 milliGRAM(s) Oral <User Schedule>  naloxone Injectable 0.1 milliGRAM(s) IV Push every 3 minutes PRN  ondansetron Injectable 4 milliGRAM(s) IV Push every 6 hours PRN  polyethylene glycol 3350 17 Gram(s) Oral daily  senna 2 Tablet(s) Oral at bedtime  simethicone 80 milliGRAM(s) Chew every 6 hours PRN  sodium chloride 0.9% lock flush 3 milliLiter(s) IV Push every 8 hours  sodium chloride 0.9% lock flush 3 milliLiter(s) IV Push every 8 hours      Vital Signs Last 24 Hrs  T(C): 36.4 (16 Apr 2020 08:55), Max: 37.2 (15 Apr 2020 21:00)  T(F): 97.5 (16 Apr 2020 08:55), Max: 98.9 (15 Apr 2020 21:00)  HR: 124 (16 Apr 2020 10:52) (119 - 126)  BP: 122/78 (16 Apr 2020 08:55) (118/69 - 123/72)  BP(mean): --  RR: 18 (16 Apr 2020 08:55) (18 - 18)  SpO2: 93% (16 Apr 2020 10:53) (82% - 100%)    Physical Exam:  General:    NAD,  non toxic  Head: atraumatic, normocephalic  Eye: normal sclera and conjunctiva  ENT:    no oropharyngeal lesions,   no LAD,   neck supple  Cardio:    tachy regular S1, S2  Respiratory:    clear b/l,    no wheezing but  tachypneic  abd:    palpable mass, soft,   BS +,  slight diffuse tenderness  :   no CVAT,  no suprapubic tenderness,   no  cash  Musculoskeletal:   no joint swelling,   no edema  vascular: RUE picc  Skin:    no rash  Neurologic:     no focal deficit  psych: normal affect                          7.3    15.61 )-----------( 396      ( 16 Apr 2020 07:53 )             24.4       04-16    132<L>  |  90<L>  |  7   ----------------------------<  105<H>  3.5   |  32<H>  |  0.72    Ca    8.2<L>      16 Apr 2020 07:54  Phos  1.7     04-16  Mg     1.9     04-16    TPro  6.6  /  Alb  2.6<L>  /  TBili  1.4<H>  /  DBili  1.0<H>  /  AST  43<H>  /  ALT  9<L>  /  AlkPhos  183<H>  04-16          MICROBIOLOGY:  v  .Blood Blood-Peripheral  04-13-20   No growth to date.  --  --      .Blood PICC/PERC Double Lumen BLUE  04-13-20   No growth to date.  --  --      .Urine Clean Catch (Midstream)  04-08-20   No growth  --  --      .Blood Blood-Peripheral  04-05-20   No Growth Final  --  --      .Blood Blood-Peripheral  04-05-20   No Growth Final  --  --      .Urine Clean Catch (Midstream)  04-05-20   No growth  --  --      .Blood Blood-Peripheral  04-03-20   No Growth Final  --  --      .Blood Blood-Catheter  04-03-20   No Growth Final  --  --      .Blood Blood-Peripheral  04-01-20   No Growth Final  --  --      .Urine Clean Catch (Midstream)  04-01-20   >=3 organisms. Probable collection contamination.  --  --      .Blood Blood-Peripheral  03-25-20   No Growth Final  --  --      .Blood Blood-Venous  03-20-20   No growth at 5 days.  --  --      .Blood Blood-Peripheral  03-20-20   No growth at 5 days.  --  --      .Urine Clean Catch (Midstream)  03-20-20   <10,000 CFU/mL Normal Urogenital Hui  --  --                RADIOLOGY:  Images below independently visualized and reviewed personally, findings as below  < from: [facility] Duplex Lower Ext Vein Scan, Tia (04.12.20 @ 15:06) >  IMPRESSION:     No evidence of deep venous thrombosis in either lower extremity.

## 2020-04-16 NOTE — PROGRESS NOTE ADULT - ASSESSMENT
24 f with b/l adnexal masses and pelvic LAD, metastatic carcinoma of unknown primary presented 3/20 with abd pain, s/p CT guided biopsy 3/25, PICC line 4/3 to start chemo  pt intermittently febrile and leukocytosis, now febrile to 101.7 and tachy, WBC: 34  QUINN for the last 2 days now Cr: 1.3  blood and urine cx negative  COVID negative x 3 last one 4/4  RVP 4/1 with parainfluenza  also some diarrhea initially    persistent fever and leukocytosis, with tachycardia sepsis  C-diff negative  parainfluenza URI 4/1  metastatic ca with ?tumor fever  * s/p cycle 1 of folfox now WBC down to 15 and afebrile, off antibiotics  * infection w/u negative and likely malignancy related  * monitor the WBC and temp  * monitor for new signs of infection  * pt persistently tachycardic, consider chest CTA again to r/o PE    The above assessment and plan was discussed with hem/onc team    Gina Arenas MD  Pager [phone]  After 5pm and on weekends call [phone]

## 2020-04-16 NOTE — PROGRESS NOTE ADULT - ATTENDING COMMENTS
Seen with Dr Banks. Tachycardic with continued need for 02, desaturated with ambulation during PT. Prior CTA was suboptimal, pt at high risk for PE, repeat CTA ordered. I agree with the assessment and plan as outlined in Dr Banks's note.

## 2020-04-16 NOTE — PROGRESS NOTE ADULT - PROBLEM SELECTOR PLAN 1
Predominant symptom: abdominal pain  Status: the pain well controlled  Likely due to malignancy  maintain morphine ER 30/60/60  maintain po morphine 7.5mg q3H PRN  naloxone PRN  Risk mitigation/Bowel regimen: maintain senna and miralax, ensure this is ordered upon discharge  Adverse events noted: none, she was counseled on   Ensure Supportive Oncology referral prior to discharge

## 2020-04-16 NOTE — PROGRESS NOTE ADULT - SUBJECTIVE AND OBJECTIVE BOX
Overnight events: No major events  Staff reports: no issues  Patient: Pain is well controlled. She is lucid  PRN use: none in 24 hours,        RECENT VITALS/LABS/MEDICATIONS/ASSAYS     04-16-20 @ 11:55    T(C): 36.4 (04-16-20 @ 08:55), Max: 37.2 (04-15-20 @ 21:00)  HR: 124 (04-16-20 @ 10:52) (118 - 126)  BP: 122/78 (04-16-20 @ 08:55) (118/69 - 124/83)  RR: 18 (04-16-20 @ 08:55) (18 - 20)  SpO2: 93% (04-16-20 @ 10:53) (82% - 100%)  Wt(kg): --      15 Apr 2020 07:01  -  16 Apr 2020 07:00  --------------------------------------------------------  IN:    Oral Fluid: 240 mL  Total IN: 240 mL    OUT:    Voided: 600 mL  Total OUT: 600 mL    Total NET: -360 mL          04-15 @ 07:01  -  04-16 @ 07:00  --------------------------------------------------------  IN: 240 mL / OUT: 600 mL / NET: -360 mL      CAPILLARY BLOOD GLUCOSE            acetaminophen   Tablet .. 650 milliGRAM(s) Oral every 6 hours PRN  allopurinol 300 milliGRAM(s) Oral daily  aluminum hydroxide/magnesium hydroxide/simethicone Suspension 30 milliLiter(s) Oral every 6 hours PRN  enoxaparin Injectable 40 milliGRAM(s) SubCutaneous daily  famotidine    Tablet 20 milliGRAM(s) Oral daily  lidocaine   Patch 2 Patch Transdermal daily  metoclopramide Injectable 10 milliGRAM(s) IV Push every 6 hours PRN  morphine  IR 7.5 milliGRAM(s) Oral every 3 hours PRN  morphine ER Tablet 30 milliGRAM(s) Oral <User Schedule>  morphine ER Tablet 60 milliGRAM(s) Oral <User Schedule>  naloxone Injectable 0.1 milliGRAM(s) IV Push every 3 minutes PRN  ondansetron Injectable 4 milliGRAM(s) IV Push every 6 hours PRN  polyethylene glycol 3350 17 Gram(s) Oral daily  senna 2 Tablet(s) Oral at bedtime  simethicone 80 milliGRAM(s) Chew every 6 hours PRN  sodium chloride 0.9% lock flush 3 milliLiter(s) IV Push every 8 hours  sodium chloride 0.9% lock flush 3 milliLiter(s) IV Push every 8 hours          04-16    132<L>  |  90<L>  |  7   ----------------------------<  105<H>  3.5   |  32<H>  |  0.72    Ca    8.2<L>      16 Apr 2020 07:54  Phos  1.7     04-16  Mg     1.9     04-16    TPro  6.6  /  Alb  2.6<L>  /  TBili  1.4<H>  /  DBili  1.0<H>  /  AST  43<H>  /  ALT  9<L>  /  AlkPhos  183<H>  04-16      Procalc  BNP  ABG                          7.3    15.61 )-----------( 396      ( 16 Apr 2020 07:53 )             24.4   PT/INR - ( 16 Apr 2020 07:53 )   PT: 17.0 sec;   INR: 1.47 ratio                 blood and urine cultures              ADVANCE DIRECTIVES:    DNR  MOLST  [ ]  Living Will  [ ]   DECISION MAKER(s):  [ ] Health Care Proxy(s)  [ ] Surrogate(s)  [ ] Guardian           Name(s): Phone Number(s):    BASELINE (I)ADL(s) (prior to admission):  [place]: [ ]Total  [ ] Moderate [ ]Dependent    Allergies    No Known Allergies    Intolerances       PRESENT SYMPTOMS: [ ]Unable to obtain due to poor mentation   Source if other than patient:  [ ]Family   [ ]Team     Pain: [X ]yes [ ]no  QOL impact -  decreased function  Location - adnexal  Aggravating factors - movement  Quality - burning  Radiation - down legs  Timing- constant  Severity (0-10 scale): 8/10   Minimal acceptable level (0-10 scale): 2/10    CPOT:    https://www.[email or web address]/getattachment/[email or web address]/Critical-Care-Pain-Observation-Tool-(CPOT)      PAIN AD Score:     [email or web address]/painad.pdf (press ctrl +  left click to view)    Dyspnea:                           [ ]Mild [ ]Moderate [ ]Severe  Anxiety:                             [ ]Mild [ ]Moderate [ ]Severe  Fatigue:                             [ ]Mild [ x]Moderate [ ]Severe  Nausea:                             [ ]Mild [ ]Moderate [ ]Severe  Loss of appetite:              [ ]Mild [ ]Moderate [ ]Severe  Constipation:                    [ ]Mild [ ]Moderate [ ]Severe    Other Symptoms:  [X ]All other review of systems negative     Palliative Performance Status Version 2:   40      %    [email or web address]/news/palliative_performance_scale_[email or web address].pdf    PHYSICAL EXAM:      Limited exam for patient safety and to limit infection risk during COVID-19 outbreak. Please refr to primary team's examination for today.  GENERAL:  [ X]Alert  [ ]Oriented x   [ ]Lethargic  [ ]Cachexia  [ ]Unarousable  [ X]Verbal  [ ]Non-Verbal  Behavioral:   [ ] Anxiety  [ ] Delirium [ ] Agitation [ ] Other  HEENT:  [ ]Normal   [ ]Dry mouth   [ ]ET Tube/Trach  [ ]Oral lesions  PULMONARY:   [ ]Clear [ ]Tachypnea  [ ]Audible excessive secretions   [ ]Rhonchi        [ ]Right [ ]Left [ ]Bilateral  [ ]Crackles        [ ]Right [ ]Left [ ]Bilateral  [ ]Wheezing     [ ]Right [ ]Left [ ]Bilateral  [ ]Diminished breath sounds [ ]right [ ]left [ ]bilateral  CARDIOVASCULAR:    [ ]Regular [ ]Irregular [ ]Tachy  [ ]Quirino [ ]Murmur [ ]Other  GASTROINTESTINAL:  [ ]Soft  [ ]Distended   [ ]+BS  [ ]Non tender [ ]Tender  [ ]PEG [ ]OGT/ NGT  Last BM:     GENITOURINARY:  [ ]Normal [ ] Incontinent   [ ]Oliguria/Anuria   [ ]Cifuentes  MUSCULOSKELETAL:   [ ]Normal   [ x]Weakness  [ ]Bed/Wheelchair bound [ ]Edema  NEUROLOGIC:  Lucid speech  [ ]No focal deficits  [ ]Cognitive impairment  [ ]Dysphagia [ ]Dysarthria [ ]Paresis [ ]Other   SKIN:   [ ]Normal    [ ]Rash  [ ]Pressure ulcer(s)       Present on admission [ ]y [ ]n    CRITICAL CARE:  [ ] Shock Present  [ ]Septic [ ]Cardiogenic [ ]Neurologic [ ]Hypovolemic  [ ]  Vasopressors [ ]  Inotropes   [ ]Respiratory failure present [ ]Mechanical ventilation [ ]Non-invasive ventilatory support [ ]High flow  [ ]Acute  [ ]Chronic [ ]Hypoxic  [ ]Hypercarbic [ ]Other  [ ]Other organ failure     LABS: reviewed                          7.7    41.90 )-----------( 611      ( 03 Apr 2020 10:03 )             25.6     04-03    130<L>  |  91<L>  |  8   ----------------------------<  92  3.7   |  27  |  0.75    Ca    11.4<H>      03 Apr 2020 10:03  Phos  2.4     04-03  Mg     1.8     04-03            RADIOLOGY & ADDITIONAL STUDIES:    PROTEIN CALORIE MALNUTRITION PRESENT: [ ]mild [ ]moderate [ ]severe [ ]underweight [ ]morbid obesity  https://www.andeal.org/vault/2440/web/files/ONC/Table_Clinical%20Characteristics%20to%20Document%20Malnutrition-White%20JV%20et%20al%088856.pdf    Height (cm): 162.6 (03-21-20 @ 03:15), 162.56 (03-07-20 @ 19:18)  Weight (kg): 84.4 (03-21-20 @ 03:15), 89.4 (03-07-20 @ 19:18)  BMI (kg/m2): 31.9 (03-21-20 @ 03:15), 33.8 (03-07-20 @ 19:18)    [ ]PPSV2 < or = to 30% [ ]significant weight loss  [ ]poor nutritional intake  [ ]anasarca     Albumin, Serum: 2.9 g/dL (03-25-20 @ 05:59)   [ ]Artificial Nutrition      REFERRALS:   [ ]Chaplaincy  [ ]Hospice  [ ]Child Life  [ ]Social Work  [ ]Case management [ ]Holistic Therapy

## 2020-04-16 NOTE — PROGRESS NOTE ADULT - SUBJECTIVE AND OBJECTIVE BOX
INTERVAL History:    Allergies    No Known Allergies    Intolerances        MEDICATIONS  (STANDING):  allopurinol 300 milliGRAM(s) Oral daily  enoxaparin Injectable 40 milliGRAM(s) SubCutaneous daily  famotidine    Tablet 20 milliGRAM(s) Oral daily  iron sucrose IVPB 200 milliGRAM(s) IV Intermittent every 24 hours  lidocaine   Patch 2 Patch Transdermal daily  morphine ER Tablet 30 milliGRAM(s) Oral <User Schedule>  morphine ER Tablet 60 milliGRAM(s) Oral <User Schedule>  polyethylene glycol 3350 17 Gram(s) Oral daily  potassium phosphate IVPB 15 milliMole(s) IV Intermittent once  senna 2 Tablet(s) Oral at bedtime  sodium chloride 0.9% lock flush 3 milliLiter(s) IV Push every 8 hours  sodium chloride 0.9% lock flush 3 milliLiter(s) IV Push every 8 hours    MEDICATIONS  (PRN):  acetaminophen   Tablet .. 650 milliGRAM(s) Oral every 6 hours PRN Temp greater or equal to 38C (100.4F)  aluminum hydroxide/magnesium hydroxide/simethicone Suspension 30 milliLiter(s) Oral every 6 hours PRN Dyspepsia  metoclopramide Injectable 10 milliGRAM(s) IV Push every 6 hours PRN Nausea/Vomiting  morphine  IR 7.5 milliGRAM(s) Oral every 3 hours PRN Moderate Pain (4 - 6) or severe  naloxone Injectable 0.1 milliGRAM(s) IV Push every 3 minutes PRN Sedation or RR <10  ondansetron Injectable 4 milliGRAM(s) IV Push every 6 hours PRN Nausea and/or Vomiting  simethicone 80 milliGRAM(s) Chew every 6 hours PRN Gas      Vital Signs Last 24 Hrs  T(C): 36.4 (16 Apr 2020 08:55), Max: 37.2 (15 Apr 2020 21:00)  T(F): 97.5 (16 Apr 2020 08:55), Max: 98.9 (15 Apr 2020 21:00)  HR: 124 (16 Apr 2020 08:55) (118 - 126)  BP: 122/78 (16 Apr 2020 08:55) (118/69 - 128/84)  BP(mean): --  RR: 18 (16 Apr 2020 08:55) (18 - 20)  SpO2: 96% (16 Apr 2020 08:55) (94% - 100%)    PHYSICAL EXAM:    General: AOX3, no acute distress  EYES: EOMI, PERRLA, conjunctiva and sclera clear  NECK: Supple, No JVD, Normal thyroid  CHEST/LUNG: Clear to auscultation bilaterally; No rales, rhonchi, or wheezing  HEART: Regular rate and rhythm; no murmurs  ABDOMEN: Soft, Nontender. BS+  LYMPH: No lymphadenopathy noted  Extremities: No peripheral edema      LABS:                        7.3    15.61 )-----------( 396      ( 16 Apr 2020 07:53 )             24.4     04-16    132<L>  |  90<L>  |  7   ----------------------------<  105<H>  3.5   |  32<H>  |  0.72    Ca    8.2<L>      16 Apr 2020 07:54  Phos  1.7     04-16  Mg     1.9     04-16    TPro  6.6  /  Alb  2.6<L>  /  TBili  1.4<H>  /  DBili  1.0<H>  /  AST  43<H>  /  ALT  9<L>  /  AlkPhos  183<H>  04-16    PT/INR - ( 16 Apr 2020 07:53 )   PT: 17.0 sec;   INR: 1.47 ratio                 RADIOLOGY & ADDITIONAL STUDIES:    PATHOLOGY: INTERVAL History: No acute events overnight. Pt afebrile, however remains tachycardic and still requiring O2. Pain otherwise well controlled on oral pain meds.     Allergies    No Known Allergies    Intolerances        MEDICATIONS  (STANDING):  allopurinol 300 milliGRAM(s) Oral daily  enoxaparin Injectable 40 milliGRAM(s) SubCutaneous daily  famotidine    Tablet 20 milliGRAM(s) Oral daily  iron sucrose IVPB 200 milliGRAM(s) IV Intermittent every 24 hours  lidocaine   Patch 2 Patch Transdermal daily  morphine ER Tablet 30 milliGRAM(s) Oral <User Schedule>  morphine ER Tablet 60 milliGRAM(s) Oral <User Schedule>  polyethylene glycol 3350 17 Gram(s) Oral daily  potassium phosphate IVPB 15 milliMole(s) IV Intermittent once  senna 2 Tablet(s) Oral at bedtime  sodium chloride 0.9% lock flush 3 milliLiter(s) IV Push every 8 hours  sodium chloride 0.9% lock flush 3 milliLiter(s) IV Push every 8 hours    MEDICATIONS  (PRN):  acetaminophen   Tablet .. 650 milliGRAM(s) Oral every 6 hours PRN Temp greater or equal to 38C (100.4F)  aluminum hydroxide/magnesium hydroxide/simethicone Suspension 30 milliLiter(s) Oral every 6 hours PRN Dyspepsia  metoclopramide Injectable 10 milliGRAM(s) IV Push every 6 hours PRN Nausea/Vomiting  morphine  IR 7.5 milliGRAM(s) Oral every 3 hours PRN Moderate Pain (4 - 6) or severe  naloxone Injectable 0.1 milliGRAM(s) IV Push every 3 minutes PRN Sedation or RR <10  ondansetron Injectable 4 milliGRAM(s) IV Push every 6 hours PRN Nausea and/or Vomiting  simethicone 80 milliGRAM(s) Chew every 6 hours PRN Gas      Vital Signs Last 24 Hrs  T(C): 36.4 (16 Apr 2020 08:55), Max: 37.2 (15 Apr 2020 21:00)  T(F): 97.5 (16 Apr 2020 08:55), Max: 98.9 (15 Apr 2020 21:00)  HR: 124 (16 Apr 2020 08:55) (118 - 126)  BP: 122/78 (16 Apr 2020 08:55) (118/69 - 128/84)  BP(mean): --  RR: 18 (16 Apr 2020 08:55) (18 - 20)  SpO2: 96% (16 Apr 2020 08:55) (94% - 100%)    PHYSICAL EXAM:    General: AOX3, no acute distress on nasal cannula  Not physically examined to decrease COVID exposure to our already immunocompromised patient population       LABS:                        7.3    15.61 )-----------( 396      ( 16 Apr 2020 07:53 )             24.4     04-16    132<L>  |  90<L>  |  7   ----------------------------<  105<H>  3.5   |  32<H>  |  0.72    Ca    8.2<L>      16 Apr 2020 07:54  Phos  1.7     04-16  Mg     1.9     04-16    TPro  6.6  /  Alb  2.6<L>  /  TBili  1.4<H>  /  DBili  1.0<H>  /  AST  43<H>  /  ALT  9<L>  /  AlkPhos  183<H>  04-16    PT/INR - ( 16 Apr 2020 07:53 )   PT: 17.0 sec;   INR: 1.47 ratio                 RADIOLOGY & ADDITIONAL STUDIES:    PATHOLOGY:

## 2020-04-17 NOTE — PROGRESS NOTE ADULT - SUBJECTIVE AND OBJECTIVE BOX
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     Allergies: NKDA    Medications:    enoxaparin Injectable: 40 milliGRAM(s) SubCutaneous (04-16 @ 11:34)    Data:    T(C): 36.9  HR: 128  BP: 128/81  RR: 18  SpO2: 97%    -WBC 15.58 / HgB 7.3 / Hct 24.9 / Plt 418  -Na 133 / Cl 92 / BUN 6 / Glucose 105  -K 3.7 / CO2 31 / Cr 0.75  -ALT 11 / Alk Phos 263 / T.Bili 1.0  -INR1.54    Imaging: CT Chest on 04/16/2020    Plan:   -24y Female presents for right chest tube placement.  -Risks/Benefits/alternatives explained with the patient and witnessed informed consent obtained.

## 2020-04-17 NOTE — PROGRESS NOTE ADULT - ATTENDING COMMENTS
Pt seen with Dr Banks. CT images reviewed, large bilateral pleural effusions, R>L, due for drainage of right sided effusion today, left on Monday. Pain is better controlled. Stable on nasal cannula. I agree with Dr Banks's assessment and plan

## 2020-04-17 NOTE — PROGRESS NOTE ADULT - SUBJECTIVE AND OBJECTIVE BOX
Vascular & Interventional Radiology Post-Procedure Note    Pre-Procedure Diagnosis: Large Bilateral Effusions  Post-Procedure Diagnosis: Same as pre.  Indications for Procedure: Shortness of breath     : Clotilde ARMAS, Jessica ARMAS    Procedure Details/Findings: Successful placement of a 10.2F Right Chest Tube.     Complications: None  Estimated Blood Loss: Minimal  Specimen: 950ml of alexander fluid removed. Sent for C/S, LDH, Cytology, Ph.   Contrast: None  Sedation: None  Patient Condition/Disposition: Stable. Floor

## 2020-04-17 NOTE — PROGRESS NOTE ADULT - ASSESSMENT
24 f with b/l adnexal masses and pelvic LAD, metastatic carcinoma of unknown primary presented 3/20 with abd pain, s/p CT guided biopsy 3/25, PICC line 4/3 to start chemo  pt intermittently febrile and leukocytosis, now febrile to 101.7 and tachy, WBC: 34  QUINN for the last 2 days now Cr: 1.3  blood and urine cx negative  COVID negative x 3 last one 4/4  RVP 4/1 with parainfluenza  also some diarrhea initially    persistent fever and leukocytosis, with tachycardia sepsis  C-diff negative  parainfluenza URI 4/1  metastatic ca with ?tumor fever  persistent tachycardia but no PE, just b/l pleural effusion and L pleural nodularity s/o malignant effusion  * s/p cycle 1 of folfox now WBC down to 15 and afebrile, off antibiotics  * infection w/u negative and likely malignancy related  * monitor the WBC and temp  * monitor for new signs of infection  * plan for tap and chest tube  * will sign off, please call with questions    The above assessment and plan was discussed with hem/onc team    Gina Arenas MD  Pager [phone]  After 5pm and on weekends call [phone]

## 2020-04-17 NOTE — PROGRESS NOTE ADULT - ASSESSMENT
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      # tachycardia  - COVID-19 negative x 3, but parainfluenza positive ().   - CTA Chest () was negative for central PE, but unable to evaluate segmental and subsegmental branches due to motion artifact. Lung imaging showed unchanged metastatic nodules, small bilateral pleural effusions, and atelectasis of the basilar LLL (unchanged as well). No new opacity which decreases the likelihood of PNA  - CT A/P repeated on 4/3/20; no obvious source of infection   - Blood/urine cultures have had NGTD. C. diff negative  - ID following, appreciate recs  - S/p PICC line placement for chemotherapy  - Patient is s/p C1 modified FOLFOX (-). Further management as noted below   -- Blood cultures from  neg, afebrile overnight   - Due for IR drainage today of right lung with pigtail catheter, will plan for left lung drainage Monday, follow up IR recs     # Metastatic carcinoma, cancer of unknown primary  - CT A/P with bilateral heterogeneous adnexal masses, as well as upper abdominal, RP, possible bone lesions and pelvic lymphadenopathy; CT Chest with multiple solid pulmonary nodules  - Cancer of unknown primary (poorly differentiated carcinoma) but suspect likely GI origin given CDX2 positivity on pathology; chromogranin positivity also suggests neuroendocrine features; Ki-67 index 40%.   - Tumor markers elevated: Cancer Antigen, Ca 27.29: 70.8, Cancer Antigen, 125: 619, Carcinoembryonic Antigen: 44.7, Beta-HC.0 on initial evaluation (3/7); Checked again on 20 and it was 102.7  - Discussed diagnosis with patient on 3/31/20. Given advanced, metastatic stage, will need to be on indefinite treatment to limit progression of disease.  - S/p C1 mFOLFOX (-). Next cycle is due in ~ 2 weeks. Will need to start discharge planning.   - Patient is currently on 2L NC. Will try to wean supplemental oxygen as tolerated. Will also provide an incentive spirometer  - CTA Chest () was negative for central PE, but unable to evaluate segmental and subsegmental branches due to motion artifact. In the setting of her hypoxemia and tachycardia, will check LE dopplers to r/o a DVT.  - Continue Venofer until discharge         # Pain control   - On Morphine infusion and PRN injections  - Pain medications now switched to MS contin, pain is adequately controlled per patient. Will continue to follow-up with Palliative Care regarding pain management/regimen on discharge.     # Hyperuricemia  - Patient's uric acid was 8.8 yesterday (). She was given IV Rasburicase 3mg x1 on . Her uric acid level is 4.1 today ()  - C/w Allopurinol 300mg daily  - Monitor TLS labs daily (CMP, Phos, LDH, Uric acid)    # DVT PPx  - Lovenox 40mg SQ    # Dispo  - Pending resolution of acute medical issues. Pt recs: DC home with assist from mother/brother, +elevator access in apt to 5th floor, recommend rolling walker for long distances and recommend 3:1 commode  - Wean off O2 as tolerated- Pending IR drainage of bilateral pleural effusions  - Pain control   - Will continue to update mother and brother over phone daily, all of their questions answered to their satisfaction       Cesia Banks MD  Hematology Oncology Fellow, PGY-4  [facility] Pager: [phone]/ [facility] Pager: [phone]

## 2020-04-17 NOTE — CHART NOTE - NSCHARTNOTEFT_GEN_A_CORE
Events noted.  Chest tube placed on right by IR and currently draining.  fluid is consistent with an exudate.  Would allow to drain until drainage stops.  Will need Chest tube placed by IR on the left next week.  Discussed with PA. Please call with questions over the weekend - [phone].

## 2020-04-17 NOTE — PROGRESS NOTE ADULT - SUBJECTIVE AND OBJECTIVE BOX
Follow Up:  fever, leukocytosis    Interval History: s/p cycle 1 of folfox, now off, WBC 15 and afebrile but still tachy so chest CTA was done to r/o PE but showed mets and pleural effusion    ROS:      All other systems negative    Constitutional: no fever,  chills  Head: no trauma  Eyes: no vision changes, no eye pain  ENT:  no sore throat, no rhinorrhea  Cardiovascular:  no chest pain, no palpitation  Respiratory:  + SOB, no cough  GI:  + abd pain, no vomiting, no diarrhea   urinary: no dysuria, no hematuria, no flank pain  musculoskeletal:  no joint pain, no joint swelling  skin:  no rash  neurology:  no headache, no seizure, no change in mental status  psych: no anxiety, no depression         Allergies  No Known Allergies        ANTIMICROBIALS:      OTHER MEDS:  acetaminophen   Tablet .. 650 milliGRAM(s) Oral every 6 hours PRN  allopurinol 300 milliGRAM(s) Oral daily  aluminum hydroxide/magnesium hydroxide/simethicone Suspension 30 milliLiter(s) Oral every 6 hours PRN  enoxaparin Injectable 40 milliGRAM(s) SubCutaneous daily  famotidine    Tablet 20 milliGRAM(s) Oral daily  lidocaine   Patch 2 Patch Transdermal daily  metoclopramide Injectable 10 milliGRAM(s) IV Push every 6 hours PRN  morphine  IR 7.5 milliGRAM(s) Oral every 3 hours PRN  morphine ER Tablet 30 milliGRAM(s) Oral <User Schedule>  morphine ER Tablet 60 milliGRAM(s) Oral <User Schedule>  naloxone Injectable 0.1 milliGRAM(s) IV Push every 3 minutes PRN  ondansetron Injectable 4 milliGRAM(s) IV Push every 6 hours PRN  polyethylene glycol 3350 17 Gram(s) Oral daily  senna 2 Tablet(s) Oral at bedtime  simethicone 80 milliGRAM(s) Chew every 6 hours PRN  sodium chloride 0.9% lock flush 3 milliLiter(s) IV Push every 8 hours  sodium chloride 0.9% lock flush 3 milliLiter(s) IV Push every 8 hours      Vital Signs Last 24 Hrs  T(C): 36.8 (17 Apr 2020 13:10), Max: 37.3 (16 Apr 2020 17:40)  T(F): 98.2 (17 Apr 2020 13:10), Max: 99.1 (16 Apr 2020 17:40)  HR: 127 (17 Apr 2020 13:10) (120 - 128)  BP: 125/81 (17 Apr 2020 13:10) (124/46 - 143/91)  BP(mean): --  RR: 18 (17 Apr 2020 13:10) (18 - 18)  SpO2: 98% (17 Apr 2020 13:10) (95% - 98%)    Physical Exam:  General:    NAD,  non toxic, walking  Head: atraumatic, normocephalic  Eye: normal sclera and conjunctiva  ENT:    no oropharyngeal lesions,   no LAD,   neck supple  Cardio:    tachy regular S1, S2  Respiratory:  poor air entry on the bses,   no wheezing but  tachypneic  abd:    palpable mass, soft,   BS +,  slight diffuse tenderness  :   no CVAT,  no suprapubic tenderness,   no  cash  Musculoskeletal:   no joint swelling,   no edema  vascular: RUE picc  Skin:    no rash  Neurologic:     no focal deficit  psych: normal affect                          7.3    15.58 )-----------( 418      ( 17 Apr 2020 06:29 )             24.9       04-17    133<L>  |  92<L>  |  6<L>  ----------------------------<  105<H>  3.7   |  31  |  0.75    Ca    8.1<L>      17 Apr 2020 06:29  Phos  1.8     04-17  Mg     1.8     04-17    TPro  6.8  /  Alb  2.6<L>  /  TBili  1.0  /  DBili  x   /  AST  43<H>  /  ALT  11  /  AlkPhos  263<H>  04-17          MICROBIOLOGY:  v  .Blood Blood-Peripheral  04-13-20   No growth to date.  --  --      .Blood PICC/PERC Double Lumen BLUE  04-13-20   No growth to date.  --  --      .Urine Clean Catch (Midstream)  04-08-20   No growth  --  --      .Blood Blood-Peripheral  04-05-20   No Growth Final  --  --      .Blood Blood-Peripheral  04-05-20   No Growth Final  --  --      .Urine Clean Catch (Midstream)  04-05-20   No growth  --  --      .Blood Blood-Peripheral  04-03-20   No Growth Final  --  --      .Blood Blood-Catheter  04-03-20   No Growth Final  --  --      .Blood Blood-Peripheral  04-01-20   No Growth Final  --  --      .Urine Clean Catch (Midstream)  04-01-20   >=3 organisms. Probable collection contamination.  --  --      .Blood Blood-Peripheral  03-25-20   No Growth Final  --  --      .Blood Blood-Venous  03-20-20   No growth at 5 days.  --  --      .Blood Blood-Peripheral  03-20-20   No growth at 5 days.  --  --      .Urine Clean Catch (Midstream)  03-20-20   <10,000 CFU/mL Normal Urogenital Hui  --  --                RADIOLOGY:  Images below independently visualized and reviewed personally, findings as below  < from: CT Angio Chest w/ IV Cont (04.16.20 @ 14:34) >  IMPRESSION:     No pulmonary embolism.     Pulmonary edema.    Large bilateral pleural effusions with interval increase. Left pleural nodularity consistent with a malignant effusion.    Pulmonary and hepatic metastatic disease.

## 2020-04-17 NOTE — PROGRESS NOTE ADULT - SUBJECTIVE AND OBJECTIVE BOX
INTERVAL History:    Allergies    No Known Allergies    Intolerances        MEDICATIONS  (STANDING):  allopurinol 300 milliGRAM(s) Oral daily  enoxaparin Injectable 40 milliGRAM(s) SubCutaneous daily  famotidine    Tablet 20 milliGRAM(s) Oral daily  lidocaine   Patch 2 Patch Transdermal daily  morphine ER Tablet 30 milliGRAM(s) Oral <User Schedule>  morphine ER Tablet 60 milliGRAM(s) Oral <User Schedule>  polyethylene glycol 3350 17 Gram(s) Oral daily  senna 2 Tablet(s) Oral at bedtime  sodium chloride 0.9% lock flush 3 milliLiter(s) IV Push every 8 hours  sodium chloride 0.9% lock flush 3 milliLiter(s) IV Push every 8 hours    MEDICATIONS  (PRN):  acetaminophen   Tablet .. 650 milliGRAM(s) Oral every 6 hours PRN Temp greater or equal to 38C (100.4F)  aluminum hydroxide/magnesium hydroxide/simethicone Suspension 30 milliLiter(s) Oral every 6 hours PRN Dyspepsia  metoclopramide Injectable 10 milliGRAM(s) IV Push every 6 hours PRN Nausea/Vomiting  morphine  IR 7.5 milliGRAM(s) Oral every 3 hours PRN Moderate Pain (4 - 6) or severe  naloxone Injectable 0.1 milliGRAM(s) IV Push every 3 minutes PRN Sedation or RR <10  ondansetron Injectable 4 milliGRAM(s) IV Push every 6 hours PRN Nausea and/or Vomiting  simethicone 80 milliGRAM(s) Chew every 6 hours PRN Gas      Vital Signs Last 24 Hrs  T(C): 36.7 (17 Apr 2020 05:22), Max: 37.4 (16 Apr 2020 13:39)  T(F): 98 (17 Apr 2020 05:22), Max: 99.3 (16 Apr 2020 13:39)  HR: 124 (17 Apr 2020 05:22) (120 - 128)  BP: 130/85 (17 Apr 2020 05:22) (122/78 - 143/91)  BP(mean): --  RR: 18 (17 Apr 2020 05:22) (18 - 18)  SpO2: 95% (17 Apr 2020 05:22) (82% - 97%)    PHYSICAL EXAM:    General: AOX3, no acute distress  EYES: EOMI, PERRLA, conjunctiva and sclera clear  NECK: Supple, No JVD, Normal thyroid  CHEST/LUNG: Clear to auscultation bilaterally; No rales, rhonchi, or wheezing  HEART: Regular rate and rhythm; no murmurs  ABDOMEN: Soft, Nontender. BS+  LYMPH: No lymphadenopathy noted  Extremities: No peripheral edema      LABS:                        7.3    15.58 )-----------( 418      ( 17 Apr 2020 06:29 )             24.9     04-17    133<L>  |  92<L>  |  6<L>  ----------------------------<  105<H>  3.7   |  31  |  0.75    Ca    8.1<L>      17 Apr 2020 06:29  Phos  1.8     04-17  Mg     1.8     04-17    TPro  6.8  /  Alb  2.6<L>  /  TBili  1.0  /  DBili  x   /  AST  43<H>  /  ALT  11  /  AlkPhos  263<H>  04-17    PT/INR - ( 17 Apr 2020 06:29 )   PT: 17.8 sec;   INR: 1.54 ratio                 RADIOLOGY & ADDITIONAL STUDIES:    PATHOLOGY: INTERVAL History: No acute events overnight. Pt seen this morning, alert and awake, sitting up in bed. She states that she has been walking with physical therapy. No shortness of breath at rest but desaturates off nasal cannula.     Allergies    No Known Allergies    Intolerances        MEDICATIONS  (STANDING):  allopurinol 300 milliGRAM(s) Oral daily  enoxaparin Injectable 40 milliGRAM(s) SubCutaneous daily  famotidine    Tablet 20 milliGRAM(s) Oral daily  lidocaine   Patch 2 Patch Transdermal daily  morphine ER Tablet 30 milliGRAM(s) Oral <User Schedule>  morphine ER Tablet 60 milliGRAM(s) Oral <User Schedule>  polyethylene glycol 3350 17 Gram(s) Oral daily  senna 2 Tablet(s) Oral at bedtime  sodium chloride 0.9% lock flush 3 milliLiter(s) IV Push every 8 hours  sodium chloride 0.9% lock flush 3 milliLiter(s) IV Push every 8 hours    MEDICATIONS  (PRN):  acetaminophen   Tablet .. 650 milliGRAM(s) Oral every 6 hours PRN Temp greater or equal to 38C (100.4F)  aluminum hydroxide/magnesium hydroxide/simethicone Suspension 30 milliLiter(s) Oral every 6 hours PRN Dyspepsia  metoclopramide Injectable 10 milliGRAM(s) IV Push every 6 hours PRN Nausea/Vomiting  morphine  IR 7.5 milliGRAM(s) Oral every 3 hours PRN Moderate Pain (4 - 6) or severe  naloxone Injectable 0.1 milliGRAM(s) IV Push every 3 minutes PRN Sedation or RR <10  ondansetron Injectable 4 milliGRAM(s) IV Push every 6 hours PRN Nausea and/or Vomiting  simethicone 80 milliGRAM(s) Chew every 6 hours PRN Gas      Vital Signs Last 24 Hrs  T(C): 36.7 (17 Apr 2020 05:22), Max: 37.4 (16 Apr 2020 13:39)  T(F): 98 (17 Apr 2020 05:22), Max: 99.3 (16 Apr 2020 13:39)  HR: 124 (17 Apr 2020 05:22) (120 - 128)  BP: 130/85 (17 Apr 2020 05:22) (122/78 - 143/91)  BP(mean): --  RR: 18 (17 Apr 2020 05:22) (18 - 18)  SpO2: 95% (17 Apr 2020 05:22) (82% - 97%)    PHYSICAL EXAM:    General: AOX3, no acute distress on nasal cannula  Not physically examined to decrease COVID exposure to our already immunocompromised patient population      LABS:                        7.3    15.58 )-----------( 418      ( 17 Apr 2020 06:29 )             24.9     04-17    133<L>  |  92<L>  |  6<L>  ----------------------------<  105<H>  3.7   |  31  |  0.75    Ca    8.1<L>      17 Apr 2020 06:29  Phos  1.8     04-17  Mg     1.8     04-17    TPro  6.8  /  Alb  2.6<L>  /  TBili  1.0  /  DBili  x   /  AST  43<H>  /  ALT  11  /  AlkPhos  263<H>  04-17    PT/INR - ( 17 Apr 2020 06:29 )   PT: 17.8 sec;   INR: 1.54 ratio                 RADIOLOGY & ADDITIONAL STUDIES:    PATHOLOGY:

## 2020-04-18 NOTE — PROGRESS NOTE ADULT - SUBJECTIVE AND OBJECTIVE BOX
INTERVAL History: Pt spiked fever to 100.6, cultures were drawn. Patient seen this morning, spoke with mother and brother over Facetime. Pt appears very well, sitting up in bed and eating lunch. Pain is well controlled, leg swelling improved. Pt states no complications with the IR drainage of her right lung, still with output, total daily ouput of 900ccs.     Allergies    No Known Allergies    Intolerances        MEDICATIONS  (STANDING):  allopurinol 300 milliGRAM(s) Oral daily  enoxaparin Injectable 40 milliGRAM(s) SubCutaneous daily  famotidine    Tablet 20 milliGRAM(s) Oral daily  iron sucrose IVPB 200 milliGRAM(s) IV Intermittent every 24 hours  lidocaine   Patch 2 Patch Transdermal daily  morphine ER Tablet 30 milliGRAM(s) Oral <User Schedule>  morphine ER Tablet 60 milliGRAM(s) Oral <User Schedule>  phytonadione   Solution 10 milliGRAM(s) Oral daily  polyethylene glycol 3350 17 Gram(s) Oral daily  senna 2 Tablet(s) Oral at bedtime  sodium chloride 0.9% lock flush 3 milliLiter(s) IV Push every 8 hours  sodium chloride 0.9% lock flush 3 milliLiter(s) IV Push every 8 hours    MEDICATIONS  (PRN):  acetaminophen   Tablet .. 650 milliGRAM(s) Oral every 6 hours PRN Temp greater or equal to 38C (100.4F)  aluminum hydroxide/magnesium hydroxide/simethicone Suspension 30 milliLiter(s) Oral every 6 hours PRN Dyspepsia  metoclopramide Injectable 10 milliGRAM(s) IV Push every 6 hours PRN Nausea/Vomiting  morphine  IR 7.5 milliGRAM(s) Oral every 3 hours PRN Moderate Pain (4 - 6) or severe  naloxone Injectable 0.1 milliGRAM(s) IV Push every 3 minutes PRN Sedation or RR <10  ondansetron Injectable 4 milliGRAM(s) IV Push every 6 hours PRN Nausea and/or Vomiting  simethicone 80 milliGRAM(s) Chew every 6 hours PRN Gas      Vital Signs Last 24 Hrs  T(C): 38.1 (2020 08:39), Max: 38.1 (2020 08:39)  T(F): 100.6 (2020 08:39), Max: 100.6 (2020 08:39)  HR: 136 (2020 08:42) (127 - 138)  BP: 118/61 (2020 08:42) (114/75 - 137/81)  BP(mean): --  RR: 18 (2020 08:42) (18 - 18)  SpO2: 94% (2020 08:42) (94% - 98%)    PHYSICAL EXAM:    General: AOX3, no acute distress  Not physically examined in attempt to decrease COVID exposure to our already immunocompromised patient population     LABS:                        7.4    12.71 )-----------( 440      ( 2020 06:53 )             25.5     04-18    135  |  94<L>  |  4<L>  ----------------------------<  110<H>  3.7   |  31  |  0.69    Ca    7.8<L>      2020 06:53  Phos  2.0     -18  Mg     1.8     18    TPro  6.4  /  Alb  2.5<L>  /  TBili  0.8  /  DBili  x   /  AST  40  /  ALT  11  /  AlkPhos  290<H>  04-18    PT/INR - ( 2020 06:53 )   PT: 18.2 sec;   INR: 1.57 ratio           Urinalysis Basic - ( 2020 10:41 )    Color: Yellow / Appearance: Clear / S.010 / pH: x  Gluc: x / Ketone: Negative  / Bili: Negative / Urobili: <2 mg/dL   Blood: x / Protein: Trace / Nitrite: Negative   Leuk Esterase: Moderate / RBC: 25 /HPF / WBC 55 /HPF   Sq Epi: x / Non Sq Epi: 4 /HPF / Bacteria: TNTC          RADIOLOGY & ADDITIONAL STUDIES:    PATHOLOGY:

## 2020-04-18 NOTE — PROGRESS NOTE ADULT - ASSESSMENT
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      # tachycardia, hypoxia   - COVID-19 negative x 3, but parainfluenza positive ().   - CTA Chest () was negative for central PE, but unable to evaluate segmental and subsegmental branches due to motion artifact. Lung imaging showed unchanged metastatic nodules, small bilateral pleural effusions, and atelectasis of the basilar LLL (unchanged as well). No new opacity which decreases the likelihood of PNA  - CT A/P repeated on 4/3/20; no obvious source of infection   - Blood/urine cultures have had NGTD. C. diff negative  - ID following, appreciate recs  - S/p PICC line placement for chemotherapy  - Patient is s/p C1 modified FOLFOX (-). Further management as noted below   -- Blood cultures from  neg, afebrile overnight   - CTA chest on  showing no PE, but large bilateral pleural effusions  - s/p IR drainage  of right lung with pigtail catheter, total of 900ccs drained over 24 hours, will plan for left lung drainage Monday  - F/u echocardiogram     # Metastatic carcinoma, cancer of unknown primary  - CT A/P with bilateral heterogeneous adnexal masses, as well as upper abdominal, RP, possible bone lesions and pelvic lymphadenopathy; CT Chest with multiple solid pulmonary nodules  - Cancer of unknown primary (poorly differentiated carcinoma) but suspect likely GI origin given CDX2 positivity on pathology; chromogranin positivity also suggests neuroendocrine features; Ki-67 index 40%.   - Tumor markers elevated: Cancer Antigen, Ca 27.29: 70.8, Cancer Antigen, 125: 619, Carcinoembryonic Antigen: 44.7, Beta-HC.0 on initial evaluation (3/7); Checked again on 20 and it was 102.7  - Discussed diagnosis with patient on 3/31/20. Given advanced, metastatic stage, will need to be on indefinite treatment to limit progression of disease.  - S/p C1 mFOLFOX (4.9-). Next cycle is due in ~ 2 weeks. Will need to start discharge planning.   - Patient is currently on 2L NC. Will try to wean supplemental oxygen as tolerated. Will also provide an incentive spirometer  - CTA Chest () was negative for central PE, but unable to evaluate segmental and subsegmental branches due to motion artifact. In the setting of her hypoxemia and tachycardia, will check LE dopplers to r/o a DVT.  - Continue Venofer until discharge   - If patient remains in hospital through next week, will plan for cycle 2 of mFolfox on Thursday,         # Pain control   - On Morphine infusion and PRN injections  - Pain medications now switched to MS contin, pain is adequately controlled per patient. Will continue to follow-up with Palliative Care regarding pain management/regimen on discharge.     # Hyperuricemia  - Patient's uric acid was 8.8 yesterday (). She was given IV Rasburicase 3mg x1 on . Her uric acid level is 4.1 today ()  - C/w Allopurinol 300mg daily  - Monitor TLS labs daily (CMP, Phos, LDH, Uric acid)    # DVT PPx  - Lovenox 40mg SQ    # Dispo  - Pending resolution of acute medical issues. Pt recs: DC home with assist from mother/brother, +elevator access in apt to 5th floor, recommend rolling walker for long distances and recommend 3:1 commode  - Wean off O2 as tolerated, pending IR guided left lung drainage, remove right lung drain when output is decreased   - Pain control   - Will continue to update mother and brother over phone daily, all of their questions answered to their satisfaction       Cesia Banks MD  Hematology Oncology Fellow, PGY-4  [facility] Pager: [phone]/ [facility] Pager: [phone]

## 2020-04-19 NOTE — PROGRESS NOTE ADULT - ASSESSMENT
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      # Tachycardia, hypoxia   - COVID-19 negative x 3, but parainfluenza positive ().   - CTA Chest () was negative for central PE, but unable to evaluate segmental and subsegmental branches due to motion artifact. Lung imaging showed unchanged metastatic nodules, small bilateral pleural effusions, and atelectasis of the basilar LLL (unchanged as well). No new opacity which decreases the likelihood of PNA  - CT A/P repeated on 4/3/20; no obvious source of infection   - Blood/urine cultures have had NGTD. C. diff negative  - ID following, appreciate recs  - S/p PICC line placement for chemotherapy  - Patient is s/p C1 modified FOLFOX (-). Further management as noted below   -- Blood cultures from  neg, repeat blood culture : pending    - CTA chest on  showing no PE, but large bilateral pleural effusions  - s/p IR drainage  of right lung with pigtail catheter, total of 900ccs drained over 24 hours, will plan for left lung drainage Monday  - F/u echocardiogram     # Metastatic carcinoma, cancer of unknown primary  - CT A/P with bilateral heterogeneous adnexal masses, as well as upper abdominal, RP, possible bone lesions and pelvic lymphadenopathy; CT Chest with multiple solid pulmonary nodules  - Cancer of unknown primary (poorly differentiated carcinoma) but suspect likely GI origin given CDX2 positivity on pathology; chromogranin positivity also suggests neuroendocrine features; Ki-67 index 40%.   - Tumor markers elevated: Cancer Antigen, Ca 27.29: 70.8, Cancer Antigen, 125: 619, Carcinoembryonic Antigen: 44.7, Beta-HC.0 on initial evaluation (3/7); Checked again on 20 and it was 102.7  - Discussed diagnosis with patient on 3/31/20. Given advanced, metastatic stage, will need to be on indefinite treatment to limit progression of disease.  - S/p C1 mFOLFOX (-). Next cycle is due in ~ 2 weeks. Will need to start discharge planning.   - Patient is currently on 2L NC. Will try to wean supplemental oxygen as tolerated. Will also provide an incentive spirometer  - CTA Chest () was negative for central PE, but unable to evaluate segmental and subsegmental branches due to motion artifact. In the setting of her hypoxemia and tachycardia, will check LE dopplers to r/o a DVT.  - Continue Venofer until discharge   - If patient remains in hospital through next week, will plan for cycle 2 of mFolfox on Thursday,         # Pain control   - On Morphine infusion and PRN injections  - Pain medications now switched to MS contin, pain is adequately controlled per patient. Will continue to follow-up with Palliative Care regarding pain management/regimen on discharge.     # Hyperuricemia  - Patient's uric acid was 8.8 yesterday (). She was given IV Rasburicase 3mg x1 on . Her uric acid level is 4.1 today ()  - C/w Allopurinol 300mg daily  - Monitor TLS labs daily (CMP, Phos, LDH, Uric acid)    # DVT PPx  - Lovenox 40mg SQ    # Dispo  - Pending resolution of acute medical issues. Pt recs: DC home with assist from mother/brother, +elevator access in apt to 5th floor, recommend rolling walker for long distances and recommend 3:1 commode  - Wean off O2 as tolerated, pending IR guided left lung drainage, remove right lung drain when output is decreased   - Pain control   - Will continue to update mother and brother over phone daily, all of their questions answered to their satisfaction       Jazz Sanchez MD  Hematology/Oncology Fellow, PGY-5  pager: [phone]  After 5pm or on weekends, please page the on-call fellow.

## 2020-04-19 NOTE — PROGRESS NOTE ADULT - SUBJECTIVE AND OBJECTIVE BOX
INTERVAL HPI/OVERNIGHT EVENTS:  Patient S&E at bedside.   Sitting up in bed, feeling well.  States that she had abdominal pain last night after eating dinner, but has since resolved.   Afebrile for the past 24 hours.   Breathing is stable on supplemental oxygen.       VITAL SIGNS:  T(F): 98.4 (20 @ 05:50)  HR: 117 (20 @ 05:50)  BP: 117/74 (20 @ 05:50)  RR: 20 (20 @ 05:50)  SpO2: 97% (20 @ 05:50)      PHYSICAL EXAM:  Constitutional: NAD, on nasal cannula   Eyes: EOMI, sclera non-icteric  Neck: supple  Respiratory: normal respiratory effort  Cardiovascular: RRR  Gastrointestinal: soft, ND  Extremities: no cyanosis, clubbing or edema   Neurological: awake and alert      MEDICATIONS  (STANDING):  allopurinol 300 milliGRAM(s) Oral daily  enoxaparin Injectable 40 milliGRAM(s) SubCutaneous daily  famotidine    Tablet 20 milliGRAM(s) Oral daily  iron sucrose IVPB 200 milliGRAM(s) IV Intermittent every 24 hours  lidocaine   Patch 2 Patch Transdermal daily  morphine ER Tablet 30 milliGRAM(s) Oral <User Schedule>  morphine ER Tablet 60 milliGRAM(s) Oral <User Schedule>  phytonadione   Solution 10 milliGRAM(s) Oral daily  polyethylene glycol 3350 17 Gram(s) Oral daily  senna 2 Tablet(s) Oral at bedtime  sodium chloride 0.9% lock flush 3 milliLiter(s) IV Push every 8 hours  sodium chloride 0.9% lock flush 3 milliLiter(s) IV Push every 8 hours    MEDICATIONS  (PRN):  acetaminophen   Tablet .. 650 milliGRAM(s) Oral every 6 hours PRN Temp greater or equal to 38C (100.4F)  aluminum hydroxide/magnesium hydroxide/simethicone Suspension 30 milliLiter(s) Oral every 6 hours PRN Dyspepsia  metoclopramide Injectable 10 milliGRAM(s) IV Push every 6 hours PRN Nausea/Vomiting  morphine  IR 7.5 milliGRAM(s) Oral every 3 hours PRN Moderate Pain (4 - 6) or severe  naloxone Injectable 0.1 milliGRAM(s) IV Push every 3 minutes PRN Sedation or RR <10  ondansetron Injectable 4 milliGRAM(s) IV Push every 6 hours PRN Nausea and/or Vomiting  simethicone 80 milliGRAM(s) Chew every 6 hours PRN Gas      Allergies  No Known Allergies        LABS:                        7.3    13.68 )-----------( 417      ( 2020 07:06 )             24.8         137  |  98  |  5<L>  ----------------------------<  98  3.7   |  29  |  0.69    Ca    7.9<L>      2020 07:06  Phos  1.9       Mg     1.8         TPro  6.4  /  Alb  2.5<L>  /  TBili  0.7  /  DBili  x   /  AST  33  /  ALT  9<L>  /  AlkPhos  279<H>      PT/INR - ( 2020 07:06 )   PT: 14.0 sec;   INR: 1.21 ratio           Urinalysis Basic - ( 2020 10:41 )    Color: Yellow / Appearance: Clear / S.010 / pH: x  Gluc: x / Ketone: Negative  / Bili: Negative / Urobili: <2 mg/dL   Blood: x / Protein: Trace / Nitrite: Negative   Leuk Esterase: Moderate / RBC: 25 /HPF / WBC 55 /HPF   Sq Epi: x / Non Sq Epi: 4 /HPF / Bacteria: TNTC        RADIOLOGY & ADDITIONAL TESTS:  Studies reviewed.

## 2020-04-19 NOTE — PROGRESS NOTE ADULT - ATTENDING COMMENTS
poorly differentiated carcinoma with neuroendocrine feature possible GI origin with extensive mets in the lungs, retroperitoneal and pelvic lymphadenopathy and adnexal mass, bone lesions     C1D11 mFOLFOX patient has been afebrile in last 24 hours. On NC 2L/min with saturation above 95%.    Hopefully tomorrow she will get a therapeutic tap for her left pleural effusion.

## 2020-04-20 NOTE — PROGRESS NOTE ADULT - SUBJECTIVE AND OBJECTIVE BOX
INTERVAL History:    Allergies    No Known Allergies    Intolerances        MEDICATIONS  (STANDING):  allopurinol 300 milliGRAM(s) Oral daily  enoxaparin Injectable 40 milliGRAM(s) SubCutaneous daily  famotidine    Tablet 20 milliGRAM(s) Oral daily  iron sucrose IVPB 200 milliGRAM(s) IV Intermittent every 24 hours  lidocaine   Patch 2 Patch Transdermal daily  morphine ER Tablet 30 milliGRAM(s) Oral <User Schedule>  morphine ER Tablet 60 milliGRAM(s) Oral <User Schedule>  phytonadione   Solution 10 milliGRAM(s) Oral daily  polyethylene glycol 3350 17 Gram(s) Oral daily  senna 2 Tablet(s) Oral at bedtime  sodium chloride 0.9% lock flush 3 milliLiter(s) IV Push every 8 hours  sodium chloride 0.9% lock flush 3 milliLiter(s) IV Push every 8 hours    MEDICATIONS  (PRN):  acetaminophen   Tablet .. 650 milliGRAM(s) Oral every 6 hours PRN Temp greater or equal to 38C (100.4F)  aluminum hydroxide/magnesium hydroxide/simethicone Suspension 30 milliLiter(s) Oral every 6 hours PRN Dyspepsia  metoclopramide Injectable 10 milliGRAM(s) IV Push every 6 hours PRN Nausea/Vomiting  morphine  IR 7.5 milliGRAM(s) Oral every 3 hours PRN Moderate Pain (4 - 6) or severe  naloxone Injectable 0.1 milliGRAM(s) IV Push every 3 minutes PRN Sedation or RR <10  ondansetron Injectable 4 milliGRAM(s) IV Push every 6 hours PRN Nausea and/or Vomiting  simethicone 80 milliGRAM(s) Chew every 6 hours PRN Gas      Vital Signs Last 24 Hrs  T(C): 36.7 (2020 05:30), Max: 37.9 (2020 13:30)  T(F): 98.1 (2020 05:30), Max: 100.2 (2020 13:30)  HR: 126 (2020 05:30) (121 - 126)  BP: 132/76 (2020 05:30) (118/76 - 135/86)  BP(mean): --  RR: 20 (2020 05:30) (20 - 20)  SpO2: 98% (2020 05:30) (97% - 98%)    PHYSICAL EXAM:    General: AOX3, no acute distress  EYES: EOMI, PERRLA, conjunctiva and sclera clear  NECK: Supple, No JVD, Normal thyroid  CHEST/LUNG: Clear to auscultation bilaterally; No rales, rhonchi, or wheezing  HEART: Regular rate and rhythm; no murmurs  ABDOMEN: Soft, Nontender. BS+  LYMPH: No lymphadenopathy noted  Extremities: No peripheral edema      LABS:                        7.6    x     )-----------( 440      ( 2020 07:41 )             25.2     -    137  |  98  |  5<L>  ----------------------------<  98  3.7   |  29  |  0.69    Ca    7.9<L>      2020 07:06  Phos  1.9       Mg     1.8         TPro  6.4  /  Alb  2.5<L>  /  TBili  0.7  /  DBili  x   /  AST  33  /  ALT  9<L>  /  AlkPhos  279<H>      PT/INR - ( 2020 07:06 )   PT: 14.0 sec;   INR: 1.21 ratio           Urinalysis Basic - ( 2020 10:41 )    Color: Yellow / Appearance: Clear / S.010 / pH: x  Gluc: x / Ketone: Negative  / Bili: Negative / Urobili: <2 mg/dL   Blood: x / Protein: Trace / Nitrite: Negative   Leuk Esterase: Moderate / RBC: 25 /HPF / WBC 55 /HPF   Sq Epi: x / Non Sq Epi: 4 /HPF / Bacteria: TNTC          RADIOLOGY & ADDITIONAL STUDIES:    PATHOLOGY: INTERVAL History: No acute events overnight. Pt remains afebrile, saturation is good on nasal cannula. Pending drainage of left lung by IR today.     Allergies    No Known Allergies    Intolerances        MEDICATIONS  (STANDING):  allopurinol 300 milliGRAM(s) Oral daily  enoxaparin Injectable 40 milliGRAM(s) SubCutaneous daily  famotidine    Tablet 20 milliGRAM(s) Oral daily  iron sucrose IVPB 200 milliGRAM(s) IV Intermittent every 24 hours  lidocaine   Patch 2 Patch Transdermal daily  morphine ER Tablet 30 milliGRAM(s) Oral <User Schedule>  morphine ER Tablet 60 milliGRAM(s) Oral <User Schedule>  phytonadione   Solution 10 milliGRAM(s) Oral daily  polyethylene glycol 3350 17 Gram(s) Oral daily  senna 2 Tablet(s) Oral at bedtime  sodium chloride 0.9% lock flush 3 milliLiter(s) IV Push every 8 hours  sodium chloride 0.9% lock flush 3 milliLiter(s) IV Push every 8 hours    MEDICATIONS  (PRN):  acetaminophen   Tablet .. 650 milliGRAM(s) Oral every 6 hours PRN Temp greater or equal to 38C (100.4F)  aluminum hydroxide/magnesium hydroxide/simethicone Suspension 30 milliLiter(s) Oral every 6 hours PRN Dyspepsia  metoclopramide Injectable 10 milliGRAM(s) IV Push every 6 hours PRN Nausea/Vomiting  morphine  IR 7.5 milliGRAM(s) Oral every 3 hours PRN Moderate Pain (4 - 6) or severe  naloxone Injectable 0.1 milliGRAM(s) IV Push every 3 minutes PRN Sedation or RR <10  ondansetron Injectable 4 milliGRAM(s) IV Push every 6 hours PRN Nausea and/or Vomiting  simethicone 80 milliGRAM(s) Chew every 6 hours PRN Gas      Vital Signs Last 24 Hrs  T(C): 36.7 (2020 05:30), Max: 37.9 (2020 13:30)  T(F): 98.1 (2020 05:30), Max: 100.2 (2020 13:30)  HR: 126 (2020 05:30) (121 - 126)  BP: 132/76 (2020 05:30) (118/76 - 135/86)  BP(mean): --  RR: 20 (2020 05:30) (20 - 20)  SpO2: 98% (2020 05:30) (97% - 98%)    PHYSICAL EXAM:    Pt not physically examined to decrease COVID exposure in our already immunocompromised pt population       LABS:                        7.6    x     )-----------( 440      ( 2020 07:41 )             25.2     04-19    137  |  98  |  5<L>  ----------------------------<  98  3.7   |  29  |  0.69    Ca    7.9<L>      2020 07:06  Phos  1.9     -  Mg     1.8     19    TPro  6.4  /  Alb  2.5<L>  /  TBili  0.7  /  DBili  x   /  AST  33  /  ALT  9<L>  /  AlkPhos  279<H>  -19    PT/INR - ( 2020 07:06 )   PT: 14.0 sec;   INR: 1.21 ratio           Urinalysis Basic - ( 2020 10:41 )    Color: Yellow / Appearance: Clear / S.010 / pH: x  Gluc: x / Ketone: Negative  / Bili: Negative / Urobili: <2 mg/dL   Blood: x / Protein: Trace / Nitrite: Negative   Leuk Esterase: Moderate / RBC: 25 /HPF / WBC 55 /HPF   Sq Epi: x / Non Sq Epi: 4 /HPF / Bacteria: TNTC          RADIOLOGY & ADDITIONAL STUDIES:    PATHOLOGY:

## 2020-04-20 NOTE — PROGRESS NOTE ADULT - ATTENDING COMMENTS
Left sided effusion drained, just returned from IR. Due next dose of chemo on 4/23/20. I agree with Dr Banks's assessment and plan.

## 2020-04-20 NOTE — CHART NOTE - NSCHARTNOTEFT_GEN_A_CORE
Interventional Radiology Brief- Operative Note    Procedure: Left chest tube placement    Operators: Shar Zayas Greg    Anesthesia (type): Lidocaine 1% local.    Contrast: 0cc     EBL: 0cc    Findings/Follow up Plan of Care:    Specimens Removed: 1L serosanguinous pleural fluid    Implants: 10fr left pigtail chest tube    Complications: none    Condition/Disposition: stable / floor    Please call Interventional Radiology x [phone] with any questions, concerns, or issues.

## 2020-04-20 NOTE — PROGRESS NOTE ADULT - ASSESSMENT
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      # Tachycardia, hypoxia   - COVID-19 negative x 3, but parainfluenza positive ().   - CTA Chest () was negative for central PE, but unable to evaluate segmental and subsegmental branches due to motion artifact. Lung imaging showed unchanged metastatic nodules, small bilateral pleural effusions, and atelectasis of the basilar LLL (unchanged as well). No new opacity which decreases the likelihood of PNA  - CT A/P repeated on 4/3/20; no obvious source of infection   - Blood/urine cultures have had NGTD. C. diff negative  - ID following, appreciate recs  - S/p PICC line placement for chemotherapy  - Patient is s/p C1 modified FOLFOX (-). Further management as noted below   -- Blood cultures from  neg, repeat blood culture  NGTD    - CTA chest on  showing no PE, but large bilateral pleural effusions  - s/p IR drainage  of right lung with pigtail catheter, total of 900ccs drained over 24 hours, will plan for left lung drainage today by IR  - F/u echocardiogram     # Metastatic carcinoma, cancer of unknown primary  - CT A/P with bilateral heterogeneous adnexal masses, as well as upper abdominal, RP, possible bone lesions and pelvic lymphadenopathy; CT Chest with multiple solid pulmonary nodules  - Cancer of unknown primary (poorly differentiated carcinoma) but suspect likely GI origin given CDX2 positivity on pathology; chromogranin positivity also suggests neuroendocrine features; Ki-67 index 40%.   - Tumor markers elevated: Cancer Antigen, Ca 27.29: 70.8, Cancer Antigen, 125: 619, Carcinoembryonic Antigen: 44.7, Beta-HC.0 on initial evaluation (3/7); Checked again on 20 and it was 102.7  - Discussed diagnosis with patient on 3/31/20. Given advanced, metastatic stage, will need to be on indefinite treatment to limit progression of disease.  - S/p C1 mFOLFOX (.-). Next cycle is due in ~ 2 weeks. Will need to start discharge planning.   - Patient is currently on 2L NC. Will try to wean supplemental oxygen as tolerated. Will also provide an incentive spirometer  - CTA Chest () was negative for central PE, but unable to evaluate segmental and subsegmental branches due to motion artifact. In the setting of her hypoxemia and tachycardia, will check LE dopplers to r/o a DVT.  - Continue Venofer until discharge   - Will plan for inpatient cycle 2 of mFolfox on Thursday,         # Pain control   - On Morphine infusion and PRN injections  - Pain medications now switched to MS contin, pain is adequately controlled per patient. Will continue to follow-up with Palliative Care regarding pain management/regimen on discharge.     # Hyperuricemia  - Patient's uric acid was 8.8 yesterday (). She was given IV Rasburicase 3mg x1 on . Her uric acid level is 4.1 today ()  - C/w Allopurinol 300mg daily  - Monitor TLS labs daily (CMP, Phos, LDH, Uric acid)    # DVT PPx  - Lovenox 40mg SQ    # Dispo  - Pending resolution of acute medical issues. Pt recs: DC home with assist from mother/brother, +elevator access in apt to 5th floor, recommend rolling walker for long distances and recommend 3:1 commode  - Wean off O2 as tolerated, pending IR guided left lung drainage, remove right lung drain when output is decreased   - Pain control   - Will continue to update mother and brother over phone daily, all of their questions answered to their satisfaction       Cesia Banks MD  Hematology Oncology Fellow, PGY-4  [facility] Pager: [phone]/ [facility] Pager: [phone]

## 2020-04-20 NOTE — PROGRESS NOTE ADULT - SUBJECTIVE AND OBJECTIVE BOX
Pulmonary Follow up Note     metastatic malignancy unknown primary   bilateral pleural effusions sp drainage    no dyspnea    respirations  unlabored  pulses regular    alert conversant     Vital Signs Last 24 Hrs  T(C): 37 (20 Apr 2020 14:45), Max: 37.3 (20 Apr 2020 10:15)  T(F): 98.6 (20 Apr 2020 14:45), Max: 99.2 (20 Apr 2020 10:15)  HR: 125 (20 Apr 2020 14:45) (121 - 126)  BP: 120/74 (20 Apr 2020 14:45) (120/74 - 138/81)  BP(mean): --  RR: 20 (20 Apr 2020 14:45) (20 - 20)  SpO2: 97% (20 Apr 2020 14:45) (97% - 98%)    await cytology    monitor CXR      Labs, meds radiographic studies reviewed    Viktor Womack MD    PULMONARY

## 2020-04-20 NOTE — CHART NOTE - NSCHARTNOTEFT_GEN_A_CORE
Interventional Radiology Pre-Procedure Note    This is a 24y Female with metastatic cancer and malignant pleural effusions.    Procedure: Left chest tube placement    Diagnosis/Indication: Patient is a 24y old  Female who presents with a chief complaint of b/l pelvic masses (20 Apr 2020 07:57)      PAST MEDICAL & SURGICAL HISTORY:  No pertinent past medical history  No significant past surgical history       Allergies: No Known Allergies      LABS:  CBC Full  -  ( 20 Apr 2020 07:41 )  WBC Count : 12.80 K/uL  RBC Count : 2.99 M/uL  Hemoglobin : 7.6 g/dL  Hematocrit : 25.2 %  Platelet Count - Automated : 440 K/uL  Mean Cell Volume : 84.3 fl  Mean Cell Hemoglobin : 25.4 pg  Mean Cell Hemoglobin Concentration : 30.2 gm/dL  Auto Neutrophil # : 11.24 K/uL  Auto Lymphocyte # : 0.23 K/uL  Auto Monocyte # : 1.33 K/uL  Auto Eosinophil # : 0.00 K/uL  Auto Basophil # : 0.00 K/uL  Auto Neutrophil % : 87.8 %  Auto Lymphocyte % : 1.8 %  Auto Monocyte % : 10.4 %  Auto Eosinophil % : 0.0 %  Auto Basophil % : 0.0 %    04-20    140  |  101  |  4<L>  ----------------------------<  93  3.8   |  28  |  0.63    Ca    8.1<L>      20 Apr 2020 07:41  Phos  2.0     04-20  Mg     1.8     04-20    TPro  6.5  /  Alb  2.5<L>  /  TBili  0.6  /  DBili  x   /  AST  32  /  ALT  11  /  AlkPhos  261<H>  04-20    PT/INR - ( 20 Apr 2020 07:41 )   PT: 12.8 sec;   INR: 1.12 ratio             Procedure/ risks/ benefits/ alternatives were explained, informed verbal consent obtained from patient, verbalizes understanding. Plan for left chest tube placement.

## 2020-04-21 NOTE — PROGRESS NOTE ADULT - ASSESSMENT
24 f with b/l adnexal masses and pelvic LAD, metastatic carcinoma of unknown primary presented 3/20 with abd pain, s/p CT guided biopsy 3/25, PICC line 4/3 to start chemo  pt intermittently febrile and leukocytosis, now febrile to 101.7 and tachy, WBC: 34  QUINN for the last 2 days now Cr: 1.3  blood and urine cx negative  COVID negative x 3 last one 4/4  RVP 4/1 with parainfluenza  also some diarrhea initially    persistent fever and leukocytosis, with tachycardia sepsis  C-diff negative  parainfluenza URI 4/1  metastatic ca with ?tumor fever  persistent tachycardia but no PE, just b/l pleural effusion and L pleural nodularity s/o malignant effusion s/p b/l chest tube  new fever to 100.9 but pt had fevers before with negative infectious work up and now the WBC is improving today 11  urine cx with <[id number] klebsiella but no urinary symptoms likely asymptomatic bacteriuria and u/a only with 6 WBC  * s/p cycle 1 of folfox now WBC down to 15 and afebrile, off antibiotics  * infection w/u negative and likely malignancy related  * WBC is improving and no urinary symptoms, monitor off antibiotics for now  * monitor the WBC and temp  * monitor for new signs of infection      The above assessment and plan was discussed with hem/onc team    Gina Arenas MD  Pager [phone]  After 5pm and on weekends call [phone]

## 2020-04-21 NOTE — PROGRESS NOTE ADULT - SUBJECTIVE AND OBJECTIVE BOX
Follow Up:  fever, leukocytosis    Interval History: pt had a fever to 100.9 overnight but the WBC is actually improving to 11, urine cx with klebsiella but pt has no urinary symptoms    ROS:      All other systems negative    Constitutional: no fever,  chills  Head: no trauma  Eyes: no vision changes, no eye pain  ENT:  no sore throat, no rhinorrhea  Cardiovascular:  no chest pain, no palpitation  Respiratory:  improved SOB, no cough  GI:  + abd pain, no vomiting, no diarrhea   urinary: no dysuria, no hematuria, no flank pain  musculoskeletal:  no joint pain, no joint swelling  skin:  no rash  neurology:  no headache, no seizure, no change in mental status  psych: no anxiety, no depression           Allergies  No Known Allergies        ANTIMICROBIALS:      OTHER MEDS:  acetaminophen   Tablet .. 650 milliGRAM(s) Oral every 6 hours PRN  allopurinol 300 milliGRAM(s) Oral daily  aluminum hydroxide/magnesium hydroxide/simethicone Suspension 30 milliLiter(s) Oral every 6 hours PRN  enoxaparin Injectable 40 milliGRAM(s) SubCutaneous daily  famotidine    Tablet 20 milliGRAM(s) Oral daily  iron sucrose IVPB 200 milliGRAM(s) IV Intermittent every 24 hours  lidocaine   Patch 2 Patch Transdermal daily  metoclopramide Injectable 10 milliGRAM(s) IV Push every 6 hours PRN  morphine  IR 7.5 milliGRAM(s) Oral every 3 hours PRN  morphine ER Tablet 60 milliGRAM(s) Oral <User Schedule>  naloxone Injectable 0.1 milliGRAM(s) IV Push every 3 minutes PRN  ondansetron Injectable 4 milliGRAM(s) IV Push every 6 hours PRN  polyethylene glycol 3350 17 Gram(s) Oral daily  senna 2 Tablet(s) Oral at bedtime  simethicone 80 milliGRAM(s) Chew every 6 hours PRN  sodium chloride 0.9% lock flush 3 milliLiter(s) IV Push every 8 hours  sodium chloride 0.9% lock flush 3 milliLiter(s) IV Push every 8 hours      Vital Signs Last 24 Hrs  T(C): 37.6 (2020 13:25), Max: 38.3 (2020 17:24)  T(F): 99.6 (2020 13:25), Max: 100.9 (2020 17:24)  HR: 124 (2020 13:25) (109 - 127)  BP: 116/77 (2020 13:25) (116/74 - 123/67)  BP(mean): --  RR: 18 (2020 16:00) (18 - 22)  SpO2: 88% (2020 16:00) (88% - 100%)    Physical Exam:  General:    NAD,  non toxic, walking  Head: atraumatic, normocephalic  Eye: normal sclera and conjunctiva  ENT:    no oropharyngeal lesions,   no LAD,   neck supple  Cardio:    tachy regular S1, S2  Respiratory:  b/l chest tubes  abd:    palpable mass, soft,   BS +,  slight diffuse tenderness  :   no CVAT,  no suprapubic tenderness,   no  cash  Musculoskeletal:   no joint swelling,   no edema  vascular: RUE picc  Skin:    no rash  Neurologic:     no focal deficit  psych: normal affect                          7.6    11.94 )-----------( 432      ( 2020 07:07 )             26.0           138  |  100  |  4<L>  ----------------------------<  103<H>  3.6   |  27  |  0.62    Ca    7.8<L>      2020 07:08  Phos  1.7       Mg     1.7         TPro  5.6<L>  /  Alb  2.1<L>  /  TBili  0.5  /  DBili  x   /  AST  32  /  ALT  9<L>  /  AlkPhos  253<H>        Urinalysis Basic - ( 2020 18:29 )    Color: Light Yellow / Appearance: Clear / S.008 / pH: x  Gluc: x / Ketone: Negative  / Bili: Negative / Urobili: Negative   Blood: x / Protein: Negative / Nitrite: Negative   Leuk Esterase: Small / RBC: 3 /hpf / WBC 6 /HPF   Sq Epi: x / Non Sq Epi: 1 /hpf / Bacteria: Negative        MICROBIOLOGY:  v  Pleural Fl Pleural Fluid  20   Testing in progress  --    polymorphonuclear leukocytes seen  No organisms seen  by cytocentrifuge      .Urine  20   50,000 - 99,000 CFU/mL Klebsiella pneumoniae (Carbapenem Resistant)  --  Klepne MDRO      .Urine Clean Catch (Midstream)  20   10,000 - 49,000 CFU/mL Klebsiella pneumoniae (Carbapenem Resistant)  --  Klepne MDRO      .Blood Blood-Catheter  20   No growth to date.  --  --      .Blood Blood-Peripheral  20   No growth to date.  --  --      Pleural Fl Pleural Fluid  20   No growth  --    polymorphonuclear leukocytes seen  No organisms seen  by cytocentrifuge      .Blood Blood-Peripheral  20   No Growth Final  --  --      .Blood PICC/PERC Double Lumen BLUE  20   No Growth Final  --  --      .Urine Clean Catch (Midstream)  20   No growth  --  --      .Blood Blood-Peripheral  20   No Growth Final  --  --      .Blood Blood-Peripheral  20   No Growth Final  --  --      .Urine Clean Catch (Midstream)  20   No growth  --  --      .Blood Blood-Peripheral  20   No Growth Final  --  --      .Blood Blood-Catheter  20   No Growth Final  --  --      .Blood Blood-Peripheral  20   No Growth Final  --  --      .Urine Clean Catch (Midstream)  20   >=3 organisms. Probable collection contamination.  --  --      .Blood Blood-Peripheral  20   No Growth Final  --  --                RADIOLOGY:  Images below independently visualized and reviewed personally, findings as below  < from: Xray Chest 1 View- PORTABLE-Urgent (20 @ 19:34) >  IMPRESSION:    Bilateral chest tubes are present. Right PICC tip within SVC.    Bilateral pleural effusions are decreased. Bibasilar hazy opacities.    < from: CT Angio Chest w/ IV Cont (20 @ 14:34) >  IMPRESSION:     No pulmonary embolism.     Pulmonary edema.    Large bilateral pleural effusions with interval increase. Left pleural nodularity consistent with a malignant effusion.    Pulmonary and hepatic metastatic disease.      < end of copied text >

## 2020-04-21 NOTE — PROGRESS NOTE ADULT - SUBJECTIVE AND OBJECTIVE BOX
INTERVAL History:    Allergies    No Known Allergies    Intolerances        MEDICATIONS  (STANDING):  allopurinol 300 milliGRAM(s) Oral daily  enoxaparin Injectable 40 milliGRAM(s) SubCutaneous daily  famotidine    Tablet 20 milliGRAM(s) Oral daily  iron sucrose IVPB 200 milliGRAM(s) IV Intermittent every 24 hours  lidocaine   Patch 2 Patch Transdermal daily  morphine ER Tablet 60 milliGRAM(s) Oral <User Schedule>  polyethylene glycol 3350 17 Gram(s) Oral daily  senna 2 Tablet(s) Oral at bedtime  sodium chloride 0.9% lock flush 3 milliLiter(s) IV Push every 8 hours  sodium chloride 0.9% lock flush 3 milliLiter(s) IV Push every 8 hours    MEDICATIONS  (PRN):  acetaminophen   Tablet .. 650 milliGRAM(s) Oral every 6 hours PRN Temp greater or equal to 38C (100.4F)  aluminum hydroxide/magnesium hydroxide/simethicone Suspension 30 milliLiter(s) Oral every 6 hours PRN Dyspepsia  metoclopramide Injectable 10 milliGRAM(s) IV Push every 6 hours PRN Nausea/Vomiting  morphine  IR 7.5 milliGRAM(s) Oral every 3 hours PRN Moderate Pain (4 - 6) or severe  naloxone Injectable 0.1 milliGRAM(s) IV Push every 3 minutes PRN Sedation or RR <10  ondansetron Injectable 4 milliGRAM(s) IV Push every 6 hours PRN Nausea and/or Vomiting  simethicone 80 milliGRAM(s) Chew every 6 hours PRN Gas      Vital Signs Last 24 Hrs  T(C): 37.4 (2020 05:59), Max: 38.3 (2020 17:24)  T(F): 99.3 (2020 05:59), Max: 100.9 (2020 17:24)  HR: 120 (2020 05:59) (120 - 127)  BP: 116/74 (2020 05:59) (116/74 - 138/81)  BP(mean): --  RR: 19 (2020 05:59) (19 - 20)  SpO2: 98% (2020 05:59) (96% - 98%)    PHYSICAL EXAM:    General: AOX3, no acute distress  EYES: EOMI, PERRLA, conjunctiva and sclera clear  NECK: Supple, No JVD, Normal thyroid  CHEST/LUNG: Clear to auscultation bilaterally; No rales, rhonchi, or wheezing  HEART: Regular rate and rhythm; no murmurs  ABDOMEN: Soft, Nontender. BS+  LYMPH: No lymphadenopathy noted  Extremities: No peripheral edema      LABS:                        7.6    11.94 )-----------( 432      ( 2020 07:07 )             26.0     04-21    138  |  100  |  4<L>  ----------------------------<  103<H>  3.6   |  27  |  0.62    Ca    7.8<L>      2020 07:08  Phos  1.7       Mg     1.7         TPro  5.6<L>  /  Alb  2.1<L>  /  TBili  0.5  /  DBili  x   /  AST  32  /  ALT  9<L>  /  AlkPhos  253<H>  21    PT/INR - ( 2020 07:07 )   PT: 13.5 sec;   INR: 1.17 ratio           Urinalysis Basic - ( 2020 18:29 )    Color: Light Yellow / Appearance: Clear / S.008 / pH: x  Gluc: x / Ketone: Negative  / Bili: Negative / Urobili: Negative   Blood: x / Protein: Negative / Nitrite: Negative   Leuk Esterase: Small / RBC: 3 /hpf / WBC 6 /HPF   Sq Epi: x / Non Sq Epi: 1 /hpf / Bacteria: Negative          RADIOLOGY & ADDITIONAL STUDIES:    PATHOLOGY: INTERVAL History: Pt s/p drainage of left lung , with low grade fevers 100.5, 100.9 yesterday afternoon, cultured, though likely 2/2 disease.     Allergies    No Known Allergies    Intolerances        MEDICATIONS  (STANDING):  allopurinol 300 milliGRAM(s) Oral daily  enoxaparin Injectable 40 milliGRAM(s) SubCutaneous daily  famotidine    Tablet 20 milliGRAM(s) Oral daily  iron sucrose IVPB 200 milliGRAM(s) IV Intermittent every 24 hours  lidocaine   Patch 2 Patch Transdermal daily  morphine ER Tablet 60 milliGRAM(s) Oral <User Schedule>  polyethylene glycol 3350 17 Gram(s) Oral daily  senna 2 Tablet(s) Oral at bedtime  sodium chloride 0.9% lock flush 3 milliLiter(s) IV Push every 8 hours  sodium chloride 0.9% lock flush 3 milliLiter(s) IV Push every 8 hours    MEDICATIONS  (PRN):  acetaminophen   Tablet .. 650 milliGRAM(s) Oral every 6 hours PRN Temp greater or equal to 38C (100.4F)  aluminum hydroxide/magnesium hydroxide/simethicone Suspension 30 milliLiter(s) Oral every 6 hours PRN Dyspepsia  metoclopramide Injectable 10 milliGRAM(s) IV Push every 6 hours PRN Nausea/Vomiting  morphine  IR 7.5 milliGRAM(s) Oral every 3 hours PRN Moderate Pain (4 - 6) or severe  naloxone Injectable 0.1 milliGRAM(s) IV Push every 3 minutes PRN Sedation or RR <10  ondansetron Injectable 4 milliGRAM(s) IV Push every 6 hours PRN Nausea and/or Vomiting  simethicone 80 milliGRAM(s) Chew every 6 hours PRN Gas      Vital Signs Last 24 Hrs  T(C): 37.4 (2020 05:59), Max: 38.3 (2020 17:24)  T(F): 99.3 (2020 05:59), Max: 100.9 (2020 17:24)  HR: 120 (2020 05:59) (120 - 127)  BP: 116/74 (2020 05:59) (116/74 - 138/81)  BP(mean): --  RR: 19 (2020 05:59) (19 - 20)  SpO2: 98% (2020 05:59) (96% - 98%)    PHYSICAL EXAM:    General: AOX3, no acute distress  EYES: EOMI, PERRLA, conjunctiva and sclera clear  NECK: Supple, No JVD, Normal thyroid  CHEST/LUNG: Clear to auscultation bilaterally; No rales, rhonchi, or wheezing  HEART: Regular rate and rhythm; no murmurs  ABDOMEN: Soft, Nontender. BS+  LYMPH: No lymphadenopathy noted  Extremities: No peripheral edema      LABS:                        7.6    11.94 )-----------( 432      ( 2020 07:07 )             26.0     04-21    138  |  100  |  4<L>  ----------------------------<  103<H>  3.6   |  27  |  0.62    Ca    7.8<L>      2020 07:08  Phos  1.7     -  Mg     1.7     -    TPro  5.6<L>  /  Alb  2.1<L>  /  TBili  0.5  /  DBili  x   /  AST  32  /  ALT  9<L>  /  AlkPhos  253<H>  -21    PT/INR - ( 2020 07:07 )   PT: 13.5 sec;   INR: 1.17 ratio           Urinalysis Basic - ( 2020 18:29 )    Color: Light Yellow / Appearance: Clear / S.008 / pH: x  Gluc: x / Ketone: Negative  / Bili: Negative / Urobili: Negative   Blood: x / Protein: Negative / Nitrite: Negative   Leuk Esterase: Small / RBC: 3 /hpf / WBC 6 /HPF   Sq Epi: x / Non Sq Epi: 1 /hpf / Bacteria: Negative          RADIOLOGY & ADDITIONAL STUDIES:    PATHOLOGY: INTERVAL History: Pt s/p drainage of left lung , with low grade fevers 100.5, 100.9 yesterday afternoon, cultured. Patient seen this morning. She looks well sitting in chair and eating. No shortness of breath or chest pain.     Allergies    No Known Allergies    Intolerances        MEDICATIONS  (STANDING):  allopurinol 300 milliGRAM(s) Oral daily  enoxaparin Injectable 40 milliGRAM(s) SubCutaneous daily  famotidine    Tablet 20 milliGRAM(s) Oral daily  iron sucrose IVPB 200 milliGRAM(s) IV Intermittent every 24 hours  lidocaine   Patch 2 Patch Transdermal daily  morphine ER Tablet 60 milliGRAM(s) Oral <User Schedule>  polyethylene glycol 3350 17 Gram(s) Oral daily  senna 2 Tablet(s) Oral at bedtime  sodium chloride 0.9% lock flush 3 milliLiter(s) IV Push every 8 hours  sodium chloride 0.9% lock flush 3 milliLiter(s) IV Push every 8 hours    MEDICATIONS  (PRN):  acetaminophen   Tablet .. 650 milliGRAM(s) Oral every 6 hours PRN Temp greater or equal to 38C (100.4F)  aluminum hydroxide/magnesium hydroxide/simethicone Suspension 30 milliLiter(s) Oral every 6 hours PRN Dyspepsia  metoclopramide Injectable 10 milliGRAM(s) IV Push every 6 hours PRN Nausea/Vomiting  morphine  IR 7.5 milliGRAM(s) Oral every 3 hours PRN Moderate Pain (4 - 6) or severe  naloxone Injectable 0.1 milliGRAM(s) IV Push every 3 minutes PRN Sedation or RR <10  ondansetron Injectable 4 milliGRAM(s) IV Push every 6 hours PRN Nausea and/or Vomiting  simethicone 80 milliGRAM(s) Chew every 6 hours PRN Gas      Vital Signs Last 24 Hrs  T(C): 37.4 (2020 05:59), Max: 38.3 (2020 17:24)  T(F): 99.3 (2020 05:59), Max: 100.9 (2020 17:24)  HR: 120 (2020 05:59) (120 - 127)  BP: 116/74 (2020 05:59) (116/74 - 138/81)  BP(mean): --  RR: 19 (2020 05:59) (19 - 20)  SpO2: 98% (2020 05:59) (96% - 98%)    PHYSICAL EXAM:    General: AOX3, no acute distress    Not physically examined to decrease COVID exposure to our already immunocompromised patient population       LABS:                        7.6    11.94 )-----------( 432      ( 2020 07:07 )             26.0     04-21    138  |  100  |  4<L>  ----------------------------<  103<H>  3.6   |  27  |  0.62    Ca    7.8<L>      2020 07:08  Phos  1.7     04-21  Mg     1.7     -21    TPro  5.6<L>  /  Alb  2.1<L>  /  TBili  0.5  /  DBili  x   /  AST  32  /  ALT  9<L>  /  AlkPhos  253<H>  -21    PT/INR - ( 2020 07:07 )   PT: 13.5 sec;   INR: 1.17 ratio           Urinalysis Basic - ( 2020 18:29 )    Color: Light Yellow / Appearance: Clear / S.008 / pH: x  Gluc: x / Ketone: Negative  / Bili: Negative / Urobili: Negative   Blood: x / Protein: Negative / Nitrite: Negative   Leuk Esterase: Small / RBC: 3 /hpf / WBC 6 /HPF   Sq Epi: x / Non Sq Epi: 1 /hpf / Bacteria: Negative    Culture - Urine (20 @ 00:43)    -  Gentamicin: S <=4    -  Imipenem: R 4    -  Levofloxacin: S <=2    -  Meropenem: R 8    -  Nitrofurantoin: I 64 Should not be used to treat pyelonephritis    -  Piperacillin/Tazobactam: R >64    -  Tigecycline: S <=2    -  Tobramycin: S <=4    -  Trimethoprim/Sulfamethoxazole: S <=2/38    -  Amikacin: S <=16    -  Ampicillin: R >16 These ampicillin results predict results for amoxicillin    -  Ampicillin/Sulbactam: R >16/8 Enterobacter, Citrobacter, and Serratia may develop resistance during prolonged therapy (3-4 days)    -  Aztreonam: R >16    -  Cefazolin: R >16 (MIC_CL_[id number]) For uncomplicated UTI with K. pneumoniae, E. coli, or P. mirablis: CANDY <=16 is sensitive and CANDY >=32 is resistant. This also predicts results for oral agents cefaclor, cefdinir, cefpodoxime, cefprozil, cefuroxime axetil, cephalexin and locarbef for uncomplicated UTI. Note that some isolates may be susceptible to these agents while testing resistant to cefazolin.    -  Cefepime: R <=4    -  Cefoxitin: S <=8    -  Ceftriaxone: R 32 Enterobacter, Citrobacter, and Serratia may develop resistance during prolonged therapy    -  Ciprofloxacin: S <=1    -  Ertapenem: R >4    Specimen Source: .Urine    Culture Results:   50,000 - 99,000 CFU/mL Klebsiella pneumoniae (Carbapenem Resistant)    Organism Identification: Manpreet MDRO    Organism: Manpreet MDRO    Method Type: CANDY          RADIOLOGY & ADDITIONAL STUDIES:    PATHOLOGY:

## 2020-04-21 NOTE — PROGRESS NOTE ADULT - SUBJECTIVE AND OBJECTIVE BOX
Pulmonary Follow up Note     sp bilateral pigtail chest tubes    improved resp status    cancer unknown primary    pleural fluid is exudate, lymphiocytic predomonance    < from: CT Abdomen and Pelvis w/ IV Cont (04.03.20 @ 16:46) >    LOWER CHEST: Bibasilar pulmonary metastatic disease as at recent chest CT. Small bilateral pleural effusions, left greater than right. Nodularity within the left pleural effusion concerning for a malignant effusion. Tip of a central line at the cavoatrial junction.    LIVER: Low-density hepatic lesions in both lobes measuring up to 1.8 cm in the right hepatic lobe are concerning for metastases.  BILE DUCTS: Normal caliber.  GALLBLADDER: Within normal limits.  SPLEEN: Within normal limits.  PANCREAS: Within normal limits.  ADRENALS: Within normal limits.  KIDNEYS/URETERS: Within normal limits.    BLADDER: Within normal limits.  REPRODUCTIVE ORGANS: Nonspecific heterogeneity of the endometrial cavity. Bilateral cystic and solid adnexal masses with the largest on the right measuring 14.8 x 12.9 cm, without significant change.    BOWEL: No bowel obstruction.  PERITONEUM: Small volume ascites with peritoneal nodularity as at recent prior imaging.  VESSELS: Within normal limits.  RETROPERITONEUM/LYMPH NODES: Retroperitoneal and pelvic adenopathy including enlarged right external iliac nodes as a recent prior imaging.    ABDOMINAL WALL: Within normal limits.  BONES: Lytic lesion involving the right iliac bone.    < end of copied text >      IMPRESSION     bilateral pleural effusions in setting of intraabdominal malignancy    small lung nodules    bilateral pigtail chest tubes in place with pleurevac on water seal    await cytology    monitor drainage    Viktor Womack MD  Pulmonary

## 2020-04-21 NOTE — CHART NOTE - NSCHARTNOTEFT_GEN_A_CORE
Nutrition Follow Up Note  Patient seen for: Nutrition follow up. Interim events noted, chart reviewed. Pt c adnexal masses, metastatic disease, continues to have fevers, noted Palliative Care following for pain management. Pt with R pleural effusion S/P pigtail placement, plan for cycle 2 chemotherapy this week.       Source: Pt    Diet : Regular diet with ensure enlive 2 daily     Patient reports: Pt with no N+V, last BM over the weekend, pt stated BMs have been less frequent and attributes this to pain medication      PO intake : Pt stated she has been making an effort to eat more at her meals and will try to eat a portion of all 3 meals, pt will take ~50% of meals  and ensure enlive intermittently, pt stated she has not been taking this daily anymore as she has been eating more, RD encouraged pt to continue taking at least 1 per day. Pt has not been eating meats as in the past she had a stomach ache after consuming, has been trying to take other protein foods such as dairy products.      Weight: 201 lbs (3/25), 199 lbs (4/14), 198.8 lbs (4/20), weight stabilizing     Pertinent Medications: MEDICATIONS  (STANDING):  allopurinol 300 milliGRAM(s) Oral daily  enoxaparin Injectable 40 milliGRAM(s) SubCutaneous daily  famotidine    Tablet 20 milliGRAM(s) Oral daily  iron sucrose IVPB 200 milliGRAM(s) IV Intermittent every 24 hours  lidocaine   Patch 2 Patch Transdermal daily  morphine ER Tablet 60 milliGRAM(s) Oral <User Schedule>  polyethylene glycol 3350 17 Gram(s) Oral daily  senna 2 Tablet(s) Oral at bedtime  sodium chloride 0.9% lock flush 3 milliLiter(s) IV Push every 8 hours  sodium chloride 0.9% lock flush 3 milliLiter(s) IV Push every 8 hours    MEDICATIONS  (PRN):  acetaminophen   Tablet .. 650 milliGRAM(s) Oral every 6 hours PRN Temp greater or equal to 38C (100.4F)  aluminum hydroxide/magnesium hydroxide/simethicone Suspension 30 milliLiter(s) Oral every 6 hours PRN Dyspepsia  metoclopramide Injectable 10 milliGRAM(s) IV Push every 6 hours PRN Nausea/Vomiting  morphine  IR 7.5 milliGRAM(s) Oral every 3 hours PRN Moderate Pain (4 - 6) or severe  naloxone Injectable 0.1 milliGRAM(s) IV Push every 3 minutes PRN Sedation or RR <10  ondansetron Injectable 4 milliGRAM(s) IV Push every 6 hours PRN Nausea and/or Vomiting  simethicone 80 milliGRAM(s) Chew every 6 hours PRN Gas    Pertinent Labs: 04-21 @ 07:08: Na 138, BUN 4<L>, Cr 0.62, <H>, K+ 3.6, Phos 1.7<L>, Mg 1.7, Alk Phos 253<H>, ALT/SGPT 9<L>, AST/SGOT 32, HbA1c --    Finger Sticks: none       Skin per nursing documentation: free of pressure injury   Edema: none     Estimated Needs:   [x ] no change since previous assessment  [ ] recalculated:     Previous Nutrition Diagnosis: Inadequate oral intake   Nutrition Diagnosis is: ongoing, addressed with supplements     New Nutrition Diagnosis:  Related to:    As evidenced by:      Interventions:     Recommend  1) Continue regular diet with supplements as ordered  2) Continue to encourage po intake and obtain/honor food preferences as able, RD encouraged pt to nutrient dense snacks between meals with emphasis on foods with protein, sipping ensure supplement throughout the day.     Monitoring and Evaluation:     Continue to monitor Nutritional intake, Tolerance to diet prescription, weights, labs, skin integrity    RD remains available upon request and will follow up per protocol

## 2020-04-21 NOTE — PROGRESS NOTE ADULT - ATTENDING COMMENTS
Pt seen with Dr Banks, better today, due chemo on Thursday, oxygen sat 100 on n/c, to check off of oxygen. Pain well controlled.. I agree with Dr Banks's assessment and plan.

## 2020-04-22 NOTE — PROGRESS NOTE ADULT - SUBJECTIVE AND OBJECTIVE BOX
Follow Up:  fever, leukocytosis    Interval History: pt febrile again t but the WBC is actually improving to 10, urine cx with klebsiella but pt has no urinary symptoms    ROS:      All other systems negative    Constitutional: no fever,  chills  Head: no trauma  Eyes: no vision changes, no eye pain  ENT:  no sore throat, no rhinorrhea  Cardiovascular:  no chest pain, no palpitation  Respiratory:  improved SOB, no cough  GI:  + abd pain, no vomiting, no diarrhea   urinary: no dysuria, no hematuria, no flank pain  musculoskeletal:  no joint pain, no joint swelling  skin:  no rash  neurology:  no headache, no seizure, no change in mental status  psych: no anxiety, no depression         Allergies  No Known Allergies        ANTIMICROBIALS:      OTHER MEDS:  acetaminophen   Tablet .. 650 milliGRAM(s) Oral every 6 hours PRN  allopurinol 300 milliGRAM(s) Oral daily  aluminum hydroxide/magnesium hydroxide/simethicone Suspension 30 milliLiter(s) Oral every 6 hours PRN  bisacodyl 5 milliGRAM(s) Oral every 12 hours PRN  enoxaparin Injectable 40 milliGRAM(s) SubCutaneous daily  famotidine    Tablet 20 milliGRAM(s) Oral daily  iron sucrose IVPB 200 milliGRAM(s) IV Intermittent every 24 hours  lidocaine   Patch 2 Patch Transdermal daily  metoclopramide Injectable 10 milliGRAM(s) IV Push every 6 hours PRN  morphine  IR 7.5 milliGRAM(s) Oral every 3 hours PRN  morphine ER Tablet 60 milliGRAM(s) Oral <User Schedule>  naloxone Injectable 0.1 milliGRAM(s) IV Push every 3 minutes PRN  ondansetron Injectable 4 milliGRAM(s) IV Push every 6 hours PRN  polyethylene glycol 3350 17 Gram(s) Oral daily  senna 2 Tablet(s) Oral at bedtime  simethicone 80 milliGRAM(s) Chew every 6 hours PRN  sodium chloride 0.9% lock flush 3 milliLiter(s) IV Push every 8 hours  sodium chloride 0.9% lock flush 3 milliLiter(s) IV Push every 8 hours      Vital Signs Last 24 Hrs  T(C): 38.6 (2020 13:25), Max: 38.6 (2020 00:50)  T(F): 101.4 (2020 13:25), Max: 101.4 (2020 00:50)  HR: 132 (2020 13:25) (110 - 132)  BP: 120/75 (2020 13:25) (112/71 - 125/62)  BP(mean): --  RR: 20 (2020 13:25) (18 - 20)  SpO2: 99% (2020 13:25) (88% - 100%)    Physical Exam:  General:    NAD,  non toxic, eating  Head: atraumatic, normocephalic  Eye: normal sclera and conjunctiva  ENT:    no oropharyngeal lesions,   no LAD,   neck supple  Cardio:    tachy regular S1, S2  Respiratory:  b/l chest tubes, now off O2  abd:    palpable mass, soft,   BS +,  slight diffuse tenderness  :   no CVAT,  no suprapubic tenderness,   no  cash  Musculoskeletal:   no joint swelling,   no edema  vascular: RUE picc  Skin:    no rash  Neurologic:     no focal deficit  psych: normal affect                          7.4    10.26 )-----------( 445      ( 2020 07:14 )             25.8           138  |  101  |  4<L>  ----------------------------<  96  3.8   |  30  |  0.59    Ca    7.9<L>      2020 07:14  Phos  2.4       Mg     1.6         TPro  6.1  /  Alb  2.1<L>  /  TBili  0.5  /  DBili  x   /  AST  29  /  ALT  8<L>  /  AlkPhos  225<H>        Urinalysis Basic - ( 2020 18:29 )    Color: Light Yellow / Appearance: Clear / S.008 / pH: x  Gluc: x / Ketone: Negative  / Bili: Negative / Urobili: Negative   Blood: x / Protein: Negative / Nitrite: Negative   Leuk Esterase: Small / RBC: 3 /hpf / WBC 6 /HPF   Sq Epi: x / Non Sq Epi: 1 /hpf / Bacteria: Negative        MICROBIOLOGY:  v  .Blood Blood-Peripheral  20   No growth to date.  --  --      .Blood Blood-Catheter  20   No growth to date.  --  --      .Urine Clean Catch (Midstream)  20   >100,000 CFU/ml Gram Negative Rods  --  --      Pleural Fl Pleural Fluid  20   No growth  --    polymorphonuclear leukocytes seen  No organisms seen  by cytocentrifuge      .Urine  20   50,000 - 99,000 CFU/mL Klebsiella pneumoniae (Carbapenem Resistant)  --  Klepne MDRO      .Urine Clean Catch (Midstream)  20   10,000 - 49,000 CFU/mL Klebsiella pneumoniae (Carbapenem Resistant)  --  Klepne MDRO      .Blood Blood-Catheter  20   No growth to date.  --  --      .Blood Blood-Peripheral  20   No growth to date.  --  --      Pleural Fl Pleural Fluid  20   No growth  --    polymorphonuclear leukocytes seen  No organisms seen  by cytocentrifuge      .Blood Blood-Peripheral  20   No Growth Final  --  --      .Blood PICC/PERC Double Lumen BLUE  20   No Growth Final  --  --      .Urine Clean Catch (Midstream)  20   No growth  --  --      .Blood Blood-Peripheral  20   No Growth Final  --  --      .Blood Blood-Peripheral  20   No Growth Final  --  --      .Urine Clean Catch (Midstream)  20   No growth  --  --      .Blood Blood-Peripheral  20   No Growth Final  --  --      .Blood Blood-Catheter  20   No Growth Final  --  --      .Blood Blood-Peripheral  20   No Growth Final  --  --      .Urine Clean Catch (Midstream)  20   >=3 organisms. Probable collection contamination.  --  --      .Blood Blood-Peripheral  20   No Growth Final  --  --                RADIOLOGY:  Images below independently visualized and reviewed personally, findings as below  < from: Xray Chest 1 View- PORTABLE-Urgent (20 @ 19:34) >  IMPRESSION:    Bilateral chest tubes are present. Right PICC tip within SVC.    Bilateral pleural effusions are decreased. Bibasilar hazy opacities.

## 2020-04-22 NOTE — PROGRESS NOTE ADULT - ASSESSMENT
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      # Tachycardia  # Hypoxia   - COVID-19 negative x 3, but parainfluenza positive ().   - CTA Chest () was negative for central PE, but unable to evaluate segmental and subsegmental branches due to motion artifact. Lung imaging showed unchanged metastatic nodules, small bilateral pleural effusions, and atelectasis of the basilar LLL (unchanged as well). No new opacity which decreases the likelihood of PNA  - CT A/P repeated on 4/3/20; no obvious source of infection   - Blood/urine cultures have had NGTD. C. diff negative  - ID following, appreciate recs  - S/p PICC line placement for chemotherapy  - Patient is s/p C1 modified FOLFOX (-). Further management as noted below   - Blood cultures from  and  NGTD  - urine cx + 50-99K Klebsiella CRE- per ID no need to treat at this time given no symptoms      - CTA chest on  showing no PE, but large bilateral pleural effusions  - Drains in both left and right lung   - Echo WNL     # Metastatic carcinoma, cancer of unknown primary  - CT A/P with bilateral heterogeneous adnexal masses, as well as upper abdominal, RP, possible bone lesions and pelvic lymphadenopathy; CT Chest with multiple solid pulmonary nodules  - Cancer of unknown primary (poorly differentiated carcinoma) but suspect likely GI origin given CDX2 positivity on pathology; chromogranin positivity also suggests neuroendocrine features; Ki-67 index 40%.   - Tumor markers elevated: Cancer Antigen, Ca 27.29: 70.8, Cancer Antigen, 125: 619, Carcinoembryonic Antigen: 44.7, Beta-HC.0 on initial evaluation (3/7); Checked again on 20 and it was 102.7  - Discussed diagnosis with patient on 3/31/20. Given advanced, metastatic stage, will need to be on indefinite treatment to limit progression of disease.  - S/p C1 mFOLFOX (-4/11).    - Will try to wean supplemental oxygen as tolerated. Will also provide an incentive spirometer  - CTA Chest () was negative for central PE, but unable to evaluate segmental and subsegmental branches due to motion artifact. In the setting of her hypoxemia and tachycardia,   -LE dopplers negative for DVT on .  - Continue Venofer until discharge   - Will plan for inpatient cycle 2 of mFolfox on Thursday,  (Will write an order today)      # Pain control   - Pain medications now switched to MS contin, pain is adequately controlled per patient. Will continue to follow-up with Palliative Care regarding pain management/regimen on discharge.     # Hyperuricemia  - uric acid was 8.8 on . s/p IV Rasburicase 3mg x1 on .   - C/w Allopurinol 300mg daily  - Monitor TLS labs daily (CMP, Phos, LDH, Uric acid)    # DVT PPx  - Lovenox 40mg SQ    # Dispo  - Pending resolution of acute medical issues. 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      # Tachycardia  # Hypoxia   - COVID-19 negative x 3, but parainfluenza positive ().   - CTA Chest () was negative for central PE, but unable to evaluate segmental and subsegmental branches due to motion artifact. Lung imaging showed unchanged metastatic nodules, small bilateral pleural effusions, and atelectasis of the basilar LLL (unchanged as well). No new opacity which decreases the likelihood of PNA  - CT A/P repeated on 4/3/20; no obvious source of infection   - Blood/urine cultures have had NGTD. C. diff negative  - ID following, appreciate recs  - S/p PICC line placement for chemotherapy  - Patient is s/p C1 modified FOLFOX (-). Further management as noted below   - Blood cultures from  and  NGTD  - urine cx + 50-99K Klebsiella CRE- per ID no need to treat at this time given no symptoms      - CTA chest on  showing no PE, but large bilateral pleural effusions  - Drains in both left and right lung   - Echo WNL     # Metastatic carcinoma, cancer of unknown primary  - CT A/P with bilateral heterogeneous adnexal masses, as well as upper abdominal, RP, possible bone lesions and pelvic lymphadenopathy; CT Chest with multiple solid pulmonary nodules  - Cancer of unknown primary (poorly differentiated carcinoma) but suspect likely GI origin given CDX2 positivity on pathology; chromogranin positivity also suggests neuroendocrine features; Ki-67 index 40%.   - Tumor markers elevated: Cancer Antigen, Ca 27.29: 70.8, Cancer Antigen, 125: 619, Carcinoembryonic Antigen: 44.7, Beta-HC.0 on initial evaluation (3/7); Checked again on 20 and it was 102.7  - Discussed diagnosis with patient on 3/31/20. Given advanced, metastatic stage, will need to be on indefinite treatment to limit progression of disease.  - S/p C1 mFOLFOX (-4/11).    - Will try to wean supplemental oxygen as tolerated. Will also provide an incentive spirometer  - CTA Chest () was negative for central PE, but unable to evaluate segmental and subsegmental branches due to motion artifact. In the setting of her hypoxemia and tachycardia,   -LE dopplers negative for DVT on .  - Continue Venofer until discharge   - Monitor fever and tentatively cycle 2 of mFolfox on Thursday,  (Order given to chemoRN on )      # Pain control   - Pain medications now switched to MS contin, pain is adequately controlled per patient. Will continue to follow-up with Palliative Care regarding pain management/regimen on discharge.     # Hyperuricemia  - uric acid was 8.8 on . s/p IV Rasburicase 3mg x1 on .   - C/w Allopurinol 300mg daily  - Monitor TLS labs daily (CMP, Phos, LDH, Uric acid)    # DVT PPx  - Lovenox 40mg SQ    # Dispo  - Pending resolution of acute medical issues. Pt recs: DC home with assist from mother/brother, +elevator access in apt to 5th floor, recommend rolling walker for long distances and recommend 3:1 commode  - Wean off O2 as tolerated   - Pain control   - Will continue to update mother and brother over phone daily, all of their questions answered to their satisfaction     The case was discussed with Dr. Darin Gonzalez MD, MPH  Hematology/Oncology Fellow  Pager: [phone]

## 2020-04-22 NOTE — PROGRESS NOTE ADULT - ATTENDING COMMENTS
Patient seen with Dr Gonzalez. O2 sats down off nasal cannula, despite drainage of both pleural effusions, Due chemo tomorrow, dose # 2, however she was febrile last night and again today. Urine culture positive, blood NTD. Chemo will be delayed for now. PICC line appears fine. NO cough/sputum. Comfortable on oxygen. I agree with Dr Gonzalez's assessment and plan Patient seen with Dr Gonzalez. O2 sats down off nasal cannula, despite drainage of both pleural effusions, Due chemo tomorrow, dose # 2, however she was febrile last night and again today. Urine culture positive, blood NTD. Chemo will be delayed for now. PICC line appears fine. NO cough/sputum. Comfortable on oxygen. I agree with Dr Gonzalez's assessment and plan.

## 2020-04-22 NOTE — PROGRESS NOTE ADULT - SUBJECTIVE AND OBJECTIVE BOX
Pulmonary Follow up Note     sp bilateral pigtail chest tubes    improved resp status    cancer unknown primary    pleural fluid is exudate, lymphocytic predominance    Cytology negative so far    < from: CT Abdomen and Pelvis w/ IV Cont (04.03.20 @ 16:46) >    LOWER CHEST: Bibasilar pulmonary metastatic disease as at recent chest CT. Small bilateral pleural effusions, left greater than right. Nodularity within the left pleural effusion concerning for a malignant effusion. Tip of a central line at the cavoatrial junction.    LIVER: Low-density hepatic lesions in both lobes measuring up to 1.8 cm in the right hepatic lobe are concerning for metastases.  BILE DUCTS: Normal caliber.  GALLBLADDER: Within normal limits.  SPLEEN: Within normal limits.  PANCREAS: Within normal limits.  ADRENALS: Within normal limits.  KIDNEYS/URETERS: Within normal limits.    BLADDER: Within normal limits.  REPRODUCTIVE ORGANS: Nonspecific heterogeneity of the endometrial cavity. Bilateral cystic and solid adnexal masses with the largest on the right measuring 14.8 x 12.9 cm, without significant change.    BOWEL: No bowel obstruction.  PERITONEUM: Small volume ascites with peritoneal nodularity as at recent prior imaging.  VESSELS: Within normal limits.  RETROPERITONEUM/LYMPH NODES: Retroperitoneal and pelvic adenopathy including enlarged right external iliac nodes as a recent prior imaging.    ABDOMINAL WALL: Within normal limits.  BONES: Lytic lesion involving the right iliac bone.    < end of copied text >      IMPRESSION     bilateral pleural effusions in setting of intraabdominal malignancy    small lung nodules    bilateral pigtail chest tubes in place with Pleur-evac on water seal    await further cytology    Follow CXR daily    If unimproved may place chest tubes to suction thru pleuevac	        monitor drainage    Viktor Womack MD  Pulmonary

## 2020-04-22 NOTE — PROGRESS NOTE ADULT - ASSESSMENT
24 f with b/l adnexal masses and pelvic LAD, metastatic carcinoma of unknown primary presented 3/20 with abd pain, s/p CT guided biopsy 3/25, PICC line 4/3 to start chemo  pt intermittently febrile and leukocytosis, now febrile to 101.7 and tachy, WBC: 34  QUINN for the last 2 days now Cr: 1.3  blood and urine cx negative  COVID negative x 3 last one 4/4  RVP 4/1 with parainfluenza  also some diarrhea initially    persistent fever and leukocytosis, with tachycardia sepsis  C-diff negative  parainfluenza URI 4/1  metastatic ca with ?tumor fever  persistent tachycardia but no PE, just b/l pleural effusion and L pleural nodularity s/o malignant effusion s/p b/l chest tube  new fever  but pt had fevers before with negative infectious work up and now the WBC normalized to 10, likely tumor related  urine cx with <[id number] klebsiella but no urinary symptoms likely asymptomatic bacteriuria and u/a only with 6 WBC  * s/p cycle 1 of folfox and plan for second cycle 4/23  * infection w/u negative and likely malignancy related  * WBC is improving and no urinary symptoms, monitor off antibiotics for now  * monitor the WBC and temp  * monitor for new signs of infection      The above assessment and plan was discussed with hem/onc team    Gina Arenas MD  Pager [phone]  After 5pm and on weekends call [phone]

## 2020-04-22 NOTE — PROGRESS NOTE ADULT - SUBJECTIVE AND OBJECTIVE BOX
Hematology/Oncology Follow-up    INTERVAL HPI/OVERNIGHT EVENTS:  Had fever last night (101.4)    chills, dizziness, weakness, CP, palpitations, SOB, cough, N/V/D/C, dysuria, changes in bowel movements, LE edema.      VITAL SIGNS:  T(F): 98.8 (20 @ 04:51)  HR: 110 (20 @ 04:51)  BP: 125/62 (20 @ 04:51)  RR: 18 (20 @ 04:51)  SpO2: 100% (20 @ 04:51)  Wt(kg): --    20 @ 07:01  -  20 @ 07:00  --------------------------------------------------------  IN: 2008 mL / OUT: 3416 mL / NET: -1408 mL        PHYSICAL EXAM:  Constitutional: NAD   Eyes: PERRLA, EOMI, sclera non-icteric, conjunctiva non-anemia  Neck: supple, no masses, no elevated JVP  Mouth: No mucositis, no exudate, no ulcer.   Respiratory: CTA b/l  Cardiovascular: Normal rate regular rhythm. normal S1S2, no murmur  Gastrointestinal: soft and flat, no tenderness to palpation, no masses palpable, normoactive bowel soundS, no HSM  Extremities:  No c/c/e  MSK: No joint swelling, erythema, or tenderness   Neurological: AOx3, Cranial nerves II-XII grossly intact  Skin: No rash  Psych: Normal affect    MEDICATIONS  (STANDING):  allopurinol 300 milliGRAM(s) Oral daily  enoxaparin Injectable 40 milliGRAM(s) SubCutaneous daily  famotidine    Tablet 20 milliGRAM(s) Oral daily  iron sucrose IVPB 200 milliGRAM(s) IV Intermittent every 24 hours  lidocaine   Patch 2 Patch Transdermal daily  morphine ER Tablet 60 milliGRAM(s) Oral <User Schedule>  polyethylene glycol 3350 17 Gram(s) Oral daily  senna 2 Tablet(s) Oral at bedtime  sodium chloride 0.9% lock flush 3 milliLiter(s) IV Push every 8 hours  sodium chloride 0.9% lock flush 3 milliLiter(s) IV Push every 8 hours    MEDICATIONS  (PRN):  acetaminophen   Tablet .. 650 milliGRAM(s) Oral every 6 hours PRN Temp greater or equal to 38C (100.4F)  aluminum hydroxide/magnesium hydroxide/simethicone Suspension 30 milliLiter(s) Oral every 6 hours PRN Dyspepsia  metoclopramide Injectable 10 milliGRAM(s) IV Push every 6 hours PRN Nausea/Vomiting  morphine  IR 7.5 milliGRAM(s) Oral every 3 hours PRN Moderate Pain (4 - 6) or severe  naloxone Injectable 0.1 milliGRAM(s) IV Push every 3 minutes PRN Sedation or RR <10  ondansetron Injectable 4 milliGRAM(s) IV Push every 6 hours PRN Nausea and/or Vomiting  simethicone 80 milliGRAM(s) Chew every 6 hours PRN Gas      No Known Allergies      LABS:                        7.4    10.26 )-----------( 445      ( 2020 07:14 )             25.8         138  |  101  |  4<L>  ----------------------------<  96  3.8   |  30  |  0.59    Ca    7.9<L>      2020 07:14  Phos  2.4       Mg     1.6         TPro  6.1  /  Alb  2.1<L>  /  TBili  0.5  /  DBili  x   /  AST  29  /  ALT  8<L>  /  AlkPhos  225<H>      PT/INR - ( 2020 07:14 )   PT: 14.6 sec;   INR: 1.27 ratio          Lactate Dehydrogenase, Serum: 325 U/L ( @ 07:14)    Urinalysis Basic - ( 2020 18:29 )    Color: Light Yellow / Appearance: Clear / S.008 / pH: x  Gluc: x / Ketone: Negative  / Bili: Negative / Urobili: Negative   Blood: x / Protein: Negative / Nitrite: Negative   Leuk Esterase: Small / RBC: 3 /hpf / WBC 6 /HPF   Sq Epi: x / Non Sq Epi: 1 /hpf / Bacteria: Negative        RADIOLOGY & ADDITIONAL TESTS:  Studies reviewed. Hematology/Oncology Follow-up    INTERVAL HPI/OVERNIGHT EVENTS:  Had fever last night (101.4). Denies chills. She had one good BM yesterday. Denies diarrhea. walked with PT yesterday. Pain is well controlled. No nausea or vomiting.     VITAL SIGNS:  T(F): 98.8 (20 @ 04:51)  HR: 110 (20 @ 04:51)  BP: 125/62 (20 @ 04:51)  RR: 18 (20 @ 04:51)  SpO2: 100% (20 @ 04:51)  Wt(kg): --    20 @ 07:01  -  20 @ 07:00  --------------------------------------------------------  IN: 2008 mL / OUT: 3416 mL / NET: -1408 mL        PHYSICAL EXAM:  Constitutional: NAD   Eyes: sclera non-icteric, conjunctiva non-anemia  Mouth: No mucositis.  Respiratory: Decreased breath sound in bilateral lungs  Chest: Bilateral chest tubes  Cardiovascular: tachycardia, regular rhythm.   Gastrointestinal: soft and flat, no tenderness to palpation, normoactive bowel sound  Extremities:  No c/c/e  MSK: No joint swelling, erythema, or tenderness   Neurological: AOx3, Cranial nerves II-XII grossly intact  Skin: No rash  Psych: Normal affect    MEDICATIONS  (STANDING):  allopurinol 300 milliGRAM(s) Oral daily  enoxaparin Injectable 40 milliGRAM(s) SubCutaneous daily  famotidine    Tablet 20 milliGRAM(s) Oral daily  iron sucrose IVPB 200 milliGRAM(s) IV Intermittent every 24 hours  lidocaine   Patch 2 Patch Transdermal daily  morphine ER Tablet 60 milliGRAM(s) Oral <User Schedule>  polyethylene glycol 3350 17 Gram(s) Oral daily  senna 2 Tablet(s) Oral at bedtime  sodium chloride 0.9% lock flush 3 milliLiter(s) IV Push every 8 hours  sodium chloride 0.9% lock flush 3 milliLiter(s) IV Push every 8 hours    MEDICATIONS  (PRN):  acetaminophen   Tablet .. 650 milliGRAM(s) Oral every 6 hours PRN Temp greater or equal to 38C (100.4F)  aluminum hydroxide/magnesium hydroxide/simethicone Suspension 30 milliLiter(s) Oral every 6 hours PRN Dyspepsia  metoclopramide Injectable 10 milliGRAM(s) IV Push every 6 hours PRN Nausea/Vomiting  morphine  IR 7.5 milliGRAM(s) Oral every 3 hours PRN Moderate Pain (4 - 6) or severe  naloxone Injectable 0.1 milliGRAM(s) IV Push every 3 minutes PRN Sedation or RR <10  ondansetron Injectable 4 milliGRAM(s) IV Push every 6 hours PRN Nausea and/or Vomiting  simethicone 80 milliGRAM(s) Chew every 6 hours PRN Gas      No Known Allergies      LABS:                        7.4    10.26 )-----------( 445      ( 2020 07:14 )             25.8         138  |  101  |  4<L>  ----------------------------<  96  3.8   |  30  |  0.59    Ca    7.9<L>      2020 07:14  Phos  2.4       Mg     1.6         TPro  6.1  /  Alb  2.1<L>  /  TBili  0.5  /  DBili  x   /  AST  29  /  ALT  8<L>  /  AlkPhos  225<H>      PT/INR - ( 2020 07:14 )   PT: 14.6 sec;   INR: 1.27 ratio          Lactate Dehydrogenase, Serum: 325 U/L ( @ 07:14)    Urinalysis Basic - ( 2020 18:29 )    Color: Light Yellow / Appearance: Clear / S.008 / pH: x  Gluc: x / Ketone: Negative  / Bili: Negative / Urobili: Negative   Blood: x / Protein: Negative / Nitrite: Negative   Leuk Esterase: Small / RBC: 3 /hpf / WBC 6 /HPF   Sq Epi: x / Non Sq Epi: 1 /hpf / Bacteria: Negative        RADIOLOGY & ADDITIONAL TESTS:  Studies reviewed.

## 2020-04-23 NOTE — PROGRESS NOTE ADULT - SUBJECTIVE AND OBJECTIVE BOX
Pulmonary Follow up Note     nad    bilateral chest tubes    improved oxygenation     improved aeration on exam    awaiting cytology    follow CXR and chesttube output to determine when to remove tubes    lungs diminished aeration at bases    Viktor Womack MD  Pulmonary

## 2020-04-23 NOTE — PROGRESS NOTE ADULT - ATTENDING COMMENTS
Pt seen and examined with Dr Gonzalez. Fever is believed sec to tumor, as before. Will proceed with cycle 1b of mFolFOX today. I agree with Dr Gonzalez's assessment and plan.

## 2020-04-23 NOTE — PROGRESS NOTE ADULT - ASSESSMENT
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      # Metastatic carcinoma, cancer of unknown primary  - CT A/P with bilateral heterogeneous adnexal masses, as well as upper abdominal, RP, possible bone lesions and pelvic lymphadenopathy; CT Chest with multiple solid pulmonary nodules  - Cancer of unknown primary (poorly differentiated carcinoma) but suspect likely GI origin given CDX2 positivity on pathology; chromogranin positivity also suggests neuroendocrine features; Ki-67 index 40%.   - Tumor markers elevated: Cancer Antigen, Ca 27.29: 70.8, Cancer Antigen, 125: 619, Carcinoembryonic Antigen: 44.7, Beta-HC.0 on initial evaluation (3/7); Checked again on 20 and it was 102.7  - Discussed diagnosis with patient on 3/31/20. Given advanced, metastatic stage, will need to be on indefinite treatment to limit progression of disease.  - S/p C1 mFOLFOX (-).    - Will try to wean supplemental oxygen as tolerated. Will also provide an incentive spirometer  - CTA Chest () was negative for central PE, but unable to evaluate segmental and subsegmental branches due to motion artifact. In the setting of her hypoxemia and tachycardia,   -LE dopplers negative for DVT on .  - Continue Venofer until discharge   -fever could be tumor fever, ID is monitoring off Abx. BCx from  NGTD.   -Will proceed with cycle 2 of mFolfox today     # Tachycardia  # Hypoxia   - COVID-19 negative x 3, but parainfluenza positive ().   - CTA Chest () was negative for central PE, but unable to evaluate segmental and subsegmental branches due to motion artifact. Lung imaging showed unchanged metastatic nodules, small bilateral pleural effusions, and atelectasis of the basilar LLL (unchanged as well). No new opacity which decreases the likelihood of PNA  - CT A/P repeated on 4/3/20; no obvious source of infection   - Blood/urine cultures have had NGTD. C. diff negative  - ID following, appreciate recs  - S/p PICC line placement for chemotherapy  - Patient is s/p C1 modified FOLFOX (-). Further management as noted below   - Blood cultures from  and  NGTD  - urine cx + 50-99K Klebsiella CRE- per ID no need to treat at this time given no symptoms    - CTA chest on  showing no PE, but large bilateral pleural effusions s/p Drains in both left and right lung   -Appreciate pulmonary's reds  - Echo WNL     # Pain control   - Pain medications now switched to MS contin, pain is adequately controlled per patient. Will continue to follow-up with Palliative Care regarding pain management/regimen on discharge.     # Hyperuricemia  - uric acid was 8.8 on . s/p IV Rasburicase 3mg x1 on .   - C/w Allopurinol 300mg daily  - Monitor TLS labs daily (CMP, Phos, LDH, Uric acid)    # DVT PPx  - Lovenox 40mg SQ    # Dispo  - Pending resolution of acute medical issues. 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      # Metastatic carcinoma, cancer of unknown primary  - CT A/P with bilateral heterogeneous adnexal masses, as well as upper abdominal, RP, possible bone lesions and pelvic lymphadenopathy; CT Chest with multiple solid pulmonary nodules  - Cancer of unknown primary (poorly differentiated carcinoma) but suspect likely GI origin given CDX2 positivity on pathology; chromogranin positivity also suggests neuroendocrine features; Ki-67 index 40%.   - Tumor markers elevated: Cancer Antigen, Ca 27.29: 70.8, Cancer Antigen, 125: 619, Carcinoembryonic Antigen: 44.7, Beta-HC.0 on initial evaluation (3/7); Checked again on 20 and it was 102.7  - Discussed diagnosis with patient on 3/31/20. Given advanced, metastatic stage, will need to be on indefinite treatment to limit progression of disease.  - S/p C1 mFOLFOX (-).    - Will try to wean supplemental oxygen as tolerated. Will also provide an incentive spirometer  - CTA Chest () was negative for central PE, but unable to evaluate segmental and subsegmental branches due to motion artifact. In the setting of her hypoxemia and tachycardia,   -LE dopplers negative for DVT on .  - Continue Venofer until discharge   -fever could be tumor fever, ID is monitoring off Abx. BCx from  NGTD.   -Will proceed with cycle 2 of mFolfox today     # Tachycardia  # Hypoxia   - COVID-19 negative x 3, but parainfluenza positive ().   - CTA Chest () was negative for central PE, but unable to evaluate segmental and subsegmental branches due to motion artifact. Lung imaging showed unchanged metastatic nodules, small bilateral pleural effusions, and atelectasis of the basilar LLL (unchanged as well). No new opacity which decreases the likelihood of PNA  - CT A/P repeated on 4/3/20; no obvious source of infection   - Blood/urine cultures have had NGTD. C. diff negative  - ID following, appreciate recs  - S/p PICC line placement for chemotherapy  - Patient is s/p C1 modified FOLFOX (-). Further management as noted below   - Blood cultures from  and  NGTD  - urine cx + 50-99K Klebsiella CRE- per ID no need to treat at this time given no symptoms    - CTA chest on  showing no PE, but large bilateral pleural effusions s/p Drains in both left and right lung   -Appreciate pulmonary's reds  - Echo WNL     # Pain control   - Pain medications now switched to MS contin, pain is adequately controlled per patient. Will continue to follow-up with Palliative Care regarding pain management/regimen on discharge.     # Hyperuricemia  - uric acid was 8.8 on . s/p IV Rasburicase 3mg x1 on .   - C/w Allopurinol 300mg daily  - Monitor TLS labs daily (CMP, Phos, LDH, Uric acid)    # DVT PPx  - Lovenox 40mg SQ    # Dispo  - Pending resolution of acute medical issues. Pt recs: DC home with assist from mother/brother, +elevator access in apt to 5th floor, recommend rolling walker for long distances and recommend 3:1 commode  - Wean off O2 as tolerated   - Pain control   - Will continue to update mother and brother over phone daily, all of their questions answered to their satisfaction     The case was discussed with Dr. Darin Gonzalez MD, MPH  Hematology/Oncology Fellow  Pager: [phone]

## 2020-04-23 NOTE — PROGRESS NOTE ADULT - ASSESSMENT
24 f with b/l adnexal masses and pelvic LAD, metastatic carcinoma of unknown primary presented 3/20 with abd pain, s/p CT guided biopsy 3/25, PICC line 4/3 to start chemo  pt intermittently febrile and leukocytosis, now febrile to 101.7 and tachy, WBC: 34  QUINN for the last 2 days now Cr: 1.3  blood and urine cx negative  COVID negative x 3 last one 4/4  RVP 4/1 with parainfluenza  also some diarrhea initially    persistent fever and leukocytosis, with tachycardia sepsis  C-diff negative  parainfluenza URI 4/1  metastatic ca with ?tumor fever  persistent tachycardia but no PE, just b/l pleural effusion and L pleural nodularity s/o malignant effusion s/p b/l chest tube  new fever  but pt had fevers before with negative infectious work up and now the WBC normalized to 10, likely tumor related  urine cx with <[id number] klebsiella but no urinary symptoms likely asymptomatic bacteriuria and u/a only with 6 WBC  * s/p cycle 1 of folfox and started on second cycle today 4/23  * infection w/u negative and likely malignancy related  * f/u the blood and urine cx  * WBC is improving and no urinary symptoms, monitor off antibiotics for now  * monitor the WBC and temp  * monitor for new signs of infection      The above assessment and plan was discussed with hem/onc team    Gina Arenas MD  Pager [phone]  After 5pm and on weekends call [phone]

## 2020-04-23 NOTE — PROGRESS NOTE ADULT - SUBJECTIVE AND OBJECTIVE BOX
Hematology/Oncology Follow-up    INTERVAL HPI/OVERNIGHT EVENTS:  Had fever early this morning again. Denies chills, diarrhea.       VITAL SIGNS:  T(F): 99.1 (20 @ 09:30)  HR: 107 (20 @ 09:30)  BP: 121/72 (20 @ 09:30)  RR: 20 (20 @ 09:30)  SpO2: 99% (20 @ 09:30)  Wt(kg): --    20 @ 07:01  -  20 @ 07:00  --------------------------------------------------------  IN: 1803 mL / OUT: 3780 mL / NET: -1977 mL    20 @ 07:01  -  20 @ 11:08  --------------------------------------------------------  IN: 360 mL / OUT: 320 mL / NET: 40 mL    PHYSICAL EXAM:  Constitutional: NAD   Eyes: sclera non-icteric, conjunctiva non-anemia  Mouth: No mucositis.  Respiratory: Decreased breath sound in bilateral lungs  Chest: Bilateral chest tubes  Cardiovascular: tachycardia, regular rhythm.   Gastrointestinal: soft and flat, no tenderness to palpation, normoactive bowel sound, + palpable mass in lower abdomen  Extremities:  No c/c/e  MSK: No joint swelling, erythema, or tenderness   Neurological: AOx3, Cranial nerves II-XII grossly intact  Skin: No rash  Psych: Normal affect    MEDICATIONS  (STANDING):  allopurinol 300 milliGRAM(s) Oral daily  dexAMETHasone  IVPB 12 milliGRAM(s) IV Intermittent once  enoxaparin Injectable 40 milliGRAM(s) SubCutaneous daily  famotidine    Tablet 20 milliGRAM(s) Oral daily  fluorouracil IVPB (eMAR) 2280 milliGRAM(s) IV Intermittent every 24 hours  fosaprepitant IVPB 150 milliGRAM(s) IV Intermittent once  iron sucrose IVPB 200 milliGRAM(s) IV Intermittent every 24 hours  leucovorin IVPB (eMAR) 760 milliGRAM(s) IV Intermittent once  lidocaine   Patch 2 Patch Transdermal daily  morphine ER Tablet 60 milliGRAM(s) Oral <User Schedule>  ondansetron  IVPB 16 milliGRAM(s) IV Intermittent every 24 hours  oxaliplatin IVPB (eMAR) 133 milliGRAM(s) IV Intermittent once  polyethylene glycol 3350 17 Gram(s) Oral daily  senna 2 Tablet(s) Oral at bedtime  sodium chloride 0.9% lock flush 3 milliLiter(s) IV Push every 8 hours  sodium chloride 0.9% lock flush 3 milliLiter(s) IV Push every 8 hours    MEDICATIONS  (PRN):  acetaminophen   Tablet .. 650 milliGRAM(s) Oral every 6 hours PRN Temp greater or equal to 38C (100.4F)  aluminum hydroxide/magnesium hydroxide/simethicone Suspension 30 milliLiter(s) Oral every 6 hours PRN Dyspepsia  bisacodyl 5 milliGRAM(s) Oral every 12 hours PRN Constipation  metoclopramide Injectable 10 milliGRAM(s) IV Push every 6 hours PRN Nausea/Vomiting  morphine  IR 7.5 milliGRAM(s) Oral every 3 hours PRN Moderate Pain (4 - 6) or severe  naloxone Injectable 0.1 milliGRAM(s) IV Push every 3 minutes PRN Sedation or RR <10  ondansetron Injectable 4 milliGRAM(s) IV Push every 6 hours PRN Nausea and/or Vomiting  simethicone 80 milliGRAM(s) Chew every 6 hours PRN Gas      No Known Allergies      LABS:                        7.7    9.98  )-----------( 496      ( 2020 07:25 )             26.7     04    139  |  100  |  6<L>  ----------------------------<  107<H>  3.9   |  25  |  0.59    Ca    8.2<L>      2020 07:25  Phos  1.6     04  Mg     1.7         TPro  6.3  /  Alb  2.2<L>  /  TBili  0.4  /  DBili  x   /  AST  27  /  ALT  6<L>  /  AlkPhos  264<H>      PT/INR - ( 2020 07:25 )   PT: 14.0 sec;   INR: 1.21 ratio          Lactate Dehydrogenase, Serum: 342 U/L ( @ 07:25)    Urinalysis Basic - ( 2020 08:30 )    Color: Light Yellow / Appearance: Clear / S.009 / pH: x  Gluc: x / Ketone: Negative  / Bili: Negative / Urobili: <2 mg/dL   Blood: x / Protein: Negative / Nitrite: Negative   Leuk Esterase: Moderate / RBC: 3 /HPF / WBC 20 /HPF   Sq Epi: x / Non Sq Epi: 5-10 /HPF / Bacteria: Negative        RADIOLOGY & ADDITIONAL TESTS:  Studies reviewed. Hematology/Oncology Follow-up    INTERVAL HPI/OVERNIGHT EVENTS:  Had fever early this morning again. Usually associated with chills and sweat, Denies diarrhea.     VITAL SIGNS:  T(F): 99.1 (20 @ 09:30)  HR: 107 (20 @ 09:30)  BP: 121/72 (20 @ 09:30)  RR: 20 (20 @ 09:30)  SpO2: 99% (20 @ 09:30)  Wt(kg): --    20 @ 07:01  -  20 @ 07:00  --------------------------------------------------------  IN: 1803 mL / OUT: 3780 mL / NET: -1977 mL    20 @ 07:01  -  20 @ 11:08  --------------------------------------------------------  IN: 360 mL / OUT: 320 mL / NET: 40 mL    PHYSICAL EXAM:  Constitutional: NAD   Eyes: sclera non-icteric, conjunctiva non-anemia  Mouth: No mucositis.  Respiratory: Decreased breath sound in bilateral lungs  Chest: Bilateral chest tubes  Cardiovascular: tachycardia, regular rhythm.   Gastrointestinal: soft and flat, no tenderness to palpation, normoactive bowel sound, + palpable mass in lower abdomen  Extremities:  No c/c/e  MSK: No joint swelling, erythema, or tenderness   Neurological: AOx3, Cranial nerves II-XII grossly intact  Skin: No rash  Psych: Normal affect    MEDICATIONS  (STANDING):  allopurinol 300 milliGRAM(s) Oral daily  dexAMETHasone  IVPB 12 milliGRAM(s) IV Intermittent once  enoxaparin Injectable 40 milliGRAM(s) SubCutaneous daily  famotidine    Tablet 20 milliGRAM(s) Oral daily  fluorouracil IVPB (eMAR) 2280 milliGRAM(s) IV Intermittent every 24 hours  fosaprepitant IVPB 150 milliGRAM(s) IV Intermittent once  iron sucrose IVPB 200 milliGRAM(s) IV Intermittent every 24 hours  leucovorin IVPB (eMAR) 760 milliGRAM(s) IV Intermittent once  lidocaine   Patch 2 Patch Transdermal daily  morphine ER Tablet 60 milliGRAM(s) Oral <User Schedule>  ondansetron  IVPB 16 milliGRAM(s) IV Intermittent every 24 hours  oxaliplatin IVPB (eMAR) 133 milliGRAM(s) IV Intermittent once  polyethylene glycol 3350 17 Gram(s) Oral daily  senna 2 Tablet(s) Oral at bedtime  sodium chloride 0.9% lock flush 3 milliLiter(s) IV Push every 8 hours  sodium chloride 0.9% lock flush 3 milliLiter(s) IV Push every 8 hours    MEDICATIONS  (PRN):  acetaminophen   Tablet .. 650 milliGRAM(s) Oral every 6 hours PRN Temp greater or equal to 38C (100.4F)  aluminum hydroxide/magnesium hydroxide/simethicone Suspension 30 milliLiter(s) Oral every 6 hours PRN Dyspepsia  bisacodyl 5 milliGRAM(s) Oral every 12 hours PRN Constipation  metoclopramide Injectable 10 milliGRAM(s) IV Push every 6 hours PRN Nausea/Vomiting  morphine  IR 7.5 milliGRAM(s) Oral every 3 hours PRN Moderate Pain (4 - 6) or severe  naloxone Injectable 0.1 milliGRAM(s) IV Push every 3 minutes PRN Sedation or RR <10  ondansetron Injectable 4 milliGRAM(s) IV Push every 6 hours PRN Nausea and/or Vomiting  simethicone 80 milliGRAM(s) Chew every 6 hours PRN Gas      No Known Allergies      LABS:                        7.7    9.98  )-----------( 496      ( 2020 07:25 )             26.7     04    139  |  100  |  6<L>  ----------------------------<  107<H>  3.9   |  25  |  0.59    Ca    8.2<L>      2020 07:25  Phos  1.6     04  Mg     1.7         TPro  6.3  /  Alb  2.2<L>  /  TBili  0.4  /  DBili  x   /  AST  27  /  ALT  6<L>  /  AlkPhos  264<H>  04    PT/INR - ( 2020 07:25 )   PT: 14.0 sec;   INR: 1.21 ratio          Lactate Dehydrogenase, Serum: 342 U/L ( @ 07:25)    Urinalysis Basic - ( 2020 08:30 )    Color: Light Yellow / Appearance: Clear / S.009 / pH: x  Gluc: x / Ketone: Negative  / Bili: Negative / Urobili: <2 mg/dL   Blood: x / Protein: Negative / Nitrite: Negative   Leuk Esterase: Moderate / RBC: 3 /HPF / WBC 20 /HPF   Sq Epi: x / Non Sq Epi: 5-10 /HPF / Bacteria: Negative        RADIOLOGY & ADDITIONAL TESTS:  Studies reviewed.

## 2020-04-23 NOTE — PROGRESS NOTE ADULT - SUBJECTIVE AND OBJECTIVE BOX
Follow Up:  fever, leukocytosis    Interval History: pt febrile again and was started on the second cycle today    ROS:      All other systems negative    Constitutional: no fever,  chills  Head: no trauma  Eyes: no vision changes, no eye pain  ENT:  no sore throat, no rhinorrhea  Cardiovascular:  no chest pain, no palpitation  Respiratory:  improved SOB, no cough  GI:  + abd pain, no vomiting, no diarrhea   urinary: no dysuria, no hematuria, no flank pain  musculoskeletal:  no joint pain, no joint swelling  skin:  no rash  neurology:  no headache, no seizure, no change in mental status  psych: no anxiety, no depression           Allergies  No Known Allergies        ANTIMICROBIALS:      OTHER MEDS:  acetaminophen   Tablet .. 650 milliGRAM(s) Oral every 6 hours PRN  allopurinol 300 milliGRAM(s) Oral daily  aluminum hydroxide/magnesium hydroxide/simethicone Suspension 30 milliLiter(s) Oral every 6 hours PRN  bisacodyl 5 milliGRAM(s) Oral every 12 hours PRN  enoxaparin Injectable 40 milliGRAM(s) SubCutaneous daily  famotidine    Tablet 20 milliGRAM(s) Oral daily  fluorouracil IVPB (eMAR) 2280 milliGRAM(s) IV Intermittent every 24 hours  iron sucrose IVPB 200 milliGRAM(s) IV Intermittent every 24 hours  lidocaine   Patch 2 Patch Transdermal daily  metoclopramide Injectable 10 milliGRAM(s) IV Push every 6 hours PRN  morphine  IR 7.5 milliGRAM(s) Oral every 3 hours PRN  morphine ER Tablet 60 milliGRAM(s) Oral <User Schedule>  naloxone Injectable 0.1 milliGRAM(s) IV Push every 3 minutes PRN  ondansetron  IVPB 16 milliGRAM(s) IV Intermittent every 24 hours  ondansetron Injectable 4 milliGRAM(s) IV Push every 6 hours PRN  polyethylene glycol 3350 17 Gram(s) Oral daily  senna 2 Tablet(s) Oral at bedtime  simethicone 80 milliGRAM(s) Chew every 6 hours PRN  sodium chloride 0.9% lock flush 3 milliLiter(s) IV Push every 8 hours  sodium chloride 0.9% lock flush 3 milliLiter(s) IV Push every 8 hours      Vital Signs Last 24 Hrs  T(C): 37.1 (2020 13:00), Max: 38.7 (2020 05:07)  T(F): 98.8 (2020 13:00), Max: 101.7 (2020 05:07)  HR: 116 (2020 13:00) (100 - 128)  BP: 122/77 (2020 13:00) (110/64 - 124/67)  BP(mean): --  RR: 20 (2020 13:00) (20 - 20)  SpO2: 97% (2020 13:00) (97% - 100%)    Physical Exam:  General:    NAD,  non toxic, eating  Head: atraumatic, normocephalic  Eye: normal sclera and conjunctiva  ENT:    no oropharyngeal lesions,   no LAD,   neck supple  Cardio:    tachy regular S1, S2  Respiratory:  b/l chest tubes, now on NC  abd:    palpable mass, soft,   BS +,  slight diffuse tenderness  :   no CVAT,  no suprapubic tenderness,   no  cash  Musculoskeletal:   no joint swelling,   no edema  vascular: RUE picc  Skin:    no rash  Neurologic:     no focal deficit  psych: normal affect                          7.7    9.98  )-----------( 496      ( 2020 07:25 )             26.7           139  |  100  |  6<L>  ----------------------------<  107<H>  3.9   |  25  |  0.59    Ca    8.2<L>      2020 07:25  Phos  1.6       Mg     1.7         TPro  6.3  /  Alb  2.2<L>  /  TBili  0.4  /  DBili  x   /  AST  27  /  ALT  6<L>  /  AlkPhos  264<H>  23      Urinalysis Basic - ( 2020 08:30 )    Color: Light Yellow / Appearance: Clear / S.009 / pH: x  Gluc: x / Ketone: Negative  / Bili: Negative / Urobili: <2 mg/dL   Blood: x / Protein: Negative / Nitrite: Negative   Leuk Esterase: Moderate / RBC: 3 /HPF / WBC 20 /HPF   Sq Epi: x / Non Sq Epi: 5-10 /HPF / Bacteria: Negative        MICROBIOLOGY:  v  .Blood Blood-Peripheral  20   No growth to date.  --  --      .Blood Blood-Catheter  20   No growth to date.  --  --      .Urine Clean Catch (Midstream)  20   >100,000 CFU/ml Gram Negative Rods  --  --      Pleural Fl Pleural Fluid  20   No growth  --    polymorphonuclear leukocytes seen  No organisms seen  by cytocentrifuge      .Urine  20   50,000 - 99,000 CFU/mL Klebsiella pneumoniae (Carbapenem Resistant)  --  Klepne MDRO      .Urine Clean Catch (Midstream)  20   10,000 - 49,000 CFU/mL Klebsiella pneumoniae (Carbapenem Resistant)  --  Klepne MDRO      .Blood Blood-Catheter  20   No Growth Final  --  --      .Blood Blood-Peripheral  20   No Growth Final  --  --      Pleural Fl Pleural Fluid  20   No growth at 5 days  --    polymorphonuclear leukocytes seen  No organisms seen  by cytocentrifuge      .Blood Blood-Peripheral  20   No Growth Final  --  --      .Blood PICC/PERC Double Lumen BLUE  20   No Growth Final  --  --      .Urine Clean Catch (Midstream)  20   No growth  --  --      .Blood Blood-Peripheral  20   No Growth Final  --  --      .Blood Blood-Peripheral  20   No Growth Final  --  --      .Urine Clean Catch (Midstream)  20   No growth  --  --      .Blood Blood-Peripheral  20   No Growth Final  --  --      .Blood Blood-Catheter  20   No Growth Final  --  --      .Blood Blood-Peripheral  20   No Growth Final  --  --      .Urine Clean Catch (Midstream)  20   >=3 organisms. Probable collection contamination.  --  --      .Blood Blood-Peripheral  20   No Growth Final  --  --                RADIOLOGY:  Images below independently visualized and reviewed personally, findings as below  < from: Xray Chest 1 View- PORTABLE-Urgent (20 @ 19:34) >  FINDINGS/  IMPRESSION:    Bilateral chest tubes are present. Right PICC tip within SVC.    Bilateral pleural effusions are decreased. Bibasilar hazy opacities.

## 2020-04-23 NOTE — ADVANCED PRACTICE NURSE CONSULT - RECOMMEDATIONS
Pt tolerated well report given to area nurse strict i/o monitor for side effects cold precautions in place

## 2020-04-23 NOTE — ADVANCED PRACTICE NURSE CONSULT - ASSESSMENT
Cycle 2 day 1/2,Height and weight BSA verified. Lab results as per aden madrigal np aware of same. Vital signs stable prior to chemotherapy, and  within accepectable parameters, see sunrise. strict i/o Pt education done re chemo regimen, drug effects and potential side effects, Reports that he/she has been tolerating chemotherapy well without side effects. Pt with R PICC  line, site free from signs and symptoms of infection, good blood return obtained. Pre- medicated with emend 150 mg ivss zofran 16 mg ivss decadron 12 mg ivss oxaliplatin 70 mg/f6=514 mg ivss concurent with Leucovorin 400 mg/v6=718 mg ivss over 2 hr 5 fu 1200 mg/a2=0535 mg civi @ 23 ml/hr

## 2020-04-24 NOTE — PROGRESS NOTE ADULT - SUBJECTIVE AND OBJECTIVE BOX
Follow Up:  fever, leukocytosis    Interval History: pt afebrile after starting chemo, feels better    ROS:      All other systems negative    Constitutional: no fever,  chills  Head: no trauma  Eyes: no vision changes, no eye pain  ENT:  no sore throat, no rhinorrhea  Cardiovascular:  no chest pain, no palpitation  Respiratory:  improved SOB, no cough  GI:  + abd pain, no vomiting, no diarrhea   urinary: no dysuria, no hematuria, no flank pain  musculoskeletal:  no joint pain, no joint swelling  skin:  no rash  neurology:  no headache, no seizure, no change in mental status  psych: no anxiety, no depression           Allergies  No Known Allergies        ANTIMICROBIALS:      OTHER MEDS:  acetaminophen   Tablet .. 650 milliGRAM(s) Oral every 6 hours PRN  allopurinol 300 milliGRAM(s) Oral daily  aluminum hydroxide/magnesium hydroxide/simethicone Suspension 30 milliLiter(s) Oral every 6 hours PRN  bisacodyl 5 milliGRAM(s) Oral every 12 hours PRN  enoxaparin Injectable 40 milliGRAM(s) SubCutaneous daily  famotidine    Tablet 20 milliGRAM(s) Oral daily  fluorouracil IVPB (eMAR) 2280 milliGRAM(s) IV Intermittent every 24 hours  iron sucrose IVPB 200 milliGRAM(s) IV Intermittent every 24 hours  lidocaine   Patch 2 Patch Transdermal daily  metoclopramide Injectable 10 milliGRAM(s) IV Push every 6 hours PRN  morphine  IR 7.5 milliGRAM(s) Oral every 3 hours PRN  morphine ER Tablet 60 milliGRAM(s) Oral <User Schedule>  naloxone Injectable 0.1 milliGRAM(s) IV Push every 3 minutes PRN  ondansetron  IVPB 16 milliGRAM(s) IV Intermittent every 24 hours  ondansetron Injectable 4 milliGRAM(s) IV Push every 6 hours PRN  polyethylene glycol 3350 17 Gram(s) Oral daily  senna 2 Tablet(s) Oral at bedtime  simethicone 80 milliGRAM(s) Chew every 6 hours PRN  sodium chloride 0.9% lock flush 3 milliLiter(s) IV Push every 8 hours  sodium chloride 0.9% lock flush 3 milliLiter(s) IV Push every 8 hours      Vital Signs Last 24 Hrs  T(C): 35.9 (2020 13:10), Max: 36.8 (2020 21:41)  T(F): 96.7 (2020 13:10), Max: 98.3 (2020 21:41)  HR: 102 (2020 13:10) (102 - 113)  BP: 117/73 (2020 13:10) (109/67 - 121/68)  BP(mean): --  RR: 18 (2020 13:10) (18 - 20)  SpO2: 97% (2020 13:10) (97% - 98%)    Physical Exam:  General:    NAD,  non toxic, eating  Head: atraumatic, normocephalic  Eye: normal sclera and conjunctiva  ENT:    no oropharyngeal lesions,   no LAD,   neck supple  Cardio:    tachy regular S1, S2  Respiratory:  b/l chest tubes, now on NC  abd:    palpable mass, soft,   BS +,  slight diffuse tenderness  :   no CVAT,  no suprapubic tenderness,   no  cash  Musculoskeletal:   no joint swelling,   no edema  vascular: RUE picc  Skin:    no rash  Neurologic:     no focal deficit  psych: normal affect                          8.0    10.19 )-----------( 540      ( 2020 07:13 )             26.9       04-24    139  |  101  |  8   ----------------------------<  138<H>  4.2   |  29  |  0.53    Ca    8.9      2020 07:13  Phos  2.3     -  Mg     1.6         TPro  6.7  /  Alb  2.3<L>  /  TBili  0.3  /  DBili  x   /  AST  24  /  ALT  7<L>  /  AlkPhos  259<H>  24      Urinalysis Basic - ( 2020 08:30 )    Color: Light Yellow / Appearance: Clear / S.009 / pH: x  Gluc: x / Ketone: Negative  / Bili: Negative / Urobili: <2 mg/dL   Blood: x / Protein: Negative / Nitrite: Negative   Leuk Esterase: Moderate / RBC: 3 /HPF / WBC 20 /HPF   Sq Epi: x / Non Sq Epi: 5-10 /HPF / Bacteria: Negative        MICROBIOLOGY:  v  .Blood Blood-Peripheral  20   No growth to date.  --  --      .Urine Clean Catch (Midstream)  20   >100,000 CFU/ml Gram Negative Rods  --  --      .Blood Blood-Peripheral  20   No growth to date.  --  --      .Blood Blood-Catheter  20   No growth to date.  --  --      .Urine Clean Catch (Midstream)  20   >100,000 CFU/ml Klebsiella pneumoniae (Carbapenem Resistant)  --  Klepne MDRO      Pleural Fl Pleural Fluid  20   No growth  --    polymorphonuclear leukocytes seen  No organisms seen  by cytocentrifuge      .Urine  20   50,000 - 99,000 CFU/mL Klebsiella pneumoniae (Carbapenem Resistant)  --  Klepne MDRO      .Urine Clean Catch (Midstream)  20   10,000 - 49,000 CFU/mL Klebsiella pneumoniae (Carbapenem Resistant)  --  Klepne MDRO      .Blood Blood-Catheter  20   No Growth Final  --  --      .Blood Blood-Peripheral  20   No Growth Final  --  --      Pleural Fl Pleural Fluid  20   No growth at 5 days  --    polymorphonuclear leukocytes seen  No organisms seen  by cytocentrifuge      .Blood Blood-Peripheral  20   No Growth Final  --  --      .Blood PICC/PERC Double Lumen BLUE  20   No Growth Final  --  --      .Urine Clean Catch (Midstream)  20   No growth  --  --      .Blood Blood-Peripheral  20   No Growth Final  --  --      .Blood Blood-Peripheral  20   No Growth Final  --  --      .Urine Clean Catch (Midstream)  20   No growth  --  --      .Blood Blood-Peripheral  20   No Growth Final  --  --      .Blood Blood-Catheter  20   No Growth Final  --  --      .Blood Blood-Peripheral  20   No Growth Final  --  --      .Urine Clean Catch (Midstream)  20   >=3 organisms. Probable collection contamination.  --  --      .Blood Blood-Peripheral  20   No Growth Final  --  --                RADIOLOGY:  Images below independently visualized and reviewed personally, findings as below  < from: Xray Chest 1 View- PORTABLE-Urgent (20 @ 19:34) >  IMPRESSION:    Bilateral chest tubes are present. Right PICC tip within SVC.    Bilateral pleural effusions are decreased. Bibasilar hazy opacities.

## 2020-04-24 NOTE — PROGRESS NOTE ADULT - SUBJECTIVE AND OBJECTIVE BOX
Hematology/Oncology Follow-up    INTERVAL HPI/OVERNIGHT EVENTS:  No acute overnight event. Cycle2 FOLFOX is going. Today is day 2. No fever/chills.     VITAL SIGNS:  T(F): 98 (20 @ 04:58)  HR: 113 (20 @ 04:58)  BP: 121/68 (20 @ 04:58)  RR: 20 (20 @ 04:58)  SpO2: 98% (20 @ 04:58)  Wt(kg): --    20 @ 07:01  -  20 @ 07:00  --------------------------------------------------------  IN: 3347 mL / OUT: 3740 mL / NET: -393 mL        PHYSICAL EXAM:  Constitutional: NAD   Eyes: sclera non-icteric, conjunctiva non-anemia  Mouth: No mucositis.  Respiratory: Decreased breath sound in bilateral lungs  Chest: Bilateral chest tubes  Cardiovascular: tachycardia, regular rhythm.   Gastrointestinal: soft and flat, no tenderness to palpation, normoactive bowel sound, + palpable mass in lower abdomen  Extremities:  No c/c/e  MSK: No joint swelling, erythema, or tenderness   Neurological: AOx3, Cranial nerves II-XII grossly intact  Skin: No rash  Psych: Normal affect    MEDICATIONS  (STANDING):  allopurinol 300 milliGRAM(s) Oral daily  enoxaparin Injectable 40 milliGRAM(s) SubCutaneous daily  famotidine    Tablet 20 milliGRAM(s) Oral daily  fluorouracil IVPB (eMAR) 2280 milliGRAM(s) IV Intermittent every 24 hours  iron sucrose IVPB 200 milliGRAM(s) IV Intermittent every 24 hours  lidocaine   Patch 2 Patch Transdermal daily  morphine ER Tablet 60 milliGRAM(s) Oral <User Schedule>  ondansetron  IVPB 16 milliGRAM(s) IV Intermittent every 24 hours  polyethylene glycol 3350 17 Gram(s) Oral daily  senna 2 Tablet(s) Oral at bedtime  sodium chloride 0.9% lock flush 3 milliLiter(s) IV Push every 8 hours  sodium chloride 0.9% lock flush 3 milliLiter(s) IV Push every 8 hours    MEDICATIONS  (PRN):  acetaminophen   Tablet .. 650 milliGRAM(s) Oral every 6 hours PRN Temp greater or equal to 38C (100.4F)  aluminum hydroxide/magnesium hydroxide/simethicone Suspension 30 milliLiter(s) Oral every 6 hours PRN Dyspepsia  bisacodyl 5 milliGRAM(s) Oral every 12 hours PRN Constipation  metoclopramide Injectable 10 milliGRAM(s) IV Push every 6 hours PRN Nausea/Vomiting  morphine  IR 7.5 milliGRAM(s) Oral every 3 hours PRN Moderate Pain (4 - 6) or severe  naloxone Injectable 0.1 milliGRAM(s) IV Push every 3 minutes PRN Sedation or RR <10  ondansetron Injectable 4 milliGRAM(s) IV Push every 6 hours PRN Nausea and/or Vomiting  simethicone 80 milliGRAM(s) Chew every 6 hours PRN Gas      No Known Allergies      LABS:                        8.0    10.19 )-----------( 540      ( 2020 07:13 )             26.9         139  |  101  |  8   ----------------------------<  138<H>  4.2   |  29  |  0.53    Ca    8.9      2020 07:13  Phos  2.3       Mg     1.6         TPro  6.7  /  Alb  2.3<L>  /  TBili  0.3  /  DBili  x   /  AST  24  /  ALT  7<L>  /  AlkPhos  259<H>      PT/INR - ( 2020 07:13 )   PT: 14.2 sec;   INR: 1.24 ratio          Lactate Dehydrogenase, Serum: 365 U/L ( @ 07:13)    Urinalysis Basic - ( 2020 08:30 )    Color: Light Yellow / Appearance: Clear / S.009 / pH: x  Gluc: x / Ketone: Negative  / Bili: Negative / Urobili: <2 mg/dL   Blood: x / Protein: Negative / Nitrite: Negative   Leuk Esterase: Moderate / RBC: 3 /HPF / WBC 20 /HPF   Sq Epi: x / Non Sq Epi: 5-10 /HPF / Bacteria: Negative    RADIOLOGY & ADDITIONAL TESTS:  Studies reviewed. Hematology/Oncology Follow-up    INTERVAL HPI/OVERNIGHT EVENTS:  No acute overnight event. Cycle2 FOLFOX is going. Today is day 2. No fever/chills. Feeling ok today    VITAL SIGNS:  T(F): 98 (20 @ 04:58)  HR: 113 (20 @ 04:58)  BP: 121/68 (20 @ 04:58)  RR: 20 (20 @ 04:58)  SpO2: 98% (20 @ 04:58)  Wt(kg): --    20 @ 07:01  -  20 @ 07:00  --------------------------------------------------------  IN: 3347 mL / OUT: 3740 mL / NET: -393 mL        PHYSICAL EXAM:  Constitutional: NAD   Eyes: sclera non-icteric, conjunctiva non-anemia  Mouth: No mucositis.  Respiratory: Decreased breath sound in bilateral lungs  Chest: Bilateral chest tubes  Cardiovascular: tachycardia, regular rhythm.   Gastrointestinal: soft and flat, no tenderness to palpation, normoactive bowel sound, + palpable mass in lower abdomen  Extremities:  No c/c/e  MSK: No joint swelling, erythema, or tenderness   Neurological: AOx3, Cranial nerves II-XII grossly intact  Skin: No rash  Psych: Normal affect    MEDICATIONS  (STANDING):  allopurinol 300 milliGRAM(s) Oral daily  enoxaparin Injectable 40 milliGRAM(s) SubCutaneous daily  famotidine    Tablet 20 milliGRAM(s) Oral daily  fluorouracil IVPB (eMAR) 2280 milliGRAM(s) IV Intermittent every 24 hours  iron sucrose IVPB 200 milliGRAM(s) IV Intermittent every 24 hours  lidocaine   Patch 2 Patch Transdermal daily  morphine ER Tablet 60 milliGRAM(s) Oral <User Schedule>  ondansetron  IVPB 16 milliGRAM(s) IV Intermittent every 24 hours  polyethylene glycol 3350 17 Gram(s) Oral daily  senna 2 Tablet(s) Oral at bedtime  sodium chloride 0.9% lock flush 3 milliLiter(s) IV Push every 8 hours  sodium chloride 0.9% lock flush 3 milliLiter(s) IV Push every 8 hours    MEDICATIONS  (PRN):  acetaminophen   Tablet .. 650 milliGRAM(s) Oral every 6 hours PRN Temp greater or equal to 38C (100.4F)  aluminum hydroxide/magnesium hydroxide/simethicone Suspension 30 milliLiter(s) Oral every 6 hours PRN Dyspepsia  bisacodyl 5 milliGRAM(s) Oral every 12 hours PRN Constipation  metoclopramide Injectable 10 milliGRAM(s) IV Push every 6 hours PRN Nausea/Vomiting  morphine  IR 7.5 milliGRAM(s) Oral every 3 hours PRN Moderate Pain (4 - 6) or severe  naloxone Injectable 0.1 milliGRAM(s) IV Push every 3 minutes PRN Sedation or RR <10  ondansetron Injectable 4 milliGRAM(s) IV Push every 6 hours PRN Nausea and/or Vomiting  simethicone 80 milliGRAM(s) Chew every 6 hours PRN Gas      No Known Allergies      LABS:                        8.0    10.19 )-----------( 540      ( 2020 07:13 )             26.9         139  |  101  |  8   ----------------------------<  138<H>  4.2   |  29  |  0.53    Ca    8.9      2020 07:13  Phos  2.3       Mg     1.6         TPro  6.7  /  Alb  2.3<L>  /  TBili  0.3  /  DBili  x   /  AST  24  /  ALT  7<L>  /  AlkPhos  259<H>      PT/INR - ( 2020 07:13 )   PT: 14.2 sec;   INR: 1.24 ratio          Lactate Dehydrogenase, Serum: 365 U/L ( @ 07:13)    Urinalysis Basic - ( 2020 08:30 )    Color: Light Yellow / Appearance: Clear / S.009 / pH: x  Gluc: x / Ketone: Negative  / Bili: Negative / Urobili: <2 mg/dL   Blood: x / Protein: Negative / Nitrite: Negative   Leuk Esterase: Moderate / RBC: 3 /HPF / WBC 20 /HPF   Sq Epi: x / Non Sq Epi: 5-10 /HPF / Bacteria: Negative    RADIOLOGY & ADDITIONAL TESTS:  Studies reviewed.

## 2020-04-24 NOTE — ADVANCED PRACTICE NURSE CONSULT - ASSESSMENT
Lab results reviewed by Dr. Wong. Reinforced teachings to patient about her chemo regimen well understood. With right arm ac double lumen PICC line both ports in used patent. OOB with minimal assist from chair to bed. On zofran 16mg day 2 and 3 as antiemetic. 2 RNs verification completed prior to chemo start. 5FU 2280mg in 500ml D5W started at 1648 as civ at 23ml/hr via lowest port of NSS line into right purple port from PICC line. Patent. Safety maintained.

## 2020-04-24 NOTE — PROGRESS NOTE ADULT - SUBJECTIVE AND OBJECTIVE BOX
Pulmonary Follow up Note     nad    bilateral chest tubes    improved oxygenation     improved lung aeration on exam    awaiting cytology, negative    Vital Signs Last 24 Hrs  T(C): 35.9 (24 Apr 2020 13:10), Max: 36.8 (23 Apr 2020 21:41)  T(F): 96.7 (24 Apr 2020 13:10), Max: 98.3 (23 Apr 2020 21:41)  HR: 102 (24 Apr 2020 13:10) (102 - 113)  BP: 117/73 (24 Apr 2020 13:10) (109/67 - 121/68)  BP(mean): --  RR: 18 (24 Apr 2020 13:10) (18 - 20)  SpO2: 97% (24 Apr 2020 13:10) (97% - 98%)      lungs diminished aeration at bases    IMPRESSION     metastatic cancer unknown primary    bilateral pleural effusions    sp bilateral chest tube    output is low    RECOMMEND   may discontinue chest tubes and monitor CXR    If effusions recur then drainage and pleurodesis may be needed, unless patient is treated with chemotherapy that prevents recurrence of effusions        Viktor Womack MD  Pulmonary

## 2020-04-24 NOTE — PROGRESS NOTE ADULT - ASSESSMENT
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      # Metastatic carcinoma, cancer of unknown primary  - CT A/P with bilateral heterogeneous adnexal masses, as well as upper abdominal, RP, possible bone lesions and pelvic lymphadenopathy; CT Chest with multiple solid pulmonary nodules  - Cancer of unknown primary (poorly differentiated carcinoma) but suspect likely GI origin given CDX2 positivity on pathology; chromogranin positivity also suggests neuroendocrine features; Ki-67 index 40%.   - Tumor markers elevated: Cancer Antigen, Ca 27.29: 70.8, Cancer Antigen, 125: 619, Carcinoembryonic Antigen: 44.7, Beta-HC.0 on initial evaluation (3/7); Checked again on 20 and it was 102.7  - Discussed diagnosis with patient on 3/31/20. Given advanced, metastatic stage, will need to be on indefinite treatment to limit progression of disease.  - S/p C1 mFOLFOX (-).    - Will try to wean supplemental oxygen as tolerated. Will also provide an incentive spirometer  - CTA Chest () was negative for central PE, but unable to evaluate segmental and subsegmental branches due to motion artifact. In the setting of her hypoxemia and tachycardia,   -LE dopplers negative for DVT on .  - Continue Venofer until discharge   - is cycle 2 day2 of mFolfox, continue     # Tachycardia  # Hypoxia   # Bilateral pleural effusion  - COVID-19 negative x 3, but parainfluenza positive ().   - CTA Chest () was negative for central PE, but unable to evaluate segmental and subsegmental branches due to motion artifact. Lung imaging showed unchanged metastatic nodules, small bilateral pleural effusions, and atelectasis of the basilar LLL (unchanged as well). No new opacity which decreases the likelihood of PNA  - CT A/P repeated on 4/3/20; no obvious source of infection   - Blood/urine cultures have had NGTD. C. diff negative  - ID following, appreciate recs  - S/p PICC line placement for chemotherapy  - Patient is s/p C1 modified FOLFOX (-). Further management as noted below   - Blood cultures from  and  NGTD  - urine cx + 50-99K Klebsiella CRE- per ID no need to treat at this time given no symptoms    - CTA chest on  showing no PE, but large bilateral pleural effusions s/p Drains in both left and right lung   -Appreciate pulmonary's reds  - Echo WNL     # Pain control   - Pain medications now switched to MS contin, pain is adequately controlled per patient. Will continue to follow-up with Palliative Care regarding pain management/regimen on discharge.     # Hyperuricemia  - uric acid was 8.8 on . s/p IV Rasburicase 3mg x1 on .   - C/w Allopurinol 300mg daily  - Monitor TLS labs daily (CMP, Phos, LDH, Uric acid)    # DVT PPx  - Lovenox 40mg SQ    # Dispo  - Pending resolution of acute medical issues. 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      # Metastatic carcinoma, cancer of unknown primary  - CT A/P with bilateral heterogeneous adnexal masses, as well as upper abdominal, RP, possible bone lesions and pelvic lymphadenopathy; CT Chest with multiple solid pulmonary nodules  - Cancer of unknown primary (poorly differentiated carcinoma) but suspect likely GI origin given CDX2 positivity on pathology; chromogranin positivity also suggests neuroendocrine features; Ki-67 index 40%.   - Tumor markers elevated: Cancer Antigen, Ca 27.29: 70.8, Cancer Antigen, 125: 619, Carcinoembryonic Antigen: 44.7, Beta-HC.0 on initial evaluation (3/7); Checked again on 20 and it was 102.7  - Discussed diagnosis with patient on 3/31/20. Given advanced, metastatic stage, will need to be on indefinite treatment to limit progression of disease.  - S/p C1 mFOLFOX (-).    - Will try to wean supplemental oxygen as tolerated. Will also provide an incentive spirometer  - CTA Chest () was negative for central PE, but unable to evaluate segmental and subsegmental branches due to motion artifact. In the setting of her hypoxemia and tachycardia,   -LE dopplers negative for DVT on .  - Continue Venofer until discharge   - is cycle 2 day2 of mFolfox, continue     # Tachycardia  # Hypoxia   # Bilateral pleural effusion  - COVID-19 negative x 3, but parainfluenza positive ().   - CTA Chest () was negative for central PE, but unable to evaluate segmental and subsegmental branches due to motion artifact. Lung imaging showed unchanged metastatic nodules, small bilateral pleural effusions, and atelectasis of the basilar LLL (unchanged as well). No new opacity which decreases the likelihood of PNA  - CT A/P repeated on 4/3/20; no obvious source of infection   - Blood/urine cultures have had NGTD. C. diff negative  - ID following, appreciate recs  - S/p PICC line placement for chemotherapy  - Patient is s/p C1 modified FOLFOX (-). Further management as noted below   - Blood cultures from  and  NGTD  - urine cx + 50-99K Klebsiella CRE- per ID no need to treat at this time given no symptoms    - CTA chest on  showing no PE, but large bilateral pleural effusions s/p Drains in both left and right lung   -Appreciate pulmonary's reds  -Cytology was positive from pleural effusion, F/U pulmonary's recs  - Echo WNL     # Pain control   - Pain medications now switched to MS contin, pain is adequately controlled per patient. Will continue to follow-up with Palliative Care regarding pain management/regimen on discharge.     # Hyperuricemia  - uric acid was 8.8 on . s/p IV Rasburicase 3mg x1 on .   - C/w Allopurinol 300mg daily  - Monitor TLS labs daily (CMP, Phos, LDH, Uric acid)    # DVT PPx  - Lovenox 40mg SQ    # Dispo  - Pending resolution of acute medical issues. Pt recs: DC home with assist from mother/brother, +elevator access in apt to 5th floor, recommend rolling walker for long distances and recommend 3:1 commode  - Wean off O2 as tolerated   - Pain control   - Will continue to update mother and brother over phone daily, all of their questions answered to their satisfaction     The case was discussed with Dr. Darin Gonzalez MD, MPH  Hematology/Oncology Fellow  Pager: [phone]

## 2020-04-24 NOTE — PROGRESS NOTE ADULT - ASSESSMENT
24 f with b/l adnexal masses and pelvic LAD, metastatic carcinoma of unknown primary presented 3/20 with abd pain, s/p CT guided biopsy 3/25, PICC line 4/3 to start chemo  pt intermittently febrile and leukocytosis, now febrile to 101.7 and tachy, WBC: 34  QUINN for the last 2 days now Cr: 1.3  blood and urine cx negative  COVID negative x 3 last one 4/4  RVP 4/1 with parainfluenza  also some diarrhea initially    persistent fever and leukocytosis, with tachycardia sepsis  C-diff negative  parainfluenza URI 4/1  metastatic ca with ?tumor fever  persistent tachycardia but no PE, just b/l pleural effusion and L pleural nodularity s/o malignant effusion s/p b/l chest tube  new fever again resolved after starting chemo and pt had fevers before with negative infectious work up and now the WBC normalized to 10, likely tumor related  urine cx with <970521 klebsiella but no urinary symptoms likely asymptomatic bacteriuria and u/a only with 6 WBC  * s/p cycle 1 of folfox and started on second cycle 4/23  * infection w/u negative and likely malignancy related  *  blood cxs negative and urine cx again with GNR but no urinary symptoms  * WBC is improving and no urinary symptoms, monitor off antibiotics for now  * monitor the WBC and temp  * monitor for new signs of infection      The above assessment and plan was discussed with hem/onc team    Gina Arenas MD  Pager [phone]  After 5pm and on weekends call [phone]

## 2020-04-24 NOTE — PROGRESS NOTE ADULT - ATTENDING COMMENTS
Seen at bedside with Dr Gonzalez.. on mFolFOX, dose #2 day #2. Still with SOB when off oxygen, bilateral chest tubes draining malignant effusion, no fevers overnight. I agree with Dr Gonzalez's assessment and plan.

## 2020-04-25 NOTE — CHART NOTE - NSCHARTNOTEFT_GEN_A_CORE
Received call from hem/onc fellow requested chemo be d/margaux immediately secondary to possible cardiac side effects.  2 [facility] RN made aware; 2 [facility] resident made aware; 7 Davy RN sent to 2 [facility] to dc chemo.  F/u with primary team in am.    Betina Stubbs, ANP ext [phone]

## 2020-04-25 NOTE — PROGRESS NOTE ADULT - SUBJECTIVE AND OBJECTIVE BOX
CHIEF COMPLAINT: f/up sob, bilateral pleural effusions, restrictive dysfunction--anxious over all over HR and holding of chemo    Interval Events: tx to monitored floor    REVIEW OF SYSTEMS:  Constitutional: No fevers or chills. No weight loss. + fatigue or generalized malaise.  Eyes: No itching or discharge from the eyes  ENT: No ear pain. No ear discharge. No nasal congestion. No post nasal drip. No epistaxis. No throat pain. No sore throat. No difficulty swallowing.   CV: No chest pain. No palpitations. No lightheadedness or dizziness.   Resp: No dyspnea at rest. + dyspnea on exertion. No orthopnea. No wheezing. No cough. No stridor. No sputum production. No chest pain with respiration.  GI: No nausea. No vomiting. No diarrhea.  MSK: No joint pain or pain in any extremities  Integumentary: No skin lesions. No pedal edema.  Neurological: + gross motor weakness. No sensory changes.  [+ ] All other systems negative  [ ] Unable to assess ROS because ________    OBJECTIVE:  ICU Vital Signs Last 24 Hrs  T(C): 36.8 (25 Apr 2020 04:49), Max: 36.8 (25 Apr 2020 04:49)  T(F): 98.2 (25 Apr 2020 04:49), Max: 98.2 (25 Apr 2020 04:49)  HR: 101 (25 Apr 2020 04:49) (100 - 115)  BP: 105/69 (25 Apr 2020 04:49) (105/69 - 133/76)  BP(mean): --  ABP: --  ABP(mean): --  RR: 18 (25 Apr 2020 04:49) (18 - 20)  SpO2: 97% (25 Apr 2020 04:49) (97% - 99%)        04-24 @ 07:01  -  04-25 @ 07:00  --------------------------------------------------------  IN: 860 mL / OUT: 1151 mL / NET: -291 mL      CAPILLARY BLOOD GLUCOSE      POCT Blood Glucose.: 135 mg/dL (25 Apr 2020 01:06)      PHYSICAL EXAM: anxious  General: Awake, alert, oriented X 3.   HEENT: Atraumatic, normocephalic.                 Mallampatti Grade 3                No nasal congestion.                No tonsillar or pharyngeal exudates.  Lymph Nodes: No palpable lymphadenopathy  Neck: No JVD. No carotid bruit.   Respiratory: abnormal chest expansion-bialterally                         abnormal percussion                         Normal and equal air entry                         No wheeze, rhonchi or rales.  Cardiovascular: S1 S2 normal. No murmurs, rubs or gallops.   Abdomen: Soft, non-tender, non-distended. No organomegaly. Normoactive bowel sounds.  Extremities: Warm to touch. Peripheral pulse palpable. No pedal edema.   Skin: No rashes or skin lesions  Neurological: Motor and sensory examination equal and normal in all four extremities.  Psychiatry: Appropriate mood and affect.    HOSPITAL MEDICATIONS:  MEDICATIONS  (STANDING):  allopurinol 300 milliGRAM(s) Oral daily  enoxaparin Injectable 40 milliGRAM(s) SubCutaneous daily  famotidine    Tablet 20 milliGRAM(s) Oral daily  iron sucrose IVPB 200 milliGRAM(s) IV Intermittent every 24 hours  lidocaine   Patch 2 Patch Transdermal daily  morphine ER Tablet 60 milliGRAM(s) Oral <User Schedule>  ondansetron  IVPB 16 milliGRAM(s) IV Intermittent every 24 hours  polyethylene glycol 3350 17 Gram(s) Oral daily  senna 2 Tablet(s) Oral at bedtime  sodium chloride 0.9% lock flush 3 milliLiter(s) IV Push every 8 hours  sodium chloride 0.9% lock flush 3 milliLiter(s) IV Push every 8 hours    MEDICATIONS  (PRN):  acetaminophen   Tablet .. 650 milliGRAM(s) Oral every 6 hours PRN Temp greater or equal to 38C (100.4F)  aluminum hydroxide/magnesium hydroxide/simethicone Suspension 30 milliLiter(s) Oral every 6 hours PRN Dyspepsia  bisacodyl 5 milliGRAM(s) Oral every 12 hours PRN Constipation  metoclopramide Injectable 10 milliGRAM(s) IV Push every 6 hours PRN Nausea/Vomiting  morphine  IR 7.5 milliGRAM(s) Oral every 3 hours PRN Moderate Pain (4 - 6) or severe  naloxone Injectable 0.1 milliGRAM(s) IV Push every 3 minutes PRN Sedation or RR <10  ondansetron Injectable 4 milliGRAM(s) IV Push every 6 hours PRN Nausea and/or Vomiting  simethicone 80 milliGRAM(s) Chew every 6 hours PRN Gas      LABS:                        8.1    9.65  )-----------( 572      ( 25 Apr 2020 01:36 )             27.6     04-25    140  |  100  |  12  ----------------------------<  140<H>  4.3   |  27  |  0.56    Ca    8.6      25 Apr 2020 01:30  Phos  2.9     04-25  Mg     1.6     04-25    TPro  6.7  /  Alb  2.3<L>  /  TBili  0.3  /  DBili  x   /  AST  24  /  ALT  7<L>  /  AlkPhos  259<H>  04-24    PT/INR - ( 24 Apr 2020 07:13 )   PT: 14.2 sec;   INR: 1.24 ratio                   MICROBIOLOGY:     RADIOLOGY:  [ ] Reviewed and interpreted by me    Point of Care Ultrasound Findings:    PFT:    EKG:

## 2020-04-25 NOTE — PROGRESS NOTE ADULT - ASSESSMENT
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      # Metastatic carcinoma, cancer of unknown primary  - CT A/P with bilateral heterogeneous adnexal masses, as well as upper abdominal, RP, possible bone lesions and pelvic lymphadenopathy; CT Chest with multiple solid pulmonary nodules  - Cancer of unknown primary (poorly differentiated carcinoma) but suspect likely GI origin given CDX2 positivity on pathology; chromogranin positivity also suggests neuroendocrine features; Ki-67 index 40%.   - Tumor markers elevated: Cancer Antigen, Ca 27.29: 70.8, Cancer Antigen, 125: 619, Carcinoembryonic Antigen: 44.7, Beta-HC.0 on initial evaluation (3/7); Checked again on 20 and it was 102.7  - Discussed diagnosis with patient on 3/31/20. Given advanced, metastatic stage, will need to be on indefinite treatment to limit progression of disease.  - S/p C1 mFOLFOX (-).    - Will try to wean supplemental oxygen as tolerated. Will also provide an incentive spirometer  - CTA Chest () was negative for central PE, but unable to evaluate segmental and subsegmental branches due to motion artifact. In the setting of her hypoxemia and tachycardia,   -LE dopplers negative for DVT on .  - Continue Venofer until discharge   - is cycle 2 day3 of mFolfox, continue     # Tachycardia  # Hypoxia   # Bilateral pleural effusion  - COVID-19 negative x 3, but parainfluenza positive ().   - CTA Chest () was negative for central PE, but unable to evaluate segmental and subsegmental branches due to motion artifact. Lung imaging showed unchanged metastatic nodules, small bilateral pleural effusions, and atelectasis of the basilar LLL (unchanged as well). No new opopacity which decreases the likelihood of PNA  - CT A/P repeated on 4/3/20; no obvious source of infection   - Blood/urine cultures have had NGTD. C. diff negative  - ID following, appreciate recs  - S/p PICC line placement for chemotherapy  - Patient is s/p C1 modified FOLFOX (-). Further management as noted below   - Blood cultures from  and  NGTD  - urine cx + 50-99K Klebsiella CRE- per ID no need to treat at this time given no symptoms    - CTA chest on  showing no PE, but large bilateral pleural effusions s/p Drains in both left and right lung   -Appreciate pulmonary's reds  -Cytology was positive from pleural effusion, F/U pulmonary's recs  - Echo WNL     # Pain control   - Pain medications now switched to MS contin, pain is adequately controlled per patient. Will continue to follow-up with Palliative Care regarding pain management/regimen on discharge.     # Hyperuricemia  - uric acid was 8.8 on . s/p IV Rasburicase 3mg x1 on .   - C/w Allopurinol 300mg daily  - Monitor TLS labs daily (CMP, Phos, LDH, Uric acid)    # DVT PPx   Lovenox 40mg SQ    # Chest pain last night: with diffuse ST segment elevation, cardiology consult appreciated. Repeat echocardiogram, no new changes. Continue close monitoring. Troponin negative. 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      # Metastatic carcinoma, cancer of unknown primary  - CT A/P with bilateral heterogeneous adnexal masses, as well as upper abdominal, RP, possible bone lesions and pelvic lymphadenopathy; CT Chest with multiple solid pulmonary nodules  - Cancer of unknown primary (poorly differentiated carcinoma) but suspect likely GI origin given CDX2 positivity on pathology; chromogranin positivity also suggests neuroendocrine features; Ki-67 index 40%.   - Tumor markers elevated: Cancer Antigen, Ca 27.29: 70.8, Cancer Antigen, 125: 619, Carcinoembryonic Antigen: 44.7, Beta-HC.0 on initial evaluation (3/7); Checked again on 20 and it was 102.7  - Discussed diagnosis with patient on 3/31/20. Given advanced, metastatic stage, will need to be on indefinite treatment to limit progression of disease.  - S/p C1 mFOLFOX (-).    - Will try to wean supplemental oxygen as tolerated. Will also provide an incentive spirometer  - CTA Chest () was negative for central PE, but unable to evaluate segmental and subsegmental branches due to motion artifact. In the setting of her hypoxemia and tachycardia,   -LE dopplers negative for DVT on .  - Continue Venofer until discharge   - is cycle 2 day3 of mFolfox, continue     # Tachycardia  # Hypoxia   # Bilateral pleural effusion  - COVID-19 negative x 3, but parainfluenza positive ().   - CTA Chest () was negative for central PE, but unable to evaluate segmental and subsegmental branches due to motion artifact. Lung imaging showed unchanged metastatic nodules, small bilateral pleural effusions, and atelectasis of the basilar LLL (unchanged as well). No new opopacity which decreases the likelihood of PNA  - CT A/P repeated on 4/3/20; no obvious source of infection   - Blood/urine cultures have had NGTD. C. diff negative  - ID following, appreciate recs  - S/p PICC line placement for chemotherapy  - Patient is s/p C1 modified FOLFOX (-). Further management as noted below   - Blood cultures from  and  NGTD  - urine cx + 50-99K Klebsiella CRE- per ID no need to treat at this time given no symptoms    - CTA chest on  showing no PE, but large bilateral pleural effusions s/p Drains in both left and right lung   -Appreciate pulmonary's reds  -Cytology was positive from pleural effusion, F/U pulmonary's recs  - Echo WNL     # Pain control   - Pain medications now switched to MS contin, pain is adequately controlled per patient. Will continue to follow-up with Palliative Care regarding pain management/regimen on discharge.     # Hyperuricemia  - uric acid was 8.8 on . s/p IV Rasburicase 3mg x1 on .   - C/w Allopurinol 300mg daily  - Monitor TLS labs daily (CMP, Phos, LDH, Uric acid)    # DVT PPx   Lovenox 40mg SQ    # Chest pain last night: with diffuse ST segment elevation on EKG, cardiology consult appreciated. Repeat echocardiogram, no new changes. Continue close monitoring. Troponin negative. Patient is on telemetry.

## 2020-04-25 NOTE — CONSULT NOTE ADULT - ASSESSMENT
25 yo F with no known medical history who initially presented with two months of worsening abdominal pain, found to have metastatic carcinoma of unclear etiology (suspect GI). Cardiology consulted for further evaluation of EKG with SHANICE and chest pain.    #Abnormal EKG  #Chest pain  - Patient with atypical chest pain at rest, resolves with relaxation, at low risk for ACS from plaque rupture  - EKG reviewed and discussed with interventional cardiologist, SHANICE not in classic distribution, may be repolarization  - Stat TTE performed and reviewed, no areas of regional hypokinesis in coronary distrubution, no significant pericardial effusion  - Given above, would not treat for ACS, continue to monitor  - Check serial EKG and cardiac biomarkers for trend    Patient to be staffed with attending. Please await attending addendum.    Yaneli Burks MD  Cardiology Fellow  [phone]  All Cardiology service information can be found 24/7 on [email or web address], password: [email or web address] 23 yo F with no prior medical history, initially presented with two months of worsening abdominal pain, found to have metastatic carcinoma of unclear etiology (suspect GI, ?neurodendcrine).   Cardiology consulted for palps, CP and abnormal EKG.      REC:    #1.  CP, abnormal EKG  - Patient with atypical chest pain occurring at rest, resolves with relaxation, at low risk for ACS from plaque rupture  - EKG reviewed and discussed with interventional cardiologist, SHANICE not in classic distribution, may be repolarization abnormallities  - Stat TTE performed and reviewed, no clear areas of significant regional/segmental hypokinesis in coronary distrubution arguing against ischemia; no significant pericardial effusion seen.  - Given above, would not treat for ACS, continue to monitor  - Check serial EKG and cardiac biomarkers for trend      Yaneli Burks MD  Cardiology Fellow  [phone]    Jasmeet Baeza M.D.  Cardiology Attending, Consult Service  Beeper     All Cardiology service information can be found 24/7 on [email or web address], password: [email or web address] 25 yo F with no prior medical history, initially presented with two months of worsening abdominal pain, found to have metastatic carcinoma of unclear etiology (suspect GI, ?neurodendcrine).   Cardiology consulted for palps, CP and abnormal EKG.      REC:    #1.  Palps, CP, abnormal EKG  - Patient with palps and atypical chest pain occurring at rest, resolves with relaxation, at low risk for ACS from plaque rupture  - EKG reviewed and discussed with interventional cardiologist, SHANICE not in classic distribution, may be repolarization abnormalities  - Stat TTE performed and reviewed, no clear areas of significant regional/segmental hypokinesis in coronary distrubution arguing against ischemia; no significant pericardial effusion seen.  - Given above, would not treat for ACS, continue to monitor  - would add metoprolol to start at 12.5 mg bid to slow ST which may help alleviate sxs.  - Check serial EKG and cardiac biomarkers for trend  - will need to speak to heme-onc re chemo; as no clear cardiac abnormality detected, would be inclined to restart chemo unless current sxs known to be a chemo side effect      Yaneli Burks MD  Cardiology Fellow  [phone]    Jasmeet Baeza M.D.  Cardiology Attending, Consult Service  Beeper     All Cardiology service information can be found 24/7 on [email or web address], password: [email or web address]

## 2020-04-25 NOTE — PROGRESS NOTE ADULT - SUBJECTIVE AND OBJECTIVE BOX
Transthoracic Echocardiogram report:    [facility] Echo system report would not sign.      CONCLUSIONS: Stat echo performed by cardiology on call for ECG abnormalities. Technically difficult and very lmited 17 image study. There are only parasternal views on this study.  Short axis imaging of the left ventricle is limited and off axis precluding detailed wall motion analysis. The basal inferolateral, anterolateral, and anterior walls appear to thicken normally.  The basal inferior wall is not well assessed.  There may be some septal hypokinesis.  Somewhere along the mid to apical left ventricle the walls appear to globally thicken.  The apex and much of the mid walls are not well seen.  Globally, the left ventricular function is at the most mildly reduced, but given limited imaging quatitative ejection fraction is not possible.  From the parasternal imaging and short axis imaging, the right ventricular outflow tract and limited parts of the right ventricle seen are nomal in size with normal function.  However much of the right ventricle is not seen as there are no apical views or subcostal views precluding full assessment.    There is no pericardial effusion seen on these limited images (However, much of the pericardial space is not asssessed especially around the atria).    The pleural space was not assessed.    The limited views obtained are similar to the study from 4/20/2020 but full assessment not performed.  Consider repeating full study if clinically indicated in am.    Results discussed with Dr. Burks.

## 2020-04-25 NOTE — CONSULT NOTE ADULT - REASON FOR ADMISSION
b/l pelvic masses
b/l pelvic masses - pain

## 2020-04-25 NOTE — PROGRESS NOTE ADULT - ATTENDING COMMENTS
as above-  multifactorial dyspnea-metastatic NE tumor to pleural space, SVT (new)-? PE despite prophylaxis, debility, anemia--keep sat above 90%  pleural effusions--continue bilateral pigtail catheters in place-monitor out put--hopeful for decline w/ chemo to avoid pleuradesis etc  SVT--? electrolyte issues vs PE--d-dimer/dopplers of LE/? CTPA--would boost lovenox to full dose rx here until DVT/PE ruled out  onc/heme-chemo as able  GI prophylaxis   OOB as able               anxiolytics for anxiety  Harlan Solis MD-Pulmonary   [phone]

## 2020-04-25 NOTE — CONSULT NOTE ADULT - NSHPATTENDINGPLANDISCUSS_GEN_ALL_CORE
Dr. Bass
cardiology fellow; patient seen and examined.       I was physically present for the key portions of the evaluation and management (E/M) service provided.    I agree with the above history, physical, and plan which I have reviewed and edited where appropriate.
patient, Dr Sanchez and GYN Oncology staff

## 2020-04-25 NOTE — CONSULT NOTE ADULT - SUBJECTIVE AND OBJECTIVE BOX
Patient seen and evaluated at bedside    Chief Complaint: Chest pain    HPI:  Patient is a 25 yo F with no known medical history who initially presented with two months of worsening abdominal pain, found to have metastatic carcinoma of unclear etiology (suspect GI). Since admission has been started on chemotherapy, on cycle 2 FOLFOX. Hospital course complicated by hypoxia with bilateral pleural effusions s/p chest tube placement. Had recurrent episodes of palpitations and chest discomfort overnight, RRT called, EKG with concern for SHANICE in leads I, II, aVL with reciprocal depression in V1-V2. Cardiology consulted for further evaluation.  Per patient, no prior cardiac symptoms, no significant family history. Episodes of chest discomfort at rest, always accompanying palpitations, feels like pain going up her throat. Resolves with relaxation and meditation, slightly better with leaning forward. Has had similar episodes in past when she was very "still".    PMHx:   No pertinent past medical history      PSHx:   No significant past surgical history      Allergies:  No Known Allergies      Home Meds:    Current Medications:   acetaminophen   Tablet .. 650 milliGRAM(s) Oral every 6 hours PRN  allopurinol 300 milliGRAM(s) Oral daily  aluminum hydroxide/magnesium hydroxide/simethicone Suspension 30 milliLiter(s) Oral every 6 hours PRN  bisacodyl 5 milliGRAM(s) Oral every 12 hours PRN  enoxaparin Injectable 40 milliGRAM(s) SubCutaneous daily  famotidine    Tablet 20 milliGRAM(s) Oral daily  iron sucrose IVPB 200 milliGRAM(s) IV Intermittent every 24 hours  lidocaine   Patch 2 Patch Transdermal daily  metoclopramide Injectable 10 milliGRAM(s) IV Push every 6 hours PRN  morphine  IR 7.5 milliGRAM(s) Oral every 3 hours PRN  morphine ER Tablet 60 milliGRAM(s) Oral <User Schedule>  naloxone Injectable 0.1 milliGRAM(s) IV Push every 3 minutes PRN  ondansetron  IVPB 16 milliGRAM(s) IV Intermittent every 24 hours  ondansetron Injectable 4 milliGRAM(s) IV Push every 6 hours PRN  polyethylene glycol 3350 17 Gram(s) Oral daily  senna 2 Tablet(s) Oral at bedtime  simethicone 80 milliGRAM(s) Chew every 6 hours PRN  sodium chloride 0.9% lock flush 3 milliLiter(s) IV Push every 8 hours  sodium chloride 0.9% lock flush 3 milliLiter(s) IV Push every 8 hours      FAMILY HISTORY:      Social History:  Smoking History: Denies  Alcohol Use: Denies  Drug Use: Denies    REVIEW OF SYSTEMS:  CONSTITUTIONAL: No fevers or chills; +fatigue  EYES/ENT: No visual changes;  No dysphagia  NECK: No pain or stiffness  RESPIRATORY: No cough, wheezing, hemoptysis; +shortness of breath  CARDIOVASCULAR: No lower extremity edema; +palpitations, chest pain  GASTROINTESTINAL: +abdominal pain  BACK: No back pain  GENITOURINARY: No dysuria, frequency or hematuria  NEUROLOGICAL: No numbness or weakness  SKIN: No itching, burning, rashes, or lesions   All other review of systems is negative unless indicated above.    Physical Exam:  T(F): 97.9 (04-25), Max: 98 (04-24)  HR: 100 (04-25) (100 - 115)  BP: 120/81 (04-25) (117/73 - 133/76)  RR: 18 (04-25)  SpO2: 99% (04-25)  GENERAL: Wearing NC, no increased work of breathing, drowsy during exam  HEAD:  Atraumatic, Normocephalic  ENT: EOMI, conjunctiva and sclera clear, Neck supple, No JVD  CHEST/LUNG: Clear to auscultation bilaterally with decreased breath sounds over RLL   HEART: Tachycardic and regular rhythm; No murmurs, rubs, or gallops  ABDOMEN: Soft  EXTREMITIES:  No clubbing, cyanosis, or edema  PSYCH: Nl behavior, nl affect  NEUROLOGY: AAOx3, non-focal, cranial nerves grossly intact  SKIN: Normal color, No rashes or lesions    LINES:  Cardiovascular Diagnostic Testing:    ECG: sinus tachycardia, ST elevation in leads I, aVL and II, with ST depressions in leads V1-V2    Echo:  < from: TTE with Doppler (w/Cont) (04.20.20 @ 11:18) >  Conclusions:  1. Normal mitral valve. Minimal mitral regurgitation.  2. Normal trileaflet aortic valve. No aortic valve  regurgitation seen.  3. Endocardial visualization enhanced with intravenous  injection of Ultrasonic Enhancing Agent (Definity). Low  normal left ventricular systolic function that may be  secondary to tachycardia. -140bpm at the time of  exam.  4. Normal right ventricular size and function.  5. Normal pericardium with trace pericardial effusion.  6. Bilateral pleural effusions.  < end of copied text >    Imaging:  CXR:  < from: Xray Chest 1 View- PORTABLE-Urgent (04.20.20 @ 19:34) >  IMPRESSION:    Bilateral chest tubes are present. Right PICC tip within SVC.    Bilateral pleural effusions are decreased. Bibasilar hazy opacities.    < end of copied text >      Labs: Personally reviewed                        8.1    9.65  )-----------( 572      ( 25 Apr 2020 01:36 )             27.6     04-25    140  |  100  |  12  ----------------------------<  140<H>  4.3   |  27  |  0.56    Ca    8.6      25 Apr 2020 01:30  Phos  2.9     04-25  Mg     1.6     04-25    TPro  6.7  /  Alb  2.3<L>  /  TBili  0.3  /  DBili  x   /  AST  24  /  ALT  7<L>  /  AlkPhos  259<H>  04-24    PT/INR - ( 24 Apr 2020 07:13 )   PT: 14.2 sec;   INR: 1.24 ratio          CARDIAC MARKERS ( 25 Apr 2020 01:36 )  <6 ng/L / x     / x     / 108 U/L / x     / 15.2 ng/mL 25 yo F with no known medical history who initially presented with two months of worsening abdominal pain and was found to have metastatic carcinoma of unclear etiology (suspect GI). Has been started on chemotherapy, on cycle 2 FOLFOX.   Hospital course complicated by hypoxia with bilateral pleural effusions, s/p chest tube placement. Had recurring episodes of palpitations and chest discomfort overnight and a RRT was called.  EKG done, with concern for SHANICE in leads I, II, aVL with reciprocal depression in V1-V2. Cardiology consulted for further evaluation.  Per patient, no prior cardiac symptoms, no significant family history of heart dz.  Reports episodes of chest discomfort at rest, always accompanying palpitations, feels like pain going up her throat. Resolves with relaxation and meditation, slightly better with leaning forward. Has had similar episodes in past when she was very "still".      PMHx:   No pertinent past medical history    PSHx:   No significant past surgical history    Allergies:  No Known Allergies    Current Medications:   acetaminophen   Tablet .. 650 milliGRAM(s) Oral every 6 hours PRN  allopurinol 300 milliGRAM(s) Oral daily  aluminum hydroxide/magnesium hydroxide/simethicone Suspension 30 milliLiter(s) Oral every 6 hours PRN  bisacodyl 5 milliGRAM(s) Oral every 12 hours PRN  enoxaparin Injectable 40 milliGRAM(s) SubCutaneous daily  famotidine    Tablet 20 milliGRAM(s) Oral daily  iron sucrose IVPB 200 milliGRAM(s) IV Intermittent every 24 hours  lidocaine   Patch 2 Patch Transdermal daily  metoclopramide Injectable 10 milliGRAM(s) IV Push every 6 hours PRN  morphine  IR 7.5 milliGRAM(s) Oral every 3 hours PRN  morphine ER Tablet 60 milliGRAM(s) Oral <User Schedule>  naloxone Injectable 0.1 milliGRAM(s) IV Push every 3 minutes PRN  ondansetron  IVPB 16 milliGRAM(s) IV Intermittent every 24 hours  ondansetron Injectable 4 milliGRAM(s) IV Push every 6 hours PRN  polyethylene glycol 3350 17 Gram(s) Oral daily  senna 2 Tablet(s) Oral at bedtime  simethicone 80 milliGRAM(s) Chew every 6 hours PRN  sodium chloride 0.9% lock flush 3 milliLiter(s) IV Push every 8 hours  sodium chloride 0.9% lock flush 3 milliLiter(s) IV Push every 8 hours      FAMILY HISTORY:  No hx. of heart dz.      Social History:  Smoking History: Denies  Alcohol Use: Denies  Drug Use: Denies    REVIEW OF SYSTEMS:  CONSTITUTIONAL: No fevers or chills; +fatigue  EYES/ENT: No visual changes;  No dysphagia  NECK: No pain or stiffness  RESPIRATORY: No cough, wheezing, hemoptysis; +shortness of breath  CARDIOVASCULAR: No lower extremity edema; +palpitations, chest pain  GASTROINTESTINAL: +abdominal pain  BACK: No back pain  GENITOURINARY: No dysuria, frequency or hematuria  NEUROLOGICAL: No numbness or weakness  SKIN: No itching, burning, rashes, or lesions   All other review of systems is negative unless indicated above.    Physical Exam:  T(F): 97.9 (04-25), Max: 98 (04-24)  HR: 100 (04-25) (100 - 115)  BP: 120/81 (04-25) (117/73 - 133/76)  RR: 18 (04-25)  SpO2: 99% (04-25)    GENERAL: Wearing NC, no increased work of breathing, drowsy during exam  HEAD:  Atraumatic, Normocephalic  ENT: EOMI, conjunctiva and sclera clear, Neck supple, No JVD  CHEST/LUNG: Clear to auscultation bilaterally with decreased breath sounds over RLL   HEART: Tachycardic and regular rhythm; No murmurs, rubs, or gallops  ABDOMEN: Soft  EXTREMITIES:  No clubbing, cyanosis, or edema  PSYCH: Nl behavior, nl affect  NEUROLOGY: AAOx3, non-focal, cranial nerves grossly intact  SKIN: Normal color, No rashes or lesions      ECG:  Sinus tachycardia, ST elevation in leads I, aVL and II, with ST depressions in leads V1-V2      TTE with Doppler (w/Cont) (04.20.20 @ 11:18) >  Conclusions:  1. Normal mitral valve. Minimal mitral regurgitation.  2. Normal trileaflet aortic valve. No aortic valve regurgitation seen.  3. Endocardial visualization enhanced with intravenous injection of Ultrasonic Enhancing Agent (Definity). Low normal left ventricular systolic function that may be secondary to tachycardia. -140bpm at the time of exam.  4. Normal right ventricular size and function.  5. Normal pericardium with trace pericardial effusion.  6. Bilateral pleural effusions.        Transthoracic Echocardiogram (04.25.20 @ 03:52)   Patient name: MARYANN VASQUEZ  YOB: 1995   Age: 24 (F)   MR#: [id number]  Study Date: 4/25/2020  [id number] Yaneli Burks M.D.  Study quality: Technically difficult/Limited  ------------------------------------------------------------------------  OBSERVATIONS:  Mitral Valve: Normal mitral valve. From the single view of the mitral valve in the parasternal long axis view there is no mitral regurgitation seen.  Aortic Root: Aortic Root: 2.8 cm. Normal aortic root size.   Aortic Valve: Normaltrileaflet aortic valve. No aortic valve regurgitation seen.  Left Atrium: The left atrium was not well visualized.  Left Ventricle: The basal inferolateral, anterolateral, and anterior walls appear to thicken normally.  The basal inferior wall is not well assessed.  There may be some septal hypokinesis.  Somewhere along the mid to apical left ventricle the walls appear to globally thicken.  The apex and much of the mid walls are not well seen.  Globally, the left ventricular function is at the most mildly reduced, but given limited imaging quantitative ejection fraction is not possible.  The patient was tachycardic at this time.   Louise left ventricular size.  Right Heart: The right atrium was not well seen. From the parasternal imaging and short axis imaging, the right ventricular outflow tract and limited parts of the right ventricle seen are normal in size with  normal function. However much of the right ventricle is not seen as there are no apical views or subcostal views precluding full assessment. The tricuspid valve was not fully assessed. Normal appearing pulmonic valve.  Pericardium/Pleura:  There is no pericardial effusion seen on these limited images (however, much of the pericardial space is not assessed, especially around the atria). The pleural space is not well seen.  ------------------------------------------------------------------------  CONCLUSIONS:  Stat echo performed by cardiology on call for ECG abnormalities.  Technically difficult and very limited 17 image study; there are only parasternal views on this study.  Short axis imaging of the left ventricle is limited and off axis precluding detailed wall motion analysis.  The basal inferolateral, anterolateral, and anterior walls appear to thicken normally.  The basal inferior wall is not well assessed.  There may be some septal hypokinesis. Somewhere along the mid to apical left ventricle the walls appear to globally thicken.  The apex and much of the mid walls are not well seen.  Globally, the left ventricular function is at the most mildly reduced, but given limited imaging quantitative ejection fraction is not possible.  The patient was tachycardic at this time.  From the parasternal imaging and short axis imaging, the right ventricular outflow tract and limited parts of the right ventricle seen are normal in size with normal  function.  However much of the right ventricle is not seen as there are no apical views or subcostal views precluding full assessment.  There is no pericardial effusion seen on these limited images (However, much of the pericardial space is not assessed especially around the atria).  The pleural space was not assessed.  *** Compared with echocardiogram of 4/20/2020, the limited views obtained appear similar but imaging is limited and off axis on this study.  The patient was tachycardic during the exam.  Would repeat full study in am if clinically indicated.  Results discussed with Dr. Burks.  ------------------------------------------------------------------------  PROCEDURE DESCRIPTION: Transthoracic echocardiogram with  2-D, M-Mode and complete spectral and color flow Doppler.  ------------------------------------------------------------------------  ECHOCARDIOGRAPHIC EXAMINATION:  AORTIC ROOT:  Aortic Root (Leaflet): 2.7 cm  Aortic Root Index: 0.8  LEFT ATRIUM:  AP Dimensions: 3.8 cm  LEFT VENTRICLE:  LVIDd: 5.4 cm  LVIDs: 3.6 cm  IVS: 1.02  ILWT: 0.9 cm  RWT: 0.35  LV Mass: 211.0 gm  LV Mass Index: 111 gm/sqm  HR and BP:  HR: 102 bpm  BP: 120/81  BSA: 1.90  TRICUSPID VALVE:  TR Velocity: 3.6 M/s  TR Gradient: 52.1 mm Hg  ------------------------------------------------------------------------  HEMODYNAMICS:  RA Pressure Estimate: 8  RVSP: 60  LA Pressure Estimate: < 20  -----------------------------------------------------------------------  Confirmed on  4/25/2020 - 05:35:12 by Tiffanie Gordon M.D.  ------------------------------------------------------------------------            Xray Chest 1 View- PORTABLE-Urgent (04.20.20 @ 19:34)   IMPRESSION:  Bilateral chest tubes are present. Right PICC tip within SVC.  Bilateral pleural effusions are decreased. Bibasilar hazy opacities.      Labs:                       8.1    9.65  )-----------( 572      ( 25 Apr 2020 01:36 )             27.6     04-25  140  |  100  |  12  ----------------------------<  140<H>  4.3   |  27  |  0.56    Ca    8.6      25 Apr 2020 01:30  Phos  2.9     04-25  Mg     1.6     04-25    TPro  6.7  /  Alb  2.3<L>  /  TBili  0.3  /  DBili  x   /  AST  24  /  ALT  7<L>  /  AlkPhos  259<H>  04-24    PT/INR - ( 24 Apr 2020 07:13 )   PT: 14.2 sec;   INR: 1.24 ratio        CARDIAC MARKERS ( 25 Apr 2020 01:36 )  <6 ng/L / x     / x     / 108 U/L / x     / 15.2 ng/mL      Troponin T, High Sensitivity Result: 11: Rapid upward or downward changes in high-sensitivity troponin levels  suggest acute myocardial injury. Renal impairment may cause sustained  troponin elevations.  Normal: <6 - 14 ng/L  Indeterminate: 15-51 ng/L  Elevated: > 51 ng/L  See [email or web address] on the [employer] for more  information ng/L (04.25.20 @ 04:56)    Troponin T, High Sensitivity (04.25.20 @ 01:36)    Troponin T, High Sensitivity Result: <6: Rapid upward or downward changes in high-sensitivity troponin levels  suggest acute myocardial injury. Renal impairment may cause sustained  troponin elevations.  Normal: <6 - 14 ng/L  Indeterminate: 15-51 ng/L  Elevated: > 51 ng/L  See [email or web address] on the [employer] for more  information ng/L

## 2020-04-25 NOTE — PROGRESS NOTE ADULT - SUBJECTIVE AND OBJECTIVE BOX
Patient complaint of chest pain last evening was evaluated by cardiology; echocardiogram done did not show new changes even though EKG showed diffuse ST segment elevation.     Cycle2 FOLFOX . Today is day 3. No fever/chills.      MEDICATIONS  (STANDING):  allopurinol 300 milliGRAM(s) Oral daily  enoxaparin Injectable 80 milliGRAM(s) SubCutaneous every 12 hours  famotidine    Tablet 20 milliGRAM(s) Oral daily  iron sucrose IVPB 200 milliGRAM(s) IV Intermittent every 24 hours  lidocaine   Patch 2 Patch Transdermal daily  morphine ER Tablet 60 milliGRAM(s) Oral <User Schedule>  ondansetron  IVPB 16 milliGRAM(s) IV Intermittent every 24 hours  polyethylene glycol 3350 17 Gram(s) Oral daily  senna 2 Tablet(s) Oral at bedtime  sodium chloride 0.9% lock flush 3 milliLiter(s) IV Push every 8 hours  sodium chloride 0.9% lock flush 3 milliLiter(s) IV Push every 8 hours    MEDICATIONS  (PRN):  acetaminophen   Tablet .. 650 milliGRAM(s) Oral every 6 hours PRN Temp greater or equal to 38C (100.4F)  aluminum hydroxide/magnesium hydroxide/simethicone Suspension 30 milliLiter(s) Oral every 6 hours PRN Dyspepsia  bisacodyl 5 milliGRAM(s) Oral every 12 hours PRN Constipation  metoclopramide Injectable 10 milliGRAM(s) IV Push every 6 hours PRN Nausea/Vomiting  morphine  IR 7.5 milliGRAM(s) Oral every 3 hours PRN Moderate Pain (4 - 6) or severe  naloxone Injectable 0.1 milliGRAM(s) IV Push every 3 minutes PRN Sedation or RR <10  Troponin T, High Sensitivity (04.25.20 @ 04:56)    Troponin T, High Sensitivity Result: 11: Rapid upward or downward changes in high-sensitivity troponin levels  suggest acute myocardial injury. Renal impairment may cause sustained  troponin elevations.  Normal: <6 - 14 ng/L  Indeterminate: 15-51 ng/L  Elevated: > 51 ng/L  See [email or web address] on the [facility] intranet for more  information ng/L    ondansetron Injectable 4 milliGRAM(s) IV Push every 6 hours PRN Nausea and/or Vomiting  simethicone 80 milliGRAM(s) Chew every 6 hours PRN Gas      ICU Vital Signs Last 24 Hrs  T(C): 36.8 (25 Apr 2020 04:49), Max: 36.8 (25 Apr 2020 04:49)  T(F): 98.2 (25 Apr 2020 04:49), Max: 98.2 (25 Apr 2020 04:49)  HR: 101 (25 Apr 2020 04:49) (100 - 115)  BP: 105/69 (25 Apr 2020 04:49) (105/69 - 133/76)  BP(mean): --  Complete Blood Count STAT (04.25.20 @ 01:36)    Nucleated RBC: 0 /100 WBCs    WBC Count: 9.65 K/uL    RBC Count: 3.18 M/uL    Hemoglobin: 8.1 g/dL    Hematocrit: 27.6 %    Mean Cell Volume: 86.8 fl    Mean Cell Hemoglobin: 25.5 pg    Mean Cell Hemoglobin Conc: 29.3 gm/dL    Red Cell Distrib Width: 24.4 %    Platelet Count - Automated: 572 K/uL    ABP: --  ABP(mean): --  RR: 18 (25 Apr 2020 04:49) (18 - 20)  SpO2: 97% (25 Apr 2020 04:49) (97% - 99%)      PHYSICAL EXAM:  Constitutional: NAD   Eyes: sclera non-icteric, conjunctiva non-anemia  Mouth: No mucositis.  Respiratory: Decreased breath sound in bilateral lungs  Chest: Bilateral chest tubes  Cardiovascular: tachycardia, regular rhythm.   Gastrointestinal: soft and flat, no tenderness to palpation, normoactive bowel sound, + palpable mass in lower abdomen  Extremities:  No c/c/e  MSK: No joint swelling, erythema, or tenderness   Neurological: AOx3, Cranial nerves II-XII grossly intact  Skin: No rash  Psych: Normal affect    No Known Allergies      Troponin T, High Sensitivity (04.25.20 @ 04:56)    Troponin T, High Sensitivity Result: 11: Rapid upward or downward changes in high-sensitivity troponin levels  suggest acute myocardial injury. Renal impairment may cause sustained  troponin elevations.  Normal: <6 - 14 ng/L  Indeterminate: 15-51 ng/L  Elevated: > 51 ng/L  See [email or web address] on the [facility] intranet for more  information ng/L        Complete Blood Count STAT (04.25.20 @ 01:36)    Nucleated RBC: 0 /100 WBCs    WBC Count: 9.65 K/uL    RBC Count: 3.18 M/uL    Hemoglobin: 8.1 g/dL    Hematocrit: 27.6 %    Mean Cell Volume: 86.8 fl    Mean Cell Hemoglobin: 25.5 pg    Mean Cell Hemoglobin Conc: 29.3 gm/dL    Red Cell Distrib Width: 24.4 %    Platelet Count - Automated: 572 K/uL                Basic Metabolic Panel - STAT (04.25.20 @ 01:30)    Sodium, Serum: 140 mmol/L    Potassium, Serum: 4.3 mmol/L    Chloride, Serum: 100 mmol/L    Carbon Dioxide, Serum: 27 mmol/L    Anion Gap, Serum: 13 mmol/L    Blood Urea Nitrogen, Serum: 12 mg/dL    Creatinine, Serum: 0.56 mg/dL    Glucose, Serum: 140 mg/dL    Calcium, Total Serum: 8.6 mg/dL    eGFR if Non : 129: Interpretative comment  The units for eGFR are mL/min/1.73M2 (normalized body surface area). The  eGFR is calculated from a serum creatinine using the CKD-EPI equation.  Other variables required for calculation are race, age and sex. Among  patients with chronic kidney disease (CKD), the eGFR is useful in  determining the stage of disease according to KDOQI CKD classification.  All eGFR results are reported numerically with the following  interpretation.          GFR                    With                 Without     (ml/min/1.73 m2)    Kidney Damage       Kidney Damage        >= 90                    Stage 1                     Normal        60-89                    Stage 2                     Decreased GFR        30-59     Stage 3                     Stage 3        15-29                    Stage 4                     Stage 4        < 15                      Stage 5                     Stage 5  Each stage of CKD assumes that the associated GFR level has been in  effect for at least 3 months. Determination of stages one and two (with  eGFR > 59 ml/min/m2) requires estimation of kidney damage for at least 3  months as defined by structural or functional abnormalities.  Limitations: All estimates of GFR will be less accurate for patients at  extremes of muscle mass (including but not limited to frail elderly,  critically ill, or cancer patients), those with unusual diets, and those  with conditions associated with reduced secretion or extrarenal  elimination of creatinine. The eGFR equation is not recommended for use  in patients with unstable creatinine levels. mL/min/1.73M2    eGFR if African American: 149 mL/min/1.73M2        Prothrombin Time and INR, Plasma (04.24.20 @ 07:13)    Prothrombin Time, Plasma: 14.2: Effective October 30th, 2018 the reference range for PT has changed. sec    INR: 1.24: Recommended ranges for therapeutic INR:    2.0-3.0 for most medical and surgical thromboembolic states    2.0-3.0 for atrial fibrillation    2.0-3.0 for bileaflet mechanical valve in aortic position    2.5-3.5 for mechanical heart valves    Chest 2004;[id number]  The presence of direct thrombin inhibitors (argatroban, refludan)  may falsely increase results. ratio    Lactate Dehydrogenase, Serum (04.24.20 @ 07:13)    Lactate Dehydrogenase, Serum: 365 U/L

## 2020-04-25 NOTE — CHART NOTE - NSCHARTNOTEFT_GEN_A_CORE
Called by RN to evaluate Pt. for temp =   (f)     .  Pt seen and evaluated at bedside.  Denies headache, dizziness, visual disturbance, chest pain, cough, SOB, palpitations, abdominal pain, N/V/D , dysuria.   Offers no complaints at present time.      Vital Signs Last 24 Hrs  T(C): 36.6 (25 Apr 2020 00:33), Max: 36.7 (24 Apr 2020 04:58)  T(F): 97.9 (25 Apr 2020 00:33), Max: 98 (24 Apr 2020 04:58)  HR: 115 (25 Apr 2020 00:33) (102 - 115)  BP: 133/76 (25 Apr 2020 00:33) (117/73 - 133/76)  BP(mean): --  RR: 18 (25 Apr 2020 00:33) (18 - 20)  SpO2: 98% (25 Apr 2020 00:33) (97% - 98%)    Labs:                        8.0    10.19 )-----------( 540      ( 24 Apr 2020 07:13 )             26.9       04-24    139  |  101  |  8   ----------------------------<  138<H>  4.2   |  29  |  0.53    Ca    8.9      24 Apr 2020 07:13  Phos  2.3     04-24  Mg     1.6     04-24    TPro  6.7  /  Alb  2.3<L>  /  TBili  0.3  /  DBili  x   /  AST  24  /  ALT  7<L>  /  AlkPhos  259<H>  04-24      Antimicrobials:  MEDICATIONS  (STANDING):  allopurinol 300 milliGRAM(s) Oral daily  enoxaparin Injectable 40 milliGRAM(s) SubCutaneous daily  famotidine    Tablet 20 milliGRAM(s) Oral daily  iron sucrose IVPB 200 milliGRAM(s) IV Intermittent every 24 hours  lidocaine   Patch 2 Patch Transdermal daily  morphine ER Tablet 60 milliGRAM(s) Oral <User Schedule>  ondansetron  IVPB 16 milliGRAM(s) IV Intermittent every 24 hours  polyethylene glycol 3350 17 Gram(s) Oral daily  senna 2 Tablet(s) Oral at bedtime  sodium chloride 0.9% lock flush 3 milliLiter(s) IV Push every 8 hours  sodium chloride 0.9% lock flush 3 milliLiter(s) IV Push every 8 hours        PE:    General: Alert & Oriented x 3, non toxic, in no acute distress  Neuro: non focal  Cardiac: S1, S2, tachycardic  Pulm: Lungs CTA  GI: Abd soft, NT/ND, + bowel sounds x 4 quadrants  Ext: no edema, + pedal pulses BL  Skin: intact      AP:     1.  Neutropenic fever       - continue with current antimicrobials       - continue antipyretic/cooling measures      - continue IV hydration      - BC x 2      - UA/CS      - f/u panculture results      - cxr      - continue to closely monitor      - f/u with primary team in AM    Betina Stubbs, ANP x Called by RN to evaluate Pt. for c/o palpitations and chest pain.  Pt seen and evaluated at bedside.  Pt. sleeping, easily arousable, a&o X 3, non toxic, in no acute distress. Pt. states her heart rate is usually fast, frequently above 100 bpm, accompanied by palpitations.  Has used meditation in past to calm down.  Endorses being woken up by palpitations, with feeling of shortness of breath and chest pressure radiating to jaw.  Points to sternal region for chest pain.  Describes pain as pressure, 4-6/10 on pain scaleDenies headache, dizziness, visual disturbance, cough, abdominal pain, N/V/D , dysuria.   Offers no complaints at present time.      Vital Signs Last 24 Hrs  T(C): 36.6 (25 Apr 2020 00:33), Max: 36.7 (24 Apr 2020 04:58)  T(F): 97.9 (25 Apr 2020 00:33), Max: 98 (24 Apr 2020 04:58)  HR: 115 (25 Apr 2020 00:33) (102 - 115)  BP: 133/76 (25 Apr 2020 00:33) (117/73 - 133/76)  BP(mean): --  RR: 18 (25 Apr 2020 00:33) (18 - 20)  SpO2: 98% (25 Apr 2020 00:33) (97% - 98%)    Labs:                        8.0    10.19 )-----------( 540      ( 24 Apr 2020 07:13 )             26.9       04-24    139  |  101  |  8   ----------------------------<  138<H>  4.2   |  29  |  0.53    Ca    8.9      24 Apr 2020 07:13  Phos  2.3     04-24  Mg     1.6     04-24    TPro  6.7  /  Alb  2.3<L>  /  TBili  0.3  /  DBili  x   /  AST  24  /  ALT  7<L>  /  AlkPhos  259<H>  04-24    CXR 4/20/2020   IMPRESSION:    Bilateral chest tubes are present. Right PICC tip within SVC.    Bilateral pleural effusions are decreased. Bibasilar hazy opacities.      MEDICATIONS  (STANDING):  allopurinol 300 milliGRAM(s) Oral daily  enoxaparin Injectable 40 milliGRAM(s) SubCutaneous daily  famotidine    Tablet 20 milliGRAM(s) Oral daily  iron sucrose IVPB 200 milliGRAM(s) IV Intermittent every 24 hours  lidocaine   Patch 2 Patch Transdermal daily  morphine ER Tablet 60 milliGRAM(s) Oral <User Schedule>  ondansetron  IVPB 16 milliGRAM(s) IV Intermittent every 24 hours  polyethylene glycol 3350 17 Gram(s) Oral daily  senna 2 Tablet(s) Oral at bedtime  sodium chloride 0.9% lock flush 3 milliLiter(s) IV Push every 8 hours  sodium chloride 0.9% lock flush 3 milliLiter(s) IV Push every 8 hours        PE:    General: Alert & Oriented x 3, non toxic, in no acute distress  Neuro: non focal  Cardiac: S1, S2, tachycardic  Pulm: Lungs CTA, b/l chest tubes in place with scant drainage  GI: Abd soft, NT/ND, + bowel sounds x 4 quadrants  Ext: no edema, + pedal pulses BL  Skin: intact      AP:     1.  chest pain, palpitations, r/o acs       - EKG-stat - new T elevations in leads I, II, AVL and depressions in V1-V2 compared to previous       - RRT called - see RRT sheet       - Cardiology called by RRT - will see patient       - Cardiac enzymes       - CBC, BMP       - morphine 2 mg IV x 1       -CXR      - continue to closely monitor      - f/u with primary team in     Betina Stubbs, ANP x [phone]

## 2020-04-25 NOTE — CONSULT NOTE ADULT - CONSULT REQUESTED DATE/TIME
05-Apr-2020 11:48
16-Apr-2020 16:51
20-Mar-2020 17:30
21-Mar-2020
22-Mar-2020 06:52
27-Mar-2020
25-Apr-2020 12:07

## 2020-04-26 NOTE — PROGRESS NOTE ADULT - ASSESSMENT
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      # Metastatic carcinoma, cancer of unknown primary  - CT A/P with bilateral heterogeneous adnexal masses, as well as upper abdominal, RP, possible bone lesions and pelvic lymphadenopathy; CT Chest with multiple solid pulmonary nodules  - Cancer of unknown primary (poorly differentiated carcinoma) but suspect likely GI origin given CDX2 positivity on pathology; chromogranin positivity also suggests neuroendocrine features; Ki-67 index 40%.   - Tumor markers elevated: Cancer Antigen, Ca 27.29: 70.8, Cancer Antigen, 125: 619, Carcinoembryonic Antigen: 44.7, Beta-HC.0 on initial evaluation (3/7); Checked again on 20 and it was 102.7  - Discussed diagnosis with patient on 3/31/20. Given advanced, metastatic stage, will need to be on indefinite treatment to limit progression of disease.  - S/p C1 mFOLFOX (-).    - Will try to wean supplemental oxygen as tolerated. Will also provide an incentive spirometer  - CTA Chest () was negative for central PE, but unable to evaluate segmental and subsegmental branches due to motion artifact. In the setting of her hypoxemia and tachycardia,   -LE dopplers negative for DVT on .  - Continue Venofer until discharge   - is cycle 2 day4 of mFolfox, continue     # Tachycardia  # Hypoxia   # Bilateral pleural effusion  - COVID-19 negative x 3, but parainfluenza positive ().   - CTA Chest () was negative for central PE, but unable to evaluate segmental and subsegmental branches due to motion artifact. Lung imaging showed unchanged metastatic nodules, small bilateral pleural effusions, and atelectasis of the basilar LLL (unchanged as well). No new opopacity which decreases the likelihood of PNA  - CT A/P repeated on 4/3/20; no obvious source of infection   - Blood/urine cultures have had NGTD. C. diff negative  - ID following, appreciate recs  - S/p PICC line placement for chemotherapy  - Patient is s/p C1 modified FOLFOX (-). Further management as noted below   - Blood cultures from  and  NGTD  - urine cx + 50-99K Klebsiella CRE- per ID no need to treat at this time given no symptoms    - CTA chest on  showing no PE, but large bilateral pleural effusions s/p Drains in both left and right lung   -Appreciate pulmonary's reds  -Cytology was positive from pleural effusion, F/U pulmonary's recs  - Echo WNL     # Pain control   - Pain medications now switched to MS contin, pain is adequately controlled per patient. Will continue to follow-up with Palliative Care regarding pain management/regimen on discharge.     # Hyperuricemia  - uric acid was 8.8 on . s/p IV Rasburicase 3mg x1 on .   - C/w Allopurinol 300mg daily  - Monitor TLS labs daily (CMP, Phos, LDH, Uric acid)    # DVT PPx   Lovenox 40mg SQ    # chest pain, has improved, telemetry monitoring c/w tachycardia , no other changes see. Cardiology follow up appreciated.

## 2020-04-26 NOTE — PROGRESS NOTE ADULT - SUBJECTIVE AND OBJECTIVE BOX
Cardiology Progress Note    Interval: Pt resting comfortably in bed. Noted mild discomfort with the chest tubes but otherwise, chest pain and palpitations have gotten much better compared to yesterday.    Tele: Sinus tachycardia in low 100's    Medications:  acetaminophen   Tablet .. 650 milliGRAM(s) Oral every 6 hours PRN  allopurinol 300 milliGRAM(s) Oral daily  aluminum hydroxide/magnesium hydroxide/simethicone Suspension 30 milliLiter(s) Oral every 6 hours PRN  bisacodyl 5 milliGRAM(s) Oral every 12 hours PRN  enoxaparin Injectable 80 milliGRAM(s) SubCutaneous every 12 hours  famotidine    Tablet 20 milliGRAM(s) Oral daily  lidocaine   Patch 2 Patch Transdermal daily  melatonin 3 milliGRAM(s) Oral at bedtime  metoclopramide Injectable 10 milliGRAM(s) IV Push every 6 hours PRN  metoprolol tartrate 12.5 milliGRAM(s) Oral two times a day  morphine  IR 7.5 milliGRAM(s) Oral every 3 hours PRN  morphine ER Tablet 60 milliGRAM(s) Oral <User Schedule>  naloxone Injectable 0.1 milliGRAM(s) IV Push every 3 minutes PRN  ondansetron Injectable 4 milliGRAM(s) IV Push every 6 hours PRN  polyethylene glycol 3350 17 Gram(s) Oral daily  senna 2 Tablet(s) Oral at bedtime  simethicone 80 milliGRAM(s) Chew every 6 hours PRN  sodium chloride 0.9% lock flush 3 milliLiter(s) IV Push every 8 hours  sodium chloride 0.9% lock flush 3 milliLiter(s) IV Push every 8 hours      Review of Systems:  Constitutional: [ ] Fever [ ] Chills [ ] Fatigue [ ] Weight change   HEENT: [ ] Blurred vision [ ] Eye Pain [ ] Headache [ ] Runny nose [ ] Sore Throat   Respiratory: [ ] Cough [ ] Wheezing [ ] Shortness of breath  Cardiovascular: [ ] Chest Pain [ ] Palpitations [ ] SHARP [ ] PND [ ] Orthopnea  Gastrointestinal: [ ] Abdominal Pain [ ] Diarrhea [ ] Constipation [ ] Hemorrhoids [ ] Nausea [ ] Vomiting  Genitourinary: [ ] Nocturia [ ] Dysuria [ ] Incontinence  Extremities: [ ] Swelling [ ] Joint Pain  Neurologic: [ ] Focal deficit [ ] Paresthesias [ ] Syncope  Lymphatic: [ ] Swelling [ ] Lymphadenopathy   Skin: [ ] Rash [ ] Ecchymoses [ ] Wounds [ ] Lesions  Psychiatry: [ ] Depression [ ] Suicidal/Homicidal Ideation [ ] Anxiety [ ] Sleep Disturbances  [ ] 10 point review of systems is otherwise negative except as mentioned above            [ ]Unable to obtain    Vitals:  T(C): 36.8 (04-26-20 @ 04:33), Max: 36.8 (04-25-20 @ 19:38)  HR: 105 (04-26-20 @ 04:33) (102 - 107)  BP: 111/73 (04-26-20 @ 04:33) (110/71 - 116/73)  BP(mean): 86 (04-26-20 @ 04:33) (86 - 86)  RR: 18 (04-26-20 @ 04:33) (18 - 18)  SpO2: 97% (04-26-20 @ 04:33) (97% - 97%)  Wt(kg): --  Daily     Daily   I&O's Summary    25 Apr 2020 07:01  -  26 Apr 2020 07:00  --------------------------------------------------------  IN: 1606 mL / OUT: 3602 mL / NET: -1996 mL        Physical Exam:  General: NAD  Eye: PERRL, EOMI  HENT: Normal oral mucosa NC/AT  CV: Normal S1/S2, RRR, No M/R/G, no edema, no elevation in JVP  Resp: Normal respiratory effort, clear to auscultation bilaterally on RA, no wheezing, no crackles, bilateral chest tube sites intact  Abd: Soft, Non-tender, Non-distended, BS+  Ext: No clubbing, No joint deformity   Neuro: Non-focal, motor and sensory intact  Lymph: No lymphadenopathy  Psych: AAOx3, Mood & affect appropriate  Skin: No rashes, No ecchymoses, No cyanosis    Labs:                        8.0    5.81  )-----------( 549      ( 26 Apr 2020 05:55 )             27.9     04-26    136  |  96  |  13  ----------------------------<  103<H>  4.1   |  31  |  0.65    Ca    8.8      26 Apr 2020 05:55  Phos  2.3     04-26  Mg     2.1     04-26      PT/INR - ( 26 Apr 2020 05:55 )   PT: 12.7 sec;   INR: 1.11 ratio           CARDIAC MARKERS ( 25 Apr 2020 04:56 )  x     / x     / 104 U/L / x     / 15.0 ng/mL  CARDIAC MARKERS ( 25 Apr 2020 01:36 )  x     / x     / 108 U/L / x     / 15.2 ng/mL              New results/imaging: Cardiology Progress Note    Interval: Pt resting comfortably in bed. Noted mild discomfort with the chest tubes but otherwise, chest pain and palpitations have gotten much better compared to yesterday.      Tele: Sinus tachycardia in low 100's      Medications:  acetaminophen   Tablet .. 650 milliGRAM(s) Oral every 6 hours PRN  allopurinol 300 milliGRAM(s) Oral daily  aluminum hydroxide/magnesium hydroxide/simethicone Suspension 30 milliLiter(s) Oral every 6 hours PRN  bisacodyl 5 milliGRAM(s) Oral every 12 hours PRN  enoxaparin Injectable 80 milliGRAM(s) SubCutaneous every 12 hours  famotidine    Tablet 20 milliGRAM(s) Oral daily  lidocaine   Patch 2 Patch Transdermal daily  melatonin 3 milliGRAM(s) Oral at bedtime  metoclopramide Injectable 10 milliGRAM(s) IV Push every 6 hours PRN  metoprolol tartrate 12.5 milliGRAM(s) Oral two times a day  morphine  IR 7.5 milliGRAM(s) Oral every 3 hours PRN  morphine ER Tablet 60 milliGRAM(s) Oral <User Schedule>  naloxone Injectable 0.1 milliGRAM(s) IV Push every 3 minutes PRN  ondansetron Injectable 4 milliGRAM(s) IV Push every 6 hours PRN  polyethylene glycol 3350 17 Gram(s) Oral daily  senna 2 Tablet(s) Oral at bedtime  simethicone 80 milliGRAM(s) Chew every 6 hours PRN  sodium chloride 0.9% lock flush 3 milliLiter(s) IV Push every 8 hours  sodium chloride 0.9% lock flush 3 milliLiter(s) IV Push every 8 hours      Review of Systems:  Constitutional: [ ] Fever [ ] Chills [ ] Fatigue [ ] Weight change   HEENT: [ ] Blurred vision [ ] Eye Pain [ ] Headache [ ] Runny nose [ ] Sore Throat   Respiratory: [ ] Cough [ ] Wheezing [ ] Shortness of breath  Gastrointestinal: [ ] Abdominal Pain [ ] Diarrhea [ ] Constipation [ ] Hemorrhoids [ ] Nausea [ ] Vomiting  Genitourinary: [ ] Nocturia [ ] Dysuria [ ] Incontinence  Extremities: [ ] Swelling [ ] Joint Pain  Neurologic: [ ] Focal deficit [ ] Paresthesias [ ] Syncope  Lymphatic: [ ] Swelling [ ] Lymphadenopathy   Skin: [ ] Rash [ ] Ecchymoses [ ] Wounds [ ] Lesions  Psychiatry: [ ] Depression [ ] Suicidal/Homicidal Ideation [ ] Anxiety [ ] Sleep Disturbances  [x ] 10 point review of systems is otherwise negative except as mentioned above              Vitals:  T(C): 36.8 (04-26-20 @ 04:33), Max: 36.8 (04-25-20 @ 19:38)  HR: 105 (04-26-20 @ 04:33) (102 - 107)  BP: 111/73 (04-26-20 @ 04:33) (110/71 - 116/73)  BP(mean): 86 (04-26-20 @ 04:33) (86 - 86)  RR: 18 (04-26-20 @ 04:33) (18 - 18)  SpO2: 97% (04-26-20 @ 04:33) (97% - 97%)      I&O's Summary  25 Apr 2020 07:01  -  26 Apr 2020 07:00  --------------------------------------------------------  IN: 1606 mL / OUT: 3602 mL / NET: -1996 mL        Physical Exam:  General: NAD  Eye: PERRL, EOMI  HENT: Normal oral mucosa NC/AT  CV: Normal S1/S2, RRR, No M/R/G, no edema, no elevation in JVP  Resp: Normal respiratory effort, clear to auscultation bilaterally on RA, no wheezing, no crackles, bilateral chest tube sites intact  Abd: Soft, Non-tender, Non-distended, BS+  Ext: No clubbing, No joint deformity   Neuro: Non-focal, motor and sensory intact  Lymph: No lymphadenopathy  Psych: AAOx3, Mood & affect appropriate  Skin: No rashes, No ecchymoses, No cyanosis      Labs:                      8.0    5.81  )-----------( 549      ( 26 Apr 2020 05:55 )             27.9     04-26  136  |  96  |  13  ----------------------------<  103<H>  4.1   |  31  |  0.65    Ca    8.8      26 Apr 2020 05:55  Phos  2.3     04-26  Mg     2.1     04-26    PT/INR - ( 26 Apr 2020 05:55 )   PT: 12.7 sec;   INR: 1.11 ratio      CARDIAC MARKERS ( 25 Apr 2020 04:56 )  x     / x     / 104 U/L / x     / 15.0 ng/mL  CARDIAC MARKERS ( 25 Apr 2020 01:36 )  x     / x     / 108 U/L / x     / 15.2 ng/mL Interval: Pt resting comfortably in bed. Noted mild discomfort with the chest tubes but otherwise, chest pain and palpitations have gotten much better compared to yesterday.      Tele: Sinus tachycardia in low 100's      Medications:  acetaminophen   Tablet .. 650 milliGRAM(s) Oral every 6 hours PRN  allopurinol 300 milliGRAM(s) Oral daily  aluminum hydroxide/magnesium hydroxide/simethicone Suspension 30 milliLiter(s) Oral every 6 hours PRN  bisacodyl 5 milliGRAM(s) Oral every 12 hours PRN  enoxaparin Injectable 80 milliGRAM(s) SubCutaneous every 12 hours  famotidine    Tablet 20 milliGRAM(s) Oral daily  lidocaine   Patch 2 Patch Transdermal daily  melatonin 3 milliGRAM(s) Oral at bedtime  metoclopramide Injectable 10 milliGRAM(s) IV Push every 6 hours PRN  metoprolol tartrate 12.5 milliGRAM(s) Oral two times a day  morphine  IR 7.5 milliGRAM(s) Oral every 3 hours PRN  morphine ER Tablet 60 milliGRAM(s) Oral <User Schedule>  naloxone Injectable 0.1 milliGRAM(s) IV Push every 3 minutes PRN  ondansetron Injectable 4 milliGRAM(s) IV Push every 6 hours PRN  polyethylene glycol 3350 17 Gram(s) Oral daily  senna 2 Tablet(s) Oral at bedtime  simethicone 80 milliGRAM(s) Chew every 6 hours PRN  sodium chloride 0.9% lock flush 3 milliLiter(s) IV Push every 8 hours  sodium chloride 0.9% lock flush 3 milliLiter(s) IV Push every 8 hours      Review of Systems:  Constitutional: [ ] Fever [ ] Chills [ ] Fatigue [ ] Weight change   HEENT: [ ] Blurred vision [ ] Eye Pain [ ] Headache [ ] Runny nose [ ] Sore Throat   Respiratory: [ ] Cough [ ] Wheezing [ ] Shortness of breath  Gastrointestinal: [ ] Abdominal Pain [ ] Diarrhea [ ] Constipation [ ] Hemorrhoids [ ] Nausea [ ] Vomiting  Genitourinary: [ ] Nocturia [ ] Dysuria [ ] Incontinence  Extremities: [ ] Swelling [ ] Joint Pain  Neurologic: [ ] Focal deficit [ ] Paresthesias [ ] Syncope  Lymphatic: [ ] Swelling [ ] Lymphadenopathy   Skin: [ ] Rash [ ] Ecchymoses [ ] Wounds [ ] Lesions  Psychiatry: [ ] Depression [ ] Suicidal/Homicidal Ideation [ ] Anxiety [ ] Sleep Disturbances  [x ] 10 point review of systems is otherwise negative except as mentioned above              Vitals:  T(C): 36.8 (04-26-20 @ 04:33), Max: 36.8 (04-25-20 @ 19:38)  HR: 105 (04-26-20 @ 04:33) (102 - 107)  BP: 111/73 (04-26-20 @ 04:33) (110/71 - 116/73)  BP(mean): 86 (04-26-20 @ 04:33) (86 - 86)  RR: 18 (04-26-20 @ 04:33) (18 - 18)  SpO2: 97% (04-26-20 @ 04:33) (97% - 97%)      I&O's Summary  25 Apr 2020 07:01  -  26 Apr 2020 07:00  --------------------------------------------------------  IN: 1606 mL / OUT: 3602 mL / NET: -1996 mL        Physical Exam:  General: NAD  Eye: PERRL, EOMI  HENT: Normal oral mucosa NC/AT  CV: Normal S1/S2, RRR, No M/R/G, no edema, no elevation in JVP  Resp: Normal respiratory effort, clear to auscultation bilaterally on RA, no wheezing, no crackles, bilateral chest tube sites intact  Abd: Soft, Non-tender, Non-distended, BS+  Ext: No clubbing, No joint deformity   Neuro: Non-focal, motor and sensory intact  Lymph: No lymphadenopathy  Psych: AAOx3, Mood & affect appropriate  Skin: No rashes, No ecchymoses, No cyanosis      Labs:                      8.0    5.81  )-----------( 549      ( 26 Apr 2020 05:55 )             27.9     04-26  136  |  96  |  13  ----------------------------<  103<H>  4.1   |  31  |  0.65    Ca    8.8      26 Apr 2020 05:55  Phos  2.3     04-26  Mg     2.1     04-26    PT/INR - ( 26 Apr 2020 05:55 )   PT: 12.7 sec;   INR: 1.11 ratio      CARDIAC MARKERS ( 25 Apr 2020 04:56 )  x     / x     / 104 U/L / x     / 15.0 ng/mL  CARDIAC MARKERS ( 25 Apr 2020 01:36 )  x     / x     / 108 U/L / x     / 15.2 ng/mL

## 2020-04-26 NOTE — PROGRESS NOTE ADULT - ASSESSMENT
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 Has had bilateral pigtails for drainage of effusions.  ************  4/25-tachycardia o/n--seen by cards for echo-poor study by grossly normal LV EF   4/26-still elev HR but less so-at 100-less symptomatic

## 2020-04-26 NOTE — PROGRESS NOTE ADULT - SUBJECTIVE AND OBJECTIVE BOX
Patient seen and examined. hemodynamically stable. -4/26 is cycle 2 day4 of mFolfox.    Pt resting comfortably in bed. Noted mild discomfort with the chest tubes but otherwise, chest pain and palpitations have gotten much better compared to yesterday.    MEDICATIONS  (STANDING):  allopurinol 300 milliGRAM(s) Oral daily  enoxaparin Injectable 80 milliGRAM(s) SubCutaneous every 12 hours  famotidine    Tablet 20 milliGRAM(s) Oral daily  lidocaine   Patch 2 Patch Transdermal daily  melatonin 3 milliGRAM(s) Oral at bedtime  metoprolol tartrate 12.5 milliGRAM(s) Oral two times a day  morphine ER Tablet 60 milliGRAM(s) Oral <User Schedule>  polyethylene glycol 3350 17 Gram(s) Oral daily  senna 2 Tablet(s) Oral at bedtime  sodium chloride 0.9% lock flush 3 milliLiter(s) IV Push every 8 hours  sodium chloride 0.9% lock flush 3 milliLiter(s) IV Push every 8 hours    MEDICATIONS  (PRN):  acetaminophen   Tablet .. 650 milliGRAM(s) Oral every 6 hours PRN Temp greater or equal to 38C (100.4F)  aluminum hydroxide/magnesium hydroxide/simethicone Suspension 30 milliLiter(s) Oral every 6 hours PRN Dyspepsia  bisacodyl 5 milliGRAM(s) Oral every 12 hours PRN Constipation  metoclopramide Injectable 10 milliGRAM(s) IV Push every 6 hours PRN Nausea/Vomiting  morphine  IR 7.5 milliGRAM(s) Oral every 3 hours PRN Moderate Pain (4 - 6) or severe  naloxone Injectable 0.1 milliGRAM(s) IV Push every 3 minutes PRN Sedation or RR <10  ondansetron Injectable 4 milliGRAM(s) IV Push every 6 hours PRN Nausea and/or Vomiting  simethicone 80 milliGRAM(s) Chew every 6 hours PRN Gas      ICU Vital Signs Last 24 Hrs  T(C): 36.8 (26 Apr 2020 04:33), Max: 36.8 (25 Apr 2020 19:38)  T(F): 98.2 (26 Apr 2020 04:33), Max: 98.2 (25 Apr 2020 19:38)  HR: 105 (26 Apr 2020 04:33) (102 - 107)  BP: 111/73 (26 Apr 2020 04:33) (110/71 - 116/73)  BP(mean): 86 (26 Apr 2020 04:33) (86 - 86)  ABP: --  ABP(mean): --  RR: 18 (26 Apr 2020 04:33) (18 - 18)  SpO2: 97% (26 Apr 2020 04:33) (97% - 97%)      PHYSICAL EXAM:  Constitutional: NAD   Eyes: sclera non-icteric, conjunctiva non-anemia  Mouth: No mucositis.  Respiratory: Decreased breath sound in bilateral lungs  Chest: Bilateral chest tubes  Cardiovascular: tachycardia, regular rhythm.   Gastrointestinal: soft and flat, no tenderness to palpation, normoactive bowel sound, + palpable mass in lower abdomen  Extremities:  No c/c/e  MSK: No joint swelling, erythema, or tenderness   Neurological: AOx3, Cranial nerves II-XII grossly intact  Skin: No rash  Psych: Normal affect    No Known Allergies      Phosphorus Level, Serum (04.26.20 @ 05:55)    Phosphorus Level, Serum: 2.3 mg/dL    Magnesium, Serum (04.26.20 @ 05:55)    Magnesium, Serum: 2.1 mg/dL    Prothrombin Time and INR, Plasma (04.26.20 @ 05:55)    Prothrombin Time, Plasma: 12.7: Effective October 30th, 2018 the reference range for PT has changed. sec    INR: 1.11: Recommended ranges for therapeutic INR:    2.0-3.0 for most medical and surgical thromboembolic states    2.0-3.0 for atrial fibrillation    2.0-3.0 for bileaflet mechanical valve in aortic position    2.5-3.5 for mechanical heart valves    Chest 2004;[id number]  The presence of direct thrombin inhibitors (argatroban, refludan)  may falsely increase results. ratio    Lactate Dehydrogenase, Serum (04.26.20 @ 05:55)    Lactate Dehydrogenase, Serum: 360 U/L    Uric Acid, Serum (04.26.20 @ 05:55)    Uric Acid, Serum: 3.0 mg/dL    Complete Blood Count in AM (04.26.20 @ 05:55)    Nucleated RBC: 0 /100 WBCs    WBC Count: 5.81 K/uL    RBC Count: 3.18 M/uL    Hemoglobin: 8.0 g/dL    Hematocrit: 27.9 %    Mean Cell Volume: 87.7 fl    Mean Cell Hemoglobin: 25.2 pg    Mean Cell Hemoglobin Conc: 28.7 gm/dL    Red Cell Distrib Width: 24.7 %    Platelet Count - Automated: 549 K/uL    Basic Metabolic Panel in AM (04.26.20 @ 05:55)    Sodium, Serum: 136 mmol/L    Potassium, Serum: 4.1 mmol/L    Chloride, Serum: 96 mmol/L    Carbon Dioxide, Serum: 31 mmol/L    Anion Gap, Serum: 9 mmol/L    Blood Urea Nitrogen, Serum: 13 mg/dL    Creatinine, Serum: 0.65 mg/dL    Glucose, Serum: 103 mg/dL    Calcium, Total Serum: 8.8 mg/dL    eGFR if Non : 123: Interpretative comment  The units for eGFR are mL/min/1.73M2 (normalized body surface area). The  eGFR is calculated from a serum creatinine using the CKD-EPI equation.  Other variables required for calculation are race, age and sex. Among  patients with chronic kidney disease (CKD), the eGFR is useful in  determining the stage of disease according to KDOQI CKD classification.  All eGFR results are reported numerically with the following  interpretation.          GFR                    With                 Without     (ml/min/1.73 m2)    Kidney Damage       Kidney Damage        >= 90                    Stage 1                     Normal        60-89                    Stage 2                     Decreased GFR        30-59     Stage 3                     Stage 3        15-29                    Stage 4                     Stage 4        < 15                      Stage 5                     Stage 5  Each stage of CKD assumes that the associated GFR level has been in  effect for at least 3 months. Determination of stages one and two (with  eGFR > 59 ml/min/m2) requires estimation of kidney damage for at least 3  months as defined by structural or functional abnormalities.  Limitations: All estimates of GFR will be less accurate for patients at  extremes of muscle mass (including but not limited to frail elderly,  critically ill, or cancer patients), those with unusual diets, and those  with conditions associated with reduced secretion or extrarenal  elimination of creatinine. The eGFR equation is not recommended for use  in patients with unstable creatinine levels. mL/min/1.73M2    eGFR if African American: 142 mL/min/1.73M2

## 2020-04-26 NOTE — PROGRESS NOTE ADULT - SUBJECTIVE AND OBJECTIVE BOX
CHIEF COMPLAINT:  f/up sob, bilateral pleural effusions, restrictive dysfunction--better today -less anxious and sob, some body aches    Interval Events: Heme onc    REVIEW OF SYSTEMS:  Constitutional: No fevers or chills. No weight loss. + fatigue or generalized malaise.  Eyes: No itching or discharge from the eyes  ENT: No ear pain. No ear discharge. No nasal congestion. No post nasal drip. No epistaxis. No throat pain. No sore throat. No difficulty swallowing.   CV: No chest pain. No palpitations. No lightheadedness or dizziness.   Resp: No dyspnea at rest. + dyspnea on exertion. No orthopnea. No wheezing. No cough. No stridor. No sputum production. No chest pain with respiration.  GI: No nausea. No vomiting. No diarrhea.  MSK: No joint pain or pain in any extremities  Integumentary: No skin lesions. No pedal edema.  Neurological: + gross motor weakness. No sensory changes.  [ +] All other systems negative  [ ] Unable to assess ROS because ________    OBJECTIVE:  ICU Vital Signs Last 24 Hrs  T(C): 36.8 (26 Apr 2020 04:33), Max: 36.8 (25 Apr 2020 19:38)  T(F): 98.2 (26 Apr 2020 04:33), Max: 98.2 (25 Apr 2020 19:38)  HR: 105 (26 Apr 2020 04:33) (102 - 107)  BP: 111/73 (26 Apr 2020 04:33) (110/71 - 116/73)  BP(mean): 86 (26 Apr 2020 04:33) (86 - 86)  ABP: --  ABP(mean): --  RR: 18 (26 Apr 2020 04:33) (18 - 18)  SpO2: 97% (26 Apr 2020 04:33) (97% - 97%)        04-25 @ 07:01  -  04-26 @ 07:00  --------------------------------------------------------  IN: 1606 mL / OUT: 3602 mL / NET: -1996 mL      CAPILLARY BLOOD GLUCOSE      POCT Blood Glucose.: 135 mg/dL (25 Apr 2020 01:06)      PHYSICAL EXAM: NaD in bed on NC O2  General: Awake, alert, oriented X 3.   HEENT: Atraumatic, normocephalic.                 Mallampatti Grade 3                No nasal congestion.                No tonsillar or pharyngeal exudates.  Lymph Nodes: No palpable lymphadenopathy  Neck: No JVD. No carotid bruit.   Respiratory: abnormal chest expansion                         Normal percussion                         Normal and equal air entry                         No wheeze, rhonchi but mild inspriatory rales.  Cardiovascular: S1 S2 normal. No murmurs, rubs or gallops.   Abdomen: Soft, non-tender, non-distended. No organomegaly. Normoactive bowel sounds.  Extremities: Warm to touch. Peripheral pulse palpable. No pedal edema.   Skin: No rashes or skin lesions  Neurological: Motor and sensory examination equal and abnormal in all four extremities.  Psychiatry: Appropriate mood and affect.    HOSPITAL MEDICATIONS:  MEDICATIONS  (STANDING):  allopurinol 300 milliGRAM(s) Oral daily  enoxaparin Injectable 80 milliGRAM(s) SubCutaneous every 12 hours  famotidine    Tablet 20 milliGRAM(s) Oral daily  lidocaine   Patch 2 Patch Transdermal daily  melatonin 3 milliGRAM(s) Oral at bedtime  metoprolol tartrate 12.5 milliGRAM(s) Oral two times a day  morphine ER Tablet 60 milliGRAM(s) Oral <User Schedule>  polyethylene glycol 3350 17 Gram(s) Oral daily  senna 2 Tablet(s) Oral at bedtime  sodium chloride 0.9% lock flush 3 milliLiter(s) IV Push every 8 hours  sodium chloride 0.9% lock flush 3 milliLiter(s) IV Push every 8 hours    MEDICATIONS  (PRN):  acetaminophen   Tablet .. 650 milliGRAM(s) Oral every 6 hours PRN Temp greater or equal to 38C (100.4F)  aluminum hydroxide/magnesium hydroxide/simethicone Suspension 30 milliLiter(s) Oral every 6 hours PRN Dyspepsia  bisacodyl 5 milliGRAM(s) Oral every 12 hours PRN Constipation  metoclopramide Injectable 10 milliGRAM(s) IV Push every 6 hours PRN Nausea/Vomiting  morphine  IR 7.5 milliGRAM(s) Oral every 3 hours PRN Moderate Pain (4 - 6) or severe  naloxone Injectable 0.1 milliGRAM(s) IV Push every 3 minutes PRN Sedation or RR <10  ondansetron Injectable 4 milliGRAM(s) IV Push every 6 hours PRN Nausea and/or Vomiting  simethicone 80 milliGRAM(s) Chew every 6 hours PRN Gas      LABS:                        8.0    5.81  )-----------( 549      ( 26 Apr 2020 05:55 )             27.9     04-26    136  |  96  |  13  ----------------------------<  103<H>  4.1   |  31  |  0.65    Ca    8.8      26 Apr 2020 05:55  Phos  2.3     04-26  Mg     2.1     04-26      PT/INR - ( 26 Apr 2020 05:55 )   PT: 12.7 sec;   INR: 1.11 ratio                   MICROBIOLOGY:     RADIOLOGY:  [ ] Reviewed and interpreted by me    Point of Care Ultrasound Findings:    PFT:    EKG:

## 2020-04-26 NOTE — PROGRESS NOTE ADULT - ATTENDING COMMENTS
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 PE despite prophylaxis, debility, anemia--keep sat above 90%  pleural effusions--continue bilateral pigtail catheters in place-monitor out put--hopeful for decline w/ chemo to avoid pleuradesis etc  SVT--? electrolyte issues vs PE--d-dimer/dopplers of LE/? CTPA--would boost lovenox to full dose rx here until DVT/PE ruled out  onc/heme-chemo as able  GI prophylaxis   OOB as able               anxiolytics for anxiety  Harlan Solis MD-Pulmonary   [phone]

## 2020-04-26 NOTE — CHART NOTE - NSCHARTNOTEFT_GEN_A_CORE
Discussed the case with cardiology attending, Dr. Baeza.     Bedside echo did not reveal any significant wall motion abnormalities or pericardial effusion although it was a suboptimal view. ACS is unlikely given the bedside echo and age. Official echo was ordered and pending.     At this moment, the etiology of the new ST changes is unclear. She was getting 5-FU at the time when she developed the symptoms and discussed the possibility of coronary vasospasm. Dr. Baeza mentioned that it could be but it is hard to make a definite diagnosis of coronary vasospasm. He will switch BB to CCB to control HR and prevent possible vasospasm. F/U EKG to tomorrow and official echo.     At this moment, she completed cycle 2 with discontinuation of 5-FU in day 3.   We will discuss the next cycle; 5-FU vs xeloda with Dr. Miles.

## 2020-04-26 NOTE — PROGRESS NOTE ADULT - ASSESSMENT
23 yo F with no prior medical history presented with two months of worsening abdominal pain, found to have metastatic carcinoma of unclear etiology (suspect GI, ?neurodendcrine). Cardiology consulted for chest pain and palpitations.    - clinically stable with resolved chest pain and improved palpitations this morning  - telemetry reviewed, sinus tachycardia in low 100s  - TTE reviewed, difficult windows but no clear evidence of WMAs in coronary distributions to suggest ACS  - continue po lopressor 12.5mg BID  - no need to trend cardiac enzymes at this time  - will continue to follow, please call with further questions    Joshua Laguerre, #[phone]  Cardiology Fellow 23 yo F with no prior medical history presented with two months of worsening abdominal pain, found to have metastatic carcinoma of unclear etiology (suspect GI, ?neurodendcrine).  Cardiology consulted for chest pain and palpitations.      REC:  - Appears clinically stable with resolved chest pain and improved palpitations this morning  - telemetry reviewed, sinus tachycardia in low 100s  - TTE reviewed, difficult windows but no clear evidence of WMAs in coronary distributions to suggest ACS  - continue po lopressor 12.5mg BID; can increase to 25 mg bid is palps warrant (sxs. likely due to ST)  - no need to trend cardiac enzymes at this time  - will continue to follow, please call with further questions  - will call heme/onc (Dr. Ogden) to discuss      Joshua Laguerre, #[phone]  Cardiology Fellow    Jasmeet Baeza M.D.  Cardiology Attending, Consult Service  Beeper     All Cardiology service information can be found 24/7 on [email or web address], password: [email or web address] 25 yo F with no prior medical history presented with two months of worsening abdominal pain, found to have metastatic carcinoma of unclear etiology (suspect GI, ?neurodendcrine).  Cardiology consulted for chest pain and palpitations.      REC:  - Appears clinically stable with resolved chest pain and improved palpitations this morning  - telemetry reviewed, sinus tachycardia in low 100s  - no need to trend cardiac enzymes at this time  - called heme/onc (Dr. Ogden) to discuss - spoke to fellow.  5-FU can rarely cause vasospasm.  Will change beta-blocker to diltiazem to cover this possibility.  Has completed current chemo cycle; will be due for next cycle in 2 weeks.  - repeat EKG in AM to re-assess ST/T findings  - repeat formal TTE in Cardiology dept. tomorrow.      Joshua Laguerre, #[phone]  Cardiology Fellow    Jasmeet Baeza M.D.  Cardiology Attending, Consult Service  Beeper     All Cardiology service information can be found 24/7 on [email or web address], password: "[email or web address]

## 2020-04-27 NOTE — PROGRESS NOTE ADULT - ATTENDING COMMENTS
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  Her pigtails appear to be draining less, will likely repeat CXR tomorrow and consider removing left catheter and subsequently right depending on drainage.  Cytology was negative.    On diltiazem for coronary vasospasm.  Overall, sats are better on room air but still dyspneic.  Continue supplemental oxygen.

## 2020-04-27 NOTE — PROGRESS NOTE ADULT - SUBJECTIVE AND OBJECTIVE BOX
Interval History: No recurrent chest pain over the weekend    Review Of Systems:  Constitutional: [ ] Fever [ ] Chills [x] Fatigue [ ] Weight change   HEENT: [ ] Blurred vision [ ] Eye Pain [ ] Headache [ ] Runny nose [ ] Sore Throat   Respiratory: [ ] Cough [ ] Wheezing [x] Shortness of breath  Cardiovascular: [ ] Chest Pain [ ] Palpitations [ ] SHARP [ ] PND [ ] Orthopnea  Gastrointestinal: [ ] Abdominal Pain [ ] Diarrhea [ ] Constipation [ ] Hemorrhoids [ ] Nausea [ ] Vomiting  Genitourinary: [ ] Nocturia [ ] Dysuria [ ] Incontinence  Extremities: [ ] Swelling [ ] Joint Pain  Neurologic: [ ] Focal deficit [ ] Paresthesias [ ] Syncope  Lymphatic: [ ] Swelling [ ] Lymphadenopathy   Skin: [ ] Rash [ ] Ecchymoses [ ] Wounds [ ] Lesions  Psychiatry: [ ] Depression [ ] Suicidal/Homicidal Ideation [ ] Anxiety [ ] Sleep Disturbances  [x] 10 point review of systems is otherwise negative except as mentioned above    Medications:  acetaminophen   Tablet .. 650 milliGRAM(s) Oral every 6 hours PRN  allopurinol 300 milliGRAM(s) Oral daily  aluminum hydroxide/magnesium hydroxide/simethicone Suspension 30 milliLiter(s) Oral every 6 hours PRN  bisacodyl 5 milliGRAM(s) Oral every 12 hours PRN  diltiazem    Tablet 60 milliGRAM(s) Oral every 8 hours  enoxaparin Injectable 80 milliGRAM(s) SubCutaneous every 12 hours  famotidine    Tablet 20 milliGRAM(s) Oral daily  lidocaine   Patch 2 Patch Transdermal daily  melatonin 3 milliGRAM(s) Oral at bedtime  metoclopramide Injectable 10 milliGRAM(s) IV Push every 6 hours PRN  morphine  IR 7.5 milliGRAM(s) Oral every 3 hours PRN  morphine ER Tablet 60 milliGRAM(s) Oral <User Schedule>  naloxone Injectable 0.1 milliGRAM(s) IV Push every 3 minutes PRN  ondansetron Injectable 4 milliGRAM(s) IV Push every 6 hours PRN  polyethylene glycol 3350 17 Gram(s) Oral daily  senna 2 Tablet(s) Oral at bedtime  simethicone 80 milliGRAM(s) Chew every 6 hours PRN  sodium chloride 0.9% lock flush 3 milliLiter(s) IV Push every 8 hours  sodium chloride 0.9% lock flush 3 milliLiter(s) IV Push every 8 hours      Vitals:  ICU Vital Signs Last 24 Hrs  T(C): 37.2 (27 Apr 2020 05:13), Max: 37.4 (26 Apr 2020 12:52)  T(F): 98.9 (27 Apr 2020 05:13), Max: 99.4 (26 Apr 2020 12:52)  HR: 113 (27 Apr 2020 05:13) (110 - 121)  BP: 111/73 (27 Apr 2020 05:13) (106/71 - 117/68)  BP(mean): 85 (27 Apr 2020 05:13) (84 - 85)  ABP: --  ABP(mean): --  RR: 18 (27 Apr 2020 05:13) (18 - 18)  SpO2: 95% (27 Apr 2020 05:13) (95% - 96%)    Daily     Daily   I&O's Summary    26 Apr 2020 07:01  -  27 Apr 2020 07:00  --------------------------------------------------------  IN: 1000 mL / OUT: 1760 mL / NET: -760 mL    Physical Exam:  Appearance:  NAD, laying in bed wearing NC  HENT: NC/AT  Cardiovascular: Tachycardic and regular rhythm, equal S1, S2, no murmurs, no LE Edema or JVD Present  Respiratory: Clear to auscultation bilaterally with decreased breath sounds over RLL, chest tubes in place  Gastrointestinal: Soft  Psychiatry: [x] AAOx3  [x] Follows Commands  Skin: Intact    Labs:                        8.4    6.38  )-----------( 558      ( 27 Apr 2020 05:41 )             29.2     04-27    135  |  93<L>  |  9   ----------------------------<  103<H>  4.1   |  30  |  0.64    Ca    9.0      27 Apr 2020 05:41  Phos  2.8     04-27  Mg     1.9     04-27    TPro  7.1  /  Alb  2.8<L>  /  TBili  0.4  /  DBili  x   /  AST  25  /  ALT  8<L>  /  AlkPhos  343<H>  04-27    PT/INR - ( 27 Apr 2020 05:41 )   PT: 13.4 sec;   INR: 1.16 ratio         Culture - Blood (collected 04-23-20 @ 09:04)  Source: .Blood PICC/PERC Double Lumen BLUE  Preliminary Report (04-24-20 @ 10:02):    No growth to date.    Culture - Blood (collected 04-23-20 @ 09:04)  Source: .Blood Blood-Peripheral  Preliminary Report (04-24-20 @ 10:02):    No growth to date.    Culture - Urine (collected 04-23-20 @ 09:03)  Source: .Urine Clean Catch (Midstream)  Final Report (04-25-20 @ 08:50):    >100,000 CFU/ml Klebsiella pneumoniae  Organism: Klebsiella pneumoniae (04-25-20 @ 08:50)  Organism: Klebsiella pneumoniae (04-25-20 @ 08:50)      -  Amikacin: S <=16      -  Ampicillin: R >16 These ampicillin results predict results for amoxicillin      -  Ampicillin/Sulbactam: R >16/8 Enterobacter, Citrobacter, and Serratia may develop resistance during prolonged therapy (3-4 days)      -  Aztreonam: I 8      -  Cefazolin: R >16 (MIC_CL_COM_ENTERIC_CEFAZU) For uncomplicated UTI with K. pneumoniae, E. coli, or P. mirablis: CANDY <=16 is sensitive and CANDY >=32 is resistant. This also predicts results for oral agents cefaclor, cefdinir, cefpodoxime, cefprozil, cefuroxime axetil, cephalexin and locarbef for uncomplicated UTI. Note that some isolates may be susceptible to these agents while testing resistant to cefazolin.      -  Cefepime: S <=4      -  Cefoxitin: S <=8      -  Ceftriaxone: R 8 Enterobacter, Citrobacter, and Serratia may develop resistance during prolonged therapy      -  Ciprofloxacin: S <=1      -  Gentamicin: S <=4      -  Imipenem: S <=1      -  Levofloxacin: S <=2      -  Meropenem: S <=1      -  Nitrofurantoin: I 64 Should not be used to treat pyelonephritis      -  Piperacillin/Tazobactam: I 64      -  Tigecycline: S <=2      -  Tobramycin: S <=4      -  Trimethoprim/Sulfamethoxazole: R >2/38      Method Type: CANDY    Interpretation of Telemetry: sinus rhythm

## 2020-04-27 NOTE — PROGRESS NOTE ADULT - ASSESSMENT
24 f with b/l adnexal masses and pelvic LAD, metastatic carcinoma of unknown primary presented 3/20 with abd pain, s/p CT guided biopsy 3/25, PICC line 4/3 to start chemo  pt intermittently febrile and leukocytosis, now febrile to 101.7 and tachy, WBC: 34  QUINN for the last 2 days now Cr: 1.3  blood and urine cx negative  COVID negative x 3 last one 4/4  RVP 4/1 with parainfluenza  also some diarrhea initially    persistent fever and leukocytosis, with tachycardia sepsis  C-diff negative  parainfluenza URI 4/1  metastatic ca with ?tumor fever  persistent tachycardia but no PE, just b/l pleural effusion and L pleural nodularity s/o malignant effusion s/p b/l chest tube  new fever again resolved after starting chemo and pt had fevers before with negative infectious work up and now the WBC normalized to 10, likely tumor related  urine cx with <[id number] klebsiella but no urinary symptoms likely asymptomatic bacteriuria and u/a only with 6 WBC  SVT and chest pain  * s/p cycle 1 of folfox and started on second cycle 4/23  * infection w/u negative and likely malignancy related, now afebrile with normal WBC  * blood cxs negative and urine cx again with GNR but no urinary symptoms  * monitor the WBC and temp  * monitor for new signs of infection  * will sign off, please call with questions      The above assessment and plan was discussed with hem/onc team    Gina Arenas MD  Pager [phone]  After 5pm and on weekends call [phone]

## 2020-04-27 NOTE — PROGRESS NOTE ADULT - ASSESSMENT
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 Has had bilateral pigtails for drainage of effusions.  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 Has had bilateral pigtails for drainage of effusions.  ************  4/25-tachycardia o/n--seen by cards for echo-poor study by grossly normal LV EF   4/26-still elev HR but less so-at 100-less symptomatic    Currently being monitored on a telemetry floor because of tachycardia.  Catheters are draining well.  cytology is negative for malignant cells which does not rule out malignant effusion as multiple samples may be necessary.      REc:    Continue to monitor output from catheters.  When drainage has stopped, CXR can be repeated and catheters can be removed.  Would not pleuradese at this time as drainage is decreasing.    Will follow with you.

## 2020-04-27 NOTE — PROGRESS NOTE ADULT - SUBJECTIVE AND OBJECTIVE BOX
CHIEF COMPLAINT:    Interval Events:    REVIEW OF SYSTEMS:  Constitutional: [ ] negative [ ] fevers [ ] chills [ ] weight loss [ ] weight gain  HEENT: [ ] negative [ ] dry eyes [ ] eye irritation [ ] postnasal drip [ ] nasal congestion  CV: [ ] negative  [ ] chest pain [ ] orthopnea [ ] palpitations [ ] murmur  Resp: [ ] negative [ ] cough [ ] shortness of breath [ ] dyspnea [ ] wheezing [ ] sputum [ ] hemoptysis  GI: [ ] negative [ ] nausea [ ] vomiting [ ] diarrhea [ ] constipation [ ] abd pain [ ] dysphagia   : [ ] negative [ ] dysuria [ ] nocturia [ ] hematuria [ ] increased urinary frequency  Musculoskeletal: [ ] negative [ ] back pain [ ] myalgias [ ] arthralgias [ ] fracture  Skin: [ ] negative [ ] rash [ ] itch  Neurological: [ ] negative [ ] headache [ ] dizziness [ ] syncope [ ] weakness [ ] numbness  Psychiatric: [ ] negative [ ] anxiety [ ] depression  Endocrine: [ ] negative [ ] diabetes [ ] thyroid problem  Hematologic/Lymphatic: [ ] negative [ ] anemia [ ] bleeding problem  Allergic/Immunologic: [ ] negative [ ] itchy eyes [ ] nasal discharge [ ] hives [ ] angioedema  [ ] All other systems negative  [ ] Unable to assess ROS because ________    OBJECTIVE:  ICU Vital Signs Last 24 Hrs  T(C): 37.2 (27 Apr 2020 05:13), Max: 37.4 (26 Apr 2020 12:52)  T(F): 98.9 (27 Apr 2020 05:13), Max: 99.4 (26 Apr 2020 12:52)  HR: 113 (27 Apr 2020 05:13) (110 - 121)  BP: 111/73 (27 Apr 2020 05:13) (106/71 - 117/68)  BP(mean): 85 (27 Apr 2020 05:13) (84 - 85)  ABP: --  ABP(mean): --  RR: 18 (27 Apr 2020 05:13) (18 - 18)  SpO2: 95% (27 Apr 2020 05:13) (95% - 96%)        04-26 @ 07:01  -  04-27 @ 07:00  --------------------------------------------------------  IN: 1000 mL / OUT: 1760 mL / NET: -760 mL    04-27 @ 07:01 - 04-27 @ 11:29  --------------------------------------------------------  IN: 240 mL / OUT: 0 mL / NET: 240 mL      CAPILLARY BLOOD GLUCOSE          PHYSICAL EXAM:  General:   HEENT:   Lymph Nodes:  Neck:   Respiratory:   Cardiovascular:   Abdomen:   Extremities:   Skin:   Neurological:  Psychiatry:    HOSPITAL MEDICATIONS:  MEDICATIONS  (STANDING):  allopurinol 300 milliGRAM(s) Oral daily  diltiazem    Tablet 60 milliGRAM(s) Oral every 8 hours  enoxaparin Injectable 80 milliGRAM(s) SubCutaneous every 12 hours  famotidine    Tablet 20 milliGRAM(s) Oral daily  lidocaine   Patch 2 Patch Transdermal daily  melatonin 3 milliGRAM(s) Oral at bedtime  morphine ER Tablet 60 milliGRAM(s) Oral <User Schedule>  polyethylene glycol 3350 17 Gram(s) Oral daily  senna 2 Tablet(s) Oral at bedtime  sodium chloride 0.9% lock flush 3 milliLiter(s) IV Push every 8 hours  sodium chloride 0.9% lock flush 3 milliLiter(s) IV Push every 8 hours    MEDICATIONS  (PRN):  acetaminophen   Tablet .. 650 milliGRAM(s) Oral every 6 hours PRN Temp greater or equal to 38C (100.4F)  aluminum hydroxide/magnesium hydroxide/simethicone Suspension 30 milliLiter(s) Oral every 6 hours PRN Dyspepsia  bisacodyl 5 milliGRAM(s) Oral every 12 hours PRN Constipation  metoclopramide Injectable 10 milliGRAM(s) IV Push every 6 hours PRN Nausea/Vomiting  morphine  IR 7.5 milliGRAM(s) Oral every 3 hours PRN Moderate Pain (4 - 6) or severe  naloxone Injectable 0.1 milliGRAM(s) IV Push every 3 minutes PRN Sedation or RR <10  ondansetron Injectable 4 milliGRAM(s) IV Push every 6 hours PRN Nausea and/or Vomiting  simethicone 80 milliGRAM(s) Chew every 6 hours PRN Gas      LABS:                        8.4    6.38  )-----------( 558      ( 27 Apr 2020 05:41 )             29.2     Hgb Trend: 8.4<--, 8.0<--, 8.1<--, 8.0<--, 7.7<--  04-27    135  |  93<L>  |  9   ----------------------------<  103<H>  4.1   |  30  |  0.64    Ca    9.0      27 Apr 2020 05:41  Phos  2.8     04-27  Mg     1.9     04-27    TPro  7.1  /  Alb  2.8<L>  /  TBili  0.4  /  DBili  x   /  AST  25  /  ALT  8<L>  /  AlkPhos  343<H>  04-27    Creatinine Trend: 0.64<--, 0.65<--, 0.56<--, 0.53<--, 0.59<--, 0.59<--  PT/INR - ( 27 Apr 2020 05:41 )   PT: 13.4 sec;   INR: 1.16 ratio                   MICROBIOLOGY:       RADIOLOGY:  [ ] Reviewed and interpreted by me    PULMONARY FUNCTION TESTS:    EKG: CHIEF COMPLAINT:    Interval Events:  Feeling better overall.  She is tired.      REVIEW OF SYSTEMS:  Denies dyspnea, cough or chest discomfort.    [x ] All other systems negative  [ ] Unable to assess ROS because ________    OBJECTIVE:  ICU Vital Signs Last 24 Hrs  T(C): 37.2 (27 Apr 2020 05:13), Max: 37.4 (26 Apr 2020 12:52)  T(F): 98.9 (27 Apr 2020 05:13), Max: 99.4 (26 Apr 2020 12:52)  HR: 113 (27 Apr 2020 05:13) (110 - 121)  BP: 111/73 (27 Apr 2020 05:13) (106/71 - 117/68)  BP(mean): 85 (27 Apr 2020 05:13) (84 - 85)  ABP: --  ABP(mean): --  RR: 18 (27 Apr 2020 05:13) (18 - 18)  SpO2: 95% (27 Apr 2020 05:13) (95% - 96%)        04-26 @ 07:01 - 04-27 @ 07:00  --------------------------------------------------------  IN: 1000 mL / OUT: 1760 mL / NET: -760 mL    04-27 @ 07:01 - 04-27 @ 11:29  --------------------------------------------------------  IN: 240 mL / OUT: 0 mL / NET: 240 mL      CAPILLARY BLOOD GLUCOSE    HOSPITAL MEDICATIONS:  MEDICATIONS  (STANDING):  allopurinol 300 milliGRAM(s) Oral daily  diltiazem    Tablet 60 milliGRAM(s) Oral every 8 hours  enoxaparin Injectable 80 milliGRAM(s) SubCutaneous every 12 hours  famotidine    Tablet 20 milliGRAM(s) Oral daily  lidocaine   Patch 2 Patch Transdermal daily  melatonin 3 milliGRAM(s) Oral at bedtime  morphine ER Tablet 60 milliGRAM(s) Oral <User Schedule>  polyethylene glycol 3350 17 Gram(s) Oral daily  senna 2 Tablet(s) Oral at bedtime  sodium chloride 0.9% lock flush 3 milliLiter(s) IV Push every 8 hours  sodium chloride 0.9% lock flush 3 milliLiter(s) IV Push every 8 hours    MEDICATIONS  (PRN):  acetaminophen   Tablet .. 650 milliGRAM(s) Oral every 6 hours PRN Temp greater or equal to 38C (100.4F)  aluminum hydroxide/magnesium hydroxide/simethicone Suspension 30 milliLiter(s) Oral every 6 hours PRN Dyspepsia  bisacodyl 5 milliGRAM(s) Oral every 12 hours PRN Constipation  metoclopramide Injectable 10 milliGRAM(s) IV Push every 6 hours PRN Nausea/Vomiting  morphine  IR 7.5 milliGRAM(s) Oral every 3 hours PRN Moderate Pain (4 - 6) or severe  naloxone Injectable 0.1 milliGRAM(s) IV Push every 3 minutes PRN Sedation or RR <10  ondansetron Injectable 4 milliGRAM(s) IV Push every 6 hours PRN Nausea and/or Vomiting  simethicone 80 milliGRAM(s) Chew every 6 hours PRN Gas      LABS:                        8.4    6.38  )-----------( 558      ( 27 Apr 2020 05:41 )             29.2     Hgb Trend: 8.4<--, 8.0<--, 8.1<--, 8.0<--, 7.7<--  04-27    135  |  93<L>  |  9   ----------------------------<  103<H>  4.1   |  30  |  0.64    Ca    9.0      27 Apr 2020 05:41  Phos  2.8     04-27  Mg     1.9     04-27    TPro  7.1  /  Alb  2.8<L>  /  TBili  0.4  /  DBili  x   /  AST  25  /  ALT  8<L>  /  AlkPhos  343<H>  04-27    Creatinine Trend: 0.64<--, 0.65<--, 0.56<--, 0.53<--, 0.59<--, 0.59<--  PT/INR - ( 27 Apr 2020 05:41 )   PT: 13.4 sec;   INR: 1.16 ratio                   MICROBIOLOGY:       RADIOLOGY:  [x ] Reviewed and interpreted by me- bilateral pigtail catheters in place.  NO effusion or infiltrates noted.      PULMONARY FUNCTION TESTS:    EKG:

## 2020-04-27 NOTE — PROGRESS NOTE ADULT - SUBJECTIVE AND OBJECTIVE BOX
Follow Up:  fever, leukocytosis    Interval History: pt afebrile with normal WBC after chemo but now with SVT and chest pain    ROS:      All other systems negative    Constitutional: no fever,  chills  Head: no trauma  Eyes: no vision changes, no eye pain  ENT:  no sore throat, no rhinorrhea  Cardiovascular:   chest pain, no palpitation  Respiratory:  improved SOB, no cough  GI:  + abd pain, no vomiting, no diarrhea   urinary: no dysuria, no hematuria, no flank pain  musculoskeletal:  no joint pain, no joint swelling  skin:  no rash  neurology:  no headache, no seizure, no change in mental status  psych: no anxiety, no depression       Allergies  No Known Allergies        ANTIMICROBIALS:      OTHER MEDS:  acetaminophen   Tablet .. 650 milliGRAM(s) Oral every 6 hours PRN  allopurinol 300 milliGRAM(s) Oral daily  aluminum hydroxide/magnesium hydroxide/simethicone Suspension 30 milliLiter(s) Oral every 6 hours PRN  bisacodyl 5 milliGRAM(s) Oral every 12 hours PRN  diltiazem    Tablet 60 milliGRAM(s) Oral every 8 hours  enoxaparin Injectable 80 milliGRAM(s) SubCutaneous every 12 hours  famotidine    Tablet 20 milliGRAM(s) Oral daily  lidocaine   Patch 2 Patch Transdermal daily  melatonin 3 milliGRAM(s) Oral at bedtime  metoclopramide Injectable 10 milliGRAM(s) IV Push every 6 hours PRN  morphine  IR 7.5 milliGRAM(s) Oral every 3 hours PRN  morphine ER Tablet 60 milliGRAM(s) Oral <User Schedule>  naloxone Injectable 0.1 milliGRAM(s) IV Push every 3 minutes PRN  ondansetron Injectable 4 milliGRAM(s) IV Push every 6 hours PRN  polyethylene glycol 3350 17 Gram(s) Oral daily  senna 2 Tablet(s) Oral at bedtime  simethicone 80 milliGRAM(s) Chew every 6 hours PRN  sodium chloride 0.9% lock flush 3 milliLiter(s) IV Push every 8 hours  sodium chloride 0.9% lock flush 3 milliLiter(s) IV Push every 8 hours      Vital Signs Last 24 Hrs  T(C): 36.9 (27 Apr 2020 12:44), Max: 37.2 (27 Apr 2020 05:13)  T(F): 98.4 (27 Apr 2020 12:44), Max: 98.9 (27 Apr 2020 05:13)  HR: 112 (27 Apr 2020 12:44) (112 - 121)  BP: 119/79 (27 Apr 2020 12:44) (106/71 - 119/79)  BP(mean): 85 (27 Apr 2020 05:13) (85 - 85)  RR: 18 (27 Apr 2020 12:44) (18 - 18)  SpO2: 96% (27 Apr 2020 12:44) (95% - 96%)    Physical Exam:  General:    NAD,  non toxic, eating  Head: atraumatic, normocephalic  Eye: normal sclera and conjunctiva  ENT:    no oropharyngeal lesions,   no LAD,   neck supple  Cardio:    tachy regular S1, S2  Respiratory:  b/l chest tubes, now on NC  abd:    palpable mass, soft,   BS +,  slight diffuse tenderness  :   no CVAT,  no suprapubic tenderness,   no  cash  Musculoskeletal:   no joint swelling,   no edema  vascular: RUE picc  Skin:    no rash  Neurologic:     no focal deficit  psych: normal affect                          8.4    6.38  )-----------( 558      ( 27 Apr 2020 05:41 )             29.2       04-27    135  |  93<L>  |  9   ----------------------------<  103<H>  4.1   |  30  |  0.64    Ca    9.0      27 Apr 2020 05:41  Phos  2.8     04-27  Mg     1.9     04-27    TPro  7.1  /  Alb  2.8<L>  /  TBili  0.4  /  DBili  x   /  AST  25  /  ALT  8<L>  /  AlkPhos  343<H>  04-27          MICROBIOLOGY:  v  .Blood Blood-Peripheral  04-23-20   No growth to date.  --  --      .Urine Clean Catch (Midstream)  04-23-20   >100,000 CFU/ml Klebsiella pneumoniae  --  Klebsiella pneumoniae      .Blood Blood-Peripheral  04-20-20   No Growth Final  --  --      .Blood Blood-Catheter  04-20-20   No Growth Final  --  --      .Urine Clean Catch (Midstream)  04-20-20   >100,000 CFU/ml Klebsiella pneumoniae (Carbapenem Resistant)  --  Klepne MDRO      Pleural Fl Pleural Fluid  04-20-20   No growth  --    polymorphonuclear leukocytes seen  No organisms seen  by cytocentrifuge      .Urine  04-19-20   50,000 - 99,000 CFU/mL Klebsiella pneumoniae (Carbapenem Resistant)  --  Klepne MDRO      .Urine Clean Catch (Midstream)  04-18-20   10,000 - 49,000 CFU/mL Klebsiella pneumoniae (Carbapenem Resistant)  --  Klepne MDRO      .Blood Blood-Catheter  04-18-20   No Growth Final  --  --      .Blood Blood-Peripheral  04-18-20   No Growth Final  --  --      Pleural Fl Pleural Fluid  04-17-20   No growth at 5 days  --    polymorphonuclear leukocytes seen  No organisms seen  by cytocentrifuge      .Blood Blood-Peripheral  04-13-20   No Growth Final  --  --      .Blood PICC/PERC Double Lumen BLUE  04-13-20   No Growth Final  --  --      .Urine Clean Catch (Midstream)  04-08-20   No growth  --  --      .Blood Blood-Peripheral  04-05-20   No Growth Final  --  --      .Blood Blood-Peripheral  04-05-20   No Growth Final  --  --      .Urine Clean Catch (Midstream)  04-05-20   No growth  --  --      .Blood Blood-Peripheral  04-03-20   No Growth Final  --  --      .Blood Blood-Catheter  04-03-20   No Growth Final  --  --      .Blood Blood-Peripheral  04-01-20   No Growth Final  --  --      .Urine Clean Catch (Midstream)  04-01-20   >=3 organisms. Probable collection contamination.  --  --                RADIOLOGY:  Images below independently visualized and reviewed personally, findings as below  < from: Xray Chest 1 View- PORTABLE-Urgent (04.25.20 @ 01:08) >    IMPRESSION: There are unchanged bilateral chest tubes. There is minimal bibasilar patchy opacification. The right-sided PICC line is well-positioned. The heart is normal in size.        < from: TTE with Doppler (w/Cont) (04.27.20 @ 05:34) >  Conclusions:  Normal left ventricular systolic function. No segmental  wall motion abnormalities.  Trace pericardial effusion.  ------------------------------------------------------------------------  Confirmed on  4/27/2020 - 10:33:02 by Luis Mackenzie MD,    < end of copied text >

## 2020-04-27 NOTE — PROGRESS NOTE ADULT - ASSESSMENT
25 yo F with no prior medical history presented with two months of worsening abdominal pain, found to have metastatic carcinoma of unclear etiology (suspect GI, ?neurodendcrine).  Cardiology consulted for chest pain and palpitations with abnormal EKG.    #Chest pain  #Abnormal EKG  - Cardiac biomarkers not suspicious for ACS with resolved chest pain  - Chest pain may be 2/2 5-FU, continue to monitor symptoms  - Continue diltiazem 60mg q8h for possible vasospasm from 5-FU  - Repeat EKG to evaluate ST changes, none found in chart this morning  - Repeat formal TTE ordered, will follow up on results    Yaneli Burks MD  Cardiology Fellow  [phone]  All Cardiology service information can be found 24/7 on [email or web address], password: [email or web address]

## 2020-04-27 NOTE — PROGRESS NOTE ADULT - ASSESSMENT
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      # Metastatic carcinoma, cancer of unknown primary  - CT A/P with bilateral heterogeneous adnexal masses, as well as upper abdominal, RP, possible bone lesions and pelvic lymphadenopathy; CT Chest with multiple solid pulmonary nodules  - Cancer of unknown primary (poorly differentiated carcinoma) but suspect likely GI origin given CDX2 positivity on pathology; chromogranin positivity also suggests neuroendocrine features; Ki-67 index 40%.   - Tumor markers elevated: Cancer Antigen, Ca 27.29: 70.8, Cancer Antigen, 125: 619, Carcinoembryonic Antigen: 44.7, Beta-HC.0 on initial evaluation (3/7); Checked again on 20 and it was 102.7  - Discussed diagnosis with patient on 3/31/20. Given advanced, metastatic stage, will need to be on indefinite treatment to limit progression of disease.  - S/p C1 mFOLFOX (-).    - Will try to wean supplemental oxygen as tolerated. Will also provide an incentive spirometer  - CTA Chest () was negative for central PE, but unable to evaluate segmental and subsegmental branches due to motion artifact. In the setting of her hypoxemia and tachycardia,   -LE dopplers negative for DVT on .  - Continue Venofer until discharge   - is cycle 2 day5 of mFolfox, continue (5-FU infusion was stopped after about 30 hours for possible coronary vasspasm)    # Palpitation   # Chest pain  # ST-T changes  She developed palpitation and chest pain on C2D3 while she was getting 5-FU. Trop was negative. CT-T change was non-specific and echo did not show any wall motion abnormalities. Coronary vasospasm is possible 2/2 5-FU but hard to confirm. Tele shows sinus tachy.   -Will check EKG and then discuss D/C tele with cards  -Continue diltiazem for HR control and possible vasspasm     # Tachycardia  # Hypoxia   # Bilateral pleural effusion  - COVID-19 negative x 3, but parainfluenza positive ().   - CTA Chest () was negative for central PE, but unable to evaluate segmental and subsegmental branches due to motion artifact. Lung imaging showed unchanged metastatic nodules, small bilateral pleural effusions, and atelectasis of the basilar LLL (unchanged as well). No new opopacity which decreases the likelihood of PNA  - CT A/P repeated on 4/3/20; no obvious source of infection   - Blood/urine cultures have had NGTD. C. diff negative  - ID following, appreciate recs  - S/p PICC line placement for chemotherapy  - Patient is s/p C1 modified FOLFOX (-). Further management as noted below   - Blood cultures from  and  NGTD  - urine cx + 50-99K Klebsiella CRE- per ID no need to treat at this time given no symptoms    - CTA chest on  showing no PE, but large bilateral pleural effusions s/p Drains in both left and right lung   The case was discussed with Dr. Anton Gonzalez MD, MPH  Hematology/Oncology Fellow  Pager: [phone] showed only atypical cells  - Echo WNL   -Discussed the case with pulmonary attending on . we will do CXR tomorrow AM and consider D/C left pig tail since she has minimal drainage. Will keep left side. Reassess the drainage amount tomorrow.     # Pain control   - Pain medications now switched to MS contin, pain is adequately controlled per patient. Will continue to follow-up with Palliative Care regarding pain management/regimen on discharge.     # Hyperuricemia  - uric acid was 8.8 on . s/p IV Rasburicase 3mg x1 on .   - C/w Allopurinol 300mg daily  - Monitor TLS labs daily (CMP, Phos, LDH, Uric acid)    # DVT PPx   Lovenox 40mg SQ    The case was discussed with Dr. Anton Gonzalez MD, MPH  Hematology/Oncology Fellow  Pager: [phone]

## 2020-04-27 NOTE — PROGRESS NOTE ADULT - ATTENDING COMMENTS
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 PE despite prophylaxis, debility, anemia--keep sat above 90%  pleural effusions--continue bilateral pigtail catheters in place-monitor out put--hopeful for decline w/ chemo to avoid pleuradesis etc  SVT--? electrolyte issues vs PE--d-dimer/dopplers of LE/? CTPA--would boost lovenox to full dose rx here until DVT/PE ruled out  onc/heme-chemo as able  GI prophylaxis   OOB as able               anxiolytics for anxiety

## 2020-04-27 NOTE — PROGRESS NOTE ADULT - SUBJECTIVE AND OBJECTIVE BOX
Hematology/Oncology Follow-up    INTERVAL HPI/OVERNIGHT EVENTS:  No acute overnight event. Denies chest pain. Breathing is about the same. Has pain in lower abdomen.    VITAL SIGNS:  T(F): 98.4 (04-27-20 @ 12:44)  HR: 112 (04-27-20 @ 12:44)  BP: 119/79 (04-27-20 @ 12:44)  RR: 18 (04-27-20 @ 12:44)  SpO2: 96% (04-27-20 @ 12:44)  Wt(kg): --    04-26-20 @ 07:01  -  04-27-20 @ 07:00  --------------------------------------------------------  IN: 1000 mL / OUT: 1760 mL / NET: -760 mL    04-27-20 @ 07:01  -  04-27-20 @ 14:00  --------------------------------------------------------  IN: 240 mL / OUT: 0 mL / NET: 240 mL        PHYSICAL EXAM:  Constitutional: NAD   Eyes: sclera non-icteric, conjunctiva non-anemia  Mouth: No mucositis.  Respiratory: Decreased breath sound in right lung  Chest: Bilateral chest tubes  Cardiovascular: tachycardia, regular rhythm.   Gastrointestinal: soft and flat, no tenderness to palpation, normoactive bowel sound, + palpable mass in lower abdomen  Extremities:  No c/c/e  MSK: No joint swelling, erythema, or tenderness   Neurological: AOx3, Cranial nerves II-XII grossly intact  Skin: No rash  Psych: Normal affect    MEDICATIONS  (STANDING):  allopurinol 300 milliGRAM(s) Oral daily  diltiazem    Tablet 60 milliGRAM(s) Oral every 8 hours  enoxaparin Injectable 80 milliGRAM(s) SubCutaneous every 12 hours  famotidine    Tablet 20 milliGRAM(s) Oral daily  lidocaine   Patch 2 Patch Transdermal daily  melatonin 3 milliGRAM(s) Oral at bedtime  morphine ER Tablet 60 milliGRAM(s) Oral <User Schedule>  polyethylene glycol 3350 17 Gram(s) Oral daily  senna 2 Tablet(s) Oral at bedtime  sodium chloride 0.9% lock flush 3 milliLiter(s) IV Push every 8 hours  sodium chloride 0.9% lock flush 3 milliLiter(s) IV Push every 8 hours    MEDICATIONS  (PRN):  acetaminophen   Tablet .. 650 milliGRAM(s) Oral every 6 hours PRN Temp greater or equal to 38C (100.4F)  aluminum hydroxide/magnesium hydroxide/simethicone Suspension 30 milliLiter(s) Oral every 6 hours PRN Dyspepsia  bisacodyl 5 milliGRAM(s) Oral every 12 hours PRN Constipation  metoclopramide Injectable 10 milliGRAM(s) IV Push every 6 hours PRN Nausea/Vomiting  morphine  IR 7.5 milliGRAM(s) Oral every 3 hours PRN Moderate Pain (4 - 6) or severe  naloxone Injectable 0.1 milliGRAM(s) IV Push every 3 minutes PRN Sedation or RR <10  ondansetron Injectable 4 milliGRAM(s) IV Push every 6 hours PRN Nausea and/or Vomiting  simethicone 80 milliGRAM(s) Chew every 6 hours PRN Gas      No Known Allergies      LABS:                        8.4    6.38  )-----------( 558      ( 27 Apr 2020 05:41 )             29.2     04-27    135  |  93<L>  |  9   ----------------------------<  103<H>  4.1   |  30  |  0.64    Ca    9.0      27 Apr 2020 05:41  Phos  2.8     04-27  Mg     1.9     04-27    TPro  7.1  /  Alb  2.8<L>  /  TBili  0.4  /  DBili  x   /  AST  25  /  ALT  8<L>  /  AlkPhos  343<H>  04-27    PT/INR - ( 27 Apr 2020 05:41 )   PT: 13.4 sec;   INR: 1.16 ratio          Lactate Dehydrogenase, Serum: 290 U/L (04-27 @ 05:41)    RADIOLOGY & ADDITIONAL TESTS:  Studies reviewed.

## 2020-04-28 NOTE — PROGRESS NOTE ADULT - ASSESSMENT
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      # Metastatic carcinoma, cancer of unknown primary  - CT A/P with bilateral heterogeneous adnexal masses, as well as upper abdominal, RP, possible bone lesions and pelvic lymphadenopathy; CT Chest with multiple solid pulmonary nodules  - Cancer of unknown primary (poorly differentiated carcinoma) but suspect likely GI origin given CDX2 positivity on pathology; chromogranin positivity also suggests neuroendocrine features; Ki-67 index 40%.   - Tumor markers elevated: Cancer Antigen, Ca 27.29: 70.8, Cancer Antigen, 125: 619, Carcinoembryonic Antigen: 44.7, Beta-HC.0 on initial evaluation (3/7); Checked again on 20 and it was 102.7  - Discussed diagnosis with patient on 3/31/20. Given advanced, metastatic stage, will need to be on indefinite treatment to limit progression of disease.  - S/p C1 mFOLFOX (-).    - Will try to wean supplemental oxygen as tolerated. Will also provide an incentive spirometer  - CTA Chest () was negative for central PE, but unable to evaluate segmental and subsegmental branches due to motion artifact. In the setting of her hypoxemia and tachycardia,   -LE dopplers negative for DVT on .  - Continue Venofer until discharge   -mFolfox. Cycle 2 day1 was  (5-FU infusion was stopped after about 30 hours for possible coronary vasspasm)    # Tachycardia  # Hypoxia   # Bilateral pleural effusion  - COVID-19 negative x 3, but parainfluenza positive ().   - CTA Chest () was negative for central PE, but unable to evaluate segmental and subsegmental branches due to motion artifact. Lung imaging showed unchanged metastatic nodules, small bilateral pleural effusions, and atelectasis of the basilar LLL (unchanged as well). No new opopacity which decreases the likelihood of PNA  - CT A/P repeated on 4/3/20; no obvious source of infection   - Blood/urine cultures have had NGTD. C. diff negative  - ID following, appreciate recs  - S/p PICC line placement for chemotherapy  - Patient is s/p C1 modified FOLFOX (-). Further management as noted below   - Blood cultures from  and  NGTD  - urine cx + 50-99K Klebsiella CRE- per ID no need to treat at this time given no symptoms    - CTA chest on  showing no PE, but large bilateral pleural effusions s/p Drains in both left and right lung   -Will remove left pig tail, keep right for now    # Palpitation, resolved  # Chest pain, resolved  # ST-T changes  She developed palpitation and chest pain on C2D3 while she was getting 5-FU. Trop was negative. CT-T change was non-specific and echo did not show any wall motion abnormalities. ACS is unlikely. Coronary vasospasm is possible 2/2 5-FU but hard to confirm.   -Continue diltiazem for HR control and possible vasspasm     # Pain control   - Pain medications now switched to MS contin, pain is adequately controlled per patient. Will continue to follow-up with Palliative Care regarding pain management/regimen on discharge.   -Told pt to use IR before bedtime for low back pain at night    # Hyperuricemia  - uric acid was 8.8 on . s/p IV Rasburicase 3mg x1 on .   - C/w Allopurinol 300mg daily  - Monitor TLS labs daily (CMP, Phos, LDH, Uric acid)    # DVT PPx   Lovenox 40mg SQ    The case was discussed with Dr. Anton Gonzalez MD, MPH  Hematology/Oncology Fellow  Pager: [phone]

## 2020-04-28 NOTE — PROGRESS NOTE ADULT - ATTENDING COMMENTS
23 y/o F with metastatic poorly differentiated carcinoma of unknown primary- cycle 2, day 6 of FOLFOX.    Plan on left pigtail removal tomorrow, d/w IR.    Continue diltiazem.    Pain control of her low back pain- recommend her to utilize the morphine IR and continue lidoderm patch, MSContin.

## 2020-04-28 NOTE — PROGRESS NOTE ADULT - SUBJECTIVE AND OBJECTIVE BOX
Hematology/Oncology Follow-up    INTERVAL HPI/OVERNIGHT EVENTS:  No acute overnight event. Sleeping on couch. Reported low back pain only when she sleeps at night.     VITAL SIGNS:  T(F): 98.6 (04-28-20 @ 11:57)  HR: 114 (04-28-20 @ 11:57)  BP: 108/75 (04-28-20 @ 11:57)  RR: 18 (04-28-20 @ 11:57)  SpO2: 95% (04-28-20 @ 11:57)  Wt(kg): --    04-27-20 @ 07:01  -  04-28-20 @ 07:00  --------------------------------------------------------  IN: 840 mL / OUT: 1320 mL / NET: -480 mL    04-28-20 @ 07:01  -  04-28-20 @ 13:28  --------------------------------------------------------  IN: 560 mL / OUT: 0 mL / NET: 560 mL        PHYSICAL EXAM:  Constitutional: NAD   Eyes: sclera non-icteric, conjunctiva non-anemia  Mouth: No mucositis.  Respiratory: Decreased breath sound in right lung  Chest: Bilateral chest tubes  Cardiovascular: tachycardia, regular rhythm.   Gastrointestinal: soft and flat, no tenderness to palpation, normoactive bowel sound, + palpable mass in lower abdomen  Extremities:  No c/c/e  MSK: No joint swelling, erythema, or tenderness   Neurological: AOx3, Cranial nerves II-XII grossly intact  Skin: No rash  Psych: Normal affect    MEDICATIONS  (STANDING):  allopurinol 300 milliGRAM(s) Oral daily  diltiazem    Tablet 60 milliGRAM(s) Oral every 8 hours  enoxaparin Injectable 80 milliGRAM(s) SubCutaneous every 12 hours  famotidine    Tablet 20 milliGRAM(s) Oral daily  lidocaine   Patch 2 Patch Transdermal daily  melatonin 3 milliGRAM(s) Oral at bedtime  morphine ER Tablet 60 milliGRAM(s) Oral <User Schedule>  polyethylene glycol 3350 17 Gram(s) Oral daily  senna 2 Tablet(s) Oral at bedtime  sodium chloride 0.9% lock flush 3 milliLiter(s) IV Push every 8 hours  sodium chloride 0.9% lock flush 3 milliLiter(s) IV Push every 8 hours    MEDICATIONS  (PRN):  acetaminophen   Tablet .. 650 milliGRAM(s) Oral every 6 hours PRN Temp greater or equal to 38C (100.4F)  aluminum hydroxide/magnesium hydroxide/simethicone Suspension 30 milliLiter(s) Oral every 6 hours PRN Dyspepsia  bisacodyl 5 milliGRAM(s) Oral every 12 hours PRN Constipation  metoclopramide Injectable 10 milliGRAM(s) IV Push every 6 hours PRN Nausea/Vomiting  morphine  IR 7.5 milliGRAM(s) Oral every 3 hours PRN Moderate Pain (4 - 6) or severe  naloxone Injectable 0.1 milliGRAM(s) IV Push every 3 minutes PRN Sedation or RR <10  ondansetron Injectable 4 milliGRAM(s) IV Push every 6 hours PRN Nausea and/or Vomiting  simethicone 80 milliGRAM(s) Chew every 6 hours PRN Gas      No Known Allergies      LABS:                        8.4    6.38  )-----------( 558      ( 27 Apr 2020 05:41 )             29.2     04-28    135  |  93<L>  |  7   ----------------------------<  105<H>  3.9   |  29  |  0.59    Ca    9.0      28 Apr 2020 07:12  Phos  2.5     04-28  Mg     1.8     04-28    TPro  7.0  /  Alb  2.6<L>  /  TBili  0.3  /  DBili  x   /  AST  23  /  ALT  8<L>  /  AlkPhos  382<H>  04-28    PT/INR - ( 28 Apr 2020 07:12 )   PT: 13.8 sec;   INR: 1.20 ratio          Lactate Dehydrogenase, Serum: 263 U/L (04-28 @ 07:12)        RADIOLOGY & ADDITIONAL TESTS:  Studies reviewed.

## 2020-04-28 NOTE — PROGRESS NOTE ADULT - SUBJECTIVE AND OBJECTIVE BOX
CHIEF COMPLAINT:    Interval Events:  drainage from pigtail catheters is minimal at this time.    REVIEW OF SYSTEMS:  She is experiencing some left shoulder discomfort.  Denies shortness of breath, chest pain.    ][x ] All other systems negative  [ ] Unable to assess ROS because ________    OBJECTIVE:  ICU Vital Signs Last 24 Hrs  T(C): 36.8 (28 Apr 2020 04:34), Max: 36.9 (27 Apr 2020 12:44)  T(F): 98.3 (28 Apr 2020 04:34), Max: 98.4 (27 Apr 2020 12:44)  HR: 109 (28 Apr 2020 04:34) (101 - 112)  BP: 113/76 (28 Apr 2020 04:34) (108/68 - 119/79)  BP(mean): 88 (28 Apr 2020 04:34) (88 - 89)  ABP: --  ABP(mean): --  RR: 18 (28 Apr 2020 04:34) (18 - 18)  SpO2: 99% (28 Apr 2020 04:34) (96% - 99%)        04-27 @ 07:01 - 04-28 @ 07:00  --------------------------------------------------------  IN: 840 mL / OUT: 1320 mL / NET: -480 mL    04-28 @ 07:01 - 04-28 @ 10:56  --------------------------------------------------------  IN: 320 mL / OUT: 0 mL / NET: 320 mL    PHYSICAL EXAM:  General: Appears well  HEENT:   Lymph Nodes:  Neck:   Respiratory: lungs CTA  Cardiovascular: still tachycardic  Abdomen:   Extremities: without edema  Skin: warm dry no lesions  Neurological: alert and oriented  Psychiatry:    HOSPITAL MEDICATIONS:  MEDICATIONS  (STANDING):  allopurinol 300 milliGRAM(s) Oral daily  diltiazem    Tablet 60 milliGRAM(s) Oral every 8 hours  enoxaparin Injectable 80 milliGRAM(s) SubCutaneous every 12 hours  famotidine    Tablet 20 milliGRAM(s) Oral daily  lidocaine   Patch 2 Patch Transdermal daily  melatonin 3 milliGRAM(s) Oral at bedtime  morphine ER Tablet 60 milliGRAM(s) Oral <User Schedule>  polyethylene glycol 3350 17 Gram(s) Oral daily  senna 2 Tablet(s) Oral at bedtime  sodium chloride 0.9% lock flush 3 milliLiter(s) IV Push every 8 hours  sodium chloride 0.9% lock flush 3 milliLiter(s) IV Push every 8 hours    MEDICATIONS  (PRN):  acetaminophen   Tablet .. 650 milliGRAM(s) Oral every 6 hours PRN Temp greater or equal to 38C (100.4F)  aluminum hydroxide/magnesium hydroxide/simethicone Suspension 30 milliLiter(s) Oral every 6 hours PRN Dyspepsia  bisacodyl 5 milliGRAM(s) Oral every 12 hours PRN Constipation  metoclopramide Injectable 10 milliGRAM(s) IV Push every 6 hours PRN Nausea/Vomiting  morphine  IR 7.5 milliGRAM(s) Oral every 3 hours PRN Moderate Pain (4 - 6) or severe  naloxone Injectable 0.1 milliGRAM(s) IV Push every 3 minutes PRN Sedation or RR <10  ondansetron Injectable 4 milliGRAM(s) IV Push every 6 hours PRN Nausea and/or Vomiting  simethicone 80 milliGRAM(s) Chew every 6 hours PRN Gas      LABS:                        8.4    6.38  )-----------( 558      ( 27 Apr 2020 05:41 )             29.2     Hgb Trend: 8.4<--, 8.0<--, 8.1<--, 8.0<--, 7.7<--  04-28    135  |  93<L>  |  7   ----------------------------<  105<H>  3.9   |  29  |  0.59    Ca    9.0      28 Apr 2020 07:12  Phos  2.5     04-28  Mg     1.8     04-28    TPro  7.0  /  Alb  2.6<L>  /  TBili  0.3  /  DBili  x   /  AST  23  /  ALT  8<L>  /  AlkPhos  382<H>  04-28    Creatinine Trend: 0.59<--, 0.64<--, 0.65<--, 0.56<--, 0.53<--, 0.59<--  PT/INR - ( 28 Apr 2020 07:12 )   PT: 13.8 sec;   INR: 1.20 ratio       RADIOLOGY:  [x ] Reviewed and interpreted by me: 4/28 pigtails in place, no effusion or opacities    PULMONARY FUNCTION TESTS:    EKG:

## 2020-04-28 NOTE — PROGRESS NOTE ADULT - ASSESSMENT
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 Has had bilateral pigtails for drainage of effusions.  ************  4/25-tachycardia o/n--seen by cards for echo-poor study by grossly normal LV EF   4/26-still elev HR but less so-at 100-less symptomatic    Currently being monitored on a telemetry floor because of tachycardia.  There has been no significant drainage overnight- cytology is negative for malignant cells which does not rule out malignant effusion as multiple samples may be necessary.      Plan:    Left pigtail catheter to be removed today.  Right catheter can be removed tomorrow or later if drainage is still minimal.  Will monitor for signs of recurrence.

## 2020-04-29 NOTE — PROGRESS NOTE ADULT - ATTENDING COMMENTS
23 y/o F with metastatic poorly differentiated carcinoma of unknown primary- cycle 2, day 7 of FOLFOX.    Bilateral pigtails removed today.    Plan on repeat scan along with tumor markers.  Will likely decide upon alternative treatment options.  Pain control of her low back pain- recommend her to utilize the morphine IR and continue lidoderm patch, MSContin.

## 2020-04-29 NOTE — PROGRESS NOTE ADULT - SUBJECTIVE AND OBJECTIVE BOX
CHIEF COMPLAINT:    Interval Events:  CT not removed yesterday; no other overnight events, transferred to [address] [place] [address]  REVIEW OF SYSTEMS:  experiencing fatigue, denies chest discomfort or dyspnea, off oxygen when I saw her  [ ] All other systems negative  [ ] Unable to assess ROS because ________    OBJECTIVE:  ICU Vital Signs Last 24 Hrs  T(C): 37.1 (29 Apr 2020 12:18), Max: 37.7 (29 Apr 2020 01:16)  T(F): 98.7 (29 Apr 2020 12:18), Max: 99.9 (29 Apr 2020 01:16)  HR: 122 (29 Apr 2020 12:45) (111 - 127)  BP: 113/75 (29 Apr 2020 12:45) (111/70 - 124/82)  BP(mean): --  ABP: --  ABP(mean): --  RR: 18 (29 Apr 2020 12:45) (18 - 18)  SpO2: 94% (29 Apr 2020 12:45) (94% - 99%)        04-28 @ 07:01  -  04-29 @ 07:00  --------------------------------------------------------  IN: 560 mL / OUT: 800 mL / NET: -240 mL    PHYSICAL EXAM:  General: appears well no acute distress  HEENT:   Lymph Nodes:  Neck:   Respiratory: Decreased breath sounds in right base, left lug clear  Cardiovascular: rrr  Abdomen:   Extremities: no edema  Skin:   Neurological:  Psychiatry:    HOSPITAL MEDICATIONS:  MEDICATIONS  (STANDING):  allopurinol 300 milliGRAM(s) Oral daily  diltiazem    Tablet 60 milliGRAM(s) Oral every 8 hours  enoxaparin Injectable 80 milliGRAM(s) SubCutaneous every 12 hours  famotidine    Tablet 20 milliGRAM(s) Oral daily  lidocaine   Patch 2 Patch Transdermal daily  melatonin 3 milliGRAM(s) Oral at bedtime  morphine ER Tablet 60 milliGRAM(s) Oral <User Schedule>  phytonadione   Solution 10 milliGRAM(s) Oral daily  polyethylene glycol 3350 17 Gram(s) Oral daily  senna 2 Tablet(s) Oral at bedtime  sodium chloride 0.9% lock flush 3 milliLiter(s) IV Push every 8 hours  sodium chloride 0.9% lock flush 3 milliLiter(s) IV Push every 8 hours    MEDICATIONS  (PRN):  acetaminophen   Tablet .. 650 milliGRAM(s) Oral every 6 hours PRN Temp greater or equal to 38C (100.4F)  aluminum hydroxide/magnesium hydroxide/simethicone Suspension 30 milliLiter(s) Oral every 6 hours PRN Dyspepsia  bisacodyl 5 milliGRAM(s) Oral every 12 hours PRN Constipation  metoclopramide Injectable 10 milliGRAM(s) IV Push every 6 hours PRN Nausea/Vomiting  morphine  IR 7.5 milliGRAM(s) Oral every 3 hours PRN Moderate Pain (4 - 6) or severe  naloxone Injectable 0.1 milliGRAM(s) IV Push every 3 minutes PRN Sedation or RR <10  ondansetron Injectable 4 milliGRAM(s) IV Push every 6 hours PRN Nausea and/or Vomiting  simethicone 80 milliGRAM(s) Chew every 6 hours PRN Gas      LABS:                        8.8    9.91  )-----------( 591      ( 29 Apr 2020 07:11 )             29.8     Hgb Trend: 8.8<--, 8.4<--, 8.0<--, 8.1<--, 8.0<--  04-29    134<L>  |  93<L>  |  9   ----------------------------<  104<H>  3.9   |  32<H>  |  0.62    Ca    8.9      29 Apr 2020 07:11  Phos  2.3     04-29  Mg     1.8     04-29    TPro  7.2  /  Alb  2.8<L>  /  TBili  0.4  /  DBili  x   /  AST  24  /  ALT  7<L>  /  AlkPhos  387<H>  04-29    Creatinine Trend: 0.62<--, 0.59<--, 0.64<--, 0.65<--, 0.56<--, 0.53<--  PT/INR - ( 29 Apr 2020 07:11 )   PT: 14.6 sec;   INR: 1.26 ratio

## 2020-04-29 NOTE — PROGRESS NOTE ADULT - SUBJECTIVE AND OBJECTIVE BOX
Hematology/Oncology Follow-up    INTERVAL HPI/OVERNIGHT EVENTS:  No acute overnight event.     VITAL SIGNS:  T(F): 98.7 (04-29-20 @ 12:18)  HR: 119 (04-29-20 @ 12:18)  BP: 119/74 (04-29-20 @ 12:18)  RR: 18 (04-29-20 @ 12:18)  SpO2: 94% (04-29-20 @ 12:18)  Wt(kg): --    04-28-20 @ 07:01  -  04-29-20 @ 07:00  --------------------------------------------------------  IN: 560 mL / OUT: 800 mL / NET: -240 mL        PHYSICAL EXAM:  Constitutional: NAD   Eyes: sclera non-icteric, conjunctiva non-anemia  Mouth: No mucositis.  Respiratory: Decreased breath sound in right lung  Chest: Bilateral chest tubes  Cardiovascular: tachycardia, regular rhythm.   Gastrointestinal: soft and flat, no tenderness to palpation, normoactive bowel sound, + palpable mass in lower abdomen  Extremities:  No c/c/e  MSK: No joint swelling, erythema, or tenderness   Neurological: AOx3, Cranial nerves II-XII grossly intact  Skin: No rash  Psych: Normal affect      MEDICATIONS  (STANDING):  allopurinol 300 milliGRAM(s) Oral daily  diltiazem    Tablet 60 milliGRAM(s) Oral every 8 hours  enoxaparin Injectable 80 milliGRAM(s) SubCutaneous every 12 hours  famotidine    Tablet 20 milliGRAM(s) Oral daily  lidocaine   Patch 2 Patch Transdermal daily  melatonin 3 milliGRAM(s) Oral at bedtime  morphine ER Tablet 60 milliGRAM(s) Oral <User Schedule>  phytonadione   Solution 10 milliGRAM(s) Oral daily  polyethylene glycol 3350 17 Gram(s) Oral daily  senna 2 Tablet(s) Oral at bedtime  sodium chloride 0.9% lock flush 3 milliLiter(s) IV Push every 8 hours  sodium chloride 0.9% lock flush 3 milliLiter(s) IV Push every 8 hours  sodium phosphate IVPB 15 milliMole(s) IV Intermittent once    MEDICATIONS  (PRN):  acetaminophen   Tablet .. 650 milliGRAM(s) Oral every 6 hours PRN Temp greater or equal to 38C (100.4F)  aluminum hydroxide/magnesium hydroxide/simethicone Suspension 30 milliLiter(s) Oral every 6 hours PRN Dyspepsia  bisacodyl 5 milliGRAM(s) Oral every 12 hours PRN Constipation  metoclopramide Injectable 10 milliGRAM(s) IV Push every 6 hours PRN Nausea/Vomiting  morphine  IR 7.5 milliGRAM(s) Oral every 3 hours PRN Moderate Pain (4 - 6) or severe  naloxone Injectable 0.1 milliGRAM(s) IV Push every 3 minutes PRN Sedation or RR <10  ondansetron Injectable 4 milliGRAM(s) IV Push every 6 hours PRN Nausea and/or Vomiting  simethicone 80 milliGRAM(s) Chew every 6 hours PRN Gas      No Known Allergies      LABS:                        8.8    9.91  )-----------( 591      ( 29 Apr 2020 07:11 )             29.8     04-29    134<L>  |  93<L>  |  9   ----------------------------<  104<H>  3.9   |  32<H>  |  0.62    Ca    8.9      29 Apr 2020 07:11  Phos  2.3     04-29  Mg     1.8     04-29    TPro  7.2  /  Alb  2.8<L>  /  TBili  0.4  /  DBili  x   /  AST  24  /  ALT  7<L>  /  AlkPhos  387<H>  04-29    PT/INR - ( 29 Apr 2020 07:11 )   PT: 14.6 sec;   INR: 1.26 ratio          Lactate Dehydrogenase, Serum: 251 U/L (04-29 @ 07:11)        RADIOLOGY & ADDITIONAL TESTS:  Studies reviewed. Hematology/Oncology Follow-up    INTERVAL HPI/OVERNIGHT EVENTS:  No acute overnight event.  Continues to have some lower back pain.      VITAL SIGNS:  T(F): 98.7 (04-29-20 @ 12:18)  HR: 119 (04-29-20 @ 12:18)  BP: 119/74 (04-29-20 @ 12:18)  RR: 18 (04-29-20 @ 12:18)  SpO2: 94% (04-29-20 @ 12:18)  Wt(kg): --    04-28-20 @ 07:01  -  04-29-20 @ 07:00  --------------------------------------------------------  IN: 560 mL / OUT: 800 mL / NET: -240 mL        PHYSICAL EXAM:  Constitutional: NAD   Eyes: sclera non-icteric, conjunctiva non-anemia  Mouth: No mucositis.  Respiratory: Decreased breath sound in right lung  Chest: Bilateral chest tubes  Cardiovascular: tachycardia, regular rhythm.   Gastrointestinal: soft and flat, no tenderness to palpation, normoactive bowel sound, + palpable mass in lower abdomen  Extremities:  No c/c/e  MSK: No joint swelling, erythema, or tenderness   Neurological: AOx3, Cranial nerves II-XII grossly intact  Skin: No rash  Psych: Normal affect      MEDICATIONS  (STANDING):  allopurinol 300 milliGRAM(s) Oral daily  diltiazem    Tablet 60 milliGRAM(s) Oral every 8 hours  enoxaparin Injectable 80 milliGRAM(s) SubCutaneous every 12 hours  famotidine    Tablet 20 milliGRAM(s) Oral daily  lidocaine   Patch 2 Patch Transdermal daily  melatonin 3 milliGRAM(s) Oral at bedtime  morphine ER Tablet 60 milliGRAM(s) Oral <User Schedule>  phytonadione   Solution 10 milliGRAM(s) Oral daily  polyethylene glycol 3350 17 Gram(s) Oral daily  senna 2 Tablet(s) Oral at bedtime  sodium chloride 0.9% lock flush 3 milliLiter(s) IV Push every 8 hours  sodium chloride 0.9% lock flush 3 milliLiter(s) IV Push every 8 hours  sodium phosphate IVPB 15 milliMole(s) IV Intermittent once    MEDICATIONS  (PRN):  acetaminophen   Tablet .. 650 milliGRAM(s) Oral every 6 hours PRN Temp greater or equal to 38C (100.4F)  aluminum hydroxide/magnesium hydroxide/simethicone Suspension 30 milliLiter(s) Oral every 6 hours PRN Dyspepsia  bisacodyl 5 milliGRAM(s) Oral every 12 hours PRN Constipation  metoclopramide Injectable 10 milliGRAM(s) IV Push every 6 hours PRN Nausea/Vomiting  morphine  IR 7.5 milliGRAM(s) Oral every 3 hours PRN Moderate Pain (4 - 6) or severe  naloxone Injectable 0.1 milliGRAM(s) IV Push every 3 minutes PRN Sedation or RR <10  ondansetron Injectable 4 milliGRAM(s) IV Push every 6 hours PRN Nausea and/or Vomiting  simethicone 80 milliGRAM(s) Chew every 6 hours PRN Gas      No Known Allergies      LABS:                        8.8    9.91  )-----------( 591      ( 29 Apr 2020 07:11 )             29.8     04-29    134<L>  |  93<L>  |  9   ----------------------------<  104<H>  3.9   |  32<H>  |  0.62    Ca    8.9      29 Apr 2020 07:11  Phos  2.3     04-29  Mg     1.8     04-29    TPro  7.2  /  Alb  2.8<L>  /  TBili  0.4  /  DBili  x   /  AST  24  /  ALT  7<L>  /  AlkPhos  387<H>  04-29    PT/INR - ( 29 Apr 2020 07:11 )   PT: 14.6 sec;   INR: 1.26 ratio          Lactate Dehydrogenase, Serum: 251 U/L (04-29 @ 07:11)        RADIOLOGY & ADDITIONAL TESTS:  Studies reviewed.

## 2020-04-29 NOTE — PROGRESS NOTE ADULT - ASSESSMENT
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 Has had bilateral pigtails for drainage of effusions.  ************  4/25-tachycardia o/n--seen by cards for echo-poor study by grossly normal LV EF   4/26-still elev HR but less so-at 100-less symptomatic    Currently being monitored on a telemetry floor because of tachycardia.  There has been no significant drainage overnight- cytology is negative for malignant cells which does not rule out malignant effusion as multiple samples may be necessary.     4/29; back on 7 Liu, no drainage from either pigtail for 48 hours.  CXR looks good    Plan:    IR to remove both pigtail catheters today.  Agree with plan.

## 2020-04-29 NOTE — PROGRESS NOTE ADULT - ASSESSMENT
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      # Metastatic carcinoma, cancer of unknown primary  - CT A/P with bilateral heterogeneous adnexal masses, as well as upper abdominal, RP, possible bone lesions and pelvic lymphadenopathy; CT Chest with multiple solid pulmonary nodules  - Cancer of unknown primary (poorly differentiated carcinoma) but suspect likely GI origin given CDX2 positivity on pathology; chromogranin positivity also suggests neuroendocrine features; Ki-67 index 40%.   - Tumor markers elevated: Cancer Antigen, Ca 27.29: 70.8, Cancer Antigen, 125: 619, Carcinoembryonic Antigen: 44.7, Beta-HC.0 on initial evaluation (3/7); Checked again on 20 and it was 102.7  - Discussed diagnosis with patient on 3/31/20. Given advanced, metastatic stage, will need to be on indefinite treatment to limit progression of disease.  - S/p C1 mFOLFOX (.-).    - Will try to wean supplemental oxygen as tolerated. Will also provide an incentive spirometer  - CTA Chest () was negative for central PE, but unable to evaluate segmental and subsegmental branches due to motion artifact. In the setting of her hypoxemia and tachycardia,   -LE dopplers negative for DVT on .  - Continue Venofer until discharge   -mFolfox. Cycle 2 day1 was  (5-FU infusion was stopped after about 30 hours for possible coronary vasspasm)  -Will not rechallenge mFOLFOX  -CT chest/abd/Pel and tumor markers next week before the next treatment  -Will consider carboplatin/PTX vs single agent irrinotecan next    # Tachycardia  # Hypoxia   # Bilateral pleural effusion  - COVID-19 negative x 3, but parainfluenza positive ().   - CTA Chest () was negative for central PE, but unable to evaluate segmental and subsegmental branches due to motion artifact. Lung imaging showed unchanged metastatic nodules, small bilateral pleural effusions, and atelectasis of the basilar LLL (unchanged as well). No new opopacity which decreases the likelihood of PNA  - CT A/P repeated on 4/3/20; no obvious source of infection   - Blood/urine cultures have had NGTD. C. diff negative  - ID following, appreciate recs  - S/p PICC line placement for chemotherapy  - Patient is s/p C1 modified FOLFOX (-). Further management as noted below   - Blood cultures from  and  NGTD  - urine cx + 50-99K Klebsiella CRE- per ID no need to treat at this time given no symptoms    - CTA chest on  showing no PE, but large bilateral pleural effusions s/p Drains in both left and right lung   -left pig tail to be removed today  -keep right for now, monitor drainage    # Palpitation, resolved  # Chest pain, resolved  # ST-T changes  She developed palpitation and chest pain on C2D3 while she was getting 5-FU. Trop was negative. CT-T change was non-specific and echo did not show any wall motion abnormalities. ACS is unlikely. Coronary vasospasm is possible 2/2 5-FU but hard to confirm.   -Continue diltiazem for HR control and possible vasspasm     # Pain control   - Pain medications now switched to MS contin, pain is adequately controlled per patient. Will continue to follow-up with Palliative Care regarding pain management/regimen on discharge.   -Told pt to use IR before bedtime for low back pain at night    # Hyperuricemia  - uric acid was 8.8 on . s/p IV Rasburicase 3mg x1 on .   - C/w Allopurinol 300mg daily  - Monitor TLS labs daily (CMP, Phos, LDH, Uric acid)    # DVT PPx   Lovenox 40mg SQ    The case was discussed with Dr. Anton Gonzalez MD, MPH  Hematology/Oncology Fellow  Pager: [phone]

## 2020-04-29 NOTE — CHART NOTE - NSCHARTNOTEFT_GEN_A_CORE
called by patient's primary team to remove the b/l chest tubes. patient seen at bedside and the b/l chest tubes were removed without complication. CXR ordered.    Interventional Radiology  [id number]

## 2020-04-29 NOTE — CHART NOTE - NSCHARTNOTEFT_GEN_A_CORE
Nutrition Follow Up Note  Patient seen for: Nutrition follow up. Chart reviewed, events noted pt with poorly differentiated carcinoma suspected GI origin S/P cycle 2 chemotherapy, complicated by chest pain/palpitations -now resolved.     Source: Pt    Diet : Regular diet with ensure enlive 2 daily     Patient reports: No N+V, last BM noted 4/25-ordered for bowel regimen     PO intake : Pt reports intake was decreased while receiving chemotherapy last week but intake has now started to improve. Pt will try to take bites of meats but still is unable to eat much a this time, pt will try to take alternative protein sources such as yogurt and will try to take at least 1 ensure enlive, at times she is able to take 2. RD reviewed alternative protein sources.       Weight: 201 lbs (3/28), 199 lbs (4/14), 198.8 lbs (4/20), 187.3 lbs (4/22), weight had slowly been decreasing but staying relatively stable, weight decreased more swiftly within 2  days, ? accuracy, continue to monitor weight trends.     Pertinent Medications: MEDICATIONS  (STANDING):  allopurinol 300 milliGRAM(s) Oral daily  diltiazem    Tablet 60 milliGRAM(s) Oral every 8 hours  enoxaparin Injectable 80 milliGRAM(s) SubCutaneous every 12 hours  famotidine    Tablet 20 milliGRAM(s) Oral daily  lidocaine   Patch 2 Patch Transdermal daily  melatonin 3 milliGRAM(s) Oral at bedtime  morphine ER Tablet 60 milliGRAM(s) Oral <User Schedule>  polyethylene glycol 3350 17 Gram(s) Oral daily  senna 2 Tablet(s) Oral at bedtime  sodium chloride 0.9% lock flush 3 milliLiter(s) IV Push every 8 hours  sodium chloride 0.9% lock flush 3 milliLiter(s) IV Push every 8 hours    MEDICATIONS  (PRN):  acetaminophen   Tablet .. 650 milliGRAM(s) Oral every 6 hours PRN Temp greater or equal to 38C (100.4F)  aluminum hydroxide/magnesium hydroxide/simethicone Suspension 30 milliLiter(s) Oral every 6 hours PRN Dyspepsia  bisacodyl 5 milliGRAM(s) Oral every 12 hours PRN Constipation  metoclopramide Injectable 10 milliGRAM(s) IV Push every 6 hours PRN Nausea/Vomiting  morphine  IR 7.5 milliGRAM(s) Oral every 3 hours PRN Moderate Pain (4 - 6) or severe  naloxone Injectable 0.1 milliGRAM(s) IV Push every 3 minutes PRN Sedation or RR <10  ondansetron Injectable 4 milliGRAM(s) IV Push every 6 hours PRN Nausea and/or Vomiting  simethicone 80 milliGRAM(s) Chew every 6 hours PRN Gas    Pertinent Labs: 04-29 @ 07:11: Na 134<L>, BUN 9, Cr 0.62, <H>, K+ 3.9, Phos 2.3<L>, Mg 1.8, Alk Phos 387<H>, ALT/SGPT 7<L>, AST/SGOT 24, HbA1c --    Finger Sticks:      Skin per nursing documentation: free of pressure injury   Edema: none     Estimated Needs:   [x ] no change since previous assessment  [ ] recalculated:     Previous Nutrition Diagnosis: Inadequate oral intake   Nutrition Diagnosis is: ongoing, addressed with supplements     New Nutrition Diagnosis:  Related to:    As evidenced by:      Interventions:     Recommend  1) Continue regular diet with ensure enlive 2 daily-will honor flavor preferences  2) Continue to encourage po intake and obtain/honor food preferences as able, RD continued to emphasize ordering nutrient dense snacks with meals to consume in between meals to mimic smaller, more frequent meals in house, emphasis on protein containing foods.     Monitoring and Evaluation:     Continue to monitor Nutritional intake, Tolerance to diet prescription, weights, labs, skin integrity    RD remains available upon request and will follow up per protocol    Kala Jernigan R.D., [facility], Pager #[phone]

## 2020-04-30 NOTE — PROGRESS NOTE ADULT - ATTENDING COMMENTS
25 y/o F with metastatic poorly differentiated carcinoma of unknown primary- cycle 2, day 8 of FOLFOX.    Bilateral pigtails removed yesterday.  She continues to have back pain- continue MScontin/morphine IR/lidoderm patches.   Will image her lumbar spine along with CT c/a/p.  Repeat tumor markers.    Discharge planning.   Physical therapy, consider home O2 if able.

## 2020-04-30 NOTE — PROGRESS NOTE ADULT - ASSESSMENT
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 Has had bilateral pigtails for drainage of effusions.  ************  4/25-tachycardia o/n--seen by cards for echo-poor study by grossly normal LV EF   4/26-still elev HR but less so-at 100-less symptomatic    Currently being monitored on a telemetry floor because of tachycardia.  There has been no significant drainage overnight- cytology is negative for malignant cells which does not rule out malignant effusion as multiple samples may be necessary.     4/29; back on 7 Liu, no drainage from either pigtail for 48 hours.  CXR looks good    Plan:    IR to remove both pigtail catheters today.  Agree with plan. 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 Has had bilateral pigtails for drainage of effusions.  ************  4/25-tachycardia o/n--seen by cards for echo-poor study by grossly normal LV EF   4/26-still elev HR but less so-at 100-less symptomatic    Currently being monitored on a telemetry floor because of tachycardia.  There has been no significant drainage overnight- cytology is negative for malignant cells which does not rule out malignant effusion as multiple samples may be necessary.     4/29; back on 7 Liu, no drainage from either pigtail for 48 hours.  CXR looks good  4/30 pigtail catheters out, lungs clear no effusion    Plan:    She is stable from the pulmonary standpoint after removal of pigtail catheters.  CXR looks fine.    REcommend assessing for oxygen therapy on discharge  please order pulse oximeter for patient to take homoe on discharge for use with homoe televisists  Please set up televisit with me on discharge within one week.  Call [phone] to arrange.    Please reconsult as needed.

## 2020-04-30 NOTE — PROGRESS NOTE ADULT - SUBJECTIVE AND OBJECTIVE BOX
Full consult to follow shortly. DIscussed with patient, having 7/10 back pain, will utilize IV morphine 4mg IV Q2 PRN for 24 hours for dose finding, with transition back to PO tomorrow, with possible increase in MS Contin as well    ______________  Alex Mack MD   of Geriatric and Palliative Medicine  [facility]     Please page the following number for clinical matters between the hours of 9AM and 5PM   from Monday through Friday : [phone]    After 5PM and on weekends, please page: [phone]. The Geriatric and Palliative Medicine consult service has 24/7 coverage for medical recommendations, including for symptom management needs. 24F here with worsening abdominal pain in the setting of known adnexal massess, found to have for metastatic carcinoma with final pathology pending. Given evidence of metastatic disease, patient is not a surgical candidate. Has been having worsening adnexal pain, markedly last night after taking senna. States pain is 7-8/10 at its worst, located in adnexal area with radiation down to legs, worse with movement, and tolerable with opioids around 2/10. Palliative care called to help with pain.     INTERVAL EVENTS:  4/30: states having lower back pain, not fully relieved with morphine IR    ADVANCE DIRECTIVES:    DNR  MOLST  [ ]  Living Will  [ ]   DECISION MAKER(s):  [ ] Health Care Proxy(s)  [ ] Surrogate(s)  [ ] Guardian           Name(s): Phone Number(s):    BASELINE (I)ADL(s) (prior to admission):  [place]: [ ]Total  [ ] Moderate [ ]Dependent    Allergies    No Known Allergies    Intolerances       PRESENT SYMPTOMS: [ ]Unable to obtain due to poor mentation   Source if other than patient:  [ ]Family   [ ]Team     Pain: [X ]yes [ ]no  QOL impact -  decreased function  Location - back  Aggravating factors -   Quality -   Radiation -   Timing-   Severity (0-10 scale): 7/10   Minimal acceptable level (0-10 scale): 2/10    CPOT:    https://www.[facility].org/getattachment/[email or web address]-[email or web address]-[email or web address]-[email or web address]/Critical-Care-Pain-Observation-Tool-(CPOT)      PAIN AD Score:     http://geriatrictoolkit.[facility]/cog/painad.pdf (press ctrl +  left click to view)    Dyspnea:                           [ ]Mild [ ]Moderate [ ]Severe  Anxiety:                             [ ]Mild [ ]Moderate [ ]Severe  Fatigue:                             [ ]Mild [ x]Moderate [ ]Severe  Nausea:                             [ ]Mild [ ]Moderate [ ]Severe  Loss of appetite:              [ ]Mild [ ]Moderate [ ]Severe  Constipation:                    [ ]Mild [ ]Moderate [ ]Severe    Other Symptoms:  [X ]All other review of systems negative     Palliative Performance Status Version 2:   40      %    http://npcrc.org/files/news/palliative_performance_scale_ppsv2.pdf    PHYSICAL EXAM:      Limited exam for patient safety and to limit infection risk during COVID-19 outbreak. Please refr to primary team's examination for today.  GENERAL:  [ X]Alert  [ ]Oriented x   [ ]Lethargic  [ ]Cachexia  [ ]Unarousable  [ X]Verbal  [ ]Non-Verbal  Behavioral:   [ ] Anxiety  [ ] Delirium [ ] Agitation [ ] Other  HEENT:  [ ]Normal   [ ]Dry mouth   [ ]ET Tube/Trach  [ ]Oral lesions  PULMONARY:   [ ]Clear [ ]Tachypnea  [ ]Audible excessive secretions   [ ]Rhonchi        [ ]Right [ ]Left [ ]Bilateral  [ ]Crackles        [ ]Right [ ]Left [ ]Bilateral  [ ]Wheezing     [ ]Right [ ]Left [ ]Bilateral  [ ]Diminished breath sounds [ ]right [ ]left [ ]bilateral  CARDIOVASCULAR:    [ ]Regular [ ]Irregular [ ]Tachy  [ ]Quirino [ ]Murmur [ ]Other  GASTROINTESTINAL:  [ ]Soft  [ ]Distended   [ ]+BS  [ ]Non tender [ ]Tender  [ ]PEG [ ]OGT/ NGT  Last BM:     GENITOURINARY:  [ ]Normal [ ] Incontinent   [ ]Oliguria/Anuria   [ ]Cifuentes  MUSCULOSKELETAL:   [ ]Normal   [ x]Weakness  [ ]Bed/Wheelchair bound [ ]Edema  NEUROLOGIC:  Lucid speech  [ ]No focal deficits  [ ]Cognitive impairment  [ ]Dysphagia [ ]Dysarthria [ ]Paresis [ ]Other   SKIN:   [ ]Normal    [ ]Rash  [ ]Pressure ulcer(s)       Present on admission [ ]y [ ]n    CRITICAL CARE:  [ ] Shock Present  [ ]Septic [ ]Cardiogenic [ ]Neurologic [ ]Hypovolemic  [ ]  Vasopressors [ ]  Inotropes   [ ]Respiratory failure present [ ]Mechanical ventilation [ ]Non-invasive ventilatory support [ ]High flow  [ ]Acute  [ ]Chronic [ ]Hypoxic  [ ]Hypercarbic [ ]Other  [ ]Other organ failure     LABS: reviewed                          8.7    12.95 )-----------( 594      ( 30 Apr 2020 06:47 )             29.4     04-30    133<L>  |  93<L>  |  8   ----------------------------<  106<H>  3.7   |  29  |  0.63    Ca    8.7      30 Apr 2020 06:46  Phos  2.3     04-30  Mg     1.8     04-30        RADIOLOGY & ADDITIONAL STUDIES:    PROTEIN CALORIE MALNUTRITION PRESENT: [ ]mild [ ]moderate [ ]severe [ ]underweight [ ]morbid obesity  https://www.andeal.org/vault/1220/web/files/ONC/Table_Clinical%20Characteristics%20to%20Document%20Malnutrition-White%20JV%20et%20al%202012.pdf    Height (cm): 162.6 (03-21-20 @ 03:15), 162.56 (03-07-20 @ 19:18)  Weight (kg): 84.4 (03-21-20 @ 03:15), 89.4 (03-07-20 @ 19:18)  BMI (kg/m2): 31.9 (03-21-20 @ 03:15), 33.8 (03-07-20 @ 19:18)    [ ]PPSV2 < or = to 30% [ ]significant weight loss  [ ]poor nutritional intake  [ ]anasarca     Albumin, Serum: 2.9 g/dL (03-25-20 @ 05:59)   [ ]Artificial Nutrition      REFERRALS:   [ ]Chaplaincy  [ ]Hospice  [ ]Child Life  [ ]Social Work  [ ]Case management [ ]Holistic Therapy

## 2020-04-30 NOTE — PROGRESS NOTE ADULT - ATTENDING COMMENTS
Ensure opioids, naloxone, and bowel regimen, see below   Ensure Supportive Oncology referral, see below    Palliative Medicine Service      The patient's symptoms are well controlled.  The patient's care plan has been delineated.  Thank you for the consult, feel free to reconsult when clinical needs arise      In the event of newly developing, evolving, or worsening symptoms, please contact the Palliative Medicine team via pager (if the patient is at [facility] #[phone] or if the patient is at [facility] #[phone]) The Geriatric and Palliative Medicine service has coverage 24 hours a day/ 7 days a week to provide medical recommendations regarding symptom management needs via telephone        Baldomero Gomez MD   of Geriatric and Palliative Medicine  [facility]      Please page the following number for clinical matters between the hours of 9 am and 5 pm   from Monday through Friday : [phone]    After 5pm and on weekends, please see the contact information below:    In the event of newly developing, evolving, or worsening symptoms, please contact the Palliative Medicine team via pager (if the patient is at [facility] #[phone] or if the patient is at [facility] #[phone]) The Geriatric and Palliative Medicine service has coverage 24 hours a day/ 7 days a week to provide medical recommendations regarding symptom management needs via telephone          Opioid  Discharge Recommendations    This patient will leave the hospital with a need for opioids to address symptoms.    Prior to discharge, please ensure that the receiving pharmacy  -has the medication on formulary  -has the correct dose and formulation of the medication  -has an adequate supply of the medication to accommodate the patient's prescription  -has a prescription for a bowel regimen    Please ensure that there is a physician who will continue to follow up and prescribe opioids as needed.    Please ensure prior authorization is completed, if necessary, prior to discharge.    Has this patient been identified to receive a naloxone prescription    Yes [x]  No []       Please note the following on patient's discharge summary: "Please see Dr. Katya Savage at the [facility] ([phone]) for supportive oncology and pain and symptom management."     If referring to Dr. Katya Savage, please email her with a summary of the patient's hospitalization and pain treatment at the time of discharge. Thank you.      On discharge, please prescribe Naloxone spray 4 mg/0.1 ml intranasal, Spray 0.1 mL into one nostril. Repeat with second device into other nostril after 2-3 minutes if no or minimal response (http://prescribetoprevent.org/prescribers/palliative/). Please be sure the patient's pharmacy has the medication, otherwise, be sure there is a pharmacy were the patient can get the Naloxone inhaled.

## 2020-04-30 NOTE — PROGRESS NOTE ADULT - SUBJECTIVE AND OBJECTIVE BOX
CHIEF COMPLAINT:    Interval Events:    REVIEW OF SYSTEMS:  Constitutional: [ ] negative [ ] fevers [ ] chills [ ] weight loss [ ] weight gain  HEENT: [ ] negative [ ] dry eyes [ ] eye irritation [ ] postnasal drip [ ] nasal congestion  CV: [ ] negative  [ ] chest pain [ ] orthopnea [ ] palpitations [ ] murmur  Resp: [ ] negative [ ] cough [ ] shortness of breath [ ] dyspnea [ ] wheezing [ ] sputum [ ] hemoptysis  GI: [ ] negative [ ] nausea [ ] vomiting [ ] diarrhea [ ] constipation [ ] abd pain [ ] dysphagia   : [ ] negative [ ] dysuria [ ] nocturia [ ] hematuria [ ] increased urinary frequency  Musculoskeletal: [ ] negative [ ] back pain [ ] myalgias [ ] arthralgias [ ] fracture  Skin: [ ] negative [ ] rash [ ] itch  Neurological: [ ] negative [ ] headache [ ] dizziness [ ] syncope [ ] weakness [ ] numbness  Psychiatric: [ ] negative [ ] anxiety [ ] depression  Endocrine: [ ] negative [ ] diabetes [ ] thyroid problem  Hematologic/Lymphatic: [ ] negative [ ] anemia [ ] bleeding problem  Allergic/Immunologic: [ ] negative [ ] itchy eyes [ ] nasal discharge [ ] hives [ ] angioedema  [ ] All other systems negative  [ ] Unable to assess ROS because ________    OBJECTIVE:  ICU Vital Signs Last 24 Hrs  T(C): 37.1 (30 Apr 2020 05:29), Max: 37.8 (30 Apr 2020 00:20)  T(F): 98.8 (30 Apr 2020 05:29), Max: 100 (30 Apr 2020 00:20)  HR: 119 (30 Apr 2020 05:29) (119 - 125)  BP: 132/79 (30 Apr 2020 05:29) (107/73 - 132/79)  BP(mean): --  ABP: --  ABP(mean): --  RR: 18 (30 Apr 2020 05:29) (18 - 20)  SpO2: 96% (30 Apr 2020 05:29) (94% - 98%)        04-29 @ 07:01  -  04-30 @ 07:00  --------------------------------------------------------  IN: 1160 mL / OUT: 850 mL / NET: 310 mL      CAPILLARY BLOOD GLUCOSE          PHYSICAL EXAM:  General:   HEENT:   Lymph Nodes:  Neck:   Respiratory:   Cardiovascular:   Abdomen:   Extremities:   Skin:   Neurological:  Psychiatry:    HOSPITAL MEDICATIONS:  MEDICATIONS  (STANDING):  allopurinol 300 milliGRAM(s) Oral daily  diltiazem    Tablet 60 milliGRAM(s) Oral every 8 hours  enoxaparin Injectable 80 milliGRAM(s) SubCutaneous every 12 hours  famotidine    Tablet 20 milliGRAM(s) Oral daily  lidocaine   Patch 2 Patch Transdermal daily  melatonin 3 milliGRAM(s) Oral at bedtime  morphine ER Tablet 60 milliGRAM(s) Oral <User Schedule>  phytonadione   Solution 10 milliGRAM(s) Oral daily  polyethylene glycol 3350 17 Gram(s) Oral daily  senna 2 Tablet(s) Oral at bedtime  sodium chloride 0.9% lock flush 3 milliLiter(s) IV Push every 8 hours  sodium chloride 0.9% lock flush 3 milliLiter(s) IV Push every 8 hours    MEDICATIONS  (PRN):  acetaminophen   Tablet .. 650 milliGRAM(s) Oral every 6 hours PRN Temp greater or equal to 38C (100.4F)  aluminum hydroxide/magnesium hydroxide/simethicone Suspension 30 milliLiter(s) Oral every 6 hours PRN Dyspepsia  bisacodyl 5 milliGRAM(s) Oral every 12 hours PRN Constipation  metoclopramide Injectable 10 milliGRAM(s) IV Push every 6 hours PRN Nausea/Vomiting  morphine  IR 7.5 milliGRAM(s) Oral every 3 hours PRN Moderate Pain (4 - 6) or severe  naloxone Injectable 0.1 milliGRAM(s) IV Push every 3 minutes PRN Sedation or RR <10  ondansetron Injectable 4 milliGRAM(s) IV Push every 6 hours PRN Nausea and/or Vomiting  simethicone 80 milliGRAM(s) Chew every 6 hours PRN Gas      LABS:                        8.7    12.95 )-----------( 594      ( 30 Apr 2020 06:47 )             29.4     Hgb Trend: 8.7<--, 8.8<--, 8.4<--, 8.0<--, 8.1<--  04-30    133<L>  |  93<L>  |  8   ----------------------------<  106<H>  3.7   |  29  |  0.63    Ca    8.7      30 Apr 2020 06:46  Phos  2.3     04-30  Mg     1.8     04-30    TPro  6.9  /  Alb  2.6<L>  /  TBili  0.4  /  DBili  x   /  AST  30  /  ALT  8<L>  /  AlkPhos  439<H>  04-30    Creatinine Trend: 0.63<--, 0.62<--, 0.59<--, 0.64<--, 0.65<--, 0.56<--  PT/INR - ( 30 Apr 2020 06:48 )   PT: 13.1 sec;   INR: 1.14 ratio                   MICROBIOLOGY:       RADIOLOGY:  [ ] Reviewed and interpreted by me    PULMONARY FUNCTION TESTS:    EKG: CHIEF COMPLAINT:    Interval Events:  bilateral pigtail catheters removed yesterday.      REVIEW OF SYSTEMS:  She reports chest discomfort on the right side when moving.  Wearing oxygen lying flat comfortably  [x ] All other systems negative  [ ] Unable to assess ROS because ________    OBJECTIVE:  ICU Vital Signs Last 24 Hrs  T(C): 37.1 (30 Apr 2020 05:29), Max: 37.8 (30 Apr 2020 00:20)  T(F): 98.8 (30 Apr 2020 05:29), Max: 100 (30 Apr 2020 00:20)  HR: 119 (30 Apr 2020 05:29) (119 - 125)  BP: 132/79 (30 Apr 2020 05:29) (107/73 - 132/79)  BP(mean): --  ABP: --  ABP(mean): --  RR: 18 (30 Apr 2020 05:29) (18 - 20)  SpO2: 96% (30 Apr 2020 05:29) (94% - 98%)        04-29 @ 07:01  -  04-30 @ 07:00  --------------------------------------------------------  IN: 1160 mL / OUT: 850 mL / NET: 310 mL      CAPILLARY BLOOD GLUCOSE          PHYSICAL EXAM:  General: Awaker alert  HEENT:   Lymph Nodes:  Neck:   Respiratory: decreased breath sounds on the right base.  otherwise clear bilaterally  Cardiovascular: rrr  Abdomen:   Extremities: warm, well perfused, without edema  Skin:   Neurological:awake and fully oriented, non focal  Psychiatry:    HOSPITAL MEDICATIONS:  MEDICATIONS  (STANDING):  allopurinol 300 milliGRAM(s) Oral daily  diltiazem    Tablet 60 milliGRAM(s) Oral every 8 hours  enoxaparin Injectable 80 milliGRAM(s) SubCutaneous every 12 hours  famotidine    Tablet 20 milliGRAM(s) Oral daily  lidocaine   Patch 2 Patch Transdermal daily  melatonin 3 milliGRAM(s) Oral at bedtime  morphine ER Tablet 60 milliGRAM(s) Oral <User Schedule>  phytonadione   Solution 10 milliGRAM(s) Oral daily  polyethylene glycol 3350 17 Gram(s) Oral daily  senna 2 Tablet(s) Oral at bedtime  sodium chloride 0.9% lock flush 3 milliLiter(s) IV Push every 8 hours  sodium chloride 0.9% lock flush 3 milliLiter(s) IV Push every 8 hours    MEDICATIONS  (PRN):  acetaminophen   Tablet .. 650 milliGRAM(s) Oral every 6 hours PRN Temp greater or equal to 38C (100.4F)  aluminum hydroxide/magnesium hydroxide/simethicone Suspension 30 milliLiter(s) Oral every 6 hours PRN Dyspepsia  bisacodyl 5 milliGRAM(s) Oral every 12 hours PRN Constipation  metoclopramide Injectable 10 milliGRAM(s) IV Push every 6 hours PRN Nausea/Vomiting  morphine  IR 7.5 milliGRAM(s) Oral every 3 hours PRN Moderate Pain (4 - 6) or severe  naloxone Injectable 0.1 milliGRAM(s) IV Push every 3 minutes PRN Sedation or RR <10  ondansetron Injectable 4 milliGRAM(s) IV Push every 6 hours PRN Nausea and/or Vomiting  simethicone 80 milliGRAM(s) Chew every 6 hours PRN Gas      LABS:                        8.7    12.95 )-----------( 594      ( 30 Apr 2020 06:47 )             29.4     Hgb Trend: 8.7<--, 8.8<--, 8.4<--, 8.0<--, 8.1<--  04-30    133<L>  |  93<L>  |  8   ----------------------------<  106<H>  3.7   |  29  |  0.63    Ca    8.7      30 Apr 2020 06:46  Phos  2.3     04-30  Mg     1.8     04-30    TPro  6.9  /  Alb  2.6<L>  /  TBili  0.4  /  DBili  x   /  AST  30  /  ALT  8<L>  /  AlkPhos  439<H>  04-30    Creatinine Trend: 0.63<--, 0.62<--, 0.59<--, 0.64<--, 0.65<--, 0.56<--  PT/INR - ( 30 Apr 2020 06:48 )   PT: 13.1 sec;   INR: 1.14 ratio         RADIOLOGY:  [x ] Reviewed and interpreted by me: 4/30/20 clear lungs, no effusions present elevated right hemidiaphragm.  pigtail catheters removed.

## 2020-04-30 NOTE — PROGRESS NOTE ADULT - SUBJECTIVE AND OBJECTIVE BOX
Hematology/Oncology Follow-up    INTERVAL HPI/OVERNIGHT EVENTS:  No acute overnight event. Trying to off O2. Reporting low back pain.     VITAL SIGNS:  T(F): 98.8 (04-30-20 @ 05:29)  HR: 119 (04-30-20 @ 05:29)  BP: 132/79 (04-30-20 @ 05:29)  RR: 18 (04-30-20 @ 05:29)  SpO2: 96% (04-30-20 @ 05:29)  Wt(kg): --    04-29-20 @ 07:01  -  04-30-20 @ 07:00  --------------------------------------------------------  IN: 1160 mL / OUT: 850 mL / NET: 310 mL    04-30-20 @ 07:01  -  04-30-20 @ 13:14  --------------------------------------------------------  IN: 120 mL / OUT: 0 mL / NET: 120 mL        PHYSICAL EXAM:  Constitutional: NAD   Eyes: sclera non-icteric, conjunctiva non-anemia  Mouth: No mucositis.  Respiratory: Decreased breath sound bilaterally  Cardiovascular: tachycardia, regular rhythm.   Gastrointestinal: soft and flat, no tenderness to palpation, normoactive bowel sound, + palpable mass in lower abdomen  Extremities:  No c/c/e  MSK: No joint swelling, erythema, or tenderness   Neurological: AOx3, Cranial nerves II-XII grossly intact  Skin: No rash  Psych: Normal affect      MEDICATIONS  (STANDING):  allopurinol 300 milliGRAM(s) Oral daily  diltiazem    Tablet 60 milliGRAM(s) Oral every 8 hours  enoxaparin Injectable 80 milliGRAM(s) SubCutaneous every 12 hours  famotidine    Tablet 20 milliGRAM(s) Oral daily  lidocaine   Patch 2 Patch Transdermal daily  melatonin 3 milliGRAM(s) Oral at bedtime  morphine ER Tablet 60 milliGRAM(s) Oral <User Schedule>  phytonadione   Solution 10 milliGRAM(s) Oral daily  polyethylene glycol 3350 17 Gram(s) Oral daily  senna 2 Tablet(s) Oral at bedtime  sodium chloride 0.9% lock flush 3 milliLiter(s) IV Push every 8 hours  sodium chloride 0.9% lock flush 3 milliLiter(s) IV Push every 8 hours    MEDICATIONS  (PRN):  acetaminophen   Tablet .. 650 milliGRAM(s) Oral every 6 hours PRN Temp greater or equal to 38C (100.4F)  aluminum hydroxide/magnesium hydroxide/simethicone Suspension 30 milliLiter(s) Oral every 6 hours PRN Dyspepsia  bisacodyl 5 milliGRAM(s) Oral every 12 hours PRN Constipation  metoclopramide Injectable 10 milliGRAM(s) IV Push every 6 hours PRN Nausea/Vomiting  morphine  IR 7.5 milliGRAM(s) Oral every 3 hours PRN Moderate Pain (4 - 6) or severe  naloxone Injectable 0.1 milliGRAM(s) IV Push every 3 minutes PRN Sedation or RR <10  ondansetron Injectable 4 milliGRAM(s) IV Push every 6 hours PRN Nausea and/or Vomiting  simethicone 80 milliGRAM(s) Chew every 6 hours PRN Gas      No Known Allergies      LABS:                        8.7    12.95 )-----------( 594      ( 30 Apr 2020 06:47 )             29.4     04-30    133<L>  |  93<L>  |  8   ----------------------------<  106<H>  3.7   |  29  |  0.63    Ca    8.7      30 Apr 2020 06:46  Phos  2.3     04-30  Mg     1.8     04-30    TPro  6.9  /  Alb  2.6<L>  /  TBili  0.4  /  DBili  x   /  AST  30  /  ALT  8<L>  /  AlkPhos  439<H>  04-30    PT/INR - ( 30 Apr 2020 06:48 )   PT: 13.1 sec;   INR: 1.14 ratio          Lactate Dehydrogenase, Serum: 280 U/L (04-30 @ 06:46)        RADIOLOGY & ADDITIONAL TESTS:  Studies reviewed.

## 2020-04-30 NOTE — PROGRESS NOTE ADULT - ASSESSMENT
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      # Metastatic carcinoma, cancer of unknown primary  - CT A/P with bilateral heterogeneous adnexal masses, as well as upper abdominal, RP, possible bone lesions and pelvic lymphadenopathy; CT Chest with multiple solid pulmonary nodules  - Cancer of unknown primary (poorly differentiated carcinoma) but suspect likely GI origin given CDX2 positivity on pathology; chromogranin positivity also suggests neuroendocrine features; Ki-67 index 40%.   - Tumor markers elevated: Cancer Antigen, Ca 27.29: 70.8, Cancer Antigen, 125: 619, Carcinoembryonic Antigen: 44.7, Beta-HC.0 on initial evaluation (3/7); Checked again on 20 and it was 102.7  - Discussed diagnosis with patient on 3/31/20. Given advanced, metastatic stage, will need to be on indefinite treatment to limit progression of disease.  - S/p C1 mFOLFOX (.-).    - Will try to wean supplemental oxygen as tolerated. Will also provide an incentive spirometer  - CTA Chest () was negative for central PE, but unable to evaluate segmental and subsegmental branches due to motion artifact. In the setting of her hypoxemia and tachycardia,   -LE dopplers negative for DVT on .  - Continue Venofer until discharge   -mFolfox. Cycle 2 day1 was  (5-FU infusion was stopped after about 30 hours for possible coronary vasspasm)  -Will not rechallenge mFOLFOX  -CT chest/abd/Pel with IV con, CT L-spine tomorrow  -Check CEA, , b-Hcg tomorrow (Ordered)  -Will consider carboplatin/PTX vs single agent irrinotecan next    # Bilateral pleural effusion  - COVID-19 negative x 3, but parainfluenza positive ().   - CTA Chest () was negative for central PE, but unable to evaluate segmental and subsegmental branches due to motion artifact. Lung imaging showed unchanged metastatic nodules, small bilateral pleural effusions, and atelectasis of the basilar LLL (unchanged as well). No new opopacity which decreases the likelihood of PNA  - CT A/P repeated on 4/3/20; no obvious source of infection   - Blood/urine cultures have had NGTD. C. diff negative  - ID following, appreciate recs  - S/p PICC line placement for chemotherapy  - Patient is s/p C1 modified FOLFOX (-). Further management as noted below   - Blood cultures from  and  NGTD  - urine cx + 50-99K Klebsiella CRE- per ID no need to treat at this time given no symptoms    - CTA chest on  showing no PE, but large bilateral pleural effusions s/p Drains in both left and right lung   -Bilateral chest tube removed on   -PT evaluation   -Home O2 evaluation    # Palpitation, resolved  # Chest pain, resolved  # ST-T changes  She developed palpitation and chest pain on C2D3 while she was getting 5-FU. Trop was negative. CT-T change was non-specific and echo did not show any wall motion abnormalities. ACS is unlikely. Coronary vasospasm is possible 2/2 5-FU but hard to confirm.   -Continue diltiazem for HR control and possible vasspasm     # Pain control   # low back pain  - Pain medications now switched to MS contin, pain is adequately controlled per patient. Will continue to follow-up with Palliative Care regarding pain management/regimen on discharge.   -Reconsult palliative for pain control, low back pain     # Hyperuricemia  - uric acid was 8.8 on . s/p IV Rasburicase 3mg x1 on .   - C/w Allopurinol 300mg daily  - Monitor TLS labs daily (CMP, Phos, LDH, Uric acid)    # DVT PPx   Lovenox 40mg SQ    DISPO: Pending PTevaluation, Home O2 evaluation, Pulmonary clearance for discharge, CT tomorrow, anticipating sometime next week    The case was discussed with Dr. Anton Gonzalez MD, MPH  Hematology/Oncology Fellow  Pager: [phone]

## 2020-04-30 NOTE — PROGRESS NOTE ADULT - PROBLEM SELECTOR PLAN 1
- abdominal pain persistent, but also with back pain  - will do dose-finding with morphine 4mg IV Q2 PRN, and c/w MS Contin  - possible increase in MS Contin, and transition back to PO tomorrow

## 2020-05-01 NOTE — PROGRESS NOTE ADULT - ATTENDING COMMENTS
Ensure opioids, naloxone, and bowel regimen, see below   Ensure Supportive Oncology referral, see below    Palliative Medicine Service      The patient's symptoms are well controlled.  The patient's care plan has been delineated.  Thank you for the consult, feel free to reconsult when clinical needs arise      In the event of newly developing, evolving, or worsening symptoms, please contact the Palliative Medicine team via pager (if the patient is at [facility] #[phone] or if the patient is at [facility] #[phone]) The Geriatric and Palliative Medicine service has coverage 24 hours a day/ 7 days a week to provide medical recommendations regarding symptom management needs via telephone        Baldomero Gomez MD   of Geriatric and Palliative Medicine  [facility]      Please page the following number for clinical matters between the hours of 9 am and 5 pm   from Monday through Friday : [phone]    After 5pm and on weekends, please see the contact information below:    In the event of newly developing, evolving, or worsening symptoms, please contact the Palliative Medicine team via pager (if the patient is at [facility] #[phone] or if the patient is at [facility] #[phone]) The Geriatric and Palliative Medicine service has coverage 24 hours a day/ 7 days a week to provide medical recommendations regarding symptom management needs via telephone          Opioid  Discharge Recommendations    This patient will leave the hospital with a need for opioids to address symptoms.    Prior to discharge, please ensure that the receiving pharmacy  -has the medication on formulary  -has the correct dose and formulation of the medication  -has an adequate supply of the medication to accommodate the patient's prescription  -has a prescription for a bowel regimen    Please ensure that there is a physician who will continue to follow up and prescribe opioids as needed.    Please ensure prior authorization is completed, if necessary, prior to discharge.    Has this patient been identified to receive a naloxone prescription    Yes [x]  No []       Please note the following on patient's discharge summary: "Please see Dr. Katya Savage at the [facility] ([phone]) for supportive oncology and pain and symptom management."     If referring to Dr. Katya Savage, please email her with a summary of the patient's hospitalization and pain treatment at the time of discharge. Thank you.      On discharge, please prescribe Naloxone spray 4 mg/0.1 ml intranasal, Spray 0.1 mL into one nostril. Repeat with second device into other nostril after 2-3 minutes if no or minimal response ([email or web address]). Please be sure the patient's pharmacy has the medication, otherwise, be sure there is a pharmacy were the patient can get the Naloxone inhaled.

## 2020-05-01 NOTE — CHART NOTE - NSCHARTNOTEFT_GEN_A_CORE
Brief cardiology fellow note:  Contacted by primary team re: persistent sinus tachycardia in patient.  Cardiology previously following for ST elevation on EKG in setting of chest pain, hsTroponin negative, TTE without wall motion abnormalities, low suspicion for CAD.  Started on diltiazem for possible coronary vasospasm induced by ?5-FU.    Patient has been otherwise asymptomatic, intermittently sinus tachycardia 120-140s.  Can increase diltiazem to 60mg q6h if patient reports palpitations or is symptomatic from tachycardia, otherwise no indication for additional treatment of asymptomatic sinus tachycardia, physiologic response to ongoing cancer. Can continue diltiazem for vasospasm component.     Yaneli Burks MD  Cardiology Fellow  [phone]  All Cardiology service information can be found 24/7 on [email or web address], password: [email or web address]

## 2020-05-01 NOTE — PROGRESS NOTE ADULT - ASSESSMENT
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      # Metastatic carcinoma, cancer of unknown primary  - CT A/P with bilateral heterogeneous adnexal masses, as well as upper abdominal, RP, possible bone lesions and pelvic lymphadenopathy; CT Chest with multiple solid pulmonary nodules  - Cancer of unknown primary (poorly differentiated carcinoma) but suspect likely GI origin given CDX2 positivity on pathology; chromogranin positivity also suggests neuroendocrine features; Ki-67 index 40%.   - Tumor markers elevated: Cancer Antigen, Ca 27.29: 70.8, Cancer Antigen, 125: 619, Carcinoembryonic Antigen: 44.7, Beta-HC.0 on initial evaluation (3/7); Checked again on 20 and it was 102.7  - Discussed diagnosis with patient on 3/31/20. Given advanced, metastatic stage, will need to be on indefinite treatment to limit progression of disease.  - S/p C1 mFOLFOX (-).  Cycle 2 day1 was  (5-FU infusion was stopped after about 30 hours for possible coronary vasspasm)  - Will try to wean supplemental oxygen as tolerated. Will also provide an incentive spirometer  - CTA Chest () was negative for central PE, but unable to evaluate segmental and subsegmental branches due to motion artifact. In the setting of her hypoxemia and tachycardia,   -LE dopplers negative for DVT on .  - Continue Venofer until discharge   -CT chest/abd/Pel on  shoes PD  -Considering giving carboplatin/PTX sometime next week after Tuesday (Consent obtained)    # Bilateral pleural effusion  - COVID-19 negative x 3, but parainfluenza positive ().   - CTA Chest () was negative for central PE, but unable to evaluate segmental and subsegmental branches due to motion artifact. Lung imaging showed unchanged metastatic nodules, small bilateral pleural effusions, and atelectasis of the basilar LLL (unchanged as well). No new opopacity which decreases the likelihood of PNA  - CT A/P repeated on 4/3/20; no obvious source of infection   - Blood/urine cultures have had NGTD. C. diff negative  - ID following, appreciate recs  - S/p PICC line placement for chemotherapy  - Patient is s/p C1 modified FOLFOX (-). Further management as noted below   - Blood cultures from  and  NGTD  - urine cx + 50-99K Klebsiella CRE- per ID no need to treat at this time given no symptoms    - CTA chest on  showing no PE, but large bilateral pleural effusions s/p Drains in both left and right lung   -Bilateral chest tube removed on   -PT evaluation   -Home O2 evaluation    # Palpitation, resolved  # Chest pain, resolved  # ST-T changes  She developed palpitation and chest pain on C2D3 while she was getting 5-FU. Trop was negative. CT-T change was non-specific and echo did not show any wall motion abnormalities. ACS is unlikely. Coronary vasospasm is possible 2/2 5-FU but hard to confirm.   -Continue diltiazem for HR control and possible vasspasm   -Discuss meds with cards     # Pain control   # low back pain  - Pain medications now switched to MS contin, pain is adequately controlled per patient. Will continue to follow-up with Palliative Care regarding pain management/regimen on discharge.   -Reconsult palliative for pain control, low back pain     # Hyperuricemia  - uric acid was 8.8 on . s/p IV Rasburicase 3mg x1 on .   - C/w Allopurinol 300mg daily  - Monitor TLS labs daily (CMP, Phos, LDH, Uric acid)    # DVT PPx   Lovenox 40mg SQ    DISPO: Pending PT evaluation, Home O2 evaluation, Pulmonary clearance for discharge, inpatient chemo x1    The case was discussed with Dr. Anton Gonzalez MD, MPH  Hematology/Oncology Fellow  Pager: [phone]

## 2020-05-01 NOTE — PROGRESS NOTE ADULT - PROBLEM SELECTOR PLAN 1
- abdominal pain persistent, but also with back pain  - used 3 doses IV morphine 4mg/24 hours (12mg = 36mg PO), will increase MS Contin to 75mg  - she is open to transition back to morphine IR tomorrow, 15mg Q3 PRN

## 2020-05-01 NOTE — PROGRESS NOTE ADULT - SUBJECTIVE AND OBJECTIVE BOX
24F here with worsening abdominal pain in the setting of known adnexal massess, found to have for metastatic carcinoma with final pathology pending. Given evidence of metastatic disease, patient is not a surgical candidate. Has been having worsening adnexal pain, markedly last night after taking senna. States pain is 7-8/10 at its worst, located in adnexal area with radiation down to legs, worse with movement, and tolerable with opioids around 2/10. Palliative care called to help with pain.     INTERVAL EVENTS:  4/30: states having lower back pain, not fully relieved with morphine IR  5/1: used 4 doses of 3mg IV morphine/24 hours    ADVANCE DIRECTIVES:    DNR  MOLST  [ ]  Living Will  [ ]   DECISION MAKER(s):  [ ] Health Care Proxy(s)  [ ] Surrogate(s)  [ ] Guardian           Name(s): Phone Number(s):    BASELINE (I)ADL(s) (prior to admission):  [place]: [ ]Total  [ ] Moderate [ ]Dependent    Allergies    No Known Allergies    Intolerances       PRESENT SYMPTOMS: [ ]Unable to obtain due to poor mentation   Source if other than patient:  [ ]Family   [ ]Team     Pain: [X ]yes [ ]no  QOL impact -  decreased function  Location - back  Aggravating factors -   Quality -   Radiation -   Timing-   Severity (0-10 scale): 7/10   Minimal acceptable level (0-10 scale): 2/10    CPOT:    https://www.[facility].org/getattachment/[email or web address]/Critical-Care-Pain-Observation-Tool-(CPOT)      PAIN AD Score:     http://geriatrictoolkit.[place].[facility]/cog/painad.pdf (press ctrl +  left click to view)    Dyspnea:                           [ ]Mild [ ]Moderate [ ]Severe  Anxiety:                             [ ]Mild [ ]Moderate [ ]Severe  Fatigue:                             [ ]Mild [ x]Moderate [ ]Severe  Nausea:                             [ ]Mild [ ]Moderate [ ]Severe  Loss of appetite:              [ ]Mild [ ]Moderate [ ]Severe  Constipation:                    [ ]Mild [ ]Moderate [ ]Severe    Other Symptoms:  [X ]All other review of systems negative     Palliative Performance Status Version 2:   40      %    http://npcrc.org/files/news/palliative_performance_scale_ppsv2.pdf    PHYSICAL EXAM:      Limited exam for patient safety and to limit infection risk during COVID-19 outbreak. Please refr to primary team's examination for today.  GENERAL:  [ X]Alert  [ ]Oriented x   [ ]Lethargic  [ ]Cachexia  [ ]Unarousable  [ X]Verbal  [ ]Non-Verbal  Behavioral:   [ ] Anxiety  [ ] Delirium [ ] Agitation [ ] Other  HEENT:  [ ]Normal   [ ]Dry mouth   [ ]ET Tube/Trach  [ ]Oral lesions  PULMONARY:   [ ]Clear [ ]Tachypnea  [ ]Audible excessive secretions   [ ]Rhonchi        [ ]Right [ ]Left [ ]Bilateral  [ ]Crackles        [ ]Right [ ]Left [ ]Bilateral  [ ]Wheezing     [ ]Right [ ]Left [ ]Bilateral  [ ]Diminished breath sounds [ ]right [ ]left [ ]bilateral  CARDIOVASCULAR:    [ ]Regular [ ]Irregular [ ]Tachy  [ ]Quirino [ ]Murmur [ ]Other  GASTROINTESTINAL:  [ ]Soft  [ ]Distended   [ ]+BS  [ ]Non tender [ ]Tender  [ ]PEG [ ]OGT/ NGT  Last BM:     GENITOURINARY:  [ ]Normal [ ] Incontinent   [ ]Oliguria/Anuria   [ ]Cifuentes  MUSCULOSKELETAL:   [ ]Normal   [ x]Weakness  [ ]Bed/Wheelchair bound [ ]Edema  NEUROLOGIC:  Lucid speech  [ ]No focal deficits  [ ]Cognitive impairment  [ ]Dysphagia [ ]Dysarthria [ ]Paresis [ ]Other   SKIN:   [ ]Normal    [ ]Rash  [ ]Pressure ulcer(s)       Present on admission [ ]y [ ]n    CRITICAL CARE:  [ ] Shock Present  [ ]Septic [ ]Cardiogenic [ ]Neurologic [ ]Hypovolemic  [ ]  Vasopressors [ ]  Inotropes   [ ]Respiratory failure present [ ]Mechanical ventilation [ ]Non-invasive ventilatory support [ ]High flow  [ ]Acute  [ ]Chronic [ ]Hypoxic  [ ]Hypercarbic [ ]Other  [ ]Other organ failure     LABS: reviewed                          8.8    13.67 )-----------( 505      ( 01 May 2020 07:08 )             29.7     05-01    132<L>  |  91<L>  |  6<L>  ----------------------------<  97  3.8   |  30  |  0.59    Ca    8.9      01 May 2020 07:05  Phos  2.4     05-01  Mg     1.9     05-01        RADIOLOGY & ADDITIONAL STUDIES:    PROTEIN CALORIE MALNUTRITION PRESENT: [ ]mild [ ]moderate [ ]severe [ ]underweight [ ]morbid obesity  https://www.andeal.org/vault/2440/web/files/ONC/Table_Clinical%20Characteristics%20to%20Document%20Malnutrition-White%20JV%20et%20al%202012.pdf    Height (cm): 162.6 (03-21-20 @ 03:15), 162.56 (03-07-20 @ 19:18)  Weight (kg): 84.4 (03-21-20 @ 03:15), 89.4 (03-07-20 @ 19:18)  BMI (kg/m2): 31.9 (03-21-20 @ 03:15), 33.8 (03-07-20 @ 19:18)    [ ]PPSV2 < or = to 30% [ ]significant weight loss  [ ]poor nutritional intake  [ ]anasarca     Albumin, Serum: 2.9 g/dL (03-25-20 @ 05:59)   [ ]Artificial Nutrition      REFERRALS:   [ ]Chaplaincy  [ ]Hospice  [ ]Child Life  [ ]Social Work  [ ]Case management [ ]Holistic Therapy

## 2020-05-01 NOTE — CHART NOTE - NSCHARTNOTEFT_GEN_A_CORE
called by RN to evaluate patient for tachycardia. Seen and examined at bed side. Patient is asymptomatic. EKG done and showed sinus tachycardia with . Lopressor 2.5 mg IV ordered. Will discuss with AM team.  Vital Signs Last 24 Hrs  T(C): 37 (01 May 2020 05:17), Max: 37.2 (30 Apr 2020 17:45)  T(F): 98.6 (01 May 2020 05:17), Max: 98.9 (30 Apr 2020 17:45)  HR: 121 (01 May 2020 05:17) (104 - 142)  BP: 119/80 (01 May 2020 05:17) (112/72 - 124/74)  BP(mean): --  RR: 18 (01 May 2020 05:17) (18 - 18)  SpO2: 98% (01 May 2020 05:17) (92% - 98%)                          8.7    12.95 )-----------( 594      ( 30 Apr 2020 06:47 )             29.4       04-30    133<L>  |  93<L>  |  8   ----------------------------<  106<H>  3.7   |  29  |  0.63    Ca    8.7      30 Apr 2020 06:46  Phos  2.3     04-30  Mg     1.8     04-30    TPro  6.9  /  Alb  2.6<L>  /  TBili  0.4  /  DBili  x   /  AST  30  /  ALT  8<L>  /  AlkPhos  439<H>  04-30      PT/INR - ( 30 Apr 2020 06:48 )   PT: 13.1 sec;   INR: 1.14 ratio

## 2020-05-01 NOTE — PROGRESS NOTE ADULT - ATTENDING COMMENTS
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   Repeat scan yesterday with progression of her disease, progression of pleural metastases and peritoneal carcinomatosis.  Pulmonary, hepatic and osseous metastases, thoracic and retroperitoneal lymphadenopathy as above.  Complex pelvic masses.  Her tumor markers are also increasing.  Discussion had with Dr. Miles, plan for treatment with carbo/taxol next week.    Pain due to osseous disease, continue pain control with MScontin/morphine IR/lidoderm patches.

## 2020-05-01 NOTE — PROGRESS NOTE ADULT - SUBJECTIVE AND OBJECTIVE BOX
Hematology/Oncology Follow-up    INTERVAL HPI/OVERNIGHT EVENTS:  HR went up to 130s. Had IV metoprolol at night. When we evakuated her, HR still in 110s-120s. Denies chest pain, palitation. Off O2    VITAL SIGNS:  T(F): 98 (05-01-20 @ 09:25)  HR: 122 (05-01-20 @ 09:25)  BP: 114/79 (05-01-20 @ 09:25)  RR: 18 (05-01-20 @ 09:25)  SpO2: 94% (05-01-20 @ 09:25)  Wt(kg): --    04-30-20 @ 07:01  -  05-01-20 @ 07:00  --------------------------------------------------------  IN: 1280 mL / OUT: 700 mL / NET: 580 mL    05-01-20 @ 07:01  -  05-01-20 @ 13:17  --------------------------------------------------------  IN: 0 mL / OUT: 300 mL / NET: -300 mL        PHYSICAL EXAM:  Constitutional: NAD   Eyes: sclera non-icteric, conjunctiva non-anemia  Mouth: No mucositis.  Respiratory: Decreased breath sound bilaterally  Cardiovascular: tachycardia, regular rhythm.   Gastrointestinal: soft and flat, no tenderness to palpation, normoactive bowel sound, + palpable mass in lower abdomen  Extremities:  No c/c/e  MSK: No joint swelling, erythema, or tenderness   Neurological: AOx3, Cranial nerves II-XII grossly intact  Skin: No rash  Psych: Normal affect    MEDICATIONS  (STANDING):  allopurinol 300 milliGRAM(s) Oral daily  diltiazem    Tablet 60 milliGRAM(s) Oral every 8 hours  enoxaparin Injectable 80 milliGRAM(s) SubCutaneous every 12 hours  famotidine    Tablet 20 milliGRAM(s) Oral daily  lidocaine   Patch 2 Patch Transdermal daily  melatonin 3 milliGRAM(s) Oral at bedtime  morphine ER Tablet 60 milliGRAM(s) Oral <User Schedule>  phytonadione   Solution 10 milliGRAM(s) Oral daily  polyethylene glycol 3350 17 Gram(s) Oral daily  senna 2 Tablet(s) Oral at bedtime  sodium chloride 0.9% lock flush 3 milliLiter(s) IV Push every 8 hours  sodium chloride 0.9% lock flush 3 milliLiter(s) IV Push every 8 hours    MEDICATIONS  (PRN):  acetaminophen   Tablet .. 650 milliGRAM(s) Oral every 6 hours PRN Temp greater or equal to 38C (100.4F)  aluminum hydroxide/magnesium hydroxide/simethicone Suspension 30 milliLiter(s) Oral every 6 hours PRN Dyspepsia  bisacodyl 5 milliGRAM(s) Oral every 12 hours PRN Constipation  metoclopramide Injectable 10 milliGRAM(s) IV Push every 6 hours PRN Nausea/Vomiting  morphine  - Injectable 4 milliGRAM(s) IV Push every 2 hours PRN moderate-severe pain  naloxone Injectable 0.1 milliGRAM(s) IV Push every 3 minutes PRN Sedation or RR <10  ondansetron Injectable 4 milliGRAM(s) IV Push every 6 hours PRN Nausea and/or Vomiting  simethicone 80 milliGRAM(s) Chew every 6 hours PRN Gas      No Known Allergies      LABS:                        8.8    13.67 )-----------( 505      ( 01 May 2020 07:08 )             29.7     05-01    132<L>  |  91<L>  |  6<L>  ----------------------------<  97  3.8   |  30  |  0.59    Ca    8.9      01 May 2020 07:05  Phos  2.4     05-01  Mg     1.9     05-01    TPro  7.1  /  Alb  2.6<L>  /  TBili  0.4  /  DBili  x   /  AST  38  /  ALT  8<L>  /  AlkPhos  464<H>  05-01    PT/INR - ( 01 May 2020 07:08 )   PT: 13.2 sec;   INR: 1.14 ratio          Lactate Dehydrogenase, Serum: 280 U/L (05-01 @ 07:05)        RADIOLOGY & ADDITIONAL TESTS:  < from: CT Abdomen and Pelvis w/ Oral Cont and w/ IV Cont (04.30.20 @ 17:27) >    IMPRESSION:     Near complete resolution of pleural effusions.    Interval progression of pleural metastases and peritoneal carcinomatosis.    Pulmonary, hepatic and osseous metastases, thoracic and retroperitoneal lymphadenopathy as above.    Complex pelvic masses, difficult to clearly separate as above.    < end of copied text >  Studies reviewed.

## 2020-05-02 NOTE — PROGRESS NOTE ADULT - SUBJECTIVE AND OBJECTIVE BOX
24F here with worsening abdominal pain in the setting of known adnexal massess, found to have for metastatic carcinoma with final pathology pending. Given evidence of metastatic disease, patient is not a surgical candidate. Has been having worsening adnexal pain, markedly last night after taking senna. States pain is 7-8/10 at its worst, located in adnexal area with radiation down to legs, worse with movement, and tolerable with opioids around 2/10. Palliative care called to help with pain.     INTERVAL EVENTS:  4/30: states having lower back pain, not fully relieved with morphine IR  5/1: used 4 doses of 3mg IV morphine/24 hours  5/2: used 2 BT of 4 mg Morphine/24 hrs.     ADVANCE DIRECTIVES:    DNR  MOLST  [ ]  Living Will  [ ]   DECISION MAKER(s):  [ ] Health Care Proxy(s)  [ ] Surrogate(s)  [ ] Guardian           Name(s): Phone Number(s):    BASELINE (I)ADL(s) (prior to admission):  [place]: [ ]Total  [ ] Moderate [ ]Dependent    Allergies    No Known Allergies    Intolerances       PRESENT SYMPTOMS: [ ]Unable to obtain due to poor mentation   Source if other than patient:  [ ]Family   [ ]Team     Pain: [X ]yes [ ]no  QOL impact -  decreased function  Location - back  Aggravating factors -   Quality -   Radiation -   Timing-   Severity (0-10 scale): 7/10   Minimal acceptable level (0-10 scale): 2/10    CPOT:    https://www.[facility].org/getattachment/[email or web address]/Critical-Care-Pain-Observation-Tool-(CPOT)      PAIN AD Score:     http://geriatrictoolkit.[place].[facility]/cog/painad.pdf (press ctrl +  left click to view)    Dyspnea:                           [ ]Mild [ ]Moderate [ ]Severe  Anxiety:                             [ ]Mild [ ]Moderate [ ]Severe  Fatigue:                             [ ]Mild [ x]Moderate [ ]Severe  Nausea:                             [ ]Mild [ ]Moderate [ ]Severe  Loss of appetite:              [ ]Mild [ ]Moderate [ ]Severe  Constipation:                    [ ]Mild [ ]Moderate [ ]Severe    Other Symptoms:  [X ]All other review of systems negative     Palliative Performance Status Version 2:   40      %    http://npcrc.org/files/news/palliative_performance_scale_ppsv2.pdf    PHYSICAL EXAM:      Limited exam for patient safety and to limit infection risk during COVID-19 outbreak. Please refr to primary team's examination for today.  GENERAL:  [ X]Alert  [ ]Oriented x   [ ]Lethargic  [ ]Cachexia  [ ]Unarousable  [ X]Verbal  [ ]Non-Verbal  Behavioral:   [ ] Anxiety  [ ] Delirium [ ] Agitation [ ] Other  HEENT:  [ ]Normal   [ ]Dry mouth   [ ]ET Tube/Trach  [ ]Oral lesions  PULMONARY:   [ ]Clear [ ]Tachypnea  [ ]Audible excessive secretions   [ ]Rhonchi        [ ]Right [ ]Left [ ]Bilateral  [ ]Crackles        [ ]Right [ ]Left [ ]Bilateral  [ ]Wheezing     [ ]Right [ ]Left [ ]Bilateral  [ ]Diminished breath sounds [ ]right [ ]left [ ]bilateral  CARDIOVASCULAR:    [ ]Regular [ ]Irregular [ ]Tachy  [ ]Quirino [ ]Murmur [ ]Other  GASTROINTESTINAL:  [ ]Soft  [ ]Distended   [ ]+BS  [ ]Non tender [ ]Tender  [ ]PEG [ ]OGT/ NGT  Last BM:     GENITOURINARY:  [ ]Normal [ ] Incontinent   [ ]Oliguria/Anuria   [ ]Cifuentes  MUSCULOSKELETAL:   [ ]Normal   [ x]Weakness  [ ]Bed/Wheelchair bound [ ]Edema  NEUROLOGIC:  Lucid speech  [ ]No focal deficits  [ ]Cognitive impairment  [ ]Dysphagia [ ]Dysarthria [ ]Paresis [ ]Other   SKIN:   [ ]Normal    [ ]Rash  [ ]Pressure ulcer(s)       Present on admission [ ]y [ ]n    CRITICAL CARE:  [ ] Shock Present  [ ]Septic [ ]Cardiogenic [ ]Neurologic [ ]Hypovolemic  [ ]  Vasopressors [ ]  Inotropes   [ ]Respiratory failure present [ ]Mechanical ventilation [ ]Non-invasive ventilatory support [ ]High flow  [ ]Acute  [ ]Chronic [ ]Hypoxic  [ ]Hypercarbic [ ]Other  [ ]Other organ failure     LABS: reviewed    05-02    133<L>  |  93<L>  |  6<L>  ----------------------------<  104<H>  3.5   |  27  |  0.59    Ca    9.0      02 May 2020 06:51  Phos  2.4     05-02  Mg     1.8     05-02    TPro  7.1  /  Alb  2.8<L>  /  TBili  0.5  /  DBili  x   /  AST  36  /  ALT  11  /  AlkPhos  401<H>  05-02                          8.5    14.24 )-----------( 368      ( 02 May 2020 06:51 )             28.9           RADIOLOGY & ADDITIONAL STUDIES:    PROTEIN CALORIE MALNUTRITION PRESENT: [ ]mild [ ]moderate [ ]severe [ ]underweight [ ]morbid obesity  https://www.andeal.org/vault/2440/web/files/ONC/Table_Clinical%20Characteristics%20to%20Document%20Malnutrition-White%20JV%20et%20al%243466.pdf    Height (cm): 162.6 (03-21-20 @ 03:15), 162.56 (03-07-20 @ 19:18)  Weight (kg): 84.4 (03-21-20 @ 03:15), 89.4 (03-07-20 @ 19:18)  BMI (kg/m2): 31.9 (03-21-20 @ 03:15), 33.8 (03-07-20 @ 19:18)    [ ]PPSV2 < or = to 30% [ ]significant weight loss  [ ]poor nutritional intake  [ ]anasarca     Albumin, Serum: 2.9 g/dL (03-25-20 @ 05:59)   [ ]Artificial Nutrition      REFERRALS:   [ ]Chaplaincy  [ ]Hospice  [ ]Child Life  [ ]Social Work  [ ]Case management [ ]Holistic Therapy

## 2020-05-02 NOTE — PROGRESS NOTE ADULT - ASSESSMENT
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   - Tumor markers elevated: Cancer Antigen, Ca 27.29: 70.8, Cancer Antigen, 125: 619, Carcinoembryonic Antigen: 44.7 -->70.9--> 74.2, Beta-HC.0 on initial evaluation (3/7); Checked again on 20 and it was 102.7  - Discussed diagnosis with patient on 3/31/20. Given advanced, metastatic stage, will need to be on indefinite treatment to limit progression of disease.  - S/p C1 mFOLFOX (-).  Cycle 2 day1 was  (5-FU infusion was stopped after about 30 hours for possible coronary vasspasm)  - Will try to wean supplemental oxygen as tolerated. Will also provide an incentive spirometer  - CTA Chest () was negative for central PE, but unable to evaluate segmental and subsegmental branches due to motion artifact. In the setting of her hypoxemia and tachycardia,   -LE dopplers negative for DVT on .  - Continue Venofer until discharge   -CT chest/abd/Pel on  shoes PD  -Considering giving carboplatin/PTX sometime next week after Tuesday (Consent obtained)    # Bilateral pleural effusion  - COVID-19 negative x 3, but parainfluenza positive ().   - CTA Chest () was negative for central PE, but unable to evaluate segmental and subsegmental branches due to motion artifact. Lung imaging showed unchanged metastatic nodules, small bilateral pleural effusions, and atelectasis of the basilar LLL (unchanged as well). No new opopacity which decreases the likelihood of PNA  - CT A/P repeated on 4/3/20; no obvious source of infection   - Blood/urine cultures have had NGTD. C. diff negative  - ID following, appreciate recs  - S/p PICC line placement for chemotherapy  - Patient is s/p C1 modified FOLFOX (-). Further management as noted below   - Blood cultures from  and  NGTD  - urine cx + 50-99K Klebsiella CRE- per ID no need to treat at this time given no symptoms    - CTA chest on  showing no PE, but large bilateral pleural effusions s/p Drains in both left and right lung   -Bilateral chest tube removed on   -PT evaluation   -Home O2 evaluation    # Palpitation, resolved  # Chest pain, resolved  # ST-T changes  She developed palpitation and chest pain on C2D3 while she was getting 5-FU. Trop was negative. CT-T change was non-specific and echo did not show any wall motion abnormalities. ACS is unlikely. Coronary vasospasm is possible 2/2 5-FU but hard to confirm.   -Continue diltiazem for HR control and possible vasspasm   -Discuss meds with cards     # Pain control   # low back pain  - Pain medications now switched to MS contin, pain is adequately controlled per patient. Will continue to follow-up with Palliative Care regarding pain management/regimen on discharge.   -Reconsult palliative for pain control, low back pain     # Hyperuricemia  - uric acid was 8.8 on . s/p IV Rasburicase 3mg x1 on .   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   - Tumor markers elevated: Cancer Antigen, Ca 27.29: 70.8, Cancer Antigen, 125: 619-->1163, Carcinoembryonic Antigen: 44.7 -->70.9--> 74.2, Beta-HC.0 on initial evaluation (3/7); Checked again on 20 and it was 102.7  - Discussed diagnosis with patient on 3/31/20. Given advanced, metastatic stage, will need to be on indefinite treatment to limit progression of disease.  - S/p C1 mFOLFOX (-). Cycle 2 day1 was on  (5-FU infusion was stopped after about 30 hours for possible coronary vasospasm)  - CTA Chest () was negative for central PE, but unable to evaluate segmental and subsegmental branches due to motion artifact. In the setting of her hypoxemia and tachycardia,   - LE dopplers negative for DVT on .   -CT C/A/P () showed POD. Given imaging findings and uptrending tumor markers, plan to switch chemotherapy. Plan to give Carbo/Taxol early this week (around Tuesday). Consent was obtained    # Bilateral pleural effusion - Improved  - COVID-19 negative x 3, but parainfluenza positive ().   - CTA Chest () was negative for central PE, but unable to evaluate segmental and subsegmental branches due to motion artifact. Lung imaging showed unchanged metastatic nodules, small bilateral pleural effusions, and atelectasis of the basilar LLL (unchanged as well). No new opopacity which decreases the likelihood of PNA  - CTA chest on  showed no PE, but large bilateral pleural effusions s/p drains in both left and right lung. Bilateral chest tubes removed on   - Appreciate Pulmonary recs  - Patient is currently saturating well on RA. Continue to closely monitor  - C/w incentive spirometry    # Fever  - Patient with intermittent fevers over the past month with no recent source for infection  - CT A/P (4/3/20) No obvious source of infection. No source for infection seen on CT C/A/P () as well  - Blood/urine cultures have had NGTD. C. diff negative  - Appreciate ID recs  - S/p PICC line placement for chemotherapy  - Patient is s/p C1 modified FOLFOX (-). Cycle 2 stopped due to possible coronary vasospasm  - Blood cultures from  and  NGTD. Urine culture was positive for Klebsiella. Per ID, no need to treat at this time given no symptoms   - Patient with another low grade fever yesterday. Repeat blood cultures were sent. Patient without localizing signs of infection at this time. Suspect possible tumor fever. Will monitor off antibiotics for now and follow-up blood cultures.    - PT evaluation - Recommend Home with home PT  - Home O2 evaluation - Pending    # Palpitation, resolved  # Chest pain, resolved  # ST-T changes  Patient developed palpitations and chest pain on C2D3 while she was getting 5-FU. Trop was negative. ST-T changes was non-specific and TTE did not show any wall motion abnormalities. ACS is unlikely. Coronary vasospasm is possibly 2/2 5-FU but hard to confirm.   - Continue Diltiazem for HR control and possible vasospasm   - Cycle 2 day1 was on  (5-FU infusion was stopped after about 30 hours for possible coronary vasospasm)  - Cardiology following, appreciate recs     # Pain control   # Low back pain  - Palliative Care following, appreciate recs  - Pain medications now switched to MS contin. Pain is adequately controlled per patient. Will continue to follow-up with Palliative Care regarding pain management/regimen on discharge.     # Hyperuricemia - Improved  - Uric acid was 8.8 on . s/p IV Rasburicase 3mg x1 on .   - C/w Allopurinol 300mg daily  - Monitor TLS labs daily (CMP, Phos, LDH, Uric acid)    # DVT PPx  - C/w full dose Lovenox    # Dispo  - Pending resolution of acute medical issues. Patient evaluated by PT who recommended home with home PT    Plan d/w patient and patient's brother (Thom) via telephone    Westley Little, PGY4  Hematology-Oncology Fellow  Pager: [phone] / [phone]

## 2020-05-02 NOTE — PROGRESS NOTE ADULT - SUBJECTIVE AND OBJECTIVE BOX
Patient is a 24y old  Female who presents with a chief complaint of b/l pelvic masses (01 May 2020 15:34)    SUBJECTIVE / OVERNIGHT EVENTS:  Patient with a Tmax of 100.5F over the last 24 hours. Repeat blood cultures were sent. No other acute events overnight. Patient seen and examined this AM. She was sitting on the edge of her bed about to eat breakfast. She endorses chronic low back and R-sided abdominal pain. No chest pain, shortness of breath, or cough. No diarrhea or dysuria.     MEDICATIONS  (STANDING):  allopurinol 300 milliGRAM(s) Oral daily  diltiazem    Tablet 60 milliGRAM(s) Oral every 8 hours  enoxaparin Injectable 80 milliGRAM(s) SubCutaneous every 12 hours  famotidine    Tablet 20 milliGRAM(s) Oral daily  lidocaine   Patch 2 Patch Transdermal daily  melatonin 3 milliGRAM(s) Oral at bedtime  morphine ER Tablet 75 milliGRAM(s) Oral two times a day  phytonadione   Solution 10 milliGRAM(s) Oral daily  polyethylene glycol 3350 17 Gram(s) Oral daily  senna 2 Tablet(s) Oral at bedtime  sodium chloride 0.9% lock flush 3 milliLiter(s) IV Push every 8 hours  sodium chloride 0.9% lock flush 3 milliLiter(s) IV Push every 8 hours    MEDICATIONS  (PRN):  acetaminophen   Tablet .. 650 milliGRAM(s) Oral every 6 hours PRN Temp greater or equal to 38C (100.4F)  aluminum hydroxide/magnesium hydroxide/simethicone Suspension 30 milliLiter(s) Oral every 6 hours PRN Dyspepsia  bisacodyl 5 milliGRAM(s) Oral every 12 hours PRN Constipation  metoclopramide Injectable 10 milliGRAM(s) IV Push every 6 hours PRN Nausea/Vomiting  morphine  - Injectable 4 milliGRAM(s) IV Push every 2 hours PRN moderate-severe pain  naloxone Injectable 0.1 milliGRAM(s) IV Push every 3 minutes PRN Sedation or RR <10  ondansetron Injectable 4 milliGRAM(s) IV Push every 6 hours PRN Nausea and/or Vomiting  simethicone 80 milliGRAM(s) Chew every 6 hours PRN Gas        CAPILLARY BLOOD GLUCOSE        I&O's Summary    01 May 2020 07:01  -  02 May 2020 07:00  --------------------------------------------------------  IN: 774 mL / OUT: 1700 mL / NET: -926 mL    02 May 2020 07:01  -  02 May 2020 10:32  --------------------------------------------------------  IN: 100 mL / OUT: 200 mL / NET: -100 mL    Vital Signs Last 24 Hrs  T(C): 36.9 (02 May 2020 09:32), Max: 38.1 (01 May 2020 17:49)  T(F): 98.4 (02 May 2020 09:32), Max: 100.5 (01 May 2020 17:49)  HR: 124 (02 May 2020 09:32) (18 - 134)  BP: 113/73 (02 May 2020 09:32) (109/72 - 126/78)  BP(mean): --  RR: 18 (02 May 2020 09:32) (18 - 20)  SpO2: 98% (02 May 2020 09:32) (93% - 98%)    PHYSICAL EXAM:  GENERAL: NAD, Sitting on the edge of her bed  HEENT: NC/AT, MMM  NECK: Supple  BACK: Diffuse TTP of lower back  CHEST/LUNG: Grossly CTAB; No wheeze appreciated  HEART: Tachycardic, Regular rhythm  ABDOMEN: +BS, Soft, Mild R-sided TTP, No rigidity  EXTREMITIES: No LE edema  NEUROLOGY: Awake and alert, Answering questions and following commands appropriately  SKIN: Warm and dry  PSYCH: Calm and cooperative    LABS:                        8.5    14.24 )-----------( 368      ( 02 May 2020 06:51 )             28.9     05-02    133<L>  |  93<L>  |  6<L>  ----------------------------<  104<H>  3.5   |  27  |  0.59    Ca    9.0      02 May 2020 06:51  Phos  1.8     05-02  Mg     1.8     05-02    TPro  7.1  /  Alb  2.8<L>  /  TBili  0.5  /  DBili  x   /  AST  36  /  ALT  11  /  AlkPhos  401<H>  05-02    PT/INR - ( 02 May 2020 06:51 )   PT: 12.3 sec;   INR: 1.07 ratio      RADIOLOGY & ADDITIONAL TESTS:  Studies reviewed.

## 2020-05-02 NOTE — PROGRESS NOTE ADULT - ATTENDING COMMENTS
Patient interviewed/examined.  Agree with findings, PE, Assessment and Plan as outlined above.      Carl Jama MD   [phone]

## 2020-05-02 NOTE — PROGRESS NOTE ADULT - PROBLEM SELECTOR PLAN 1
- abdominal pain persistent, but also with chronic back pain  - used 2 doses IV morphine 4mg/24 hours   - transitioned to morphine IR 15mg Q3 PRN  - will keep IV prn for sever pain for 24 hrs

## 2020-05-03 NOTE — PROGRESS NOTE ADULT - ASSESSMENT
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   - Tumor markers elevated: Cancer Antigen, Ca 27.29: 70.8, Cancer Antigen, 125: 619-->1163, Carcinoembryonic Antigen: 44.7 -->70.9--> 74.2, Beta-HC.0 on initial evaluation (3/7); Checked again on 20 and it was 102.7  - Discussed diagnosis with patient on 3/31/20. Given advanced, metastatic stage, will need to be on indefinite treatment to limit progression of disease.  - S/p C1 mFOLFOX (-). Cycle 2 day1 was on  (5-FU infusion was stopped after about 30 hours for possible coronary vasospasm)  - CTA Chest () was negative for central PE, but unable to evaluate segmental and subsegmental branches due to motion artifact. Currently on full dose Lovenox given tachycardia and hypoxia.  - LE dopplers negative for DVT on .   - CT C/A/P () showed POD. Given imaging findings and uptrending tumor markers, plan to switch chemotherapy. Plan to give Carbo/Taxol early this week (around Tuesday). Consent was obtained    # Bilateral pleural effusion - Improved  - COVID-19 negative x 3, but parainfluenza positive ().   - CTA Chest () was negative for central PE, but unable to evaluate segmental and subsegmental branches due to motion artifact. Lung imaging showed unchanged metastatic nodules, small bilateral pleural effusions, and atelectasis of the basilar LLL (unchanged as well). No new opopacity which decreases the likelihood of PNA  - CTA chest on  showed no PE, but large bilateral pleural effusions s/p drains in both left and right lung. Bilateral chest tubes removed on .  - Appreciate Pulmonary recs  - Patient is currently saturating well on RA. Continue to closely monitor  - C/w incentive spirometry    # Fever  - Patient with intermittent fevers over the past month with no recent source for infection. Again febrile this AM to 100.6F.  - CT A/P (4/3/20) No obvious source of infection. No source for infection seen on CT C/A/P () as well  - Blood cultures have had NGTD, last one sent . Urine culture from  pending, UA grossly no bacteria. C. diff negative.  - Appreciate ID recs  - S/p PICC line placement for chemotherapy  - Patient is s/p C1 modified FOLFOX (-). Cycle 2 stopped due to possible coronary vasospasm  - Blood cultures from  and  NGTD. Previous Urine culture was positive for Klebsiella. Per ID, no need to treat at this time given no symptoms.   - Suspect possible tumor fever. Will monitor off antibiotics for now and follow-up blood cultures.    - PT evaluation - Recommend Home with home PT  - Home O2 evaluation - Pending    # Palpitation, resolved however patient is still tachycardic; could be contributed by fever.  # Chest pain, resolved  # ST-T changes  Patient developed palpitations and chest pain on C2D3 while she was getting 5-FU. Trop was negative. ST-T changes was non-specific and TTE did not show any wall motion abnormalities. ACS is unlikely. Coronary vasospasm is possibly 2/2 5-FU but hard to confirm.   - Continue Diltiazem for HR control and possible vasospasm   - Cycle 2 day1 was on  (5-FU infusion was stopped after about 30 hours for possible coronary vasospasm)  - Cardiology following, appreciate recs     # Pain control   # Low back pain  - Palliative Care following, appreciate recs  - Pain medications now switched to MS contin. Pain is adequately controlled per patient. Will continue to follow-up with Palliative Care regarding pain management/regimen on discharge.  - Check bowel movements and give proper stool regimen.     # Hyperuricemia - Improved  - Uric acid was 8.8 on . s/p IV Rasburicase 3mg x1 on .   - C/w Allopurinol 300mg daily  - Monitor TLS labs daily (CMP, Phos, LDH, Uric acid)    # DVT PPx  - C/w full dose Lovenox given tachycardia and hypoxia, as mentioned above.    # Dispo  - Pending resolution of acute medical issues. Patient evaluated by PT who recommended home with home PT.

## 2020-05-03 NOTE — PROGRESS NOTE ADULT - SUBJECTIVE AND OBJECTIVE BOX
Oncology Follow-up    INTERVAL HPI/OVERNIGHT EVENTS:  Patient S&E at bedside. Febrile to 100.6F last night and this morning.      VITAL SIGNS:  T(F): 100.6 (20 @ 06:30)  HR: 132 (20 @ 06:30)  BP: 118/74 (20 @ 06:30)  RR: 18 (20 @ 06:30)  SpO2: 95% (20 @ 06:30)  Wt(kg): --    PHYSICAL EXAM:    GENERAL: NAD  HEENT: NC/AT, MMM  NECK: Supple  BACK: Diffuse TTP of lower back  CHEST/LUNG: Grossly CTAB; No wheeze appreciated  HEART: Tachycardic, Regular rhythm  ABDOMEN: +BS, Soft, Mild R-sided TTP, No rigidity  EXTREMITIES: No LE edema  NEUROLOGY: Awake and alert, Answering questions and following commands appropriately  SKIN: Warm and dry  PSYCH: Calm and cooperative      MEDICATIONS  (STANDING):  allopurinol 300 milliGRAM(s) Oral daily  diltiazem    Tablet 60 milliGRAM(s) Oral every 8 hours  enoxaparin Injectable 80 milliGRAM(s) SubCutaneous every 12 hours  famotidine    Tablet 20 milliGRAM(s) Oral daily  lidocaine   Patch 2 Patch Transdermal daily  melatonin 3 milliGRAM(s) Oral at bedtime  morphine ER Tablet 75 milliGRAM(s) Oral two times a day  polyethylene glycol 3350 17 Gram(s) Oral daily  potassium acid phosphate/sodium acid phosphate tablet (K-PHOS No. 2) 1 Tablet(s) Oral four times a day with meals  potassium chloride  20 mEq/100 mL IVPB 20 milliEquivalent(s) IV Intermittent every 2 hours  senna 2 Tablet(s) Oral at bedtime  sodium chloride 0.9% lock flush 3 milliLiter(s) IV Push every 8 hours  sodium chloride 0.9% lock flush 3 milliLiter(s) IV Push every 8 hours  sodium phosphate IVPB 15 milliMole(s) IV Intermittent once    MEDICATIONS  (PRN):  acetaminophen   Tablet .. 650 milliGRAM(s) Oral every 6 hours PRN Temp greater or equal to 38C (100.4F)  aluminum hydroxide/magnesium hydroxide/simethicone Suspension 30 milliLiter(s) Oral every 6 hours PRN Dyspepsia  bisacodyl 5 milliGRAM(s) Oral every 12 hours PRN Constipation  metoclopramide Injectable 10 milliGRAM(s) IV Push every 6 hours PRN Nausea/Vomiting  morphine  - Injectable 4 milliGRAM(s) IV Push every 6 hours PRN Severe Pain (7 - 10)  morphine  IR 15 milliGRAM(s) Oral every 4 hours PRN Moderate Pain (4 - 6)  naloxone Injectable 0.1 milliGRAM(s) IV Push every 3 minutes PRN Sedation or RR <10  ondansetron Injectable 4 milliGRAM(s) IV Push every 6 hours PRN Nausea and/or Vomiting  simethicone 80 milliGRAM(s) Chew every 6 hours PRN Gas      No Known Allergies      LABS:                        8.8    15.66 )-----------( 449      ( 03 May 2020 07:13 )             30.0     05-    134<L>  |  93<L>  |  6<L>  ----------------------------<  97  3.4<L>   |  26  |  0.51    Ca    8.9      03 May 2020 07:13  Phos  1.9     -  Mg     1.8         TPro  6.7  /  Alb  2.8<L>  /  TBili  0.4  /  DBili  x   /  AST  34  /  ALT  10  /  AlkPhos  386<H>      PT/INR - ( 03 May 2020 07:13 )   PT: 12.5 sec;   INR: 1.08 ratio         Lactate Dehydrogenase, Serum: 342 U/L ( @ 07:13)    Urinalysis Basic - ( 02 May 2020 11:55 )    Color: Yellow / Appearance: Clear / S.018 / pH: x  Gluc: x / Ketone: Negative  / Bili: Negative / Urobili: Negative   Blood: x / Protein: 30 mg/dL / Nitrite: Negative   Leuk Esterase: Large / RBC: 3 /hpf / WBC 14 /HPF   Sq Epi: x / Non Sq Epi: 1 /hpf / Bacteria: Negative      D-Dimer Assay, Quantitative: 2282 ng/mL DDU (20 @ 12:51)    Culture - Blood (20 @ 22:09)    Specimen Source: .Blood Blood-Peripheral    Culture Results:   No growth to date.      COVID-19 PCR . (04.29.20 @ 11:08)    COVID-19 PCR: NotDetec: This test has been validated by [employer] to be accurate;  though it has not been FDA cleared/approved by the usual pathway.  As with all laboratory tests, results should be correlated with clinical  findings.          RADIOLOGY & ADDITIONAL TESTS:  Studies reviewed. Oncology Follow-up    INTERVAL HPI/OVERNIGHT EVENTS:  Patient S&E at bedside. Febrile to 100.6F last night and this morning. Says she has upper abdominal tenderness and 'firming'.      VITAL SIGNS:  T(F): 100.6 (20 @ 06:30)  HR: 132 (20 @ 06:30)  BP: 118/74 (20 @ 06:30)  RR: 18 (20 @ 06:30)  SpO2: 95% (20 @ 06:30)  Wt(kg): --    PHYSICAL EXAM:    GENERAL: NAD  HEENT: NC/AT, MMM  NECK: Supple  BACK: Diffuse TTP of lower back  CHEST/LUNG: Grossly CTAB; No wheeze appreciated  HEART: Tachycardic, Regular rhythm  ABDOMEN: +BS, Soft, Mild R-sided TTP, No rigidity  EXTREMITIES: No LE edema  NEUROLOGY: Awake and alert, Answering questions and following commands appropriately  SKIN: Warm and dry  PSYCH: Calm and cooperative      MEDICATIONS  (STANDING):  allopurinol 300 milliGRAM(s) Oral daily  diltiazem    Tablet 60 milliGRAM(s) Oral every 8 hours  enoxaparin Injectable 80 milliGRAM(s) SubCutaneous every 12 hours  famotidine    Tablet 20 milliGRAM(s) Oral daily  lidocaine   Patch 2 Patch Transdermal daily  melatonin 3 milliGRAM(s) Oral at bedtime  morphine ER Tablet 75 milliGRAM(s) Oral two times a day  polyethylene glycol 3350 17 Gram(s) Oral daily  potassium acid phosphate/sodium acid phosphate tablet (K-PHOS No. 2) 1 Tablet(s) Oral four times a day with meals  potassium chloride  20 mEq/100 mL IVPB 20 milliEquivalent(s) IV Intermittent every 2 hours  senna 2 Tablet(s) Oral at bedtime  sodium chloride 0.9% lock flush 3 milliLiter(s) IV Push every 8 hours  sodium chloride 0.9% lock flush 3 milliLiter(s) IV Push every 8 hours  sodium phosphate IVPB 15 milliMole(s) IV Intermittent once    MEDICATIONS  (PRN):  acetaminophen   Tablet .. 650 milliGRAM(s) Oral every 6 hours PRN Temp greater or equal to 38C (100.4F)  aluminum hydroxide/magnesium hydroxide/simethicone Suspension 30 milliLiter(s) Oral every 6 hours PRN Dyspepsia  bisacodyl 5 milliGRAM(s) Oral every 12 hours PRN Constipation  metoclopramide Injectable 10 milliGRAM(s) IV Push every 6 hours PRN Nausea/Vomiting  morphine  - Injectable 4 milliGRAM(s) IV Push every 6 hours PRN Severe Pain (7 - 10)  morphine  IR 15 milliGRAM(s) Oral every 4 hours PRN Moderate Pain (4 - 6)  naloxone Injectable 0.1 milliGRAM(s) IV Push every 3 minutes PRN Sedation or RR <10  ondansetron Injectable 4 milliGRAM(s) IV Push every 6 hours PRN Nausea and/or Vomiting  simethicone 80 milliGRAM(s) Chew every 6 hours PRN Gas      No Known Allergies      LABS:                        8.8    15.66 )-----------( 449      ( 03 May 2020 07:13 )             30.0     05-    134<L>  |  93<L>  |  6<L>  ----------------------------<  97  3.4<L>   |  26  |  0.51    Ca    8.9      03 May 2020 07:13  Phos  1.9     05-  Mg     1.8     05-    TPro  6.7  /  Alb  2.8<L>  /  TBili  0.4  /  DBili  x   /  AST  34  /  ALT  10  /  AlkPhos  386<H>  05-    PT/INR - ( 03 May 2020 07:13 )   PT: 12.5 sec;   INR: 1.08 ratio         Lactate Dehydrogenase, Serum: 342 U/L ( @ 07:13)    Urinalysis Basic - ( 02 May 2020 11:55 )    Color: Yellow / Appearance: Clear / S.018 / pH: x  Gluc: x / Ketone: Negative  / Bili: Negative / Urobili: Negative   Blood: x / Protein: 30 mg/dL / Nitrite: Negative   Leuk Esterase: Large / RBC: 3 /hpf / WBC 14 /HPF   Sq Epi: x / Non Sq Epi: 1 /hpf / Bacteria: Negative      D-Dimer Assay, Quantitative: 2282 ng/mL DDU (20 @ 12:51)    Culture - Blood (20 @ 22:09)    Specimen Source: .Blood Blood-Peripheral    Culture Results:   No growth to date.      COVID-19 PCR . (20 @ 11:08)    COVID-19 PCR: NotDetec: This test has been validated by [employer] to be accurate;  though it has not been FDA cleared/approved by the usual pathway.  As with all laboratory tests, results should be correlated with clinical  findings.          RADIOLOGY & ADDITIONAL TESTS:  Studies reviewed. Oncology Follow-up    INTERVAL HPI/OVERNIGHT EVENTS:  Patient S&E at bedside. Febrile to 100.6F last night and this morning. Says she has upper abdominal tenderness and 'firming'. Also c/o lower back pain.      VITAL SIGNS:  T(F): 100.6 (20 @ 06:30)  HR: 132 (20 @ 06:30)  BP: 118/74 (20 @ 06:30)  RR: 18 (20 @ 06:30)  SpO2: 95% (20 @ 06:30)  Wt(kg): --      PHYSICAL EXAM:    GENERAL: NAD  HEENT: NC/AT, MMM  NECK: Supple  BACK: Diffuse TTP of lower back  CHEST/LUNG: Grossly CTAB; No wheeze appreciated  HEART: Tachycardic, Regular rhythm  ABDOMEN: +BS, upper abdominal tenderness, No rigidity  EXTREMITIES: No LE edema  NEUROLOGY: Awake and alert, Answering questions and following commands appropriately  SKIN: Warm and dry  PSYCH: Calm and cooperative      MEDICATIONS  (STANDING):  allopurinol 300 milliGRAM(s) Oral daily  diltiazem    Tablet 60 milliGRAM(s) Oral every 8 hours  enoxaparin Injectable 80 milliGRAM(s) SubCutaneous every 12 hours  famotidine    Tablet 20 milliGRAM(s) Oral daily  lidocaine   Patch 2 Patch Transdermal daily  melatonin 3 milliGRAM(s) Oral at bedtime  morphine ER Tablet 75 milliGRAM(s) Oral two times a day  polyethylene glycol 3350 17 Gram(s) Oral daily  potassium acid phosphate/sodium acid phosphate tablet (K-PHOS No. 2) 1 Tablet(s) Oral four times a day with meals  potassium chloride  20 mEq/100 mL IVPB 20 milliEquivalent(s) IV Intermittent every 2 hours  senna 2 Tablet(s) Oral at bedtime  sodium chloride 0.9% lock flush 3 milliLiter(s) IV Push every 8 hours  sodium chloride 0.9% lock flush 3 milliLiter(s) IV Push every 8 hours  sodium phosphate IVPB 15 milliMole(s) IV Intermittent once    MEDICATIONS  (PRN):  acetaminophen   Tablet .. 650 milliGRAM(s) Oral every 6 hours PRN Temp greater or equal to 38C (100.4F)  aluminum hydroxide/magnesium hydroxide/simethicone Suspension 30 milliLiter(s) Oral every 6 hours PRN Dyspepsia  bisacodyl 5 milliGRAM(s) Oral every 12 hours PRN Constipation  metoclopramide Injectable 10 milliGRAM(s) IV Push every 6 hours PRN Nausea/Vomiting  morphine  - Injectable 4 milliGRAM(s) IV Push every 6 hours PRN Severe Pain (7 - 10)  morphine  IR 15 milliGRAM(s) Oral every 4 hours PRN Moderate Pain (4 - 6)  naloxone Injectable 0.1 milliGRAM(s) IV Push every 3 minutes PRN Sedation or RR <10  ondansetron Injectable 4 milliGRAM(s) IV Push every 6 hours PRN Nausea and/or Vomiting  simethicone 80 milliGRAM(s) Chew every 6 hours PRN Gas      No Known Allergies      LABS:                        8.8    15.66 )-----------( 449      ( 03 May 2020 07:13 )             30.0     05-    134<L>  |  93<L>  |  6<L>  ----------------------------<  97  3.4<L>   |  26  |  0.51    Ca    8.9      03 May 2020 07:13  Phos  1.9     -  Mg     1.8     -    TPro  6.7  /  Alb  2.8<L>  /  TBili  0.4  /  DBili  x   /  AST  34  /  ALT  10  /  AlkPhos  386<H>      PT/INR - ( 03 May 2020 07:13 )   PT: 12.5 sec;   INR: 1.08 ratio         Lactate Dehydrogenase, Serum: 342 U/L ( @ 07:13)    Urinalysis Basic - ( 02 May 2020 11:55 )    Color: Yellow / Appearance: Clear / S.018 / pH: x  Gluc: x / Ketone: Negative  / Bili: Negative / Urobili: Negative   Blood: x / Protein: 30 mg/dL / Nitrite: Negative   Leuk Esterase: Large / RBC: 3 /hpf / WBC 14 /HPF   Sq Epi: x / Non Sq Epi: 1 /hpf / Bacteria: Negative      D-Dimer Assay, Quantitative: 2282 ng/mL DDU (20 @ 12:51)    Culture - Blood (20 @ 22:09)    Specimen Source: .Blood Blood-Peripheral    Culture Results:   No growth to date.      COVID-19 PCR . (20 @ 11:08)    COVID-19 PCR: NotDetec: This test has been validated by [employer] to be accurate;  though it has not been FDA cleared/approved by the usual pathway.  As with all laboratory tests, results should be correlated with clinical  findings.          RADIOLOGY & ADDITIONAL TESTS:  Studies reviewed.

## 2020-05-03 NOTE — PROGRESS NOTE ADULT - ATTENDING COMMENTS
Patient interviewed/examined.  Feeling somewhat better than this morning with controlled abdominal and back pain.    Agree with findings, PE, Assessment and Plan as above.      Carl Jama MD (Weekend Coverage)

## 2020-05-04 NOTE — PROGRESS NOTE ADULT - ASSESSMENT
24 f with b/l adnexal masses and pelvic LAD, metastatic carcinoma of unknown primary presented 3/20 with abd pain, s/p CT guided biopsy 3/25, PICC line 4/3 to start chemo  pt intermittently febrile and leukocytosis, most recent fever 5/3 100.6 and WBC 17K today (5/4)  Cr: 0.55  blood culture has been NGTD  Most recent Urine culture is growing GNR likely klebsiella which she has grown in the past  COVID negative x 7 last one 4/29  RVP 4/1 with parainfluenza  also some diarrhea initially    persistent fever and leukocytosis, with tachycardia   C-diff negative  parainfluenza URI 4/1  metastatic ca with ?tumor fever  persistent tachycardia but no PE, just b/l pleural effusion and L pleural nodularity s/o malignant effusion s/p b/l chest tube  Prior fevers where deemed to likely be related to tumor fevers as multiple sepsis workup have all been negative   Her prior urine culture of klebsiella were deemed to be asymptomatic bacteruria which have not been treated   She has progression of disease based on CT C/A/P done on 4/30 and plan is to start new chemo regiment this week and hematology/onc would like clearance from a ID standpoint    Recommendations:    ------INCOMPLETE NOTE-TO BE DISCUSSED WITH ATTENDING- RECOMMENDATIONS TO FOLLOW--- 24 f with b/l adnexal masses and pelvic LAD, metastatic carcinoma of unknown primary presented 3/20 with abd pain, s/p CT guided biopsy 3/25, PICC line 4/3 to start chemo  pt intermittently febrile and leukocytosis, most recent fever 5/3 100.6 and WBC 17K today (5/4)  Cr: 0.55  blood culture has been NGTD  Most recent Urine culture is growing GNR likely klebsiella which she has grown in the past  COVID negative x 7 last one 4/29  RVP 4/1 with parainfluenza  also some diarrhea initially but that has since resolved     persistent fever and leukocytosis, with tachycardia   C-diff negative  parainfluenza URI 4/1  metastatic ca with ?tumor fever  persistent tachycardia but no PE, just b/l pleural effusion and L pleural nodularity s/o malignant effusion s/p b/l chest tube which have been removed and her repeat CT shows resolution of her PLEFs  Prior fevers where deemed to likely be related to tumor fevers as multiple sepsis workup have all been negative   Her prior urine culture of klebsiella were deemed to be asymptomatic bacteruria which have not been treated   She has progression of disease based on CT C/A/P done on 4/30 and plan is to start new chemo regiment this week and hematology/onc would like clearance from a ID standpoint    Recommendations:  Monitor off antibiotics   f/u urine cultures sent 5/2 (likely asymptomatic bacteruria)  no absolute contraindication to starting chemotherapy from an ID standpoint; risks and benefits per primary oncology team   will continue to follow  trend WBC and fever curve     Discussed with NP  will follow    Jaime Drake DO  Pager [phone]   ID fellow, PGY 5  After 5pm/weekends call [phone]

## 2020-05-04 NOTE — PROGRESS NOTE ADULT - ATTENDING COMMENTS
25 y/o F with metastatic poorly differentiated carcinoma of unknown primary- She was given 2 cycles of FOLFOX and found to have progression of disease on recent scan. Today with new nausea and vomiting concerning for obstruction. Will make NPO, check AXR, consider NG tube if needed. Will hold carbo/taxol if actively obstructed.   Pain due to osseous disease, continue pain control with MScontin/morphine IR/lidoderm patches. Appreciate [facility] care recs.     Alexus Lynne MD   Hematology/Oncology

## 2020-05-04 NOTE — PROGRESS NOTE ADULT - SUBJECTIVE AND OBJECTIVE BOX
Follow Up:  fever, leukocytosis, clearance for chemo    Interval History: pt has intermittent low grade temp, last fever yesterday 100.6. Has a rising WBC to 17.     ROS:      All other systems negative    Constitutional: no fever,  chills  Head: no trauma  Eyes: no vision changes, no eye pain  ENT:  no sore throat, no rhinorrhea  Cardiovascular:   chest pain, no palpitation  Respiratory:  improved SOB, no cough  GI:  + abd pain, no vomiting, no diarrhea   urinary: no dysuria, no hematuria, no flank pain  musculoskeletal:  no joint pain, no joint swelling  skin:  no rash  neurology:  no headache, no seizure, no change in mental status  psych: no anxiety, no depression       Allergies  No Known Allergies        ANTIMICROBIALS:        MEDICATIONS  (STANDING):  allopurinol 300 daily  diltiazem    Tablet 60 every 8 hours  enoxaparin Injectable 80 every 12 hours  famotidine    Tablet 20 daily  melatonin 3 at bedtime  morphine ER Tablet 75 two times a day  polyethylene glycol 3350 17 daily  senna 2 at bedtime      PRN  acetaminophen   Tablet .. 650 milliGRAM(s) Oral every 6 hours PRN  aluminum hydroxide/magnesium hydroxide/simethicone Suspension 30 milliLiter(s) Oral every 6 hours PRN  bisacodyl 5 milliGRAM(s) Oral every 12 hours PRN  metoclopramide Injectable 10 milliGRAM(s) IV Push every 6 hours PRN  morphine  - Injectable 4 milliGRAM(s) IV Push every 6 hours PRN  morphine  IR 15 milliGRAM(s) Oral every 4 hours PRN  ondansetron Injectable 4 milliGRAM(s) IV Push every 6 hours PRN  simethicone 80 milliGRAM(s) Chew every 6 hours PRN      Vital Signs Last 24 Hrs  T(F): 97.7 (05-04-20 @ 09:39), Max: 100.2 (05-04-20 @ 05:19)  HR: 119 (05-04-20 @ 09:39)  BP: 113/73 (05-04-20 @ 09:39)  RR: 18 (05-04-20 @ 09:39)  SpO2: 99% (05-04-20 @ 09:39) (95% - 100%)  Wt(kg): --    Physical Exam:  General:    NAD,  non toxic, eating  Head: atraumatic, normocephalic  Eye: normal sclera and conjunctiva  ENT:    no oropharyngeal lesions,   no LAD,   neck supple  Cardio:    tachy regular S1, S2  Respiratory:  now on NC  abd:    palpable mass, soft,   BS +,  slight diffuse tenderness  :   no CVAT,  no suprapubic tenderness,   no  cash  Musculoskeletal:   no joint swelling,   no edema  vascular: RUE picc  Skin:    no rash  Neurologic:     no focal deficit  psych: normal affect    Labs:  WBC Count: 17.80 K/uL (05-04 @ 06:52)  WBC Count: 15.66 K/uL (05-03 @ 07:13)  WBC Count: 14.24 K/uL (05-02 @ 06:51)  WBC Count: 13.67 K/uL (05-01 @ 07:08)  WBC Count: 12.95 K/uL (04-30 @ 06:47)  WBC Count: 9.91 K/uL (04-29 @ 07:11)                            8.6    17.80 )-----------( 423      ( 04 May 2020 06:52 )             28.6       05-04    133<L>  |  94<L>  |  6<L>  ----------------------------<  102<H>  3.6   |  27  |  0.55    Ca    9.1      04 May 2020 06:50  Phos  1.5     05-04  Mg     1.9     05-04    TPro  7.1  /  Alb  2.7<L>  /  TBili  0.4  /  DBili  x   /  AST  33  /  ALT  8<L>  /  AlkPhos  361<H>  05-04      Creatinine Trend: 0.55<--, 0.51<--, 0.59<--, 0.59<--, 0.63<--, 0.62<--    Lactate Dehydrogenase, Serum (05.04.20 @ 06:50)    Lactate Dehydrogenase, Serum: 399 U/L            MICROBIOLOGY:  v  Urinalysis + Microscopic Examination (05.02.20 @ 11:55)    Urine Appearance: Clear    Urobilinogen: Negative    Specific Gravity: 1.018    Protein, Urine: 30 mg/dL    Blood, Urine: Negative    Nitrite: Negative    pH Urine: 6.0    Leukocyte Esterase Concentration: Large    Glucose Qualitative, Urine: Negative    Color: Yellow    Bilirubin: Negative    Ketone - Urine: Negative    Red Blood Cell - Urine: 3 /hpf    White Blood Cell - Urine: 14 /HPF    Epithelial Cells: 1 /hpf    Hyaline Casts: 0 /lpf    Bacteria: Negative    Culture - Urine (05.02.20 @ 16:41)    Specimen Source: .Urine Clean Catch (Midstream)    Culture Results:   50,000 - 99,000 CFU/mL Gram Negative Rods    .Blood Blood-Peripheral  04-23-20   No growth to date.  --  --      .Urine Clean Catch (Midstream)  04-23-20   >100,000 CFU/ml Klebsiella pneumoniae  --  Klebsiella pneumoniae      .Blood Blood-Peripheral  04-20-20   No Growth Final  --  --      .Blood Blood-Catheter  04-20-20   No Growth Final  --  --      .Urine Clean Catch (Midstream)  04-20-20   >100,000 CFU/ml Klebsiella pneumoniae (Carbapenem Resistant)  --  Klepne MDRO      Pleural Fl Pleural Fluid  04-20-20   No growth  --    polymorphonuclear leukocytes seen  No organisms seen  by cytocentrifuge      .Urine  04-19-20   50,000 - 99,000 CFU/mL Klebsiella pneumoniae (Carbapenem Resistant)  --  Klepne MDRO      .Urine Clean Catch (Midstream)  04-18-20   10,000 - 49,000 CFU/mL Klebsiella pneumoniae (Carbapenem Resistant)  --  Klepne MDRO      .Blood Blood-Catheter  04-18-20   No Growth Final  --  --      .Blood Blood-Peripheral  04-18-20   No Growth Final  --  --      Pleural Fl Pleural Fluid  04-17-20   No growth at 5 days  --    polymorphonuclear leukocytes seen  No organisms seen  by cytocentrifuge      .Blood Blood-Peripheral  04-13-20   No Growth Final  --  --      RADIOLOGY:  Images below independently visualized and reviewed personally, findings as below    CT Abdomen and Pelvis w/ Oral Cont and w/ IV Cont (04.30.20 @ 17:27)   IMPRESSION:   Near complete resolution of pleural effusions.  Interval progression of pleural metastases and peritoneal carcinomatosis.  Pulmonary, hepatic and osseous metastases, thoracic and retroperitoneal lymphadenopathy as above.  Complex pelvic masses, difficult to clearly separate as above.    TTE with Doppler (w/Cont) (04.27.20 @ 05:34)   Conclusions:  Normal left ventricular systolic function. No segmental  wall motion abnormalities.  Trace pericardial effusion.  ------------------------------------------------------------------------  Confirmed on  4/27/2020 - 10:33:02 by Luis Mackenzie MD, Follow Up:  fever, leukocytosis, clearance for chemo    Interval History: pt has intermittent low grade temp, last fever yesterday 100.6. Has a rising WBC to 17. Reports continued back and abdominal pain. No dysuria or other urinary complaints    ROS:      All other systems negative    Constitutional: no fever,  chills  Head: no trauma  Eyes: no vision changes, no eye pain  ENT:  no sore throat, no rhinorrhea  Cardiovascular:   chest pain, no palpitation  Respiratory:  improved SOB, no cough  GI:  + abd pain, no vomiting, no diarrhea   urinary: no dysuria, no hematuria, no flank pain  musculoskeletal:  no joint pain, no joint swelling  skin:  no rash  neurology:  no headache, no seizure, no change in mental status  psych: no anxiety, no depression       Allergies  No Known Allergies        ANTIMICROBIALS:        MEDICATIONS  (STANDING):  allopurinol 300 daily  diltiazem    Tablet 60 every 8 hours  enoxaparin Injectable 80 every 12 hours  famotidine    Tablet 20 daily  melatonin 3 at bedtime  morphine ER Tablet 75 two times a day  polyethylene glycol 3350 17 daily  senna 2 at bedtime      PRN  acetaminophen   Tablet .. 650 milliGRAM(s) Oral every 6 hours PRN  aluminum hydroxide/magnesium hydroxide/simethicone Suspension 30 milliLiter(s) Oral every 6 hours PRN  bisacodyl 5 milliGRAM(s) Oral every 12 hours PRN  metoclopramide Injectable 10 milliGRAM(s) IV Push every 6 hours PRN  morphine  - Injectable 4 milliGRAM(s) IV Push every 6 hours PRN  morphine  IR 15 milliGRAM(s) Oral every 4 hours PRN  ondansetron Injectable 4 milliGRAM(s) IV Push every 6 hours PRN  simethicone 80 milliGRAM(s) Chew every 6 hours PRN      Vital Signs Last 24 Hrs  T(F): 97.7 (05-04-20 @ 09:39), Max: 100.2 (05-04-20 @ 05:19)  HR: 119 (05-04-20 @ 09:39)  BP: 113/73 (05-04-20 @ 09:39)  RR: 18 (05-04-20 @ 09:39)  SpO2: 99% (05-04-20 @ 09:39) (95% - 100%)  Wt(kg): --    Physical Exam:  General:    NAD,  non toxic, eating  Head: atraumatic, normocephalic  Eye: normal sclera and conjunctiva  ENT:    no oropharyngeal lesions,   no LAD,   neck supple  Cardio:    tachycardic, regular S1, S2  Respiratory:  now on NC  abd:    palpable mass, soft,   BS +,  slight diffuse tenderness  :   no CVAT,  no suprapubic tenderness,   no  cash  Musculoskeletal:   no joint swelling,   no edema  vascular: RUE picc c/d/i   Skin:    no rash  Neurologic:     no focal deficit  psych: normal affect    Labs:  WBC Count: 17.80 K/uL (05-04 @ 06:52)  WBC Count: 15.66 K/uL (05-03 @ 07:13)  WBC Count: 14.24 K/uL (05-02 @ 06:51)  WBC Count: 13.67 K/uL (05-01 @ 07:08)  WBC Count: 12.95 K/uL (04-30 @ 06:47)  WBC Count: 9.91 K/uL (04-29 @ 07:11)                            8.6    17.80 )-----------( 423      ( 04 May 2020 06:52 )             28.6       05-04    133<L>  |  94<L>  |  6<L>  ----------------------------<  102<H>  3.6   |  27  |  0.55    Ca    9.1      04 May 2020 06:50  Phos  1.5     05-04  Mg     1.9     05-04    TPro  7.1  /  Alb  2.7<L>  /  TBili  0.4  /  DBili  x   /  AST  33  /  ALT  8<L>  /  AlkPhos  361<H>  05-04      Creatinine Trend: 0.55<--, 0.51<--, 0.59<--, 0.59<--, 0.63<--, 0.62<--    Lactate Dehydrogenase, Serum (05.04.20 @ 06:50)    Lactate Dehydrogenase, Serum: 399 U/L            MICROBIOLOGY:  v  Urinalysis + Microscopic Examination (05.02.20 @ 11:55)    Urine Appearance: Clear    Urobilinogen: Negative    Specific Gravity: 1.018    Protein, Urine: 30 mg/dL    Blood, Urine: Negative    Nitrite: Negative    pH Urine: 6.0    Leukocyte Esterase Concentration: Large    Glucose Qualitative, Urine: Negative    Color: Yellow    Bilirubin: Negative    Ketone - Urine: Negative    Red Blood Cell - Urine: 3 /hpf    White Blood Cell - Urine: 14 /HPF    Epithelial Cells: 1 /hpf    Hyaline Casts: 0 /lpf    Bacteria: Negative    Culture - Urine (05.02.20 @ 16:41)    Specimen Source: .Urine Clean Catch (Midstream)    Culture Results:   50,000 - 99,000 CFU/mL Gram Negative Rods    .Blood Blood-Peripheral  04-23-20   No growth to date.  --  --      .Urine Clean Catch (Midstream)  04-23-20   >100,000 CFU/ml Klebsiella pneumoniae  --  Klebsiella pneumoniae      .Blood Blood-Peripheral  04-20-20   No Growth Final  --  --      .Blood Blood-Catheter  04-20-20   No Growth Final  --  --      .Urine Clean Catch (Midstream)  04-20-20   >100,000 CFU/ml Klebsiella pneumoniae (Carbapenem Resistant)  --  Klepne MDRO      Pleural Fl Pleural Fluid  04-20-20   No growth  --    polymorphonuclear leukocytes seen  No organisms seen  by cytocentrifuge      .Urine  04-19-20   50,000 - 99,000 CFU/mL Klebsiella pneumoniae (Carbapenem Resistant)  --  Klepne MDRO      .Urine Clean Catch (Midstream)  04-18-20   10,000 - 49,000 CFU/mL Klebsiella pneumoniae (Carbapenem Resistant)  --  Klepne MDRO      .Blood Blood-Catheter  04-18-20   No Growth Final  --  --      .Blood Blood-Peripheral  04-18-20   No Growth Final  --  --      Pleural Fl Pleural Fluid  04-17-20   No growth at 5 days  --    polymorphonuclear leukocytes seen  No organisms seen  by cytocentrifuge      .Blood Blood-Peripheral  04-13-20   No Growth Final  --  --      RADIOLOGY:  Images below independently visualized and reviewed personally, findings as below    CT Abdomen and Pelvis w/ Oral Cont and w/ IV Cont (04.30.20 @ 17:27)   IMPRESSION:   Near complete resolution of pleural effusions.  Interval progression of pleural metastases and peritoneal carcinomatosis.  Pulmonary, hepatic and osseous metastases, thoracic and retroperitoneal lymphadenopathy as above.  Complex pelvic masses, difficult to clearly separate as above.    TTE with Doppler (w/Cont) (04.27.20 @ 05:34)   Conclusions:  Normal left ventricular systolic function. No segmental  wall motion abnormalities.  Trace pericardial effusion.  ------------------------------------------------------------------------  Confirmed on  4/27/2020 - 10:33:02 by Luis Mackenzie MD,

## 2020-05-04 NOTE — PROGRESS NOTE ADULT - SUBJECTIVE AND OBJECTIVE BOX
INTERVAL History:    Allergies    No Known Allergies    Intolerances        MEDICATIONS  (STANDING):  allopurinol 300 milliGRAM(s) Oral daily  diltiazem    Tablet 60 milliGRAM(s) Oral every 8 hours  enoxaparin Injectable 80 milliGRAM(s) SubCutaneous every 12 hours  famotidine    Tablet 20 milliGRAM(s) Oral daily  lidocaine   Patch 2 Patch Transdermal daily  melatonin 3 milliGRAM(s) Oral at bedtime  morphine ER Tablet 75 milliGRAM(s) Oral two times a day  polyethylene glycol 3350 17 Gram(s) Oral daily  senna 2 Tablet(s) Oral at bedtime  sodium chloride 0.9% lock flush 3 milliLiter(s) IV Push every 8 hours  sodium chloride 0.9% lock flush 3 milliLiter(s) IV Push every 8 hours  sodium phosphate IVPB 15 milliMole(s) IV Intermittent once    MEDICATIONS  (PRN):  acetaminophen   Tablet .. 650 milliGRAM(s) Oral every 6 hours PRN Temp greater or equal to 38C (100.4F)  aluminum hydroxide/magnesium hydroxide/simethicone Suspension 30 milliLiter(s) Oral every 6 hours PRN Dyspepsia  bisacodyl 5 milliGRAM(s) Oral every 12 hours PRN Constipation  metoclopramide Injectable 10 milliGRAM(s) IV Push every 6 hours PRN Nausea/Vomiting  morphine  - Injectable 4 milliGRAM(s) IV Push every 6 hours PRN Severe Pain (7 - 10)  morphine  IR 15 milliGRAM(s) Oral every 4 hours PRN Moderate Pain (4 - 6)  naloxone Injectable 0.1 milliGRAM(s) IV Push every 3 minutes PRN Sedation or RR <10  ondansetron Injectable 4 milliGRAM(s) IV Push every 6 hours PRN Nausea and/or Vomiting  simethicone 80 milliGRAM(s) Chew every 6 hours PRN Gas      Vital Signs Last 24 Hrs  T(C): 36.5 (04 May 2020 09:39), Max: 37.9 (04 May 2020 05:19)  T(F): 97.7 (04 May 2020 09:39), Max: 100.2 (04 May 2020 05:19)  HR: 119 (04 May 2020 09:39) (117 - 138)  BP: 113/73 (04 May 2020 09:39) (113/73 - 131/81)  BP(mean): --  RR: 18 (04 May 2020 09:39) (18 - 18)  SpO2: 99% (04 May 2020 09:39) (95% - 100%)    PHYSICAL EXAM:    General: AOX3, no acute distress  EYES: EOMI, PERRLA, conjunctiva and sclera clear  NECK: Supple, No JVD, Normal thyroid  CHEST/LUNG: Clear to auscultation bilaterally; No rales, rhonchi, or wheezing  HEART: Regular rate and rhythm; no murmurs  ABDOMEN: Soft, Nontender. BS+  LYMPH: No lymphadenopathy noted  Extremities: No peripheral edema      LABS:                        8.6    17.80 )-----------( 423      ( 04 May 2020 06:52 )             28.6     05-04    133<L>  |  94<L>  |  6<L>  ----------------------------<  102<H>  3.6   |  27  |  0.55    Ca    9.1      04 May 2020 06:50  Phos  1.5     05-04  Mg     1.9     05-04    TPro  7.1  /  Alb  2.7<L>  /  TBili  0.4  /  DBili  x   /  AST  33  /  ALT  8<L>  /  AlkPhos  361<H>  05-04    PT/INR - ( 04 May 2020 06:52 )   PT: 13.8 sec;   INR: 1.20 ratio           Urinalysis Basic - ( 02 May 2020 11:55 )    Color: Yellow / Appearance: Clear / S.018 / pH: x  Gluc: x / Ketone: Negative  / Bili: Negative / Urobili: Negative   Blood: x / Protein: 30 mg/dL / Nitrite: Negative   Leuk Esterase: Large / RBC: 3 /hpf / WBC 14 /HPF   Sq Epi: x / Non Sq Epi: 1 /hpf / Bacteria: Negative          RADIOLOGY & ADDITIONAL STUDIES:    PATHOLOGY: INTERVAL History: No acute events overnight. Over the weekend, pt continues to have low grade fevers, T max of 100.6, blood cultures negative.     Allergies    No Known Allergies    Intolerances        MEDICATIONS  (STANDING):  allopurinol 300 milliGRAM(s) Oral daily  diltiazem    Tablet 60 milliGRAM(s) Oral every 8 hours  enoxaparin Injectable 80 milliGRAM(s) SubCutaneous every 12 hours  famotidine    Tablet 20 milliGRAM(s) Oral daily  lidocaine   Patch 2 Patch Transdermal daily  melatonin 3 milliGRAM(s) Oral at bedtime  morphine ER Tablet 75 milliGRAM(s) Oral two times a day  polyethylene glycol 3350 17 Gram(s) Oral daily  senna 2 Tablet(s) Oral at bedtime  sodium chloride 0.9% lock flush 3 milliLiter(s) IV Push every 8 hours  sodium chloride 0.9% lock flush 3 milliLiter(s) IV Push every 8 hours  sodium phosphate IVPB 15 milliMole(s) IV Intermittent once    MEDICATIONS  (PRN):  acetaminophen   Tablet .. 650 milliGRAM(s) Oral every 6 hours PRN Temp greater or equal to 38C (100.4F)  aluminum hydroxide/magnesium hydroxide/simethicone Suspension 30 milliLiter(s) Oral every 6 hours PRN Dyspepsia  bisacodyl 5 milliGRAM(s) Oral every 12 hours PRN Constipation  metoclopramide Injectable 10 milliGRAM(s) IV Push every 6 hours PRN Nausea/Vomiting  morphine  - Injectable 4 milliGRAM(s) IV Push every 6 hours PRN Severe Pain (7 - 10)  morphine  IR 15 milliGRAM(s) Oral every 4 hours PRN Moderate Pain (4 - 6)  naloxone Injectable 0.1 milliGRAM(s) IV Push every 3 minutes PRN Sedation or RR <10  ondansetron Injectable 4 milliGRAM(s) IV Push every 6 hours PRN Nausea and/or Vomiting  simethicone 80 milliGRAM(s) Chew every 6 hours PRN Gas      Vital Signs Last 24 Hrs  T(C): 36.5 (04 May 2020 09:39), Max: 37.9 (04 May 2020 05:19)  T(F): 97.7 (04 May 2020 09:39), Max: 100.2 (04 May 2020 05:19)  HR: 119 (04 May 2020 09:39) (117 - 138)  BP: 113/73 (04 May 2020 09:39) (113/73 - 131/81)  BP(mean): --  RR: 18 (04 May 2020 09:39) (18 - 18)  SpO2: 99% (04 May 2020 09:39) (95% - 100%)    PHYSICAL EXAM:    Not physically examined to decrease COVID exposure     LABS:                        8.6    17.80 )-----------( 423      ( 04 May 2020 06:52 )             28.6     05-04    133<L>  |  94<L>  |  6<L>  ----------------------------<  102<H>  3.6   |  27  |  0.55    Ca    9.1      04 May 2020 06:50  Phos  1.5     05-04  Mg     1.9     05-04    TPro  7.1  /  Alb  2.7<L>  /  TBili  0.4  /  DBili  x   /  AST  33  /  ALT  8<L>  /  AlkPhos  361<H>  05-04    PT/INR - ( 04 May 2020 06:52 )   PT: 13.8 sec;   INR: 1.20 ratio           Urinalysis Basic - ( 02 May 2020 11:55 )    Color: Yellow / Appearance: Clear / S.018 / pH: x  Gluc: x / Ketone: Negative  / Bili: Negative / Urobili: Negative   Blood: x / Protein: 30 mg/dL / Nitrite: Negative   Leuk Esterase: Large / RBC: 3 /hpf / WBC 14 /HPF   Sq Epi: x / Non Sq Epi: 1 /hpf / Bacteria: Negative          RADIOLOGY & ADDITIONAL STUDIES:    PATHOLOGY: INTERVAL History: No acute events overnight. Over the weekend, pt continues to have low grade fevers, T max of 100.6, blood cultures negative. Patient seen and having copious biliary emesis.     Allergies    No Known Allergies    Intolerances        MEDICATIONS  (STANDING):  allopurinol 300 milliGRAM(s) Oral daily  diltiazem    Tablet 60 milliGRAM(s) Oral every 8 hours  enoxaparin Injectable 80 milliGRAM(s) SubCutaneous every 12 hours  famotidine    Tablet 20 milliGRAM(s) Oral daily  lidocaine   Patch 2 Patch Transdermal daily  melatonin 3 milliGRAM(s) Oral at bedtime  morphine ER Tablet 75 milliGRAM(s) Oral two times a day  polyethylene glycol 3350 17 Gram(s) Oral daily  senna 2 Tablet(s) Oral at bedtime  sodium chloride 0.9% lock flush 3 milliLiter(s) IV Push every 8 hours  sodium chloride 0.9% lock flush 3 milliLiter(s) IV Push every 8 hours  sodium phosphate IVPB 15 milliMole(s) IV Intermittent once    MEDICATIONS  (PRN):  acetaminophen   Tablet .. 650 milliGRAM(s) Oral every 6 hours PRN Temp greater or equal to 38C (100.4F)  aluminum hydroxide/magnesium hydroxide/simethicone Suspension 30 milliLiter(s) Oral every 6 hours PRN Dyspepsia  bisacodyl 5 milliGRAM(s) Oral every 12 hours PRN Constipation  metoclopramide Injectable 10 milliGRAM(s) IV Push every 6 hours PRN Nausea/Vomiting  morphine  - Injectable 4 milliGRAM(s) IV Push every 6 hours PRN Severe Pain (7 - 10)  morphine  IR 15 milliGRAM(s) Oral every 4 hours PRN Moderate Pain (4 - 6)  naloxone Injectable 0.1 milliGRAM(s) IV Push every 3 minutes PRN Sedation or RR <10  ondansetron Injectable 4 milliGRAM(s) IV Push every 6 hours PRN Nausea and/or Vomiting  simethicone 80 milliGRAM(s) Chew every 6 hours PRN Gas      Vital Signs Last 24 Hrs  T(C): 36.5 (04 May 2020 09:39), Max: 37.9 (04 May 2020 05:19)  T(F): 97.7 (04 May 2020 09:39), Max: 100.2 (04 May 2020 05:19)  HR: 119 (04 May 2020 09:39) (117 - 138)  BP: 113/73 (04 May 2020 09:39) (113/73 - 131/81)  BP(mean): --  RR: 18 (04 May 2020 09:39) (18 - 18)  SpO2: 99% (04 May 2020 09:39) (95% - 100%)    PHYSICAL EXAM:    Not physically examined to decrease COVID exposure     LABS:                        8.6    17.80 )-----------( 423      ( 04 May 2020 06:52 )             28.6     05-04    133<L>  |  94<L>  |  6<L>  ----------------------------<  102<H>  3.6   |  27  |  0.55    Ca    9.1      04 May 2020 06:50  Phos  1.5     05-04  Mg     1.9     05-04    TPro  7.1  /  Alb  2.7<L>  /  TBili  0.4  /  DBili  x   /  AST  33  /  ALT  8<L>  /  AlkPhos  361<H>  05-04    PT/INR - ( 04 May 2020 06:52 )   PT: 13.8 sec;   INR: 1.20 ratio           Urinalysis Basic - ( 02 May 2020 11:55 )    Color: Yellow / Appearance: Clear / S.018 / pH: x  Gluc: x / Ketone: Negative  / Bili: Negative / Urobili: Negative   Blood: x / Protein: 30 mg/dL / Nitrite: Negative   Leuk Esterase: Large / RBC: 3 /hpf / WBC 14 /HPF   Sq Epi: x / Non Sq Epi: 1 /hpf / Bacteria: Negative          RADIOLOGY & ADDITIONAL STUDIES:    PATHOLOGY:

## 2020-05-04 NOTE — PROGRESS NOTE ADULT - ATTENDING COMMENTS
Patient seen and examined  Above verified  Agree with above unless as noted below.  24 f with b/l adnexal masses and pelvic LAD, metastatic carcinoma of unknown primary presented 3/20 with abd pain, s/p CT guided biopsy 3/25, PICC line 4/3 to start chemo  pt intermittently febrile and leukocytosis, most recent fever 5/3 100.6 and WBC 17K today (5/4)  Cr: 0.55  blood culture has been NGTD  Most recent Urine culture is growing GNR likely klebsiella which she has grown in the past  COVID negative x 7 last one 4/29  RVP 4/1 with parainfluenza  also some diarrhea initially but that has since resolved     persistent fever and leukocytosis, with tachycardia   C-diff negative  parainfluenza URI 4/1  metastatic ca with ?tumor fever  persistent tachycardia but no PE, just b/l pleural effusion and L pleural nodularity s/o malignant effusion s/p b/l chest tube which have been removed and her repeat CT shows resolution of her PLEFs  Prior fevers where deemed to likely be related to tumor fevers as multiple sepsis workup have all been negative   Her prior urine culture of klebsiella were deemed to be asymptomatic bacteruria which have not been treated   She has progression of disease based on CT C/A/P done on 4/30 and plan is to start new chemo regiment this week  A) fever  ? Tumor fever  Follow Cx and clinically  Continue observation off abx    B) malignancy  will defer to hem onc on chemo  risk benefit should eb explored with patient  Given negative cultures and clinical stability no ID contraindications to planned chemo e,however risks inherent to any chemo including infection will remain.Risks/benefits of the same should be discussed with the patient .    Will tailor plan for ID issues  per course,results.Will defer to primary team on management of other issues.  Assessment, plan and recommendations as detailed above were discussed with the medical/primary  team.  Will Follow.  Beeper [phone] [facility] [id number].   Wknd/afterhours/No response-[phone] or Fellow on call

## 2020-05-04 NOTE — PROGRESS NOTE ADULT - ASSESSMENT
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   - Tumor markers elevated: Cancer Antigen, Ca 27.29: 70.8, Cancer Antigen, 125: 619-->1163, Carcinoembryonic Antigen: 44.7 -->70.9--> 74.2, Beta-HC.0 on initial evaluation (3/7); Checked again on 20 and it was 102.7  - Discussed diagnosis with patient on 3/31/20. Given advanced, metastatic stage, will need to be on indefinite treatment to limit progression of disease.  - S/p C1 mFOLFOX (-). Cycle 2 day1 was on  (5-FU infusion was stopped after about 30 hours for possible coronary vasospasm)  - CTA Chest () was negative for central PE, but unable to evaluate segmental and subsegmental branches due to motion artifact. Currently on full dose Lovenox given tachycardia and hypoxia.  - LE dopplers negative for DVT on .   - CT C/A/P () showed POD. Given imaging findings and uptrending tumor markers, plan to switch chemotherapy. Plan to give Carbo/Taxol early this week (around Tuesday). Consent was obtained. Need ID team to document clearance given pt's persistent low grade fevers to start chemotherapy. ID team to followup today.     # Bilateral pleural effusion - Improved  - COVID-19 negative x 3, but parainfluenza positive ().   - CTA Chest () was negative for central PE, but unable to evaluate segmental and subsegmental branches due to motion artifact. Lung imaging showed unchanged metastatic nodules, small bilateral pleural effusions, and atelectasis of the basilar LLL (unchanged as well). No new opopacity which decreases the likelihood of PNA  - CTA chest on  showed no PE, but large bilateral pleural effusions s/p drains in both left and right lung. Bilateral chest tubes removed on .  - Appreciate Pulmonary recs  - Patient is currently saturating well on RA. Continue to closely monitor  - C/w incentive spirometry    # Fever  - Patient with intermittent fevers over the past month with no recent source for infection.   - CT A/P (4/3/20) No obvious source of infection. No source for infection seen on CT C/A/P () as well  - Blood cultures have had NGTD, last one sent . Urine culture from  pending, UA grossly no bacteria. C. diff negative.  - Appreciate ID recs  - S/p PICC line placement for chemotherapy  - Blood cultures. NGTD. Previous Urine culture was positive for Klebsiella. Per ID, no need to treat at this time given no symptoms.   - Suspect possible tumor fever. Will monitor off antibiotics for now and continue to follow cultures. Need ID clearance to start chemo this week.     - PT evaluation - Recommend Home with home PT  - Home O2 evaluation - Pending    # Palpitation, resolved however patient is still tachycardic; could be contributed by fever.  # Chest pain, resolved  # ST-T changes  Patient developed palpitations and chest pain on C2D3 while she was getting 5-FU. Trop was negative. ST-T changes was non-specific and TTE did not show any wall motion abnormalities. ACS is unlikely. Coronary vasospasm is possibly 2/2 5-FU but hard to confirm.   - Continue Diltiazem for HR control and possible vasospasm   - Cycle 2 day1 was on  (5-FU infusion was stopped after about 30 hours for possible coronary vasospasm)  - Cardiology following, appreciate recs     # Pain control   # Low back pain  - Palliative Care following, appreciate recs  - Pain medications now switched to MS contin. Pain is adequately controlled per patient. Will continue to follow-up with Palliative Care regarding pain management/regimen on discharge.  - Check bowel movements and give proper stool regimen.     # Hyperuricemia - Improved  - Uric acid was 8.8 on . s/p IV Rasburicase 3mg x1 on .   - C/w Allopurinol 300mg daily  - Monitor TLS labs daily (CMP, Phos, LDH, Uric acid)    # DVT PPx  - C/w full dose Lovenox given tachycardia and hypoxia, as mentioned above.    # Dispo  - Pending resolution of acute medical issues. Patient evaluated by PT who recommended home with home PT.    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   - Tumor markers elevated: Cancer Antigen, Ca 27.29: 70.8, Cancer Antigen, 125: 619-->1163, Carcinoembryonic Antigen: 44.7 -->70.9--> 74.2, Beta-HC.0 on initial evaluation (3/7); Checked again on 20 and it was 102.7  - Discussed diagnosis with patient on 3/31/20. Given advanced, metastatic stage, will need to be on indefinite treatment to limit progression of disease.  - S/p C1 mFOLFOX (-). Cycle 2 day1 was on  (5-FU infusion was stopped after about 30 hours for possible coronary vasospasm)  - CTA Chest () was negative for central PE, but unable to evaluate segmental and subsegmental branches due to motion artifact. Currently on full dose Lovenox given tachycardia and hypoxia.  - LE dopplers negative for DVT on .   - CT C/A/P () showed POD. Given imaging findings and uptrending tumor markers, plan to switch chemotherapy. Plan to give Carbo/Taxol early this week (around Tuesday). Consent was obtained. Need ID team to document clearance given pt's persistent low grade fevers to start chemotherapy. ID team to followup today.   Patient with bilious vomiting- obtain abd xray to r/o obstruction- if obstructed, would need NG tube and surgery consult, may need to hold of chemo if patient has obstruction   # Bilateral pleural effusion - Improved  - COVID-19 negative x 3, but parainfluenza positive ().   - CTA Chest () was negative for central PE, but unable to evaluate segmental and subsegmental branches due to motion artifact. Lung imaging showed unchanged metastatic nodules, small bilateral pleural effusions, and atelectasis of the basilar LLL (unchanged as well). No new opopacity which decreases the likelihood of PNA  - CTA chest on  showed no PE, but large bilateral pleural effusions s/p drains in both left and right lung. Bilateral chest tubes removed on .  - Appreciate Pulmonary recs  - Patient is currently saturating well on RA. Continue to closely monitor  - C/w incentive spirometry    # Fever  - Patient with intermittent fevers over the past month with no recent source for infection.   - CT A/P (4/3/20) No obvious source of infection. No source for infection seen on CT C/A/P () as well  - Blood cultures have had NGTD, last one sent . Urine culture from  pending, UA grossly no bacteria. C. diff negative.  - Appreciate ID recs  - S/p PICC line placement for chemotherapy  - Blood cultures. NGTD. Previous Urine culture was positive for Klebsiella. Per ID, no need to treat at this time given no symptoms.   - Suspect possible tumor fever. Will monitor off antibiotics for now and continue to follow cultures. Need ID clearance to start chemo this week.     - PT evaluation - Recommend Home with home PT  - Home O2 evaluation - Pending    # Palpitation, resolved however patient is still tachycardic; could be contributed by fever.  # Chest pain, resolved  # ST-T changes  Patient developed palpitations and chest pain on C2D3 while she was getting 5-FU. Trop was negative. ST-T changes was non-specific and TTE did not show any wall motion abnormalities. ACS is unlikely. Coronary vasospasm is possibly 2/2 5-FU but hard to confirm.   - Continue Diltiazem for HR control and possible vasospasm   - Cycle 2 day1 was on  (5-FU infusion was stopped after about 30 hours for possible coronary vasospasm)  - Cardiology following, appreciate recs     # Pain control   # Low back pain  - Palliative Care following, appreciate recs  - Pain medications now switched to MS contin. Pain is adequately controlled per patient. Will continue to follow-up with Palliative Care regarding pain management/regimen on discharge.  - Check bowel movements and give proper stool regimen.     # Hyperuricemia - Improved  - Uric acid was 8.8 on . s/p IV Rasburicase 3mg x1 on .   - C/w Allopurinol 300mg daily  - Monitor TLS labs daily (CMP, Phos, LDH, Uric acid)    # DVT PPx  - C/w full dose Lovenox given tachycardia and hypoxia, as mentioned above.    # Dispo  - Pending resolution of acute medical issues. Patient evaluated by PT who recommended home with home PT.    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   - Tumor markers elevated: Cancer Antigen, Ca 27.29: 70.8, Cancer Antigen, 125: 619-->1163, Carcinoembryonic Antigen: 44.7 -->70.9--> 74.2, Beta-HC.0 on initial evaluation (3/7); Checked again on 20 and it was 102.7  - Discussed diagnosis with patient on 3/31/20. Given advanced, metastatic stage, will need to be on indefinite treatment to limit progression of disease.  - S/p C1 mFOLFOX (-). Cycle 2 day1 was on  (5-FU infusion was stopped after about 30 hours for possible coronary vasospasm)  - CTA Chest () was negative for central PE, but unable to evaluate segmental and subsegmental branches due to motion artifact. Currently on full dose Lovenox given tachycardia and hypoxia.  - LE dopplers negative for DVT on .   - CT C/A/P () showed POD. Given imaging findings and uptrending tumor markers, plan to switch chemotherapy. Plan to give Carbo/Taxol early this week (around Tuesday). Consent was obtained. Need ID team to document clearance given pt's persistent low grade fevers to start chemotherapy. ID team to followup today.   Patient with bilious vomiting- obtain abd xray to r/o obstruction- if obstructed, would need NG tube and surgery consult, may need to hold of chemo if patient has obstruction   - Can also give Ativan PRN for nausea if not improved with Zofran or Reglan     # Bilateral pleural effusion - Improved  - COVID-19 negative x 3, but parainfluenza positive ().   - CTA Chest () was negative for central PE, but unable to evaluate segmental and subsegmental branches due to motion artifact. Lung imaging showed unchanged metastatic nodules, small bilateral pleural effusions, and atelectasis of the basilar LLL (unchanged as well). No new opopacity which decreases the likelihood of PNA  - CTA chest on  showed no PE, but large bilateral pleural effusions s/p drains in both left and right lung. Bilateral chest tubes removed on .  - Appreciate Pulmonary recs  - Patient is currently saturating well on RA. Continue to closely monitor  - C/w incentive spirometry    # Fever  - Patient with intermittent fevers over the past month with no recent source for infection.   - CT A/P (4/3/20) No obvious source of infection. No source for infection seen on CT C/A/P () as well  - Blood cultures have had NGTD, last one sent . Urine culture from  pending, UA grossly no bacteria. C. diff negative.  - Appreciate ID recs  - S/p PICC line placement for chemotherapy  - Blood cultures. NGTD. Previous Urine culture was positive for Klebsiella. Per ID, no need to treat at this time given no symptoms.   - Suspect possible tumor fever. Will monitor off antibiotics for now and continue to follow cultures. Need ID clearance to start chemo this week.     - PT evaluation - Recommend Home with home PT  - Home O2 evaluation - Pending    # Palpitation, resolved however patient is still tachycardic; could be contributed by fever.  # Chest pain, resolved  # ST-T changes  Patient developed palpitations and chest pain on C2D3 while she was getting 5-FU. Trop was negative. ST-T changes was non-specific and TTE did not show any wall motion abnormalities. ACS is unlikely. Coronary vasospasm is possibly 2/2 5-FU but hard to confirm.   - Continue Diltiazem for HR control and possible vasospasm   - Cycle 2 day1 was on  (5-FU infusion was stopped after about 30 hours for possible coronary vasospasm)  - Cardiology following, appreciate recs     # Pain control   # Low back pain  - Palliative Care following, appreciate recs  - Pain medications now switched to MS contin. Pain is adequately controlled per patient. Will continue to follow-up with Palliative Care regarding pain management/regimen on discharge.  - Check bowel movements and give proper stool regimen.     # Hyperuricemia - Improved  - Uric acid was 8.8 on . s/p IV Rasburicase 3mg x1 on .   - C/w Allopurinol 300mg daily  - Monitor TLS labs daily (CMP, Phos, LDH, Uric acid)    # DVT PPx  - C/w full dose Lovenox given tachycardia and hypoxia, as mentioned above.    # Dispo  - Pending resolution of acute medical issues. Patient evaluated by PT who recommended home with home PT.    Cesia Banks MD  Hematology Oncology Fellow, PGY-4  [facility] Pager: [phone]/ [facility] Pager: [phone]

## 2020-05-04 NOTE — PROGRESS NOTE ADULT - SUBJECTIVE AND OBJECTIVE BOX
24F here with worsening abdominal pain in the setting of known adnexal massess, found to have for metastatic carcinoma with final pathology pending. Given evidence of metastatic disease, patient is not a surgical candidate. Has been having worsening adnexal pain, markedly last night after taking senna. States pain is 7-8/10 at its worst, located in adnexal area with radiation down to legs, worse with movement, and tolerable with opioids around 2/10. Palliative care called to help with pain.     INTERVAL EVENTS:  4/30: states having lower back pain, not fully relieved with morphine IR  5/1: used 4 doses of 3mg IV morphine/24 hours  5/2: used 2 BT of 4 mg Morphine/24 hrs.  5/4: used morphine 15mg PO x 1 and 4mg IV x 1/24 hours     ADVANCE DIRECTIVES:    DNR  MOLST  [ ]  Living Will  [ ]   DECISION MAKER(s):  [ ] Health Care Proxy(s)  [ ] Surrogate(s)  [ ] Guardian           Name(s): Phone Number(s):    BASELINE (I)ADL(s) (prior to admission):  [place]: [ ]Total  [ ] Moderate [ ]Dependent    Allergies    No Known Allergies    Intolerances       PRESENT SYMPTOMS: [ ]Unable to obtain due to poor mentation   Source if other than patient:  [ ]Family   [ ]Team     Pain: [X ]yes [ ]no  QOL impact -  decreased function  Location - back  Aggravating factors -   Quality -   Radiation -   Timing-   Severity (0-10 scale): 7/10   Minimal acceptable level (0-10 scale): 2/10    CPOT:    https://www.[email or web address]/getattachment/[email or web address]/Critical-Care-Pain-Observation-Tool-(CPOT)      PAIN AD Score:     [email or web address].[email or web address]/painad.pdf (press ctrl +  left click to view)    Dyspnea:                           [ ]Mild [ ]Moderate [ ]Severe  Anxiety:                             [ ]Mild [ ]Moderate [ ]Severe  Fatigue:                             [ ]Mild [ x]Moderate [ ]Severe  Nausea:                             [ ]Mild [ ]Moderate [ ]Severe  Loss of appetite:              [ ]Mild [ ]Moderate [ ]Severe  Constipation:                    [ ]Mild [ ]Moderate [ ]Severe    Other Symptoms:  [X ]All other review of systems negative     Palliative Performance Status Version 2:   40      %    [email or web address]/[email or web address]_performance_scale_[email or web address].pdf    PHYSICAL EXAM:      Limited exam for patient safety and to limit infection risk during COVID-19 outbreak. Please refr to primary team's examination for today.  GENERAL:  [ X]Alert  [ ]Oriented x   [ ]Lethargic  [ ]Cachexia  [ ]Unarousable  [ X]Verbal  [ ]Non-Verbal  Behavioral:   [ ] Anxiety  [ ] Delirium [ ] Agitation [ ] Other  HEENT:  [ ]Normal   [ ]Dry mouth   [ ]ET Tube/Trach  [ ]Oral lesions  PULMONARY:   [ ]Clear [ ]Tachypnea  [ ]Audible excessive secretions   [ ]Rhonchi        [ ]Right [ ]Left [ ]Bilateral  [ ]Crackles        [ ]Right [ ]Left [ ]Bilateral  [ ]Wheezing     [ ]Right [ ]Left [ ]Bilateral  [ ]Diminished breath sounds [ ]right [ ]left [ ]bilateral  CARDIOVASCULAR:    [ ]Regular [ ]Irregular [ ]Tachy  [ ]Quirino [ ]Murmur [ ]Other  GASTROINTESTINAL:  [ ]Soft  [ ]Distended   [ ]+BS  [ ]Non tender [ ]Tender  [ ]PEG [ ]OGT/ NGT  Last BM:     GENITOURINARY:  [ ]Normal [ ] Incontinent   [ ]Oliguria/Anuria   [ ]Cifuentes  MUSCULOSKELETAL:   [ ]Normal   [ x]Weakness  [ ]Bed/Wheelchair bound [ ]Edema  NEUROLOGIC:  Lucid speech  [ ]No focal deficits  [ ]Cognitive impairment  [ ]Dysphagia [ ]Dysarthria [ ]Paresis [ ]Other   SKIN:   [ ]Normal    [ ]Rash  [ ]Pressure ulcer(s)       Present on admission [ ]y [ ]n    CRITICAL CARE:  [ ] Shock Present  [ ]Septic [ ]Cardiogenic [ ]Neurologic [ ]Hypovolemic  [ ]  Vasopressors [ ]  Inotropes   [ ]Respiratory failure present [ ]Mechanical ventilation [ ]Non-invasive ventilatory support [ ]High flow  [ ]Acute  [ ]Chronic [ ]Hypoxic  [ ]Hypercarbic [ ]Other  [ ]Other organ failure     LABS: reviewed                          8.6    17.80 )-----------( 423      ( 04 May 2020 06:52 )             28.6     05-04    133<L>  |  94<L>  |  6<L>  ----------------------------<  102<H>  3.6   |  27  |  0.55    Ca    9.1      04 May 2020 06:50  Phos  1.5     05-04  Mg     1.9     05-04        RADIOLOGY & ADDITIONAL STUDIES:    PROTEIN CALORIE MALNUTRITION PRESENT: [ ]mild [ ]moderate [ ]severe [ ]underweight [ ]morbid obesity  https://www.andeal.org/vault/2440/web/files/ONC/Table_Clinical%20Characteristics%20to%20Document%20Malnutrition-White%20JV%20et%20al%858968.pdf    Height (cm): 162.6 (03-21-20 @ 03:15), 162.56 (03-07-20 @ 19:18)  Weight (kg): 84.4 (03-21-20 @ 03:15), 89.4 (03-07-20 @ 19:18)  BMI (kg/m2): 31.9 (03-21-20 @ 03:15), 33.8 (03-07-20 @ 19:18)    [ ]PPSV2 < or = to 30% [ ]significant weight loss  [ ]poor nutritional intake  [ ]anasarca     Albumin, Serum: 2.9 g/dL (03-25-20 @ 05:59)   [ ]Artificial Nutrition      REFERRALS:   [ ]Chaplaincy  [ ]Hospice  [ ]Child Life  [ ]Social Work  [ ]Case management [ ]Holistic Therapy

## 2020-05-04 NOTE — PROGRESS NOTE ADULT - PROBLEM SELECTOR PLAN 1
- abdominal pain persistent, but also with chronic back pain  - used 1 doses IV morphine 4mg and 1 dose morphine 15mg IR/24 hours   - c/w morphine ER 75mg in AM, will increase PM dose to 90mg, patient reports more pain at night, as unable to change position while sleeping

## 2020-05-05 NOTE — PROGRESS NOTE ADULT - ATTENDING COMMENTS
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 Work up yesterday for obstruction negative. Mild nausea today but tolerating PO, will plan for carbo/taxol today.   Pain due to osseous disease, continue pain control with MScontin/morphine IR/lidoderm patches. Appreciate pall care recs.   Monitor constipation, may need increased bowel regimen     Alexus Lynne MD   Hematology/Oncology

## 2020-05-05 NOTE — PROGRESS NOTE ADULT - PROBLEM SELECTOR PLAN 1
- abdominal pain improving, but also with chronic back pain  - no PRN/24 hours   - c/w current doses  - c/w morphine ER 75mg in AM, 90mg in PM, reports sleeping better at night

## 2020-05-05 NOTE — PROGRESS NOTE ADULT - SUBJECTIVE AND OBJECTIVE BOX
Patient is a 24y old  Female who presents with a chief complaint of b/l pelvic masses (05 May 2020 08:06)    Being followed by ID for fever    Interval history:some abd pain  for chemo today  no urinary complaints   No acute events      ROS:  No cough,SOB,CP  No N/V/D./abd pain  No other complaints      Antimicrobials:      Other medications reviewed  MEDICATIONS  (STANDING):  allopurinol 300 milliGRAM(s) Oral daily  CARBOplatin IVPB (eMAR) 750 milliGRAM(s) IV Intermittent once  dexAMETHasone  IVPB 20 milliGRAM(s) IV Intermittent once  diltiazem    Tablet 60 milliGRAM(s) Oral every 8 hours  diphenhydrAMINE   Injectable 50 milliGRAM(s) IV Push once  enoxaparin Injectable 80 milliGRAM(s) SubCutaneous every 12 hours  famotidine    Tablet 20 milliGRAM(s) Oral daily  famotidine Injectable 20 milliGRAM(s) IV Push once  lidocaine   Patch 2 Patch Transdermal daily  melatonin 3 milliGRAM(s) Oral at bedtime  morphine ER Tablet 75 milliGRAM(s) Oral <User Schedule>  morphine ER Tablet 90 milliGRAM(s) Oral <User Schedule>  PACLitaxel IVPB (eMAR) 326 milliGRAM(s) IV Intermittent once  palonosetron Injectable 0.25 milliGRAM(s) IV Push once  polyethylene glycol 3350 17 Gram(s) Oral daily  senna 2 Tablet(s) Oral at bedtime  sodium chloride 0.9% lock flush 3 milliLiter(s) IV Push every 8 hours  sodium chloride 0.9% lock flush 3 milliLiter(s) IV Push every 8 hours  sodium phosphate IVPB 15 milliMole(s) IV Intermittent once      Vital Signs Last 24 Hrs  T(C): 36.8 (05-05-20 @ 09:30), Max: 37.6 (05-05-20 @ 06:02)  T(F): 98.2 (05-05-20 @ 09:30), Max: 99.6 (05-05-20 @ 06:02)  HR: 132 (05-05-20 @ 09:30) (119 - 140)  BP: 119/75 (05-05-20 @ 09:30) (110/70 - 128/82)  BP(mean): --  RR: 18 (05-05-20 @ 09:30) (18 - 18)  SpO2: 98% (05-05-20 @ 09:30) (98% - 100%)    Physical Exam:        HEENT PERRLA EOMI    No oral exudate or erythema    Chest Good AE,CTA    CVS RRR S1 S2 WNl No murmur or rub or gallop    Abd soft BS normal Lower half tenderness ? mass    R PICC  site no erythema tenderness or discharge    CNS AAO X 3 no focal    Lab Data:                          8.4    17.51 )-----------( 424      ( 05 May 2020 06:59 )             28.3       05-05    132<L>  |  92<L>  |  5<L>  ----------------------------<  99  3.5   |  29  |  0.55    Ca    9.3      05 May 2020 06:58  Phos  2.2     05-05  Mg     1.9     05-05    TPro  6.6  /  Alb  2.5<L>  /  TBili  0.5  /  DBili  x   /  AST  41<H>  /  ALT  8<L>  /  AlkPhos  412<H>  05-05        Culture - Urine (collected 02 May 2020 16:41)  Source: .Urine Clean Catch (Midstream)  Final Report (04 May 2020 17:26):    50,000 - 99,000 CFU/mL Klebsiella pneumoniae    50,000 - 99,000 CFU/mL Citrobacter freundii  Organism: Gram Negative Rods  Gram Negative Rods (04 May 2020 17:26)  Organism: Gram Negative Rods (04 May 2020 17:26)      -  Amikacin: S <=16      -  Ampicillin: R >16 These ampicillin results predict results for amoxicillin      -  Ampicillin/Sulbactam: R >16/8 Enterobacter, Citrobacter, and Serratia may develop resistance during prolonged therapy (3-4 days)      -  Aztreonam: R >16      -  Cefazolin: R >16      -  Cefepime: S <=4      -  Cefoxitin: R 16      -  Ceftriaxone: R >32 Enterobacter, Citrobacter, and Serratia may develop resistance during prolonged therapy      -  Ciprofloxacin: S <=1      -  Ertapenem: S <=1      -  Gentamicin: S <=4      -  Imipenem: I 2      -  Levofloxacin: S <=2      -  Meropenem: S <=1      -  Nitrofurantoin: S <=32 Should not be used to treat pyelonephritis      -  Piperacillin/Tazobactam: I 64      -  Tigecycline: S <=2      -  Tobramycin: S <=4      -  Trimethoprim/Sulfamethoxazole: R >2/38      Method Type: CANDY  Organism: Gram Negative Rods (04 May 2020 17:26)      -  Amikacin: S <=16      -  Ampicillin: R >16 These ampicillin results predict results for amoxicillin      -  Ampicillin/Sulbactam: R >16/8 Enterobacter, Citrobacter, and Serratia may develop resistance during prolonged therapy (3-4 days)      -  Aztreonam: I 8      -  Cefazolin: R >16 ([id number]_[id number]) For uncomplicated UTI with K. pneumoniae, E. coli, or P. mirablis: CANDY <=16 is sensitive and CANDY >=32 is resistant. This also predicts results for oral agents cefaclor, cefdinir, cefpodoxime, cefprozil, cefuroxime axetil, cephalexin and locarbef for uncomplicated UTI. Note that some isolates may be susceptible to these agents while testing resistant to cefazolin.      -  Cefepime: S <=4      -  Cefoxitin: S <=8      -  Ceftriaxone: R 8 Enterobacter, Citrobacter, and Serratia may develop resistance during prolonged therapy      -  Ciprofloxacin: S <=1      -  Gentamicin: S <=4      -  Imipenem: S <=1      -  Levofloxacin: S <=2      -  Meropenem: S <=1      -  Nitrofurantoin: I 64 Should not be used to treat pyelonephritis      -  Piperacillin/Tazobactam: I 64      -  Tigecycline: S <=2      -  Tobramycin: S <=4      -  Trimethoprim/Sulfamethoxazole: R >2/38      Method Type: CANDY    Culture - Blood (collected 01 May 2020 22:09)  Source: .Blood Blood-Peripheral  Preliminary Report (02 May 2020 23:01):    No growth to date.    Culture - Blood (collected 01 May 2020 22:09)  Source: .Blood Blood-Catheter  Preliminary Report (02 May 2020 23:01):    No growth to date.    < from: Xray Chest 1 View- PORTABLE-Routine (05.04.20 @ 09:15) >    IMPRESSION:    No interval change since prior chest radiograph.  Pleural metastases are better visualized on recent CT chest abdomen pelvis dated 4/30/2020.      < end of copied text >

## 2020-05-05 NOTE — PROGRESS NOTE ADULT - ASSESSMENT
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   - Tumor markers elevated: Cancer Antigen, Ca 27.29: 70.8, Cancer Antigen, 125: 619-->1163, Carcinoembryonic Antigen: 44.7 -->70.9--> 74.2, Beta-HC.0 on initial evaluation (3/7); Checked again on 20 and it was 102.7  - Discussed diagnosis with patient on 3/31/20. Given advanced, metastatic stage, will need to be on indefinite treatment to limit progression of disease.  - S/p C1 mFOLFOX (-). Cycle 2 day1 was on  (5-FU infusion was stopped after about 30 hours for possible coronary vasospasm)  - CTA Chest () was negative for central PE, but unable to evaluate segmental and subsegmental branches due to motion artifact. Currently on full dose Lovenox given tachycardia and hypoxia.  - LE dopplers negative for DVT on .   - CT C/A/P () showed POD. Given imaging findings and uptrending tumor markers, plan to switch chemotherapy.  - Plan to give Carbo/Taxol today . There was no obstruction noted on abdominal xray.   - Appreciate ID clearance, no fevers overnight or this morning    - Can give Ativan PRN for nausea    # Bilateral pleural effusion - Improved  - COVID-19 negative x 3, but parainfluenza positive ().   - CTA Chest () was negative for central PE, but unable to evaluate segmental and subsegmental branches due to motion artifact. Lung imaging showed unchanged metastatic nodules, small bilateral pleural effusions, and atelectasis of the basilar LLL (unchanged as well). No new opopacity which decreases the likelihood of PNA  - CTA chest on  showed no PE, but large bilateral pleural effusions s/p drains in both left and right lung. Bilateral chest tubes removed on .  - Appreciate Pulmonary recs  - Patient is currently saturating well on RA. Continue to closely monitor  - C/w incentive spirometry    # Fever  - Patient with intermittent fevers over the past month with no recent source for infection.   - CT A/P (4/3/20) No obvious source of infection. No source for infection seen on CT C/A/P () as well  - Blood cultures have had NGTD, last one sent . Urine culture from  pending, UA grossly no bacteria. C. diff negative.  - Appreciate ID recs  - S/p PICC line placement for chemotherapy  - Blood cultures. NGTD. Previous Urine culture was positive for Klebsiella. Per ID, no need to treat at this time given no symptoms.   - Suspect possible tumor fever. Will monitor off antibiotics for now and continue to follow cultures. Appreciate ID clearance to start chemo.     - PT evaluation - Recommend Home with home PT  - Home O2 evaluation - Pending    # Palpitation, resolved however patient is still tachycardic; could be contributed by fever.  # Chest pain, resolved  # ST-T changes  Patient developed palpitations and chest pain on C2D3 while she was getting 5-FU. Trop was negative. ST-T changes was non-specific and TTE did not show any wall motion abnormalities. ACS is unlikely. Coronary vasospasm is possibly 2/2 5-FU but hard to confirm.   - Continue Diltiazem for HR control and possible vasospasm   - Cycle 2 day1 was on  (5-FU infusion was stopped after about 30 hours for possible coronary vasospasm)  - Cardiology following, appreciate recs     # Pain control   # Low back pain  - Palliative Care following, appreciate recs  - Pain medications now switched to MS contin. Pain is adequately controlled per patient. Will continue to follow-up with Palliative Care regarding pain management/regimen on discharge.  - Check bowel movements and give proper stool regimen.     # Hyperuricemia - Improved  - Uric acid was 8.8 on . s/p IV Rasburicase 3mg x1 on .   - C/w Allopurinol 300mg daily  - Monitor TLS labs daily (CMP, Phos, LDH, Uric acid)    # DVT PPx  - C/w full dose Lovenox given tachycardia and hypoxia, as mentioned above.    # Dispo  - Pending resolution of acute medical issues. Patient evaluated by PT who recommended home with home PT.    Cesia Banks MD  Hematology Oncology Fellow, PGY-4  [facility] Pager: [phone]/ [facility] Pager: [phone]

## 2020-05-05 NOTE — PROGRESS NOTE ADULT - ASSESSMENT
24 f with b/l adnexal masses and pelvic LAD, metastatic carcinoma of unknown primary presented 3/20 with abd pain, s/p CT guided biopsy 3/25, PICC line 4/3 to start chemo  pt intermittently febrile and leukocytosis,   blood culture has been NGTD  Most recent Urine culture as above-no urinary symptoms -given this + >2 organisms likely colonization/contamination   COVID negative x 7 last one 4/29  C-diff negative  metastatic ca with ?tumor fever  She has progression of disease based on CT C/A/P done on 4/30 and plan is to start new chemo today     A) fever  ? Tumor fever  Follow Cx and clinically  Continue observation off abx  if any fever while on chemo would need to start meropenem as patient likley to have decrease in WBC     B) malignancy  will defer to hem onc on chemo  Observe for s/s any nosocomial infectious process    Will tailor plan for ID issues  per course,results.Will defer to primary team on management of other issues.  Assessment, plan and recommendations as detailed above were discussed with the medical/primary  team.  Will Follow.  Beeper [phone] [facility] [id number].   Wknd/afterhours/No response-[phone] or Fellow on call .

## 2020-05-05 NOTE — PROGRESS NOTE ADULT - SUBJECTIVE AND OBJECTIVE BOX
INTERVAL History:    Allergies    No Known Allergies    Intolerances        MEDICATIONS  (STANDING):  allopurinol 300 milliGRAM(s) Oral daily  diltiazem    Tablet 60 milliGRAM(s) Oral every 8 hours  enoxaparin Injectable 80 milliGRAM(s) SubCutaneous every 12 hours  famotidine    Tablet 20 milliGRAM(s) Oral daily  lidocaine   Patch 2 Patch Transdermal daily  melatonin 3 milliGRAM(s) Oral at bedtime  morphine ER Tablet 75 milliGRAM(s) Oral <User Schedule>  morphine ER Tablet 90 milliGRAM(s) Oral <User Schedule>  polyethylene glycol 3350 17 Gram(s) Oral daily  senna 2 Tablet(s) Oral at bedtime  sodium chloride 0.9% lock flush 3 milliLiter(s) IV Push every 8 hours  sodium chloride 0.9% lock flush 3 milliLiter(s) IV Push every 8 hours    MEDICATIONS  (PRN):  acetaminophen   Tablet .. 650 milliGRAM(s) Oral every 6 hours PRN Temp greater or equal to 38C (100.4F)  aluminum hydroxide/magnesium hydroxide/simethicone Suspension 30 milliLiter(s) Oral every 6 hours PRN Dyspepsia  bisacodyl 5 milliGRAM(s) Oral every 12 hours PRN Constipation  LORazepam   Injectable 0.25 milliGRAM(s) IV Push every 6 hours PRN Nausea and/or Vomiting  metoclopramide Injectable 10 milliGRAM(s) IV Push every 6 hours PRN Nausea/Vomiting  morphine  - Injectable 4 milliGRAM(s) IV Push every 6 hours PRN Severe Pain (7 - 10)  morphine  IR 15 milliGRAM(s) Oral every 4 hours PRN Moderate Pain (4 - 6)  naloxone Injectable 0.1 milliGRAM(s) IV Push every 3 minutes PRN Sedation or RR <10  ondansetron Injectable 4 milliGRAM(s) IV Push every 6 hours PRN Nausea and/or Vomiting  simethicone 80 milliGRAM(s) Chew every 6 hours PRN Gas      Vital Signs Last 24 Hrs  T(C): 37.6 (05 May 2020 06:02), Max: 37.6 (05 May 2020 06:02)  T(F): 99.6 (05 May 2020 06:02), Max: 99.6 (05 May 2020 06:02)  HR: 140 (05 May 2020 06:02) (119 - 140)  BP: 121/77 (05 May 2020 06:02) (113/73 - 128/82)  BP(mean): --  RR: 18 (05 May 2020 06:02) (18 - 18)  SpO2: 99% (05 May 2020 06:02) (99% - 100%)    PHYSICAL EXAM:    General: AOX3, no acute distress  EYES: EOMI, PERRLA, conjunctiva and sclera clear  NECK: Supple, No JVD, Normal thyroid  CHEST/LUNG: Clear to auscultation bilaterally; No rales, rhonchi, or wheezing  HEART: Regular rate and rhythm; no murmurs  ABDOMEN: Soft, Nontender. BS+  LYMPH: No lymphadenopathy noted  Extremities: No peripheral edema      LABS:                        8.4    17.51 )-----------( 424      ( 05 May 2020 06:59 )             28.3     05-04    133<L>  |  94<L>  |  6<L>  ----------------------------<  102<H>  3.6   |  27  |  0.55    Ca    9.1      04 May 2020 06:50  Phos  5.6     05-04  Mg     1.9     05-04    TPro  7.1  /  Alb  2.7<L>  /  TBili  0.4  /  DBili  x   /  AST  33  /  ALT  8<L>  /  AlkPhos  361<H>  05-04    PT/INR - ( 05 May 2020 06:59 )   PT: 14.0 sec;   INR: 1.21 ratio                 RADIOLOGY & ADDITIONAL STUDIES:    PATHOLOGY: INTERVAL History: No acute events overnight. Patient eating breakfast this morning. No nausea or vomiting. Pt asking when she will get chemo. No fevers documented overnight or this morning.     Allergies    No Known Allergies    Intolerances        MEDICATIONS  (STANDING):  allopurinol 300 milliGRAM(s) Oral daily  diltiazem    Tablet 60 milliGRAM(s) Oral every 8 hours  enoxaparin Injectable 80 milliGRAM(s) SubCutaneous every 12 hours  famotidine    Tablet 20 milliGRAM(s) Oral daily  lidocaine   Patch 2 Patch Transdermal daily  melatonin 3 milliGRAM(s) Oral at bedtime  morphine ER Tablet 75 milliGRAM(s) Oral <User Schedule>  morphine ER Tablet 90 milliGRAM(s) Oral <User Schedule>  polyethylene glycol 3350 17 Gram(s) Oral daily  senna 2 Tablet(s) Oral at bedtime  sodium chloride 0.9% lock flush 3 milliLiter(s) IV Push every 8 hours  sodium chloride 0.9% lock flush 3 milliLiter(s) IV Push every 8 hours    MEDICATIONS  (PRN):  acetaminophen   Tablet .. 650 milliGRAM(s) Oral every 6 hours PRN Temp greater or equal to 38C (100.4F)  aluminum hydroxide/magnesium hydroxide/simethicone Suspension 30 milliLiter(s) Oral every 6 hours PRN Dyspepsia  bisacodyl 5 milliGRAM(s) Oral every 12 hours PRN Constipation  LORazepam   Injectable 0.25 milliGRAM(s) IV Push every 6 hours PRN Nausea and/or Vomiting  metoclopramide Injectable 10 milliGRAM(s) IV Push every 6 hours PRN Nausea/Vomiting  morphine  - Injectable 4 milliGRAM(s) IV Push every 6 hours PRN Severe Pain (7 - 10)  morphine  IR 15 milliGRAM(s) Oral every 4 hours PRN Moderate Pain (4 - 6)  naloxone Injectable 0.1 milliGRAM(s) IV Push every 3 minutes PRN Sedation or RR <10  ondansetron Injectable 4 milliGRAM(s) IV Push every 6 hours PRN Nausea and/or Vomiting  simethicone 80 milliGRAM(s) Chew every 6 hours PRN Gas      Vital Signs Last 24 Hrs  T(C): 37.6 (05 May 2020 06:02), Max: 37.6 (05 May 2020 06:02)  T(F): 99.6 (05 May 2020 06:02), Max: 99.6 (05 May 2020 06:02)  HR: 140 (05 May 2020 06:02) (119 - 140)  BP: 121/77 (05 May 2020 06:02) (113/73 - 128/82)  BP(mean): --  RR: 18 (05 May 2020 06:02) (18 - 18)  SpO2: 99% (05 May 2020 06:02) (99% - 100%)    PHYSICAL EXAM:    Not physically examined to decrease COVID exposure to our already immunocompromised patients       LABS:                        8.4    17.51 )-----------( 424      ( 05 May 2020 06:59 )             28.3     05-04    133<L>  |  94<L>  |  6<L>  ----------------------------<  102<H>  3.6   |  27  |  0.55    Ca    9.1      04 May 2020 06:50  Phos  5.6     05-04  Mg     1.9     05-04    TPro  7.1  /  Alb  2.7<L>  /  TBili  0.4  /  DBili  x   /  AST  33  /  ALT  8<L>  /  AlkPhos  361<H>  05-04    PT/INR - ( 05 May 2020 06:59 )   PT: 14.0 sec;   INR: 1.21 ratio                 RADIOLOGY & ADDITIONAL STUDIES:    < from: Xray Abdomen 1 View PORTABLE -Urgent (05.04.20 @ 17:35) >    EXAM:  XR ABDOMEN PORTABLE URGENT 1V                            PROCEDURE DATE:  05/04/2020            INTERPRETATION:  EXAM:XR ABDOMEN URGENT.     CLINICAL INDICATION: Abdominal distention. Ovarian cancer.     TECHNIQUE: 2 AP supine views.     PRIOR EXAM: None.     FINDINGS:      Technically limited study due to portable technique. No abnormal bowel distention. Paucity of bowel gas limiting the evaluation. Grossly unremarkable osseous structures.      IMPRESSION:     No abnormal gaseous bowel distention.                      ELEVR ROCHA M.D., ATTENDING RADIOLOGIST  This document has been electronically signed. May  4 2020  8:56PM        < end of copied text >      PATHOLOGY: INTERVAL History: No acute events overnight. Patient eating breakfast this morning. No nausea or vomiting. Pt asking when she will get chemo. No fevers documented overnight or this morning.     Allergies    No Known Allergies    Intolerances        MEDICATIONS  (STANDING):  allopurinol 300 milliGRAM(s) Oral daily  diltiazem    Tablet 60 milliGRAM(s) Oral every 8 hours  enoxaparin Injectable 80 milliGRAM(s) SubCutaneous every 12 hours  famotidine    Tablet 20 milliGRAM(s) Oral daily  lidocaine   Patch 2 Patch Transdermal daily  melatonin 3 milliGRAM(s) Oral at bedtime  morphine ER Tablet 75 milliGRAM(s) Oral <User Schedule>  morphine ER Tablet 90 milliGRAM(s) Oral <User Schedule>  polyethylene glycol 3350 17 Gram(s) Oral daily  senna 2 Tablet(s) Oral at bedtime  sodium chloride 0.9% lock flush 3 milliLiter(s) IV Push every 8 hours  sodium chloride 0.9% lock flush 3 milliLiter(s) IV Push every 8 hours    MEDICATIONS  (PRN):  acetaminophen   Tablet .. 650 milliGRAM(s) Oral every 6 hours PRN Temp greater or equal to 38C (100.4F)  aluminum hydroxide/magnesium hydroxide/simethicone Suspension 30 milliLiter(s) Oral every 6 hours PRN Dyspepsia  bisacodyl 5 milliGRAM(s) Oral every 12 hours PRN Constipation  LORazepam   Injectable 0.25 milliGRAM(s) IV Push every 6 hours PRN Nausea and/or Vomiting  metoclopramide Injectable 10 milliGRAM(s) IV Push every 6 hours PRN Nausea/Vomiting  morphine  - Injectable 4 milliGRAM(s) IV Push every 6 hours PRN Severe Pain (7 - 10)  morphine  IR 15 milliGRAM(s) Oral every 4 hours PRN Moderate Pain (4 - 6)  naloxone Injectable 0.1 milliGRAM(s) IV Push every 3 minutes PRN Sedation or RR <10  ondansetron Injectable 4 milliGRAM(s) IV Push every 6 hours PRN Nausea and/or Vomiting  simethicone 80 milliGRAM(s) Chew every 6 hours PRN Gas      Vital Signs Last 24 Hrs  T(C): 37.6 (05 May 2020 06:02), Max: 37.6 (05 May 2020 06:02)  T(F): 99.6 (05 May 2020 06:02), Max: 99.6 (05 May 2020 06:02)  HR: 140 (05 May 2020 06:02) (119 - 140)  BP: 121/77 (05 May 2020 06:02) (113/73 - 128/82)  BP(mean): --  RR: 18 (05 May 2020 06:02) (18 - 18)  SpO2: 99% (05 May 2020 06:02) (99% - 100%)    PHYSICAL EXAM:  GENERAL: NAD on nasal cannula   CHEST/LUNG: Clear to percussion bilaterally; No rales, rhonchi, wheezing, or rubs  HEART: Tachycardic  ABDOMEN: Soft, mildly tender to palpation BS +  VASCULAR:  2+ Peripheral Pulses, No clubbing, cyanosis, or edema  Neuro: AOx3    LABS:                        8.4    17.51 )-----------( 424      ( 05 May 2020 06:59 )             28.3     05-04    133<L>  |  94<L>  |  6<L>  ----------------------------<  102<H>  3.6   |  27  |  0.55    Ca    9.1      04 May 2020 06:50  Phos  5.6     05-04  Mg     1.9     05-04    TPro  7.1  /  Alb  2.7<L>  /  TBili  0.4  /  DBili  x   /  AST  33  /  ALT  8<L>  /  AlkPhos  361<H>  05-04    PT/INR - ( 05 May 2020 06:59 )   PT: 14.0 sec;   INR: 1.21 ratio                 RADIOLOGY & ADDITIONAL STUDIES:    < from: Xray Abdomen 1 View PORTABLE -Urgent (05.04.20 @ 17:35) >    EXAM:  XR ABDOMEN PORTABLE URGENT 1V                            PROCEDURE DATE:  05/04/2020            INTERPRETATION:  EXAM:XR ABDOMEN URGENT.     CLINICAL INDICATION: Abdominal distention. Ovarian cancer.     TECHNIQUE: 2 AP supine views.     PRIOR EXAM: None.     FINDINGS:      Technically limited study due to portable technique. No abnormal bowel distention. Paucity of bowel gas limiting the evaluation. Grossly unremarkable osseous structures.      IMPRESSION:     No abnormal gaseous bowel distention.                      ELVER ROCHA M.D., ATTENDING RADIOLOGIST  This document has been electronically signed. May  4 2020  8:56PM        < end of copied text >      PATHOLOGY:

## 2020-05-05 NOTE — ADVANCED PRACTICE NURSE CONSULT - ASSESSMENT
Prior to chemotherapy patient received Dexamethasone 20mg, Benadryl 50mg, Pepcid 20mg, Aloxi 0.25.    750mg Carboplatin was started at 1412 over one hour. No adverse reaction noted during transfusion.     175mg/m2 (326mg) Taxol was started at 1517 to run 3 hours.  Fifteen minute one to one observation was completed by RN at the start of Taxol.  No adverse reaction was noted during this time.  VSS remained stable during transfusions. Patient educated on Taxol / Carbo and all questions answered Consent confirmed and in chart.  Prior to chemotherapy patient received Dexamethasone 20mg, Benadryl 50mg, Pepcid 20mg, Aloxi 0.25. as ordered     Two license professionals perform the following independent verification prior to administrastion: Drug name; Drug dose; Infusion volume of bag or syringe; Rate of administartion; Route of administartion; Expiration date/time; Appearance and integrity of drug(s);Compatability of Solution;  Rate set on infusion pump.  In the presence of the patient, verify the patient's identification using two patient identifiers.    750mg Carboplatin was started at 1412 over one hour. No adverse reaction noted during transfusion.     175mg/m2 (326mg) Taxol was started at 1517 to run 3 hours.  Fifteen minute one to one observation was completed by RN at the start of Taxol.  No adverse reaction was noted during this time.  VSS remained stable during transfusions. NUrsing care ongoing.  SBAR given to evening nurse.

## 2020-05-05 NOTE — PROGRESS NOTE ADULT - SUBJECTIVE AND OBJECTIVE BOX
24F here with worsening abdominal pain in the setting of known adnexal massess, found to have for metastatic carcinoma with final pathology pending. Given evidence of metastatic disease, patient is not a surgical candidate. Has been having worsening adnexal pain, markedly last night after taking senna. States pain is 7-8/10 at its worst, located in adnexal area with radiation down to legs, worse with movement, and tolerable with opioids around 2/10. Palliative care called to help with pain.     INTERVAL EVENTS:  4/30: states having lower back pain, not fully relieved with morphine IR  5/1: used 4 doses of 3mg IV morphine/24 hours  5/2: used 2 BT of 4 mg Morphine/24 hrs.  5/4: used morphine 15mg PO x 1 and 4mg IV x 1/24 hours   5/5: no use of PRN/24 hours    ADVANCE DIRECTIVES:    DNR  MOLST  [ ]  Living Will  [ ]   DECISION MAKER(s):  [ ] Health Care Proxy(s)  [ ] Surrogate(s)  [ ] Guardian           Name(s): Phone Number(s):    BASELINE (I)ADL(s) (prior to admission):  [place]: [ ]Total  [ ] Moderate [ ]Dependent    Allergies    No Known Allergies    Intolerances       PRESENT SYMPTOMS: [ ]Unable to obtain due to poor mentation   Source if other than patient:  [ ]Family   [ ]Team     Pain: [X ]yes [ ]no  QOL impact -  decreased function  Location - back  Aggravating factors -   Quality -   Radiation -   Timing-   Severity (0-10 scale): 7/10   Minimal acceptable level (0-10 scale): 2/10    CPOT:    https://www.[facility].org/getattachment/[email or web address]/Critical-Care-Pain-Observation-Tool-(CPOT)      PAIN AD Score:     http://geriatrictoolkit.[employer].edu/cog/painad.pdf (press ctrl +  left click to view)    Dyspnea:                           [ ]Mild [ ]Moderate [ ]Severe  Anxiety:                             [ ]Mild [ ]Moderate [ ]Severe  Fatigue:                             [ ]Mild [ x]Moderate [ ]Severe  Nausea:                             [ ]Mild [ ]Moderate [ ]Severe  Loss of appetite:              [ ]Mild [ ]Moderate [ ]Severe  Constipation:                    [ ]Mild [ ]Moderate [ ]Severe    Other Symptoms:  [X ]All other review of systems negative     Palliative Performance Status Version 2:   40      %    http://[facility].org/files/news/palliative_performance_scale_ppsv2.pdf    PHYSICAL EXAM:  Vital Signs Last 24 Hrs  T(C): 36.4 (05 May 2020 15:35), Max: 37.6 (05 May 2020 06:02)  T(F): 97.6 (05 May 2020 15:35), Max: 99.6 (05 May 2020 06:02)  HR: 130 (05 May 2020 15:35) (128 - 140)  BP: 121/80 (05 May 2020 15:35) (110/70 - 128/82)  BP(mean): --  RR: 24 (05 May 2020 15:35) (18 - 24)  SpO2: 100% (05 May 2020 15:35) (98% - 100%)    Limited exam for patient safety and to limit infection risk during COVID-19 outbreak. Please refr to primary team's examination for today.  GENERAL:  [ X]Alert  [ ]Oriented x   [ ]Lethargic  [ ]Cachexia  [ ]Unarousable  [ X]Verbal  [ ]Non-Verbal  Behavioral:   [ ] Anxiety  [ ] Delirium [ ] Agitation [ ] Other  HEENT:  [ ]Normal   [ ]Dry mouth   [ ]ET Tube/Trach  [ ]Oral lesions  PULMONARY:   [ ]Clear [ ]Tachypnea  [ ]Audible excessive secretions   [ ]Rhonchi        [ ]Right [ ]Left [ ]Bilateral  [ ]Crackles        [ ]Right [ ]Left [ ]Bilateral  [ ]Wheezing     [ ]Right [ ]Left [ ]Bilateral  [ ]Diminished breath sounds [ ]right [ ]left [ ]bilateral  CARDIOVASCULAR:    [ ]Regular [ ]Irregular [ ]Tachy  [ ]Quirino [ ]Murmur [ ]Other  GASTROINTESTINAL:  [ ]Soft  [ ]Distended   [ ]+BS  [ ]Non tender [ ]Tender  [ ]PEG [ ]OGT/ NGT  Last BM:     GENITOURINARY:  [ ]Normal [ ] Incontinent   [ ]Oliguria/Anuria   [ ]Cifuentes  MUSCULOSKELETAL:   [ ]Normal   [ x]Weakness  [ ]Bed/Wheelchair bound [ ]Edema  NEUROLOGIC:  Lucid speech  [ ]No focal deficits  [ ]Cognitive impairment  [ ]Dysphagia [ ]Dysarthria [ ]Paresis [ ]Other   SKIN:   [ ]Normal    [ ]Rash  [ ]Pressure ulcer(s)       Present on admission [ ]y [ ]n    CRITICAL CARE:  [ ] Shock Present  [ ]Septic [ ]Cardiogenic [ ]Neurologic [ ]Hypovolemic  [ ]  Vasopressors [ ]  Inotropes   [ ]Respiratory failure present [ ]Mechanical ventilation [ ]Non-invasive ventilatory support [ ]High flow  [ ]Acute  [ ]Chronic [ ]Hypoxic  [ ]Hypercarbic [ ]Other  [ ]Other organ failure     LABS: reviewed                          8.4    17.51 )-----------( 424      ( 05 May 2020 06:59 )             28.3     05-05    132<L>  |  92<L>  |  5<L>  ----------------------------<  99  3.5   |  29  |  0.55    Ca    9.3      05 May 2020 06:58  Phos  2.2     05-05  Mg     1.9     05-05      RADIOLOGY & ADDITIONAL STUDIES:    PROTEIN CALORIE MALNUTRITION PRESENT: [ ]mild [ ]moderate [ ]severe [ ]underweight [ ]morbid obesity  https://www.andeal.org/vault/2440/web/files/ONC/Table_Clinical%20Characteristics%20to%20Document%20Malnutrition-White%20JV%20et%20al%589350.pdf    Height (cm): 162.6 (03-21-20 @ 03:15), 162.56 (03-07-20 @ 19:18)  Weight (kg): 84.4 (03-21-20 @ 03:15), 89.4 (03-07-20 @ 19:18)  BMI (kg/m2): 31.9 (03-21-20 @ 03:15), 33.8 (03-07-20 @ 19:18)    [ ]PPSV2 < or = to 30% [ ]significant weight loss  [ ]poor nutritional intake  [ ]anasarca     Albumin, Serum: 2.9 g/dL (03-25-20 @ 05:59)   [ ]Artificial Nutrition      REFERRALS:   [ ]Chaplaincy  [ ]Hospice  [ ]Child Life  [ ]Social Work  [ ]Case management [ ]Holistic Therapy

## 2020-05-06 NOTE — PROGRESS NOTE ADULT - SUBJECTIVE AND OBJECTIVE BOX
Patient is a 24y old  Female who presents with a chief complaint of b/l pelvic masses (06 May 2020 08:48)    Being followed by ID for fever    Interval history:feels well  occ vomiting   No acute events      ROS:  No cough,SOB,CP  no urinary complaints  no dysuria  No N/D./abd pain  No other complaints      Antimicrobials:      Other medications reviewed  MEDICATIONS  (STANDING):  allopurinol 300 milliGRAM(s) Oral daily  diltiazem    Tablet 60 milliGRAM(s) Oral every 8 hours  enoxaparin Injectable 80 milliGRAM(s) SubCutaneous every 12 hours  famotidine    Tablet 20 milliGRAM(s) Oral daily  lidocaine   Patch 2 Patch Transdermal daily  melatonin 3 milliGRAM(s) Oral at bedtime  morphine ER Tablet 75 milliGRAM(s) Oral <User Schedule>  morphine ER Tablet 90 milliGRAM(s) Oral <User Schedule>  polyethylene glycol 3350 17 Gram(s) Oral daily  senna 2 Tablet(s) Oral at bedtime  sodium chloride 0.9% lock flush 3 milliLiter(s) IV Push every 8 hours  sodium chloride 0.9% lock flush 3 milliLiter(s) IV Push every 8 hours  sodium phosphate IVPB 15 milliMole(s) IV Intermittent once  sodium phosphate IVPB 15 milliMole(s) IV Intermittent once      Vital Signs Last 24 Hrs  T(C): 36.1 (05-06-20 @ 08:46), Max: 37.5 (05-05-20 @ 14:15)  T(F): 97 (05-06-20 @ 08:46), Max: 99.5 (05-05-20 @ 14:15)  HR: 125 (05-06-20 @ 08:46) (124 - 132)  BP: 120/84 (05-06-20 @ 08:46) (119/74 - 135/81)  BP(mean): --  RR: 18 (05-06-20 @ 08:46) (18 - 24)  SpO2: 98% (05-06-20 @ 08:46) (97% - 100%)    Physical Exam:      HEENT PERRLA EOMI    No oral exudate or erythema    Chest Good AE,CTA    CVS RRR S1 S2 WNl No murmur or rub or gallop    Abd soft BS normal Lower half tenderness ? mass    R PICC  site no erythema tenderness or discharge    CNS AAO X 3 no focal    Lab Data:                          7.9    16.71 )-----------( 401      ( 06 May 2020 06:50 )             26.8       05-06    139  |  98  |  7   ----------------------------<  138<H>  3.7   |  31  |  0.50    Ca    9.4      06 May 2020 06:50  Phos  1.2     05-06  Mg     2.1     05-06    TPro  6.9  /  Alb  2.5<L>  /  TBili  0.5  /  DBili  x   /  AST  60<H>  /  ALT  11  /  AlkPhos  486<H>  05-06        Culture - Urine (collected 02 May 2020 16:41)  Source: .Urine Clean Catch (Midstream)  Final Report (04 May 2020 17:26):    50,000 - 99,000 CFU/mL Klebsiella pneumoniae    50,000 - 99,000 CFU/mL Citrobacter freundii  Organism: Gram Negative Rods  Gram Negative Rods (04 May 2020 17:26)  Organism: Gram Negative Rods (04 May 2020 17:26)      -  Amikacin: S <=16      -  Ampicillin: R >16 These ampicillin results predict results for amoxicillin      -  Ampicillin/Sulbactam: R >16/8 Enterobacter, Citrobacter, and Serratia may develop resistance during prolonged therapy (3-4 days)      -  Aztreonam: R >16      -  Cefazolin: R >16      -  Cefepime: S <=4      -  Cefoxitin: R 16      -  Ceftriaxone: R >32 Enterobacter, Citrobacter, and Serratia may develop resistance during prolonged therapy      -  Ciprofloxacin: S <=1      -  Ertapenem: S <=1      -  Gentamicin: S <=4      -  Imipenem: I 2      -  Levofloxacin: S <=2      -  Meropenem: S <=1      -  Nitrofurantoin: S <=32 Should not be used to treat pyelonephritis      -  Piperacillin/Tazobactam: I 64      -  Tigecycline: S <=2      -  Tobramycin: S <=4      -  Trimethoprim/Sulfamethoxazole: R >2/38      Method Type: CANDY  Organism: Gram Negative Rods (04 May 2020 17:26)      -  Amikacin: S <=16      -  Ampicillin: R >16 These ampicillin results predict results for amoxicillin      -  Ampicillin/Sulbactam: R >16/8 Enterobacter, Citrobacter, and Serratia may develop resistance during prolonged therapy (3-4 days)      -  Aztreonam: I 8      -  Cefazolin: R >16 (MIC_CL_COM_ENTERIC_CEFAZU) For uncomplicated UTI with K. pneumoniae, E. coli, or P. mirablis: CANDY <=16 is sensitive and CANDY >=32 is resistant. This also predicts results for oral agents cefaclor, cefdinir, cefpodoxime, cefprozil, cefuroxime axetil, cephalexin and locarbef for uncomplicated UTI. Note that some isolates may be susceptible to these agents while testing resistant to cefazolin.      -  Cefepime: S <=4      -  Cefoxitin: S <=8      -  Ceftriaxone: R 8 Enterobacter, Citrobacter, and Serratia may develop resistance during prolonged therapy      -  Ciprofloxacin: S <=1      -  Gentamicin: S <=4      -  Imipenem: S <=1      -  Levofloxacin: S <=2      -  Meropenem: S <=1      -  Nitrofurantoin: I 64 Should not be used to treat pyelonephritis      -  Piperacillin/Tazobactam: I 64      -  Tigecycline: S <=2      -  Tobramycin: S <=4      -  Trimethoprim/Sulfamethoxazole: R >2/38      Method Type: CANDY    Culture - Blood (collected 01 May 2020 22:09)  Source: .Blood Blood-Peripheral  Preliminary Report (02 May 2020 23:01):    No growth to date.    Culture - Blood (collected 01 May 2020 22:09)  Source: .Blood Blood-Catheter  Preliminary Report (02 May 2020 23:01):    No growth to date.        < from: Xray Abdomen 1 View PORTABLE -Urgent (05.04.20 @ 17:35) >    IMPRESSION:     No abnormal gaseous bowel distention.            < end of copied text >

## 2020-05-06 NOTE — CHART NOTE - NSCHARTNOTEFT_GEN_A_CORE
Nutrition Follow Up Note  Patient seen for: Nutrition follow up. Chart reviewed, events noted. CT noted with progression of disease chemotherapy changed S/P 1st dose yesterday.     Source: Pt    Diet : Regular diet with ensure enlive 2 daily    Patient reports: intermittent nausea with episodes of vomiting 2 days ago. Pt reports ongoing intermittent nausea but no further vomiting today. Pt with last BM 5/3-ordered for dulcolax, miralax, and senna      PO intake : Pt reports appetite has continued to be variable , pt will do best at breakfast and is able to consume >50% of meals, lunch pt typically consumes </=50% and dinner usually <25%. Pt will supplement with 1 ensure enlive per day and at times 2, pt enjoys Italian ices and fruits. Pt's intake has been variable for the duration of her stay but suboptimal for the majority.       Daily 201 lbs (3/25), 198.8 lbs (4/20), 187.3 lbs (4/22), 176.8 lbs (5/4), weight continues to decline, pt overall with 24 lb (12%) weight loss within the past 1.5 months.     Pertinent Medications: MEDICATIONS  (STANDING):  allopurinol 300 milliGRAM(s) Oral daily  diltiazem    Tablet 60 milliGRAM(s) Oral every 8 hours  enoxaparin Injectable 80 milliGRAM(s) SubCutaneous every 12 hours  famotidine    Tablet 20 milliGRAM(s) Oral daily  lidocaine   Patch 2 Patch Transdermal daily  melatonin 3 milliGRAM(s) Oral at bedtime  morphine ER Tablet 75 milliGRAM(s) Oral <User Schedule>  morphine ER Tablet 90 milliGRAM(s) Oral <User Schedule>  polyethylene glycol 3350 17 Gram(s) Oral daily  senna 2 Tablet(s) Oral at bedtime  sodium chloride 0.9% lock flush 3 milliLiter(s) IV Push every 8 hours  sodium chloride 0.9% lock flush 3 milliLiter(s) IV Push every 8 hours    MEDICATIONS  (PRN):  acetaminophen   Tablet .. 650 milliGRAM(s) Oral every 6 hours PRN Temp greater or equal to 38C (100.4F)  aluminum hydroxide/magnesium hydroxide/simethicone Suspension 30 milliLiter(s) Oral every 6 hours PRN Dyspepsia  bisacodyl 5 milliGRAM(s) Oral every 12 hours PRN Constipation  diphenhydrAMINE   Injectable 50 milliGRAM(s) IV Push once PRN Reaction  hydrocortisone sodium succinate Injectable 100 milliGRAM(s) IV Push once PRN Reaction  LORazepam   Injectable 0.25 milliGRAM(s) IV Push every 6 hours PRN Nausea and/or Vomiting  metoclopramide Injectable 10 milliGRAM(s) IV Push every 6 hours PRN Nausea/Vomiting  morphine  - Injectable 4 milliGRAM(s) IV Push every 6 hours PRN Severe Pain (7 - 10)  morphine  IR 15 milliGRAM(s) Oral every 4 hours PRN Moderate Pain (4 - 6)  naloxone Injectable 0.1 milliGRAM(s) IV Push every 3 minutes PRN Sedation or RR <10  ondansetron Injectable 4 milliGRAM(s) IV Push every 6 hours PRN Nausea and/or Vomiting  simethicone 80 milliGRAM(s) Chew every 6 hours PRN Gas    Pertinent Labs: 05-06 @ 06:50: Na 139, BUN 7, Cr 0.50, <H>, K+ 3.7, Phos 1.2<L>, Mg 2.1, Alk Phos 486<H>, ALT/SGPT 11, AST/SGOT 60<H>, HbA1c --  05-05 @ 11:50: Na --, BUN --, Cr --, BG --, K+ --, Phos 2.2<L>, Mg --, Alk Phos --, ALT/SGPT --, AST/SGOT --, HbA1c --    Finger Sticks: none      Skin per nursing documentation: free of pressure injury   Edema: none     Estimated Needs:   [x ] no change since previous assessment  [ ] recalculated:     Previous Nutrition Diagnosis: Inadequate oral intake   Nutrition Diagnosis is: Escalated to diagnosis below     New Nutrition Diagnosis: Severe malnutrition   Related to: Decreased appetite, catabolic illness    As evidenced by: 12% wt loss within 1.5 months, meeting <75% estimated needs for >1 month      Interventions:     Recommend  1) Continue regular diet with ensure enlive 2 daily-RD encouraged pt to sip on supplement throughout the day  2) Continue to encourage po intake and obtain/honor food preferences as able, RD encouraged pt to order meals to ensure she receives foods she enjoys, RD obtained selections for today and tomorrow. Encouraged pt to order nutrient dense snacks with meals to consume in between to mimic smaller, more frequent meals in house with emphasis on protein containing foods.     Monitoring and Evaluation:     Continue to monitor Nutritional intake, Tolerance to diet prescription, weights, labs, skin integrity    RD remains available upon request and will follow up per protocol    Kala Jernigan R.D., [facility], Pager #[phone]

## 2020-05-06 NOTE — PROGRESS NOTE ADULT - ASSESSMENT
24 f with b/l adnexal masses and pelvic LAD, metastatic carcinoma of unknown primary presented 3/20 with abd pain, s/p CT guided biopsy 3/25, PICC line 4/3 to start chemo  pt intermittently febrile and leukocytosis,   blood culture has been NGTD  Most recent Urine culture as above-no urinary symptoms -given this + >2 organisms likely colonization/contamination   COVID negative x 7 last one 4/29  C-diff negative  metastatic ca with ?tumor fever  She has progression of disease based on CT C/A/P done on 4/30 and plan is to start new chemo today     A) fever  ? Tumor fever  Follow Cx and clinically  Continue observation off abx  if any fever while on chemo would need to start meropenem as patient likley to have decrease in WBC     B) malignancy  will defer to hem onc on chemo  Observe for s/s any nosocomial infectious process    Will tailor plan for ID issues  per course,results.Will defer to primary team on management of other issues.  Assessment, plan and recommendations as detailed above were discussed with the medical/primary  team.  Will Follow.  Beeper [id number] [facility] [id number].   Wknd/afterhours/No response-[phone] or Fellow on call .

## 2020-05-06 NOTE — PROGRESS NOTE ADULT - ATTENDING COMMENTS
25 y/o F with metastatic poorly differentiated carcinoma of unknown primary- She was given 2 cycles of FOLFOX and found to have progression of disease on recent scan. Cycle 1 Day 2 of Carbo/taxol with improvement in fevers and nausea.    Pain due to osseous disease, continue pain control with MScontin/morphine IR/lidoderm patches. Appreciate pall care recs.   Monitor constipation, will increase bowel regimen     Alexus Lynne MD   Hematology/Oncology

## 2020-05-06 NOTE — PROGRESS NOTE ADULT - SUBJECTIVE AND OBJECTIVE BOX
INTERVAL History: No acute events overnight. Pt afebrile overnight and this morning. S/p cycle 1 of Carbo/Taxol on 5/5.     Allergies    No Known Allergies    Intolerances        MEDICATIONS  (STANDING):  allopurinol 300 milliGRAM(s) Oral daily  diltiazem    Tablet 60 milliGRAM(s) Oral every 8 hours  enoxaparin Injectable 80 milliGRAM(s) SubCutaneous every 12 hours  famotidine    Tablet 20 milliGRAM(s) Oral daily  lidocaine   Patch 2 Patch Transdermal daily  melatonin 3 milliGRAM(s) Oral at bedtime  morphine ER Tablet 75 milliGRAM(s) Oral <User Schedule>  morphine ER Tablet 90 milliGRAM(s) Oral <User Schedule>  polyethylene glycol 3350 17 Gram(s) Oral daily  senna 2 Tablet(s) Oral at bedtime  sodium chloride 0.9% lock flush 3 milliLiter(s) IV Push every 8 hours  sodium chloride 0.9% lock flush 3 milliLiter(s) IV Push every 8 hours    MEDICATIONS  (PRN):  acetaminophen   Tablet .. 650 milliGRAM(s) Oral every 6 hours PRN Temp greater or equal to 38C (100.4F)  aluminum hydroxide/magnesium hydroxide/simethicone Suspension 30 milliLiter(s) Oral every 6 hours PRN Dyspepsia  bisacodyl 5 milliGRAM(s) Oral every 12 hours PRN Constipation  diphenhydrAMINE   Injectable 50 milliGRAM(s) IV Push once PRN Reaction  hydrocortisone sodium succinate Injectable 100 milliGRAM(s) IV Push once PRN Reaction  LORazepam   Injectable 0.25 milliGRAM(s) IV Push every 6 hours PRN Nausea and/or Vomiting  metoclopramide Injectable 10 milliGRAM(s) IV Push every 6 hours PRN Nausea/Vomiting  morphine  - Injectable 4 milliGRAM(s) IV Push every 6 hours PRN Severe Pain (7 - 10)  morphine  IR 15 milliGRAM(s) Oral every 4 hours PRN Moderate Pain (4 - 6)  naloxone Injectable 0.1 milliGRAM(s) IV Push every 3 minutes PRN Sedation or RR <10  ondansetron Injectable 4 milliGRAM(s) IV Push every 6 hours PRN Nausea and/or Vomiting  simethicone 80 milliGRAM(s) Chew every 6 hours PRN Gas      Vital Signs Last 24 Hrs  T(C): 36.9 (06 May 2020 05:52), Max: 37.5 (05 May 2020 14:15)  T(F): 98.4 (06 May 2020 05:52), Max: 99.5 (05 May 2020 14:15)  HR: 126 (06 May 2020 05:52) (124 - 135)  BP: 129/81 (06 May 2020 05:52) (110/70 - 135/81)  BP(mean): --  RR: 18 (06 May 2020 05:52) (18 - 24)  SpO2: 97% (06 May 2020 05:52) (97% - 100%)    PHYSICAL EXAM:          LABS:                        7.9    16.71 )-----------( 401      ( 06 May 2020 06:50 )             26.8     05-06    139  |  98  |  7   ----------------------------<  138<H>  3.7   |  31  |  0.50    Ca    9.4      06 May 2020 06:50  Phos  1.2     05-06  Mg     2.1     05-06    TPro  6.9  /  Alb  2.5<L>  /  TBili  0.5  /  DBili  x   /  AST  60<H>  /  ALT  11  /  AlkPhos  486<H>  05-06    PT/INR - ( 06 May 2020 06:50 )   PT: 14.5 sec;   INR: 1.25 ratio                 RADIOLOGY & ADDITIONAL STUDIES:    PATHOLOGY: INTERVAL History: No acute events overnight. Pt afebrile overnight and this morning. S/p cycle 1 of Carbo/Taxol on 5/5. Patient seen this afternoon, tolerated chemo well yesterday. No further episodes of emesis. Pt is eating. Back pain is improved.     Allergies    No Known Allergies    Intolerances        MEDICATIONS  (STANDING):  allopurinol 300 milliGRAM(s) Oral daily  diltiazem    Tablet 60 milliGRAM(s) Oral every 8 hours  enoxaparin Injectable 80 milliGRAM(s) SubCutaneous every 12 hours  famotidine    Tablet 20 milliGRAM(s) Oral daily  lidocaine   Patch 2 Patch Transdermal daily  melatonin 3 milliGRAM(s) Oral at bedtime  morphine ER Tablet 75 milliGRAM(s) Oral <User Schedule>  morphine ER Tablet 90 milliGRAM(s) Oral <User Schedule>  polyethylene glycol 3350 17 Gram(s) Oral daily  senna 2 Tablet(s) Oral at bedtime  sodium chloride 0.9% lock flush 3 milliLiter(s) IV Push every 8 hours  sodium chloride 0.9% lock flush 3 milliLiter(s) IV Push every 8 hours    MEDICATIONS  (PRN):  acetaminophen   Tablet .. 650 milliGRAM(s) Oral every 6 hours PRN Temp greater or equal to 38C (100.4F)  aluminum hydroxide/magnesium hydroxide/simethicone Suspension 30 milliLiter(s) Oral every 6 hours PRN Dyspepsia  bisacodyl 5 milliGRAM(s) Oral every 12 hours PRN Constipation  diphenhydrAMINE   Injectable 50 milliGRAM(s) IV Push once PRN Reaction  hydrocortisone sodium succinate Injectable 100 milliGRAM(s) IV Push once PRN Reaction  LORazepam   Injectable 0.25 milliGRAM(s) IV Push every 6 hours PRN Nausea and/or Vomiting  metoclopramide Injectable 10 milliGRAM(s) IV Push every 6 hours PRN Nausea/Vomiting  morphine  - Injectable 4 milliGRAM(s) IV Push every 6 hours PRN Severe Pain (7 - 10)  morphine  IR 15 milliGRAM(s) Oral every 4 hours PRN Moderate Pain (4 - 6)  naloxone Injectable 0.1 milliGRAM(s) IV Push every 3 minutes PRN Sedation or RR <10  ondansetron Injectable 4 milliGRAM(s) IV Push every 6 hours PRN Nausea and/or Vomiting  simethicone 80 milliGRAM(s) Chew every 6 hours PRN Gas      Vital Signs Last 24 Hrs  T(C): 36.9 (06 May 2020 05:52), Max: 37.5 (05 May 2020 14:15)  T(F): 98.4 (06 May 2020 05:52), Max: 99.5 (05 May 2020 14:15)  HR: 126 (06 May 2020 05:52) (124 - 135)  BP: 129/81 (06 May 2020 05:52) (110/70 - 135/81)  BP(mean): --  RR: 18 (06 May 2020 05:52) (18 - 24)  SpO2: 97% (06 May 2020 05:52) (97% - 100%)    PHYSICAL EXAM:    General: Pt appears well and comfortable, no acute distress  Abdomen soft, mildly tender to palpation, no guarding.       LABS:                        7.9    16.71 )-----------( 401      ( 06 May 2020 06:50 )             26.8     05-06    139  |  98  |  7   ----------------------------<  138<H>  3.7   |  31  |  0.50    Ca    9.4      06 May 2020 06:50  Phos  1.2     05-06  Mg     2.1     05-06    TPro  6.9  /  Alb  2.5<L>  /  TBili  0.5  /  DBili  x   /  AST  60<H>  /  ALT  11  /  AlkPhos  486<H>  05-06    PT/INR - ( 06 May 2020 06:50 )   PT: 14.5 sec;   INR: 1.25 ratio                 RADIOLOGY & ADDITIONAL STUDIES:    PATHOLOGY:

## 2020-05-06 NOTE — PROGRESS NOTE ADULT - ASSESSMENT
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   - Tumor markers elevated: Cancer Antigen, Ca 27.29: 70.8, Cancer Antigen, 125: 619-->1163, Carcinoembryonic Antigen: 44.7 -->70.9--> 74.2, Beta-HC.0 on initial evaluation (3/7); Checked again on 20 and it was 102.7  - Discussed diagnosis with patient on 3/31/20. Given advanced, metastatic stage, will need to be on indefinite treatment to limit progression of disease.  - S/p C1 mFOLFOX (-). Cycle 2 day1 was on  (5-FU infusion was stopped after about 30 hours for possible coronary vasospasm)  - CTA Chest () was negative for central PE, but unable to evaluate segmental and subsegmental branches due to motion artifact. Currently on full dose Lovenox given tachycardia and hypoxia.  - LE dopplers negative for DVT on .   - CT C/A/P () showed POD. Given imaging findings and uptrending tumor markers, switched chemotherapy to Carbo/Taxol.   - S/p Carbo/Taxol . Patient tolerated well. No fevers overnight or this morning.   - Nausea improved, can give Ativan PRN for nausea  - Pt without bowel movement for 2 days, continue bowel regimen and await BM. May need to escalate regimen if no BM tomorrow.     # Bilateral pleural effusion - Improved  - COVID-19 negative x 3, but parainfluenza positive ().   - CTA Chest () was negative for central PE, but unable to evaluate segmental and subsegmental branches due to motion artifact. Lung imaging showed unchanged metastatic nodules, small bilateral pleural effusions, and atelectasis of the basilar LLL (unchanged as well). No new opopacity which decreases the likelihood of PNA  - CTA chest on  showed no PE, but large bilateral pleural effusions s/p drains in both left and right lung. Bilateral chest tubes removed on .  - Appreciate Pulmonary recs  - Patient is currently saturating well on RA. Continue to closely monitor  - C/w incentive spirometry    # Fever  - Patient with intermittent fevers over the past month with no recent source for infection.   - CT A/P (4/3/20) No obvious source of infection. No source for infection seen on CT C/A/P () as well  - Blood cultures have had NGTD, last one sent . Urine culture from  pending, UA grossly no bacteria. C. diff negative.  - Appreciate ID recs  - S/p PICC line placement for chemotherapy  - Blood cultures. NGTD. Previous Urine culture was positive for Klebsiella. Per ID, no need to treat at this time given no symptoms.   - Suspect possible tumor fever. Will monitor off antibiotics for now and continue to follow cultures. Appreciate ID clearance to start chemo.     - PT evaluation - Recommend Home with home PT  - Home O2 evaluation - Pending    # Palpitation, resolved however patient is still tachycardic; could be contributed by fever.  # Chest pain, resolved  # ST-T changes  Patient developed palpitations and chest pain on C2D3 while she was getting 5-FU. Trop was negative. ST-T changes was non-specific and TTE did not show any wall motion abnormalities. ACS is unlikely. Coronary vasospasm is possibly 2/2 5-FU but hard to confirm.   - Continue Diltiazem for HR control and possible vasospasm   - Cycle 2 day1 was on  (5-FU infusion was stopped after about 30 hours for possible coronary vasospasm)  - Cardiology following, appreciate recs     # Pain control   # Low back pain  - Palliative Care following, appreciate recs  - Pain medications now switched to MS contin. Pain is adequately controlled per patient. Will continue to follow-up with Palliative Care regarding pain management/regimen on discharge.  - Check bowel movements and give proper stool regimen.     # Hyperuricemia - Improved  - Uric acid was 8.8 on . s/p IV Rasburicase 3mg x1 on .   - C/w Allopurinol 300mg daily  - Monitor TLS labs daily (CMP, Phos, LDH, Uric acid)    # DVT PPx  - C/w full dose Lovenox given tachycardia and hypoxia, as mentioned above.    # Dispo  - Pending resolution of acute medical issues. Patient evaluated by PT who recommended home with home PT.    Cesia Banks MD  Hematology Oncology Fellow, PGY-4  [facility] Pager: [phone]/ [facility] Pager: [phone] Patient is a 25 y/o F w/ no PMHx who initially p/w abdominal pain, found to have bilateral pelvic masses and extensive pelvic lymphadenopathy concerning for metastatic malignancy with pathology showing likely metastatic carcinoma with neuroendocrine features, possibly of GI origin, s/p C1 mFOLFOX (-), C2 stopped 2/2 possible coronary vasospasm, found to have POD    # Metastatic carcinoma, cancer of unknown primary  - CT A/P with bilateral heterogeneous adnexal masses, as well as upper abdominal, RP, possible bone lesions and pelvic lymphadenopathy; CT Chest with multiple solid pulmonary nodules  - Cancer of unknown primary (poorly differentiated carcinoma) but suspect likely GI origin given CDX2 positivity on pathology; chromogranin positivity also suggests neuroendocrine features; Ki-67 index 40%.   - Tumor markers elevated: Cancer Antigen, Ca 27.29: 70.8, Cancer Antigen, 125: 619-->1163, Carcinoembryonic Antigen: 44.7 -->70.9--> 74.2, Beta-HC.0 on initial evaluation (3/7); Checked again on 20 and it was 102.7  - Discussed diagnosis with patient on 3/31/20. Given advanced, metastatic stage, will need to be on indefinite treatment to limit progression of disease.  - S/p C1 mFOLFOX (-). Cycle 2 day1 was on  (5-FU infusion was stopped after about 30 hours for possible coronary vasospasm)  - CTA Chest () was negative for central PE, but unable to evaluate segmental and subsegmental branches due to motion artifact. Currently on full dose Lovenox given tachycardia and hypoxia.  - LE dopplers negative for DVT on .   - CT C/A/P () showed POD. Given imaging findings and uptrending tumor markers, switched chemotherapy to Carbo/Taxol.   - S/p Carbo/Taxol . Patient tolerated well. No fevers overnight or this morning.   - Nausea improved, can give Ativan PRN for nausea  - Pt without bowel movement for 2 days, continue bowel regimen and await BM. May need to escalate regimen if no BM tomorrow.     # Bilateral pleural effusion - Improved  - COVID-19 negative x 3, but parainfluenza positive ().   - CTA Chest () was negative for central PE, but unable to evaluate segmental and subsegmental branches due to motion artifact. Lung imaging showed unchanged metastatic nodules, small bilateral pleural effusions, and atelectasis of the basilar LLL (unchanged as well). No new opopacity which decreases the likelihood of PNA  - CTA chest on  showed no PE, but large bilateral pleural effusions s/p drains in both left and right lung. Bilateral chest tubes removed on .  - Appreciate Pulmonary recs  - Patient is currently saturating well on RA. Continue to closely monitor  - C/w incentive spirometry    # Fever  - Patient with intermittent fevers over the past month with no recent source for infection.   - CT A/P (4/3/20) No obvious source of infection. No source for infection seen on CT C/A/P () as well  - Blood cultures have had NGTD, last one sent . Urine culture from  pending, UA grossly no bacteria. C. diff negative.  - Appreciate ID recs  - S/p PICC line placement for chemotherapy  - Blood cultures. NGTD. Previous Urine culture was positive for Klebsiella. Per ID, no need to treat at this time given no symptoms.   - Suspect possible tumor fever. Will monitor off antibiotics for now and continue to follow cultures. Appreciate ID clearance to start chemo.     - PT evaluation - Recommend Home with home PT  - Home O2 evaluation - Pending    # ST-T changes  Patient developed palpitations and chest pain on C2D3 while she was getting 5-FU. Trop was negative. ST-T changes was non-specific and TTE did not show any wall motion abnormalities. ACS is unlikely. Coronary vasospasm is possibly 2/2 5-FU but hard to confirm.   - Continue Diltiazem for HR control and possible vasospasm   - Cycle 2 day1 was on  (5-FU infusion was stopped after about 30 hours for possible coronary vasospasm)  - Cardiology following, appreciate recs     # Pain control   # Low back pain  - Palliative Care following, appreciate recs  - Pain medications now switched to MS contin. Pain is adequately controlled per patient. Will continue to follow-up with Palliative Care regarding pain management/regimen on discharge.     # Hyperuricemia - Improved  - Uric acid was 8.8 on . s/p IV Rasburicase 3mg x1 on .   - C/w Allopurinol 300mg daily  - Monitor TLS labs daily (CMP, Phos, LDH, Uric acid)    # DVT PPx  - C/w full dose Lovenox given tachycardia and hypoxia, as mentioned above.    # Dispo  - Pending resolution of acute medical issues. Patient evaluated by PT who recommended home with home PT.    Cesia Banks MD  Hematology Oncology Fellow, PGY-4  [facility] Pager: [phone]/ [facility] Pager: [phone]

## 2020-05-06 NOTE — CHART NOTE - NSCHARTNOTEFT_GEN_A_CORE
Upon Nutritional Assessment by the Registered Dietitian your patient was determined to meet criteria / has evidence of the following diagnosis/diagnoses:          [ ]  Mild Protein Calorie Malnutrition        [ ]  Moderate Protein Calorie Malnutrition        [x ] Severe Protein Calorie Malnutrition        [ ] Unspecified Protein Calorie Malnutrition        [ ] Underweight / BMI <19        [ ] Morbid Obesity / BMI > 40      Findings as based on:  [x ] Comprehensive nutrition assessment 12% wt loss within 1.5 months, meeting <75% estimated needs for >1 month   [ ] Nutrition Focused Physical Exam  [ ] Other:       Nutrition Plan/Recommendations:  Continue supplements, continue to encourage po intake and obtain/honor food preferences as able         PROVIDER Section:     By signing this assessment you are acknowledging and agree with the diagnosis/diagnoses assigned by the Registered Dietitian    Comments:

## 2020-05-07 NOTE — PROGRESS NOTE ADULT - PROBLEM SELECTOR PLAN 1
- abdominal pain improving, but also with chronic back pain  - 1 PRN/24 hours   - c/w current doses  - c/w morphine ER 75mg in AM, 90mg in PM, reports sleeping better at night  - c/w bowel regimen, would give miralax today, nausea management

## 2020-05-07 NOTE — PROGRESS NOTE ADULT - ASSESSMENT
24 f with b/l adnexal masses and pelvic LAD, metastatic carcinoma of unknown primary presented 3/20 with abd pain, s/p CT guided biopsy 3/25, PICC line 4/3 to start chemo  pt intermittently febrile and leukocytosis,   blood culture has been NGTD  Most recent Urine culture as above-no urinary symptoms -given this + >2 organisms likely colonization/contamination   COVID negative x 7 last one 4/29  C-diff negative  metastatic ca with ?tumor fever  She has progression of disease based on CT C/A/P done on 4/30 and plan is to start new chemo today     A) fever  resolved   ? Tumor fever  Follow Cx and clinically  Continue observation off abx  if any fever while on chemo would need to start meropenem     B) malignancy  will defer to hem onc on chemo  Observe for s/s any nosocomial infectious process    Will tailor plan for ID issues  per course,results.Will defer to primary team on management of other issues.  Assessment, plan and recommendations as detailed above were discussed with the medical/primary  team.  Will Follow.  Beeper [phone] [facility] [id number].   Wknd/afterhours/No response-[phone] or Fellow on call .

## 2020-05-07 NOTE — PROGRESS NOTE ADULT - ASSESSMENT
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   - Tumor markers elevated: Cancer Antigen, Ca 27.29: 70.8, Cancer Antigen, 125: 619-->1163, Carcinoembryonic Antigen: 44.7 -->70.9--> 74.2, Beta-HC.0 on initial evaluation (3/7); Checked again on 20 and it was 102.7  - Discussed diagnosis with patient on 3/31/20. Given advanced, metastatic stage, will need to be on indefinite treatment to limit progression of disease.  - S/p C1 mFOLFOX (-). Cycle 2 day1 was on  (5-FU infusion was stopped after about 30 hours for possible coronary vasospasm)  - CTA Chest () was negative for central PE, but unable to evaluate segmental and subsegmental branches due to motion artifact. Currently on full dose Lovenox given tachycardia and hypoxia.  - LE dopplers negative for DVT on .   - CT C/A/P () showed POD. Given imaging findings and uptrending tumor markers, switched chemotherapy to Carbo/Taxol.   - S/p Carbo/Taxol , next cycle due . Patient tolerated well. No fevers overnight or this morning.   - Nausea improved, can give Ativan PRN for nausea  - Pt without bowel movement for 3 days, if no BM by this afternoon, recommend suppository.     # Bilateral pleural effusion - Improved  - COVID-19 negative x 3, but parainfluenza positive ().   - CTA Chest () was negative for central PE, but unable to evaluate segmental and subsegmental branches due to motion artifact. Lung imaging showed unchanged metastatic nodules, small bilateral pleural effusions, and atelectasis of the basilar LLL (unchanged as well). No new opopacity which decreases the likelihood of PNA  - CTA chest on  showed no PE, but large bilateral pleural effusions s/p drains in both left and right lung. Bilateral chest tubes removed on .  - Appreciate Pulmonary recs  - Patient is currently saturating well on RA. Continue to closely monitor  - C/w incentive spirometry    # Fever  - Patient with intermittent fevers over the past month with no recent source for infection.   - CT A/P (4/3/20) No obvious source of infection. No source for infection seen on CT C/A/P () as well  - Blood cultures have had NGTD, last one sent . Urine culture from  pending, UA grossly no bacteria. C. diff negative.  - Appreciate ID recs  - S/p PICC line placement for chemotherapy  - Blood cultures. NGTD. Previous Urine culture was positive for Klebsiella. Per ID, no need to treat at this time given no symptoms.   - Suspect possible tumor fever. Will monitor off antibiotics for now and continue to follow cultures. Appreciate ID clearance to start chemo.     - PT evaluation - Recommend Home with home PT  - Home O2 evaluation - Pending    # ST-T changes  Patient developed palpitations and chest pain on C2D3 while she was getting 5-FU. Trop was negative. ST-T changes was non-specific and TTE did not show any wall motion abnormalities. ACS is unlikely. Coronary vasospasm is possibly 2/2 5-FU but hard to confirm.   - Continue Diltiazem for HR control and possible vasospasm   - Cycle 2 day1 was on  (5-FU infusion was stopped after about 30 hours for possible coronary vasospasm)  - Cardiology following, appreciate recs     # Pain control   # Low back pain  - Palliative Care following, appreciate recs  - Pain medications now switched to MS contin. Pain is adequately controlled per patient. Will continue to follow-up with Palliative Care regarding pain management/regimen on discharge.     # Hyperuricemia - Improved  - Uric acid was 8.8 on . s/p IV Rasburicase 3mg x1 on .   - C/w Allopurinol 300mg daily  - Monitor TLS labs daily (CMP, Phos, LDH, Uric acid)    # DVT PPx  - C/w full dose Lovenox given tachycardia and hypoxia, as mentioned above.    # Dispo  - Pending resolution of acute medical issues. Patient evaluated by PT who recommended home with home PT.    Cesia Banks MD  Hematology Oncology Fellow, PGY-4  [facility] Pager: [phone]/ [facility] Pager: [phone]

## 2020-05-07 NOTE — PROGRESS NOTE ADULT - SUBJECTIVE AND OBJECTIVE BOX
Patient is a 24y old  Female who presents with a chief complaint of b/l pelvic masses (07 May 2020 08:11)    Being followed by ID for fever, bacteriuria    Interval history:feels well  denies any urinary complaints   No acute events      ROS:  No cough,SOB,CP  No N/V/D./abd pain  No other complaints      Antimicrobials:      Other medications reviewed  MEDICATIONS  (STANDING):  allopurinol 300 milliGRAM(s) Oral daily  diltiazem    Tablet 60 milliGRAM(s) Oral every 8 hours  enoxaparin Injectable 80 milliGRAM(s) SubCutaneous every 12 hours  famotidine    Tablet 20 milliGRAM(s) Oral daily  lidocaine   Patch 2 Patch Transdermal daily  melatonin 3 milliGRAM(s) Oral at bedtime  morphine ER Tablet 75 milliGRAM(s) Oral <User Schedule>  morphine ER Tablet 90 milliGRAM(s) Oral <User Schedule>  polyethylene glycol 3350 17 Gram(s) Oral daily  senna 2 Tablet(s) Oral at bedtime  sodium chloride 0.9% lock flush 3 milliLiter(s) IV Push every 8 hours  sodium chloride 0.9% lock flush 3 milliLiter(s) IV Push every 8 hours      Vital Signs Last 24 Hrs  T(C): 36.9 (05-07-20 @ 09:45), Max: 37.4 (05-07-20 @ 05:23)  T(F): 98.5 (05-07-20 @ 09:45), Max: 99.4 (05-07-20 @ 05:23)  HR: 132 (05-07-20 @ 09:45) (123 - 136)  BP: 114/72 (05-07-20 @ 09:45) (112/65 - 120/66)  BP(mean): --  RR: 20 (05-07-20 @ 09:45) (18 - 20)  SpO2: 97% (05-07-20 @ 09:45) (93% - 97%)    Physical Exam:    HEENT PERRLA EOMI    No oral exudate or erythema    Chest Good AE,CTA    CVS RRR S1 S2 WNl No murmur or rub or gallop    Abd soft BS normal Lower half tenderness ? mass    R PICC  site no erythema tenderness or discharge    CNS AAO X 3 no focal  Lab Data:                          7.8    10.97 )-----------( 390      ( 07 May 2020 06:50 )             26.3       05-07    135  |  94<L>  |  10  ----------------------------<  114<H>  3.6   |  29  |  0.46<L>    Ca    9.4      07 May 2020 06:50  Phos  1.5     05-07  Mg     2.3     05-07    TPro  6.8  /  Alb  2.5<L>  /  TBili  0.4  /  DBili  x   /  AST  60<H>  /  ALT  11  /  AlkPhos  359<H>  05-07        Culture - Urine (collected 02 May 2020 16:41)  Source: .Urine Clean Catch (Midstream)  Final Report (04 May 2020 17:26):    50,000 - 99,000 CFU/mL Klebsiella pneumoniae    50,000 - 99,000 CFU/mL Citrobacter freundii  Organism: Gram Negative Rods  Gram Negative Rods (04 May 2020 17:26)  Organism: Gram Negative Rods (04 May 2020 17:26)      -  Amikacin: S <=16      -  Ampicillin: R >16 These ampicillin results predict results for amoxicillin      -  Ampicillin/Sulbactam: R >16/8 Enterobacter, Citrobacter, and Serratia may develop resistance during prolonged therapy (3-4 days)      -  Aztreonam: R >16      -  Cefazolin: R >16      -  Cefepime: S <=4      -  Cefoxitin: R 16      -  Ceftriaxone: R >32 Enterobacter, Citrobacter, and Serratia may develop resistance during prolonged therapy      -  Ciprofloxacin: S <=1      -  Ertapenem: S <=1      -  Gentamicin: S <=4      -  Imipenem: I 2      -  Levofloxacin: S <=2      -  Meropenem: S <=1      -  Nitrofurantoin: S <=32 Should not be used to treat pyelonephritis      -  Piperacillin/Tazobactam: I 64      -  Tigecycline: S <=2      -  Tobramycin: S <=4      -  Trimethoprim/Sulfamethoxazole: R >2/38      Method Type: CANDY  Organism: Gram Negative Rods (04 May 2020 17:26)      -  Amikacin: S <=16      -  Ampicillin: R >16 These ampicillin results predict results for amoxicillin      -  Ampicillin/Sulbactam: R >16/8 Enterobacter, Citrobacter, and Serratia may develop resistance during prolonged therapy (3-4 days)      -  Aztreonam: I 8      -  Cefazolin: R >16 (MIC_CL_COM_ENTERIC_CEFAZU) For uncomplicated UTI with K. pneumoniae, E. coli, or P. mirablis: CANDY <=16 is sensitive and CANDY >=32 is resistant. This also predicts results for oral agents cefaclor, cefdinir, cefpodoxime, cefprozil, cefuroxime axetil, cephalexin and locarbef for uncomplicated UTI. Note that some isolates may be susceptible to these agents while testing resistant to cefazolin.      -  Cefepime: S <=4      -  Cefoxitin: S <=8      -  Ceftriaxone: R 8 Enterobacter, Citrobacter, and Serratia may develop resistance during prolonged therapy      -  Ciprofloxacin: S <=1      -  Gentamicin: S <=4      -  Imipenem: S <=1      -  Levofloxacin: S <=2      -  Meropenem: S <=1      -  Nitrofurantoin: I 64 Should not be used to treat pyelonephritis      -  Piperacillin/Tazobactam: I 64      -  Tigecycline: S <=2      -  Tobramycin: S <=4      -  Trimethoprim/Sulfamethoxazole: R >2/38      Method Type: CANDY        < from: Xray Abdomen 1 View PORTABLE -Urgent (05.04.20 @ 17:35) >    IMPRESSION:     No abnormal gaseous bowel distention.    < end of copied text >

## 2020-05-07 NOTE — PROGRESS NOTE ADULT - ATTENDING COMMENTS
25 y/o F with metastatic poorly differentiated carcinoma of unknown primary- She was given 2 cycles of FOLFOX and found to have progression of disease on recent scan. Cycle 1 Day 3 of Carbo/taxol with improvement in fevers.   Pain due to osseous disease, continue pain control with MScontin/morphine IR/lidoderm patches. Appreciate pall care recs.   Monitor constipation, will increase bowel regimen with dulcolax, consider suppository if no BM this afternoon    Alexus Lynne MD   Hematology/Oncology

## 2020-05-07 NOTE — PROGRESS NOTE ADULT - SUBJECTIVE AND OBJECTIVE BOX
24F here with worsening abdominal pain in the setting of known adnexal massess, found to have for metastatic carcinoma with final pathology pending. Given evidence of metastatic disease, patient is not a surgical candidate. Has been having worsening adnexal pain, markedly last night after taking senna. States pain is 7-8/10 at its worst, located in adnexal area with radiation down to legs, worse with movement, and tolerable with opioids around 2/10. Palliative care called to help with pain.     INTERVAL EVENTS:  4/30: states having lower back pain, not fully relieved with morphine IR  5/1: used 4 doses of 3mg IV morphine/24 hours  5/2: used 2 BT of 4 mg Morphine/24 hrs.  5/4: used morphine 15mg PO x 1 and 4mg IV x 1/24 hours   5/5: no use of PRN/24 hours  5/7: one PRN MSIR/24 hours, states having constipation (no BM x 3 days) and nausea    ADVANCE DIRECTIVES:    DNR  MOLST  [ ]  Living Will  [ ]   DECISION MAKER(s):  [ ] Health Care Proxy(s)  [ ] Surrogate(s)  [ ] Guardian           Name(s): Phone Number(s):    BASELINE (I)ADL(s) (prior to admission):  [place]: [ ]Total  [ ] Moderate [ ]Dependent    Allergies    No Known Allergies    Intolerances       PRESENT SYMPTOMS: [ ]Unable to obtain due to poor mentation   Source if other than patient:  [ ]Family   [ ]Team     Pain: [X ]yes [ ]no  QOL impact -  decreased function  Location - back  Aggravating factors -   Quality -   Radiation -   Timing-   Severity (0-10 scale): 7/10   Minimal acceptable level (0-10 scale): 2/10    CPOT:    https://www.[facility].org/getattachment/[email or web address]/Critical-Care-Pain-Observation-Tool-(CPOT)      PAIN AD Score:     [email or web address].pdf (press ctrl +  left click to view)    Dyspnea:                           [ ]Mild [ ]Moderate [ ]Severe  Anxiety:                             [ ]Mild [ ]Moderate [ ]Severe  Fatigue:                             [ ]Mild [ x]Moderate [ ]Severe  Nausea:                             [ ]Mild [ ]Moderate [ ]Severe  Loss of appetite:              [ ]Mild [ ]Moderate [ ]Severe  Constipation:                    [ ]Mild [ ]Moderate [ ]Severe    Other Symptoms:  [X ]All other review of systems negative     Palliative Performance Status Version 2:   40      %    http://[email or web address].org/files/news/palliative_performance_scale_[email or web address].pdf    PHYSICAL EXAM:  Vital Signs Last 24 Hrs  T(C): 36.7 (07 May 2020 13:29), Max: 37.4 (07 May 2020 05:23)  T(F): 98.1 (07 May 2020 13:29), Max: 99.4 (07 May 2020 05:23)  HR: 122 (07 May 2020 13:29) (122 - 136)  BP: 106/67 (07 May 2020 13:29) (106/67 - 120/66)  BP(mean): --  RR: 20 (07 May 2020 13:29) (18 - 20)  SpO2: 97% (07 May 2020 13:29) (95% - 97%)    Limited exam for patient safety and to limit infection risk during COVID-19 outbreak. Please refr to primary team's examination for today.  GENERAL:  [ X]Alert  [ ]Oriented x   [ ]Lethargic  [ ]Cachexia  [ ]Unarousable  [ X]Verbal  [ ]Non-Verbal  Behavioral:   [ ] Anxiety  [ ] Delirium [ ] Agitation [ ] Other  HEENT:  [ ]Normal   [ ]Dry mouth   [ ]ET Tube/Trach  [ ]Oral lesions  PULMONARY:   [ ]Clear [ ]Tachypnea  [ ]Audible excessive secretions   [ ]Rhonchi        [ ]Right [ ]Left [ ]Bilateral  [ ]Crackles        [ ]Right [ ]Left [ ]Bilateral  [ ]Wheezing     [ ]Right [ ]Left [ ]Bilateral  [ ]Diminished breath sounds [ ]right [ ]left [ ]bilateral  CARDIOVASCULAR:    [ ]Regular [ ]Irregular [ ]Tachy  [ ]Quirino [ ]Murmur [ ]Other  GASTROINTESTINAL:  [ ]Soft  [ ]Distended   [ ]+BS  [ ]Non tender [ ]Tender  [ ]PEG [ ]OGT/ NGT  Last BM:     GENITOURINARY:  [ ]Normal [ ] Incontinent   [ ]Oliguria/Anuria   [ ]Cifuentes  MUSCULOSKELETAL:   [ ]Normal   [ x]Weakness  [ ]Bed/Wheelchair bound [ ]Edema  NEUROLOGIC:  Lucid speech  [ ]No focal deficits  [ ]Cognitive impairment  [ ]Dysphagia [ ]Dysarthria [ ]Paresis [ ]Other   SKIN:   [ ]Normal    [ ]Rash  [ ]Pressure ulcer(s)       Present on admission [ ]y [ ]n    CRITICAL CARE:  [ ] Shock Present  [ ]Septic [ ]Cardiogenic [ ]Neurologic [ ]Hypovolemic  [ ]  Vasopressors [ ]  Inotropes   [ ]Respiratory failure present [ ]Mechanical ventilation [ ]Non-invasive ventilatory support [ ]High flow  [ ]Acute  [ ]Chronic [ ]Hypoxic  [ ]Hypercarbic [ ]Other  [ ]Other organ failure     LABS: reviewed                          7.8    10.97 )-----------( 390      ( 07 May 2020 06:50 )             26.3     05-07    135  |  94<L>  |  10  ----------------------------<  114<H>  3.6   |  29  |  0.46<L>    Ca    9.4      07 May 2020 06:50  Phos  2.0     05-07  Mg     2.3     05-07        RADIOLOGY & ADDITIONAL STUDIES:    PROTEIN CALORIE MALNUTRITION PRESENT: [ ]mild [ ]moderate [ ]severe [ ]underweight [ ]morbid obesity  https://www.andeal.org/vault/2440/web/files/ONC/Table_Clinical%20Characteristics%20to%20Document%20Malnutrition-White%20JV%20et%20al%431599.pdf    Height (cm): 162.6 (03-21-20 @ 03:15), 162.56 (03-07-20 @ 19:18)  Weight (kg): 84.4 (03-21-20 @ 03:15), 89.4 (03-07-20 @ 19:18)  BMI (kg/m2): 31.9 (03-21-20 @ 03:15), 33.8 (03-07-20 @ 19:18)    [ ]PPSV2 < or = to 30% [ ]significant weight loss  [ ]poor nutritional intake  [ ]anasarca     Albumin, Serum: 2.9 g/dL (03-25-20 @ 05:59)   [ ]Artificial Nutrition      REFERRALS:   [ ]Chaplaincy  [ ]Hospice  [ ]Child Life  [ ]Social Work  [ ]Case management [ ]Holistic Therapy

## 2020-05-07 NOTE — PROGRESS NOTE ADULT - SUBJECTIVE AND OBJECTIVE BOX
INTERVAL History:    Allergies    No Known Allergies    Intolerances        MEDICATIONS  (STANDING):  allopurinol 300 milliGRAM(s) Oral daily  diltiazem    Tablet 60 milliGRAM(s) Oral every 8 hours  enoxaparin Injectable 80 milliGRAM(s) SubCutaneous every 12 hours  famotidine    Tablet 20 milliGRAM(s) Oral daily  lidocaine   Patch 2 Patch Transdermal daily  melatonin 3 milliGRAM(s) Oral at bedtime  morphine ER Tablet 75 milliGRAM(s) Oral <User Schedule>  morphine ER Tablet 90 milliGRAM(s) Oral <User Schedule>  polyethylene glycol 3350 17 Gram(s) Oral daily  potassium phosphate IVPB 15 milliMole(s) IV Intermittent once  senna 2 Tablet(s) Oral at bedtime  sodium chloride 0.9% lock flush 3 milliLiter(s) IV Push every 8 hours  sodium chloride 0.9% lock flush 3 milliLiter(s) IV Push every 8 hours    MEDICATIONS  (PRN):  acetaminophen   Tablet .. 650 milliGRAM(s) Oral every 6 hours PRN Temp greater or equal to 38C (100.4F)  aluminum hydroxide/magnesium hydroxide/simethicone Suspension 30 milliLiter(s) Oral every 6 hours PRN Dyspepsia  bisacodyl 5 milliGRAM(s) Oral every 12 hours PRN Constipation  diphenhydrAMINE   Injectable 50 milliGRAM(s) IV Push once PRN Reaction  hydrocortisone sodium succinate Injectable 100 milliGRAM(s) IV Push once PRN Reaction  LORazepam   Injectable 0.25 milliGRAM(s) IV Push every 6 hours PRN Nausea and/or Vomiting  metoclopramide Injectable 10 milliGRAM(s) IV Push every 6 hours PRN Nausea/Vomiting  morphine  - Injectable 4 milliGRAM(s) IV Push every 6 hours PRN Severe Pain (7 - 10)  morphine  IR 15 milliGRAM(s) Oral every 4 hours PRN Moderate Pain (4 - 6)  naloxone Injectable 0.1 milliGRAM(s) IV Push every 3 minutes PRN Sedation or RR <10  ondansetron Injectable 4 milliGRAM(s) IV Push every 6 hours PRN Nausea and/or Vomiting  simethicone 80 milliGRAM(s) Chew every 6 hours PRN Gas      Vital Signs Last 24 Hrs  T(C): 37.4 (07 May 2020 05:23), Max: 37.4 (07 May 2020 05:23)  T(F): 99.4 (07 May 2020 05:23), Max: 99.4 (07 May 2020 05:23)  HR: 136 (07 May 2020 05:23) (123 - 136)  BP: 120/66 (07 May 2020 05:23) (112/65 - 120/84)  BP(mean): --  RR: 20 (07 May 2020 05:23) (18 - 20)  SpO2: 97% (07 May 2020 05:23) (93% - 98%)    PHYSICAL EXAM:          LABS:                        7.8    10.97 )-----------( 390      ( 07 May 2020 06:50 )             26.3     05-07    135  |  94<L>  |  10  ----------------------------<  114<H>  3.6   |  29  |  0.46<L>    Ca    9.4      07 May 2020 06:50  Phos  1.5     05-07  Mg     2.3     05-07    TPro  6.8  /  Alb  2.5<L>  /  TBili  0.4  /  DBili  x   /  AST  60<H>  /  ALT  11  /  AlkPhos  359<H>  05-07    PT/INR - ( 07 May 2020 06:50 )   PT: 13.0 sec;   INR: 1.13 ratio                 RADIOLOGY & ADDITIONAL STUDIES:    PATHOLOGY: INTERVAL History: No acute events overnight. Pt remains afebrile. Constipation for 3 days, took first dose of Dulcolax this morning. No other acute complaints.     Allergies    No Known Allergies    Intolerances        MEDICATIONS  (STANDING):  allopurinol 300 milliGRAM(s) Oral daily  diltiazem    Tablet 60 milliGRAM(s) Oral every 8 hours  enoxaparin Injectable 80 milliGRAM(s) SubCutaneous every 12 hours  famotidine    Tablet 20 milliGRAM(s) Oral daily  lidocaine   Patch 2 Patch Transdermal daily  melatonin 3 milliGRAM(s) Oral at bedtime  morphine ER Tablet 75 milliGRAM(s) Oral <User Schedule>  morphine ER Tablet 90 milliGRAM(s) Oral <User Schedule>  polyethylene glycol 3350 17 Gram(s) Oral daily  potassium phosphate IVPB 15 milliMole(s) IV Intermittent once  senna 2 Tablet(s) Oral at bedtime  sodium chloride 0.9% lock flush 3 milliLiter(s) IV Push every 8 hours  sodium chloride 0.9% lock flush 3 milliLiter(s) IV Push every 8 hours    MEDICATIONS  (PRN):  acetaminophen   Tablet .. 650 milliGRAM(s) Oral every 6 hours PRN Temp greater or equal to 38C (100.4F)  aluminum hydroxide/magnesium hydroxide/simethicone Suspension 30 milliLiter(s) Oral every 6 hours PRN Dyspepsia  bisacodyl 5 milliGRAM(s) Oral every 12 hours PRN Constipation  diphenhydrAMINE   Injectable 50 milliGRAM(s) IV Push once PRN Reaction  hydrocortisone sodium succinate Injectable 100 milliGRAM(s) IV Push once PRN Reaction  LORazepam   Injectable 0.25 milliGRAM(s) IV Push every 6 hours PRN Nausea and/or Vomiting  metoclopramide Injectable 10 milliGRAM(s) IV Push every 6 hours PRN Nausea/Vomiting  morphine  - Injectable 4 milliGRAM(s) IV Push every 6 hours PRN Severe Pain (7 - 10)  morphine  IR 15 milliGRAM(s) Oral every 4 hours PRN Moderate Pain (4 - 6)  naloxone Injectable 0.1 milliGRAM(s) IV Push every 3 minutes PRN Sedation or RR <10  ondansetron Injectable 4 milliGRAM(s) IV Push every 6 hours PRN Nausea and/or Vomiting  simethicone 80 milliGRAM(s) Chew every 6 hours PRN Gas      Vital Signs Last 24 Hrs  T(C): 37.4 (07 May 2020 05:23), Max: 37.4 (07 May 2020 05:23)  T(F): 99.4 (07 May 2020 05:23), Max: 99.4 (07 May 2020 05:23)  HR: 136 (07 May 2020 05:23) (123 - 136)  BP: 120/66 (07 May 2020 05:23) (112/65 - 120/84)  BP(mean): --  RR: 20 (07 May 2020 05:23) (18 - 20)  SpO2: 97% (07 May 2020 05:23) (93% - 98%)    PHYSICAL EXAM:  General: Well appearing   Skin: Hyperpigmentation on bilateral elbows, chest   Abdomen: Mild tenderness to palpation, hypoactive bowel sounds         LABS:                        7.8    10.97 )-----------( 390      ( 07 May 2020 06:50 )             26.3     05-07    135  |  94<L>  |  10  ----------------------------<  114<H>  3.6   |  29  |  0.46<L>    Ca    9.4      07 May 2020 06:50  Phos  1.5     05-07  Mg     2.3     05-07    TPro  6.8  /  Alb  2.5<L>  /  TBili  0.4  /  DBili  x   /  AST  60<H>  /  ALT  11  /  AlkPhos  359<H>  05-07    PT/INR - ( 07 May 2020 06:50 )   PT: 13.0 sec;   INR: 1.13 ratio                 RADIOLOGY & ADDITIONAL STUDIES:    PATHOLOGY:

## 2020-05-08 NOTE — PROGRESS NOTE ADULT - ATTENDING COMMENTS
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 Cycle 1 Day 4 of Carbo/taxol, with recurrent fever found to have a new LLL pneumonia, currently on meropenem.   Pain due to osseous disease, continue pain control with MScontin/morphine IR/lidoderm patches. Appreciate pall care recs.   Monitor constipation, will increase bowel regimen with dulcolax.     Alexus Lynne MD   Hematology/Oncology

## 2020-05-08 NOTE — PROGRESS NOTE ADULT - SUBJECTIVE AND OBJECTIVE BOX
INTERVAL History:    Allergies    No Known Allergies    Intolerances        MEDICATIONS  (STANDING):  allopurinol 300 milliGRAM(s) Oral daily  diltiazem    Tablet 60 milliGRAM(s) Oral every 8 hours  enoxaparin Injectable 80 milliGRAM(s) SubCutaneous every 12 hours  famotidine    Tablet 20 milliGRAM(s) Oral daily  lidocaine   Patch 2 Patch Transdermal daily  melatonin 3 milliGRAM(s) Oral at bedtime  meropenem  IVPB 1000 milliGRAM(s) IV Intermittent every 8 hours  morphine ER Tablet 75 milliGRAM(s) Oral <User Schedule>  morphine ER Tablet 90 milliGRAM(s) Oral <User Schedule>  polyethylene glycol 3350 17 Gram(s) Oral daily  senna 2 Tablet(s) Oral at bedtime  sodium chloride 0.9% lock flush 3 milliLiter(s) IV Push every 8 hours  sodium chloride 0.9% lock flush 3 milliLiter(s) IV Push every 8 hours  sodium phosphate IVPB 15 milliMole(s) IV Intermittent once    MEDICATIONS  (PRN):  acetaminophen   Tablet .. 650 milliGRAM(s) Oral every 6 hours PRN Temp greater or equal to 38C (100.4F)  aluminum hydroxide/magnesium hydroxide/simethicone Suspension 30 milliLiter(s) Oral every 6 hours PRN Dyspepsia  bisacodyl 5 milliGRAM(s) Oral every 12 hours PRN Constipation  diphenhydrAMINE   Injectable 50 milliGRAM(s) IV Push once PRN Reaction  hydrocortisone sodium succinate Injectable 100 milliGRAM(s) IV Push once PRN Reaction  LORazepam   Injectable 0.25 milliGRAM(s) IV Push every 6 hours PRN Nausea and/or Vomiting  metoclopramide Injectable 10 milliGRAM(s) IV Push every 6 hours PRN Nausea/Vomiting  morphine  - Injectable 4 milliGRAM(s) IV Push every 6 hours PRN Severe Pain (7 - 10)  morphine  IR 15 milliGRAM(s) Oral every 4 hours PRN Moderate Pain (4 - 6)  naloxone Injectable 0.1 milliGRAM(s) IV Push every 3 minutes PRN Sedation or RR <10  ondansetron Injectable 4 milliGRAM(s) IV Push every 6 hours PRN Nausea and/or Vomiting  simethicone 80 milliGRAM(s) Chew every 6 hours PRN Gas      Vital Signs Last 24 Hrs  T(C): 38.1 (08 May 2020 05:07), Max: 38.1 (08 May 2020 05:07)  T(F): 100.6 (08 May 2020 05:07), Max: 100.6 (08 May 2020 05:07)  HR: 130 (08 May 2020 05:07) (122 - 136)  BP: 105/59 (08 May 2020 05:07) (103/64 - 131/67)  BP(mean): --  RR: 20 (08 May 2020 05:07) (20 - 22)  SpO2: 97% (08 May 2020 05:07) (94% - 98%)    PHYSICAL EXAM:    General: AOX3, no acute distress  EYES: EOMI, PERRLA, conjunctiva and sclera clear  NECK: Supple, No JVD, Normal thyroid  CHEST/LUNG: Clear to auscultation bilaterally; No rales, rhonchi, or wheezing  HEART: Regular rate and rhythm; no murmurs  ABDOMEN: Soft, Nontender. BS+  LYMPH: No lymphadenopathy noted  Extremities: No peripheral edema      LABS:                        7.8    10.97 )-----------( 390      ( 07 May 2020 06:50 )             26.3     05-08    134<L>  |  94<L>  |  9   ----------------------------<  97  4.4   |  29  |  0.46<L>    Ca    8.5      08 May 2020 06:57  Phos  1.5     05-08  Mg     1.9     05-08    TPro  6.3  /  Alb  2.3<L>  /  TBili  0.4  /  DBili  x   /  AST  46<H>  /  ALT  7<L>  /  AlkPhos  360<H>  05-08    PT/INR - ( 08 May 2020 06:59 )   PT: 11.6 sec;   INR: 1.02 ratio           Urinalysis Basic - ( 08 May 2020 06:59 )    Color: Light Yellow / Appearance: Slightly Turbid / S.012 / pH: x  Gluc: x / Ketone: Negative  / Bili: Negative / Urobili: Negative   Blood: x / Protein: Trace / Nitrite: Negative   Leuk Esterase: Large / RBC: 25 /hpf /  /HPF   Sq Epi: x / Non Sq Epi: 0 /hpf / Bacteria: Negative          RADIOLOGY & ADDITIONAL STUDIES:    PATHOLOGY: INTERVAL History: Patient spiked a fever to 100.6 this morning. Cultures sent, CXR showed a left lower opacity. Pt had one small BM this morning.     Allergies    No Known Allergies    Intolerances        MEDICATIONS  (STANDING):  allopurinol 300 milliGRAM(s) Oral daily  diltiazem    Tablet 60 milliGRAM(s) Oral every 8 hours  enoxaparin Injectable 80 milliGRAM(s) SubCutaneous every 12 hours  famotidine    Tablet 20 milliGRAM(s) Oral daily  lidocaine   Patch 2 Patch Transdermal daily  melatonin 3 milliGRAM(s) Oral at bedtime  meropenem  IVPB 1000 milliGRAM(s) IV Intermittent every 8 hours  morphine ER Tablet 75 milliGRAM(s) Oral <User Schedule>  morphine ER Tablet 90 milliGRAM(s) Oral <User Schedule>  polyethylene glycol 3350 17 Gram(s) Oral daily  senna 2 Tablet(s) Oral at bedtime  sodium chloride 0.9% lock flush 3 milliLiter(s) IV Push every 8 hours  sodium chloride 0.9% lock flush 3 milliLiter(s) IV Push every 8 hours  sodium phosphate IVPB 15 milliMole(s) IV Intermittent once    MEDICATIONS  (PRN):  acetaminophen   Tablet .. 650 milliGRAM(s) Oral every 6 hours PRN Temp greater or equal to 38C (100.4F)  aluminum hydroxide/magnesium hydroxide/simethicone Suspension 30 milliLiter(s) Oral every 6 hours PRN Dyspepsia  bisacodyl 5 milliGRAM(s) Oral every 12 hours PRN Constipation  diphenhydrAMINE   Injectable 50 milliGRAM(s) IV Push once PRN Reaction  hydrocortisone sodium succinate Injectable 100 milliGRAM(s) IV Push once PRN Reaction  LORazepam   Injectable 0.25 milliGRAM(s) IV Push every 6 hours PRN Nausea and/or Vomiting  metoclopramide Injectable 10 milliGRAM(s) IV Push every 6 hours PRN Nausea/Vomiting  morphine  - Injectable 4 milliGRAM(s) IV Push every 6 hours PRN Severe Pain (7 - 10)  morphine  IR 15 milliGRAM(s) Oral every 4 hours PRN Moderate Pain (4 - 6)  naloxone Injectable 0.1 milliGRAM(s) IV Push every 3 minutes PRN Sedation or RR <10  ondansetron Injectable 4 milliGRAM(s) IV Push every 6 hours PRN Nausea and/or Vomiting  simethicone 80 milliGRAM(s) Chew every 6 hours PRN Gas      Vital Signs Last 24 Hrs  T(C): 38.1 (08 May 2020 05:07), Max: 38.1 (08 May 2020 05:07)  T(F): 100.6 (08 May 2020 05:07), Max: 100.6 (08 May 2020 05:07)  HR: 130 (08 May 2020 05:07) (122 - 136)  BP: 105/59 (08 May 2020 05:07) (103/64 - 131/67)  BP(mean): --  RR: 20 (08 May 2020 05:07) (20 - 22)  SpO2: 97% (08 May 2020 05:07) (94% - 98%)    PHYSICAL EXAM:    General: AOX3, no acute distress        LABS:                        7.8    10.97 )-----------( 390      ( 07 May 2020 06:50 )             26.3     05-08    134<L>  |  94<L>  |  9   ----------------------------<  97  4.4   |  29  |  0.46<L>    Ca    8.5      08 May 2020 06:57  Phos  1.5     05-08  Mg     1.9     05-08    TPro  6.3  /  Alb  2.3<L>  /  TBili  0.4  /  DBili  x   /  AST  46<H>  /  ALT  7<L>  /  AlkPhos  360<H>  05-08    PT/INR - ( 08 May 2020 06:59 )   PT: 11.6 sec;   INR: 1.02 ratio           Urinalysis Basic - ( 08 May 2020 06:59 )    Color: Light Yellow / Appearance: Slightly Turbid / S.012 / pH: x  Gluc: x / Ketone: Negative  / Bili: Negative / Urobili: Negative   Blood: x / Protein: Trace / Nitrite: Negative   Leuk Esterase: Large / RBC: 25 /hpf /  /HPF   Sq Epi: x / Non Sq Epi: 0 /hpf / Bacteria: Negative          RADIOLOGY & ADDITIONAL STUDIES:    < from: Xray Chest 1 View- PORTABLE-Routine (20 @ 09:38) >    EXAM:  XR CHEST PORTABLE ROUTINE 1V                            PROCEDURE DATE:  2020            INTERPRETATION:  XR CHEST. AP view.    INDICATION: Fever    COMPARISON: 2020    FINDINGS/  IMPRESSION:    Right PICC tip within SVC.    Consolidative opacity at the left base.    DISCRETE X-RAY DATA:  Percent of LEFT lung opacification: 34-66%  Percent of RIGHT lung opacification: Clear  Change in lung opacification from most recent x-ray (<=3 days): Increase  Change from prior dated 3 or more days (same admission): Increase                    WESTON ANDERSON M.D.,ATTENDING RADIOLOGIST  This document has been electronically signed. May  8 2020  9:53AM        < end of copied text >      PATHOLOGY: INTERVAL History: Patient spiked a fever to 100.6 this morning. Cultures sent, CXR showed a left lower opacity. Pt had one small BM this morning. Main complaints today are: Heartburn and nausea.     Allergies    No Known Allergies    Intolerances        MEDICATIONS  (STANDING):  allopurinol 300 milliGRAM(s) Oral daily  diltiazem    Tablet 60 milliGRAM(s) Oral every 8 hours  enoxaparin Injectable 80 milliGRAM(s) SubCutaneous every 12 hours  famotidine    Tablet 20 milliGRAM(s) Oral daily  lidocaine   Patch 2 Patch Transdermal daily  melatonin 3 milliGRAM(s) Oral at bedtime  meropenem  IVPB 1000 milliGRAM(s) IV Intermittent every 8 hours  morphine ER Tablet 75 milliGRAM(s) Oral <User Schedule>  morphine ER Tablet 90 milliGRAM(s) Oral <User Schedule>  polyethylene glycol 3350 17 Gram(s) Oral daily  senna 2 Tablet(s) Oral at bedtime  sodium chloride 0.9% lock flush 3 milliLiter(s) IV Push every 8 hours  sodium chloride 0.9% lock flush 3 milliLiter(s) IV Push every 8 hours  sodium phosphate IVPB 15 milliMole(s) IV Intermittent once    MEDICATIONS  (PRN):  acetaminophen   Tablet .. 650 milliGRAM(s) Oral every 6 hours PRN Temp greater or equal to 38C (100.4F)  aluminum hydroxide/magnesium hydroxide/simethicone Suspension 30 milliLiter(s) Oral every 6 hours PRN Dyspepsia  bisacodyl 5 milliGRAM(s) Oral every 12 hours PRN Constipation  diphenhydrAMINE   Injectable 50 milliGRAM(s) IV Push once PRN Reaction  hydrocortisone sodium succinate Injectable 100 milliGRAM(s) IV Push once PRN Reaction  LORazepam   Injectable 0.25 milliGRAM(s) IV Push every 6 hours PRN Nausea and/or Vomiting  metoclopramide Injectable 10 milliGRAM(s) IV Push every 6 hours PRN Nausea/Vomiting  morphine  - Injectable 4 milliGRAM(s) IV Push every 6 hours PRN Severe Pain (7 - 10)  morphine  IR 15 milliGRAM(s) Oral every 4 hours PRN Moderate Pain (4 - 6)  naloxone Injectable 0.1 milliGRAM(s) IV Push every 3 minutes PRN Sedation or RR <10  ondansetron Injectable 4 milliGRAM(s) IV Push every 6 hours PRN Nausea and/or Vomiting  simethicone 80 milliGRAM(s) Chew every 6 hours PRN Gas      Vital Signs Last 24 Hrs  T(C): 38.1 (08 May 2020 05:07), Max: 38.1 (08 May 2020 05:07)  T(F): 100.6 (08 May 2020 05:07), Max: 100.6 (08 May 2020 05:07)  HR: 130 (08 May 2020 05:07) (122 - 136)  BP: 105/59 (08 May 2020 05:07) (103/64 - 131/67)  BP(mean): --  RR: 20 (08 May 2020 05:07) (20 - 22)  SpO2: 97% (08 May 2020 05:07) (94% - 98%)    PHYSICAL EXAM:    General: AOX3, no acute distress  Abdomen: Mild tenderness to palpation, hypoactive BS      LABS:                        7.8    10.97 )-----------( 390      ( 07 May 2020 06:50 )             26.3     05-08    134<L>  |  94<L>  |  9   ----------------------------<  97  4.4   |  29  |  0.46<L>    Ca    8.5      08 May 2020 06:57  Phos  1.5     05-08  Mg     1.9     05-08    TPro  6.3  /  Alb  2.3<L>  /  TBili  0.4  /  DBili  x   /  AST  46<H>  /  ALT  7<L>  /  AlkPhos  360<H>  05-08    PT/INR - ( 08 May 2020 06:59 )   PT: 11.6 sec;   INR: 1.02 ratio           Urinalysis Basic - ( 08 May 2020 06:59 )    Color: Light Yellow / Appearance: Slightly Turbid / S.012 / pH: x  Gluc: x / Ketone: Negative  / Bili: Negative / Urobili: Negative   Blood: x / Protein: Trace / Nitrite: Negative   Leuk Esterase: Large / RBC: 25 /hpf /  /HPF   Sq Epi: x / Non Sq Epi: 0 /hpf / Bacteria: Negative          RADIOLOGY & ADDITIONAL STUDIES:    < from: Xray Chest 1 View- PORTABLE-Routine (20 @ 09:38) >    EXAM:  XR CHEST PORTABLE ROUTINE 1V                            PROCEDURE DATE:  2020            INTERPRETATION:  XR CHEST. AP view.    INDICATION: Fever    COMPARISON: 2020    FINDINGS/  IMPRESSION:    Right PICC tip within SVC.    Consolidative opacity at the left base.    DISCRETE X-RAY DATA:  Percent of LEFT lung opacification: 34-66%  Percent of RIGHT lung opacification: Clear  Change in lung opacification from most recent x-ray (<=3 days): Increase  Change from prior dated 3 or more days (same admission): Increase                    WESTON ANDERSON M.D.,ATTENDING RADIOLOGIST  This document has been electronically signed. May  8 2020  9:53AM        < end of copied text >      PATHOLOGY:

## 2020-05-08 NOTE — PROGRESS NOTE ADULT - ASSESSMENT
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   - Tumor markers elevated: Cancer Antigen, Ca 27.29: 70.8, Cancer Antigen, 125: 619-->1163, Carcinoembryonic Antigen: 44.7 -->70.9--> 74.2, Beta-HC.0 on initial evaluation (3/7); Checked again on 20 and it was 102.7  - Discussed diagnosis with patient on 3/31/20. Given advanced, metastatic stage, will need to be on indefinite treatment to limit progression of disease.  - S/p C1 mFOLFOX (-). Cycle 2 day1 was on  (5-FU infusion was stopped after about 30 hours for possible coronary vasospasm)  - CTA Chest () was negative for central PE, but unable to evaluate segmental and subsegmental branches due to motion artifact. Currently on full dose Lovenox given tachycardia and hypoxia.  - LE dopplers negative for DVT on .   - CT C/A/P () showed POD. Given imaging findings and uptrending tumor markers, switched chemotherapy to Carbo/Taxol.   - S/p Carbo/Taxol , next cycle due . Patient tolerated well.   - Nausea improved, can give Ativan PRN for nausea  - continue aggressive bowel regimen: Senna, Miralax, ensure pt is getting Dulcolax BID  - Nausea- stop reglan, can try Compazine 10mg q6H, with Zofran as needed   - Heartburn- PPI and Maalox      #Fever  Patient spiked fever to 100.6 this morning- started on Meropenem, cultures pending  - CXR showed Left lower PNA   - RVP negative  - No need to retest COVID at this time per ID    # Bilateral pleural effusion - Improved  - COVID-19 negative x 3, but parainfluenza positive ().   - CTA Chest () was negative for central PE, but unable to evaluate segmental and subsegmental branches due to motion artifact. Lung imaging showed unchanged metastatic nodules, small bilateral pleural effusions, and atelectasis of the basilar LLL (unchanged as well). No new opopacity which decreases the likelihood of PNA  - CTA chest on  showed no PE, but large bilateral pleural effusions s/p drains in both left and right lung. Bilateral chest tubes removed on .  - Appreciate Pulmonary recs  - Patient is currently saturating well on RA. Continue to closely monitor  - C/w incentive spirometry    - PT evaluation - Recommend Home with home PT  - Home O2 evaluation - Pending    # ST-T changes  Patient developed palpitations and chest pain on C2D3 while she was getting 5-FU. Trop was negative. ST-T changes was non-specific and TTE did not show any wall motion abnormalities. ACS is unlikely. Coronary vasospasm is possibly 2/2 5-FU but hard to confirm.   - Continue Diltiazem for HR control and possible vasospasm   - Cycle 2 day1 was on  (5-FU infusion was stopped after about 30 hours for possible coronary vasospasm)  - Cardiology following, appreciate recs     # Pain control   # Low back pain  - Palliative Care following, appreciate recs  - Pain medications now switched to MS contin. Pain is adequately controlled per patient. Will continue to follow-up with Palliative Care regarding pain management/regimen on discharge.     # Hyperuricemia - Improved  - Uric acid was 8.8 on . s/p IV Rasburicase 3mg x1 on .   - C/w Allopurinol 300mg daily  - Monitor TLS labs daily (CMP, Phos, LDH, Uric acid)    # DVT PPx  - C/w full dose Lovenox given tachycardia and hypoxia, as mentioned above.    # Dispo  - Pending resolution of acute medical issues. Patient evaluated by PT who recommended home with home PT.    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   - Tumor markers elevated: Cancer Antigen, Ca 27.29: 70.8, Cancer Antigen, 125: 619-->1163, Carcinoembryonic Antigen: 44.7 -->70.9--> 74.2, Beta-HC.0 on initial evaluation (3/7); Checked again on 20 and it was 102.7  - Discussed diagnosis with patient on 3/31/20. Given advanced, metastatic stage, will need to be on indefinite treatment to limit progression of disease.  - S/p C1 mFOLFOX (-). Cycle 2 day1 was on  (5-FU infusion was stopped after about 30 hours for possible coronary vasospasm)  - CTA Chest () was negative for central PE, but unable to evaluate segmental and subsegmental branches due to motion artifact. Currently on full dose Lovenox given tachycardia and hypoxia.  - LE dopplers negative for DVT on .   - CT C/A/P () showed POD. Given imaging findings and uptrending tumor markers, switched chemotherapy to Carbo/Taxol.   - S/p Carbo/Taxol , next cycle due . Patient tolerated well.   - Nausea improved, can give Ativan PRN for nausea  - continue aggressive bowel regimen: Senna, Miralax, ensure pt is getting Dulcolax BID  - Nausea- stop reglan, can try Compazine 10mg q6H, with Zofran as needed   - Heartburn- PPI and Maalox      #Fever  Patient spiked fever to 100.6 this morning- started on Meropenem, cultures pending  - CXR showed Left lower PNA   - RVP negative  - No need to retest COVID at this time per ID    # Bilateral pleural effusion - Improved  - COVID-19 negative x 3, but parainfluenza positive ().   - CTA Chest () was negative for central PE, but unable to evaluate segmental and subsegmental branches due to motion artifact. Lung imaging showed unchanged metastatic nodules, small bilateral pleural effusions, and atelectasis of the basilar LLL (unchanged as well). No new opopacity which decreases the likelihood of PNA  - CTA chest on  showed no PE, but large bilateral pleural effusions s/p drains in both left and right lung. Bilateral chest tubes removed on .  - Appreciate Pulmonary recs  - Patient is currently saturating well on RA. Continue to closely monitor  - C/w incentive spirometry    # ST-T changes  Patient developed palpitations and chest pain on C2D3 while she was getting 5-FU. Trop was negative. ST-T changes was non-specific and TTE did not show any wall motion abnormalities. ACS is unlikely. Coronary vasospasm is possibly 2/2 5-FU but hard to confirm.   - Continue Diltiazem for HR control and possible vasospasm   - Cycle 2 day1 was on  (5-FU infusion was stopped after about 30 hours for possible coronary vasospasm)  - Cardiology following, appreciate recs     # Pain control   # Low back pain  - Palliative Care following, appreciate recs  - Pain medications now switched to MS contin. Pain is adequately controlled per patient. Will continue to follow-up with Palliative Care regarding pain management/regimen on discharge.    # DVT PPx  - C/w full dose Lovenox given tachycardia and hypoxia, as mentioned above.    # Dispo  - Pending resolution of acute medical issues. Patient evaluated by PT who recommended home with home PT.  - PT evaluation - Recommend Home with home PT  - Home O2 evaluation - Pending    Cesia Banks MD  Hematology Oncology Fellow, PGY-4  [facility] Pager: [phone]/ [facility] Pager: [phone]

## 2020-05-08 NOTE — PROGRESS NOTE ADULT - PROBLEM SELECTOR PLAN 1
- may be from constipation, c/w bowel regimen, would c.w miralax   - started prochlorperazine, monitor  - can also consider reglan

## 2020-05-08 NOTE — PROGRESS NOTE ADULT - ASSESSMENT
24 f with b/l adnexal masses and pelvic LAD, metastatic carcinoma of unknown primary presented 3/20 with abd pain, s/p CT guided biopsy 3/25, PICC line 4/3 to start chemo  pt intermittently febrile and leukocytosis,   blood culture has been NGTD  Most recent Urine culture as above-no urinary symptoms -given this + >2 organisms likely colonization/contamination   COVID negative x 7 last one 4/29  C-diff negative  metastatic ca with ?tumor fever  She has progression of disease based on CT C/A/P done on 4/30 and plan is to start new chemo today     A) fever  ?UTI   ? Tumor fever  ?chemo related   repeat Cx  agree with empiric merem as on chemo  Continue observation   Monitor for s/s any other foci      B) malignancy  will defer to hem onc on chemo  Observe for s/s any nosocomial infectious process    Will tailor plan for ID issues  per course,results.Will defer to primary team on management of other issues.  Assessment, plan and recommendations as detailed above were discussed with the hem onc  team.  Infectious Diseases Service will cover over weekend.  Please call [phone] if issues

## 2020-05-08 NOTE — PROGRESS NOTE ADULT - PROBLEM SELECTOR PLAN 2
- abdominal pain improving, but also with chronic back pain  - 0 PRN/24 hours   - c/w current doses  - c/w morphine ER 75mg in AM, 90mg in PM, reports sleeping better at night

## 2020-05-08 NOTE — PROGRESS NOTE ADULT - SUBJECTIVE AND OBJECTIVE BOX
Patient is a 24y old  Female who presents with a chief complaint of b/l pelvic masses (08 May 2020 08:10)    Being followed by ID for fever    Interval history:low grade temp yesterday  some nausea  started on abx  no urinary complaints   No acute events      ROS:  No cough,SOB,CP  No N/V/D./abd pain  No other complaints      Antimicrobials:    meropenem  IVPB 1000 milliGRAM(s) IV Intermittent every 8 hours    Other medications reviewed    Vital Signs Last 24 Hrs  T(C): 37.6 (05-08-20 @ 09:12), Max: 38.1 (05-08-20 @ 05:07)  T(F): 99.6 (05-08-20 @ 09:12), Max: 100.6 (05-08-20 @ 05:07)  HR: 115 (05-08-20 @ 09:12) (115 - 136)  BP: 102/55 (05-08-20 @ 09:12) (102/55 - 131/67)  BP(mean): --  RR: 20 (05-08-20 @ 09:12) (20 - 22)  SpO2: 97% (05-08-20 @ 09:12) (94% - 98%)    Physical Exam:      HEENT PERRLA EOMI    No oral exudate or erythema    Chest Good AE,CTA    CVS RRR S1 S2 WNl No murmur or rub or gallop    Abd soft BS normal Lower half tenderness ? mass    R PICC  site no erythema tenderness or discharge    CNS AAO X 3 no focal  Lab Data:                          7.0    6.83  )-----------( 303      ( 08 May 2020 07:01 )             24.3       05-08    134<L>  |  94<L>  |  9   ----------------------------<  97  4.4   |  29  |  0.46<L>    Ca    8.5      08 May 2020 06:57  Phos  1.5     05-08  Mg     1.9     05-08    TPro  6.3  /  Alb  2.3<L>  /  TBili  0.4  /  DBili  x   /  AST  46<H>  /  ALT  7<L>  /  AlkPhos  360<H>  05-08        < from: Xray Chest 1 View- PORTABLE-Routine (05.08.20 @ 09:38) >  IMPRESSION:    Right PICC tip within SVC.    Consolidative opacity at the left base.    DISCRETE X-RAY DATA:  Percent of LEFT lung opacification: 34-66%  Percent of RIGHT lung opacification: Clear  Change in lung opacification from most recent x-ray (<=3 days): Increase  Change from prior dated 3 or more days (same admission): Increase        < end of copied text >        Urine Microscopic-Add On (NC) (05.08.20 @ 06:59)    Red Blood Cell - Urine: 25 /hpf    Epithelial Cells: 0 /hpf    Bacteria: Negative    White Blood Cell - Urine: 129 /HPF    Hyaline Casts: 4 /lpf            Rapid Respiratory Viral Panel (05.08.20 @ 07:02)    Rapid RVP Result: NotDetec: This is NOT your COVID-19 test result. Separate COVID-19 report may  follow.  This Respiratory Panel uses polymerase chain reaction (PCR) to detect for  adenovirus; coronavirus (HKU1, NL63, 229E, OC43); human metapneumovirus  (hMPV); human enterovirus/rhinovirus (Entero/RV); influenza A; influenza  A/H1; influenza A/H3; influenza A/H1-2009; influenza B; parainfluenza  viruses 1, 2, 3, 4; respiratory syncytial virus; Mycoplasma pneumoniae;  and Chlamydophila pneumoniae.    COVID-19 PCR . (04.29.20 @ 11:08)    COVID-19 PCR: NotDetec: This test has been validated by [facility] to be accurate;  though it has not been FDA cleared/approved by the usual pathway.  As with all laboratory tests, results should be correlated with clinical  findings.  https://www.fda.gov/media/387999/download  https://www.fda.gov/media/759310/download

## 2020-05-08 NOTE — CHART NOTE - NSCHARTNOTEFT_GEN_A_CORE
Nutrition Follow Up Note  Patient seen for: Malnutrition follow up. Chart reviewed, events noted. CT noted with progression of disease chemotherapy changed S/P 1st dose yesterday.       Source: Pt    Diet : Regular diet with ensure enlive 2 daily     Patient reports: Nausea yesterday, last episode of vomiting 2 days ago, pt reports constipation, pt had 2 small bowel movements yesterday but still feels uncomfortable-ordered for dulcolax, miralax and senna, pt receptive to prune juices as well      PO intake : Pt reports appetite has been further decreased due to constipation, pt states she has been taking mostly liquids such as soups and fresh oranges. Pt will take usually only 1 ensure enlive per day. RD obtained meal selections for today, pt has been feeling unwell and has not been calling to place her orders.       Daily 201 lbs (3/25), 198.8 lbs (4/20), 187.3 lbs (4/22), 176.8 lbs (5/4), no new wt to trend     % Weight Change    Pertinent Medications: MEDICATIONS  (STANDING):  allopurinol 300 milliGRAM(s) Oral daily  diltiazem    Tablet 60 milliGRAM(s) Oral every 8 hours  enoxaparin Injectable 80 milliGRAM(s) SubCutaneous every 12 hours  famotidine    Tablet 20 milliGRAM(s) Oral daily  lidocaine   Patch 2 Patch Transdermal daily  melatonin 3 milliGRAM(s) Oral at bedtime  meropenem  IVPB 1000 milliGRAM(s) IV Intermittent every 8 hours  morphine ER Tablet 75 milliGRAM(s) Oral <User Schedule>  morphine ER Tablet 90 milliGRAM(s) Oral <User Schedule>  polyethylene glycol 3350 17 Gram(s) Oral daily  senna 2 Tablet(s) Oral at bedtime  sodium chloride 0.9% lock flush 3 milliLiter(s) IV Push every 8 hours  sodium chloride 0.9% lock flush 3 milliLiter(s) IV Push every 8 hours  sodium phosphate IVPB 15 milliMole(s) IV Intermittent once    MEDICATIONS  (PRN):  acetaminophen   Tablet .. 650 milliGRAM(s) Oral every 6 hours PRN Temp greater or equal to 38C (100.4F)  aluminum hydroxide/magnesium hydroxide/simethicone Suspension 30 milliLiter(s) Oral every 6 hours PRN Dyspepsia  bisacodyl 5 milliGRAM(s) Oral every 12 hours PRN Constipation  diphenhydrAMINE   Injectable 50 milliGRAM(s) IV Push once PRN Reaction  hydrocortisone sodium succinate Injectable 100 milliGRAM(s) IV Push once PRN Reaction  LORazepam   Injectable 0.25 milliGRAM(s) IV Push every 6 hours PRN Nausea and/or Vomiting  metoclopramide Injectable 10 milliGRAM(s) IV Push every 6 hours PRN Nausea/Vomiting  morphine  - Injectable 4 milliGRAM(s) IV Push every 6 hours PRN Severe Pain (7 - 10)  morphine  IR 15 milliGRAM(s) Oral every 4 hours PRN Moderate Pain (4 - 6)  naloxone Injectable 0.1 milliGRAM(s) IV Push every 3 minutes PRN Sedation or RR <10  ondansetron Injectable 4 milliGRAM(s) IV Push every 6 hours PRN Nausea and/or Vomiting  simethicone 80 milliGRAM(s) Chew every 6 hours PRN Gas    Pertinent Labs: 05-08 @ 06:57: Na 134<L>, BUN 9, Cr 0.46<L>, BG 97, K+ 4.4, Phos 1.5<L>, Mg 1.9, Alk Phos 360<H>, ALT/SGPT 7<L>, AST/SGOT 46<H>, HbA1c --  05-07 @ 13:18: Na --, BUN --, Cr --, BG --, K+ --, Phos 2.0<L>, Mg --, Alk Phos --, ALT/SGPT --, AST/SGOT --, HbA1c --    Finger Sticks: none       Skin per nursing documentation: free of pressure injury   Edema: noted with 1+ lorraine ankle edema    Estimated Needs:   [x ] no change since previous assessment  [ ] recalculated:     Previous Nutrition Diagnosis: Severe malnutrition   Nutrition Diagnosis is: ongoing, addressed with supplements and food preferences     New Nutrition Diagnosis:  Related to:    As evidenced by:      Interventions:     Recommend  1) Continue regular diet, will suspend provision of supplements temporarily in [facility]  system to allow pt to get through bedside stock, pt with >6 supplements unopened at bedside-pt informed of change.  2) Continue to encourage po intake and obtain/honor food preferences as able     Monitoring and Evaluation:     Continue to monitor Nutritional intake, Tolerance to diet prescription, weights, labs, skin integrity    RD remains available upon request and will follow up per protocol    Kala Jernigan R.D., [facility], Pager #[phone]

## 2020-05-08 NOTE — PROGRESS NOTE ADULT - SUBJECTIVE AND OBJECTIVE BOX
24F here with worsening abdominal pain in the setting of known adnexal massess, found to have for metastatic carcinoma with final pathology pending. Given evidence of metastatic disease, patient is not a surgical candidate. Has been having worsening adnexal pain, markedly last night after taking senna. States pain is 7-8/10 at its worst, located in adnexal area with radiation down to legs, worse with movement, and tolerable with opioids around 2/10. Palliative care called to help with pain.     INTERVAL EVENTS:  4/30: states having lower back pain, not fully relieved with morphine IR  5/1: used 4 doses of 3mg IV morphine/24 hours  5/2: used 2 BT of 4 mg Morphine/24 hrs.  5/4: used morphine 15mg PO x 1 and 4mg IV x 1/24 hours   5/5: no use of PRN/24 hours  5/7: one PRN MSIR/24 hours, states having constipation (no BM x 3 days) and nausea  5/8: used no PRN, had incomplete BM, still with nausea    ADVANCE DIRECTIVES:    DNR  MOLST  [ ]  Living Will  [ ]   DECISION MAKER(s):  [ ] Health Care Proxy(s)  [ ] Surrogate(s)  [ ] Guardian           Name(s): Phone Number(s):    BASELINE (I)ADL(s) (prior to admission):  [place]: [ ]Total  [ ] Moderate [ ]Dependent    Allergies    No Known Allergies    Intolerances       PRESENT SYMPTOMS: [ ]Unable to obtain due to poor mentation   Source if other than patient:  [ ]Family   [ ]Team     Pain: [X ]yes [ ]no  QOL impact -  decreased function  Location - back  Aggravating factors -   Quality -   Radiation -   Timing-   Severity (0-10 scale): 7/10   Minimal acceptable level (0-10 scale): 2/10    CPOT:    https://www.[facility].org/getattachment/miq26z29-7c3n-7l7v-9v1t-1066m8585k8s/Critical-Care-Pain-Observation-Tool-(CPOT)      PAIN AD Score:     http://geriatrictoolkit.[place].[facility]/cog/painad.pdf (press ctrl +  left click to view)    Dyspnea:                           [ ]Mild [ ]Moderate [ ]Severe  Anxiety:                             [ ]Mild [ ]Moderate [ ]Severe  Fatigue:                             [ ]Mild [ x]Moderate [ ]Severe  Nausea:                             [ ]Mild [X ]Moderate [ ]Severe  Loss of appetite:              [ ]Mild [ ]Moderate [ ]Severe  Constipation:                    [X ]Mild [ ]Moderate [ ]Severe    Other Symptoms:  [X ]All other review of systems negative     Palliative Performance Status Version 2:   40      %    http://[email or web address].org/files/news/palliative_performance_scale_[email or web address].pdf    PHYSICAL EXAM:  Vital Signs Last 24 Hrs  T(C): 37.8 (08 May 2020 13:30), Max: 38.1 (08 May 2020 05:07)  T(F): 100 (08 May 2020 13:30), Max: 100.6 (08 May 2020 05:07)  HR: 124 (08 May 2020 13:30) (115 - 136)  BP: 97/60 (08 May 2020 13:30) (97/60 - 131/67)  BP(mean): --  RR: 20 (08 May 2020 13:30) (20 - 22)  SpO2: 97% (08 May 2020 13:30) (94% - 98%)    Limited exam for patient safety and to limit infection risk during COVID-19 outbreak. Please refr to primary team's examination for today.  GENERAL:  [ X]Alert  [ ]Oriented x   [ ]Lethargic  [ ]Cachexia  [ ]Unarousable  [ X]Verbal  [ ]Non-Verbal  Behavioral:   [ ] Anxiety  [ ] Delirium [ ] Agitation [ ] Other  HEENT:  [ ]Normal   [ ]Dry mouth   [ ]ET Tube/Trach  [ ]Oral lesions  PULMONARY:   [ ]Clear [ ]Tachypnea  [ ]Audible excessive secretions   [ ]Rhonchi        [ ]Right [ ]Left [ ]Bilateral  [ ]Crackles        [ ]Right [ ]Left [ ]Bilateral  [ ]Wheezing     [ ]Right [ ]Left [ ]Bilateral  [ ]Diminished breath sounds [ ]right [ ]left [ ]bilateral  CARDIOVASCULAR:    [ ]Regular [ ]Irregular [ ]Tachy  [ ]Quirino [ ]Murmur [ ]Other  GASTROINTESTINAL:  [ ]Soft  [ ]Distended   [ ]+BS  [ ]Non tender [ ]Tender  [ ]PEG [ ]OGT/ NGT  Last BM:     GENITOURINARY:  [ ]Normal [ ] Incontinent   [ ]Oliguria/Anuria   [ ]Cifuentes  MUSCULOSKELETAL:   [ ]Normal   [ x]Weakness  [ ]Bed/Wheelchair bound [ ]Edema  NEUROLOGIC:  Lucid speech  [ ]No focal deficits  [ ]Cognitive impairment  [ ]Dysphagia [ ]Dysarthria [ ]Paresis [ ]Other   SKIN:   [ ]Normal    [ ]Rash  [ ]Pressure ulcer(s)       Present on admission [ ]y [ ]n    CRITICAL CARE:  [ ] Shock Present  [ ]Septic [ ]Cardiogenic [ ]Neurologic [ ]Hypovolemic  [ ]  Vasopressors [ ]  Inotropes   [ ]Respiratory failure present [ ]Mechanical ventilation [ ]Non-invasive ventilatory support [ ]High flow  [ ]Acute  [ ]Chronic [ ]Hypoxic  [ ]Hypercarbic [ ]Other  [ ]Other organ failure     LABS: reviewed                          7.0    6.83  )-----------( 303      ( 08 May 2020 07:01 )             24.3   05-08    134<L>  |  94<L>  |  9   ----------------------------<  97  4.4   |  29  |  0.46<L>    Ca    8.5      08 May 2020 06:57  Phos  1.5     05-08  Mg     1.9     05-08        RADIOLOGY & ADDITIONAL STUDIES:    PROTEIN CALORIE MALNUTRITION PRESENT: [ ]mild [ ]moderate [ ]severe [ ]underweight [ ]morbid obesity  https://www.andeal.org/vault/2440/web/files/ONC/Table_Clinical%20Characteristics%20to%20Document%20Malnutrition-White%20JV%20et%20al%202012.pdf    Height (cm): 162.6 (03-21-20 @ 03:15), 162.56 (03-07-20 @ 19:18)  Weight (kg): 84.4 (03-21-20 @ 03:15), 89.4 (03-07-20 @ 19:18)  BMI (kg/m2): 31.9 (03-21-20 @ 03:15), 33.8 (03-07-20 @ 19:18)    [ ]PPSV2 < or = to 30% [ ]significant weight loss  [ ]poor nutritional intake  [ ]anasarca     Albumin, Serum: 2.9 g/dL (03-25-20 @ 05:59)   [ ]Artificial Nutrition      REFERRALS:   [ ]Chaplaincy  [ ]Hospice  [ ]Child Life  [ ]Social Work  [ ]Case management [ ]Holistic Therapy

## 2020-05-09 NOTE — CHART NOTE - NSCHARTNOTEFT_GEN_A_CORE
Called by RN to evaluate Pt. for temp = 101.6(f)  38.7(c)  .  Pt seen and evaluated at bedside.  Denies headache, dizziness, visual disturbance, chest pain, cough, SOB, palpitations, abdominal pain, N/V/D , dysuria.   Offers no complaints at present time.      Vital Signs Last 24 Hrs  T(C): 38.7 (09 May 2020 04:59), Max: 38.7 (09 May 2020 04:59)  T(F): 101.6 (09 May 2020 04:59), Max: 101.6 (09 May 2020 04:59)  HR: 129 (09 May 2020 04:59) (115 - 129)  BP: 108/64 (09 May 2020 04:59) (96/57 - 108/66)  BP(mean): --  RR: 20 (09 May 2020 04:59) (20 - 22)  SpO2: 97% (09 May 2020 04:59) (94% - 98%)    Labs:                        7.0    6.83  )-----------( 303      ( 08 May 2020 07:01 )             24.3       05-08    134<L>  |  94<L>  |  9   ----------------------------<  97  4.4   |  29  |  0.46<L>    Ca    8.5      08 May 2020 06:57  Phos  2.2     05-09  Mg     1.9     05-08    TPro  6.3  /  Alb  2.3<L>  /  TBili  0.4  /  DBili  x   /  AST  46<H>  /  ALT  7<L>  /  AlkPhos  360<H>  05-08    Culture - Urine (05.02.20 @ 16:41)    -  Amikacin: S <=16    -  Amikacin: S <=16    -  Ampicillin: R >16 These ampicillin results predict results for amoxicillin    -  Ampicillin: R >16 These ampicillin results predict results for amoxicillin    -  Ampicillin/Sulbactam: R >16/8 Enterobacter, Citrobacter, and Serratia may develop resistance during prolonged therapy (3-4 days)    -  Ampicillin/Sulbactam: R >16/8 Enterobacter, Citrobacter, and Serratia may develop resistance during prolonged therapy (3-4 days)    -  Tobramycin: S <=4    -  Tobramycin: S <=4    -  Aztreonam: I 8    -  Aztreonam: R >16    -  Cefazolin: R >16 (MIC_CL_COM_[email or web address]) For uncomplicated UTI with K. pneumoniae, E. coli, or P. mirablis: CANDY <=16 is sensitive and CANDY >=32 is resistant. This also predicts results for oral agents cefaclor, cefdinir, cefpodoxime, cefprozil, cefuroxime axetil, cephalexin and locarbef for uncomplicated UTI. Note that some isolates may be susceptible to these agents while testing resistant to cefazolin.    -  Cefazolin: R >16    -  Cefepime: S <=4    -  Cefepime: S <=4    -  Cefoxitin: S <=8    -  Cefoxitin: R 16    -  Ceftriaxone: R 8 Enterobacter, Citrobacter, and Serratia may develop resistance during prolonged therapy    -  Ceftriaxone: R >32 Enterobacter, Citrobacter, and Serratia may develop resistance during prolonged therapy    -  Ciprofloxacin: S <=1    -  Ciprofloxacin: S <=1    -  Ertapenem: S <=1    -  Gentamicin: S <=4    -  Gentamicin: S <=4    -  Imipenem: S <=1    -  Imipenem: I 2    -  Levofloxacin: S <=2    -  Levofloxacin: S <=2    -  Meropenem: S <=1    -  Meropenem: S <=1    -  Nitrofurantoin: I 64 Should not be used to treat pyelonephritis    -  Nitrofurantoin: S <=32 Should not be used to treat pyelonephritis    -  Piperacillin/Tazobactam: I 64    -  Piperacillin/Tazobactam: I 64    -  Tigecycline: S <=2    -  Tigecycline: S <=2    -  Trimethoprim/Sulfamethoxazole: R >2/38    -  Trimethoprim/Sulfamethoxazole: R >2/38    Specimen Source: .Urine Clean Catch (Midstream)    Culture Results:   50,000 - 99,000 CFU/mL Klebsiella pneumoniae  50,000 - 99,000 CFU/mL Citrobacter freundii    Organism Identification: Gram Negative Rods  Gram Negative Rods    Organism: Gram Negative Rods    Organism: Gram Negative Rods    Method Type: CANDY    Method Type: CANDY    Culture - Blood (05.01.20 @ 22:09)    Specimen Source: .Blood Blood-Peripheral    Culture Results:   No Growth Final    Culture - Blood (05.01.20 @ 22:09)    Specimen Source: .Blood Blood-Catheter    Culture Results:   No Growth Final    CXR 5/8/2020 FINDINGS/  IMPRESSION:    Right PICC tip within SVC.    Consolidative opacity at the left base.    DISCRETE X-RAY DATA:  Percent of LEFT lung opacification: 34-66%  Percent of RIGHT lung opacification: Clear  Change in lung opacification from most recent x-ray (<=3 days): Increase  Change from prior dated 3 or more days (same admission): Increase      Antimicrobials:    meropenem  IVPB 1000 milliGRAM(s) IV Intermittent every 8 hours    PE:    General: Alert & Oriented x 3, non toxic, in no acute distress  Neuro: non focal  Cardiac: S1, S2, tachycardic  Pulm: Lungs CTA  GI: Abd soft, NT/ND, + bowel sounds x 4 quadrants  Ext: no edema, + pedal pulses BL  Skin: intact      AP:     1.  Neutropenic fever       - continue with current antimicrobials       - continue antipyretic/cooling measures      - continue IV hydration      - BC x 2      - UA/CS      - f/u panculture results      - cxr      - continue to closely monitor      - f/u with primary team in AM    Betina Stubbs, ANNALEE x [phone] Called by RN to evaluate Pt. for temp = 101.6(f)  38.7(c)  .  Pt seen and evaluated at bedside. A&O x 3; appears tired. Endorses "feeling drained",  cough and  constipation. Denies headache, dizziness, visual disturbance, chest pain,  palpitations, abdominal pain, N/V/D , dysuria.       Vital Signs Last 24 Hrs  T(C): 38.7 (09 May 2020 04:59), Max: 38.7 (09 May 2020 04:59)  T(F): 101.6 (09 May 2020 04:59), Max: 101.6 (09 May 2020 04:59)  HR: 129 (09 May 2020 04:59) (115 - 129)  BP: 108/64 (09 May 2020 04:59) (96/57 - 108/66)  BP(mean): --  RR: 20 (09 May 2020 04:59) (20 - 22)  SpO2: 97% (09 May 2020 04:59) (94% - 98%)    Labs:                        7.0    6.83  )-----------( 303      ( 08 May 2020 07:01 )             24.3       05-08    134<L>  |  94<L>  |  9   ----------------------------<  97  4.4   |  29  |  0.46<L>    Ca    8.5      08 May 2020 06:57  Phos  2.2     05-09  Mg     1.9     05-08    TPro  6.3  /  Alb  2.3<L>  /  TBili  0.4  /  DBili  x   /  AST  46<H>  /  ALT  7<L>  /  AlkPhos  360<H>  05-08    Blood culture x 2 5/8/2020 Results pending  Urine culture 5/8/2020 - Results pending      Culture - Urine (05.02.20 @ 16:41)    -  Amikacin: S <=16    -  Amikacin: S <=16    -  Ampicillin: R >16 These ampicillin results predict results for amoxicillin    -  Ampicillin: R >16 These ampicillin results predict results for amoxicillin    -  Ampicillin/Sulbactam: R >16/8 Enterobacter, Citrobacter, and Serratia may develop resistance during prolonged therapy (3-4 days)    -  Ampicillin/Sulbactam: R >16/8 Enterobacter, Citrobacter, and Serratia may develop resistance during prolonged therapy (3-4 days)    -  Tobramycin: S <=4    -  Tobramycin: S <=4    -  Aztreonam: I 8    -  Aztreonam: R >16    -  Cefazolin: R >16 (MIC_CL_COM_[email or web address]) For uncomplicated UTI with K. pneumoniae, E. coli, or P. mirablis: CANDY <=16 is sensitive and CANDY >=32 is resistant. This also predicts results for oral agents cefaclor, cefdinir, cefpodoxime, cefprozil, cefuroxime axetil, cephalexin and locarbef for uncomplicated UTI. Note that some isolates may be susceptible to these agents while testing resistant to cefazolin.    -  Cefazolin: R >16    -  Cefepime: S <=4    -  Cefepime: S <=4    -  Cefoxitin: S <=8    -  Cefoxitin: R 16    -  Ceftriaxone: R 8 Enterobacter, Citrobacter, and Serratia may develop resistance during prolonged therapy    -  Ceftriaxone: R >32 Enterobacter, Citrobacter, and Serratia may develop resistance during prolonged therapy    -  Ciprofloxacin: S <=1    -  Ciprofloxacin: S <=1    -  Ertapenem: S <=1    -  Gentamicin: S <=4    -  Gentamicin: S <=4    -  Imipenem: S <=1    -  Imipenem: I 2    -  Levofloxacin: S <=2    -  Levofloxacin: S <=2    -  Meropenem: S <=1    -  Meropenem: S <=1    -  Nitrofurantoin: I 64 Should not be used to treat pyelonephritis    -  Nitrofurantoin: S <=32 Should not be used to treat pyelonephritis    -  Piperacillin/Tazobactam: I 64    -  Piperacillin/Tazobactam: I 64    -  Tigecycline: S <=2    -  Tigecycline: S <=2    -  Trimethoprim/Sulfamethoxazole: R >2/38    -  Trimethoprim/Sulfamethoxazole: R >2/38    Specimen Source: .Urine Clean Catch (Midstream)    Culture Results:   50,000 - 99,000 CFU/mL Klebsiella pneumoniae  50,000 - 99,000 CFU/mL Citrobacter freundii    Organism Identification: Gram Negative Rods  Gram Negative Rods    Organism: Gram Negative Rods    Organism: Gram Negative Rods    Method Type: CANDY    Method Type: CANDY    Culture - Blood (05.01.20 @ 22:09)    Specimen Source: .Blood Blood-Peripheral    Culture Results:   No Growth Final    Culture - Blood (05.01.20 @ 22:09)    Specimen Source: .Blood Blood-Catheter    Culture Results:   No Growth Final    CXR 5/8/2020 FINDINGS/  IMPRESSION:    Right PICC tip within SVC.    Consolidative opacity at the left base.    DISCRETE X-RAY DATA:  Percent of LEFT lung opacification: 34-66%  Percent of RIGHT lung opacification: Clear  Change in lung opacification from most recent x-ray (<=3 days): Increase  Change from prior dated 3 or more days (same admission): Increase      Antimicrobials:    meropenem  IVPB 1000 milliGRAM(s) IV Intermittent every 8 hours    PE:    General: Alert & Oriented x 3, in no acute distress  Neuro: non focal  Cardiac: S1, S2, tachycardic  Pulm: Lungs CTA  GI: Abd distended, tender to palpation, + bowel sounds x 4 quadrants  Ext: no edema, + pedal pulses BL  Skin: intact      AP:     1.  Neutropenic fever       - continue with current antimicrobials       - continue antipyretic/cooling measures      - continue IV hydration      - f/u panculture results      - continue to closely monitor      - f/u with primary team in AM    Betina Stubbs, ANNALEE x [phone] Called by RN to evaluate Pt. for temp = 101.6(f)  38.7(c)  .  Pt seen and evaluated at bedside. A&O x 3; appears tired. Endorses "feeling drained",  cough and  constipation. Denies headache, dizziness, visual disturbance, chest pain,  palpitations, abdominal pain, N/V/D , dysuria.       Vital Signs Last 24 Hrs  T(C): 38.7 (09 May 2020 04:59), Max: 38.7 (09 May 2020 04:59)  T(F): 101.6 (09 May 2020 04:59), Max: 101.6 (09 May 2020 04:59)  HR: 129 (09 May 2020 04:59) (115 - 129)  BP: 108/64 (09 May 2020 04:59) (96/57 - 108/66)  BP(mean): --  RR: 20 (09 May 2020 04:59) (20 - 22)  SpO2: 97% (09 May 2020 04:59) (94% - 98%)    Labs:                        7.0    6.83  )-----------( 303      ( 08 May 2020 07:01 )             24.3       05-08    134<L>  |  94<L>  |  9   ----------------------------<  97  4.4   |  29  |  0.46<L>    Ca    8.5      08 May 2020 06:57  Phos  2.2     05-09  Mg     1.9     05-08    TPro  6.3  /  Alb  2.3<L>  /  TBili  0.4  /  DBili  x   /  AST  46<H>  /  ALT  7<L>  /  AlkPhos  360<H>  05-08    Blood culture x 2 5/8/2020 Results pending  Urine culture 5/8/2020 - Results pending      Culture - Urine (05.02.20 @ 16:41)    -  Amikacin: S <=16    -  Amikacin: S <=16    -  Ampicillin: R >16 These ampicillin results predict results for amoxicillin    -  Ampicillin: R >16 These ampicillin results predict results for amoxicillin    -  Ampicillin/Sulbactam: R >16/8 Enterobacter, Citrobacter, and Serratia may develop resistance during prolonged therapy (3-4 days)    -  Ampicillin/Sulbactam: R >16/8 Enterobacter, Citrobacter, and Serratia may develop resistance during prolonged therapy (3-4 days)    -  Tobramycin: S <=4    -  Tobramycin: S <=4    -  Aztreonam: I 8    -  Aztreonam: R >16    -  Cefazolin: R >16 (MIC_CL_COM_[email or web address]) For uncomplicated UTI with K. pneumoniae, E. coli, or P. mirablis: CANYD <=16 is sensitive and CANDY >=32 is resistant. This also predicts results for oral agents cefaclor, cefdinir, cefpodoxime, cefprozil, cefuroxime axetil, cephalexin and locarbef for uncomplicated UTI. Note that some isolates may be susceptible to these agents while testing resistant to cefazolin.    -  Cefazolin: R >16    -  Cefepime: S <=4    -  Cefepime: S <=4    -  Cefoxitin: S <=8    -  Cefoxitin: R 16    -  Ceftriaxone: R 8 Enterobacter, Citrobacter, and Serratia may develop resistance during prolonged therapy    -  Ceftriaxone: R >32 Enterobacter, Citrobacter, and Serratia may develop resistance during prolonged therapy    -  Ciprofloxacin: S <=1    -  Ciprofloxacin: S <=1    -  Ertapenem: S <=1    -  Gentamicin: S <=4    -  Gentamicin: S <=4    -  Imipenem: S <=1    -  Imipenem: I 2    -  Levofloxacin: S <=2    -  Levofloxacin: S <=2    -  Meropenem: S <=1    -  Meropenem: S <=1    -  Nitrofurantoin: I 64 Should not be used to treat pyelonephritis    -  Nitrofurantoin: S <=32 Should not be used to treat pyelonephritis    -  Piperacillin/Tazobactam: I 64    -  Piperacillin/Tazobactam: I 64    -  Tigecycline: S <=2    -  Tigecycline: S <=2    -  Trimethoprim/Sulfamethoxazole: R >2/38    -  Trimethoprim/Sulfamethoxazole: R >2/38    Specimen Source: .Urine Clean Catch (Midstream)    Culture Results:   50,000 - 99,000 CFU/mL Klebsiella pneumoniae  50,000 - 99,000 CFU/mL Citrobacter freundii    Organism Identification: Gram Negative Rods  Gram Negative Rods    Organism: Gram Negative Rods    Organism: Gram Negative Rods    Method Type: CANDY    Method Type: CANDY    Culture - Blood (05.01.20 @ 22:09)    Specimen Source: .Blood Blood-Peripheral    Culture Results:   No Growth Final    Culture - Blood (05.01.20 @ 22:09)    Specimen Source: .Blood Blood-Catheter    Culture Results:   No Growth Final    CXR 5/8/2020 FINDINGS/  IMPRESSION:    Right PICC tip within SVC.    Consolidative opacity at the left base.    DISCRETE X-RAY DATA:  Percent of LEFT lung opacification: 34-66%  Percent of RIGHT lung opacification: Clear  Change in lung opacification from most recent x-ray (<=3 days): Increase  Change from prior dated 3 or more days (same admission): Increase      Antimicrobials:    meropenem  IVPB 1000 milliGRAM(s) IV Intermittent every 8 hours    PE:    General: Alert & Oriented x 3, in no acute distress  Neuro: non focal  Cardiac: S1, S2, tachycardic  Pulm: Lungs CTA  GI: Abd distended, tender to palpation, + bowel sounds x 4 quadrants  Ext: no edema, + pedal pulses BL  Skin: intact      AP: Patient is a 25 y/o F w/ no PMHx who initially p/w abdominal pain, found to have bilateral pelvic masses and extensive pelvic lymphadenopathy concerning for metastatic malignancy with pathology showing 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 Now with fever.      1. Fever       - continue with current antimicrobials       - Vanco 1 gm IV x 1 as per primary team sign out       - continue antipyretic/cooling measures      - continue IV hydration      - f/u panculture results      - continue to closely monitor      - f/u with primary team in AM    Betina Stubbs, ANNALEE x [phone]

## 2020-05-10 NOTE — PROVIDER CONTACT NOTE (CRITICAL VALUE NOTIFICATION) - ACTION/TREATMENT ORDERED:
Sodium Phos 30mg IV over 6 hours
no orders recieved
Will continue to monitor.
Will continue to monitor. Repeat Trough to be completed in the morning
prbc 1 unit
1 unit PRBCs to be transfused.
No new orders at this time.
No new orders at this time.
none at the moment

## 2020-05-10 NOTE — PROGRESS NOTE ADULT - ASSESSMENT
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   - Tumor markers elevated: Cancer Antigen, Ca 27.29: 70.8, Cancer Antigen, 125: 619-->1163, Carcinoembryonic Antigen: 44.7 -->70.9--> 74.2, Beta-HC.0 on initial evaluation (3/7); Checked again on 20 and it was 102.7  - Discussed diagnosis with patient on 3/31/20. Given advanced, metastatic stage, will need to be on indefinite treatment to limit progression of disease.  - S/p C1 mFOLFOX (-). Cycle 2 day1 was on  (5-FU infusion was stopped after about 30 hours for possible coronary vasospasm)  - CTA Chest () was negative for central PE, but unable to evaluate segmental and subsegmental branches due to motion artifact. Currently on full dose Lovenox given tachycardia and hypoxia.  - LE dopplers negative for DVT on .   - CT C/A/P () showed POD. Given imaging findings and uptrending tumor markers, switched chemotherapy to Carbo/Taxol.   - S/p Carbo/Taxol , next cycle due . Patient tolerated well.   - Nausea improved, can give Ativan PRN for nausea  - continue aggressive bowel regimen: Senna, Miralax, ensure pt is getting Dulcolax BID  - Nausea- stop reglan, can try Compazine 10mg q6H, with Zofran as needed   - Heartburn- PPI and Maalox      #Fever  Patient spiked fever to 100.6 this morning- started on Meropenem, cultures pending  - CXR showed Left lower PNA   - RVP negative  - No need to retest COVID at this time per ID    # Bilateral pleural effusion - Improved  - COVID-19 negative x 3, but parainfluenza positive ().   - CTA Chest () was negative for central PE, but unable to evaluate segmental and subsegmental branches due to motion artifact. Lung imaging showed unchanged metastatic nodules, small bilateral pleural effusions, and atelectasis of the basilar LLL (unchanged as well). No new opopacity which decreases the likelihood of PNA  - CTA chest on  showed no PE, but large bilateral pleural effusions s/p drains in both left and right lung. Bilateral chest tubes removed on .  - Appreciate Pulmonary recs  - Patient is currently saturating well on RA. Continue to closely monitor  - C/w incentive spirometry    # ST-T changes  Patient developed palpitations and chest pain on C2D3 while she was getting 5-FU. Trop was negative. ST-T changes was non-specific and TTE did not show any wall motion abnormalities. ACS is unlikely. Coronary vasospasm is possibly 2/2 5-FU but hard to confirm.   - Continue Diltiazem for HR control and possible vasospasm   - Cycle 2 day1 was on  (5-FU infusion was stopped after about 30 hours for possible coronary vasospasm)  - Cardiology following, appreciate recs     # Pain control   # Low back pain  - Palliative Care following, appreciate recs  - Pain medications now switched to MS contin. Pain is adequately controlled per patient. Will continue to follow-up with Palliative Care regarding pain management/regimen on discharge.    # DVT PPx  - C/w full dose Lovenox given tachycardia and hypoxia, as mentioned above.    # Dispo  - Pending resolution of acute medical issues. Patient evaluated by PT who recommended home with home PT.  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   - Tumor markers elevated: Cancer Antigen, Ca 27.29: 70.8, Cancer Antigen, 125: 619-->1163, Carcinoembryonic Antigen: 44.7 -->70.9--> 74.2, Beta-HC.0 on initial evaluation (3/7); Checked again on 20 and it was 102.7  - Discussed diagnosis with patient on 3/31/20. Given advanced, metastatic stage, will need to be on indefinite treatment to limit progression of disease.  - S/p C1 mFOLFOX (-). Cycle 2 day1 was on  (5-FU infusion was stopped after about 30 hours for possible coronary vasospasm)  - CTA Chest () was negative for central PE, but unable to evaluate segmental and subsegmental branches due to motion artifact. Currently on full dose Lovenox given tachycardia and hypoxia.  - LE dopplers negative for DVT on .   - CT C/A/P () showed POD. Given imaging findings and uptrending tumor markers, switched chemotherapy to Carbo/Taxol.   - S/p Carbo/Taxol , next cycle due . Patient tolerated well.   - Nausea improved, can give Ativan PRN for nausea  - continue aggressive bowel regimen: Senna, Miralax, ensure pt is getting Dulcolax BID  - Nausea- stop reglan, can try Compazine 10mg q6H, with Zofran as needed   - Heartburn- PPI and Maalox      #Fever  Patient with low grade fever on Meropenem, cultures negative so far.  - CXR showed Left lower PNA   - RVP negative  - No need to retest COVID at this time per ID    Low Hemoglobin, no bleeding, will transfuse 2 Units today.  Treand CBC daily.    # Bilateral pleural effusion - Improved  - COVID-19 negative x 3, but parainfluenza positive ().   - CTA Chest () was negative for central PE, but unable to evaluate segmental and subsegmental branches due to motion artifact. Lung imaging showed unchanged metastatic nodules, small bilateral pleural effusions, and atelectasis of the basilar LLL (unchanged as well). No new opopacity which decreases the likelihood of PNA  - CTA chest on  showed no PE, but large bilateral pleural effusions s/p drains in both left and right lung. Bilateral chest tubes removed on .  - Appreciate Pulmonary recs  - Patient is currently saturating well on RA. Continue to closely monitor  - C/w incentive spirometry    - Palliative Care following, appreciate recs  - Pain medications now switched to MS contin. Pain is adequately controlled per patient. Will continue to follow-up with Palliative Care regarding pain management/regimen on discharge.    # DVT PPx  - C/w full dose Lovenox given tachycardia and hypoxia, as mentioned above.    Loly Peña MD.  [phone]

## 2020-05-10 NOTE — CHART NOTE - NSCHARTNOTEFT_GEN_A_CORE
Called by RN to evaluate Pt. for VA=395.   Pt seen and evaluated at bedside.  Denies headache, dizziness, visual disturbance, chest pain, cough, SOB, palpitations, abdominal pain, N/V/D , dysuria.   Offers no complaints at present time.      Vital Signs Last 24 Hrs  T(C): 37.7 (10 May 2020 00:05), Max: 38.7 (09 May 2020 04:59)  T(F): 99.9 (10 May 2020 00:05), Max: 101.6 (09 May 2020 04:59)  HR: 135 (10 May 2020 00:05) (112 - 135)  BP: 96/59 (10 May 2020 00:05) (93/52 - 117/71)  BP(mean): --  RR: 20 (10 May 2020 00:05) (20 - 20)  SpO2: 96% (10 May 2020 00:05) (86% - 98%)    Labs:                        7.1    6.08  )-----------( 220      ( 09 May 2020 06:52 )             23.7       05-09    133<L>  |  93<L>  |  7   ----------------------------<  96  3.9   |  27  |  0.48<L>    Ca    9.1      09 May 2020 06:52  Phos  1.5     05-09  Mg     1.9     05-09    TPro  6.3  /  Alb  2.4<L>  /  TBili  0.4  /  DBili  x   /  AST  33  /  ALT  9<L>  /  AlkPhos  334<H>  05-09      Antimicrobials:  MEDICATIONS  (STANDING):  diltiazem    Tablet 60 milliGRAM(s) Oral every 8 hours  enoxaparin Injectable 80 milliGRAM(s) SubCutaneous every 12 hours  lidocaine   Patch 2 Patch Transdermal daily  melatonin 3 milliGRAM(s) Oral at bedtime  meropenem  IVPB 1000 milliGRAM(s) IV Intermittent every 8 hours  morphine ER Tablet 75 milliGRAM(s) Oral <User Schedule>  morphine ER Tablet 90 milliGRAM(s) Oral <User Schedule>  pantoprazole    Tablet 40 milliGRAM(s) Oral before breakfast  polyethylene glycol 3350 17 Gram(s) Oral daily  senna 2 Tablet(s) Oral at bedtime  sodium chloride 0.9% Bolus 250 milliLiter(s) IV Bolus once  sodium chloride 0.9% lock flush 3 milliLiter(s) IV Push every 8 hours  sodium chloride 0.9% lock flush 3 milliLiter(s) IV Push every 8 hours        PE:    General: Alert & Oriented x 3, non toxic, in no acute distress  Neuro: non focal  Cardiac: S1, S2, tachycardic  Pulm: Lungs CTA  GI: Abd soft, NT/ND, + bowel sounds x 4 quadrants  Ext: no edema, + pedal pulses BL  Skin: intact      AP:     1.  Neutropenic fever       - continue with current antimicrobials       - continue antipyretic/cooling measures      - continue IV hydration      - BC x 2      - UA/CS      - f/u panculture results      - cxr      - continue to closely monitor      - f/u with primary team in AM    ANNALEE Estrada x Called by RN to evaluate Pt. for WN=099, BP=96/ 59, RR=20, Os Sat=96%.   Pt seen and evaluated at bedside.  Pt. A&O x 3, non-toxic, in no acute distress. Denies headache, dizziness, visual disturbance, chest pain, cough, SOB, palpitations, abdominal pain, N/V/D , dysuria.   Offers no complaints at present time.         MEDICATIONS  (STANDING):  diltiazem    Tablet 60 milliGRAM(s) Oral every 8 hours  enoxaparin Injectable 80 milliGRAM(s) SubCutaneous every 12 hours  lidocaine   Patch 2 Patch Transdermal daily  melatonin 3 milliGRAM(s) Oral at bedtime  meropenem  IVPB 1000 milliGRAM(s) IV Intermittent every 8 hours  morphine ER Tablet 75 milliGRAM(s) Oral <User Schedule>  morphine ER Tablet 90 milliGRAM(s) Oral <User Schedule>  pantoprazole    Tablet 40 milliGRAM(s) Oral before breakfast  polyethylene glycol 3350 17 Gram(s) Oral daily  senna 2 Tablet(s) Oral at bedtime  sodium chloride 0.9% Bolus 250 milliLiter(s) IV Bolus once  sodium chloride 0.9% lock flush 3 milliLiter(s) IV Push every 8 hours  sodium chloride 0.9% lock flush 3 milliLiter(s) IV Push every 8 hours        PE:    General: Alert & Oriented x 3, non toxic, in no acute distress  Neuro: non focal  Cardiac: S1, S2, tachycardic  Pulm: Lungs CTA  GI: Abd soft, NT/ND, + bowel sounds x 4 quadrants  Ext: no edema, + pedal pulses BL  Skin: intact      AP:     1.  Neutropenic fever       - continue with current antimicrobials       - continue antipyretic/cooling measures      - continue IV hydration      - BC x 2      - UA/CS      - f/u panculture results      - cxr      - continue to closely monitor      - f/u with primary team in AM    Betina Stubbs, ANP x Called by RN to evaluate Pt. for BV=683, BP=96/ 59, RR=20, Os Sat=96%.   Pt seen and evaluated at bedside.  Pt. A&O x 3, non-toxic, in no acute distress. Denies headache, dizziness, visual disturbance, chest pain, cough, SOB, palpitations, abdominal pain, N/V/D , dysuria.   Offers no complaints at present time. Neuro: non-focal; Cardiac S1, S2, tachycardic; lungs CTA; abdomen soft, NT/ND; + bowel sounds x 4 quadrants.  no edema; + pedal pulses b/l.  Normal saline bolus 250 cc ordered x 2  RN made aware. Continue to closely monitor; f/u with primary team in am.    Betina Stubbs, ANP ext [phone]

## 2020-05-10 NOTE — PROGRESS NOTE ADULT - SUBJECTIVE AND OBJECTIVE BOX
INTERVAL HPI/OVERNIGHT EVENTS:  Patient S&E at bedside. No o/n events, patient resting comfortably. No complaints at this time. Patient denies fever, chills, dizziness, weakness, CP, palpitations, SOB, cough, N/V/D/C, dysuria, changes in bowel movements, LE edema.    VITAL SIGNS:  T(F): 99.5 (05-10-20 @ 05:46)  HR: 135 (05-10-20 @ 05:46)  BP: 116/77 (05-10-20 @ 05:46)  RR: 20 (05-10-20 @ 05:46)  SpO2: 96% (05-10-20 @ 05:46)  Wt(kg): --    PHYSICAL EXAM:    Constitutional: WDWN, NAD,   Eyes: PERRL, EOMI, sclera non-icteric  Neck: supple, no masses, no JVD  Respiratory: CTA b/l, good air entry b/l, no wheezing, rhonchi, rales, with normal respiratory effort and no intercostal retractions  Cardiovascular: RRR, normal S1S2, no M/R/G  Gastrointestinal: soft, NTND, no masses palpable, BS normal in all four quadrants, no HSM  Extremities: WWP, no c/c/e  Neurological: AAOx3  Skin: Normal temperature    MEDICATIONS  (STANDING):  diltiazem    Tablet 60 milliGRAM(s) Oral every 8 hours  enoxaparin Injectable 80 milliGRAM(s) SubCutaneous every 12 hours  lidocaine   Patch 2 Patch Transdermal daily  melatonin 3 milliGRAM(s) Oral at bedtime  meropenem  IVPB 1000 milliGRAM(s) IV Intermittent every 8 hours  morphine ER Tablet 75 milliGRAM(s) Oral <User Schedule>  morphine ER Tablet 90 milliGRAM(s) Oral <User Schedule>  pantoprazole    Tablet 40 milliGRAM(s) Oral before breakfast  polyethylene glycol 3350 17 Gram(s) Oral daily  potassium phosphate IVPB 15 milliMole(s) IV Intermittent once  senna 2 Tablet(s) Oral at bedtime  sodium chloride 0.9% lock flush 3 milliLiter(s) IV Push every 8 hours  sodium chloride 0.9% lock flush 3 milliLiter(s) IV Push every 8 hours    MEDICATIONS  (PRN):  acetaminophen   Tablet .. 650 milliGRAM(s) Oral every 6 hours PRN Temp greater or equal to 38C (100.4F)  aluminum hydroxide/magnesium hydroxide/simethicone Suspension 30 milliLiter(s) Oral every 6 hours PRN Dyspepsia  bisacodyl 5 milliGRAM(s) Oral every 12 hours PRN Constipation  diphenhydrAMINE   Injectable 50 milliGRAM(s) IV Push once PRN Reaction  hydrocortisone sodium succinate Injectable 100 milliGRAM(s) IV Push once PRN Reaction  LORazepam   Injectable 0.25 milliGRAM(s) IV Push every 6 hours PRN Nausea and/or Vomiting  naloxone Injectable 0.1 milliGRAM(s) IV Push every 3 minutes PRN Sedation or RR <10  ondansetron Injectable 4 milliGRAM(s) IV Push every 6 hours PRN Nausea and/or Vomiting  prochlorperazine   Injectable 10 milliGRAM(s) IV Push every 6 hours PRN Nausea/Vomiting  simethicone 80 milliGRAM(s) Chew every 6 hours PRN Gas      Allergies    No Known Allergies    Intolerances        LABS:                        6.9    4.20  )-----------( 277      ( 10 May 2020 07:06 )             22.8     05-10    134<L>  |  95<L>  |  6<L>  ----------------------------<  105<H>  3.5   |  27  |  0.45<L>    Ca    8.6      10 May 2020 07:06  Phos  1.5     05-10  Mg     1.7     05-10    TPro  6.4  /  Alb  2.4<L>  /  TBili  0.4  /  DBili  x   /  AST  28  /  ALT  7<L>  /  AlkPhos  358<H>  05-10    PT/INR - ( 10 May 2020 07:06 )   PT: 13.3 sec;   INR: 1.16 ratio               RADIOLOGY & ADDITIONAL TESTS:  Studies reviewed.

## 2020-05-11 NOTE — PROGRESS NOTE ADULT - ATTENDING COMMENTS
24 y.o female with adenocarcinoma of unkonwn primary s/p Carboplatin and Taxol seven days ago.  Continue to have low grade fever and diarrhea now.  Diarrhea probably from laxatives, if persists, will send for C.diff.  COVID 19 negative.  Nausea, will try steroids along with PPI.  Pain is improved.

## 2020-05-11 NOTE — PROGRESS NOTE ADULT - SUBJECTIVE AND OBJECTIVE BOX
INTERVAL History: Low grade fevers overnight, T max 100.2.     Allergies    No Known Allergies    Intolerances        MEDICATIONS  (STANDING):  diltiazem    Tablet 60 milliGRAM(s) Oral every 8 hours  enoxaparin Injectable 80 milliGRAM(s) SubCutaneous every 12 hours  melatonin 3 milliGRAM(s) Oral at bedtime  meropenem  IVPB 1000 milliGRAM(s) IV Intermittent every 8 hours  pantoprazole    Tablet 40 milliGRAM(s) Oral before breakfast  polyethylene glycol 3350 17 Gram(s) Oral daily  senna 2 Tablet(s) Oral at bedtime  sodium chloride 0.9% lock flush 3 milliLiter(s) IV Push every 8 hours  sodium chloride 0.9% lock flush 3 milliLiter(s) IV Push every 8 hours    MEDICATIONS  (PRN):  acetaminophen   Tablet .. 650 milliGRAM(s) Oral every 6 hours PRN Temp greater or equal to 38C (100.4F)  aluminum hydroxide/magnesium hydroxide/simethicone Suspension 30 milliLiter(s) Oral every 6 hours PRN Dyspepsia  bisacodyl 5 milliGRAM(s) Oral every 12 hours PRN Constipation  diphenhydrAMINE   Injectable 50 milliGRAM(s) IV Push once PRN Reaction  hydrocortisone sodium succinate Injectable 100 milliGRAM(s) IV Push once PRN Reaction  naloxone Injectable 0.1 milliGRAM(s) IV Push every 3 minutes PRN Sedation or RR <10  ondansetron Injectable 4 milliGRAM(s) IV Push every 6 hours PRN Nausea and/or Vomiting  prochlorperazine   Injectable 10 milliGRAM(s) IV Push every 6 hours PRN Nausea/Vomiting  simethicone 80 milliGRAM(s) Chew every 6 hours PRN Gas      Vital Signs Last 24 Hrs  T(C): 37.3 (11 May 2020 05:15), Max: 37.9 (11 May 2020 01:09)  T(F): 99.2 (11 May 2020 05:15), Max: 100.2 (11 May 2020 01:09)  HR: 132 (11 May 2020 05:15) (124 - 132)  BP: 116/80 (11 May 2020 05:15) (108/65 - 139/83)  BP(mean): --  RR: 20 (11 May 2020 05:15) (20 - 20)  SpO2: 97% (11 May 2020 05:15) (94% - 97%)    PHYSICAL EXAM:          LABS:                        8.5    3.74  )-----------( 260      ( 11 May 2020 07:26 )             27.3     05-11    135  |  97  |  6<L>  ----------------------------<  103<H>  3.9   |  25  |  0.44<L>    Ca    9.5      11 May 2020 07:26  Phos  1.2     05-11  Mg     1.8     05-11    TPro  6.7  /  Alb  2.6<L>  /  TBili  0.4  /  DBili  x   /  AST  22  /  ALT  7<L>  /  AlkPhos  327<H>  05-11    PT/INR - ( 11 May 2020 07:26 )   PT: 13.4 sec;   INR: 1.16 ratio                 RADIOLOGY & ADDITIONAL STUDIES:    PATHOLOGY: INTERVAL History: Low grade fevers overnight, T max 100.2. Patient seen this afternoon on rounds. Pt continues to complain of bilious emesis and diarrhea, about 3 episodes today. Pain is well controlled per patient. She has not been able to participate with physical therapy due to nausea.     Allergies    No Known Allergies    Intolerances        MEDICATIONS  (STANDING):  diltiazem    Tablet 60 milliGRAM(s) Oral every 8 hours  enoxaparin Injectable 80 milliGRAM(s) SubCutaneous every 12 hours  melatonin 3 milliGRAM(s) Oral at bedtime  meropenem  IVPB 1000 milliGRAM(s) IV Intermittent every 8 hours  pantoprazole    Tablet 40 milliGRAM(s) Oral before breakfast  polyethylene glycol 3350 17 Gram(s) Oral daily  senna 2 Tablet(s) Oral at bedtime  sodium chloride 0.9% lock flush 3 milliLiter(s) IV Push every 8 hours  sodium chloride 0.9% lock flush 3 milliLiter(s) IV Push every 8 hours    MEDICATIONS  (PRN):  acetaminophen   Tablet .. 650 milliGRAM(s) Oral every 6 hours PRN Temp greater or equal to 38C (100.4F)  aluminum hydroxide/magnesium hydroxide/simethicone Suspension 30 milliLiter(s) Oral every 6 hours PRN Dyspepsia  bisacodyl 5 milliGRAM(s) Oral every 12 hours PRN Constipation  diphenhydrAMINE   Injectable 50 milliGRAM(s) IV Push once PRN Reaction  hydrocortisone sodium succinate Injectable 100 milliGRAM(s) IV Push once PRN Reaction  naloxone Injectable 0.1 milliGRAM(s) IV Push every 3 minutes PRN Sedation or RR <10  ondansetron Injectable 4 milliGRAM(s) IV Push every 6 hours PRN Nausea and/or Vomiting  prochlorperazine   Injectable 10 milliGRAM(s) IV Push every 6 hours PRN Nausea/Vomiting  simethicone 80 milliGRAM(s) Chew every 6 hours PRN Gas      Vital Signs Last 24 Hrs  T(C): 37.3 (11 May 2020 05:15), Max: 37.9 (11 May 2020 01:09)  T(F): 99.2 (11 May 2020 05:15), Max: 100.2 (11 May 2020 01:09)  HR: 132 (11 May 2020 05:15) (124 - 132)  BP: 116/80 (11 May 2020 05:15) (108/65 - 139/83)  BP(mean): --  RR: 20 (11 May 2020 05:15) (20 - 20)  SpO2: 97% (11 May 2020 05:15) (94% - 97%)    PHYSICAL EXAM:          LABS:                        8.5    3.74  )-----------( 260      ( 11 May 2020 07:26 )             27.3     05-11    135  |  97  |  6<L>  ----------------------------<  103<H>  3.9   |  25  |  0.44<L>    Ca    9.5      11 May 2020 07:26  Phos  1.2     05-11  Mg     1.8     05-11    TPro  6.7  /  Alb  2.6<L>  /  TBili  0.4  /  DBili  x   /  AST  22  /  ALT  7<L>  /  AlkPhos  327<H>  05-11    PT/INR - ( 11 May 2020 07:26 )   PT: 13.4 sec;   INR: 1.16 ratio                 RADIOLOGY & ADDITIONAL STUDIES:    PATHOLOGY:

## 2020-05-11 NOTE — PROGRESS NOTE ADULT - ASSESSMENT
24 f with b/l adnexal masses and pelvic LAD, metastatic carcinoma of unknown primary presented 3/20 with abd pain, s/p CT guided biopsy 3/25, PICC line 4/3 to start chemo  pt intermittently febrile and leukocytosis,   blood culture has been NGTD  COVID negative x 7 last one 4/29  C-diff negative  metastatic ca with ?tumor fever  She has progression of disease based on CT C/A/P done on 4/30   on chemo  Prior MDR urinary pathogens-most recent urine Cx >3 organisms    A) fever  resolved   ?UTI   ? Tumor fever  ?chemo related   follow clinically and repeat cx   continue  empiric merem as on chemo  Continue observation   Monitor for s/s any other foci      B) malignancy  will defer to hem onc on chemo  Observe for s/s any nosocomial infectious process    Will tailor plan for ID issues  per course,results.Will defer to primary team on management of other issues.  Assessment, plan and recommendations as detailed above were discussed with the hem onc  team.    Will Follow.  Beeper [phone] [facility] [id number].   Wknd/afterhours/No response-[phone] or Fellow on call

## 2020-05-11 NOTE — PROGRESS NOTE ADULT - SUBJECTIVE AND OBJECTIVE BOX
Patient is a 24y old  Female who presents with a chief complaint of b/l pelvic masses (11 May 2020 08:09)    Being followed by ID for fever, ?UTI    Interval history:occ vomiting  occ abd pain   No acute events      ROS:  No cough,SOB,CP  No diarrhea  No other complaints      Antimicrobials:    meropenem  IVPB 1000 milliGRAM(s) IV Intermittent every 8 hours    Other medications reviewed    Vital Signs Last 24 Hrs  T(C): 37.3 (05-11-20 @ 05:15), Max: 37.9 (05-11-20 @ 01:09)  T(F): 99.2 (05-11-20 @ 05:15), Max: 100.2 (05-11-20 @ 01:09)  HR: 132 (05-11-20 @ 05:15) (124 - 132)  BP: 116/80 (05-11-20 @ 05:15) (113/69 - 139/83)  BP(mean): --  RR: 20 (05-11-20 @ 05:15) (20 - 20)  SpO2: 97% (05-11-20 @ 05:15) (94% - 97%)    Physical Exam:      HEENT PERRLA EOMI    No oral exudate or erythema    Chest Good AE,CTA    CVS RRR S1 S2 WNl No murmur or rub or gallop    Abd soft BS normal Lower half tenderness ? mass    R PICC  site no erythema tenderness or discharge    CNS AAO X 3 no focal  Lab Data:                          8.5    3.74  )-----------( 260      ( 11 May 2020 07:26 )             27.3       05-11    135  |  97  |  6<L>  ----------------------------<  103<H>  3.9   |  25  |  0.44<L>    Ca    9.5      11 May 2020 07:26  Phos  1.2     05-11  Mg     1.8     05-11    TPro  6.7  /  Alb  2.6<L>  /  TBili  0.4  /  DBili  x   /  AST  22  /  ALT  7<L>  /  AlkPhos  327<H>  05-11        Culture - Urine (collected 08 May 2020 09:06)  Source: .Urine Clean Catch (Midstream)  Final Report (09 May 2020 08:24):    >=3 organisms. Probable collection contamination.    Culture - Blood (collected 08 May 2020 09:02)  Source: .Blood Blood-Peripheral  Preliminary Report (09 May 2020 10:01):    No growth to date.    Culture - Blood (collected 08 May 2020 09:02)  Source: .Blood PICC/PERC Double Lumen BLUE  Preliminary Report (09 May 2020 10:01):    No growth to date.      < from: Xray Chest 1 View- PORTABLE-Routine (05.08.20 @ 09:38) >  IMPRESSION:    Right PICC tip within SVC.    Consolidative opacity at the left base.    DISCRETE X-RAY DATA:  Percent of LEFT lung opacification: 34-66%  Percent of RIGHT lung opacification: Clear  Change in lung opacification from most recent x-ray (<=3 days): Increase  Change from prior dated 3 or more days (same admission): Increase        < end of copied text >

## 2020-05-11 NOTE — PROGRESS NOTE ADULT - ASSESSMENT
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   - Tumor markers elevated: Cancer Antigen, Ca 27.29: 70.8, Cancer Antigen, 125: 619-->1163, Carcinoembryonic Antigen: 44.7 -->70.9--> 74.2, Beta-HC.0 on initial evaluation (3/7); Checked again on 20 and it was 102.7  - Discussed diagnosis with patient on 3/31/20. Given advanced, metastatic stage, will need to be on indefinite treatment to limit progression of disease.  - S/p C1 mFOLFOX (-). Cycle 2 day1 was on  (5-FU infusion was stopped after about 30 hours for possible coronary vasospasm)  - CTA Chest () was negative for central PE, but unable to evaluate segmental and subsegmental branches due to motion artifact. Currently on full dose Lovenox given tachycardia and hypoxia.  - LE dopplers negative for DVT on .   - CT C/A/P () showed POD. Given imaging findings and uptrending tumor markers, switched chemotherapy to Carbo/Taxol.   - S/p Carbo/Taxol , next cycle due . Patient tolerated well.   - Given diarrhea, pt can refuse bowel regimen and await improvement in bowel movements   - Nausea- stop reglan, can try Compazine 10mg q6H, with Zofran ATC, can add low dose Decadron 2mg BID standing  - s/p 1 unit prbc on 5/10   - Heartburn- PPI and Maalox      #Fever  Patient with low grade fever on Meropenem, cultures negative so far.  - CXR showed Left lower PNA   - RVP negative  - No need to retest COVID at this time per ID    # Bilateral pleural effusion - Improved  - COVID-19 negative x 3, but parainfluenza positive ().   - CTA Chest () was negative for central PE, but unable to evaluate segmental and subsegmental branches due to motion artifact. Lung imaging showed unchanged metastatic nodules, small bilateral pleural effusions, and atelectasis of the basilar LLL (unchanged as well). No new opopacity which decreases the likelihood of PNA  - CTA chest on  showed no PE, but large bilateral pleural effusions s/p drains in both left and right lung. Bilateral chest tubes removed on .  - Appreciate Pulmonary recs  - Patient is currently saturating well on RA. Continue to closely monitor  - C/w incentive spirometry    - Palliative Care following, appreciate recs  - Pain medications now switched to MS contin. Pain is adequately controlled per patient. Will continue to follow-up with Palliative Care regarding pain management/regimen on discharge.    # DVT PPx  - C/w full dose Lovenox given tachycardia and hypoxia, as mentioned above.    Cesia Banks MD  Hematology Oncology Fellow, PGY-4  [facility] Pager: [phone]/ 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   - Tumor markers elevated: Cancer Antigen, Ca 27.29: 70.8, Cancer Antigen, 125: 619-->1163, Carcinoembryonic Antigen: 44.7 -->70.9--> 74.2, Beta-HC.0 on initial evaluation (3/7); Checked again on 20 and it was 102.7  - Discussed diagnosis with patient on 3/31/20. Given advanced, metastatic stage, will need to be on indefinite treatment to limit progression of disease.  - S/p C1 mFOLFOX (-). Cycle 2 day1 was on  (5-FU infusion was stopped after about 30 hours for possible coronary vasospasm)  - CTA Chest () was negative for central PE, but unable to evaluate segmental and subsegmental branches due to motion artifact. Currently on full dose Lovenox given tachycardia and hypoxia.  - LE dopplers negative for DVT on .   - CT C/A/P () showed POD. Given imaging findings and uptrending tumor markers, switched chemotherapy to Carbo/Taxol.   - S/p Carbo/Taxol , next cycle due . Patient tolerated well.   - Given diarrhea, pt can refuse bowel regimen and await improvement in bowel movements   - Nausea- stop reglan, can try Compazine 10mg q6H, with Zofran ATC, can add low dose Decadron 2mg BID standing  - s/p 1 unit prbc on 5/10   - Heartburn- PPI and Maalox      #Fever  Patient with low grade fever on Meropenem, cultures negative so far.  - CXR showed Left lower PNA   - RVP negative  - No need to retest COVID at this time per ID    # Bilateral pleural effusion - Improved  - COVID-19 negative x 3, but parainfluenza positive ().   - CTA Chest () was negative for central PE, but unable to evaluate segmental and subsegmental branches due to motion artifact. Lung imaging showed unchanged metastatic nodules, small bilateral pleural effusions, and atelectasis of the basilar LLL (unchanged as well). No new opopacity which decreases the likelihood of PNA  - CTA chest on  showed no PE, but large bilateral pleural effusions s/p drains in both left and right lung. Bilateral chest tubes removed on .  - Appreciate Pulmonary recs  - Patient is currently saturating well on RA. Continue to closely monitor  - C/w incentive spirometry    - Palliative Care following, appreciate recs  Pain is adequately controlled per patient. Appreciate Palliative Care recs regarding pain management/regimen on discharge.    # DVT PPx  - C/w full dose Lovenox given tachycardia and hypoxia, as mentioned above.    Dispo: Please have physical therapy assess patient regularly     Cesia Banks MD  Hematology Oncology Fellow, PGY-4  [facility] Pager: [phone]/ [facility] Pager: [phone]

## 2020-05-11 NOTE — PROGRESS NOTE ADULT - SUBJECTIVE AND OBJECTIVE BOX
24F here with worsening abdominal pain in the setting of known adnexal massess, found to have for metastatic carcinoma with final pathology pending. Given evidence of metastatic disease, patient is not a surgical candidate. Has been having worsening adnexal pain, markedly last night after taking senna. States pain is 7-8/10 at its worst, located in adnexal area with radiation down to legs, worse with movement, and tolerable with opioids around 2/10. Palliative care called to help with pain.     INTERVAL EVENTS:  4/30: states having lower back pain, not fully relieved with morphine IR  5/1: used 4 doses of 3mg IV morphine/24 hours  5/2: used 2 BT of 4 mg Morphine/24 hrs.  5/4: used morphine 15mg PO x 1 and 4mg IV x 1/24 hours   5/5: no use of PRN/24 hours  5/7: one PRN MSIR/24 hours, states having constipation (no BM x 3 days) and nausea  5/8: used no PRN, had incomplete BM, still with nausea  5/9: nauseated, no pain currently    ADVANCE DIRECTIVES:    DNR  MOLST  [ ]  Living Will  [ ]   DECISION MAKER(s):  [ ] Health Care Proxy(s)  [ ] Surrogate(s)  [ ] Guardian           Name(s): Phone Number(s):    BASELINE (I)ADL(s) (prior to admission):  [place]: [ ]Total  [ ] Moderate [ ]Dependent    Allergies    No Known Allergies    Intolerances       PRESENT SYMPTOMS: [ ]Unable to obtain due to poor mentation   Source if other than patient:  [ ]Family   [ ]Team     Pain: [X ]yes [ ]no  QOL impact -  decreased function  Location - back  Aggravating factors -   Quality -   Radiation -   Timing-   Severity (0-10 scale): 7/10   Minimal acceptable level (0-10 scale): 2/10    CPOT:    https://www.[facility].org/getattachment/abd30p18-7v9x-[email or web address]-6080k4472d0j/Critical-Care-Pain-Observation-Tool-(CPOT)      PAIN AD Score:     http://geriatrictoolkit.[email or web address]/painad.pdf (press ctrl +  left click to view)    Dyspnea:                           [ ]Mild [ ]Moderate [ ]Severe  Anxiety:                             [ ]Mild [ ]Moderate [ ]Severe  Fatigue:                             [ ]Mild [ x]Moderate [ ]Severe  Nausea:                             [ ]Mild [X ]Moderate [ ]Severe  Loss of appetite:              [ ]Mild [ ]Moderate [ ]Severe  Constipation:                    [X ]Mild [ ]Moderate [ ]Severe    Other Symptoms:  [X ]All other review of systems negative     Palliative Performance Status Version 2:   40      %    http://[email or web address].org/files/news/palliative_performance_scale_[email or web address].pdf    PHYSICAL EXAM:  Vital Signs Last 24 Hrs  T(C): 35.6 (11 May 2020 14:00), Max: 37.9 (11 May 2020 01:09)  T(F): 96 (11 May 2020 14:00), Max: 100.2 (11 May 2020 01:09)  HR: 134 (11 May 2020 14:00) (125 - 134)  BP: 134/80 (11 May 2020 14:00) (113/69 - 139/83)  BP(mean): --  RR: 20 (11 May 2020 14:00) (20 - 20)  SpO2: 97% (11 May 2020 14:00) (94% - 97%)    Limited exam for patient safety and to limit infection risk during COVID-19 outbreak. Please refr to primary team's examination for today.  GENERAL:  [ X]Alert  [ ]Oriented x   [ ]Lethargic  [ ]Cachexia  [ ]Unarousable  [ X]Verbal  [ ]Non-Verbal  Behavioral:   [ ] Anxiety  [ ] Delirium [ ] Agitation [ ] Other  HEENT:  [ ]Normal   [ ]Dry mouth   [ ]ET Tube/Trach  [ ]Oral lesions  PULMONARY:   [ ]Clear [ ]Tachypnea  [ ]Audible excessive secretions   [ ]Rhonchi        [ ]Right [ ]Left [ ]Bilateral  [ ]Crackles        [ ]Right [ ]Left [ ]Bilateral  [ ]Wheezing     [ ]Right [ ]Left [ ]Bilateral  [ ]Diminished breath sounds [ ]right [ ]left [ ]bilateral  CARDIOVASCULAR:    [ ]Regular [ ]Irregular [ ]Tachy  [ ]Quirino [ ]Murmur [ ]Other  GASTROINTESTINAL:  [ ]Soft  [ ]Distended   [ ]+BS  [ ]Non tender [ ]Tender  [ ]PEG [ ]OGT/ NGT  Last BM:     GENITOURINARY:  [ ]Normal [ ] Incontinent   [ ]Oliguria/Anuria   [ ]Cifuentes  MUSCULOSKELETAL:   [ ]Normal   [ x]Weakness  [ ]Bed/Wheelchair bound [ ]Edema  NEUROLOGIC:  Lucid speech  [ ]No focal deficits  [ ]Cognitive impairment  [ ]Dysphagia [ ]Dysarthria [ ]Paresis [ ]Other   SKIN:   [ ]Normal    [ ]Rash  [ ]Pressure ulcer(s)       Present on admission [ ]y [ ]n    CRITICAL CARE:  [ ] Shock Present  [ ]Septic [ ]Cardiogenic [ ]Neurologic [ ]Hypovolemic  [ ]  Vasopressors [ ]  Inotropes   [ ]Respiratory failure present [ ]Mechanical ventilation [ ]Non-invasive ventilatory support [ ]High flow  [ ]Acute  [ ]Chronic [ ]Hypoxic  [ ]Hypercarbic [ ]Other  [ ]Other organ failure     LABS: reviewed                           8.5    3.74  )-----------( 260      ( 11 May 2020 07:26 )             27.3     05-11    135  |  97  |  6<L>  ----------------------------<  103<H>  3.9   |  25  |  0.44<L>    Ca    9.5      11 May 2020 07:26  Phos  1.2     05-11  Mg     1.8     05-11        RADIOLOGY & ADDITIONAL STUDIES:    PROTEIN CALORIE MALNUTRITION PRESENT: [ ]mild [ ]moderate [ ]severe [ ]underweight [ ]morbid obesity  https://www.andeal.org/vault/2440/web/files/ONC/Table_Clinical%20Characteristics%20to%20Document%20Malnutrition-White%20JV%20et%20al%130322.pdf    Height (cm): 162.6 (03-21-20 @ 03:15), 162.56 (03-07-20 @ 19:18)  Weight (kg): 84.4 (03-21-20 @ 03:15), 89.4 (03-07-20 @ 19:18)  BMI (kg/m2): 31.9 (03-21-20 @ 03:15), 33.8 (03-07-20 @ 19:18)    [ ]PPSV2 < or = to 30% [ ]significant weight loss  [ ]poor nutritional intake  [ ]anasarca     Albumin, Serum: 2.9 g/dL (03-25-20 @ 05:59)   [ ]Artificial Nutrition      REFERRALS:   [ ]Chaplaincy  [ ]Hospice  [ ]Child Life  [ ]Social Work  [ ]Case management [ ]Holistic Therapy

## 2020-05-11 NOTE — PROGRESS NOTE ADULT - PROBLEM SELECTOR PLAN 1
- steroids started, monitor symptoms, also on prochlorperazine and zofran  - can consider zyprexa 2.5mg daily, may help with multiple pathways, check EKG beforehand (last QTc 442 on 5/1/20)  - unclear etiology, initially felt possible 2/2 constipation, now with diarrhea  - unlikely withdrawal from opioids, has nausea before opioid cessation  - unclear if vestibular component, but her nausea is related to movement; may consider benadryl 25mg OV Q6 PRN

## 2020-05-11 NOTE — PROGRESS NOTE ADULT - PROBLEM SELECTOR PLAN 2
- pain meds stopped over weekend, but no current pain  - in future, would advise taper, especially of long acting meds, to avoid withdrawal symptoms

## 2020-05-12 NOTE — PROGRESS NOTE ADULT - SUBJECTIVE AND OBJECTIVE BOX
24F here with worsening abdominal pain in the setting of known adnexal massess, found to have for metastatic carcinoma with final pathology pending. Given evidence of metastatic disease, patient is not a surgical candidate. Has been having worsening adnexal pain, markedly last night after taking senna. States pain is 7-8/10 at its worst, located in adnexal area with radiation down to legs, worse with movement, and tolerable with opioids around 2/10. Palliative care called to help with pain.     INTERVAL EVENTS:  4/30: states having lower back pain, not fully relieved with morphine IR  5/1: used 4 doses of 3mg IV morphine/24 hours  5/2: used 2 BT of 4 mg Morphine/24 hrs.  5/4: used morphine 15mg PO x 1 and 4mg IV x 1/24 hours   5/5: no use of PRN/24 hours  5/7: one PRN MSIR/24 hours, states having constipation (no BM x 3 days) and nausea  5/8: used no PRN, had incomplete BM, still with nausea  5/9: nauseated, no pain currently  5/12: nausea improved, but has back pain     ADVANCE DIRECTIVES:    DNR  MOLST  [ ]  Living Will  [ ]   DECISION MAKER(s):  [ ] Health Care Proxy(s)  [ ] Surrogate(s)  [ ] Guardian           Name(s): Phone Number(s):    BASELINE (I)ADL(s) (prior to admission):  [place]: [ ]Total  [ ] Moderate [ ]Dependent    Allergies    No Known Allergies    Intolerances       PRESENT SYMPTOMS: [ ]Unable to obtain due to poor mentation   Source if other than patient:  [ ]Family   [ ]Team     Pain: [X ]yes [ ]no  QOL impact -  decreased function  Location - back  Aggravating factors -   Quality -   Radiation -   Timing-   Severity (0-10 scale): 7/10   Minimal acceptable level (0-10 scale): 2/10    CPOT:    https://www.[facility].org/getattachment/oll13j81-3w0q-7j0r-2o4y-2665y0295w3d/Critical-Care-Pain-Observation-Tool-(CPOT)      PAIN AD Score:     http://geriatrictoolkit.[place].[email or web address]/cog/painad.pdf (press ctrl +  left click to view)    Dyspnea:                           [ ]Mild [ ]Moderate [ ]Severe  Anxiety:                             [ ]Mild [ ]Moderate [ ]Severe  Fatigue:                             [ ]Mild [ x]Moderate [ ]Severe  Nausea:                             [ ]Mild [X ]Moderate [ ]Severe  Loss of appetite:              [ ]Mild [ ]Moderate [ ]Severe  Constipation:                    [X ]Mild [ ]Moderate [ ]Severe    Other Symptoms:  [X ]All other review of systems negative     Palliative Performance Status Version 2:   40      %    http://[email or web address]/files/news/palliative_performance_scale_[email or web address].pdf    PHYSICAL EXAM:  Vital Signs Last 24 Hrs  T(C): 37.1 (12 May 2020 13:45), Max: 37.8 (11 May 2020 18:10)  T(F): 98.7 (12 May 2020 13:45), Max: 100.1 (11 May 2020 18:10)  HR: 127 (12 May 2020 13:45) (112 - 139)  BP: 131/76 (12 May 2020 13:45) (109/74 - 131/76)  BP(mean): --  RR: 20 (12 May 2020 13:45) (20 - 20)  SpO2: 98% (12 May 2020 13:45) (96% - 98%)    Limited exam for patient safety and to limit infection risk during COVID-19 outbreak. Please refr to primary team's examination for today.  GENERAL:  [ X]Alert  [ ]Oriented x   [ ]Lethargic  [ ]Cachexia  [ ]Unarousable  [ X]Verbal  [ ]Non-Verbal  Behavioral:   [ ] Anxiety  [ ] Delirium [ ] Agitation [ ] Other  HEENT:  [ ]Normal   [ ]Dry mouth   [ ]ET Tube/Trach  [ ]Oral lesions  PULMONARY:   [ ]Clear [ ]Tachypnea  [ ]Audible excessive secretions   [ ]Rhonchi        [ ]Right [ ]Left [ ]Bilateral  [ ]Crackles        [ ]Right [ ]Left [ ]Bilateral  [ ]Wheezing     [ ]Right [ ]Left [ ]Bilateral  [ ]Diminished breath sounds [ ]right [ ]left [ ]bilateral  CARDIOVASCULAR:    [ ]Regular [ ]Irregular [ ]Tachy  [ ]Quirino [ ]Murmur [ ]Other  GASTROINTESTINAL:  [ ]Soft  [ ]Distended   [ ]+BS  [ ]Non tender [ ]Tender  [ ]PEG [ ]OGT/ NGT  Last BM:     GENITOURINARY:  [ ]Normal [ ] Incontinent   [ ]Oliguria/Anuria   [ ]Cifuentes  MUSCULOSKELETAL:   [ ]Normal   [ x]Weakness  [ ]Bed/Wheelchair bound [ ]Edema  NEUROLOGIC:  Lucid speech  [ ]No focal deficits  [ ]Cognitive impairment  [ ]Dysphagia [ ]Dysarthria [ ]Paresis [ ]Other   SKIN:   [ ]Normal    [ ]Rash  [ ]Pressure ulcer(s)       Present on admission [ ]y [ ]n    CRITICAL CARE:  [ ] Shock Present  [ ]Septic [ ]Cardiogenic [ ]Neurologic [ ]Hypovolemic  [ ]  Vasopressors [ ]  Inotropes   [ ]Respiratory failure present [ ]Mechanical ventilation [ ]Non-invasive ventilatory support [ ]High flow  [ ]Acute  [ ]Chronic [ ]Hypoxic  [ ]Hypercarbic [ ]Other  [ ]Other organ failure     LABS: reviewed                          8.5    5.73  )-----------( 249      ( 12 May 2020 07:02 )             27.5     05-12    139  |  99  |  7   ----------------------------<  111<H>  3.3<L>   |  25  |  0.47<L>    Ca    9.4      12 May 2020 07:02  Phos  1.3     05-12  Mg     1.9     05-12        RADIOLOGY & ADDITIONAL STUDIES:    PROTEIN CALORIE MALNUTRITION PRESENT: [ ]mild [ ]moderate [ ]severe [ ]underweight [ ]morbid obesity  https://www.andeal.org/vault/2440/web/files/ONC/Table_Clinical%20Characteristics%20to%20Document%20Malnutrition-White%20JV%20et%20al%469275.pdf    Height (cm): 162.6 (03-21-20 @ 03:15), 162.56 (03-07-20 @ 19:18)  Weight (kg): 84.4 (03-21-20 @ 03:15), 89.4 (03-07-20 @ 19:18)  BMI (kg/m2): 31.9 (03-21-20 @ 03:15), 33.8 (03-07-20 @ 19:18)    [ ]PPSV2 < or = to 30% [ ]significant weight loss  [ ]poor nutritional intake  [ ]anasarca     Albumin, Serum: 2.9 g/dL (03-25-20 @ 05:59)   [ ]Artificial Nutrition      REFERRALS:   [ ]Chaplaincy  [ ]Hospice  [ ]Child Life  [ ]Social Work  [ ]Case management [ ]Holistic Therapy

## 2020-05-12 NOTE — PROGRESS NOTE ADULT - ASSESSMENT
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   - Tumor markers elevated: Cancer Antigen, Ca 27.29: 70.8, Cancer Antigen, 125: 619-->1163, Carcinoembryonic Antigen: 44.7 -->70.9--> 74.2, Beta-HC.0 on initial evaluation (3/7); Checked again on 20 and it was 102.7  - Discussed diagnosis with patient on 3/31/20. Given advanced, metastatic stage, will need to be on indefinite treatment to limit progression of disease.  - S/p C1 mFOLFOX (-). Cycle 2 day1 was on  (5-FU infusion was stopped after about 30 hours for possible coronary vasospasm)  - CTA Chest () was negative for central PE, but unable to evaluate segmental and subsegmental branches due to motion artifact. Currently on full dose Lovenox given tachycardia and hypoxia.  - LE dopplers negative for DVT on .   - CT C/A/P () showed POD. Given imaging findings and uptrending tumor markers, switched chemotherapy to Carbo/Taxol.   - S/p Carbo/Taxol , next cycle due . Patient tolerated well.   - Given diarrhea, pt can refuse bowel regimen and await improvement in bowel movements - Please send for C diff if diarrhea is persistent.   - Nausea- stop reglan, can try Compazine 10mg q6H, with Zofran ATC, if nausea is improved tomorrow, please taper Decadron from 2mg BID to 2mg daily   - Heartburn- PPI and Maalox    - abd xray showing possible ascites, consistent with physical exam. F/u abdominal ultrasound with possible need for paracentesis.     #Fever  Patient with low grade fever on Meropenem, cultures negative so far.  - CXR showed Left lower PNA   - RVP negative  - No need to retest COVID at this time per ID    # Bilateral pleural effusion - Improved  - COVID-19 negative x 3, but parainfluenza positive ().   - CTA Chest () was negative for central PE, but unable to evaluate segmental and subsegmental branches due to motion artifact. Lung imaging showed unchanged metastatic nodules, small bilateral pleural effusions, and atelectasis of the basilar LLL (unchanged as well). No new opopacity which decreases the likelihood of PNA  - CTA chest on  showed no PE, but large bilateral pleural effusions s/p drains in both left and right lung. Bilateral chest tubes removed on .  - Appreciate Pulmonary recs  - Patient is currently saturating well on RA. Continue to closely monitor  - C/w incentive spirometry    - Palliative Care following, appreciate recs  Pain is adequately controlled per patient. Appreciate Palliative Care recs regarding pain management/regimen on discharge.    # DVT PPx  - C/w full dose Lovenox given tachycardia and hypoxia, as mentioned above.    Dispo: Please have physical therapy assess patient regularly     Cesia Banks MD  Hematology Oncology Fellow, PGY-4  [facility] Pager: [phone]/ [facility] Pager: [phone]

## 2020-05-12 NOTE — PROGRESS NOTE ADULT - PROBLEM SELECTOR PLAN 2
- would restart MS IR 15mg Q4 PRN  - will consider restarting MS Contin if needed based on pain requirements

## 2020-05-12 NOTE — PROGRESS NOTE ADULT - SUBJECTIVE AND OBJECTIVE BOX
Patient is a 24y old  Female who presents with a chief complaint of b/l pelvic masses (12 May 2020 08:07)    Being followed by ID for fever, ? UTI    Interval history:decreased vomiting  decreased abd pain   No acute events      ROS:  No cough,SOB,CP  No diarrhea   No other complaints      Antimicrobials:    meropenem  IVPB 1000 milliGRAM(s) IV Intermittent every 8 hours    Other medications reviewed    Vital Signs Last 24 Hrs  T(C): 37.2 (05-12-20 @ 05:11), Max: 37.8 (05-11-20 @ 18:10)  T(F): 99 (05-12-20 @ 05:11), Max: 100.1 (05-11-20 @ 18:10)  HR: 136 (05-12-20 @ 05:11) (134 - 139)  BP: 109/74 (05-12-20 @ 05:11) (109/74 - 134/80)  BP(mean): --  RR: 20 (05-12-20 @ 05:11) (20 - 20)  SpO2: 98% (05-12-20 @ 05:11) (96% - 98%)    Physical Exam:      HEENT PERRLA EOMI    No oral exudate or erythema    Chest Good AE,CTA    CVS RRR S1 S2 WNl No murmur or rub or gallop    Abd soft BS normal Lower half tenderness ? mass    R PICC  site no erythema tenderness or discharge    CNS AAO X 3 no focal  Lab Data:                          8.5    5.73  )-----------( 249      ( 12 May 2020 07:02 )             27.5       05-12    139  |  99  |  7   ----------------------------<  111<H>  3.3<L>   |  25  |  0.47<L>    Ca    9.4      12 May 2020 07:02  Phos  1.3     05-12  Mg     1.9     05-12    TPro  6.8  /  Alb  2.8<L>  /  TBili  0.4  /  DBili  x   /  AST  19  /  ALT  7<L>  /  AlkPhos  262<H>  05-12        Culture - Urine (collected 08 May 2020 09:06)  Source: .Urine Clean Catch (Midstream)  Final Report (09 May 2020 08:24):    >=3 organisms. Probable collection contamination.    Culture - Blood (collected 08 May 2020 09:02)  Source: .Blood Blood-Peripheral  Preliminary Report (09 May 2020 10:01):    No growth to date.    Culture - Blood (collected 08 May 2020 09:02)  Source: .Blood PICC/PERC Double Lumen BLUE  Preliminary Report (09 May 2020 10:01):    No growth to date.          < from: Xray Chest 1 View- PORTABLE-Routine (05.08.20 @ 09:38) >  IMPRESSION:    Right PICC tip within SVC.    Consolidative opacity at the left base.    DISCRETE X-RAY DATA:  Percent of LEFT lung opacification: 34-66%  Percent of RIGHT lung opacification: Clear  Change in lung opacification from most recent x-ray (<=3 days): Increase  Change from prior dated 3 or more days (same admission): Increase        < end of copied text >

## 2020-05-12 NOTE — PROGRESS NOTE ADULT - ATTENDING COMMENTS
Adenocarcinoma of unknown primary s/p first cycle of Carboplatin and Taxol.  Clinically she is better today. Nausea better controlled.  AXR shows some ascites, she c.o abdominal distension and discomfort. Will arrange for paracentesis.  Plan discussed with patient.  Continue PT.

## 2020-05-12 NOTE — CHART NOTE - NSCHARTNOTEFT_GEN_A_CORE
Nutrition Follow Up Note  Patient seen for: Malnutrition follow up. Chart reviewed, events noted. CT noted with progression of disease, chemotherapy changed.    Source: Pt    Diet : Regular diet with ensure enlive 2 daily-noted ensure enlive currently suspended in [employer]  system to allow pt to drink bedside stock-still with several.     Patient reports: Nausea with episodes of vomiting- currently ordered for zofran and compazine, pt denies vomiting today stated she had 1 episode of spit up, pt reports multiple episodes of nausea and vomiting yesterday however, pt reports bowel movements have been "constant", stated she had 4 episodes thus far today.      PO intake : Pt reports poor po intake for the past several days related to N+V, stated she was able to eat more of her breakfast today than yesterday, had hash browns and some yogurt today. Pt has not been regularly taking ensure enlive, pt receptive to trial of ensure clear.       Daily No new wt to trend, latest weight 176.8 lbs (standing 5/4), decreased from admission weight.    % Weight Change    Pertinent Medications: MEDICATIONS  (STANDING):  dexAMETHasone  Injectable 2 milliGRAM(s) IV Push two times a day  diltiazem    Tablet 60 milliGRAM(s) Oral every 8 hours  enoxaparin Injectable 80 milliGRAM(s) SubCutaneous every 12 hours  melatonin 3 milliGRAM(s) Oral at bedtime  meropenem  IVPB 1000 milliGRAM(s) IV Intermittent every 8 hours  ondansetron Injectable 4 milliGRAM(s) IV Push every 6 hours  pantoprazole    Tablet 40 milliGRAM(s) Oral before breakfast  polyethylene glycol 3350 17 Gram(s) Oral daily  potassium phosphate IVPB 15 milliMole(s) IV Intermittent once  senna 2 Tablet(s) Oral at bedtime  sodium chloride 0.9% lock flush 3 milliLiter(s) IV Push every 8 hours  sodium chloride 0.9% lock flush 3 milliLiter(s) IV Push every 8 hours    MEDICATIONS  (PRN):  acetaminophen   Tablet .. 650 milliGRAM(s) Oral every 6 hours PRN Temp greater or equal to 38C (100.4F)  aluminum hydroxide/magnesium hydroxide/simethicone Suspension 30 milliLiter(s) Oral every 6 hours PRN Dyspepsia  bisacodyl 5 milliGRAM(s) Oral every 12 hours PRN Constipation  diphenhydrAMINE   Injectable 50 milliGRAM(s) IV Push once PRN Reaction  hydrocortisone sodium succinate Injectable 100 milliGRAM(s) IV Push once PRN Reaction  morphine  IR 15 milliGRAM(s) Oral every 4 hours PRN Moderate Pain (4 - 6)  naloxone Injectable 0.1 milliGRAM(s) IV Push every 3 minutes PRN Sedation or RR <10  prochlorperazine   Injectable 10 milliGRAM(s) IV Push every 6 hours PRN Nausea/Vomiting  simethicone 80 milliGRAM(s) Chew every 6 hours PRN Gas    Pertinent Labs: 05-12 @ 07:02: Na 139, BUN 7, Cr 0.47<L>, <H>, K+ 3.3<L>, Phos 1.3<L>, Mg 1.9, Alk Phos 262<H>, ALT/SGPT 7<L>, AST/SGOT 19, HbA1c --    Finger Sticks: none       Skin per nursing documentation: free of pressure injury   Edema: 1+ lorraine foot/ankle edema     Estimated Needs:   [x ] no change since previous assessment  [ ] recalculated:     Previous Nutrition Diagnosis: Severe malnutrition   Nutrition Diagnosis is: ongoing, addressed with food preferences and supplements, nutrition care plan in progress     New Nutrition Diagnosis:  Related to:    As evidenced by:      Interventions:     Recommend  1) Continue regular diet, will continue to suspend provision of ensure enlive in [employer] foodservice system, will provide trial of ensure clear-follow up regarding pt's supplement preference  2) Continue to encourage po intake and obtain/honor food preferences as able-RD obtained meal selections for today and tomorrow.     Monitoring and Evaluation:     Continue to monitor Nutritional intake, Tolerance to diet prescription, weights, labs, skin integrity    RD remains available upon request and will follow up per protocol    Kala Jernigan R.D., [facility], Pager #[phone]

## 2020-05-12 NOTE — PROGRESS NOTE ADULT - SUBJECTIVE AND OBJECTIVE BOX
INTERVAL History:    Allergies    No Known Allergies    Intolerances        MEDICATIONS  (STANDING):  dexAMETHasone  Injectable 2 milliGRAM(s) IV Push two times a day  diltiazem    Tablet 60 milliGRAM(s) Oral every 8 hours  enoxaparin Injectable 80 milliGRAM(s) SubCutaneous every 12 hours  melatonin 3 milliGRAM(s) Oral at bedtime  meropenem  IVPB 1000 milliGRAM(s) IV Intermittent every 8 hours  ondansetron Injectable 4 milliGRAM(s) IV Push every 6 hours  pantoprazole    Tablet 40 milliGRAM(s) Oral before breakfast  polyethylene glycol 3350 17 Gram(s) Oral daily  potassium chloride  20 mEq/100 mL IVPB 20 milliEquivalent(s) IV Intermittent every 2 hours  potassium phosphate IVPB 15 milliMole(s) IV Intermittent once  senna 2 Tablet(s) Oral at bedtime  sodium chloride 0.9% lock flush 3 milliLiter(s) IV Push every 8 hours  sodium chloride 0.9% lock flush 3 milliLiter(s) IV Push every 8 hours    MEDICATIONS  (PRN):  acetaminophen   Tablet .. 650 milliGRAM(s) Oral every 6 hours PRN Temp greater or equal to 38C (100.4F)  aluminum hydroxide/magnesium hydroxide/simethicone Suspension 30 milliLiter(s) Oral every 6 hours PRN Dyspepsia  bisacodyl 5 milliGRAM(s) Oral every 12 hours PRN Constipation  diphenhydrAMINE   Injectable 50 milliGRAM(s) IV Push once PRN Reaction  hydrocortisone sodium succinate Injectable 100 milliGRAM(s) IV Push once PRN Reaction  naloxone Injectable 0.1 milliGRAM(s) IV Push every 3 minutes PRN Sedation or RR <10  prochlorperazine   Injectable 10 milliGRAM(s) IV Push every 6 hours PRN Nausea/Vomiting  simethicone 80 milliGRAM(s) Chew every 6 hours PRN Gas      Vital Signs Last 24 Hrs  T(C): 37.2 (12 May 2020 05:11), Max: 37.8 (11 May 2020 18:10)  T(F): 99 (12 May 2020 05:11), Max: 100.1 (11 May 2020 18:10)  HR: 136 (12 May 2020 05:11) (134 - 139)  BP: 109/74 (12 May 2020 05:11) (109/74 - 134/80)  BP(mean): --  RR: 20 (12 May 2020 05:11) (20 - 20)  SpO2: 98% (12 May 2020 05:11) (96% - 98%)    PHYSICAL EXAM:    General: AOX3, no acute distress  EYES: EOMI, PERRLA, conjunctiva and sclera clear  NECK: Supple, No JVD, Normal thyroid  CHEST/LUNG: Clear to auscultation bilaterally; No rales, rhonchi, or wheezing  HEART: Regular rate and rhythm; no murmurs  ABDOMEN: Soft, Nontender. BS+  LYMPH: No lymphadenopathy noted  Extremities: No peripheral edema      LABS:                        8.5    5.73  )-----------( 249      ( 12 May 2020 07:02 )             27.5     05-12    139  |  99  |  7   ----------------------------<  111<H>  3.3<L>   |  25  |  0.47<L>    Ca    9.4      12 May 2020 07:02  Phos  1.3     05-12  Mg     1.9     05-12    TPro  6.8  /  Alb  2.8<L>  /  TBili  0.4  /  DBili  x   /  AST  19  /  ALT  7<L>  /  AlkPhos  262<H>  05-12    PT/INR - ( 12 May 2020 07:02 )   PT: 14.1 sec;   INR: 1.23 ratio                 RADIOLOGY & ADDITIONAL STUDIES:    PATHOLOGY: INTERVAL History: T max overnight 100.1. Abd x ray was obtained for abd distension, showing decreased amount of air is seen within the bowel probably replaced with fluid, ascites cannot be ruled out.     Allergies    No Known Allergies    Intolerances        MEDICATIONS  (STANDING):  dexAMETHasone  Injectable 2 milliGRAM(s) IV Push two times a day  diltiazem    Tablet 60 milliGRAM(s) Oral every 8 hours  enoxaparin Injectable 80 milliGRAM(s) SubCutaneous every 12 hours  melatonin 3 milliGRAM(s) Oral at bedtime  meropenem  IVPB 1000 milliGRAM(s) IV Intermittent every 8 hours  ondansetron Injectable 4 milliGRAM(s) IV Push every 6 hours  pantoprazole    Tablet 40 milliGRAM(s) Oral before breakfast  polyethylene glycol 3350 17 Gram(s) Oral daily  potassium chloride  20 mEq/100 mL IVPB 20 milliEquivalent(s) IV Intermittent every 2 hours  potassium phosphate IVPB 15 milliMole(s) IV Intermittent once  senna 2 Tablet(s) Oral at bedtime  sodium chloride 0.9% lock flush 3 milliLiter(s) IV Push every 8 hours  sodium chloride 0.9% lock flush 3 milliLiter(s) IV Push every 8 hours    MEDICATIONS  (PRN):  acetaminophen   Tablet .. 650 milliGRAM(s) Oral every 6 hours PRN Temp greater or equal to 38C (100.4F)  aluminum hydroxide/magnesium hydroxide/simethicone Suspension 30 milliLiter(s) Oral every 6 hours PRN Dyspepsia  bisacodyl 5 milliGRAM(s) Oral every 12 hours PRN Constipation  diphenhydrAMINE   Injectable 50 milliGRAM(s) IV Push once PRN Reaction  hydrocortisone sodium succinate Injectable 100 milliGRAM(s) IV Push once PRN Reaction  naloxone Injectable 0.1 milliGRAM(s) IV Push every 3 minutes PRN Sedation or RR <10  prochlorperazine   Injectable 10 milliGRAM(s) IV Push every 6 hours PRN Nausea/Vomiting  simethicone 80 milliGRAM(s) Chew every 6 hours PRN Gas      Vital Signs Last 24 Hrs  T(C): 37.2 (12 May 2020 05:11), Max: 37.8 (11 May 2020 18:10)  T(F): 99 (12 May 2020 05:11), Max: 100.1 (11 May 2020 18:10)  HR: 136 (12 May 2020 05:11) (134 - 139)  BP: 109/74 (12 May 2020 05:11) (109/74 - 134/80)  BP(mean): --  RR: 20 (12 May 2020 05:11) (20 - 20)  SpO2: 98% (12 May 2020 05:11) (96% - 98%)    PHYSICAL EXAM:    General: AOX3, no acute distress  EYES: EOMI, PERRLA, conjunctiva and sclera clear  NECK: Supple, No JVD, Normal thyroid  CHEST/LUNG: Clear to auscultation bilaterally; No rales, rhonchi, or wheezing  HEART: Regular rate and rhythm; no murmurs  ABDOMEN: Soft, Nontender. BS+  LYMPH: No lymphadenopathy noted  Extremities: No peripheral edema      LABS:                        8.5    5.73  )-----------( 249      ( 12 May 2020 07:02 )             27.5     05-12    139  |  99  |  7   ----------------------------<  111<H>  3.3<L>   |  25  |  0.47<L>    Ca    9.4      12 May 2020 07:02  Phos  1.3     05-12  Mg     1.9     05-12    TPro  6.8  /  Alb  2.8<L>  /  TBili  0.4  /  DBili  x   /  AST  19  /  ALT  7<L>  /  AlkPhos  262<H>  05-12    PT/INR - ( 12 May 2020 07:02 )   PT: 14.1 sec;   INR: 1.23 ratio                 RADIOLOGY & ADDITIONAL STUDIES:    PATHOLOGY: INTERVAL History: T max overnight 100.1. Abd x ray was obtained for abd distension, showing decreased amount of air is seen within the bowel probably replaced with fluid, ascites cannot be ruled out. Nausea and vomiting better today.     Allergies    No Known Allergies    Intolerances        MEDICATIONS  (STANDING):  dexAMETHasone  Injectable 2 milliGRAM(s) IV Push two times a day  diltiazem    Tablet 60 milliGRAM(s) Oral every 8 hours  enoxaparin Injectable 80 milliGRAM(s) SubCutaneous every 12 hours  melatonin 3 milliGRAM(s) Oral at bedtime  meropenem  IVPB 1000 milliGRAM(s) IV Intermittent every 8 hours  ondansetron Injectable 4 milliGRAM(s) IV Push every 6 hours  pantoprazole    Tablet 40 milliGRAM(s) Oral before breakfast  polyethylene glycol 3350 17 Gram(s) Oral daily  potassium chloride  20 mEq/100 mL IVPB 20 milliEquivalent(s) IV Intermittent every 2 hours  potassium phosphate IVPB 15 milliMole(s) IV Intermittent once  senna 2 Tablet(s) Oral at bedtime  sodium chloride 0.9% lock flush 3 milliLiter(s) IV Push every 8 hours  sodium chloride 0.9% lock flush 3 milliLiter(s) IV Push every 8 hours    MEDICATIONS  (PRN):  acetaminophen   Tablet .. 650 milliGRAM(s) Oral every 6 hours PRN Temp greater or equal to 38C (100.4F)  aluminum hydroxide/magnesium hydroxide/simethicone Suspension 30 milliLiter(s) Oral every 6 hours PRN Dyspepsia  bisacodyl 5 milliGRAM(s) Oral every 12 hours PRN Constipation  diphenhydrAMINE   Injectable 50 milliGRAM(s) IV Push once PRN Reaction  hydrocortisone sodium succinate Injectable 100 milliGRAM(s) IV Push once PRN Reaction  naloxone Injectable 0.1 milliGRAM(s) IV Push every 3 minutes PRN Sedation or RR <10  prochlorperazine   Injectable 10 milliGRAM(s) IV Push every 6 hours PRN Nausea/Vomiting  simethicone 80 milliGRAM(s) Chew every 6 hours PRN Gas      Vital Signs Last 24 Hrs  T(C): 37.2 (12 May 2020 05:11), Max: 37.8 (11 May 2020 18:10)  T(F): 99 (12 May 2020 05:11), Max: 100.1 (11 May 2020 18:10)  HR: 136 (12 May 2020 05:11) (134 - 139)  BP: 109/74 (12 May 2020 05:11) (109/74 - 134/80)  BP(mean): --  RR: 20 (12 May 2020 05:11) (20 - 20)  SpO2: 98% (12 May 2020 05:11) (96% - 98%)    PHYSICAL EXAM:    General: AOX3, no acute distress on nasal cannula  Abdomen: Soft, but tense, mild tenderness to palpation       LABS:                        8.5    5.73  )-----------( 249      ( 12 May 2020 07:02 )             27.5     05-12    139  |  99  |  7   ----------------------------<  111<H>  3.3<L>   |  25  |  0.47<L>    Ca    9.4      12 May 2020 07:02  Phos  1.3     05-12  Mg     1.9     05-12    TPro  6.8  /  Alb  2.8<L>  /  TBili  0.4  /  DBili  x   /  AST  19  /  ALT  7<L>  /  AlkPhos  262<H>  05-12    PT/INR - ( 12 May 2020 07:02 )   PT: 14.1 sec;   INR: 1.23 ratio                 RADIOLOGY & ADDITIONAL STUDIES:    < from: Xray Abdomen 1 View PORTABLE -Urgent (05.11.20 @ 22:36) >    EXAM:  XR ABDOMEN PORTABLE URGENT 1V                            PROCEDURE DATE:  05/11/2020            INTERPRETATION:  A single x-ray of the abdomen was obtained on May 11, 2020.    Indication: Abdominal distention.    Impression:    Decreased amount of air is seen within the bowel probably replaced with fluid. Ascites cannot be ruled out. Correlate with CT scan that was performed  April 30, 2020. However if clinically  warranted repeat CT scan should be obtained.                    ISHMAEL GARCIA M.D., ATTENDING RADIOLOGIST  This document has been electronically signed. May 12 2020  8:12AM        < end of copied text >      PATHOLOGY:

## 2020-05-12 NOTE — PROGRESS NOTE ADULT - ASSESSMENT
24 f with b/l adnexal masses and pelvic LAD, metastatic carcinoma of unknown primary presented 3/20 with abd pain, s/p CT guided biopsy 3/25, PICC line 4/3 to start chemo  pt intermittently febrile and leukocytosis,   blood culture has been NGTD  COVID negative x 7 last one 4/29  C-diff negative  metastatic ca with ?tumor fever  She has progression of disease based on CT C/A/P done on 4/30   on chemo  Prior MDR urinary pathogens-most recent urine Cx >3 organisms    A) fever  resolved   ?UTI   ? Tumor fever  ?chemo related   follow clinically and repeat cx   continue  empiric merem as on chemo  Continue observation   Monitor for s/s any other foci      B) malignancy  will defer to hem onc on chemo  Observe for s/s any nosocomial infectious process    Will tailor plan for ID issues  per course,results.Will defer to primary team on management of other issues.  Assessment, plan and recommendations as detailed above were discussed with the hem onc  team.    Will Follow.  Beeper [phone] [facility] [id number].   Wknd/afterhours/No response-[phone] or Fellow on call

## 2020-05-13 NOTE — PROGRESS NOTE ADULT - PROBLEM SELECTOR PLAN 1
- improving  - steroids started, monitor symptoms, also on prochlorperazine and zofran  - if worsening, can consider zyprexa 2.5mg daily, may help with multiple pathways, check EKG beforehand (last QTc 442 on 5/1/20)

## 2020-05-13 NOTE — PROGRESS NOTE ADULT - SUBJECTIVE AND OBJECTIVE BOX
INTERVAL History:    Allergies    No Known Allergies    Intolerances        MEDICATIONS  (STANDING):  diltiazem    Tablet 60 milliGRAM(s) Oral every 8 hours  enoxaparin Injectable 80 milliGRAM(s) SubCutaneous every 12 hours  melatonin 3 milliGRAM(s) Oral at bedtime  meropenem  IVPB 1000 milliGRAM(s) IV Intermittent every 8 hours  ondansetron Injectable 4 milliGRAM(s) IV Push every 6 hours  pantoprazole    Tablet 40 milliGRAM(s) Oral before breakfast  polyethylene glycol 3350 17 Gram(s) Oral daily  potassium phosphate IVPB 15 milliMole(s) IV Intermittent once  senna 2 Tablet(s) Oral at bedtime  sodium chloride 0.9% lock flush 3 milliLiter(s) IV Push every 8 hours  sodium chloride 0.9% lock flush 3 milliLiter(s) IV Push every 8 hours    MEDICATIONS  (PRN):  acetaminophen   Tablet .. 650 milliGRAM(s) Oral every 6 hours PRN Temp greater or equal to 38C (100.4F)  aluminum hydroxide/magnesium hydroxide/simethicone Suspension 30 milliLiter(s) Oral every 6 hours PRN Dyspepsia  bisacodyl 5 milliGRAM(s) Oral every 12 hours PRN Constipation  diphenhydrAMINE   Injectable 50 milliGRAM(s) IV Push once PRN Reaction  hydrocortisone sodium succinate Injectable 100 milliGRAM(s) IV Push once PRN Reaction  morphine  IR 15 milliGRAM(s) Oral every 4 hours PRN Moderate Pain (4 - 6)  naloxone Injectable 0.1 milliGRAM(s) IV Push every 3 minutes PRN Sedation or RR <10  prochlorperazine   Injectable 10 milliGRAM(s) IV Push every 6 hours PRN Nausea/Vomiting  simethicone 80 milliGRAM(s) Chew every 6 hours PRN Gas      Vital Signs Last 24 Hrs  T(C): 36.4 (13 May 2020 05:13), Max: 37.1 (12 May 2020 13:45)  T(F): 97.5 (13 May 2020 05:13), Max: 98.7 (12 May 2020 13:45)  HR: 119 (13 May 2020 05:13) (112 - 130)  BP: 127/85 (13 May 2020 05:13) (122/85 - 134/89)  BP(mean): --  RR: 20 (13 May 2020 05:13) (20 - 20)  SpO2: 99% (13 May 2020 05:13) (98% - 99%)    PHYSICAL EXAM:    General: AOX3, no acute distress  EYES: EOMI, PERRLA, conjunctiva and sclera clear  NECK: Supple, No JVD, Normal thyroid  CHEST/LUNG: Clear to auscultation bilaterally; No rales, rhonchi, or wheezing  HEART: Regular rate and rhythm; no murmurs  ABDOMEN: Soft, Nontender. BS+  LYMPH: No lymphadenopathy noted  Extremities: No peripheral edema      LABS:                        8.1    5.66  )-----------( 219      ( 13 May 2020 07:22 )             26.7     05-13    137  |  99  |  9   ----------------------------<  121<H>  3.8   |  24  |  0.44<L>    Ca    8.8      13 May 2020 07:20  Phos  2.2     05-13  Mg     1.8     05-13    TPro  6.8  /  Alb  2.7<L>  /  TBili  0.3  /  DBili  x   /  AST  14  /  ALT  6<L>  /  AlkPhos  233<H>  05-13    PT/INR - ( 13 May 2020 07:20 )   PT: 14.0 sec;   INR: 1.21 ratio                 RADIOLOGY & ADDITIONAL STUDIES:    PATHOLOGY: INTERVAL History: No acute events overnight. No fevers noted overnight or this morning. Nausea improved, decadron tapered from BID to daily.     Allergies    No Known Allergies    Intolerances        MEDICATIONS  (STANDING):  diltiazem    Tablet 60 milliGRAM(s) Oral every 8 hours  enoxaparin Injectable 80 milliGRAM(s) SubCutaneous every 12 hours  melatonin 3 milliGRAM(s) Oral at bedtime  meropenem  IVPB 1000 milliGRAM(s) IV Intermittent every 8 hours  ondansetron Injectable 4 milliGRAM(s) IV Push every 6 hours  pantoprazole    Tablet 40 milliGRAM(s) Oral before breakfast  polyethylene glycol 3350 17 Gram(s) Oral daily  potassium phosphate IVPB 15 milliMole(s) IV Intermittent once  senna 2 Tablet(s) Oral at bedtime  sodium chloride 0.9% lock flush 3 milliLiter(s) IV Push every 8 hours  sodium chloride 0.9% lock flush 3 milliLiter(s) IV Push every 8 hours    MEDICATIONS  (PRN):  acetaminophen   Tablet .. 650 milliGRAM(s) Oral every 6 hours PRN Temp greater or equal to 38C (100.4F)  aluminum hydroxide/magnesium hydroxide/simethicone Suspension 30 milliLiter(s) Oral every 6 hours PRN Dyspepsia  bisacodyl 5 milliGRAM(s) Oral every 12 hours PRN Constipation  diphenhydrAMINE   Injectable 50 milliGRAM(s) IV Push once PRN Reaction  hydrocortisone sodium succinate Injectable 100 milliGRAM(s) IV Push once PRN Reaction  morphine  IR 15 milliGRAM(s) Oral every 4 hours PRN Moderate Pain (4 - 6)  naloxone Injectable 0.1 milliGRAM(s) IV Push every 3 minutes PRN Sedation or RR <10  prochlorperazine   Injectable 10 milliGRAM(s) IV Push every 6 hours PRN Nausea/Vomiting  simethicone 80 milliGRAM(s) Chew every 6 hours PRN Gas      Vital Signs Last 24 Hrs  T(C): 36.4 (13 May 2020 05:13), Max: 37.1 (12 May 2020 13:45)  T(F): 97.5 (13 May 2020 05:13), Max: 98.7 (12 May 2020 13:45)  HR: 119 (13 May 2020 05:13) (112 - 130)  BP: 127/85 (13 May 2020 05:13) (122/85 - 134/89)  BP(mean): --  RR: 20 (13 May 2020 05:13) (20 - 20)  SpO2: 99% (13 May 2020 05:13) (98% - 99%)    PHYSICAL EXAM:    General: AOX3, no acute distress  EYES: EOMI, PERRLA, conjunctiva and sclera clear  NECK: Supple, No JVD, Normal thyroid  CHEST/LUNG: Clear to auscultation bilaterally; No rales, rhonchi, or wheezing  HEART: Regular rate and rhythm; no murmurs  ABDOMEN: Soft, Nontender. BS+  LYMPH: No lymphadenopathy noted  Extremities: No peripheral edema      LABS:                        8.1    5.66  )-----------( 219      ( 13 May 2020 07:22 )             26.7     05-13    137  |  99  |  9   ----------------------------<  121<H>  3.8   |  24  |  0.44<L>    Ca    8.8      13 May 2020 07:20  Phos  2.2     05-13  Mg     1.8     05-13    TPro  6.8  /  Alb  2.7<L>  /  TBili  0.3  /  DBili  x   /  AST  14  /  ALT  6<L>  /  AlkPhos  233<H>  05-13    PT/INR - ( 13 May 2020 07:20 )   PT: 14.0 sec;   INR: 1.21 ratio                 RADIOLOGY & ADDITIONAL STUDIES:    PATHOLOGY: INTERVAL History: No acute events overnight. No fevers noted overnight or this morning. Nausea improved, decadron tapered from BID to daily. Abd u/s performed yesterday showed no evidence of ascites.     Allergies    No Known Allergies    Intolerances        MEDICATIONS  (STANDING):  diltiazem    Tablet 60 milliGRAM(s) Oral every 8 hours  enoxaparin Injectable 80 milliGRAM(s) SubCutaneous every 12 hours  melatonin 3 milliGRAM(s) Oral at bedtime  meropenem  IVPB 1000 milliGRAM(s) IV Intermittent every 8 hours  ondansetron Injectable 4 milliGRAM(s) IV Push every 6 hours  pantoprazole    Tablet 40 milliGRAM(s) Oral before breakfast  polyethylene glycol 3350 17 Gram(s) Oral daily  potassium phosphate IVPB 15 milliMole(s) IV Intermittent once  senna 2 Tablet(s) Oral at bedtime  sodium chloride 0.9% lock flush 3 milliLiter(s) IV Push every 8 hours  sodium chloride 0.9% lock flush 3 milliLiter(s) IV Push every 8 hours    MEDICATIONS  (PRN):  acetaminophen   Tablet .. 650 milliGRAM(s) Oral every 6 hours PRN Temp greater or equal to 38C (100.4F)  aluminum hydroxide/magnesium hydroxide/simethicone Suspension 30 milliLiter(s) Oral every 6 hours PRN Dyspepsia  bisacodyl 5 milliGRAM(s) Oral every 12 hours PRN Constipation  diphenhydrAMINE   Injectable 50 milliGRAM(s) IV Push once PRN Reaction  hydrocortisone sodium succinate Injectable 100 milliGRAM(s) IV Push once PRN Reaction  morphine  IR 15 milliGRAM(s) Oral every 4 hours PRN Moderate Pain (4 - 6)  naloxone Injectable 0.1 milliGRAM(s) IV Push every 3 minutes PRN Sedation or RR <10  prochlorperazine   Injectable 10 milliGRAM(s) IV Push every 6 hours PRN Nausea/Vomiting  simethicone 80 milliGRAM(s) Chew every 6 hours PRN Gas      Vital Signs Last 24 Hrs  T(C): 36.4 (13 May 2020 05:13), Max: 37.1 (12 May 2020 13:45)  T(F): 97.5 (13 May 2020 05:13), Max: 98.7 (12 May 2020 13:45)  HR: 119 (13 May 2020 05:13) (112 - 130)  BP: 127/85 (13 May 2020 05:13) (122/85 - 134/89)  BP(mean): --  RR: 20 (13 May 2020 05:13) (20 - 20)  SpO2: 99% (13 May 2020 05:13) (98% - 99%)    PHYSICAL EXAM:    General: AOX3, no acute distress  EYES: EOMI, PERRLA, conjunctiva and sclera clear  NECK: Supple, No JVD, Normal thyroid  CHEST/LUNG: Clear to auscultation bilaterally; No rales, rhonchi, or wheezing  HEART: Regular rate and rhythm; no murmurs  ABDOMEN: Soft, Nontender. BS+  LYMPH: No lymphadenopathy noted  Extremities: No peripheral edema      LABS:                        8.1    5.66  )-----------( 219      ( 13 May 2020 07:22 )             26.7     05-13    137  |  99  |  9   ----------------------------<  121<H>  3.8   |  24  |  0.44<L>    Ca    8.8      13 May 2020 07:20  Phos  2.2     05-13  Mg     1.8     05-13    TPro  6.8  /  Alb  2.7<L>  /  TBili  0.3  /  DBili  x   /  AST  14  /  ALT  6<L>  /  AlkPhos  233<H>  05-13    PT/INR - ( 13 May 2020 07:20 )   PT: 14.0 sec;   INR: 1.21 ratio                 RADIOLOGY & ADDITIONAL STUDIES:    PATHOLOGY: INTERVAL History: No acute events overnight. No fevers noted overnight or this morning. Nausea improved, decadron tapered from BID to daily. Abd u/s performed yesterday showed no evidence of ascites. Diarrhea resolving.     Allergies    No Known Allergies    Intolerances        MEDICATIONS  (STANDING):  diltiazem    Tablet 60 milliGRAM(s) Oral every 8 hours  enoxaparin Injectable 80 milliGRAM(s) SubCutaneous every 12 hours  melatonin 3 milliGRAM(s) Oral at bedtime  meropenem  IVPB 1000 milliGRAM(s) IV Intermittent every 8 hours  ondansetron Injectable 4 milliGRAM(s) IV Push every 6 hours  pantoprazole    Tablet 40 milliGRAM(s) Oral before breakfast  polyethylene glycol 3350 17 Gram(s) Oral daily  potassium phosphate IVPB 15 milliMole(s) IV Intermittent once  senna 2 Tablet(s) Oral at bedtime  sodium chloride 0.9% lock flush 3 milliLiter(s) IV Push every 8 hours  sodium chloride 0.9% lock flush 3 milliLiter(s) IV Push every 8 hours    MEDICATIONS  (PRN):  acetaminophen   Tablet .. 650 milliGRAM(s) Oral every 6 hours PRN Temp greater or equal to 38C (100.4F)  aluminum hydroxide/magnesium hydroxide/simethicone Suspension 30 milliLiter(s) Oral every 6 hours PRN Dyspepsia  bisacodyl 5 milliGRAM(s) Oral every 12 hours PRN Constipation  diphenhydrAMINE   Injectable 50 milliGRAM(s) IV Push once PRN Reaction  hydrocortisone sodium succinate Injectable 100 milliGRAM(s) IV Push once PRN Reaction  morphine  IR 15 milliGRAM(s) Oral every 4 hours PRN Moderate Pain (4 - 6)  naloxone Injectable 0.1 milliGRAM(s) IV Push every 3 minutes PRN Sedation or RR <10  prochlorperazine   Injectable 10 milliGRAM(s) IV Push every 6 hours PRN Nausea/Vomiting  simethicone 80 milliGRAM(s) Chew every 6 hours PRN Gas      Vital Signs Last 24 Hrs  T(C): 36.4 (13 May 2020 05:13), Max: 37.1 (12 May 2020 13:45)  T(F): 97.5 (13 May 2020 05:13), Max: 98.7 (12 May 2020 13:45)  HR: 119 (13 May 2020 05:13) (112 - 130)  BP: 127/85 (13 May 2020 05:13) (122/85 - 134/89)  BP(mean): --  RR: 20 (13 May 2020 05:13) (20 - 20)  SpO2: 99% (13 May 2020 05:13) (98% - 99%)    PHYSICAL EXAM:    AOx 3 no acute distress  Abdomen -Mild tenderness to palpation      LABS:                        8.1    5.66  )-----------( 219      ( 13 May 2020 07:22 )             26.7     05-13    137  |  99  |  9   ----------------------------<  121<H>  3.8   |  24  |  0.44<L>    Ca    8.8      13 May 2020 07:20  Phos  2.2     05-13  Mg     1.8     05-13    TPro  6.8  /  Alb  2.7<L>  /  TBili  0.3  /  DBili  x   /  AST  14  /  ALT  6<L>  /  AlkPhos  233<H>  05-13    PT/INR - ( 13 May 2020 07:20 )   PT: 14.0 sec;   INR: 1.21 ratio                 RADIOLOGY & ADDITIONAL STUDIES:  < from: US Abdomen Limited (05.12.20 @ 15:13) >    EXAM:  US ABDOMEN LIMITED                            PROCEDURE DATE:  05/12/2020            INTERPRETATION:  CLINICAL INFORMATION: Ovarian carcinoma. Evaluate for ascites.    COMPARISON: Correlation is made with abdominal and pelvic CT dated 4/30/2020.    TECHNIQUE: All 4 quadrants were scanned..     FINDINGS:    Partially imaged complex pelvic masses are noted, as were seen on CT, consistent with ovarian neoplasm.    No ascites.    IMPRESSION:     Partially imaged complex pelvic masses, consistent with known ovarian neoplasm.    No ascites.                      FER OAKLEY M.D., ATTENDING RADIOLOGIST  This document has been electronically signed. May 12 2020  3:16PM    < end of copied text >        PATHOLOGY:

## 2020-05-13 NOTE — PROGRESS NOTE ADULT - SUBJECTIVE AND OBJECTIVE BOX
24F here with worsening abdominal pain in the setting of known adnexal massess, found to have for metastatic carcinoma with final pathology pending. Given evidence of metastatic disease, patient is not a surgical candidate. Has been having worsening adnexal pain, markedly last night after taking senna. States pain is 7-8/10 at its worst, located in adnexal area with radiation down to legs, worse with movement, and tolerable with opioids around 2/10. Palliative care called to help with pain.     INTERVAL EVENTS:  4/30: states having lower back pain, not fully relieved with morphine IR  5/1: used 4 doses of 3mg IV morphine/24 hours  5/2: used 2 BT of 4 mg Morphine/24 hrs.  5/4: used morphine 15mg PO x 1 and 4mg IV x 1/24 hours   5/5: no use of PRN/24 hours  5/7: one PRN MSIR/24 hours, states having constipation (no BM x 3 days) and nausea  5/8: used no PRN, had incomplete BM, still with nausea  5/9: nauseated, no pain currently  5/12: nausea improved, but has back pain   5/13: used MS IR 15mg x 3/24 hours    ADVANCE DIRECTIVES:    DNR  MOLST  [ ]  Living Will  [ ]   DECISION MAKER(s):  [ ] Health Care Proxy(s)  [ ] Surrogate(s)  [ ] Guardian           Name(s): Phone Number(s):    BASELINE (I)ADL(s) (prior to admission):  [place]: [ ]Total  [ ] Moderate [ ]Dependent    Allergies    No Known Allergies    Intolerances       PRESENT SYMPTOMS: [ ]Unable to obtain due to poor mentation   Source if other than patient:  [ ]Family   [ ]Team     Pain: [X ]yes [ ]no  QOL impact -  decreased function  Location - back  Aggravating factors -   Quality -   Radiation -   Timing-   Severity (0-10 scale): 7/10   Minimal acceptable level (0-10 scale): 2/10    CPOT:    https://www.[facility].org/getattachment/[email or web address]/Critical-Care-Pain-Observation-Tool-(CPOT)      PAIN AD Score:     http://geriatrictoolkit.[email or web address]/cog/painad.pdf (press ctrl +  left click to view)    Dyspnea:                           [ ]Mild [ ]Moderate [ ]Severe  Anxiety:                             [ ]Mild [ ]Moderate [ ]Severe  Fatigue:                             [ ]Mild [ x]Moderate [ ]Severe  Nausea:                             [ ]Mild [X ]Moderate [ ]Severe  Loss of appetite:              [ ]Mild [ ]Moderate [ ]Severe  Constipation:                    [X ]Mild [ ]Moderate [ ]Severe    Other Symptoms:  [X ]All other review of systems negative     Palliative Performance Status Version 2:   40      %    http://[email or web address]/files/news/palliative_performance_scale_[email or web address].pdf    PHYSICAL EXAM:  Vital Signs Last 24 Hrs  T(C): 36.4 (13 May 2020 12:34), Max: 36.5 (13 May 2020 00:38)  T(F): 97.6 (13 May 2020 12:34), Max: 97.7 (13 May 2020 00:38)  HR: 108 (13 May 2020 12:34) (104 - 130)  BP: 115/78 (13 May 2020 12:34) (115/78 - 134/89)  BP(mean): --  RR: 19 (13 May 2020 12:34) (19 - 20)  SpO2: 98% (13 May 2020 12:34) (98% - 99%)    Limited exam for patient safety and to limit infection risk during COVID-19 outbreak. Please refr to primary team's examination for today.  GENERAL:  [ X]Alert  [ ]Oriented x   [ ]Lethargic  [ ]Cachexia  [ ]Unarousable  [ X]Verbal  [ ]Non-Verbal  Behavioral:   [ ] Anxiety  [ ] Delirium [ ] Agitation [ ] Other  HEENT:  [ ]Normal   [ ]Dry mouth   [ ]ET Tube/Trach  [ ]Oral lesions  PULMONARY:   [ ]Clear [ ]Tachypnea  [ ]Audible excessive secretions   [ ]Rhonchi        [ ]Right [ ]Left [ ]Bilateral  [ ]Crackles        [ ]Right [ ]Left [ ]Bilateral  [ ]Wheezing     [ ]Right [ ]Left [ ]Bilateral  [ ]Diminished breath sounds [ ]right [ ]left [ ]bilateral  CARDIOVASCULAR:    [ ]Regular [ ]Irregular [ ]Tachy  [ ]Quirino [ ]Murmur [ ]Other  GASTROINTESTINAL:  [ ]Soft  [ ]Distended   [ ]+BS  [ ]Non tender [ ]Tender  [ ]PEG [ ]OGT/ NGT  Last BM:     GENITOURINARY:  [ ]Normal [ ] Incontinent   [ ]Oliguria/Anuria   [ ]Cifuentes  MUSCULOSKELETAL:   [ ]Normal   [ x]Weakness  [ ]Bed/Wheelchair bound [ ]Edema  NEUROLOGIC:  Lucid speech  [ ]No focal deficits  [ ]Cognitive impairment  [ ]Dysphagia [ ]Dysarthria [ ]Paresis [ ]Other   SKIN:   [ ]Normal    [ ]Rash  [ ]Pressure ulcer(s)       Present on admission [ ]y [ ]n    CRITICAL CARE:  [ ] Shock Present  [ ]Septic [ ]Cardiogenic [ ]Neurologic [ ]Hypovolemic  [ ]  Vasopressors [ ]  Inotropes   [ ]Respiratory failure present [ ]Mechanical ventilation [ ]Non-invasive ventilatory support [ ]High flow  [ ]Acute  [ ]Chronic [ ]Hypoxic  [ ]Hypercarbic [ ]Other  [ ]Other organ failure     LABS: reviewed                          8.1    5.66  )-----------( 219      ( 13 May 2020 07:22 )             26.7     05-13    137  |  99  |  9   ----------------------------<  121<H>  3.8   |  24  |  0.44<L>    Ca    8.8      13 May 2020 07:20  Phos  2.2     05-13  Mg     1.8     05-13    RADIOLOGY & ADDITIONAL STUDIES:    PROTEIN CALORIE MALNUTRITION PRESENT: [ ]mild [ ]moderate [ ]severe [ ]underweight [ ]morbid obesity  https://www.andeal.org/vault/2440/web/files/ONC/Table_Clinical%20Characteristics%20to%20Document%20Malnutrition-White%20JV%20et%20al%152007.pdf    Height (cm): 162.6 (03-21-20 @ 03:15), 162.56 (03-07-20 @ 19:18)  Weight (kg): 84.4 (03-21-20 @ 03:15), 89.4 (03-07-20 @ 19:18)  BMI (kg/m2): 31.9 (03-21-20 @ 03:15), 33.8 (03-07-20 @ 19:18)    [ ]PPSV2 < or = to 30% [ ]significant weight loss  [ ]poor nutritional intake  [ ]anasarca     Albumin, Serum: 2.9 g/dL (03-25-20 @ 05:59)   [ ]Artificial Nutrition      REFERRALS:   [ ]Chaplaincy  [ ]Hospice  [ ]Child Life  [ ]Social Work  [ ]Case management [ ]Holistic Therapy

## 2020-05-13 NOTE — PROGRESS NOTE ADULT - ASSESSMENT
24 f with b/l adnexal masses and pelvic LAD, metastatic carcinoma of unknown primary presented 3/20 with abd pain, s/p CT guided biopsy 3/25, PICC line 4/3 to start chemo  pt intermittently febrile and leukocytosis,   blood culture has been NGTD  COVID negative x 7 last one 4/29  C-diff negative  metastatic ca with ?tumor fever  She has progression of disease based on CT C/A/P done on 4/30   on chemo  Prior MDR urinary pathogens-most recent urine Cx >3 organisms    A) fever  resolved   ?UTI   ? Tumor fever  ?chemo related   follow clinically and repeat cx   continue  empiric merem as on chemo  Continue observation   Monitor for s/s any other foci      B) malignancy  will defer to hem onc on chemo  Observe for s/s any nosocomial infectious process    C) Diarrhea  check C diff if recurs  pt says hasnt had any today    Will tailor plan for ID issues  per course,results.Will defer to primary team on management of other issues.  Assessment, plan and recommendations as detailed above were discussed with the hem onc  team.    Will Follow.  Beeper [phone] [facility] [id number].   Wknd/afterhours/No response-[phone] or Fellow on call

## 2020-05-13 NOTE — PROGRESS NOTE ADULT - SUBJECTIVE AND OBJECTIVE BOX
Patient is a 24y old  Female who presents with a chief complaint of b/l pelvic masses (13 May 2020 08:24)    Being followed by ID for fever,UTI    Interval history:diarrhea yesterday but now resolved  no urinary complaints  feels well  No acute events      ROS:  No cough,SOB,CP  occasional nausea no abd pain  No other complaints      Antimicrobials:    meropenem  IVPB 1000 milliGRAM(s) IV Intermittent every 8 hours    Other medications reviewed    Vital Signs Last 24 Hrs  T(C): 36.4 (05-13-20 @ 09:22), Max: 37.1 (05-12-20 @ 13:45)  T(F): 97.5 (05-13-20 @ 09:22), Max: 98.7 (05-12-20 @ 13:45)  HR: 104 (05-13-20 @ 09:22) (104 - 130)  BP: 121/80 (05-13-20 @ 09:22) (121/80 - 134/89)  BP(mean): --  RR: 20 (05-13-20 @ 09:22) (20 - 20)  SpO2: 99% (05-13-20 @ 09:22) (98% - 99%)    Physical Exam:      HEENT PERRLA EOMI    No oral exudate or erythema    Chest Good AE,CTA    CVS RRR S1 S2 WNl No murmur or rub or gallop    Abd soft BS normal Lower half tenderness ? mass    R PICC  site no erythema tenderness or discharge    CNS AAO X 3 no focal    Lab Data:                          8.1    5.66  )-----------( 219      ( 13 May 2020 07:22 )             26.7       05-13    137  |  99  |  9   ----------------------------<  121<H>  3.8   |  24  |  0.44<L>    Ca    8.8      13 May 2020 07:20  Phos  2.2     05-13  Mg     1.8     05-13    TPro  6.8  /  Alb  2.7<L>  /  TBili  0.3  /  DBili  x   /  AST  14  /  ALT  6<L>  /  AlkPhos  233<H>  05-13            < from: US Abdomen Limited (05.12.20 @ 15:13) >    IMPRESSION:     Partially imaged complex pelvic masses, consistent with known ovarian neoplasm.    No ascites.        < end of copied text >

## 2020-05-13 NOTE — PROGRESS NOTE ADULT - PROBLEM SELECTOR PLAN 2
- c/w MS IR 15mg Q4 PRN - 3 doses/24 hours  - worsening nighttime pain, was on MS contin last week, will restart at lower dose: 15mg QAM, 30mg QPM

## 2020-05-13 NOTE — PROGRESS NOTE ADULT - ASSESSMENT
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   - Tumor markers elevated: Cancer Antigen, Ca 27.29: 70.8, Cancer Antigen, 125: 619-->1163, Carcinoembryonic Antigen: 44.7 -->70.9--> 74.2, Beta-HC.0 on initial evaluation (3/7); Checked again on 20 and it was 102.7  - Discussed diagnosis with patient on 3/31/20. Given advanced, metastatic stage, will need to be on indefinite treatment to limit progression of disease.  - S/p C1 mFOLFOX (-). Cycle 2 day1 was on  (5-FU infusion was stopped after about 30 hours for possible coronary vasospasm)  - CTA Chest () was negative for central PE, but unable to evaluate segmental and subsegmental branches due to motion artifact. Currently on full dose Lovenox given tachycardia and hypoxia.  - LE dopplers negative for DVT on .   - CT C/A/P () showed POD. Given imaging findings and uptrending tumor markers, switched chemotherapy to Carbo/Taxol.   - S/p Carbo/Taxol , next cycle due . Patient tolerated well.   - Diarrhea resolving, please send for C diff if watery diarrhea.   - Nausea- stop reglan, can try Compazine 10mg q6H, with Zofran ATC, taper Decadron, currently 2mg daily, if continues to improve, can stop tomorrow.    - Heartburn- PPI and Maalox    - Abd xray without obstruction, abd ultrasound neg for ascites.     #Fever  Patient with low grade fever on Meropenem, cultures negative so far.  - CXR showed Left lower PNA   - RVP negative  - No need to retest COVID at this time per ID  - No fevers overnight or this morning, continue with Meropenem.     # Bilateral pleural effusion - Improved  - COVID-19 negative x 3, but parainfluenza positive ().   - CTA Chest () was negative for central PE, but unable to evaluate segmental and subsegmental branches due to motion artifact. Lung imaging showed unchanged metastatic nodules, small bilateral pleural effusions, and atelectasis of the basilar LLL (unchanged as well). No new opopacity which decreases the likelihood of PNA  - CTA chest on  showed no PE, but large bilateral pleural effusions s/p drains in both left and right lung. Bilateral chest tubes removed on .  - Appreciate Pulmonary recs  - Patient is currently saturating well on RA. Continue to closely monitor  - C/w incentive spirometry    - Palliative Care following, appreciate recs  Pain is adequately controlled per patient. Appreciate Palliative Care recs regarding pain management/regimen on discharge.    # DVT PPx  - C/w full dose Lovenox given tachycardia and hypoxia, as mentioned above.    Dispo: Start d/c planning pending resolution of nausea, final PT recs, pain regimen per palliative, titrate off O2     Cesia Banks MD  Hematology Oncology Fellow, PGY-4  [facility] Pager: [phone]/ [facility] Pager: [phone]

## 2020-05-14 NOTE — CHART NOTE - NSCHARTNOTEFT_GEN_A_CORE
Pt seen for follow up regarding trial of ensure clear.     Pt reports nausea has improved with dosing of zofran around the clock, stated she has been able to eat a bit more for her meals and was able to have a hard boiled egg and fruit for breakfast, pt had Italian ice for lunch thus far and plans to eat a portion of her sandwich. Pt has not been taking ensure enlive supplements recently and stated she did not really enjoy ensure clear. RD encouraged pt to try sipping ensure between meals and offered suggestions to improve palatability. Diarrhea resolving-last BM noted 5/12. RD will continue to suspend ensure shakes at this time in [employer]  system to allow pt to get through bedside stock-observed ~6-7 at bedside currently.     RD to continue to follow up as per department protocol,    Kala Jernigan R.D., [facility], Pager #[phone]

## 2020-05-14 NOTE — PROGRESS NOTE ADULT - SUBJECTIVE AND OBJECTIVE BOX
24F here with worsening abdominal pain in the setting of known adnexal massess, found to have for metastatic carcinoma with final pathology pending. Given evidence of metastatic disease, patient is not a surgical candidate. Has been having worsening adnexal pain, markedly last night after taking senna. States pain is 7-8/10 at its worst, located in adnexal area with radiation down to legs, worse with movement, and tolerable with opioids around 2/10. Palliative care called to help with pain.     INTERVAL EVENTS:  4/30: states having lower back pain, not fully relieved with morphine IR  5/1: used 4 doses of 3mg IV morphine/24 hours  5/2: used 2 BT of 4 mg Morphine/24 hrs.  5/4: used morphine 15mg PO x 1 and 4mg IV x 1/24 hours   5/5: no use of PRN/24 hours  5/7: one PRN MSIR/24 hours, states having constipation (no BM x 3 days) and nausea  5/8: used no PRN, had incomplete BM, still with nausea  5/9: nauseated, no pain currently  5/12: nausea improved, but has back pain   5/13: used MS IR 15mg x 3/24 hours  5/14: used 2 MS IR in 24 hours states having continual back pain    ADVANCE DIRECTIVES:    DNR  MOLST  [ ]  Living Will  [ ]   DECISION MAKER(s):  [ ] Health Care Proxy(s)  [ ] Surrogate(s)  [ ] Guardian           Name(s): Phone Number(s):    BASELINE (I)ADL(s) (prior to admission):  [place]: [ ]Total  [ ] Moderate [ ]Dependent    Allergies    No Known Allergies    Intolerances    MEDICATIONS  (STANDING):  diltiazem    Tablet 60 milliGRAM(s) Oral every 8 hours  enoxaparin Injectable 80 milliGRAM(s) SubCutaneous every 12 hours  melatonin 3 milliGRAM(s) Oral at bedtime  meropenem  IVPB 1000 milliGRAM(s) IV Intermittent every 8 hours  morphine ER Tablet 15 milliGRAM(s) Oral <User Schedule>  morphine ER Tablet 30 milliGRAM(s) Oral <User Schedule>  pantoprazole    Tablet 40 milliGRAM(s) Oral before breakfast  polyethylene glycol 3350 17 Gram(s) Oral daily  senna 2 Tablet(s) Oral at bedtime  sodium chloride 0.9% lock flush 3 milliLiter(s) IV Push every 8 hours  sodium chloride 0.9% lock flush 3 milliLiter(s) IV Push every 8 hours    MEDICATIONS  (PRN):  acetaminophen   Tablet .. 650 milliGRAM(s) Oral every 6 hours PRN Temp greater or equal to 38C (100.4F)  aluminum hydroxide/magnesium hydroxide/simethicone Suspension 30 milliLiter(s) Oral every 6 hours PRN Dyspepsia  bisacodyl 5 milliGRAM(s) Oral every 12 hours PRN Constipation  diphenhydrAMINE   Injectable 50 milliGRAM(s) IV Push once PRN Reaction  hydrocortisone sodium succinate Injectable 100 milliGRAM(s) IV Push once PRN Reaction  morphine  IR 15 milliGRAM(s) Oral every 4 hours PRN Moderate Pain (4 - 6)  naloxone Injectable 0.1 milliGRAM(s) IV Push every 3 minutes PRN Sedation or RR <10  ondansetron Injectable 4 milliGRAM(s) IV Push every 6 hours PRN Nausea and/or Vomiting  prochlorperazine   Injectable 10 milliGRAM(s) IV Push every 6 hours PRN Nausea/Vomiting  simethicone 80 milliGRAM(s) Chew every 6 hours PRN Gas         PRESENT SYMPTOMS: [ ]Unable to obtain due to poor mentation   Source if other than patient:  [ ]Family   [ ]Team     Pain: [X ]yes [ ]no  QOL impact -  decreased function  Location - back  Aggravating factors -   Quality -   Radiation -   Timing-   Severity (0-10 scale): 7/10   Minimal acceptable level (0-10 scale): 2/10    CPOT:    https://www.[facility].org/getattachment/[email or web address]/Critical-Care-Pain-Observation-Tool-(CPOT)      PAIN AD Score:     http://geriatrictoolkit.[place].[email or web address]/cog/painad.pdf (press ctrl +  left click to view)    Dyspnea:                           [ ]Mild [ ]Moderate [ ]Severe  Anxiety:                             [ ]Mild [ ]Moderate [ ]Severe  Fatigue:                             [ ]Mild [ x]Moderate [ ]Severe  Nausea:                             [ ]Mild [X ]Moderate [ ]Severe  Loss of appetite:              [ ]Mild [ ]Moderate [ ]Severe  Constipation:                    [X ]Mild [ ]Moderate [ ]Severe    Other Symptoms:  [X ]All other review of systems negative     Palliative Performance Status Version 2:   40      %    http://[email or web address]/files/news/palliative_performance_scale_[email or web address].pdf    PHYSICAL EXAM:  Vital Signs Last 24 Hrs  T(C): 36.6 (14 May 2020 13:32), Max: 36.8 (14 May 2020 01:20)  T(F): 97.9 (14 May 2020 13:32), Max: 98.2 (14 May 2020 01:20)  HR: 115 (14 May 2020 13:32) (107 - 118)  BP: 113/76 (14 May 2020 13:32) (113/75 - 129/84)  BP(mean): --  RR: 18 (14 May 2020 13:32) (18 - 19)  SpO2: 95% (14 May 2020 13:32) (95% - 99%)    Limited exam for patient safety and to limit infection risk during COVID-19 outbreak. Please refr to primary team's examination for today.  GENERAL:  [ X]Alert  [ ]Oriented x   [ ]Lethargic  [ ]Cachexia  [ ]Unarousable  [ X]Verbal  [ ]Non-Verbal  Behavioral:   [ ] Anxiety  [ ] Delirium [ ] Agitation [ ] Other  HEENT:  [ ]Normal   [ ]Dry mouth   [ ]ET Tube/Trach  [ ]Oral lesions  PULMONARY:   [ ]Clear [ ]Tachypnea  [ ]Audible excessive secretions   [ ]Rhonchi        [ ]Right [ ]Left [ ]Bilateral  [ ]Crackles        [ ]Right [ ]Left [ ]Bilateral  [ ]Wheezing     [ ]Right [ ]Left [ ]Bilateral  [ ]Diminished breath sounds [ ]right [ ]left [ ]bilateral  CARDIOVASCULAR:    [ ]Regular [ ]Irregular [ ]Tachy  [ ]Quirino [ ]Murmur [ ]Other  GASTROINTESTINAL:  [ ]Soft  [ ]Distended   [ ]+BS  [ ]Non tender [ ]Tender  [ ]PEG [ ]OGT/ NGT  Last BM:     GENITOURINARY:  [ ]Normal [ ] Incontinent   [ ]Oliguria/Anuria   [ ]Cifuentes  MUSCULOSKELETAL:   [ ]Normal   [ x]Weakness  [ ]Bed/Wheelchair bound [ ]Edema  NEUROLOGIC:  Lucid speech  [ ]No focal deficits  [ ]Cognitive impairment  [ ]Dysphagia [ ]Dysarthria [ ]Paresis [ ]Other   SKIN:   [ ]Normal    [ ]Rash  [ ]Pressure ulcer(s)       Present on admission [ ]y [ ]n    CRITICAL CARE:  [ ] Shock Present  [ ]Septic [ ]Cardiogenic [ ]Neurologic [ ]Hypovolemic  [ ]  Vasopressors [ ]  Inotropes   [ ]Respiratory failure present [ ]Mechanical ventilation [ ]Non-invasive ventilatory support [ ]High flow  [ ]Acute  [ ]Chronic [ ]Hypoxic  [ ]Hypercarbic [ ]Other  [ ]Other organ failure     LABS: reviewed                          8.2    3.04  )-----------( 204      ( 14 May 2020 07:01 )             27.1     05-14    138  |  100  |  9   ----------------------------<  94  4.0   |  27  |  0.43<L>    Ca    8.7      14 May 2020 07:01  Phos  1.5     05-14  Mg     1.7     05-14      RADIOLOGY & ADDITIONAL STUDIES:    PROTEIN CALORIE MALNUTRITION PRESENT: [ ]mild [ ]moderate [ ]severe [ ]underweight [ ]morbid obesity  https://www.andeal.org/vault/2440/web/files/ONC/Table_Clinical%20Characteristics%20to%20Document%20Malnutrition-White%20JV%20et%20al%084201.pdf    Height (cm): 162.6 (03-21-20 @ 03:15), 162.56 (03-07-20 @ 19:18)  Weight (kg): 84.4 (03-21-20 @ 03:15), 89.4 (03-07-20 @ 19:18)  BMI (kg/m2): 31.9 (03-21-20 @ 03:15), 33.8 (03-07-20 @ 19:18)    [ ]PPSV2 < or = to 30% [ ]significant weight loss  [ ]poor nutritional intake  [ ]anasarca     Albumin, Serum: 2.9 g/dL (03-25-20 @ 05:59)   [ ]Artificial Nutrition      REFERRALS:   [ ]Chaplaincy  [ ]Hospice  [ ]Child Life  [ ]Social Work  [ ]Case management [ ]Holistic Therapy

## 2020-05-14 NOTE — PROGRESS NOTE ADULT - PROBLEM SELECTOR PLAN 2
- c/w MS IR 15mg Q4 PRN - 2 doses/24 hours  - worsening nighttime pain, was on MS contin last week, restarted 5/13 at lower dose: 15mg QAM, 30mg QPM; usually would uptitrate Q48 hours, but given that she was on 75mg/90mg last week, will need a higher dose to help with her pain, will increase to 30mg AM and 45mg PM

## 2020-05-14 NOTE — PROGRESS NOTE ADULT - SUBJECTIVE AND OBJECTIVE BOX
INTERVAL History:    Allergies    No Known Allergies    Intolerances        MEDICATIONS  (STANDING):  diltiazem    Tablet 60 milliGRAM(s) Oral every 8 hours  enoxaparin Injectable 80 milliGRAM(s) SubCutaneous every 12 hours  melatonin 3 milliGRAM(s) Oral at bedtime  meropenem  IVPB 1000 milliGRAM(s) IV Intermittent every 8 hours  morphine ER Tablet 15 milliGRAM(s) Oral <User Schedule>  morphine ER Tablet 30 milliGRAM(s) Oral <User Schedule>  ondansetron Injectable 4 milliGRAM(s) IV Push every 6 hours  pantoprazole    Tablet 40 milliGRAM(s) Oral before breakfast  polyethylene glycol 3350 17 Gram(s) Oral daily  senna 2 Tablet(s) Oral at bedtime  sodium chloride 0.9% lock flush 3 milliLiter(s) IV Push every 8 hours  sodium chloride 0.9% lock flush 3 milliLiter(s) IV Push every 8 hours    MEDICATIONS  (PRN):  acetaminophen   Tablet .. 650 milliGRAM(s) Oral every 6 hours PRN Temp greater or equal to 38C (100.4F)  aluminum hydroxide/magnesium hydroxide/simethicone Suspension 30 milliLiter(s) Oral every 6 hours PRN Dyspepsia  bisacodyl 5 milliGRAM(s) Oral every 12 hours PRN Constipation  diphenhydrAMINE   Injectable 50 milliGRAM(s) IV Push once PRN Reaction  hydrocortisone sodium succinate Injectable 100 milliGRAM(s) IV Push once PRN Reaction  morphine  IR 15 milliGRAM(s) Oral every 4 hours PRN Moderate Pain (4 - 6)  naloxone Injectable 0.1 milliGRAM(s) IV Push every 3 minutes PRN Sedation or RR <10  prochlorperazine   Injectable 10 milliGRAM(s) IV Push every 6 hours PRN Nausea/Vomiting  simethicone 80 milliGRAM(s) Chew every 6 hours PRN Gas      Vital Signs Last 24 Hrs  T(C): 36.3 (14 May 2020 06:10), Max: 36.8 (14 May 2020 01:20)  T(F): 97.3 (14 May 2020 06:10), Max: 98.2 (14 May 2020 01:20)  HR: 112 (14 May 2020 06:10) (108 - 118)  BP: 129/84 (14 May 2020 06:10) (113/75 - 129/84)  BP(mean): --  RR: 18 (14 May 2020 06:10) (18 - 19)  SpO2: 99% (14 May 2020 06:10) (97% - 99%)    PHYSICAL EXAM:    General: AOX3, no acute distress  EYES: EOMI, PERRLA, conjunctiva and sclera clear  NECK: Supple, No JVD, Normal thyroid  CHEST/LUNG: Clear to auscultation bilaterally; No rales, rhonchi, or wheezing  HEART: Regular rate and rhythm; no murmurs  ABDOMEN: Soft, Nontender. BS+  LYMPH: No lymphadenopathy noted  Extremities: No peripheral edema      LABS:                        8.2    3.04  )-----------( 204      ( 14 May 2020 07:01 )             27.1     05-14    138  |  100  |  9   ----------------------------<  94  4.0   |  27  |  0.43<L>    Ca    8.7      14 May 2020 07:01  Phos  1.5     05-14  Mg     1.7     05-14    TPro  6.8  /  Alb  2.7<L>  /  TBili  0.3  /  DBili  x   /  AST  14  /  ALT  <5<L>  /  AlkPhos  208<H>  05-14    PT/INR - ( 14 May 2020 07:01 )   PT: 13.6 sec;   INR: 1.19 ratio                 RADIOLOGY & ADDITIONAL STUDIES:    PATHOLOGY: INTERVAL History:    Allergies    No Known Allergies    Intolerances        MEDICATIONS  (STANDING):  diltiazem    Tablet 60 milliGRAM(s) Oral every 8 hours  enoxaparin Injectable 80 milliGRAM(s) SubCutaneous every 12 hours  melatonin 3 milliGRAM(s) Oral at bedtime  meropenem  IVPB 1000 milliGRAM(s) IV Intermittent every 8 hours  morphine ER Tablet 15 milliGRAM(s) Oral <User Schedule>  morphine ER Tablet 30 milliGRAM(s) Oral <User Schedule>  ondansetron Injectable 4 milliGRAM(s) IV Push every 6 hours  pantoprazole    Tablet 40 milliGRAM(s) Oral before breakfast  polyethylene glycol 3350 17 Gram(s) Oral daily  senna 2 Tablet(s) Oral at bedtime  sodium chloride 0.9% lock flush 3 milliLiter(s) IV Push every 8 hours  sodium chloride 0.9% lock flush 3 milliLiter(s) IV Push every 8 hours    MEDICATIONS  (PRN):  acetaminophen   Tablet .. 650 milliGRAM(s) Oral every 6 hours PRN Temp greater or equal to 38C (100.4F)  aluminum hydroxide/magnesium hydroxide/simethicone Suspension 30 milliLiter(s) Oral every 6 hours PRN Dyspepsia  bisacodyl 5 milliGRAM(s) Oral every 12 hours PRN Constipation  diphenhydrAMINE   Injectable 50 milliGRAM(s) IV Push once PRN Reaction  hydrocortisone sodium succinate Injectable 100 milliGRAM(s) IV Push once PRN Reaction  morphine  IR 15 milliGRAM(s) Oral every 4 hours PRN Moderate Pain (4 - 6)  naloxone Injectable 0.1 milliGRAM(s) IV Push every 3 minutes PRN Sedation or RR <10  prochlorperazine   Injectable 10 milliGRAM(s) IV Push every 6 hours PRN Nausea/Vomiting  simethicone 80 milliGRAM(s) Chew every 6 hours PRN Gas      Vital Signs Last 24 Hrs  T(C): 36.3 (14 May 2020 06:10), Max: 36.8 (14 May 2020 01:20)  T(F): 97.3 (14 May 2020 06:10), Max: 98.2 (14 May 2020 01:20)  HR: 112 (14 May 2020 06:10) (108 - 118)  BP: 129/84 (14 May 2020 06:10) (113/75 - 129/84)  BP(mean): --  RR: 18 (14 May 2020 06:10) (18 - 19)  SpO2: 99% (14 May 2020 06:10) (97% - 99%)    PHYSICAL EXAM:        LABS:                        8.2    3.04  )-----------( 204      ( 14 May 2020 07:01 )             27.1     05-14    138  |  100  |  9   ----------------------------<  94  4.0   |  27  |  0.43<L>    Ca    8.7      14 May 2020 07:01  Phos  1.5     05-14  Mg     1.7     05-14    TPro  6.8  /  Alb  2.7<L>  /  TBili  0.3  /  DBili  x   /  AST  14  /  ALT  <5<L>  /  AlkPhos  208<H>  05-14    PT/INR - ( 14 May 2020 07:01 )   PT: 13.6 sec;   INR: 1.19 ratio                 RADIOLOGY & ADDITIONAL STUDIES:    PATHOLOGY: INTERVAL History: No fevers overnight. Nausea and vomiting improved, tapered off Decadron.     Allergies    No Known Allergies    Intolerances        MEDICATIONS  (STANDING):  diltiazem    Tablet 60 milliGRAM(s) Oral every 8 hours  enoxaparin Injectable 80 milliGRAM(s) SubCutaneous every 12 hours  melatonin 3 milliGRAM(s) Oral at bedtime  meropenem  IVPB 1000 milliGRAM(s) IV Intermittent every 8 hours  morphine ER Tablet 15 milliGRAM(s) Oral <User Schedule>  morphine ER Tablet 30 milliGRAM(s) Oral <User Schedule>  ondansetron Injectable 4 milliGRAM(s) IV Push every 6 hours  pantoprazole    Tablet 40 milliGRAM(s) Oral before breakfast  polyethylene glycol 3350 17 Gram(s) Oral daily  senna 2 Tablet(s) Oral at bedtime  sodium chloride 0.9% lock flush 3 milliLiter(s) IV Push every 8 hours  sodium chloride 0.9% lock flush 3 milliLiter(s) IV Push every 8 hours    MEDICATIONS  (PRN):  acetaminophen   Tablet .. 650 milliGRAM(s) Oral every 6 hours PRN Temp greater or equal to 38C (100.4F)  aluminum hydroxide/magnesium hydroxide/simethicone Suspension 30 milliLiter(s) Oral every 6 hours PRN Dyspepsia  bisacodyl 5 milliGRAM(s) Oral every 12 hours PRN Constipation  diphenhydrAMINE   Injectable 50 milliGRAM(s) IV Push once PRN Reaction  hydrocortisone sodium succinate Injectable 100 milliGRAM(s) IV Push once PRN Reaction  morphine  IR 15 milliGRAM(s) Oral every 4 hours PRN Moderate Pain (4 - 6)  naloxone Injectable 0.1 milliGRAM(s) IV Push every 3 minutes PRN Sedation or RR <10  prochlorperazine   Injectable 10 milliGRAM(s) IV Push every 6 hours PRN Nausea/Vomiting  simethicone 80 milliGRAM(s) Chew every 6 hours PRN Gas      Vital Signs Last 24 Hrs  T(C): 36.3 (14 May 2020 06:10), Max: 36.8 (14 May 2020 01:20)  T(F): 97.3 (14 May 2020 06:10), Max: 98.2 (14 May 2020 01:20)  HR: 112 (14 May 2020 06:10) (108 - 118)  BP: 129/84 (14 May 2020 06:10) (113/75 - 129/84)  BP(mean): --  RR: 18 (14 May 2020 06:10) (18 - 19)  SpO2: 99% (14 May 2020 06:10) (97% - 99%)    PHYSICAL EXAM:        LABS:                        8.2    3.04  )-----------( 204      ( 14 May 2020 07:01 )             27.1     05-14    138  |  100  |  9   ----------------------------<  94  4.0   |  27  |  0.43<L>    Ca    8.7      14 May 2020 07:01  Phos  1.5     05-14  Mg     1.7     05-14    TPro  6.8  /  Alb  2.7<L>  /  TBili  0.3  /  DBili  x   /  AST  14  /  ALT  <5<L>  /  AlkPhos  208<H>  05-14    PT/INR - ( 14 May 2020 07:01 )   PT: 13.6 sec;   INR: 1.19 ratio                 RADIOLOGY & ADDITIONAL STUDIES:    PATHOLOGY: INTERVAL History: No fevers overnight. Nausea and vomiting improved, tapered off Decadron. Diarrhea resolved. Patient is walking with physical therapy off oxygen.     Allergies    No Known Allergies    Intolerances        MEDICATIONS  (STANDING):  diltiazem    Tablet 60 milliGRAM(s) Oral every 8 hours  enoxaparin Injectable 80 milliGRAM(s) SubCutaneous every 12 hours  melatonin 3 milliGRAM(s) Oral at bedtime  meropenem  IVPB 1000 milliGRAM(s) IV Intermittent every 8 hours  morphine ER Tablet 15 milliGRAM(s) Oral <User Schedule>  morphine ER Tablet 30 milliGRAM(s) Oral <User Schedule>  ondansetron Injectable 4 milliGRAM(s) IV Push every 6 hours  pantoprazole    Tablet 40 milliGRAM(s) Oral before breakfast  polyethylene glycol 3350 17 Gram(s) Oral daily  senna 2 Tablet(s) Oral at bedtime  sodium chloride 0.9% lock flush 3 milliLiter(s) IV Push every 8 hours  sodium chloride 0.9% lock flush 3 milliLiter(s) IV Push every 8 hours    MEDICATIONS  (PRN):  acetaminophen   Tablet .. 650 milliGRAM(s) Oral every 6 hours PRN Temp greater or equal to 38C (100.4F)  aluminum hydroxide/magnesium hydroxide/simethicone Suspension 30 milliLiter(s) Oral every 6 hours PRN Dyspepsia  bisacodyl 5 milliGRAM(s) Oral every 12 hours PRN Constipation  diphenhydrAMINE   Injectable 50 milliGRAM(s) IV Push once PRN Reaction  hydrocortisone sodium succinate Injectable 100 milliGRAM(s) IV Push once PRN Reaction  morphine  IR 15 milliGRAM(s) Oral every 4 hours PRN Moderate Pain (4 - 6)  naloxone Injectable 0.1 milliGRAM(s) IV Push every 3 minutes PRN Sedation or RR <10  prochlorperazine   Injectable 10 milliGRAM(s) IV Push every 6 hours PRN Nausea/Vomiting  simethicone 80 milliGRAM(s) Chew every 6 hours PRN Gas      Vital Signs Last 24 Hrs  T(C): 36.3 (14 May 2020 06:10), Max: 36.8 (14 May 2020 01:20)  T(F): 97.3 (14 May 2020 06:10), Max: 98.2 (14 May 2020 01:20)  HR: 112 (14 May 2020 06:10) (108 - 118)  BP: 129/84 (14 May 2020 06:10) (113/75 - 129/84)  BP(mean): --  RR: 18 (14 May 2020 06:10) (18 - 19)  SpO2: 99% (14 May 2020 06:10) (97% - 99%)    PHYSICAL EXAM:    Not physically examined to decrease COVID exposure.     LABS:                        8.2    3.04  )-----------( 204      ( 14 May 2020 07:01 )             27.1     05-14    138  |  100  |  9   ----------------------------<  94  4.0   |  27  |  0.43<L>    Ca    8.7      14 May 2020 07:01  Phos  1.5     05-14  Mg     1.7     05-14    TPro  6.8  /  Alb  2.7<L>  /  TBili  0.3  /  DBili  x   /  AST  14  /  ALT  <5<L>  /  AlkPhos  208<H>  05-14    PT/INR - ( 14 May 2020 07:01 )   PT: 13.6 sec;   INR: 1.19 ratio                 RADIOLOGY & ADDITIONAL STUDIES:    PATHOLOGY:

## 2020-05-14 NOTE — PROGRESS NOTE ADULT - ASSESSMENT
24 f with b/l adnexal masses and pelvic LAD, metastatic carcinoma of unknown primary presented 3/20 with abd pain, s/p CT guided biopsy 3/25, PICC line 4/3 to start chemo  pt intermittently febrile and leukocytosis,   blood culture has been NGTD  COVID negative x 7 last one 4/29  C-diff negative  metastatic ca with ?tumor fever  She has progression of disease based on CT C/A/P done on 4/30   on chemo  Prior MDR urinary pathogens-most recent urine Cx >3 organisms    A) fever  resolved   ?UTI   ? Tumor fever  ?chemo related   follow clinically and repeat cx   continue  empiric merem as on chemo  Continue observation   Monitor for s/s any other foci  if stable and for Dc could de-escalate to levaquin on Dc(urine isolates were sensitive)-duration will depend on ANC       B) malignancy  will defer to hem onc on chemo  Observe for s/s any nosocomial infectious process    C) Diarrhea  resolved  Observe     Will tailor plan for ID issues  per course,results.Will defer to primary team on management of other issues.  Assessment, plan and recommendations as detailed above were discussed with the hem onc  team.    Will Follow.  Beeper [phone] [facility] [id number].   Wknd/afterhours/No response-[phone] or Fellow on call

## 2020-05-14 NOTE — PROGRESS NOTE ADULT - SUBJECTIVE AND OBJECTIVE BOX
Patient is a 24y old  Female who presents with a chief complaint of b/l pelvic masses (14 May 2020 09:44)    Being followed by ID for UTI fever    Interval history:feels well  denies any urinary complaints   No acute events      ROS:  No cough,SOB,CP  No N/V/D./abd pain at present  But had nausea and some vomiting eralier   No other complaints      Antimicrobials:    meropenem  IVPB 1000 milliGRAM(s) IV Intermittent every 8 hours    Other medications reviewed    Vital Signs Last 24 Hrs  T(C): 36.4 (05-14-20 @ 10:44), Max: 36.8 (05-14-20 @ 01:20)  T(F): 97.6 (05-14-20 @ 10:44), Max: 98.2 (05-14-20 @ 01:20)  HR: 114 (05-14-20 @ 10:44) (107 - 118)  BP: 113/76 (05-14-20 @ 10:44) (113/75 - 129/84)  BP(mean): --  RR: 18 (05-14-20 @ 10:44) (18 - 19)  SpO2: 96% (05-14-20 @ 10:44) (96% - 99%)    Physical Exam:      HEENT PERRLA EOMI    No oral exudate or erythema    Chest Good AE,CTA    CVS RRR S1 S2 WNl No murmur or rub or gallop    Abd soft BS normal Lower half tenderness ? mass    R PICC  site no erythema tenderness or discharge    CNS AAO X 3 no focal    Lab Data:                          8.2    3.04  )-----------( 204      ( 14 May 2020 07:01 )             27.1       05-14    138  |  100  |  9   ----------------------------<  94  4.0   |  27  |  0.43<L>    Ca    8.7      14 May 2020 07:01  Phos  1.5     05-14  Mg     1.7     05-14    TPro  6.8  /  Alb  2.7<L>  /  TBili  0.3  /  DBili  x   /  AST  14  /  ALT  <5<L>  /  AlkPhos  208<H>  05-14          < from: US Abdomen Limited (05.12.20 @ 15:13) >  IMPRESSION:     Partially imaged complex pelvic masses, consistent with known ovarian neoplasm.    No ascites.          < end of copied text >

## 2020-05-14 NOTE — PROGRESS NOTE ADULT - ASSESSMENT
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   - Tumor markers elevated: Cancer Antigen, Ca 27.29: 70.8, Cancer Antigen, 125: 619-->1163, Carcinoembryonic Antigen: 44.7 -->70.9--> 74.2, Beta-HC.0 on initial evaluation (3/7); Checked again on 20 and it was 102.7  - Discussed diagnosis with patient on 3/31/20. Given advanced, metastatic stage, will need to be on indefinite treatment to limit progression of disease.  - S/p C1 mFOLFOX (-). Cycle 2 day1 was on  (5-FU infusion was stopped after about 30 hours for possible coronary vasospasm)  - CTA Chest () was negative for central PE, but unable to evaluate segmental and subsegmental branches due to motion artifact. Currently on full dose Lovenox given tachycardia and hypoxia.  - LE dopplers negative for DVT on .   - CT C/A/P () showed POD. Given imaging findings and uptrending tumor markers, switched chemotherapy to Carbo/Taxol.   - S/p Carbo/Taxol , next cycle due . Patient tolerated well.   - Patient improving overall- nausea, vomiting and diarrhea resolving- start d/c planning, tentatively for Saturday, continue to monitor off O2.   - Nausea- stop reglan, can try Compazine 10mg q6H, change Zofran to only as needed so we can see what her requirement in is 24 hours. Tapered off Decadron today.   - Heartburn- PPI and Maalox    - Abd xray without obstruction, abd ultrasound neg for ascites.     #Fever  Patient with low grade fever on Meropenem, cultures negative so far.  - CXR showed Left lower PNA   - RVP negative  - No need to retest COVID at this time per ID  - No fevers overnight or this morning, continue with Meropenem, transition to Levaquin per ID on discharge.     # Bilateral pleural effusion - Improved  - COVID-19 negative x 3, but parainfluenza positive ().   - CTA Chest () was negative for central PE, but unable to evaluate segmental and subsegmental branches due to motion artifact. Lung imaging showed unchanged metastatic nodules, small bilateral pleural effusions, and atelectasis of the basilar LLL (unchanged as well). No new opopacity which decreases the likelihood of PNA  - CTA chest on  showed no PE, but large bilateral pleural effusions s/p drains in both left and right lung. Bilateral chest tubes removed on .  - Appreciate Pulmonary recs  - Patient is currently saturating well on RA. Continue to closely monitor  - C/w incentive spirometry    - Palliative Care following, appreciate recs  Pain is adequately controlled per patient. Appreciate Palliative Care recs regarding pain management/regimen on discharge.    # DVT PPx  - C/w full dose Lovenox given tachycardia and hypoxia, as mentioned above.    Dispo: Start d/c planning tentatively for Saturday     Cesia Banks MD  Hematology Oncology Fellow, PGY-4  [facility] Pager: [phone]/ [facility] Pager: [phone]

## 2020-05-15 NOTE — PROGRESS NOTE ADULT - SUBJECTIVE AND OBJECTIVE BOX
Patient is a 24y old  Female who presents with a chief complaint of b/l pelvic masses (15 May 2020 08:16)    Being followed by ID for UTI,fever    Interval history:feels well  No pain today   No urinary complaints   No acute events      ROS:  No cough,SOB,CP  No N/V/D./abd pain  No other complaints      Antimicrobials:    meropenem  IVPB 1000 milliGRAM(s) IV Intermittent every 8 hours    Other medications reviewed    Vital Signs Last 24 Hrs  T(C): 37.1 (05-15-20 @ 08:14), Max: 37.7 (05-15-20 @ 02:40)  T(F): 98.8 (05-15-20 @ 08:14), Max: 99.9 (05-15-20 @ 02:40)  HR: 120 (05-15-20 @ 10:30) (112 - 131)  BP: 107/72 (05-15-20 @ 10:30) (107/72 - 126/88)  BP(mean): --  RR: 18 (05-15-20 @ 10:30) (18 - 18)  SpO2: 93% (05-15-20 @ 10:30) (93% - 96%)    Physical Exam:      HEENT PERRLA EOMI    No oral exudate or erythema    Chest Good AE,CTA    CVS RRR S1 S2 WNl No murmur or rub or gallop    Abd soft BS normal Lower half tenderness ? mass    R PICC  site no erythema tenderness or discharge    CNS AAO X 3 no focal    Lab Data:                          8.2    1.85  )-----------( 166      ( 15 May 2020 06:57 )             27.7     Auto Neutrophil #: 0.93 K/uL (05.15.20 @ 06:57)      05-15    134<L>  |  97  |  8   ----------------------------<  103<H>  4.1   |  24  |  0.43<L>    Ca    9.0      15 May 2020 06:57  Phos  1.8     05-15  Mg     1.6     05-15    TPro  6.4  /  Alb  2.6<L>  /  TBili  0.3  /  DBili  x   /  AST  14  /  ALT  5<L>  /  AlkPhos  193<H>  05-15          < from: US Abdomen Limited (05.12.20 @ 15:13) >  IMPRESSION:     Partially imaged complex pelvic masses, consistent with known ovarian neoplasm.    No ascites.            < end of copied text >

## 2020-05-15 NOTE — PROGRESS NOTE ADULT - NSTELEHEALTH_GEN_ALL_CORE
No

## 2020-05-15 NOTE — PROGRESS NOTE ADULT - ASSESSMENT
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   - Tumor markers elevated: Cancer Antigen, Ca 27.29: 70.8, Cancer Antigen, 125: 619-->1163, Carcinoembryonic Antigen: 44.7 -->70.9--> 74.2, Beta-HC.0 on initial evaluation (3/7); Checked again on 20 and it was 102.7  - Discussed diagnosis with patient on 3/31/20. Given advanced, metastatic stage, will need to be on indefinite treatment to limit progression of disease.  - S/p C1 mFOLFOX (-). Cycle 2 day1 was on  (5-FU infusion was stopped after about 30 hours for possible coronary vasospasm)  - CTA Chest () was negative for central PE, but unable to evaluate segmental and subsegmental branches due to motion artifact. Currently on full dose Lovenox given tachycardia and hypoxia.  - LE dopplers negative for DVT on .   - CT C/A/P () showed POD. Given imaging findings and uptrending tumor markers, switched chemotherapy to Carbo/Taxol.   - S/p Carbo/Taxol , next cycle due . Patient tolerated well.   - Patient improving overall- nausea, vomiting and diarrhea resolving- we are discharge planning with hopes for discharge on .   - Patient  on 5/15, therefore, gave 1 dose of 480mcg of Zarxio- recommend additional dose prior to discharge on Saturday.   - Nausea- stop reglan, can try Compazine 10mg q6H, Zofran only as needed however pt has not required. Can send home with anti-nausea meds as needed. Tapered off Decadron.   - Heartburn- PPI and Maalox    - Abd xray without obstruction, abd ultrasound neg for ascites.     #Fever  Patient with low grade fever on Meropenem, cultures negative so far.  - CXR showed Left lower PNA   - RVP negative  - No need to retest COVID at this time per ID  - No fevers overnight or this morning, transition from Meropenem to Levaquin per ID on discharge.     # Bilateral pleural effusion - Improved  - COVID-19 negative x 3, but parainfluenza positive ().   - CTA Chest () was negative for central PE, but unable to evaluate segmental and subsegmental branches due to motion artifact. Lung imaging showed unchanged metastatic nodules, small bilateral pleural effusions, and atelectasis of the basilar LLL (unchanged as well). No new opopacity which decreases the likelihood of PNA  - CTA chest on  showed no PE, but large bilateral pleural effusions s/p drains in both left and right lung. Bilateral chest tubes removed on .  - Appreciate Pulmonary recs  - Patient is currently saturating well on RA. Continue to closely monitor  - C/w incentive spirometry    - Palliative Care following, appreciate recs  Pain is adequately controlled per patient. Appreciate Palliative Care recs regarding pain management/regimen on discharge.    # DVT PPx  - C/w full dose Lovenox given tachycardia and hypoxia, as mentioned above.    Dispo: d/c planning tentatively for Saturday     Cesia Banks MD  Hematology Oncology Fellow, PGY-4  [facility] Pager: [phone]/ [facility] Pager: [phone]

## 2020-05-15 NOTE — PROGRESS NOTE ADULT - SUBJECTIVE AND OBJECTIVE BOX
24F here with worsening abdominal pain in the setting of known adnexal massess, found to have for metastatic carcinoma with final pathology pending. Given evidence of metastatic disease, patient is not a surgical candidate. Has been having worsening adnexal pain, markedly last night after taking senna. States pain is 7-8/10 at its worst, located in adnexal area with radiation down to legs, worse with movement, and tolerable with opioids around 2/10. Palliative care called to help with pain.     INTERVAL EVENTS:  4/30: states having lower back pain, not fully relieved with morphine IR  5/1: used 4 doses of 3mg IV morphine/24 hours  5/2: used 2 BT of 4 mg Morphine/24 hrs.  5/4: used morphine 15mg PO x 1 and 4mg IV x 1/24 hours   5/5: no use of PRN/24 hours  5/7: one PRN MSIR/24 hours, states having constipation (no BM x 3 days) and nausea  5/8: used no PRN, had incomplete BM, still with nausea  5/9: nauseated, no pain currently  5/12: nausea improved, but has back pain   5/13: used MS IR 15mg x 3/24 hours  5/14: used 2 MS IR in 24 hours states having continual back pain  5/15: used 2MS IR in 24 hours      ADVANCE DIRECTIVES:    DNR  MOLST  [ ]  Living Will  [ ]   DECISION MAKER(s):  [ ] Health Care Proxy(s)  [ ] Surrogate(s)  [ ] Guardian           Name(s): Phone Number(s):    BASELINE (I)ADL(s) (prior to admission):  [place]: [ ]Total  [ ] Moderate [ ]Dependent    Allergies    No Known Allergies    Intolerances    MEDICATIONS  (STANDING):  diltiazem    Tablet 60 milliGRAM(s) Oral every 8 hours  enoxaparin Injectable 80 milliGRAM(s) SubCutaneous every 12 hours  melatonin 3 milliGRAM(s) Oral at bedtime  meropenem  IVPB 1000 milliGRAM(s) IV Intermittent every 8 hours  morphine ER Tablet 30 milliGRAM(s) Oral <User Schedule>  morphine ER Tablet 45 milliGRAM(s) Oral <User Schedule>  pantoprazole    Tablet 40 milliGRAM(s) Oral before breakfast  polyethylene glycol 3350 17 Gram(s) Oral daily  potassium acid phosphate/sodium acid phosphate tablet (K-PHOS No. 2) 1 Tablet(s) Oral four times a day with meals  senna 2 Tablet(s) Oral at bedtime  sodium chloride 0.9% lock flush 3 milliLiter(s) IV Push every 8 hours  sodium chloride 0.9% lock flush 3 milliLiter(s) IV Push every 8 hours    MEDICATIONS  (PRN):  acetaminophen   Tablet .. 650 milliGRAM(s) Oral every 6 hours PRN Temp greater or equal to 38C (100.4F)  aluminum hydroxide/magnesium hydroxide/simethicone Suspension 30 milliLiter(s) Oral every 6 hours PRN Dyspepsia  bisacodyl 5 milliGRAM(s) Oral every 12 hours PRN Constipation  diphenhydrAMINE   Injectable 50 milliGRAM(s) IV Push once PRN Reaction  hydrocortisone sodium succinate Injectable 100 milliGRAM(s) IV Push once PRN Reaction  morphine  IR 15 milliGRAM(s) Oral every 4 hours PRN Moderate Pain (4 - 6)  naloxone Injectable 0.1 milliGRAM(s) IV Push every 3 minutes PRN Sedation or RR <10  ondansetron Injectable 4 milliGRAM(s) IV Push every 6 hours PRN Nausea and/or Vomiting  prochlorperazine   Injectable 10 milliGRAM(s) IV Push every 6 hours PRN Nausea/Vomiting  simethicone 80 milliGRAM(s) Chew every 6 hours PRN Gas         PRESENT SYMPTOMS: [ ]Unable to obtain due to poor mentation   Source if other than patient:  [ ]Family   [ ]Team     Pain: [X ]yes [ ]no  QOL impact -  decreased function  Location - back  Aggravating factors -   Quality -   Radiation -   Timing-   Severity (0-10 scale): 7/10   Minimal acceptable level (0-10 scale): 2/10    CPOT:    https://www.[facility].org/getattachment/rmw59i42-3f5m-9p8r-7h9w-3973d4740m6i/Critical-Care-Pain-Observation-Tool-(CPOT)      PAIN AD Score:     [email or web address] (press ctrl +  left click to view)    Dyspnea:                           [ ]Mild [ ]Moderate [ ]Severe  Anxiety:                             [ ]Mild [ ]Moderate [ ]Severe  Fatigue:                             [ ]Mild [ x]Moderate [ ]Severe  Nausea:                             [ ]Mild [X ]Moderate [ ]Severe  Loss of appetite:              [ ]Mild [ ]Moderate [ ]Severe  Constipation:                    [X ]Mild [ ]Moderate [ ]Severe    Other Symptoms:  [X ]All other review of systems negative     Palliative Performance Status Version 2:   40      %    [email or web address]/[email or web address]_[email or web address]    PHYSICAL EXAM:  Vital Signs Last 24 Hrs  T(C): 37.4 (15 May 2020 13:43), Max: 37.7 (15 May 2020 02:40)  T(F): 99.3 (15 May 2020 13:43), Max: 99.9 (15 May 2020 02:40)  HR: 127 (15 May 2020 13:43) (112 - 131)  BP: 113/77 (15 May 2020 13:43) (107/72 - 126/88)  BP(mean): --  RR: 18 (15 May 2020 13:43) (18 - 18)  SpO2: 95% (15 May 2020 13:43) (93% - 96%)    Limited exam for patient safety and to limit infection risk during COVID-19 outbreak. Please refr to primary team's examination for today.  GENERAL:  [ X]Alert  [ ]Oriented x   [ ]Lethargic  [ ]Cachexia  [ ]Unarousable  [ X]Verbal  [ ]Non-Verbal  Behavioral:   [ ] Anxiety  [ ] Delirium [ ] Agitation [ ] Other  HEENT:  [ ]Normal   [ ]Dry mouth   [ ]ET Tube/Trach  [ ]Oral lesions  PULMONARY:   [ ]Clear [ ]Tachypnea  [ ]Audible excessive secretions   [ ]Rhonchi        [ ]Right [ ]Left [ ]Bilateral  [ ]Crackles        [ ]Right [ ]Left [ ]Bilateral  [ ]Wheezing     [ ]Right [ ]Left [ ]Bilateral  [ ]Diminished breath sounds [ ]right [ ]left [ ]bilateral  CARDIOVASCULAR:    [ ]Regular [ ]Irregular [ ]Tachy  [ ]Quirion [ ]Murmur [ ]Other  GASTROINTESTINAL:  [ ]Soft  [ ]Distended   [ ]+BS  [ ]Non tender [ ]Tender  [ ]PEG [ ]OGT/ NGT  Last BM:     GENITOURINARY:  [ ]Normal [ ] Incontinent   [ ]Oliguria/Anuria   [ ]Cifuentes  MUSCULOSKELETAL:   [ ]Normal   [ x]Weakness  [ ]Bed/Wheelchair bound [ ]Edema  NEUROLOGIC:  Lucid speech  [ ]No focal deficits  [ ]Cognitive impairment  [ ]Dysphagia [ ]Dysarthria [ ]Paresis [ ]Other   SKIN:   [ ]Normal    [ ]Rash  [ ]Pressure ulcer(s)       Present on admission [ ]y [ ]n    CRITICAL CARE:  [ ] Shock Present  [ ]Septic [ ]Cardiogenic [ ]Neurologic [ ]Hypovolemic  [ ]  Vasopressors [ ]  Inotropes   [ ]Respiratory failure present [ ]Mechanical ventilation [ ]Non-invasive ventilatory support [ ]High flow  [ ]Acute  [ ]Chronic [ ]Hypoxic  [ ]Hypercarbic [ ]Other  [ ]Other organ failure     LABS: reviewed                      8.2    1.85  )-----------( 166      ( 15 May 2020 06:57 )             27.7   05-15    134<L>  |  97  |  8   ----------------------------<  103<H>  4.1   |  24  |  0.43<L>    Ca    9.0      15 May 2020 06:57  Phos  1.8     05-15  Mg     1.6     05-15        RADIOLOGY & ADDITIONAL STUDIES:    PROTEIN CALORIE MALNUTRITION PRESENT: [ ]mild [ ]moderate [ ]severe [ ]underweight [ ]morbid obesity  https://www.andeal.org/vault/2440/web/files/ONC/Table_Clinical%20Characteristics%20to%20Document%20Malnutrition-White%20JV%20et%20al%696477.pdf    Height (cm): 162.6 (03-21-20 @ 03:15), 162.56 (03-07-20 @ 19:18)  Weight (kg): 84.4 (03-21-20 @ 03:15), 89.4 (03-07-20 @ 19:18)  BMI (kg/m2): 31.9 (03-21-20 @ 03:15), 33.8 (03-07-20 @ 19:18)    [ ]PPSV2 < or = to 30% [ ]significant weight loss  [ ]poor nutritional intake  [ ]anasarca     Albumin, Serum: 2.9 g/dL (03-25-20 @ 05:59)   [ ]Artificial Nutrition      REFERRALS:   [ ]Chaplaincy  [ ]Hospice  [ ]Child Life  [ ]Social Work  [ ]Case management [ ]Holistic Therapy

## 2020-05-15 NOTE — PROGRESS NOTE ADULT - PROBLEM SELECTOR PROBLEM 3
Adnexal mass
Encounter for palliative care

## 2020-05-15 NOTE — PROGRESS NOTE ADULT - NSREFPHYEXINPTDOCREFER_GEN_ALL_CORE
4/10
4/7
4/12
4/13
4/17
4/6
5/15
GYN-ONC, 3/31/20
4/14
4/15
4/16
4/19
4/9
4/9
5/11
4/20
5/12
5/12
Primary team
Onc
Onc
Primary team

## 2020-05-15 NOTE — PROGRESS NOTE ADULT - SUBJECTIVE AND OBJECTIVE BOX
INTERVAL History:    Allergies    No Known Allergies    Intolerances        MEDICATIONS  (STANDING):  diltiazem    Tablet 60 milliGRAM(s) Oral every 8 hours  enoxaparin Injectable 80 milliGRAM(s) SubCutaneous every 12 hours  melatonin 3 milliGRAM(s) Oral at bedtime  meropenem  IVPB 1000 milliGRAM(s) IV Intermittent every 8 hours  morphine ER Tablet 30 milliGRAM(s) Oral <User Schedule>  morphine ER Tablet 45 milliGRAM(s) Oral <User Schedule>  pantoprazole    Tablet 40 milliGRAM(s) Oral before breakfast  polyethylene glycol 3350 17 Gram(s) Oral daily  senna 2 Tablet(s) Oral at bedtime  sodium chloride 0.9% lock flush 3 milliLiter(s) IV Push every 8 hours  sodium chloride 0.9% lock flush 3 milliLiter(s) IV Push every 8 hours  sodium phosphate IVPB 15 milliMole(s) IV Intermittent once    MEDICATIONS  (PRN):  acetaminophen   Tablet .. 650 milliGRAM(s) Oral every 6 hours PRN Temp greater or equal to 38C (100.4F)  aluminum hydroxide/magnesium hydroxide/simethicone Suspension 30 milliLiter(s) Oral every 6 hours PRN Dyspepsia  bisacodyl 5 milliGRAM(s) Oral every 12 hours PRN Constipation  diphenhydrAMINE   Injectable 50 milliGRAM(s) IV Push once PRN Reaction  hydrocortisone sodium succinate Injectable 100 milliGRAM(s) IV Push once PRN Reaction  morphine  IR 15 milliGRAM(s) Oral every 4 hours PRN Moderate Pain (4 - 6)  naloxone Injectable 0.1 milliGRAM(s) IV Push every 3 minutes PRN Sedation or RR <10  ondansetron Injectable 4 milliGRAM(s) IV Push every 6 hours PRN Nausea and/or Vomiting  prochlorperazine   Injectable 10 milliGRAM(s) IV Push every 6 hours PRN Nausea/Vomiting  simethicone 80 milliGRAM(s) Chew every 6 hours PRN Gas      Vital Signs Last 24 Hrs  T(C): 37.1 (15 May 2020 08:14), Max: 37.7 (15 May 2020 02:40)  T(F): 98.8 (15 May 2020 08:14), Max: 99.9 (15 May 2020 02:40)  HR: 125 (15 May 2020 08:14) (107 - 131)  BP: 115/77 (15 May 2020 08:14) (113/76 - 126/88)  BP(mean): --  RR: 18 (15 May 2020 08:14) (18 - 18)  SpO2: 93% (15 May 2020 08:14) (93% - 99%)    PHYSICAL EXAM:        LABS:                        8.2    1.85  )-----------( 166      ( 15 May 2020 06:57 )             27.7     05-15    134<L>  |  97  |  8   ----------------------------<  103<H>  4.1   |  24  |  0.43<L>    Ca    9.0      15 May 2020 06:57  Phos  1.8     05-15  Mg     1.6     05-15    TPro  6.4  /  Alb  2.6<L>  /  TBili  0.3  /  DBili  x   /  AST  14  /  ALT  5<L>  /  AlkPhos  193<H>  05-15    PT/INR - ( 15 May 2020 06:57 )   PT: 14.0 sec;   INR: 1.21 ratio                 RADIOLOGY & ADDITIONAL STUDIES:    PATHOLOGY: INTERVAL History: No acute events overnight. Patient appears very well, sitting in chair. Off oxygen, Ambulating with physical therapy. No nausea or vomiting, no requirements for Zofran. No diarrhea.     Allergies    No Known Allergies    Intolerances        MEDICATIONS  (STANDING):  diltiazem    Tablet 60 milliGRAM(s) Oral every 8 hours  enoxaparin Injectable 80 milliGRAM(s) SubCutaneous every 12 hours  melatonin 3 milliGRAM(s) Oral at bedtime  meropenem  IVPB 1000 milliGRAM(s) IV Intermittent every 8 hours  morphine ER Tablet 30 milliGRAM(s) Oral <User Schedule>  morphine ER Tablet 45 milliGRAM(s) Oral <User Schedule>  pantoprazole    Tablet 40 milliGRAM(s) Oral before breakfast  polyethylene glycol 3350 17 Gram(s) Oral daily  senna 2 Tablet(s) Oral at bedtime  sodium chloride 0.9% lock flush 3 milliLiter(s) IV Push every 8 hours  sodium chloride 0.9% lock flush 3 milliLiter(s) IV Push every 8 hours  sodium phosphate IVPB 15 milliMole(s) IV Intermittent once    MEDICATIONS  (PRN):  acetaminophen   Tablet .. 650 milliGRAM(s) Oral every 6 hours PRN Temp greater or equal to 38C (100.4F)  aluminum hydroxide/magnesium hydroxide/simethicone Suspension 30 milliLiter(s) Oral every 6 hours PRN Dyspepsia  bisacodyl 5 milliGRAM(s) Oral every 12 hours PRN Constipation  diphenhydrAMINE   Injectable 50 milliGRAM(s) IV Push once PRN Reaction  hydrocortisone sodium succinate Injectable 100 milliGRAM(s) IV Push once PRN Reaction  morphine  IR 15 milliGRAM(s) Oral every 4 hours PRN Moderate Pain (4 - 6)  naloxone Injectable 0.1 milliGRAM(s) IV Push every 3 minutes PRN Sedation or RR <10  ondansetron Injectable 4 milliGRAM(s) IV Push every 6 hours PRN Nausea and/or Vomiting  prochlorperazine   Injectable 10 milliGRAM(s) IV Push every 6 hours PRN Nausea/Vomiting  simethicone 80 milliGRAM(s) Chew every 6 hours PRN Gas      Vital Signs Last 24 Hrs  T(C): 37.1 (15 May 2020 08:14), Max: 37.7 (15 May 2020 02:40)  T(F): 98.8 (15 May 2020 08:14), Max: 99.9 (15 May 2020 02:40)  HR: 125 (15 May 2020 08:14) (107 - 131)  BP: 115/77 (15 May 2020 08:14) (113/76 - 126/88)  BP(mean): --  RR: 18 (15 May 2020 08:14) (18 - 18)  SpO2: 93% (15 May 2020 08:14) (93% - 99%)    PHYSICAL EXAM:    Not physically examined to decrease COVID exposure to our immunocompromised patient population    LABS:                        8.2    1.85  )-----------( 166      ( 15 May 2020 06:57 )             27.7     05-15    134<L>  |  97  |  8   ----------------------------<  103<H>  4.1   |  24  |  0.43<L>    Ca    9.0      15 May 2020 06:57  Phos  1.8     05-15  Mg     1.6     05-15    TPro  6.4  /  Alb  2.6<L>  /  TBili  0.3  /  DBili  x   /  AST  14  /  ALT  5<L>  /  AlkPhos  193<H>  05-15    PT/INR - ( 15 May 2020 06:57 )   PT: 14.0 sec;   INR: 1.21 ratio                 RADIOLOGY & ADDITIONAL STUDIES:    PATHOLOGY:

## 2020-05-15 NOTE — PROGRESS NOTE ADULT - ASSESSMENT
24 f with b/l adnexal masses and pelvic LAD, metastatic carcinoma of unknown primary presented 3/20 with abd pain, s/p CT guided biopsy 3/25, PICC line 4/3 to start chemo  pt intermittently febrile and leukocytosis,   blood culture has been NGTD  COVID negative x 7 last one 4/29  C-diff negative  metastatic ca with ?tumor fever  She has progression of disease based on CT C/A/P done on 4/30   on chemo  Prior MDR urinary pathogens-most recent urine Cx >3 organisms    A) fever  resolved   ?UTI   ? Tumor fever  ?chemo related   follow clinically and repeat cx   continue  empiric merem as on chemo  Continue observation   Monitor for s/s any other foci  if stable and for Dc could de-escalate to levaquin on Dc(urine isolates were sensitive)-duration will depend on ANC       B) malignancy  will defer to hem onc on chemo  Observe for s/s any nosocomial infectious process    C) Diarrhea  resolved  Observe     Will tailor plan for ID issues  per course,results.Will defer to primary team on management of other issues.  Assessment, plan and recommendations as detailed above were discussed with the hem onc  team.    Infectious Diseases Service will cover over weekend.  Please call [phone] if issues

## 2020-05-15 NOTE — PROGRESS NOTE ADULT - PROBLEM SELECTOR PROBLEM 1
Abdominal pain, unspecified abdominal location
Adnexal mass
Nausea
Abdominal pain, unspecified abdominal location

## 2020-05-15 NOTE — PROGRESS NOTE ADULT - PROBLEM SELECTOR PROBLEM 2
Abdominal pain, unspecified abdominal location
Adnexal mass
Metastatic cancer

## 2020-05-16 NOTE — PROGRESS NOTE ADULT - NSHPATTENDINGPLANDISCUSS_GEN_ALL_CORE
patient, Dr Byers and 7 [facility] nursing staff patient, Dr Zeeshan Fiore and 7 [facility] nursing staff

## 2020-05-16 NOTE — PROGRESS NOTE ADULT - ATTENDING COMMENTS
24 year old female with a history of a neuroendocrine carcinoma of the pelvis which by imaging shows bilateral pelvic masses and significant pelvic lymphadenopathy. She is status post modified FOLFOX 6 treatment and recently carboplatin paclitaxel (3 hour) infusion. Plan for discharge today or tomorrow May 17 on hold due to the finding of temperature elevation to100.3 yesterday evening. She will be observed today; she is non toxic in appearance and may receive colony stimulation factor this evening. Will re assess for discharge on may 17

## 2020-05-16 NOTE — PROVIDER CONTACT NOTE (OTHER) - BACKGROUND
23 yo female admitted on 3/20/20 admitted dx abdominal pain. PMH of metastatic cancer and adnexal mass.   No past surgical history.

## 2020-05-16 NOTE — PROVIDER CONTACT NOTE (OTHER) - SITUATION
Pt c/o feeling nausea and was given PRN dose of Zofran 4mg IV, within ten minutes pt threw up her dinner from earlier this evening

## 2020-05-16 NOTE — PROVIDER CONTACT NOTE (OTHER) - ACTION/TREATMENT ORDERED:
Pt is currently stable, will continue to monitor pt throughout shift and report any changes to covering provider.

## 2020-05-16 NOTE — PROGRESS NOTE ADULT - SUBJECTIVE AND OBJECTIVE BOX
INTERVAL HPI/OVERNIGHT EVENTS:  Overnight pt had fever of 100.9F. Had episode of nausea with emesis as well. This AM feels better. No complaints.     MEDICATIONS  (STANDING):  diltiazem    Tablet 60 milliGRAM(s) Oral every 8 hours  enoxaparin Injectable 80 milliGRAM(s) SubCutaneous every 12 hours  melatonin 3 milliGRAM(s) Oral at bedtime  meropenem  IVPB 1000 milliGRAM(s) IV Intermittent every 8 hours  morphine ER Tablet 30 milliGRAM(s) Oral <User Schedule>  morphine ER Tablet 45 milliGRAM(s) Oral <User Schedule>  pantoprazole    Tablet 40 milliGRAM(s) Oral before breakfast  polyethylene glycol 3350 17 Gram(s) Oral daily  senna 2 Tablet(s) Oral at bedtime  sodium chloride 0.9% lock flush 3 milliLiter(s) IV Push every 8 hours  sodium chloride 0.9% lock flush 3 milliLiter(s) IV Push every 8 hours  sodium phosphate IVPB 30 milliMole(s) IV Intermittent once    MEDICATIONS  (PRN):  acetaminophen   Tablet .. 650 milliGRAM(s) Oral every 6 hours PRN Temp greater or equal to 38C (100.4F)  aluminum hydroxide/magnesium hydroxide/simethicone Suspension 30 milliLiter(s) Oral every 6 hours PRN Dyspepsia  bisacodyl 5 milliGRAM(s) Oral every 12 hours PRN Constipation  diphenhydrAMINE   Injectable 50 milliGRAM(s) IV Push once PRN Reaction  hydrocortisone sodium succinate Injectable 100 milliGRAM(s) IV Push once PRN Reaction  morphine  IR 15 milliGRAM(s) Oral every 4 hours PRN Moderate Pain (4 - 6)  naloxone Injectable 0.1 milliGRAM(s) IV Push every 3 minutes PRN Sedation or RR <10  ondansetron Injectable 4 milliGRAM(s) IV Push every 6 hours PRN Nausea and/or Vomiting  prochlorperazine   Injectable 10 milliGRAM(s) IV Push every 6 hours PRN Nausea/Vomiting  simethicone 80 milliGRAM(s) Chew every 6 hours PRN Gas    Allergies    No Known Allergies    Intolerances          VITAL SIGNS:  T(F): 97.2 (20 @ 09:02)  HR: 123 (20 @ 09:02)  BP: 119/72 (05-16-20 @ 09:02)  RR: 18 (20 @ 09:02)  SpO2: 94% (20 @ 09:02)  Wt(kg): --    PHYSICAL EXAM:  Limited exam performed to decrease COVID exposure to our immunocompromised patient population    Abd: soft, NT, ND  CV: Tachycardic, no r/m/g  Pulm: CTAB  Ext: no edema noted    LABS:                        8.6    4.86  )-----------( 111      ( 16 May 2020 07:05 )             28.4         134<L>  |  95<L>  |  6<L>  ----------------------------<  85  3.8   |  26  |  0.37<L>    Ca    8.5      16 May 2020 07:04  Phos  1.7       Mg     1.5         TPro  6.6  /  Alb  2.6<L>  /  TBili  0.3  /  DBili  x   /  AST  16  /  ALT  5<L>  /  AlkPhos  206<H>      PT/INR - ( 16 May 2020 07:05 )   PT: 14.3 sec;   INR: 1.25 ratio           Urinalysis Basic - ( 15 May 2020 19:56 )    Color: Light Yellow / Appearance: Clear / S.012 / pH: x  Gluc: x / Ketone: Negative  / Bili: Negative / Urobili: Negative   Blood: x / Protein: Trace / Nitrite: Negative   Leuk Esterase: Negative / RBC: x / WBC x   Sq Epi: x / Non Sq Epi: x / Bacteria: x        RADIOLOGY & ADDITIONAL TESTS:  Studies reviewed.

## 2020-05-16 NOTE — PROGRESS NOTE ADULT - ASSESSMENT
Patient is a 23 y/o F w/ no PMHx who initially p/w abdominal pain, found to have bilateral pelvic masses and extensive pelvic lymphadenopathy concerning for metastatic malignancy with pathology showing likely metastatic carcinoma with neuroendocrine features, possibly of GI origin, s/p C1 mFOLFOX (-), C2 stopped 2/2 possible coronary vasospasm, found to have POD now s/p C1 carbo/taxol /.     # Metastatic carcinoma, cancer of unknown primary  - CT A/P with bilateral heterogeneous adnexal masses, as well as upper abdominal, RP, possible bone lesions and pelvic lymphadenopathy; CT Chest with multiple solid pulmonary nodules  - Cancer of unknown primary (poorly differentiated carcinoma) but suspect likely GI origin given CDX2 positivity on pathology; chromogranin positivity also suggests neuroendocrine features; Ki-67 index 40%.   - Tumor markers elevated: Cancer Antigen, Ca 27.29: 70.8, Cancer Antigen, 125: 619-->1163, Carcinoembryonic Antigen: 44.7 -->70.9--> 74.2, Beta-HC.0 on initial evaluation (3/7); Checked again on 20 and it was 102.7  - Discussed diagnosis with patient on 3/31/20. Given advanced, metastatic stage, will need to be on indefinite treatment to limit progression of disease.  - S/p C1 mFOLFOX (-). Cycle 2 day1 was on  (5-FU infusion was stopped after about 30 hours for possible coronary vasospasm)  - CTA Chest () was negative for central PE, but unable to evaluate segmental and subsegmental branches due to motion artifact. Currently on full dose Lovenox given tachycardia and hypoxia.  - LE dopplers negative for DVT on .   - CT C/A/P () showed POD. Given imaging findings and uptrending tumor markers, switched chemotherapy to Carbo/Taxol.   - S/p Carbo/Taxol , next cycle due . Patient tolerated well.   - Patient improving overall- nausea, vomiting and diarrhea resolving  - Patient  on 5/15, therefore, given 1 dose of 480mcg of Zarxio; given additional dose today   - Nausea- stop reglan, can try Compazine 10mg q6H, Zofran only as needed however pt has not required. Can send home with anti-nausea meds as needed. Tapered off Decadron.   - Heartburn- PPI and Maalox    - Abd xray without obstruction, abd ultrasound neg for ascites.   - DC planning, will hold off today given overnight fever. If no fevers and continues to feel well, will plan on DC tomorrow     #Fever  Patient with low grade fever on Meropenem, cultures negative so far.  - CXR showed Left lower PNA   - RVP negative  - No need to retest COVID at this time per ID  - Isolated fever overnight possible from zarxio, will monitor fever curve today  - transition from Meropenem to Levaquin per ID on discharge.     # Bilateral pleural effusion - Improved  - COVID-19 negative x 3, but parainfluenza positive ().   - CTA Chest () was negative for central PE, but unable to evaluate segmental and subsegmental branches due to motion artifact. Lung imaging showed unchanged metastatic nodules, small bilateral pleural effusions, and atelectasis of the basilar LLL (unchanged as well). No new opopacity which decreases the likelihood of PNA  - CTA chest on  showed no PE, but large bilateral pleural effusions s/p drains in both left and right lung. Bilateral chest tubes removed on .  - Appreciate Pulmonary recs  - Patient is currently saturating well on RA. Continue to closely monitor  - C/w incentive spirometry    - Palliative Care following, appreciate recs  Pain is adequately controlled per patient. Appreciate Palliative Care recs regarding pain management/regimen on discharge.    # DVT PPx  - C/w full dose Lovenox given tachycardia and hypoxia, as mentioned above.    Dispo: d/c planning tentatively for      Zeeshan Fiore, PGY-5  Hematology-Oncology Fellow  [phone] ([place]) [phone] ([facility])

## 2020-05-17 NOTE — PROGRESS NOTE ADULT - ASSESSMENT
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  She is status post chemotherapy and she may be discharged today. WBC has risen in response to colony stimulation factor therapy.  No fever for over 24 hours.  I have provided contact information to the patient to make an appointment to see Dr AARON Toledo for evaluation  Treatment of nausea at home discussed. She will be discharged by Kelly Hatfield with medication available

## 2020-05-17 NOTE — PROGRESS NOTE ADULT - REASON FOR ADMISSION
b/l pelvic masses
b/l pelvic masses.  Transthoracic echocardiogram report.
b/l pelvic masses

## 2020-05-17 NOTE — DISCHARGE NOTE NURSING/CASE MANAGEMENT/SOCIAL WORK - PATIENT PORTAL LINK FT
You can access the FollowMyHealth Patient Portal offered by [facility] by registering at the following website: [email or web address]. By joining [facility]’s FollowMyHealth portal, you will also be able to view your health information using other applications (apps) compatible with our system.

## 2020-05-17 NOTE — PROGRESS NOTE ADULT - SUBJECTIVE AND OBJECTIVE BOX
INTERVAL HPI/OVERNIGHT EVENTS:  Patient S&E at bedside. No o/n events,     VITAL SIGNS:  T(F): 98.3 (20 @ 08:01)  HR: 113 (20 @ 08:01)  BP: 122/76 (20 @ 08:01)  RR: 18 (20 @ 08:01)  SpO2: 95% (20 @ 08:01)  Wt(kg): --    PHYSICAL EXAM:    Constitutional: NAD  Eyes: EOMI, sclera non-icteric  Neck: supple, no masses, no JVD  Respiratory: CTA b/l, good air entry b/l  Cardiovascular: RRR, no M/R/G  Gastrointestinal: soft, NTND, no masses palpable, + BS, no hepatosplenomegaly  Extremities: no c/c/e  Neurological: AAOx3      MEDICATIONS  (STANDING):  diltiazem    Tablet 60 milliGRAM(s) Oral every 8 hours  enoxaparin Injectable 80 milliGRAM(s) SubCutaneous every 12 hours  melatonin 3 milliGRAM(s) Oral at bedtime  meropenem  IVPB 1000 milliGRAM(s) IV Intermittent every 8 hours  morphine ER Tablet 30 milliGRAM(s) Oral <User Schedule>  morphine ER Tablet 45 milliGRAM(s) Oral <User Schedule>  pantoprazole    Tablet 40 milliGRAM(s) Oral before breakfast  polyethylene glycol 3350 17 Gram(s) Oral daily  potassium acid phosphate/sodium acid phosphate tablet (K-PHOS No. 2) 1 Tablet(s) Oral four times a day with meals  potassium chloride  20 mEq/100 mL IVPB 20 milliEquivalent(s) IV Intermittent every 2 hours  potassium phosphate IVPB 30 milliMole(s) IV Intermittent once  senna 2 Tablet(s) Oral at bedtime  sodium chloride 0.9% lock flush 3 milliLiter(s) IV Push every 8 hours  sodium chloride 0.9% lock flush 3 milliLiter(s) IV Push every 8 hours    MEDICATIONS  (PRN):  acetaminophen   Tablet .. 650 milliGRAM(s) Oral every 6 hours PRN Temp greater or equal to 38C (100.4F)  aluminum hydroxide/magnesium hydroxide/simethicone Suspension 30 milliLiter(s) Oral every 6 hours PRN Dyspepsia  bisacodyl 5 milliGRAM(s) Oral every 12 hours PRN Constipation  diphenhydrAMINE   Injectable 50 milliGRAM(s) IV Push once PRN Reaction  hydrocortisone sodium succinate Injectable 100 milliGRAM(s) IV Push once PRN Reaction  morphine  IR 15 milliGRAM(s) Oral every 4 hours PRN Moderate Pain (4 - 6)  naloxone Injectable 0.1 milliGRAM(s) IV Push every 3 minutes PRN Sedation or RR <10  ondansetron Injectable 4 milliGRAM(s) IV Push every 6 hours PRN Nausea and/or Vomiting  prochlorperazine   Injectable 10 milliGRAM(s) IV Push every 6 hours PRN Nausea/Vomiting  simethicone 80 milliGRAM(s) Chew every 6 hours PRN Gas      Allergies    No Known Allergies    Intolerances        LABS:                        8.8    14.52 )-----------( 158      ( 17 May 2020 07:10 )             27.8     05-    136  |  96  |  <4<L>  ----------------------------<  93  3.2<L>   |  28  |  0.36<L>    Ca    8.7      17 May 2020 07:10  Phos  1.4     05-  Mg     1.8     -    TPro  6.6  /  Alb  2.6<L>  /  TBili  0.3  /  DBili  x   /  AST  16  /  ALT  5<L>  /  AlkPhos  224<H>  05-17    PT/INR - ( 17 May 2020 07:10 )   PT: 15.0 sec;   INR: 1.29 ratio           Urinalysis Basic - ( 15 May 2020 19:56 )    Color: Light Yellow / Appearance: Clear / S.012 / pH: x  Gluc: x / Ketone: Negative  / Bili: Negative / Urobili: Negative   Blood: x / Protein: Trace / Nitrite: Negative   Leuk Esterase: Negative / RBC: x / WBC x   Sq Epi: x / Non Sq Epi: x / Bacteria: x        RADIOLOGY & ADDITIONAL TESTS:  Studies reviewed.

## 2020-05-26 NOTE — CONSULT NOTE ADULT - SUBJECTIVE AND OBJECTIVE BOX
REASON FOR CONSULTATION: Metastatic carcinoma of unknown primary     HPI: 24F w/ recent diagnosis of metastatic carcinoma of unknown primary (being treated as likely ovarian primary) s/p Carbo/Taxol cycle 1 on 5/5 discharged last week, presented to treatment room today at [facility] found to be febrile to 101, tachycardic to 150 and tachypneic sent into the ER for further workup.     Of note, patient had a prolonged hospital course recently, where she initially  presented with abdominal pain, found to have bilateral pelvic masses and extensive pelvic lymphadenopathy. CT A/P with bilateral heterogeneous adnexal masses, as well as upper abdominal, RP and pelvic lymphadenopathy; CT Chest with multiple solid pulmonary nodules. CA-125: 619, CEA: 44.7. . Core bone bx on 3/25 showed poorly differentiated carcinoma with neuroendocrine features, raising initial suspicion for GI source with lung, liver, bone mets. PD-L1 negative, MSI stable. Started modified folfox on 4/9 completed on 4/11. CTA chest on 4/16 showed large bilateral pleural effusions- IR drain right lung and pigtail catheter on 4/17 and drain left lung placed on 4/20 removed on 4/29. 2nd cycle of mFOLFOX started on 4/23 stopped on day 3 due to palpitation and ST changes. 5-FU stopped. Restaging CT on 5/1 showed POD. Foundation One sent on 4/30. Decision to change treatment to treat as ovarian primary- started Carbo/taxol on 5/5: AUC 5, paclitaxel 175mg/m2, tolerated well. Spiked fever to 100.6 on 5/8, found to have left lower pneumonia, pan-cultured (neg) and started on Meropenem, discharged on Levaquin.  Neutropenic to 930 on 5/15, gave 2 doses of 480mcg Zarxio prior to discharge, last on Saturday 5/16. TPS score <1%.      REVIEW OF SYSTEMS:    CONSTITUTIONAL: +Fever  EYES/ENT: No visual changes;  No vertigo or throat pain   NECK: No pain or stiffness  RESPIRATORY: +Shortness of breath   CARDIOVASCULAR: No chest pain or palpitations  GASTROINTESTINAL: +abd pain from pelvic disease  GENITOURINARY: No dysuria, frequency or hematuria  NEUROLOGICAL: No numbness or weakness  SKIN: No itching, burning, rashes, or lesions   All other review of systems is negative unless indicated above.    Allergies    No Known Allergies    Intolerances        MEDICATIONS  (STANDING):  cefepime   IVPB 2000 milliGRAM(s) IV Intermittent once  sodium chloride 0.9% Bolus 2300 milliLiter(s) IV Bolus once  vancomycin  IVPB 1000 milliGRAM(s) IV Intermittent once    MEDICATIONS  (PRN):      Vital Signs Last 24 Hrs  T(C): 38.2 (26 May 2020 12:53), Max: 38.4 (26 May 2020 12:04)  T(F): 100.7 (26 May 2020 12:53), Max: 101.1 (26 May 2020 12:04)  HR: 158 (26 May 2020 12:53) (153 - 158)  BP: 115/82 (26 May 2020 12:53) (111/76 - 115/82)  BP(mean): --  RR: 16 (26 May 2020 12:53) (16 - 20)  SpO2: 91% (26 May 2020 12:53) (91% - 95%)    PHYSICAL EXAM:    Not physically examined in attempt to decrease COVID exposure to our already immunocompromised patient population.     LABS:                        9.0    21.89 )-----------( 409      ( 26 May 2020 13:21 )             30.0                     RADIOLOGY & ADDITIONAL STUDIES:    PATHOLOGY: REASON FOR CONSULTATION: Metastatic carcinoma of unknown primary     HPI: 24F w/ recent diagnosis of metastatic carcinoma of unknown primary (being treated as likely ovarian primary) s/p Carbo/Taxol cycle 1 on 5/5 discharged last week, presented to treatment room today at [facility] found to be febrile to 101, tachycardic to 150 and tachypneic sent into the ER for further workup. Patient stated she had been febrile since yesterday.     Of note, patient had a prolonged hospital course recently, where she initially  presented with abdominal pain, found to have bilateral pelvic masses and extensive pelvic lymphadenopathy. CT A/P with bilateral heterogeneous adnexal masses, as well as upper abdominal, RP and pelvic lymphadenopathy; CT Chest with multiple solid pulmonary nodules. CA-125: 619, CEA: 44.7. . Core bone bx on 3/25 showed poorly differentiated carcinoma with neuroendocrine features, raising initial suspicion for GI source with lung, liver, bone mets. PD-L1 negative, MSI stable. Started modified folfox on 4/9 completed on 4/11. CTA chest on 4/16 showed large bilateral pleural effusions- IR drain right lung and pigtail catheter on 4/17 and drain left lung placed on 4/20 removed on 4/29. 2nd cycle of mFOLFOX started on 4/23 stopped on day 3 due to palpitation and ST changes. 5-FU stopped. Restaging CT on 5/1 showed POD. Foundation One sent on 4/30. Decision to change treatment to treat as ovarian primary- started Carbo/taxol on 5/5: AUC 5, paclitaxel 175mg/m2, tolerated well. Spiked fever to 100.6 on 5/8, found to have left lower pneumonia, pan-cultured (neg) and started on Meropenem, discharged on Levaquin.  Neutropenic to 930 on 5/15, gave 2 doses of 480mcg Zarxio prior to discharge, last on Saturday 5/16. TPS score <1%.      REVIEW OF SYSTEMS:    CONSTITUTIONAL: +Fever  EYES/ENT: No visual changes;  No vertigo or throat pain   NECK: No pain or stiffness  RESPIRATORY: +Shortness of breath   CARDIOVASCULAR: No chest pain or palpitations  GASTROINTESTINAL: +abd pain from pelvic disease  GENITOURINARY: No dysuria, frequency or hematuria  NEUROLOGICAL: No numbness or weakness  SKIN: No itching, burning, rashes, or lesions   All other review of systems is negative unless indicated above.    Allergies    No Known Allergies    Intolerances        MEDICATIONS  (STANDING):  cefepime   IVPB 2000 milliGRAM(s) IV Intermittent once  sodium chloride 0.9% Bolus 2300 milliLiter(s) IV Bolus once  vancomycin  IVPB 1000 milliGRAM(s) IV Intermittent once    MEDICATIONS  (PRN):      Vital Signs Last 24 Hrs  T(C): 38.2 (26 May 2020 12:53), Max: 38.4 (26 May 2020 12:04)  T(F): 100.7 (26 May 2020 12:53), Max: 101.1 (26 May 2020 12:04)  HR: 158 (26 May 2020 12:53) (153 - 158)  BP: 115/82 (26 May 2020 12:53) (111/76 - 115/82)  BP(mean): --  RR: 16 (26 May 2020 12:53) (16 - 20)  SpO2: 91% (26 May 2020 12:53) (91% - 95%)    PHYSICAL EXAM:    Not physically examined in attempt to decrease COVID exposure to our already immunocompromised patient population.     LABS:                        9.0    21.89 )-----------( 409      ( 26 May 2020 13:21 )             30.0                     RADIOLOGY & ADDITIONAL STUDIES:    PATHOLOGY: REASON FOR CONSULTATION: Metastatic carcinoma of unknown primary     HPI: 24F w/ recent diagnosis of metastatic carcinoma of unknown primary (being treated as likely ovarian primary) s/p Carbo/Taxol cycle 1 on 5/5 discharged last week, presented to treatment room today at [facility] found to be febrile to 101, tachycardic to 150 and tachypneic sent into the ER for further workup. Patient stated she had been febrile since yesterday.     Of note, patient had a prolonged hospital course recently, where she initially  presented with abdominal pain, found to have bilateral pelvic masses and extensive pelvic lymphadenopathy. CT A/P with bilateral heterogeneous adnexal masses, as well as upper abdominal, RP and pelvic lymphadenopathy; CT Chest with multiple solid pulmonary nodules. CA-125: 619, CEA: 44.7. . Core bone bx on 3/25 showed poorly differentiated carcinoma with neuroendocrine features, raising initial suspicion for GI source with lung, liver, bone mets. PD-L1 negative, MSI stable. Started modified folfox on 4/9 completed on 4/11. CTA chest on 4/16 showed large bilateral pleural effusions- IR drain right lung and pigtail catheter on 4/17 and drain left lung placed on 4/20 removed on 4/29. 2nd cycle of mFOLFOX started on 4/23 stopped on day 3 due to palpitation and ST changes. 5-FU stopped. Restaging CT on 5/1 showed POD. Foundation One sent on 4/30. Decision to change treatment to treat as ovarian primary- started Carbo/taxol on 5/5: AUC 5, paclitaxel 175mg/m2, tolerated well. Spiked fever to 100.6 on 5/8, found to have left lower pneumonia, pan-cultured (neg) and started on Meropenem, discharged on Levaquin.  Neutropenic to 930 on 5/15, gave 2 doses of 480mcg Zarxio prior to discharge, last on Saturday 5/16. TPS score <1%.      PAST MEDICAL HISTORY:  Metastatic cancer of unknown primary     PAST SURGICAL HISTORY:  No significant past surgical history.    Social history:  No toxic habits  No malignancy in first degree relatives.     REVIEW OF SYSTEMS:    CONSTITUTIONAL: +Fever  EYES/ENT: No visual changes;  No vertigo or throat pain   NECK: No pain or stiffness  RESPIRATORY: +Shortness of breath   CARDIOVASCULAR: No chest pain or palpitations  GASTROINTESTINAL: +abd pain from pelvic disease  GENITOURINARY: No dysuria, frequency or hematuria  NEUROLOGICAL: No numbness or weakness  SKIN: No itching, burning, rashes, or lesions   All other review of systems is negative unless indicated above.    Allergies    No Known Allergies    Intolerances        MEDICATIONS  (STANDING):  cefepime   IVPB 2000 milliGRAM(s) IV Intermittent once  sodium chloride 0.9% Bolus 2300 milliLiter(s) IV Bolus once  vancomycin  IVPB 1000 milliGRAM(s) IV Intermittent once    MEDICATIONS  (PRN):      Vital Signs Last 24 Hrs  T(C): 38.2 (26 May 2020 12:53), Max: 38.4 (26 May 2020 12:04)  T(F): 100.7 (26 May 2020 12:53), Max: 101.1 (26 May 2020 12:04)  HR: 158 (26 May 2020 12:53) (153 - 158)  BP: 115/82 (26 May 2020 12:53) (111/76 - 115/82)  BP(mean): --  RR: 16 (26 May 2020 12:53) (16 - 20)  SpO2: 91% (26 May 2020 12:53) (91% - 95%)    PHYSICAL EXAM:    Not physically examined in attempt to decrease COVID exposure to our already immunocompromised patient population.     LABS:                        9.0    21.89 )-----------( 409      ( 26 May 2020 13:21 )             30.0                     RADIOLOGY & ADDITIONAL STUDIES:    PATHOLOGY:

## 2020-05-26 NOTE — ED PROCEDURE NOTE - PROCEDURE ADDITIONAL DETAILS
POCUS: Educational exam perfomed by a resident. Emergency Department Focused Ultrasound performed at patient's bedside.

## 2020-05-26 NOTE — H&P ADULT - NSHPLABSRESULTS_GEN_ALL_CORE
Labs reviewed : leukocytosis, hyponatremia, hypocalcemia, hyperkalemia, elevated LFTs, covid 19 negative    CT angio chest  personally reviewed :   1.  No pulmonary embolus.  2.  The left pleural effusion partially obscures the left pleural nodularity.  3.  No change in the bilateral pulmonary nodules since 4/30/2020.    CT Abdomen and pelvis personally reviewed :  1.  Mesenteric nodularity and ascites are concerning for malignancy.  2.  New hypodense hepatic lesions are concerning for metastasis.  3.  The pelvic mass is unchanged line for differences in technique.  4.  Lymph nodes are likely malignant.  5.  Mildly distended loops of jejunum in the left upper quadrant      EKG personally reviewed : sinus tachycardia

## 2020-05-26 NOTE — H&P ADULT - PROBLEM SELECTOR PLAN 3
- K 5.5 ; no EKG changes  - will give Lokelma 10 mg po x 1 and recheck BMP in 6 hrs  - monitor BMP  - Nephrology consulted

## 2020-05-26 NOTE — ED PROVIDER NOTE - PHYSICAL EXAMINATION
*Gen: appears fatigued, AAO*3  *HEENT: NC/AT, dry mucous membranes, airway patent, trachea midline  *CV: tachycardia, S1/S2 present  *Resp: no respiratory distress, LCTAB  *Abd: non-distended, diffuse TTP, no guarding or rigidity  *Neuro: no focal neuro deficits, moving all limbs appropriately  *Extremities: no gross deformity  *Skin: no rashes, no wounds   ~ Joseph Leon M.D.

## 2020-05-26 NOTE — H&P ADULT - ASSESSMENT
24F with recent diagnosis of metastatic carcinoma of unknown primary (being treated as likely ovarian primary) s/p Carbo/Taxol cycle 1 on 5/5 who was sent in from  [facility] on account of fever,  tachycardia  and tachypnea. Pt is admitted for sepsis work up.

## 2020-05-26 NOTE — H&P ADULT - PROBLEM SELECTOR PLAN 2
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) s/p Carbo/Taxol cycle 1 on 5/5   - was scheduled for cycle 2 today which is now being held in light of ongoing infectious process  - follows with Dr Kristina Miles at [facility]  - seen by Hem/onc ; reccs CEA,

## 2020-05-26 NOTE — H&P ADULT - PROBLEM SELECTOR PLAN 4
- Ca level 12.3 ; check ionized calcium  - check PTH, PTrH, vit d, d 1-25  -  Nephrology consulted as above

## 2020-05-26 NOTE — H&P ADULT - PROBLEM SELECTOR PLAN 1
- meets sepsis criteria with fever/tachycardia/tachypnea/leukocytosis  - unclear source at this time  - CTA chest and CT abd negative for occult infection  - PICC line is possible source  - d/w ID ; reccs to start Meropenem, Vancomycin ; pt will be seen in AM  - follow up bld cx, urine cx

## 2020-05-26 NOTE — CONSULT NOTE ADULT - ASSESSMENT
24F w/ recent diagnosis of metastatic carcinoma of unknown primary (being treated as likely ovarian primary) s/p Carbo/Taxol cycle 1 on 5/5 discharged last week, presented to treatment room today at [facility] found to be febrile to 101, tachycardic to 150 and tachypneic sent into the ER for further workup.     #Shortness of breath:  Pt presented to treatment room today tachycardic, tachypneic   Will need chest imaging to r/o PE vs infection   Continue with broad spectrum antibiotics   Supplemental O2 as needed     #Fever:  Pt had recent pneumonia last admission and was discharged on Levaquin. Pt also had frequent episodes of fever during her admission, with negative cultures, and were deemed to be 2/2 tumor fever.   Pursue full infectious workup- pan-scan + pan-culture   Continue broad spectrum antibiotics  Currently not neutropenic  Continue to trend WBC with daily CBC with differential  Pt had leukocytosis on discharge due to administration of Zarxio  Followed by ID last admission, would consult ID and have them on board again this admission    #Metastatic carcinoma of unknown primary- being treated as ovarian primary:  Details per HPI   Pt was due today for cycle two of Carbo/Taxol   Will hold off on inpatient chemotherapy for now given pt's acute clinical illness  If patient improves, may give 2nd cycle inpatient- will discuss with pt's primary oncologist Dr. Kristina Banks MD  Hematology Oncology Fellow, PGY-4  [facility] Pager: [phone]/ [facility] Pager: [phone] 24F w/ recent diagnosis of metastatic carcinoma of unknown primary (being treated as likely ovarian primary) s/p Carbo/Taxol cycle 1 on 5/5 discharged last week, presented to treatment room today at [facility] found to be febrile to 101, tachycardic to 150 and tachypneic sent into the ER for further workup.     #Shortness of breath:  Pt presented to treatment room today tachycardic, tachypneic   Will need chest imaging to r/o PE vs infection   Continue with broad spectrum antibiotics   Supplemental O2 as needed     #Fever:  Pt had recent pneumonia last admission and was discharged on Levaquin. Pt also had frequent episodes of fever during her admission, with negative cultures, and were deemed to be 2/2 tumor fever.   Pursue full infectious workup- pan-scan + pan-culture   Continue broad spectrum antibiotics  Currently not neutropenic  Continue to trend WBC with daily CBC with differential  Pt had leukocytosis on discharge due to administration of Zarxio  Followed by ID last admission, would consult ID and have them on board again this admission    #Metastatic carcinoma of unknown primary- being treated as ovarian primary:  Details per HPI   Pt was due today for cycle two of Carbo/Taxol   Will hold off on inpatient chemotherapy for now given pt's acute clinical illness  Send CEA and Ca- 125 tumor markers   If patient improves, may give 2nd cycle inpatient- will discuss with pt's primary oncologist Dr. Kristina Banks MD  Hematology Oncology Fellow, PGY-4  [facility] Pager: [phone]/ [facility] Pager: [phone] 24F w/ recent diagnosis of metastatic carcinoma of unknown primary (being treated as likely ovarian primary) s/p Carbo/Taxol cycle 1 on 5/5 discharged last week, presented to treatment room today at [facility] found to be febrile to 101, tachycardic to 150 and tachypneic sent into the ER for further workup.     #Shortness of breath:  Pt presented to treatment room today tachycardic, tachypneic   Will need chest imaging to r/o PE vs infection   Continue with broad spectrum antibiotics   Supplemental O2 as needed     #Fever:  Pt had recent pneumonia last admission and was discharged on Levaquin. Pt also had frequent episodes of fever during her admission, with negative cultures, and were deemed to be 2/2 tumor fever.   Pursue full infectious workup- pan-scan + pan-culture   Continue broad spectrum antibiotics  Currently not neutropenic  Continue to trend WBC with daily CBC with differential  Pt had leukocytosis on discharge due to administration of Zarxio  Followed by ID last admission, would consult ID and have them on board again this admission    #Metastatic carcinoma of unknown primary- being treated as ovarian primary:  Details per HPI   Pt was due today for cycle two of Carbo/Taxol   Will hold off on inpatient chemotherapy for now given pt's acute clinical illness  Send CEA and Ca- 125 tumor markers   Hypercalcemia likely 2/2 malignancy- IVF and trend   If patient improves, may give 2nd cycle inpatient- will discuss with pt's primary oncologist Dr. Kristina Banks MD  Hematology Oncology Fellow, PGY-4  [facility] Pager: [phone]/ [facility] Pager: [phone]

## 2020-05-26 NOTE — ED CLERICAL - NS ED CLERK NOTE PRE-ARRIVAL INFORMATION; ADDITIONAL PRE-ARRIVAL INFORMATION
CC/Reason For referral: temp 101.1,   Preferred Consultant(if applicable):  Who admits for you (if needed): hospitalist  Do you have documents you would like to fax over? NO  Would you still like to speak to an ED attending? YES    pt came to [facility] for 4th cycle of chemo - unknown primary - s/p chemo cycles x 3

## 2020-05-26 NOTE — H&P ADULT - HISTORY OF PRESENT ILLNESS
24 year-old female with recent diagnosis of metastatic carcinoma of unknown primary (being treated as likely ovarian primary) s/p Carbo/Taxol cycle 1 on 5/5 discharged last week presented to treatment room today at [facility] found to be febrile to 101, tachycardic to 150 and tachypneic and presented to the Emergency Department.  Reports fever started 2 days ago.  No nausea, vomiting, chest pain, shortness of breath, dysuria.  + abdominal pain; not worse than compared to prior.    Patient had a PICC line placed on 4/3 for chemotherapy. 24F with recent diagnosis of metastatic carcinoma of unknown primary (being treated as likely ovarian primary) s/p Carbo/Taxol cycle 1 on 5/5 who was sent in from  [facility] on account of fever,  tachycardia  and tachypnea. She reports fever started 2 days ago. She also c/o some nausea/ vomiting and dyspepsia. She denies chest pain,cough, shortness of breath, dysuria. She also reports  abdominal pain slightly worse than her baseline. Of note pt was recently admitted to [facility] 3/20 - 5/17 with a complicated hospital course. She had a PICC line placed on 4/3 for chemotherapy.    ED COURSE  VS : 112/72  144 18 O2 97% on room air T 100.3F  Labs : wbc 21 h/h 9/30 plt 409   AST 53  Covid 19 negative  Treatment : cefepime 2g ivpb x 1, vancomycin 1 g ivpb x 1, Morphine 4mg iv x1, Zofran 4mg ivp x 1

## 2020-05-26 NOTE — ED ADULT NURSE REASSESSMENT NOTE - NS ED NURSE REASSESS COMMENT FT1
Received report from Kala GONZALEZ, primary note not done by day RN. Pt presented to ED for fever and tachycardia, hx of metastatic ca, remains tachy to 130s. PICC noted to right upper arm. denies CP, pt afebrile, complaining of " body pain." RN medications administered per MD order.  Denies CP, SOB, n/v/d, urinary symptoms, numbness, tingling in upper and lower extremities, HA, blurry vision. Updated on plan of care.   Report given to Fabrizio GONZALEZ, Holding, aware of pending labs.  Will continue to monitor. Received report from Kala GONZALEZ, primary note not done by day RN. Pt presented to ED for fever and tachycardia, hx of metastatic ca, remains tachy to 130s. PICC noted to right upper arm. denies CP, pt afebrile, complaining of " body pain." RN medications administered per MD order.  Denies CP, SOB, n/v/d, urinary symptoms, numbness, tingling in upper and lower extremities, HA, blurry vision. Updated on plan of care.   Report given to Roxanna GONZALEZ, from [address] [place] [address], aware of pending labs.  Will continue to monitor.

## 2020-05-26 NOTE — ED PROVIDER NOTE - ATTENDING CONTRIBUTION TO CARE
I saw and evaluated patient with resident. I discussed H+P and MDM with resident. I agree with the statements made by the resident unless otherwise noted.    The care of this patient was in support of the [facility] countermeasures to Covid-19.

## 2020-05-26 NOTE — ED PROVIDER NOTE - CLINICAL SUMMARY MEDICAL DECISION MAKING FREE TEXT BOX
24 year-old female with recent diagnosis of metastatic carcinoma of unknown primary on chemotherapy presents to the Emergency Department for fever; unknown etiology thought patient at high risk given chemotherapy.  Tachycardia disproportioned compared to fever; plan to rule-out PE especially since accompanied by tachypnea.  Cultures, antibiotics, fluids, reassess - TBA. 24 year-old female with recent diagnosis of metastatic carcinoma of unknown primary on chemotherapy presents to the Emergency Department for fever; unknown etiology thought patient at high risk given chemotherapy.  Tachycardia disproportioned compared to fever; plan to rule-out PE especially since accompanied by tachypnea.  Cultures, antibiotics, fluids, reassess - TBA.    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  Reports fever started 2 days ago.  No nausea, vomiting, chest pain, shortness of breath, dysuria.  + abdominal pain; not worse than compared to prior. Also c/o some R sided calf pain. Patient had a PICC line placed on 4/3 for chemotherapy. Clinically, pt appears to have signs/sx concerning for sepsis. Ddx includes, however, is not limited to: UTI, PNA, intraabdominal infection, DVT/PE, covid, other. Plan: sepsis labs, CT chest/abd/pelvis with IV contrast, IVF, pain control, covid testing, TBA

## 2020-05-26 NOTE — CHART NOTE - NSCHARTNOTEFT_GEN_A_CORE
Received a call from renal Dr. Medrano to AMIE/ROXANNE morales as repeat potassium on VBG was 3.6. Repeat BMP stat and call renal back with results. Full consult note to follow.    [facility]  [id number]

## 2020-05-27 NOTE — PATIENT PROFILE ADULT - DISASTER - TOBACCO USE
Never smoker Pt has risk factors of past h/o substance use and chronic illness. However, he also has protective factors, including sobriety, stable housing and supportive relationships with family (especially his brother). Acute risk of self-harm seems low at this time.

## 2020-05-27 NOTE — CHART NOTE - NSCHARTNOTEFT_GEN_A_CORE
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 Consult called for hypercalcemia. Chart reviewed, spoke to team and prelim recommendations given. Official consult in am.     Possible differentials includes PTH medicated (Primary hyperparathyroidism or FHH given inappropriately high normal PTH) v/s non PTH mediated (Vitamin D 25-OH deficiency v/s malignancy related). Given very low Vitamin D 25-OH levels and history of underlying malignancy, need to replete vitamin D and rule out other causes (Vitamin D 1,25 mediated v/s PTHrP related v/s multiple myeloma v/s hyperthyroidism v/s AI) before confirming as PTH mediated hypercalcemia.     - Recommend to recheck serum calcium now and continue with IV hydration  - If repeat corrected calcium >12 , consider giving a dose of Zoledronic acid 4 mg IVPB x 1 and additionally can also consider calcitonin if patient is symptomatic  - Can start Vitamin D3 4000 units qd for low vitamin D 25-OH level <5  - Follow up Vitamin D 1,25 mediated and PTHrP (testing)  - Also check TSH, 8 AM cortisol, SPEP / UPEP  - Can send 24 hr urine calcium and creatinine to rule out FHH  - If serum calcium does not improve in few days can consider skeletal survey to rule out mets to bone given elevated alk phos and history of malignancy.   Discussed with Dr. Rouse.   Official consult in am.      Eugenia Allred MD  Fellow  Pager [phone] from 9 am to 5 pm Mon to Fri.  After hours and on weekends please call [phone] 24 F with recent diagnosis of metastatic carcinoma of unknown primary (being treated as likely ovarian primary) s/p Carbo/Taxol cycle 1 on 5/5 who was sent in from  [facility] on account of fever,  tachycardia  and tachypnea. Consult called for hypercalcemia. Chart reviewed, spoke to team and prelim recommendations given. Official consult in am.     Suspect non-PTH mediated hypercalcemia. Although PTH is inappropriately normal in the setting of hypercalcemia, it is likely higher due to stimulation from extremely low Vit D levels. Once Vit D is replete ideally >20-30 would expect her PTH level to come down considerably offering the possibility of a non-PTH mediated process likely related to her malignancy (PTHrp or bone involvement). Once Vit D is replete if PTH remains inappropriately normal or high can then consider PTH mediated (Primary hyperparathyroidism or FHH. Would  rule out other non-PTH causes (Vitamin D 1,25 mediated v/s multiple myeloma v/s hyperthyroidism v/s AI, Vit A level).     - Recommend to recheck serum calcium now and continue with IV hydration  - If repeat corrected calcium >12 , consider giving a dose of Zoledronic acid 4 mg IVPB x 1 and additionally can also consider calcitonin if patient is symptomatic although calcitonin is short acting and can bridge with bisphosphonate   - Can start Vitamin D3 4000 units qd for low vitamin D 25-OH level <5. May need to increase Vit D supplementation but given hypercalcemia, would avoid ergocalciferol at this time.  - Follow up Vitamin D 1,25 mediated and PTHrP (testing)  - Also check TSH, 8 AM cortisol, SPEP / UPEP  - Can send 24 hr urine calcium and creatinine to rule out FHH  - Would also consider skeletal survey to rule out mets to bone given elevated alk phos and history of malignancy.   Discussed with Dr. Rouse.   Official consult in am.      Eugenia Allred MD  Fellow  Pager [phone] from 9 am to 5 pm Mon to Fri.  After hours and on weekends please call [phone]

## 2020-05-27 NOTE — PROGRESS NOTE ADULT - PROBLEM SELECTOR PLAN 3
- Ca level 12.3 ; ionized Ca elevated  - PTH elevated consistent w/hyperparathyroid  -  Nephrology consulted - f/u recs - IVF for now, give calcitonin per onc

## 2020-05-27 NOTE — CONSULT NOTE ADULT - SUBJECTIVE AND OBJECTIVE BOX
Patient is a 24y old  Female who presents with a chief complaint of fever, shortness of breath (27 May 2020 08:22)    From HPI" HPI:  24F with recent diagnosis of metastatic carcinoma of unknown primary (being treated as likely ovarian primary) s/p Carbo/Taxol cycle 1 on 5/5 who was sent in from  [facility] on account of fever,  tachycardia  and tachypnea. She reports fever started 2 days ago. She also c/o some nausea/ vomiting and dyspepsia. She denies chest pain,cough, shortness of breath, dysuria. She also reports  abdominal pain slightly worse than her baseline. Of note pt was recently admitted to [facility] 3/20 - 5/17 with a complicated hospital course. She had a PICC line placed on 4/3 for chemotherapy.    ED COURSE  VS : 112/72  144 18 O2 97% on room air T 100.3F  Labs : wbc 21 h/h 9/30 plt 409   AST 53  Covid 19 negative  Treatment : cefepime 2g ivpb x 1, vancomycin 1 g ivpb x 1, Morphine 4mg iv x1, Zofran 4mg ivp x 1 (26 May 2020 17:22)  "    Above verified-agree with above unless noted below.    ID consulted     PAST MEDICAL & SURGICAL HISTORY:  Metastatic cancer  No significant past surgical history      Social history:  no   Smoking,    ETOH,      IVDU       FAMILY HISTORY:  - Family history of medical problems in all first degree relatives reviewed.None of these were found to be related to patients current illness.    REVIEW OF SYSTEMS  General:+ malaise fatigue or chills. Fevers +    Skin:No rash  	  Ophthalmologic:Denies any visual complaints,discharge redness or photophobia  	  ENMT:No nasal discharge,headache,sinus congestion or throat pain.No dental complaints    Respiratory and Thorax:No cough,sputum or chest pain.Denies shortness of breath  	  Cardiovascular:	No chest pain,palpitaions or dizziness    Gastrointestinal:	Occ nausea, +abdominal pain no diarrhea.    Genitourinary:	No dysuria,frequency. No flank pain    Musculoskeletal:	No joint swelling or pain.No weakness    Neurological:No confusion,diziness.No extremity weakness.No bladder or bowel incontinence	    Psychiatric:No delusions or hallucinations	    Hematology/Lymphatics:	No LN swelling.No gum bleeding     Endocrine:	No recent weight gain or loss.No abnormal heat/cold intolerance    Allergic/Immunologic:	No hives or rash   Allergies    No Known Allergies    Intolerances        Antimicrobials:    meropenem  IVPB 1000 milliGRAM(s) IV Intermittent every 8 hours  vancomycin  IVPB 1000 milliGRAM(s) IV Intermittent every 12 hours      Prior Antimicrobials:  MEDICATIONS  (STANDING):  cefepime   IVPB   100 mL/Hr IV Intermittent (05-26-20 @ 14:40)    meropenem  IVPB   100 mL/Hr IV Intermittent (05-27-20 @ 04:53)   100 mL/Hr IV Intermittent (05-26-20 @ 21:50)    vancomycin  IVPB   250 mL/Hr IV Intermittent (05-27-20 @ 05:30)    vancomycin  IVPB   250 mL/Hr IV Intermittent (05-26-20 @ 13:30)      Other medications reviewed      Vital Signs Last 24 Hrs  T(C): 36.9 (27 May 2020 05:06), Max: 38.4 (26 May 2020 12:04)  T(F): 98.4 (27 May 2020 05:06), Max: 101.1 (26 May 2020 12:04)  HR: 133 (27 May 2020 05:06) (130 - 158)  BP: 115/75 (27 May 2020 05:06) (101/71 - 118/87)  BP(mean): 93 (26 May 2020 17:22) (93 - 93)  RR: 20 (27 May 2020 05:06) (16 - 20)  SpO2: 98% (27 May 2020 05:06) (91% - 100%)    PHYSICAL EXAM:Pleasant patient in no acute distress.      Constitutional:Comfortable.Awake and alert  No cachexia     Eyes:PERRL EOMI.NO discharge or conjunctival injection    ENMT:No sinus tenderness.No thrush.No pharyngeal exudate or erythema.Fair dental hygiene    Neck:Supple,No LN,no JVD      Respiratory:Good air entry bilaterally,CTA    Cardiovascular:S1 S2 wnl, No murmurs,rub or gallops    Gastrointestinal:Soft BS(+) no tenderness ? masses ,No rebound or guarding    Genitourinary:No CVA tendereness         Extremities:No cyanosis,clubbing or edema.  PICC site no erythema or tenderness    Vascular:peripheral pulses felt    Neurological:AAO X 3,No grossly focal deficits    Skin:No rash     Lymph Nodes:No palpable LNs    Musculoskeletal:No joint swelling or LOM    Psychiatric:Affect normal.          Labs:                            9.0    21.89 )-----------( 409      ( 26 May 2020 13:21 )             30.0     WBC Count: 21.89 (05-26-20 @ 13:21)  WBC Count: 22.63 (05-26-20 @ 11:37)      05-27    134<L>  |  98  |  6<L>  ----------------------------<  97  3.8   |  25  |  0.46<L>    Ca    12.1<H>      27 May 2020 04:53    TPro  7.7  /  Alb  2.6<L>  /  TBili  0.9  /  DBili  x   /  AST  53<H>  /  ALT  17  /  AlkPhos  305<H>  05-26        < from: CT Abdomen and Pelvis w/ IV Cont (05.26.20 @ 15:59) >  IMPRESSION:   Chest:  1.  No pulmonary embolus.  2.  The left pleural effusion partially obscures the left pleural nodularity.  3.  No change in the bilateral pulmonary nodules since 4/30/2020.    Abdomen and pelvis:  1.  Mesenteric nodularity and ascites are concerning for malignancy.  2.  New hypodense hepatic lesions are concerning for metastasis.  3.  The pelvic mass is unchanged line for differences in technique.  4.  Lymph nodes are likely malignant.  5.  Mildly distended loops of jejunum in the left upper quadrant      < end of copied text >            Imaging studies(films) were independently reviewed.Findings as detailed in report above       COVID-19 PCR . (05.26.20 @ 14:06)    COVID-19 PCR: NotDetec: This test has been validated by [employer] to be accurate;  though it has not been FDA cleared/approved by the usual pathway  As with all laboratory test, results should be correlated with clinical  findings.  https://www.fda.gov/media/698126/download  https://www.fda.gov/media/220815/download

## 2020-05-27 NOTE — PROGRESS NOTE ADULT - ASSESSMENT
24F with recent diagnosis of metastatic carcinoma of unknown primary (being treated as likely ovarian primary) s/p Carbo/Taxol cycle 1 on 5/5 who was sent in from  [facility] on account of fever, tachycardia  and tachypnea. Suspicion for Sepsis 2/2 PNA

## 2020-05-27 NOTE — PROGRESS NOTE ADULT - SUBJECTIVE AND OBJECTIVE BOX
INTERVAL History: No acute events overnight. Afebrile overnight and this morning. T max 100.3    Allergies    No Known Allergies    Intolerances        MEDICATIONS  (STANDING):  diltiazem    Tablet 60 milliGRAM(s) Oral every 8 hours  enoxaparin Injectable 40 milliGRAM(s) SubCutaneous daily  meropenem  IVPB 1000 milliGRAM(s) IV Intermittent every 8 hours  pantoprazole    Tablet 40 milliGRAM(s) Oral before breakfast  potassium acid phosphate/sodium acid phosphate tablet (K-PHOS No. 2) 1 Tablet(s) Oral four times a day with meals  sodium chloride 0.9%. 1000 milliLiter(s) (100 mL/Hr) IV Continuous <Continuous>  vancomycin  IVPB 1000 milliGRAM(s) IV Intermittent every 12 hours    MEDICATIONS  (PRN):  aluminum hydroxide/magnesium hydroxide/simethicone Suspension 30 milliLiter(s) Oral every 6 hours PRN Dyspepsia  morphine  - Injectable 4 milliGRAM(s) IV Push every 6 hours PRN Severe Pain (7 - 10)  ondansetron Injectable 4 milliGRAM(s) IV Push every 8 hours PRN Nausea and/or Vomiting  simethicone 80 milliGRAM(s) Chew every 6 hours PRN Gas      Vital Signs Last 24 Hrs  T(C): 36.9 (27 May 2020 05:06), Max: 38.4 (26 May 2020 12:04)  T(F): 98.4 (27 May 2020 05:06), Max: 101.1 (26 May 2020 12:04)  HR: 133 (27 May 2020 05:06) (130 - 158)  BP: 115/75 (27 May 2020 05:06) (101/71 - 118/87)  BP(mean): 93 (26 May 2020 17:22) (93 - 93)  RR: 20 (27 May 2020 05:06) (16 - 20)  SpO2: 98% (27 May 2020 05:06) (91% - 100%)    PHYSICAL EXAM:    General: AOX3, no acute distress  EYES: EOMI, PERRLA, conjunctiva and sclera clear  NECK: Supple, No JVD, Normal thyroid  CHEST/LUNG: Clear to auscultation bilaterally; No rales, rhonchi, or wheezing  HEART: Regular rate and rhythm; no murmurs  ABDOMEN: Soft, Nontender. BS+  LYMPH: No lymphadenopathy noted  Extremities: No peripheral edema      LABS:                        9.0    21.89 )-----------( 409      ( 26 May 2020 13:21 )             30.0         134<L>  |  98  |  6<L>  ----------------------------<  97  3.8   |  25  |  0.46<L>    Ca    12.1<H>      27 May 2020 04:53    TPro  7.7  /  Alb  2.6<L>  /  TBili  0.9  /  DBili  x   /  AST  53<H>  /  ALT  17  /  AlkPhos  305<H>      PT/INR - ( 26 May 2020 13:21 )   PT: 17.7 sec;   INR: 1.52 ratio         PTT - ( 26 May 2020 13:21 )  PTT:32.4 sec  Urinalysis Basic - ( 26 May 2020 15:36 )    Color: Yellow / Appearance: Slightly Turbid / S.021 / pH: x  Gluc: x / Ketone: Trace  / Bili: Negative / Urobili: Negative   Blood: x / Protein: 30 mg/dL / Nitrite: Negative   Leuk Esterase: Negative / RBC: 3 /hpf / WBC 4 /HPF   Sq Epi: x / Non Sq Epi: 3 /hpf / Bacteria: Few          RADIOLOGY & ADDITIONAL STUDIES:    < from: CT Abdomen and Pelvis w/ IV Cont (20 @ 15:59) >    EXAM:  CT ABDOMEN AND PELVIS IC                          EXAM:  CT ANGIO CHEST (W)AW IC                            PROCEDURE DATE:  2020            INTERPRETATION:  CTA CHEST WITH CONTRAST (CT PULMONARY EMBOLUS)    INDICATION: Tachycardia. Shortness of breath. Evaluate for pulmonary embolus.    TECHNIQUE: Enhanced helical images were obtained of the chest, abdomen, and pelvis. Coronal and sagittal images were reconstructed.  Images were obtained after the uneventful administration of 90 cc of nonionic intravenous contrast (Omnipaque 350).  10 cc of nonionic intravenous contrast (Omnipaque 350) was discarded. Maximum intensity projection images were generated.    COMPARISON: CT chest 2020. CT chest 2020. CT chest 2020.    FINDINGS:     Pulmonary Artery: The pulmonary artery is enlarged can occur in the clinical setting of pulmonary arterial hypertension. There is no main, central, lobar, or segmental pulmonary artery embolus. The subsegmental vessels are not well evaluated.    Tubes/Lines: None.    Lungs And Airways: There are pulmonary nodules. The largest nodule measures:  *  A 10 x 6 mm (series 2 image 42) in the right middle lobe.  *  A left upper lobe nodule measures 5 mm (series 2 image 44).  *  A 10 x 10 mmnodule is in the right middle lobe (series 2 image 48).    Bilateral lower lobe and lingular linear atelectasis/scarring.    Pleura: There is a small left pleural effusion and pleural nodularity. The nodularity is partially obscured by the left pleural effusion (series 2 image 43), however, is unchanged line for differences in technique. No pneumothorax.    Mediastinum: The upper paratracheal, lower paratracheal, right hilar, and subcarinal chest lymph nodes are unchanged. The calcified chest lymph nodes in the subcarinal station are unchanged. The visualized portion of the thyroid gland is unremarkable.   The esophagus is unremarkable.    Heart and Vasculature: The heart is normal in size. No pericardial effusion. The aorta is normal in caliber.    Bones And Soft Tissues: Degenerative change of the spine.    Abdomen and pelvis:        Liver: There are bilobar hypodense hepatic lesions, new since 2020.    No intrahepatic or extrahepatic biliary ductal dilatation.    Spleen: Unremarkable.    Gallbladder: Unremarkable.    Pancreas: Unremarkable.    Adrenal glands: Unremarkable.    Kidneys: The kidneys are unremarkable. No hydronephrosis or hydroureter.    Bowel: The abdominal ascites is new. There is nodularity of the omentum and mesentery. The mass centered in the pelvis measures 11.2 x 12.5 cm and is unchanged allowing for differences in technique (series 3 image 86). The mesenteric lymph nodes are unchanged.    The stomach is unremarkable. Small bowel is mildly dilated and the jejunum measures 3.3 cm. The distal jejunum and ileum are decompressed. The appendix is normal. No free air.    Vascular: The aorta is normal in caliber. The portal vein is patent. The hepatic veins are patent.    Bladder: The bladder is displaced by the pelvic mass.    Uterus and adnexa: The uterus is unremarkable. The pelvic mass likely arises from the adnexa.    Lymph nodes: The upper abdominal, aortocaval, and periaortic lymph nodes are unchanged. In addition, the right common iliac and external iliac lymph nodes are unchanged.    Skeletal: There are lytic lesions of the thoracic and lumbar spine. In addition, there are lytic lesions of the manubrium.    IMPRESSION:   Chest:  1.  No pulmonary embolus.  2.  The left pleural effusion partially obscures the left pleural nodularity.  3.  No change in the bilateral pulmonary nodules since 2020.    Abdomen and pelvis:  1.  Mesenteric nodularity and ascites are concerning for malignancy.  2.  New hypodense hepatic lesions are concerning for metastasis.  3.  The pelvic mass is unchanged line for differences in technique.  4.  Lymph nodes are likely malignant.  5.  Mildly distended loops of jejunum in the left upper quadrant    < end of copied text >      PATHOLOGY: INTERVAL History: No acute events overnight. Afebrile overnight and this morning. T max 100.3    Allergies    No Known Allergies    Intolerances        MEDICATIONS  (STANDING):  diltiazem    Tablet 60 milliGRAM(s) Oral every 8 hours  enoxaparin Injectable 40 milliGRAM(s) SubCutaneous daily  meropenem  IVPB 1000 milliGRAM(s) IV Intermittent every 8 hours  pantoprazole    Tablet 40 milliGRAM(s) Oral before breakfast  potassium acid phosphate/sodium acid phosphate tablet (K-PHOS No. 2) 1 Tablet(s) Oral four times a day with meals  sodium chloride 0.9%. 1000 milliLiter(s) (100 mL/Hr) IV Continuous <Continuous>  vancomycin  IVPB 1000 milliGRAM(s) IV Intermittent every 12 hours    MEDICATIONS  (PRN):  aluminum hydroxide/magnesium hydroxide/simethicone Suspension 30 milliLiter(s) Oral every 6 hours PRN Dyspepsia  morphine  - Injectable 4 milliGRAM(s) IV Push every 6 hours PRN Severe Pain (7 - 10)  ondansetron Injectable 4 milliGRAM(s) IV Push every 8 hours PRN Nausea and/or Vomiting  simethicone 80 milliGRAM(s) Chew every 6 hours PRN Gas      Vital Signs Last 24 Hrs  T(C): 36.9 (27 May 2020 05:06), Max: 38.4 (26 May 2020 12:04)  T(F): 98.4 (27 May 2020 05:06), Max: 101.1 (26 May 2020 12:04)  HR: 133 (27 May 2020 05:06) (130 - 158)  BP: 115/75 (27 May 2020 05:06) (101/71 - 118/87)  BP(mean): 93 (26 May 2020 17:22) (93 - 93)  RR: 20 (27 May 2020 05:06) (16 - 20)  SpO2: 98% (27 May 2020 05:06) (91% - 100%)    PHYSICAL EXAM:          LABS:                        9.0    21.89 )-----------( 409      ( 26 May 2020 13:21 )             30.0         134<L>  |  98  |  6<L>  ----------------------------<  97  3.8   |  25  |  0.46<L>    Ca    12.1<H>      27 May 2020 04:53    TPro  7.7  /  Alb  2.6<L>  /  TBili  0.9  /  DBili  x   /  AST  53<H>  /  ALT  17  /  AlkPhos  305<H>      PT/INR - ( 26 May 2020 13:21 )   PT: 17.7 sec;   INR: 1.52 ratio         PTT - ( 26 May 2020 13:21 )  PTT:32.4 sec  Urinalysis Basic - ( 26 May 2020 15:36 )    Color: Yellow / Appearance: Slightly Turbid / S.021 / pH: x  Gluc: x / Ketone: Trace  / Bili: Negative / Urobili: Negative   Blood: x / Protein: 30 mg/dL / Nitrite: Negative   Leuk Esterase: Negative / RBC: 3 /hpf / WBC 4 /HPF   Sq Epi: x / Non Sq Epi: 3 /hpf / Bacteria: Few          RADIOLOGY & ADDITIONAL STUDIES:    < from: CT Abdomen and Pelvis w/ IV Cont (20 @ 15:59) >    EXAM:  CT ABDOMEN AND PELVIS IC                          EXAM:  CT ANGIO CHEST (W)AW IC                            PROCEDURE DATE:  2020            INTERPRETATION:  CTA CHEST WITH CONTRAST (CT PULMONARY EMBOLUS)    INDICATION: Tachycardia. Shortness of breath. Evaluate for pulmonary embolus.    TECHNIQUE: Enhanced helical images were obtained of the chest, abdomen, and pelvis. Coronal and sagittal images were reconstructed.  Images were obtained after the uneventful administration of 90 cc of nonionic intravenous contrast (Omnipaque 350).  10 cc of nonionic intravenous contrast (Omnipaque 350) was discarded. Maximum intensity projection images were generated.    COMPARISON: CT chest 2020. CT chest 2020. CT chest 2020.    FINDINGS:     Pulmonary Artery: The pulmonary artery is enlarged can occur in the clinical setting of pulmonary arterial hypertension. There is no main, central, lobar, or segmental pulmonary artery embolus. The subsegmental vessels are not well evaluated.    Tubes/Lines: None.    Lungs And Airways: There are pulmonary nodules. The largest nodule measures:  *  A 10 x 6 mm (series 2 image 42) in the right middle lobe.  *  A left upper lobe nodule measures 5 mm (series 2 image 44).  *  A 10 x 10 mmnodule is in the right middle lobe (series 2 image 48).    Bilateral lower lobe and lingular linear atelectasis/scarring.    Pleura: There is a small left pleural effusion and pleural nodularity. The nodularity is partially obscured by the left pleural effusion (series 2 image 43), however, is unchanged line for differences in technique. No pneumothorax.    Mediastinum: The upper paratracheal, lower paratracheal, right hilar, and subcarinal chest lymph nodes are unchanged. The calcified chest lymph nodes in the subcarinal station are unchanged. The visualized portion of the thyroid gland is unremarkable.   The esophagus is unremarkable.    Heart and Vasculature: The heart is normal in size. No pericardial effusion. The aorta is normal in caliber.    Bones And Soft Tissues: Degenerative change of the spine.    Abdomen and pelvis:        Liver: There are bilobar hypodense hepatic lesions, new since 2020.    No intrahepatic or extrahepatic biliary ductal dilatation.    Spleen: Unremarkable.    Gallbladder: Unremarkable.    Pancreas: Unremarkable.    Adrenal glands: Unremarkable.    Kidneys: The kidneys are unremarkable. No hydronephrosis or hydroureter.    Bowel: The abdominal ascites is new. There is nodularity of the omentum and mesentery. The mass centered in the pelvis measures 11.2 x 12.5 cm and is unchanged allowing for differences in technique (series 3 image 86). The mesenteric lymph nodes are unchanged.    The stomach is unremarkable. Small bowel is mildly dilated and the jejunum measures 3.3 cm. The distal jejunum and ileum are decompressed. The appendix is normal. No free air.    Vascular: The aorta is normal in caliber. The portal vein is patent. The hepatic veins are patent.    Bladder: The bladder is displaced by the pelvic mass.    Uterus and adnexa: The uterus is unremarkable. The pelvic mass likely arises from the adnexa.    Lymph nodes: The upper abdominal, aortocaval, and periaortic lymph nodes are unchanged. In addition, the right common iliac and external iliac lymph nodes are unchanged.    Skeletal: There are lytic lesions of the thoracic and lumbar spine. In addition, there are lytic lesions of the manubrium.    IMPRESSION:   Chest:  1.  No pulmonary embolus.  2.  The left pleural effusion partially obscures the left pleural nodularity.  3.  No change in the bilateral pulmonary nodules since 2020.    Abdomen and pelvis:  1.  Mesenteric nodularity and ascites are concerning for malignancy.  2.  New hypodense hepatic lesions are concerning for metastasis.  3.  The pelvic mass is unchanged line for differences in technique.  4.  Lymph nodes are likely malignant.  5.  Mildly distended loops of jejunum in the left upper quadrant    < end of copied text >      PATHOLOGY: INTERVAL History: No acute events overnight. Afebrile overnight and this morning. T max 100.3 Pt seen this afternoon during rounds. She is in severe distress 2/2 pain in her back as well as pain shooting down her right leg from right buttock.     Allergies    No Known Allergies    Intolerances        MEDICATIONS  (STANDING):  diltiazem    Tablet 60 milliGRAM(s) Oral every 8 hours  enoxaparin Injectable 40 milliGRAM(s) SubCutaneous daily  meropenem  IVPB 1000 milliGRAM(s) IV Intermittent every 8 hours  pantoprazole    Tablet 40 milliGRAM(s) Oral before breakfast  potassium acid phosphate/sodium acid phosphate tablet (K-PHOS No. 2) 1 Tablet(s) Oral four times a day with meals  sodium chloride 0.9%. 1000 milliLiter(s) (100 mL/Hr) IV Continuous <Continuous>  vancomycin  IVPB 1000 milliGRAM(s) IV Intermittent every 12 hours    MEDICATIONS  (PRN):  aluminum hydroxide/magnesium hydroxide/simethicone Suspension 30 milliLiter(s) Oral every 6 hours PRN Dyspepsia  morphine  - Injectable 4 milliGRAM(s) IV Push every 6 hours PRN Severe Pain (7 - 10)  ondansetron Injectable 4 milliGRAM(s) IV Push every 8 hours PRN Nausea and/or Vomiting  simethicone 80 milliGRAM(s) Chew every 6 hours PRN Gas      Vital Signs Last 24 Hrs  T(C): 36.9 (27 May 2020 05:06), Max: 38.4 (26 May 2020 12:04)  T(F): 98.4 (27 May 2020 05:06), Max: 101.1 (26 May 2020 12:04)  HR: 133 (27 May 2020 05:06) (130 - 158)  BP: 115/75 (27 May 2020 05:06) (101/71 - 118/87)  BP(mean): 93 (26 May 2020 17:22) (93 - 93)  RR: 20 (27 May 2020 05:06) (16 - 20)  SpO2: 98% (27 May 2020 05:06) (91% - 100%)    PHYSICAL EXAM:          LABS:                        9.0    21.89 )-----------( 409      ( 26 May 2020 13:21 )             30.0     05-    134<L>  |  98  |  6<L>  ----------------------------<  97  3.8   |  25  |  0.46<L>    Ca    12.1<H>      27 May 2020 04:53    TPro  7.7  /  Alb  2.6<L>  /  TBili  0.9  /  DBili  x   /  AST  53<H>  /  ALT  17  /  AlkPhos  305<H>      PT/INR - ( 26 May 2020 13:21 )   PT: 17.7 sec;   INR: 1.52 ratio         PTT - ( 26 May 2020 13:21 )  PTT:32.4 sec  Urinalysis Basic - ( 26 May 2020 15:36 )    Color: Yellow / Appearance: Slightly Turbid / S.021 / pH: x  Gluc: x / Ketone: Trace  / Bili: Negative / Urobili: Negative   Blood: x / Protein: 30 mg/dL / Nitrite: Negative   Leuk Esterase: Negative / RBC: 3 /hpf / WBC 4 /HPF   Sq Epi: x / Non Sq Epi: 3 /hpf / Bacteria: Few          RADIOLOGY & ADDITIONAL STUDIES:    < from: CT Abdomen and Pelvis w/ IV Cont (20 @ 15:59) >    EXAM:  CT ABDOMEN AND PELVIS IC                          EXAM:  CT ANGIO CHEST (W)AW IC                            PROCEDURE DATE:  2020            INTERPRETATION:  CTA CHEST WITH CONTRAST (CT PULMONARY EMBOLUS)    INDICATION: Tachycardia. Shortness of breath. Evaluate for pulmonary embolus.    TECHNIQUE: Enhanced helical images were obtained of the chest, abdomen, and pelvis. Coronal and sagittal images were reconstructed.  Images were obtained after the uneventful administration of 90 cc of nonionic intravenous contrast (Omnipaque 350).  10 cc of nonionic intravenous contrast (Omnipaque 350) was discarded. Maximum intensity projection images were generated.    COMPARISON: CT chest 2020. CT chest 2020. CT chest 2020.    FINDINGS:     Pulmonary Artery: The pulmonary artery is enlarged can occur in the clinical setting of pulmonary arterial hypertension. There is no main, central, lobar, or segmental pulmonary artery embolus. The subsegmental vessels are not well evaluated.    Tubes/Lines: None.    Lungs And Airways: There are pulmonary nodules. The largest nodule measures:  *  A 10 x 6 mm (series 2 image 42) in the right middle lobe.  *  A left upper lobe nodule measures 5 mm (series 2 image 44).  *  A 10 x 10 mmnodule is in the right middle lobe (series 2 image 48).    Bilateral lower lobe and lingular linear atelectasis/scarring.    Pleura: There is a small left pleural effusion and pleural nodularity. The nodularity is partially obscured by the left pleural effusion (series 2 image 43), however, is unchanged line for differences in technique. No pneumothorax.    Mediastinum: The upper paratracheal, lower paratracheal, right hilar, and subcarinal chest lymph nodes are unchanged. The calcified chest lymph nodes in the subcarinal station are unchanged. The visualized portion of the thyroid gland is unremarkable.   The esophagus is unremarkable.    Heart and Vasculature: The heart is normal in size. No pericardial effusion. The aorta is normal in caliber.    Bones And Soft Tissues: Degenerative change of the spine.    Abdomen and pelvis:        Liver: There are bilobar hypodense hepatic lesions, new since 2020.    No intrahepatic or extrahepatic biliary ductal dilatation.    Spleen: Unremarkable.    Gallbladder: Unremarkable.    Pancreas: Unremarkable.    Adrenal glands: Unremarkable.    Kidneys: The kidneys are unremarkable. No hydronephrosis or hydroureter.    Bowel: The abdominal ascites is new. There is nodularity of the omentum and mesentery. The mass centered in the pelvis measures 11.2 x 12.5 cm and is unchanged allowing for differences in technique (series 3 image 86). The mesenteric lymph nodes are unchanged.    The stomach is unremarkable. Small bowel is mildly dilated and the jejunum measures 3.3 cm. The distal jejunum and ileum are decompressed. The appendix is normal. No free air.    Vascular: The aorta is normal in caliber. The portal vein is patent. The hepatic veins are patent.    Bladder: The bladder is displaced by the pelvic mass.    Uterus and adnexa: The uterus is unremarkable. The pelvic mass likely arises from the adnexa.    Lymph nodes: The upper abdominal, aortocaval, and periaortic lymph nodes are unchanged. In addition, the right common iliac and external iliac lymph nodes are unchanged.    Skeletal: There are lytic lesions of the thoracic and lumbar spine. In addition, there are lytic lesions of the manubrium.    IMPRESSION:   Chest:  1.  No pulmonary embolus.  2.  The left pleural effusion partially obscures the left pleural nodularity.  3.  No change in the bilateral pulmonary nodules since 2020.    Abdomen and pelvis:  1.  Mesenteric nodularity and ascites are concerning for malignancy.  2.  New hypodense hepatic lesions are concerning for metastasis.  3.  The pelvic mass is unchanged line for differences in technique.  4.  Lymph nodes are likely malignant.  5.  Mildly distended loops of jejunum in the left upper quadrant    < end of copied text >      PATHOLOGY: INTERVAL History: No acute events overnight. Afebrile overnight and this morning. T max 100.3 Pt seen this afternoon during rounds. She is in severe distress 2/2 pain in her back as well as pain shooting down her right leg from right buttock.     Allergies    No Known Allergies    Intolerances        MEDICATIONS  (STANDING):  diltiazem    Tablet 60 milliGRAM(s) Oral every 8 hours  enoxaparin Injectable 40 milliGRAM(s) SubCutaneous daily  meropenem  IVPB 1000 milliGRAM(s) IV Intermittent every 8 hours  pantoprazole    Tablet 40 milliGRAM(s) Oral before breakfast  potassium acid phosphate/sodium acid phosphate tablet (K-PHOS No. 2) 1 Tablet(s) Oral four times a day with meals  sodium chloride 0.9%. 1000 milliLiter(s) (100 mL/Hr) IV Continuous <Continuous>  vancomycin  IVPB 1000 milliGRAM(s) IV Intermittent every 12 hours    MEDICATIONS  (PRN):  aluminum hydroxide/magnesium hydroxide/simethicone Suspension 30 milliLiter(s) Oral every 6 hours PRN Dyspepsia  morphine  - Injectable 4 milliGRAM(s) IV Push every 6 hours PRN Severe Pain (7 - 10)  ondansetron Injectable 4 milliGRAM(s) IV Push every 8 hours PRN Nausea and/or Vomiting  simethicone 80 milliGRAM(s) Chew every 6 hours PRN Gas      Vital Signs Last 24 Hrs  T(C): 36.9 (27 May 2020 05:06), Max: 38.4 (26 May 2020 12:04)  T(F): 98.4 (27 May 2020 05:06), Max: 101.1 (26 May 2020 12:04)  HR: 133 (27 May 2020 05:06) (130 - 158)  BP: 115/75 (27 May 2020 05:06) (101/71 - 118/87)  BP(mean): 93 (26 May 2020 17:22) (93 - 93)  RR: 20 (27 May 2020 05:06) (16 - 20)  SpO2: 98% (27 May 2020 05:06) (91% - 100%)    PHYSICAL EXAM:    Not physically examined in attempt to decrease COVID exposure    LABS:                        9.0    21.89 )-----------( 409      ( 26 May 2020 13:21 )             30.0     05    134<L>  |  98  |  6<L>  ----------------------------<  97  3.8   |  25  |  0.46<L>    Ca    12.1<H>      27 May 2020 04:53    TPro  7.7  /  Alb  2.6<L>  /  TBili  0.9  /  DBili  x   /  AST  53<H>  /  ALT  17  /  AlkPhos  305<H>      PT/INR - ( 26 May 2020 13:21 )   PT: 17.7 sec;   INR: 1.52 ratio         PTT - ( 26 May 2020 13:21 )  PTT:32.4 sec  Urinalysis Basic - ( 26 May 2020 15:36 )    Color: Yellow / Appearance: Slightly Turbid / S.021 / pH: x  Gluc: x / Ketone: Trace  / Bili: Negative / Urobili: Negative   Blood: x / Protein: 30 mg/dL / Nitrite: Negative   Leuk Esterase: Negative / RBC: 3 /hpf / WBC 4 /HPF   Sq Epi: x / Non Sq Epi: 3 /hpf / Bacteria: Few          RADIOLOGY & ADDITIONAL STUDIES:    < from: CT Abdomen and Pelvis w/ IV Cont (20 @ 15:59) >    EXAM:  CT ABDOMEN AND PELVIS IC                          EXAM:  CT ANGIO CHEST (W)AW IC                            PROCEDURE DATE:  2020            INTERPRETATION:  CTA CHEST WITH CONTRAST (CT PULMONARY EMBOLUS)    INDICATION: Tachycardia. Shortness of breath. Evaluate for pulmonary embolus.    TECHNIQUE: Enhanced helical images were obtained of the chest, abdomen, and pelvis. Coronal and sagittal images were reconstructed.  Images were obtained after the uneventful administration of 90 cc of nonionic intravenous contrast (Omnipaque 350).  10 cc of nonionic intravenous contrast (Omnipaque 350) was discarded. Maximum intensity projection images were generated.    COMPARISON: CT chest 2020. CT chest 2020. CT chest 2020.    FINDINGS:     Pulmonary Artery: The pulmonary artery is enlarged can occur in the clinical setting of pulmonary arterial hypertension. There is no main, central, lobar, or segmental pulmonary artery embolus. The subsegmental vessels are not well evaluated.    Tubes/Lines: None.    Lungs And Airways: There are pulmonary nodules. The largest nodule measures:  *  A 10 x 6 mm (series 2 image 42) in the right middle lobe.  *  A left upper lobe nodule measures 5 mm (series 2 image 44).  *  A 10 x 10 mmnodule is in the right middle lobe (series 2 image 48).    Bilateral lower lobe and lingular linear atelectasis/scarring.    Pleura: There is a small left pleural effusion and pleural nodularity. The nodularity is partially obscured by the left pleural effusion (series 2 image 43), however, is unchanged line for differences in technique. No pneumothorax.    Mediastinum: The upper paratracheal, lower paratracheal, right hilar, and subcarinal chest lymph nodes are unchanged. The calcified chest lymph nodes in the subcarinal station are unchanged. The visualized portion of the thyroid gland is unremarkable.   The esophagus is unremarkable.    Heart and Vasculature: The heart is normal in size. No pericardial effusion. The aorta is normal in caliber.    Bones And Soft Tissues: Degenerative change of the spine.    Abdomen and pelvis:        Liver: There are bilobar hypodense hepatic lesions, new since 2020.    No intrahepatic or extrahepatic biliary ductal dilatation.    Spleen: Unremarkable.    Gallbladder: Unremarkable.    Pancreas: Unremarkable.    Adrenal glands: Unremarkable.    Kidneys: The kidneys are unremarkable. No hydronephrosis or hydroureter.    Bowel: The abdominal ascites is new. There is nodularity of the omentum and mesentery. The mass centered in the pelvis measures 11.2 x 12.5 cm and is unchanged allowing for differences in technique (series 3 image 86). The mesenteric lymph nodes are unchanged.    The stomach is unremarkable. Small bowel is mildly dilated and the jejunum measures 3.3 cm. The distal jejunum and ileum are decompressed. The appendix is normal. No free air.    Vascular: The aorta is normal in caliber. The portal vein is patent. The hepatic veins are patent.    Bladder: The bladder is displaced by the pelvic mass.    Uterus and adnexa: The uterus is unremarkable. The pelvic mass likely arises from the adnexa.    Lymph nodes: The upper abdominal, aortocaval, and periaortic lymph nodes are unchanged. In addition, the right common iliac and external iliac lymph nodes are unchanged.    Skeletal: There are lytic lesions of the thoracic and lumbar spine. In addition, there are lytic lesions of the manubrium.    IMPRESSION:   Chest:  1.  No pulmonary embolus.  2.  The left pleural effusion partially obscures the left pleural nodularity.  3.  No change in the bilateral pulmonary nodules since 2020.    Abdomen and pelvis:  1.  Mesenteric nodularity and ascites are concerning for malignancy.  2.  New hypodense hepatic lesions are concerning for metastasis.  3.  The pelvic mass is unchanged line for differences in technique.  4.  Lymph nodes are likely malignant.  5.  Mildly distended loops of jejunum in the left upper quadrant    < end of copied text >      PATHOLOGY:

## 2020-05-27 NOTE — PROGRESS NOTE ADULT - SUBJECTIVE AND OBJECTIVE BOX
Vascular & Interventional Radiology Post-Procedure Note    Pre-Procedure Diagnosis: malignant ascites  Post-Procedure Diagnosis: Same as pre.  Procedure: diagnostic and therapeutic paracentesis.    Operators: Dr. Lopez, Dr. Miner    Procedure Details/Findings: limited abdomina ultrasound showed a small to moderate pocked of fluid within the RLQ. the area was prepped and draped in usual sterile fashion. after injection of lidocaine, under ultrasound guidance, a drainer needle was advanced into the RLQ ascites collection. serosanguinous fluid was aspirated and sent for analysis. the drain was connected to suction with withdrawal of 1.3L serosanguinous fluid. post procedure imaging revealed resolution of ascites. the drain was removed and a dry sterile dressing was applied.    Complications: none  Estimated Blood Loss: Minimal  Specimen: serosanguinous ascitic fluid  Contrast: none  Sedation: none  Patient Condition/Disposition: hemodynamically stable. back to floor.    Plan: 24y F with metastatic carcinoma of unknown origin now with ascites s/p diagnostic and therapeutic paracentesis with drainage of 1.3L serosanguinous fluid.    -f/u laboratory analysis of ascitic fluid  -plan per primary team    Interventional Radiology  [id number]

## 2020-05-27 NOTE — PROGRESS NOTE ADULT - SUBJECTIVE AND OBJECTIVE BOX
[facility] Division of Hospital Medicine  Jaime Monroe MD  Pager (M-F, 8A-1B): [phone]  Other Times:  [phone]    Patient is a 24y old  Female who presents with a chief complaint of fever, shortness of breath (27 May 2020 11:49)    SUBJECTIVE / OVERNIGHT EVENTS: pt has mild tachypneic c/o sig back pain and unrelieved by present opioid regimen. was vomiting yesterday, no bm, not taking po currently  ADDITIONAL REVIEW OF SYSTEMS:    MEDICATIONS  (STANDING):  diltiazem    Tablet 60 milliGRAM(s) Oral every 8 hours  enoxaparin Injectable 40 milliGRAM(s) SubCutaneous daily  meropenem  IVPB 1000 milliGRAM(s) IV Intermittent every 8 hours  morphine ER Tablet 60 milliGRAM(s) Oral two times a day  pantoprazole    Tablet 40 milliGRAM(s) Oral before breakfast  potassium acid phosphate/sodium acid phosphate tablet (K-PHOS No. 2) 1 Tablet(s) Oral four times a day with meals  sodium chloride 0.9%. 1000 milliLiter(s) (100 mL/Hr) IV Continuous <Continuous>  vancomycin  IVPB 1000 milliGRAM(s) IV Intermittent every 12 hours    MEDICATIONS  (PRN):  aluminum hydroxide/magnesium hydroxide/simethicone Suspension 30 milliLiter(s) Oral every 6 hours PRN Dyspepsia  haloperidol    Injectable 0.5 milliGRAM(s) IV Push every 6 hours PRN nausea  morphine  - Injectable 6 milliGRAM(s) IV Push every 3 hours PRN Severe Pain (7 - 10)  ondansetron Injectable 4 milliGRAM(s) IV Push every 8 hours PRN Nausea and/or Vomiting  simethicone 80 milliGRAM(s) Chew every 6 hours PRN Gas      CAPILLARY BLOOD GLUCOSE        I&O's Summary    26 May 2020 07:01  -  27 May 2020 07:00  --------------------------------------------------------  IN: 1450 mL / OUT: 0 mL / NET: 1450 mL        PHYSICAL EXAM:  Vital Signs Last 24 Hrs  T(C): 36.8 (27 May 2020 09:00), Max: 37.9 (26 May 2020 17:22)  T(F): 98.2 (27 May 2020 09:00), Max: 100.3 (26 May 2020 17:22)  HR: 129 (27 May 2020 09:00) (129 - 140)  BP: 110/73 (27 May 2020 09:00) (101/71 - 117/82)  BP(mean): 93 (26 May 2020 17:22) (93 - 93)  RR: 18 (27 May 2020 09:00) (18 - 20)  SpO2: 98% (27 May 2020 09:00) (97% - 100%)  CONSTITUTIONAL: NAD, well-developed, well-groomed  EYES: conjunctiva and sclera clear  ENMT: Moist oral mucosa, no pharyngeal injection or exudates; normal dentition  NECK: Supple, no palpable masses; no thyromegaly  RESPIRATORY: tachypnea but no resp distress lungs are clear to auscultation bilaterally primarily anterior lung fields auscultated  CARDIOVASCULAR: tachycardic regular rhythm trace LE pitting edema  ABDOMEN: distended abdomen tender to palpation +BS no rebound or guarding  PSYCH: calm  NEUROLOGY: AOx3 nonfocal  SKIN: No rashes; no palpable lesions    LABS:                        9.0    21.89 )-----------( 409      ( 26 May 2020 13:21 )             30.0         134<L>  |  98  |  6<L>  ----------------------------<  97  3.8   |  25  |  0.46<L>    Ca    12.1<H>      27 May 2020 04:53    TPro  7.7  /  Alb  2.6<L>  /  TBili  0.9  /  DBili  x   /  AST  53<H>  /  ALT  17  /  AlkPhos  305<H>      PT/INR - ( 26 May 2020 13:21 )   PT: 17.7 sec;   INR: 1.52 ratio         PTT - ( 26 May 2020 13:21 )  PTT:32.4 sec    Cancer Antigen, 125: 598: Method: Roche Electrochemiluminescence Immuno Assay  Values obtained with different assay methods or kits cannot be  used interchangeably.  Results cannot be interpreted as absolute evidence of the presence  or absence of malignant disease. U/mL (20 @ 23:27)    Carcinoembryonic Antigen: 90.7: Method: Roche Electrochemiluminescence Immuno Assay  Values obtained with different assay methods or kits cannot be  used interchangeably.  Results cannot be interpreted as absolute evidence of the presence  or absence of malignant disease.   CEA Normal Ranges   _________________   Non-smoker: less than 3.9 ng/mL       Smoker: less than 5.5 ng/mL ng/mL (20 @ 23:27)    Calcium, Ionized: 1.67 mmol/L (20 @ 04:55)  Intact PTH: 53: PTH METHOD: Roche  Guide for Interpretation of PTH and Calcium Results                           Calcium             PTH                           MG/DL               PG/ML  Normal                   8.4-10.5            15-65  Primary  Hyperparathyroidism      >10.5               >50  Non-PTH Hypercalcemia    >10.5               0-20  Hypoparathyroidism       <8.4                0-20  Pseudohypoparathyroid    <8.4                >50  This is intended as a guide only. Factors such as sunlight exposure,  Vitamin D status and renal function should be evaluated along with  clinical presentation. pg/mL (20 @ 00:08)    Vitamin D, 25-Hydroxy: <5.0: Test Repeated  VITD Interpretive Data Result:  30.0-80.0 ng/mL Optimal levels (Reference Range)  >80.0 ng/mL Toxicity possible  20.0-29.0 ng/mL Insufficiency  10.0-19.0 ng/mL Mild to Moderate Deficiency  <10.0 ng/mL Severe Deficiency  Optimal levels for 25-Hydroxy vitamin D are 30.0 ng/mL and above based  upon the Endocrine Society guidelines 2011.  However, there is a lack of  consensus on this and the [place] of [employer] recommends 20.0 ng/mL and  above as optimal levels.  Vitamin D results may vary depending on the  method of analysis.  The Roche frandy e801 electrochemiluminescent  immunoassay method measures both D2 and D3. ng/mL (20 @ 00:08)          Urinalysis Basic - ( 26 May 2020 15:36 )    Color: Yellow / Appearance: Slightly Turbid / S.021 / pH: x  Gluc: x / Ketone: Trace  / Bili: Negative / Urobili: Negative   Blood: x / Protein: 30 mg/dL / Nitrite: Negative   Leuk Esterase: Negative / RBC: 3 /hpf / WBC 4 /HPF   Sq Epi: x / Non Sq Epi: 3 /hpf / Bacteria: Few    RADIOLOGY & ADDITIONAL TESTS:  Results Reviewed:   Imaging Personally Reviewed: < from: CT Abdomen and Pelvis w/ IV Cont (20 @ 15:59) >  Chest:  1.  No pulmonary embolus.  2.  The left pleural effusion partially obscures the left pleural nodularity.  3.  No change in the bilateral pulmonary nodules since 2020.    Abdomen and pelvis:  1.  Mesenteric nodularity and ascites are concerning for malignancy.  2.  New hypodense hepatic lesions are concerning for metastasis.  3.  The pelvic mass is unchanged line for differences in technique.  4.  Lymph nodes are likely malignant.  5.  Mildly distended loops of jejunum in the left upper quadrant    < end of copied text >    Electrocardiogram Personally Reviewed:    COORDINATION OF CARE:  Care Discussed with Consultants/Other Providers [Y/N]: palliative consulted - d/w Dr Frederick lawton pain regimen. IR consulted for drainage  Prior or Outpatient Records Reviewed [Y/N]:

## 2020-05-27 NOTE — CONSULT NOTE ADULT - ASSESSMENT
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 Nephrology consulted for management of hypercalcemia.

## 2020-05-27 NOTE — CONSULT NOTE ADULT - ASSESSMENT
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 COVID-19 negative. Treatment started with cefepime and vancomycin. Hypercalcemia noted, along with ascites. Cultures drawn. Palliative care called to help with pain management.

## 2020-05-27 NOTE — PROGRESS NOTE ADULT - ATTENDING COMMENTS
Patient interviewed on rounds.  Avoided PE to limit Covid exposure.  Patient c/o right hip pain radiating down RLE, very severe.  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  She could benefit from Rad-Onc for inpatient treatment.    -Continue pain management per Palliative Care.   -D/W Dr. Kristina Jama MD

## 2020-05-27 NOTE — CONSULT NOTE ADULT - SUBJECTIVE AND OBJECTIVE BOX
Full consult to follow shortly. Please page [phone] with any acute concerns. HPI: 24F with PMH of metastatic carcinoma of unknown primary (treated as ovarian primary) s/p carbo/taxol (20) here from [facility] for fever, tachycardia, and tachypnea, along with N/V/dyspepsia. COVID-19 negative. Treatment started with cefepime and vancomycin. Hypercalcemia noted, along with ascites. Cultures drawn. Palliative care called to help with pain management.    PERTINENT PM/SXH:   Metastatic cancer  No pertinent past medical history    No significant past surgical history    FAMILY HISTORY:    ITEMS NOT CHECKED ARE NOT PRESENT    SOCIAL HISTORY:   Significant other/partner[ ]  Children[ ]  Episcopalian/Spirituality:  Substance hx:  [ ]   Tobacco hx:  [ ]   Alcohol hx: [ ]   Home Opioid hx:  [ ] I-Stop Reference No: [id number]    2020	oxycodone hcl 5 mg tablet	100	17	   2020	morphine sulf er 15 mg tablet	30	30	   2020	morphine sulf er 30 mg tablet	60	30	       Living Situation: [ ]Home  [ ]Long term care  [ ]Rehab [ ]Other    ADVANCE DIRECTIVES:    DNR  MOLST  [ ]  Living Will  [ ]   DECISION MAKER(s):  [ ] Health Care Proxy(s)  [ ] Surrogate(s)  [ ] Guardian           Name(s): Phone Number(s):    BASELINE (I)ADL(s) (prior to admission):  [place]: [ ]Total  [ ] Moderate [ ]Dependent    Allergies    No Known Allergies    Intolerances    MEDICATIONS  (STANDING):  diltiazem    Tablet 60 milliGRAM(s) Oral every 8 hours  enoxaparin Injectable 40 milliGRAM(s) SubCutaneous daily  meropenem  IVPB 1000 milliGRAM(s) IV Intermittent every 8 hours  pantoprazole    Tablet 40 milliGRAM(s) Oral before breakfast  potassium acid phosphate/sodium acid phosphate tablet (K-PHOS No. 2) 1 Tablet(s) Oral four times a day with meals  sodium chloride 0.9%. 1000 milliLiter(s) (100 mL/Hr) IV Continuous <Continuous>  vancomycin  IVPB 1000 milliGRAM(s) IV Intermittent every 12 hours    MEDICATIONS  (PRN):  aluminum hydroxide/magnesium hydroxide/simethicone Suspension 30 milliLiter(s) Oral every 6 hours PRN Dyspepsia  morphine  - Injectable 6 milliGRAM(s) IV Push every 3 hours PRN Severe Pain (7 - 10)  ondansetron Injectable 4 milliGRAM(s) IV Push every 8 hours PRN Nausea and/or Vomiting  simethicone 80 milliGRAM(s) Chew every 6 hours PRN Gas    PRESENT SYMPTOMS: [ ]Unable to obtain due to poor mentation   Source if other than patient:  [ ]Family   [ ]Team     Pain: [ ]yes [ ]no  QOL impact -   Location -                    Aggravating factors -  Quality -  Radiation -  Timing-  Severity (0-10 scale):  Minimal acceptable level (0-10 scale):     CPOT:    https://www.[facility].org/getattachment/[email or web address]/Critical-Care-Pain-Observation-Tool-(CPOT)      PAIN AD Score:     http://geriatrictoolkit.[email or web address]/painad.pdf (press ctrl +  left click to view)    Dyspnea:                           [ ]Mild [ ]Moderate [ ]Severe  Anxiety:                             [ ]Mild [ ]Moderate [ ]Severe  Fatigue:                             [ ]Mild [ ]Moderate [ ]Severe  Nausea:                             [ ]Mild [ ]Moderate [ ]Severe  Loss of appetite:              [ ]Mild [ ]Moderate [ ]Severe  Constipation:                    [ ]Mild [ ]Moderate [ ]Severe    Other Symptoms:  [ ]All other review of systems negative     Palliative Performance Status Version 2:         %    http://npcrc.org/files/news/palliative_performance_scale_ppsv2.pdf  PHYSICAL EXAM:  Vital Signs Last 24 Hrs  T(C): 36.9 (27 May 2020 05:06), Max: 38.4 (26 May 2020 12:04)  T(F): 98.4 (27 May 2020 05:06), Max: 101.1 (26 May 2020 12:04)  HR: 133 (27 May 2020 05:06) (130 - 158)  BP: 115/75 (27 May 2020 05:06) (101/71 - 118/87)  BP(mean): 93 (26 May 2020 17:22) (93 - 93)  RR: 20 (27 May 2020 05:06) (16 - 20)  SpO2: 98% (27 May 2020 05:06) (91% - 100%) I&O's Summary    26 May 2020 07:01  -  27 May 2020 07:00  --------------------------------------------------------  IN: 1450 mL / OUT: 0 mL / NET: 1450 mL      GENERAL:  [ ]Alert  [ ]Oriented x   [ ]Lethargic  [ ]Cachexia  [ ]Unarousable  [ ]Verbal  [ ]Non-Verbal  Behavioral:   [ ] Anxiety  [ ] Delirium [ ] Agitation [ ] Other  HEENT:  [ ]Normal   [ ]Dry mouth   [ ]ET Tube/Trach  [ ]Oral lesions  PULMONARY:   [ ]Clear [ ]Tachypnea  [ ]Audible excessive secretions   [ ]Rhonchi        [ ]Right [ ]Left [ ]Bilateral  [ ]Crackles        [ ]Right [ ]Left [ ]Bilateral  [ ]Wheezing     [ ]Right [ ]Left [ ]Bilateral  [ ]Diminished breath sounds [ ]right [ ]left [ ]bilateral  CARDIOVASCULAR:    [ ]Regular [ ]Irregular [ ]Tachy  [ ]Quirino [ ]Murmur [ ]Other  GASTROINTESTINAL:  [ ]Soft  [ ]Distended   [ ]+BS  [ ]Non tender [ ]Tender  [ ]PEG [ ]OGT/ NGT  Last BM:     GENITOURINARY:  [ ]Normal [ ] Incontinent   [ ]Oliguria/Anuria   [ ]Cifuentes  MUSCULOSKELETAL:   [ ]Normal   [ ]Weakness  [ ]Bed/Wheelchair bound [ ]Edema  NEUROLOGIC:   [ ]No focal deficits  [ ]Cognitive impairment  [ ]Dysphagia [ ]Dysarthria [ ]Paresis [ ]Other   SKIN:   [ ]Normal    [ ]Rash  [ ]Pressure ulcer(s)       Present on admission [ ]y [ ]n    CRITICAL CARE:  [ ] Shock Present  [ ]Septic [ ]Cardiogenic [ ]Neurologic [ ]Hypovolemic  [ ]  Vasopressors [ ]  Inotropes   [ ]Respiratory failure present [ ]Mechanical ventilation [ ]Non-invasive ventilatory support [ ]High flow  [ ]Acute  [ ]Chronic [ ]Hypoxic  [ ]Hypercarbic [ ]Other  [ ]Other organ failure     LABS: reviewed                        9.0    21.89 )-----------( 409      ( 26 May 2020 13:21 )             30.0   05-27    134<L>  |  98  |  6<L>  ----------------------------<  97  3.8   |  25  |  0.46<L>    Ca    12.1<H>      27 May 2020 04:53    TPro  7.7  /  Alb  2.6<L>  /  TBili  0.9  /  DBili  x   /  AST  53<H>  /  ALT  17  /  AlkPhos  305<H>    PT/INR - ( 26 May 2020 13:21 )   PT: 17.7 sec;   INR: 1.52 ratio         PTT - ( 26 May 2020 13:21 )  PTT:32.4 sec    Urinalysis Basic - ( 26 May 2020 15:36 )    Color: Yellow / Appearance: Slightly Turbid / S.021 / pH: x  Gluc: x / Ketone: Trace  / Bili: Negative / Urobili: Negative   Blood: x / Protein: 30 mg/dL / Nitrite: Negative   Leuk Esterase: Negative / RBC: 3 /hpf / WBC 4 /HPF   Sq Epi: x / Non Sq Epi: 3 /hpf / Bacteria: Few      RADIOLOGY & ADDITIONAL STUDIES: CT A/P reviewed on sunrise    PROTEIN CALORIE MALNUTRITION PRESENT: [ ]mild [ ]moderate [ ]severe [ ]underweight [ ]morbid obesity  https://www.andeal.org/vault/2440/web/files/ONC/Table_Clinical%20Characteristics%20to%20Document%20Malnutrition-White%20JV%20et%20al%785270.pdf    Height (cm): 162.56 (20 @ 12:53), 164 (20 @ 12:04), 163 (20 @ 13:44)  Weight (kg): 73.5 (20 @ 21:00), 72.8 (20 @ 12:04), 73.936 (20 @ 13:41)  BMI (kg/m2): 27.8 (20 @ 21:00), 27.5 (20 @ 12:53), 27.1 (20 @ 12:04)    [ ]PPSV2 < or = to 30% [ ]significant weight loss  [ ]poor nutritional intake  [ ]anasarca     Albumin, Serum: 2.6 g/dL (20 @ 13:21)   [ ]Artificial Nutrition      REFERRALS:   [ ]Chaplaincy  [ ]Hospice  [ ]Child Life  [ ]Social Work  [ ]Case management [ ]Holistic Therapy     Goals of Care Document:     ______________  Alex Mack MD   of Geriatric and Palliative Medicine  [facility]     Please page the following number for clinical matters between the hours of 9AM and 5PM   from Monday through Friday : [phone]    After 5PM and on weekends, please page: [phone]. The Geriatric and Palliative Medicine consult service has  coverage for medical recommendations, including for symptom management needs. HPI: 24F with PMH of metastatic carcinoma of unknown primary (treated as ovarian primary) s/p carbo/taxol (20) here from [facility] for fever, tachycardia, and tachypnea, along with N/V/dyspepsia. COVID-19 negative. Treatment started with cefepime and vancomycin. Hypercalcemia noted, along with ascites. Cultures drawn. Palliative care called to help with pain management.    PERTINENT PM/SXH:   Metastatic cancer  No pertinent past medical history    No significant past surgical history    FAMILY HISTORY:    ITEMS NOT CHECKED ARE NOT PRESENT    SOCIAL HISTORY:   Significant other/partner[ ]  Children[ ]  Shinto/Spirituality:  Substance hx:  [ ]   Tobacco hx:  [ ]   Alcohol hx: [ ]   Home Opioid hx:  [ ] I-Stop Reference No: [id number]    2020	oxycodone hcl 5 mg tablet	100	17	   2020	morphine sulf er 15 mg tablet	30	30	   2020	morphine sulf er 30 mg tablet	60	30	       Living Situation: [X] Home  [ ]Long term care  [ ]Rehab [ ]Other    ADVANCE DIRECTIVES:    DNR  MOLST  [ ]  Living Will  [ ]   DECISION MAKER(s):  [ ] Health Care Proxy(s)  [ ] Surrogate(s)  [ ] Guardian           Name(s): Phone Number(s):    BASELINE (I)ADL(s) (prior to admission):  [place]: [ ]Total  [ ] Moderate [ ]Dependent    Allergies    No Known Allergies    Intolerances    MEDICATIONS  (STANDING):  diltiazem    Tablet 60 milliGRAM(s) Oral every 8 hours  enoxaparin Injectable 40 milliGRAM(s) SubCutaneous daily  meropenem  IVPB 1000 milliGRAM(s) IV Intermittent every 8 hours  pantoprazole    Tablet 40 milliGRAM(s) Oral before breakfast  potassium acid phosphate/sodium acid phosphate tablet (K-PHOS No. 2) 1 Tablet(s) Oral four times a day with meals  sodium chloride 0.9%. 1000 milliLiter(s) (100 mL/Hr) IV Continuous <Continuous>  vancomycin  IVPB 1000 milliGRAM(s) IV Intermittent every 12 hours    MEDICATIONS  (PRN):  aluminum hydroxide/magnesium hydroxide/simethicone Suspension 30 milliLiter(s) Oral every 6 hours PRN Dyspepsia  morphine  - Injectable 6 milliGRAM(s) IV Push every 3 hours PRN Severe Pain (7 - 10)  ondansetron Injectable 4 milliGRAM(s) IV Push every 8 hours PRN Nausea and/or Vomiting  simethicone 80 milliGRAM(s) Chew every 6 hours PRN Gas    PRESENT SYMPTOMS: [ ]Unable to obtain due to poor mentation   Source if other than patient:  [ ]Family   [ ]Team     Pain: [x ]yes [ ]no  QOL impact -   Location -      back, abdomen              Aggravating factors - movement  Quality - aching  Radiation -  Timing- constant  Severity (0-10 scale): 610  Minimal acceptable level (0-10 scale): 3/10    CPOT:    https://www.[facility].org/getattachment/[email or web address]/Critical-Care-Pain-Observation-Tool-(CPOT)      PAIN AD Score:     http://geriatrictoolkit.[email or web address]/painad.pdf (press ctrl +  left click to view)    Dyspnea:                           [ ]Mild [ ]Moderate [ ]Severe  Anxiety:                             [ ]Mild [ ]Moderate [ ]Severe  Fatigue:                             [ ]Mild [ ]Moderate [ ]Severe  Nausea:                             [ ]Mild [ x]Moderate [ ]Severe  Loss of appetite:              [ ]Mild [ ]Moderate [ ]Severe  Constipation:                    [ ]Mild [ ]Moderate [ ]Severe    Other Symptoms:  [ x]All other review of systems negative     Palliative Performance Status Version 2:         %    http://[facility].org/files/news/palliative_performance_scale_ppsv2.pdf  PHYSICAL EXAM:  Vital Signs Last 24 Hrs  T(C): 36.9 (27 May 2020 05:06), Max: 38.4 (26 May 2020 12:04)  T(F): 98.4 (27 May 2020 05:06), Max: 101.1 (26 May 2020 12:04)  HR: 133 (27 May 2020 05:06) (130 - 158)  BP: 115/75 (27 May 2020 05:06) (101/71 - 118/87)  BP(mean): 93 (26 May 2020 17:22) (93 - 93)  RR: 20 (27 May 2020 05:06) (16 - 20)  SpO2: 98% (27 May 2020 05:06) (91% - 100%) I&O's Summary    26 May 2020 07:01  -  27 May 2020 07:00  --------------------------------------------------------  IN: 1450 mL / OUT: 0 mL / NET: 1450 mL      GENERAL:  [x ]Alert  [ ]Oriented x   [ ]Lethargic  [ ]Cachexia  [ ]Unarousable  [x ]Verbal  [ ]Non-Verbal  Behavioral:   [ ] Anxiety  [ ] Delirium [ ] Agitation [ ] Other  HEENT:  [x ]Normal   [ ]Dry mouth   [ ]ET Tube/Trach  [ ]Oral lesions  PULMONARY:   [x ]Clear [ ]Tachypnea  [ ]Audible excessive secretions   [ ]Rhonchi        [ ]Right [ ]Left [ ]Bilateral  [ ]Crackles        [ ]Right [ ]Left [ ]Bilateral  [ ]Wheezing     [ ]Right [ ]Left [ ]Bilateral  [ ]Diminished breath sounds [ ]right [ ]left [ ]bilateral  CARDIOVASCULAR:    [ x]Regular [ ]Irregular [ ]Tachy  [ ]Quirino [ ]Murmur [ ]Other  GASTROINTESTINAL:  [x ]Soft  [ ]Distended   [x ]+BS  [ x]Non tender [ ]Tender  [ ]PEG [ ]OGT/ NGT  Last BM:     GENITOURINARY:  [ ]Normal [ ] Incontinent   [ ]Oliguria/Anuria   [ ]Cifuentes  MUSCULOSKELETAL:   [ ]Normal   [ ]Weakness  [ ]Bed/Wheelchair bound [ ]Edema  NEUROLOGIC:   [x ]No focal deficits  [ ]Cognitive impairment  [ ]Dysphagia [ ]Dysarthria [ ]Paresis [ ]Other   SKIN:   [ ]Normal    [ ]Rash  [ ]Pressure ulcer(s)       Present on admission [ ]y [ ]n    CRITICAL CARE:  [ ] Shock Present  [ ]Septic [ ]Cardiogenic [ ]Neurologic [ ]Hypovolemic  [ ]  Vasopressors [ ]  Inotropes   [ ]Respiratory failure present [ ]Mechanical ventilation [ ]Non-invasive ventilatory support [ ]High flow  [ ]Acute  [ ]Chronic [ ]Hypoxic  [ ]Hypercarbic [ ]Other  [ ]Other organ failure     LABS: reviewed                        9.0    21.89 )-----------( 409      ( 26 May 2020 13:21 )             30.0   05-27    134<L>  |  98  |  6<L>  ----------------------------<  97  3.8   |  25  |  0.46<L>    Ca    12.1<H>      27 May 2020 04:53    TPro  7.7  /  Alb  2.6<L>  /  TBili  0.9  /  DBili  x   /  AST  53<H>  /  ALT  17  /  AlkPhos  305<H>    PT/INR - ( 26 May 2020 13:21 )   PT: 17.7 sec;   INR: 1.52 ratio         PTT - ( 26 May 2020 13:21 )  PTT:32.4 sec    Urinalysis Basic - ( 26 May 2020 15:36 )    Color: Yellow / Appearance: Slightly Turbid / S.021 / pH: x  Gluc: x / Ketone: Trace  / Bili: Negative / Urobili: Negative   Blood: x / Protein: 30 mg/dL / Nitrite: Negative   Leuk Esterase: Negative / RBC: 3 /hpf / WBC 4 /HPF   Sq Epi: x / Non Sq Epi: 3 /hpf / Bacteria: Few      RADIOLOGY & ADDITIONAL STUDIES: CT A/P reviewed on sunrise    PROTEIN CALORIE MALNUTRITION PRESENT: [ ]mild [ ]moderate [ ]severe [ ]underweight [ ]morbid obesity  https://www.andeal.org/vault/2440/web/files/ONC/Table_Clinical%20Characteristics%20to%20Document%20Malnutrition-White%20JV%20et%20al%883879.pdf    Height (cm): 162.56 (20 @ 12:53), 164 (20 @ 12:04), 163 (20 @ 13:44)  Weight (kg): 73.5 (20 @ 21:00), 72.8 (20 @ 12:04), 73.936 (20 @ 13:41)  BMI (kg/m2): 27.8 (20 @ 21:00), 27.5 (20 @ 12:53), 27.1 (20 @ 12:04)    [ ]PPSV2 < or = to 30% [ ]significant weight loss  [ ]poor nutritional intake  [ ]anasarca     Albumin, Serum: 2.6 g/dL (20 @ 13:21)   [ ]Artificial Nutrition      REFERRALS:   [ ]Chaplaincy  [ ]Hospice  [ ]Child Life  [ ]Social Work  [ ]Case management [ ]Holistic Therapy     Goals of Care Document:     ______________  Alex Mack MD   of Geriatric and Palliative Medicine  [facility]     Please page the following number for clinical matters between the hours of 9AM and 5PM   from Monday through Friday : [phone]    After 5PM and on weekends, please page: [phone]. The Geriatric and Palliative Medicine consult service has  coverage for medical recommendations, including for symptom management needs.

## 2020-05-27 NOTE — PROGRESS NOTE ADULT - SUBJECTIVE AND OBJECTIVE BOX
Vascular & Interventional Radiology Pre-Procedure Note    Procedure Name: diagnostic and therapeutic paracentesis    HPI: 24y Female with metastatic carcinoma and ascites presents for diagnostic and therapeutic paracentesis.    Allergies:     Medications:  cefepime   IVPB: 100 mL/Hr IV Intermittent (05-26 @ 14:40)  diltiazem    Tablet: 60 milliGRAM(s) Oral (05-27 @ 04:53)  enoxaparin Injectable: 40 milliGRAM(s) SubCutaneous (05-27 @ 12:15)  meropenem  IVPB: 100 mL/Hr IV Intermittent (05-27 @ 13:52)  vancomycin  IVPB: 250 mL/Hr IV Intermittent (05-27 @ 05:30)  vancomycin  IVPB: 250 mL/Hr IV Intermittent (05-26 @ 13:30)      Data:  Vital Signs Last 24 Hrs  T(C): 36.8 (27 May 2020 09:00), Max: 37.9 (26 May 2020 17:22)  T(F): 98.2 (27 May 2020 09:00), Max: 100.3 (26 May 2020 17:22)  HR: 129 (27 May 2020 09:00) (129 - 140)  BP: 110/73 (27 May 2020 09:00) (101/71 - 117/82)  BP(mean): 93 (26 May 2020 17:22) (93 - 93)  RR: 18 (27 May 2020 09:00) (18 - 20)  SpO2: 98% (27 May 2020 09:00) (97% - 100%)    LABS:                        9.0    21.89 )-----------( 409      ( 26 May 2020 13:21 )             30.0     05-27    134<L>  |  98  |  6<L>  ----------------------------<  97  3.8   |  25  |  0.46<L>    Ca    12.1<H>      27 May 2020 04:53    TPro  7.7  /  Alb  2.6<L>  /  TBili  0.9  /  DBili  x   /  AST  53<H>  /  ALT  17  /  AlkPhos  305<H>  05-26    PT/INR - ( 26 May 2020 13:21 )   PT: 17.7 sec;   INR: 1.52 ratio         PTT - ( 26 May 2020 13:21 )  PTT:32.4 sec    Imaging: < from: CT Abdomen and Pelvis w/ IV Cont (05.26.20 @ 15:59) >  EXAM:  CT ABDOMEN AND PELVIS IC                          EXAM:  CT ANGIO CHEST (W)AW IC                            PROCEDURE DATE:  05/26/2020            INTERPRETATION:  CTA CHEST WITH CONTRAST (CT PULMONARY EMBOLUS)    INDICATION: Tachycardia. Shortness of breath. Evaluate for pulmonary embolus.    TECHNIQUE: Enhanced helical images were obtained of the chest, abdomen, and pelvis. Coronal and sagittal images were reconstructed.  Images were obtained after the uneventful administration of 90 cc of nonionic intravenous contrast (Omnipaque 350).  10 cc of nonionic intravenous contrast (Omnipaque 350) was discarded. Maximum intensity projection images were generated.    COMPARISON: CT chest 4/30/2020. CT chest 4/16/2020. CT chest 4/1/2020.    FINDINGS:     Pulmonary Artery: The pulmonary artery is enlarged can occur in the clinical setting of pulmonary arterial hypertension. There is no main, central, lobar, or segmental pulmonary artery embolus. The subsegmental vessels are not well evaluated.    Tubes/Lines: None.    Lungs And Airways: There are pulmonary nodules. The largest nodule measures:  *  A 10 x 6 mm (series 2 image 42) in the right middle lobe.  *  A left upper lobe nodule measures 5 mm (series 2 image 44).  *  A 10 x 10 mmnodule is in the right middle lobe (series 2 image 48).    Bilateral lower lobe and lingular linear atelectasis/scarring.    Pleura: There is a small left pleural effusion and pleural nodularity. The nodularity is partially obscured by the left pleural effusion (series 2 image 43), however, is unchanged line for differences in technique. No pneumothorax.    Mediastinum: The upper paratracheal, lower paratracheal, right hilar, and subcarinal chest lymph nodes are unchanged. The calcified chest lymph nodes in the subcarinal station are unchanged. The visualized portion of the thyroid gland is unremarkable.   The esophagus is unremarkable.    Heart and Vasculature: The heart is normal in size. No pericardial effusion. The aorta is normal in caliber.    Bones And Soft Tissues: Degenerative change of the spine.    Abdomen and pelvis:        Liver: There are bilobar hypodense hepatic lesions, new since 4/30/2020.    No intrahepatic or extrahepatic biliary ductal dilatation.    Spleen: Unremarkable.    Gallbladder: Unremarkable.    Pancreas: Unremarkable.    Adrenal glands: Unremarkable.    Kidneys: The kidneys are unremarkable. No hydronephrosis or hydroureter.    Bowel: The abdominal ascites is new. There is nodularity of the omentum and mesentery. The mass centered in the pelvis measures 11.2 x 12.5 cm and is unchanged allowing for differences in technique (series 3 image 86). The mesenteric lymph nodes are unchanged.    The stomach is unremarkable. Small bowel is mildly dilated and the jejunum measures 3.3 cm. The distal jejunum and ileum are decompressed. The appendix is normal. No free air.    Vascular: The aorta is normal in caliber. The portal vein is patent. The hepatic veins are patent.    Bladder: The bladder is displaced by the pelvic mass.    Uterus and adnexa: The uterus is unremarkable. The pelvic mass likely arises from the adnexa.    Lymph nodes: The upper abdominal, aortocaval, and periaortic lymph nodes are unchanged. In addition, the right common iliac and external iliac lymph nodes are unchanged.    Skeletal: There are lytic lesions of the thoracic and lumbar spine. In addition, there are lytic lesions of the manubrium.    IMPRESSION:   Chest:  1.  No pulmonary embolus.  2.  The left pleural effusion partially obscures the left pleural nodularity.  3.  No change in the bilateral pulmonary nodules since 4/30/2020.    Abdomen and pelvis:  1.  Mesenteric nodularity and ascites are concerning for malignancy.  2.  New hypodense hepatic lesions are concerning for metastasis.  3.  The pelvic mass is unchanged line for differences in technique.  4.  Lymph nodes are likely malignant.  5.  Mildly distended loops of jejunum in the left upper quadrant                    KAY ABBOTT M.D., ATTENDING RADIOLOGIST  This document has been electronically signed. May 26 2020  4:42PM    < end of copied text >      Plan:   -24y Female presents for diagnostic and therapeutic paracentesis.  -Risks/Benefits/alternatives explained with the patient and witnessed informed consent obtained.

## 2020-05-27 NOTE — CONSULT NOTE ADULT - SUBJECTIVE AND OBJECTIVE BOX
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 Nephrology consulted for management of hypercalcemia. As per primary team patient without mental status changes, alert and oriented, not constipated or having excessive urination.      PAST HISTORY  --------------------------------------------------------------------------------  PAST MEDICAL & SURGICAL HISTORY:  Metastatic cancer  No significant past surgical history    FAMILY HISTORY:    PAST SOCIAL HISTORY:    ALLERGIES & MEDICATIONS  --------------------------------------------------------------------------------  Allergies    No Known Allergies    Intolerances      Standing Inpatient Medications  diltiazem    Tablet 60 milliGRAM(s) Oral every 8 hours  enoxaparin Injectable 40 milliGRAM(s) SubCutaneous daily  meropenem  IVPB 1000 milliGRAM(s) IV Intermittent every 8 hours  morphine ER Tablet 60 milliGRAM(s) Oral two times a day  pantoprazole    Tablet 40 milliGRAM(s) Oral before breakfast  potassium acid phosphate/sodium acid phosphate tablet (K-PHOS No. 2) 1 Tablet(s) Oral four times a day with meals  sodium chloride 0.9%. 1000 milliLiter(s) IV Continuous <Continuous>  vancomycin  IVPB 1000 milliGRAM(s) IV Intermittent every 12 hours    PRN Inpatient Medications  aluminum hydroxide/magnesium hydroxide/simethicone Suspension 30 milliLiter(s) Oral every 6 hours PRN  haloperidol    Injectable 0.5 milliGRAM(s) IV Push every 6 hours PRN  morphine  - Injectable 6 milliGRAM(s) IV Push every 3 hours PRN  ondansetron Injectable 4 milliGRAM(s) IV Push every 8 hours PRN  simethicone 80 milliGRAM(s) Chew every 6 hours PRN      REVIEW OF SYSTEMS: Unable to obtain.      VITALS/PHYSICAL EXAM  --------------------------------------------------------------------------------  T(C): 36.8 (05-27-20 @ 09:00), Max: 37.9 (05-26-20 @ 17:22)  HR: 129 (05-27-20 @ 09:00) (129 - 140)  BP: 110/73 (05-27-20 @ 09:00) (101/71 - 117/82)  RR: 18 (05-27-20 @ 09:00) (18 - 20)  SpO2: 98% (05-27-20 @ 09:00) (97% - 100%)  Wt(kg): --  Height (cm): 162.56 (05-26-20 @ 12:53)  Weight (kg): 73.5 (05-26-20 @ 21:00)  BMI (kg/m2): 27.8 (05-26-20 @ 21:00)  BSA (m2): 1.79 (05-26-20 @ 21:00)      05-26-20 @ 07:01  -  05-27-20 @ 07:00  --------------------------------------------------------  IN: 1450 mL / OUT: 0 mL / NET: 1450 mL      Physical Exam:  	Gen: NAD, well-appearing  	HEENT: Anicteric  	Pulm: CTA B/L  	CV:  S1S2  	Abd: Distended abdomen   	Ext: No B/L Lower ext edema    LABS/STUDIES  --------------------------------------------------------------------------------              9.0    21.89 >-----------<  409      [05-26-20 @ 13:21]              30.0     134  |  98  |  6   ----------------------------<  97      [05-27-20 @ 04:53]  3.8   |  25  |  0.46        Ca     12.1     [05-27-20 @ 04:53]      iCa    1.67     [05-27 @ 04:55]    TPro  7.7  /  Alb  2.6  /  TBili  0.9  /  DBili  x   /  AST  53  /  ALT  17  /  AlkPhos  305  [05-26-20 @ 13:21]    PT/INR: PT 17.7 , INR 1.52       [05-26-20 @ 13:21]  PTT: 32.4       [05-26-20 @ 13:21]    Serum Osmolality 286      [05-26-20 @ 23:58]    Creatinine Trend:  SCr 0.46 [05-27 @ 04:53]  SCr 0.46 [05-26 @ 20:40]  SCr 0.45 [05-26 @ 13:21]  SCr 0.36 [05-17 @ 07:10]  SCr 0.37 [05-16 @ 07:04]    Urinalysis - [05-26-20 @ 15:36]      Color Yellow / Appearance Slightly Turbid / SG 1.021 / pH 6.5      Gluc Negative / Ketone Trace  / Bili Negative / Urobili Negative       Blood Negative / Protein 30 mg/dL / Leuk Est Negative / Nitrite Negative      RBC 3 / WBC 4 / Hyaline 6 / Gran  / Sq Epi  / Non Sq Epi 3 / Bacteria Few    Urine Creatinine 112      [05-26-20 @ 20:14]  Urine Sodium 45      [05-26-20 @ 20:14]  Urine Chloride <35      [05-26-20 @ 20:14]  Urine Osmolality 409      [05-26-20 @ 23:58]    PTH -- (Ca 10.9)      [05-27-20 @ 00:08]   53  Vitamin D (25OH) <5.0      [05-27-20 @ 00:08]

## 2020-05-27 NOTE — CONSULT NOTE ADULT - ASSESSMENT
24 f with b/l adnexal masses and pelvic LAD, metastatic carcinoma of unknown primary , s/p CT guided biopsy 3/25, PICC line 4/3 to start chemo  pt intermittently febrile and leukocytosis previously,   was treated for potential UTI  Dced on levaquin  Now presenting with fever and leucocytosis  No obvious localization clinically  Chronic abd pain-unchanged  UA with only 4 wbc  CT chest abd pelvis as above no obvious new focus  ? PICC related BSI  ? UTI  ? tumor fever  ? Other occult foci  Rec:  A) fever  follow Cx  follow clinically  Agree with empiric BS coverage as above  Monitor vanco levels  Check procal;citonin    B) Tumor,abd pain  workup for infectious etiology as above  will defer to primary team on other plan    Will tailor plan for ID issues  per course,results.Will defer to primary team on management of other issues.  Assessment, plan and recommendations as detailed above were discussed with the medical/primary  team.  Will Follow.  Beeper [phone] [facility] [id number].   Wknd/afterhours/No response-[phone] or Fellow on call

## 2020-05-27 NOTE — CONSULT NOTE ADULT - PROBLEM SELECTOR RECOMMENDATION 9
Patient with hypercalcemia likely primary hyperparathyroidism given inappropriately normal PTH in the setting of hypercalcemia. Would obtain 24hr urine sample measuring for calcium and creatinine. Continue with fluids at 200 cc/hr and continue to monitor urine output closely. Continue to trend calcium q6-8hrs. As patient is not symptomatic would recommend deferring other acute medical therapies for hypercalcemia at this time however will defer to primary team/oncology/endocrinology. Will continue to monitor calcium closely. Avoid nephrotoxic agents, renally dose all medications and continue to support BP.

## 2020-05-27 NOTE — CONSULT NOTE ADULT - PROBLEM SELECTOR RECOMMENDATION 9
Full consult to follow shortly. Please page [phone] with any acute concerns. - agree with morphine 6mg IV Q3 PRN  - monitor renal function  - will restart MS ER, but at higher dose given increased pain, at 60mg BID

## 2020-05-27 NOTE — PROGRESS NOTE ADULT - PROBLEM SELECTOR PLAN 1
- unclear source at this time - ?likely pulmonary vs PICC  - CTA chest and CT abd negative for occult infection  - PICC line is possible source  - recs to start Meropenem, Vancomycin ; f/u ID  - follow up bld cx, urine cx - unclear source at this time - ?likely pulmonary vs PICC  - CTA chest and CT abd negative for occult infection  - PICC line is possible source - would remove  - recs to start Meropenem, Vancomycin ; f/u ID  - follow up bld cx, urine cx

## 2020-05-27 NOTE — PROGRESS NOTE ADULT - ASSESSMENT
24F w/ recent diagnosis of metastatic carcinoma of unknown primary (being treated as likely ovarian primary) s/p Carbo/Taxol cycle 1 on 5/5 discharged last week, presented to treatment room today at [facility] found to be febrile to 101, tachycardic to 150 and tachypneic sent into the ER for further workup.     #Shortness of breath:  CTA negative for PE, showing left pleural effusion  CT A/P showing new ascites  Please speak with IR to evaluate if pt has enough fluid for a para/thoracentesis (left pleural effusion)    #Fever:  Pt had recent pneumonia last admission and was discharged on Levaquin. Pt also had frequent episodes of fever during her admission, with negative cultures, and were deemed to be 2/2 tumor fever.   Pursue full infectious workup- pan-scan + pan-culture- cultures pending    Continue broad spectrum antibiotics  Currently not neutropenic  Continue to trend WBC with daily CBC with differential  Pt had leukocytosis on discharge due to administration of Zarxio  f/u ID eval and recs     #Metastatic carcinoma of unknown primary- being treated as ovarian primary:  Details per HPI   Pt was due 5/26 for cycle two of Carbo/Taxol   Will hold off on inpatient chemotherapy for now given pt's acute clinical illness  Ca-125 tumor marker is improving, 598 from 1065  Hypercalcemia likely 2/2 malignancy- can start calcitonin since not improving with IVF, follow up nephrology recommendations  If patient improves, may give 2nd cycle inpatient- will discuss with pt's primary oncologist Dr. Kristina Banks MD  Hematology Oncology Fellow, PGY-4  [facility] Pager: [phone]/ [facility] Pager: [phone] 24F w/ recent diagnosis of metastatic carcinoma of unknown primary (being treated as likely ovarian primary) s/p Carbo/Taxol cycle 1 on 5/5 discharged last week, presented to treatment room today at [facility] found to be febrile to 101, tachycardic to 150 and tachypneic sent into the ER for further workup.     #Shortness of breath:  CTA negative for PE, showing left pleural effusion  CT A/P showing new ascites  Please speak with IR to evaluate if pt has enough fluid for a para/thoracentesis (left pleural effusion), would send studies off fluid if performed (cytology, gram stain, culture)    #Fever:  Pt had recent pneumonia last admission and was discharged on Levaquin. Pt also had frequent episodes of fever during her admission, with negative cultures, and were deemed to be 2/2 tumor fever.   Pursue full infectious workup- pan-scan + pan-culture- cultures pending    Continue broad spectrum antibiotics  Currently not neutropenic  Continue to trend WBC with daily CBC with differential  Pt had leukocytosis on discharge due to administration of Zarxio  f/u ID eval and recs     #Metastatic carcinoma of unknown primary- being treated as ovarian primary:  Details per HPI   Pt was due 5/26 for cycle two of Carbo/Taxol   Will hold off on inpatient chemotherapy for now given pt's acute clinical illness  Ca-125 tumor marker is improving, 598 from 1065  Hypercalcemia likely 2/2 malignancy- can start calcitonin since not improving with IVF, follow up nephrology recommendations  If patient improves, may give 2nd cycle inpatient- will discuss with pt's primary oncologist Dr. Kristina Banks MD  Hematology Oncology Fellow, PGY-4  [facility] Pager: [phone]/ [facility] Pager: [phone] 24F w/ recent diagnosis of metastatic carcinoma of unknown primary (being treated as likely ovarian primary) s/p Carbo/Taxol cycle 1 on 5/5 discharged last week, presented to treatment room today at [facility] found to be febrile to 101, tachycardic to 150 and tachypneic sent into the ER for further workup.     #Shortness of breath:  CTA negative for PE, showing left pleural effusion  CT A/P showing new ascites  Please speak with IR/radiology to evaluate if pt has enough fluid for a para/thoracentesis (left pleural effusion), would send studies off fluid if performed (cytology, gram stain, culture)    #Fever:  Pt had recent pneumonia last admission and was discharged on Levaquin. Pt also had frequent episodes of fever during her admission, with negative cultures, and were deemed to be 2/2 tumor fever.   Pursue full infectious workup- pan-scan + pan-culture- cultures pending    Continue broad spectrum antibiotics  Currently not neutropenic  Continue to trend WBC with daily CBC with differential  Pt had leukocytosis on discharge due to administration of Zarxio  f/u ID eval and recs     #Metastatic carcinoma of unknown primary- being treated as ovarian primary:  Details per HPI   Pt was due 5/26 for cycle two of Carbo/Taxol   Will hold off on inpatient chemotherapy for now given pt's acute clinical illness  Ca-125 tumor marker is improving, 598 from 1065  Hypercalcemia likely 2/2 malignancy- can start calcitonin since not improving with IVF, follow up nephrology recommendations  If patient improves, may give 2nd cycle inpatient- will discuss with pt's primary oncologist Dr. Kristina Banks MD  Hematology Oncology Fellow, PGY-4  [facility] Pager: [phone]/ [facility] Pager: [phone] 24F w/ recent diagnosis of metastatic carcinoma of unknown primary (being treated as likely ovarian primary) s/p Carbo/Taxol cycle 1 on 5/5 discharged last week, presented to treatment room today at [facility] found to be febrile to 101, tachycardic to 150 and tachypneic sent into the ER for further workup.     #Shortness of breath:  CTA negative for PE, showing left pleural effusion  CT A/P showing new ascites  Please speak with IR/radiology to evaluate if pt has enough fluid for a para/thoracentesis (left pleural effusion), would send studies off fluid if performed (cytology, gram stain, culture)    #Fever:  Pt had recent pneumonia last admission and was discharged on Levaquin. Pt also had frequent episodes of fever during her admission, with negative cultures, and were deemed to be 2/2 tumor fever.   Pursue full infectious workup- pan-scan + pan-culture- cultures pending    Continue broad spectrum antibiotics  Currently not neutropenic  Continue to trend WBC with daily CBC with differential  Pt had leukocytosis on discharge due to administration of Zarxio  f/u ID eval and recs     #Metastatic carcinoma of unknown primary- being treated as ovarian primary:  Details per HPI   Pt was due 5/26 for cycle two of Carbo/Taxol   Will hold off on inpatient chemotherapy for now given pt's acute clinical illness  Ca-125 tumor marker is improving, 598 from 1065  Hypercalcemia likely 2/2 malignancy- can start calcitonin since not improving with IVF, follow up nephrology recommendations  Would consult palliative care again this admission as they assisted with pain management last admission  If patient improves, may give 2nd cycle inpatient- will discuss with pt's primary oncologist Dr. Kristina Banks MD  Hematology Oncology Fellow, PGY-4  [facility] Pager: [phone]/ [facility] Pager: [phone] 24F w/ recent diagnosis of metastatic carcinoma of unknown primary (being treated as likely ovarian primary) s/p Carbo/Taxol cycle 1 on 5/5 discharged last week, presented to treatment room today at [facility] found to be febrile to 101, tachycardic to 150 and tachypneic sent into the ER for further workup.     #Shortness of breath:  CTA negative for PE, showing left pleural effusion  CT A/P showing new ascites  s/p paracentesis today- studies sent     #Fever:  Pt had recent pneumonia last admission and was discharged on Levaquin. Pt also had frequent episodes of fever during her admission, with negative cultures, and were deemed to be 2/2 tumor fever.   Pursue full infectious workup- pan-scan + pan-culture- cultures pending    Continue broad spectrum antibiotics  Currently not neutropenic  Continue to trend WBC with daily CBC with differential  Pt had leukocytosis on discharge due to administration of Zarxio  f/u ID eval and recs     #Metastatic carcinoma of unknown primary- being treated as ovarian primary:  Details per HPI   Pt was due 5/26 for cycle two of Carbo/Taxol   Will hold off on inpatient chemotherapy for now given pt's acute clinical illness  Ca-125 tumor marker is improving, 598 from 1065  Imaging from 4/30 is showing bony mets in L3 vertebral body and right iliac spine, which may explain pt's back pain. Please consult radiation oncology in AM for possible (inpatient) radiation   Hypercalcemia likely 2/2 malignancy- follow up nephrology recommendations  Appreciate palliative care evaluation   If patient remains fever-free and blood cultures are negative, we will discuss planning for inpatient cycle 2 of chemotherapy later this week       Cesia Banks MD  Hematology Oncology Fellow, PGY-4  [facility] Pager: [phone]/ [facility] Pager: [phone]

## 2020-05-28 NOTE — PROGRESS NOTE ADULT - PROBLEM SELECTOR PLAN 1
- unclear source at this time - pulmonary vs PICC vs UTI (enterococcus in UCx)  - CTA chest and CT abd negative for occult infection  - PICC line is possible source - d/w ID can leav ein place based on culture results right now  - c/w Meropenem, Vancomycin ; monitor level  - follow up bld cx, urine cx

## 2020-05-28 NOTE — PROGRESS NOTE ADULT - ATTENDING COMMENTS
Hypercalcemia- likely primary hyperpara. r/o fhh. low Vit d. decrease fluids to 100cc/hr and give bisphosphonate per endo     we will sign off. further management per endocrinology

## 2020-05-28 NOTE — PROGRESS NOTE ADULT - ASSESSMENT
24F with recent diagnosis of metastatic carcinoma of unknown primary (being treated as likely ovarian primary) s/p Carbo/Taxol cycle 1 on 5/5 who was sent in from  [facility] on account of fever, tachycardia  and tachypnea. Suspicion for Sepsis 2/2 PNA. Hypercalcemia related to bone mets. Pain of metastatic disease. Malignant ascites s/p paracentesis 5/27.

## 2020-05-28 NOTE — PROGRESS NOTE ADULT - PROBLEM SELECTOR PLAN 3
- ionized Ca elevated  - PTH inappropriately normal in this setting but Vit D is low  - Start vit d 4000 unit daily  - endo & nephrology consulted - f/u recs - IVF for now though should lower rate, zometa given today. hold off on calcitonin for now, con't to trend.  r/o [facility]

## 2020-05-28 NOTE — PROGRESS NOTE ADULT - ASSESSMENT
24F with PMH of metastatic carcinoma of unknown primary (treated as ovarian primary) s/p carbo/taxol (5/5/20) here from [facility] for fever, tachycardia, and tachypnea, along with N/V/dyspepsia. COVID-19 negative. Treatment started with cefepime and vancomycin. Hypercalcemia noted, along with ascites. Cultures drawn. Palliative care called to help with pain management.

## 2020-05-28 NOTE — PHYSICAL THERAPY INITIAL EVALUATION ADULT - PERTINENT HX OF CURRENT PROBLEM, REHAB EVAL
24F w/ recent diagnosis of metastatic carcinoma of unknown primary (being treated as likely ovarian primary) s/p Carbo/Taxol cycle 1 on 5/5 discharged last week, presented to treatment room today at [facility] found to be febrile to 101, tachycardic to 150 and tachypneic sent into the ER for further workup.

## 2020-05-28 NOTE — PROGRESS NOTE ADULT - SUBJECTIVE AND OBJECTIVE BOX
Patient is a 24y old  Female who presents with a chief complaint of fever, shortness of breath (28 May 2020 12:56)    Being followed by ID for fever, ? UTI    Interval history:s/p paracentesis  No acute events      ROS:denies urinary complaints   No cough,SOB,CP  No N/V/D./ some abd pain  No other complaints      Antimicrobials:    meropenem  IVPB 1000 milliGRAM(s) IV Intermittent every 8 hours  vancomycin  IVPB 1250 milliGRAM(s) IV Intermittent every 12 hours    Other medications reviewed    Vital Signs Last 24 Hrs  T(C): 36.9 (05-28-20 @ 13:05), Max: 37.2 (05-27-20 @ 23:24)  T(F): 98.4 (05-28-20 @ 13:05), Max: 99 (05-27-20 @ 23:24)  HR: 130 (05-28-20 @ 13:05) (124 - 131)  BP: 120/81 (05-28-20 @ 13:05) (91/57 - 120/81)  BP(mean): --  RR: 22 (05-28-20 @ 13:05) (18 - 22)  SpO2: 95% (05-28-20 @ 13:05) (93% - 97%)    Physical Exam:    HEENT PERRLA EOMI    No oral exudate or erythema    Chest Good AE,CTA    CVS RRR S1 S2 WNl No murmur or rub or gallop    Abd soft BS normal Lower half tenderness ? mass  ascites/FF    R PICC  site no erythema tenderness or discharge    CNS AAO X 3 no focal    Lab Data:                          7.8    16.49 )-----------( 388      ( 28 May 2020 05:43 )             26.5     WBC Count: 16.49 (05-28-20 @ 05:43)  WBC Count: 21.89 (05-26-20 @ 13:21)  WBC Count: 22.63 (05-26-20 @ 11:37)    05-28    134<L>  |  100  |  <4<L>  ----------------------------<  97  3.4<L>   |  24  |  0.39<L>    Ca    11.6<H>      28 May 2020 05:43    TPro  6.3  /  Alb  2.3<L>  /  TBili  0.6  /  DBili  x   /  AST  30  /  ALT  8<L>  /  AlkPhos  260<H>  05-28    Cell Count, Body Fluid (05.27.20 @ 16:52)    Monocyte/Macrophage Count - Body Fluid: 13 %    Fluid Segmented Granulocytes: 35 %    Atypical Lymphocytes - Body Fluid: 3 %    Nucleated Red Blood Cells - Body Fluid: 2 %    Body Fluid Appearance: Sl Bloody    BF Lymphocytes: 43 %    Fluid Type: Peritoneal fl    Other Body Cells: 6: To be reviewed by hematopathologist. %    Tube Type: Sterile    Color - Body Fluid: Red    Total Nucleated Cell Count, Body Fluid: 77: Peritoneal/Pleural/ Pericardial  Body Fluid Types            Total Nucleated cells: <500/uL            Neutrophils: <25%          Synovial Body Fluid Type           Total Nucleated cells: <150/uL           Neutrophils: <25%           Lymphocytes: <75%           Moncytes/Macrophages: </=70%  Please note reference range updated effective Nov 26, 2019 /uL    Total RBC Count: 887850 /uL        Culture - Fungal, Body Fluid (collected 27 May 2020 20:59)  Source: .Body Fluid Peritoneal Fluid  Preliminary Report (28 May 2020 09:09):    Testing in progress    Culture - Body Fluid with Gram Stain (collected 27 May 2020 20:59)  Source: .Body Fluid Peritoneal Fluid  Gram Stain (27 May 2020 23:13):    Rare polymorphonuclear leukocytes per low power field    No organisms seen per oil power field    Culture - Urine (collected 26 May 2020 21:20)  Source: .Urine Clean Catch (Midstream)  Preliminary Report (27 May 2020 20:56):    50,000 - 99,000 CFU/mL Enterococcus species    Culture - Blood (collected 26 May 2020 18:14)  Source: .Blood PICC/PERC Double Lumen BROWN  Preliminary Report (27 May 2020 19:45):    No growth to date.    Culture - Blood (collected 26 May 2020 18:14)  Source: .Blood PICC/PERC Double Lumen BLUE  Preliminary Report (27 May 2020 19:45):    No growth to date.    Culture - Blood (collected 26 May 2020 18:14)  Source: .Blood Blood-Peripheral  Preliminary Report (27 May 2020 19:44):    No growth to date.    Culture - Blood (collected 26 May 2020 18:14)  Source: .Blood Blood-Peripheral  Preliminary Report (27 May 2020 19:44):    No growth to date.            Vancomycin Level, Trough: 5.2 ug/mL (05-28-20 @ 05:43)        < from: CT Abdomen and Pelvis w/ IV Cont (05.26.20 @ 15:59) >    IMPRESSION:   Chest:  1.  No pulmonary embolus.  2.  The left pleural effusion partially obscures the left pleural nodularity.  3.  No change in the bilateral pulmonary nodules since 4/30/2020.    Abdomen and pelvis:  1.  Mesenteric nodularity and ascites are concerning for malignancy.  2.  New hypodense hepatic lesions are concerning for metastasis.  3.  The pelvic mass is unchanged line for differences in technique.  4.  Lymph nodes are likely malignant.  5.  Mildly distended loops of jejunum in the left upper quadrant          < end of copied text >

## 2020-05-28 NOTE — PHYSICAL THERAPY INITIAL EVALUATION ADULT - PRECAUTIONS/LIMITATIONS, REHAB EVAL
CT Abdomen: 1.  Mesenteric nodularity and ascites are concerning for malignancy.2.  New hypodense hepatic lesions are concerning for metastasis.3.  The pelvic mass is unchanged line for differences in technique.4.  Lymph nodes are likely malignant.5.  Mildly distended loops of jejunum in the left upper quadrant CT Abdomen: 1.  Mesenteric nodularity and ascites are concerning for malignancy.2.  New hypodense hepatic lesions are concerning for metastasis.3.  The pelvic mass is unchanged line for differences in technique.4.  Lymph nodes are likely malignant.5.  Mildly distended loops of jejunum in the left upper quadrant. As per heme/onc note 5/27: "Imaging from 4/30 is showing bony mets in L3 vertebral body and right iliac spine, which may explain pt's back pain. Please consult radiation oncology in AM for possible (inpatient) radiation."

## 2020-05-28 NOTE — PROGRESS NOTE ADULT - SUBJECTIVE AND OBJECTIVE BOX
INTERVAL History:    Allergies    No Known Allergies    Intolerances        MEDICATIONS  (STANDING):  cholecalciferol 4000 Unit(s) Oral daily  diltiazem    Tablet 60 milliGRAM(s) Oral every 8 hours  enoxaparin Injectable 40 milliGRAM(s) SubCutaneous daily  meropenem  IVPB 1000 milliGRAM(s) IV Intermittent every 8 hours  morphine ER Tablet 60 milliGRAM(s) Oral two times a day  pantoprazole    Tablet 40 milliGRAM(s) Oral before breakfast  potassium acid phosphate/sodium acid phosphate tablet (K-PHOS No. 2) 1 Tablet(s) Oral four times a day with meals  sodium chloride 0.9%. 1000 milliLiter(s) (200 mL/Hr) IV Continuous <Continuous>  vancomycin  IVPB 1250 milliGRAM(s) IV Intermittent every 12 hours    MEDICATIONS  (PRN):  aluminum hydroxide/magnesium hydroxide/simethicone Suspension 30 milliLiter(s) Oral every 6 hours PRN Dyspepsia  haloperidol    Injectable 0.5 milliGRAM(s) IV Push every 6 hours PRN nausea  morphine  - Injectable 6 milliGRAM(s) IV Push every 3 hours PRN Severe Pain (7 - 10)  ondansetron Injectable 4 milliGRAM(s) IV Push every 8 hours PRN Nausea and/or Vomiting  simethicone 80 milliGRAM(s) Chew every 6 hours PRN Gas      Vital Signs Last 24 Hrs  T(C): 37 (28 May 2020 05:33), Max: 37.2 (27 May 2020 23:24)  T(F): 98.6 (28 May 2020 05:33), Max: 99 (27 May 2020 23:24)  HR: 131 (27 May 2020 23:24) (124 - 131)  BP: 105/64 (28 May 2020 05:33) (91/57 - 110/73)  BP(mean): --  RR: 18 (28 May 2020 05:33) (18 - 18)  SpO2: 96% (28 May 2020 05:33) (93% - 98%)    PHYSICAL EXAM:          LABS:                        7.8    16.49 )-----------( 388      ( 28 May 2020 05:43 )             26.5     05-28    134<L>  |  100  |  <4<L>  ----------------------------<  97  3.4<L>   |  24  |  0.39<L>    Ca    11.6<H>      28 May 2020 05:43    TPro  6.3  /  Alb  2.3<L>  /  TBili  0.6  /  DBili  x   /  AST  30  /  ALT  8<L>  /  AlkPhos  260<H>      PT/INR - ( 26 May 2020 13:21 )   PT: 17.7 sec;   INR: 1.52 ratio         PTT - ( 26 May 2020 13:21 )  PTT:32.4 sec  Urinalysis Basic - ( 26 May 2020 15:36 )    Color: Yellow / Appearance: Slightly Turbid / S.021 / pH: x  Gluc: x / Ketone: Trace  / Bili: Negative / Urobili: Negative   Blood: x / Protein: 30 mg/dL / Nitrite: Negative   Leuk Esterase: Negative / RBC: 3 /hpf / WBC 4 /HPF   Sq Epi: x / Non Sq Epi: 3 /hpf / Bacteria: Few          RADIOLOGY & ADDITIONAL STUDIES:    PATHOLOGY: INTERVAL History: No acute events overnight. No fevers overnight or this morning.     Allergies    No Known Allergies    Intolerances        MEDICATIONS  (STANDING):  cholecalciferol 4000 Unit(s) Oral daily  diltiazem    Tablet 60 milliGRAM(s) Oral every 8 hours  enoxaparin Injectable 40 milliGRAM(s) SubCutaneous daily  meropenem  IVPB 1000 milliGRAM(s) IV Intermittent every 8 hours  morphine ER Tablet 60 milliGRAM(s) Oral two times a day  pantoprazole    Tablet 40 milliGRAM(s) Oral before breakfast  potassium acid phosphate/sodium acid phosphate tablet (K-PHOS No. 2) 1 Tablet(s) Oral four times a day with meals  sodium chloride 0.9%. 1000 milliLiter(s) (200 mL/Hr) IV Continuous <Continuous>  vancomycin  IVPB 1250 milliGRAM(s) IV Intermittent every 12 hours    MEDICATIONS  (PRN):  aluminum hydroxide/magnesium hydroxide/simethicone Suspension 30 milliLiter(s) Oral every 6 hours PRN Dyspepsia  haloperidol    Injectable 0.5 milliGRAM(s) IV Push every 6 hours PRN nausea  morphine  - Injectable 6 milliGRAM(s) IV Push every 3 hours PRN Severe Pain (7 - 10)  ondansetron Injectable 4 milliGRAM(s) IV Push every 8 hours PRN Nausea and/or Vomiting  simethicone 80 milliGRAM(s) Chew every 6 hours PRN Gas      Vital Signs Last 24 Hrs  T(C): 37 (28 May 2020 05:33), Max: 37.2 (27 May 2020 23:24)  T(F): 98.6 (28 May 2020 05:33), Max: 99 (27 May 2020 23:24)  HR: 131 (27 May 2020 23:24) (124 - 131)  BP: 105/64 (28 May 2020 05:33) (91/57 - 110/73)  BP(mean): --  RR: 18 (28 May 2020 05:33) (18 - 18)  SpO2: 96% (28 May 2020 05:33) (93% - 98%)    PHYSICAL EXAM:          LABS:                        7.8    16.49 )-----------( 388      ( 28 May 2020 05:43 )             26.5     05-    134<L>  |  100  |  <4<L>  ----------------------------<  97  3.4<L>   |  24  |  0.39<L>    Ca    11.6<H>      28 May 2020 05:43    TPro  6.3  /  Alb  2.3<L>  /  TBili  0.6  /  DBili  x   /  AST  30  /  ALT  8<L>  /  AlkPhos  260<H>      PT/INR - ( 26 May 2020 13:21 )   PT: 17.7 sec;   INR: 1.52 ratio         PTT - ( 26 May 2020 13:21 )  PTT:32.4 sec  Urinalysis Basic - ( 26 May 2020 15:36 )    Color: Yellow / Appearance: Slightly Turbid / S.021 / pH: x  Gluc: x / Ketone: Trace  / Bili: Negative / Urobili: Negative   Blood: x / Protein: 30 mg/dL / Nitrite: Negative   Leuk Esterase: Negative / RBC: 3 /hpf / WBC 4 /HPF   Sq Epi: x / Non Sq Epi: 3 /hpf / Bacteria: Few          RADIOLOGY & ADDITIONAL STUDIES:    PATHOLOGY: INTERVAL History: No acute events overnight. No fevers overnight or this morning.     Allergies    No Known Allergies    Intolerances        MEDICATIONS  (STANDING):  cholecalciferol 4000 Unit(s) Oral daily  diltiazem    Tablet 60 milliGRAM(s) Oral every 8 hours  enoxaparin Injectable 40 milliGRAM(s) SubCutaneous daily  meropenem  IVPB 1000 milliGRAM(s) IV Intermittent every 8 hours  morphine ER Tablet 60 milliGRAM(s) Oral two times a day  pantoprazole    Tablet 40 milliGRAM(s) Oral before breakfast  potassium acid phosphate/sodium acid phosphate tablet (K-PHOS No. 2) 1 Tablet(s) Oral four times a day with meals  sodium chloride 0.9%. 1000 milliLiter(s) (200 mL/Hr) IV Continuous <Continuous>  vancomycin  IVPB 1250 milliGRAM(s) IV Intermittent every 12 hours    MEDICATIONS  (PRN):  aluminum hydroxide/magnesium hydroxide/simethicone Suspension 30 milliLiter(s) Oral every 6 hours PRN Dyspepsia  haloperidol    Injectable 0.5 milliGRAM(s) IV Push every 6 hours PRN nausea  morphine  - Injectable 6 milliGRAM(s) IV Push every 3 hours PRN Severe Pain (7 - 10)  ondansetron Injectable 4 milliGRAM(s) IV Push every 8 hours PRN Nausea and/or Vomiting  simethicone 80 milliGRAM(s) Chew every 6 hours PRN Gas      Vital Signs Last 24 Hrs  T(C): 37 (28 May 2020 05:33), Max: 37.2 (27 May 2020 23:24)  T(F): 98.6 (28 May 2020 05:33), Max: 99 (27 May 2020 23:24)  HR: 131 (27 May 2020 23:24) (124 - 131)  BP: 105/64 (28 May 2020 05:33) (91/57 - 110/73)  BP(mean): --  RR: 18 (28 May 2020 05:33) (18 - 18)  SpO2: 96% (28 May 2020 05:33) (93% - 98%)    PHYSICAL EXAM:  General: lethargic but arousable and oriented   Mildly tachypneic, tachycardic   Abd: Distended, mild tenderness to palpation  RLE: strength 3/5   No lower extremity swelling or pitting edema      LABS:                        7.8    16.49 )-----------( 388      ( 28 May 2020 05:43 )             26.5         134<L>  |  100  |  <4<L>  ----------------------------<  97  3.4<L>   |  24  |  0.39<L>    Ca    11.6<H>      28 May 2020 05:43    TPro  6.3  /  Alb  2.3<L>  /  TBili  0.6  /  DBili  x   /  AST  30  /  ALT  8<L>  /  AlkPhos  260<H>      PT/INR - ( 26 May 2020 13:21 )   PT: 17.7 sec;   INR: 1.52 ratio         PTT - ( 26 May 2020 13:21 )  PTT:32.4 sec  Urinalysis Basic - ( 26 May 2020 15:36 )    Color: Yellow / Appearance: Slightly Turbid / S.021 / pH: x  Gluc: x / Ketone: Trace  / Bili: Negative / Urobili: Negative   Blood: x / Protein: 30 mg/dL / Nitrite: Negative   Leuk Esterase: Negative / RBC: 3 /hpf / WBC 4 /HPF   Sq Epi: x / Non Sq Epi: 3 /hpf / Bacteria: Few          RADIOLOGY & ADDITIONAL STUDIES:    PATHOLOGY:

## 2020-05-28 NOTE — PROGRESS NOTE ADULT - ATTENDING COMMENTS
Patient interviewed/examined on rounds.  Agree with history, PE, A/P as above.    Pain control improved.    For palliative RT to right hip/SI metastasis.    Planning for C2 chemo in hospital, possibly tomorrow.      Carl Jama MD

## 2020-05-28 NOTE — PROGRESS NOTE ADULT - PROBLEM SELECTOR PLAN 1
- c/w MS Contin 60mg and morphine 6mg IV Q3 for now  - if starts having worsening pain, will discuss with her re: methadone

## 2020-05-28 NOTE — CONSULT NOTE ADULT - ASSESSMENT
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 Noted to have hypercalcemia and endocrine team consulted for the same. 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 Noted to have hypercalcemia and endocrine team consulted for the same.\

## 2020-05-28 NOTE — PROGRESS NOTE ADULT - SUBJECTIVE AND OBJECTIVE BOX
[facility] Division of Kidney Diseases & Hypertension  FOLLOW UP NOTE  [phone]--------------------------------------------------------------------------------  Chief Complaint:Tachycardia      24 hour events/subjective: No acute events overnight. Patient resting comfortably in bed when seen. States she has total body soreness. No other major complaints. Labs, vitals, medications and imaging reviewed. Vital signs grossly stable however with intermittent tachycardia. Labs grossly stable as well with calcium noted to be stable at 11.6.        PAST HISTORY  --------------------------------------------------------------------------------  No significant changes to PMH, PSH, FHx, SHx, unless otherwise noted    ALLERGIES & MEDICATIONS  --------------------------------------------------------------------------------  Allergies    No Known Allergies    Intolerances      Standing Inpatient Medications  cholecalciferol 4000 Unit(s) Oral daily  diltiazem    Tablet 60 milliGRAM(s) Oral every 8 hours  enoxaparin Injectable 40 milliGRAM(s) SubCutaneous daily  meropenem  IVPB 1000 milliGRAM(s) IV Intermittent every 8 hours  morphine ER Tablet 60 milliGRAM(s) Oral two times a day  pantoprazole    Tablet 40 milliGRAM(s) Oral before breakfast  potassium acid phosphate/sodium acid phosphate tablet (K-PHOS No. 2) 1 Tablet(s) Oral four times a day with meals  sodium chloride 0.9%. 1000 milliLiter(s) IV Continuous <Continuous>  vancomycin  IVPB 1250 milliGRAM(s) IV Intermittent every 12 hours  zoledronic acid IVPB (ZOMETA) (Non - oncologic) 4 milliGRAM(s) IV Intermittent once    PRN Inpatient Medications  aluminum hydroxide/magnesium hydroxide/simethicone Suspension 30 milliLiter(s) Oral every 6 hours PRN  haloperidol    Injectable 0.5 milliGRAM(s) IV Push every 6 hours PRN  morphine  - Injectable 6 milliGRAM(s) IV Push every 3 hours PRN  ondansetron Injectable 4 milliGRAM(s) IV Push every 8 hours PRN  simethicone 80 milliGRAM(s) Chew every 6 hours PRN      REVIEW OF SYSTEMS  --------------------------------------------------------------------------------  Gen: No  fevers/chills  Head/Eyes/Ears/Mouth: No headache  Respiratory: No dyspnea  CV: No chest pain  GI: No abdominal pain  MSK: No joint pain/swelling, +soreness throughout body  Neuro: No dizziness/lightheadedness      All other systems were reviewed and are negative, except as noted.    VITALS/PHYSICAL EXAM  --------------------------------------------------------------------------------  T(C): 36.9 (05-28-20 @ 13:05), Max: 37.2 (05-27-20 @ 23:24)  HR: 130 (05-28-20 @ 13:05) (124 - 131)  BP: 120/81 (05-28-20 @ 13:05) (91/57 - 120/81)  RR: 22 (05-28-20 @ 13:05) (18 - 22)  SpO2: 95% (05-28-20 @ 13:05) (93% - 97%)  Wt(kg): --    Weight (kg): 73.5 (05-26-20 @ 21:00)      05-27-20 @ 07:01  -  05-28-20 @ 07:00  --------------------------------------------------------  IN: 2600 mL / OUT: 600 mL / NET: 2000 mL    05-28-20 @ 07:01  -  05-28-20 @ 13:57  --------------------------------------------------------  IN: 0 mL / OUT: 100 mL / NET: -100 mL      Physical Exam:  	Gen: NAD, well-appearing  	HEENT: PERRL, supple neck, clear oropharynx          Lymph: No palpable lymph nodes present.  	Pulm: CTA B/L  	CV: RRR, S1S2;  	Back: No spinal or CVA tenderness  	Abd: +BS, soft, nontender/nondistended  	: No suprapubic tenderness          Extremities: no bilateral LE edema noted.           Neuro: No focal deficits, intact gait  	Skin: Warm, without rashes  	Vascular: No cyanosis.          Access:    LABS/STUDIES  --------------------------------------------------------------------------------              7.8    16.49 >-----------<  388      [05-28-20 @ 05:43]              26.5     134  |  100  |  <4  ----------------------------<  97      [05-28-20 @ 05:43]  3.4   |  24  |  0.39        Ca     11.6     [05-28-20 @ 05:43]      iCa    1.66     [05-28 @ 05:45]    TPro  6.3  /  Alb  2.3  /  TBili  0.6  /  DBili  x   /  AST  30  /  ALT  8   /  AlkPhos  260  [05-28-20 @ 05:43]        Serum Osmolality 286      [05-26-20 @ 23:58]    Creatinine Trend:  SCr 0.39 [05-28 @ 05:43]  SCr 0.40 [05-28 @ 00:24]  SCr 0.46 [05-27 @ 04:53]  SCr 0.46 [05-26 @ 20:40]  SCr 0.45 [05-26 @ 13:21]    Urinalysis - [05-26-20 @ 15:36]      Color Yellow / Appearance Slightly Turbid / SG 1.021 / pH 6.5      Gluc Negative / Ketone Trace  / Bili Negative / Urobili Negative       Blood Negative / Protein 30 mg/dL / Leuk Est Negative / Nitrite Negative      RBC 3 / WBC 4 / Hyaline 6 / Gran  / Sq Epi  / Non Sq Epi 3 / Bacteria Few    Urine Creatinine 112      [05-26-20 @ 20:14]  Urine Sodium 45      [05-26-20 @ 20:14]  Urine Chloride <35      [05-26-20 @ 20:14]  Urine Osmolality 409      [05-26-20 @ 23:58]    PTH -- (Ca 10.9)      [05-27-20 @ 00:08]   53  Vitamin D (25OH) <5.0      [05-27-20 @ 00:08]  TSH 1.73      [05-28-20 @ 08:02]

## 2020-05-28 NOTE — PROGRESS NOTE ADULT - SUBJECTIVE AND OBJECTIVE BOX
[facility] Division of Hospital Medicine  Jaime Monroe MD  Pager (ELEAZAR-F, 8A-8H): [phone]  Other Times:  [phone]    Patient is a 24y old  Female who presents with a chief complaint of fever, shortness of breath (28 May 2020 13:57)    SUBJECTIVE / OVERNIGHT EVENTS: continued back pain. still nauseated, not tolerating po, vomited after banana yesterday. pt reports no flatus.   ADDITIONAL REVIEW OF SYSTEMS:    MEDICATIONS  (STANDING):  cholecalciferol 4000 Unit(s) Oral daily  diltiazem    Tablet 60 milliGRAM(s) Oral every 8 hours  enoxaparin Injectable 40 milliGRAM(s) SubCutaneous daily  meropenem  IVPB 1000 milliGRAM(s) IV Intermittent every 8 hours  morphine ER Tablet 60 milliGRAM(s) Oral two times a day  pantoprazole    Tablet 40 milliGRAM(s) Oral before breakfast  potassium acid phosphate/sodium acid phosphate tablet (K-PHOS No. 2) 1 Tablet(s) Oral four times a day with meals  sodium chloride 0.9%. 1000 milliLiter(s) (200 mL/Hr) IV Continuous <Continuous>  vancomycin  IVPB 1250 milliGRAM(s) IV Intermittent every 12 hours  zoledronic acid IVPB (ZOMETA) (Non - oncologic) 4 milliGRAM(s) IV Intermittent once    MEDICATIONS  (PRN):  aluminum hydroxide/magnesium hydroxide/simethicone Suspension 30 milliLiter(s) Oral every 6 hours PRN Dyspepsia  haloperidol    Injectable 0.5 milliGRAM(s) IV Push every 6 hours PRN nausea  morphine  - Injectable 6 milliGRAM(s) IV Push every 3 hours PRN Severe Pain (7 - 10)  ondansetron Injectable 4 milliGRAM(s) IV Push every 8 hours PRN Nausea and/or Vomiting  simethicone 80 milliGRAM(s) Chew every 6 hours PRN Gas      CAPILLARY BLOOD GLUCOSE        I&O's Summary    27 May 2020 07:01  -  28 May 2020 07:00  --------------------------------------------------------  IN: 2600 mL / OUT: 600 mL / NET: 2000 mL    28 May 2020 07:01  -  28 May 2020 16:00  --------------------------------------------------------  IN: 0 mL / OUT: 100 mL / NET: -100 mL        PHYSICAL EXAM:  Vital Signs Last 24 Hrs  T(C): 36.9 (28 May 2020 13:05), Max: 37.2 (27 May 2020 23:24)  T(F): 98.4 (28 May 2020 13:05), Max: 99 (27 May 2020 23:24)  HR: 130 (28 May 2020 13:05) (124 - 131)  BP: 120/81 (28 May 2020 13:05) (91/57 - 120/81)  BP(mean): --  RR: 22 (28 May 2020 13:05) (18 - 22)  SpO2: 95% (28 May 2020 13:05) (93% - 97%)  CONSTITUTIONAL: NAD, well-developed, well-groomed  EYES: conjunctiva and sclera clear  ENMT: MMM  NECK: Supple, no palpable masses; no thyromegaly  RESPIRATORY: tachypnea but no resp distress lungs are clear to auscultation bilaterally   CARDIOVASCULAR: tachycardic regular rhythm trace LE pitting edema  ABDOMEN: distended abdomen tender to palpation +BS no rebound or guarding  PSYCH: calm  NEUROLOGY: AOx3 nonfocal  SKIN: No rashes; no palpable lesions    LABS:                        7.8    16.49 )-----------( 388      ( 28 May 2020 05:43 )             26.5     05-28    134<L>  |  100  |  <4<L>  ----------------------------<  97  3.4<L>   |  24  |  0.39<L>    Ca    11.6<H>      28 May 2020 05:43    TPro  x   /  Alb  2.2<L>  /  TBili  x   /  DBili  x   /  AST  x   /  ALT  x   /  AlkPhos  x   05-28    Culture - Fungal, Body Fluid (collected 27 May 2020 20:59)  Source: .Body Fluid Peritoneal Fluid  Preliminary Report (28 May 2020 09:09):    Testing in progress    Culture - Body Fluid with Gram Stain (collected 27 May 2020 20:59)  Source: .Body Fluid Peritoneal Fluid  Gram Stain (27 May 2020 23:13):    Rare polymorphonuclear leukocytes per low power field    No organisms seen per oil power field    Culture - Urine (collected 26 May 2020 21:20)  Source: .Urine Clean Catch (Midstream)  Preliminary Report (27 May 2020 20:56):    50,000 - 99,000 CFU/mL Enterococcus species    Culture - Blood (collected 26 May 2020 18:14)  Source: .Blood PICC/PERC Double Lumen BROWN  Preliminary Report (27 May 2020 19:45):    No growth to date.    Culture - Blood (collected 26 May 2020 18:14)  Source: .Blood PICC/PERC Double Lumen BLUE  Preliminary Report (27 May 2020 19:45):    No growth to date.    Culture - Blood (collected 26 May 2020 18:14)  Source: .Blood Blood-Peripheral  Preliminary Report (27 May 2020 19:44):    No growth to date.    Culture - Blood (collected 26 May 2020 18:14)  Source: .Blood Blood-Peripheral  Preliminary Report (27 May 2020 19:44):    No growth to date.    Cell Count, Body Fluid (05.27.20 @ 16:52)    Body Fluid Appearance: Sl Bloody    BF Lymphocytes: 43 %    Atypical Lymphocytes - Body Fluid: 3 %    Nucleated Red Blood Cells - Body Fluid: 2 %    Other Body Cells: 6: To be reviewed by hematopathologist. %    Monocyte/Macrophage Count - Body Fluid: 13 %    Fluid Segmented Granulocytes: 35 %    Fluid Type: Peritoneal fl    Tube Type: Sterile    Color - Body Fluid: Red    Total Nucleated Cell Count, Body Fluid: 77: Peritoneal/Pleural/ Pericardial  Body Fluid Types            Total Nucleated cells: <500/uL            Neutrophils: <25%          Synovial Body Fluid Type           Total Nucleated cells: <150/uL           Neutrophils: <25%           Lymphocytes: <75%           Moncytes/Macrophages: </=70%  Please note reference range updated effective Nov 26, 2019 /uL    Total RBC Count: [id number] /uL        RADIOLOGY & ADDITIONAL TESTS:  Results Reviewed:   Imaging Personally Reviewed:  Electrocardiogram Personally Reviewed:    COORDINATION OF CARE: IR ID renal reviewed  Care Discussed with Consultants/Other Providers [Y/N]: rad onc Dr Patrick lawton palliative RT, onc, endo  Prior or Outpatient Records Reviewed [Y/N]:

## 2020-05-28 NOTE — CONSULT NOTE ADULT - PROBLEM SELECTOR RECOMMENDATION 9
- Suspect non-PTH mediated hypercalcemia. Although PTH is inappropriately normal in the setting of hypercalcemia, it is likely higher due to stimulation from extremely low Vit D levels <5.0 ng/ml.  - Started on Vitamin D3 4000 units qd. May need to increase Vit D supplementation but given hypercalcemia, would avoid ergocalciferol at this time. Once Vit D is replete ideally >20-30 would expect her PTH level to come down considerably offering the possibility of a non-PTH mediated process likely related to her malignancy (PTHrp or bone involvement). Per oncology note, imaging from 4/30 is showing bony mets in L3 vertebral body and right iliac spine.   - Rule out other non-PTH medicated causes : TSH wnl and Cortisol elevated so unlikely hyperthyroidism and AI respectively. Follow SPEP / UPEP , Vitamin 1,25 D level, Vitamin A level and PTHrP.   - Serum corrected calcium remains elevated 13.   - Continue with IV hydration  - Recommend to give a dose of Zoledronic acid 4 mg IVPB x 1.   - Monitor serum calcium q12h along with daily albumin.  - Once Vit D is replete if PTH remains inappropriately normal or high can then consider PTH mediated (Primary hyperparathyroidism or FHH).  - Will follow.   Plan discussed with team.

## 2020-05-28 NOTE — PROGRESS NOTE ADULT - ASSESSMENT
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     #Shortness of breath:  CTA negative for PE, showing left pleural effusion  CT A/P showing new ascites  s/p paracentesis today per IR on 5/27 with removal of 1300 ccs   on supplemental O2 - 2L NC    #Fever: Currently afebrile   Pt had recent pneumonia last admission and was discharged on Levaquin. Pt also had frequent episodes of fever during her admission, with negative cultures, and were deemed to be 2/2 tumor fever.   Blood cx NGTD  Abx per ID  Currently not neutropenic  Continue to trend WBC with daily CBC with differential  Pt had leukocytosis on discharge due to administration of Zarxio, now downtrending    #Metastatic carcinoma of unknown primary- being treated as ovarian primary:  Details per HPI   Pt was due 5/26 for cycle two of Carbo/Taxol   Will hold off on inpatient chemotherapy for now given pt's acute clinical illness  Ca-125 tumor marker is improving, 598 from 1065  Imaging from 4/30 is showing bony mets in L3 vertebral body and right iliac spine, which may explain pt's back pain. 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     #Shortness of breath:  CTA negative for PE, showing left pleural effusion  CT A/P showing new ascites  s/p paracentesis per IR on 5/27 with removal of 1300 ccs   on supplemental O2 - 2L NC    #Fever: Currently afebrile   Pt had recent pneumonia last admission and was discharged on Levaquin. Pt also had frequent episodes of fever during her admission, with negative cultures, and were deemed to be 2/2 tumor fever.   Blood cx NGTD  Abx per ID  Currently not neutropenic  Continue to trend WBC with daily CBC with differential  Pt had leukocytosis on discharge due to administration of Zarxio, now downtrending    #Metastatic carcinoma of unknown primary- being treated as ovarian primary:  Details per HPI   Pt was due 5/26 for cycle two of Carbo/Taxol   Will hold off on inpatient chemotherapy for now given pt's acute clinical illness  Ca-125 tumor marker is improving, 598 from 1065  Imaging from 4/30 is showing bony mets in L3 vertebral body and right iliac spine, which may explain pt's back pain. 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     #Shortness of breath:  CTA negative for PE, showing left pleural effusion  CT A/P showing new ascites  s/p paracentesis per IR on 5/27 with removal of 1300 ccs   on supplemental O2 - 2L NC    #Fever: Currently afebrile   Pt had recent pneumonia last admission and was discharged on Levaquin. Pt also had frequent episodes of fever during her admission, with negative cultures, and were deemed to be 2/2 tumor fever.   Blood cx NGTD  Abx per ID  Currently not neutropenic  Continue to trend WBC with daily CBC with differential  Pt had leukocytosis on discharge due to administration of Zarxio, now downtrending    #Metastatic carcinoma of unknown primary- being treated as ovarian primary:  Details per HPI   Pt was due 5/26 for cycle two of Carbo/Taxol   Will hold off on inpatient chemotherapy for now given pt's acute clinical illness  Ca-125 tumor marker is improving, 598 from 1065  Imaging from 4/30 is showing bony mets in L3 vertebral body and right iliac spine, which may explain pt's back pain. Appreciate rad/onc consult: If treatment is given as inpt, transportation from [facility] to [facility] and back will need to be arranged, one visit for simulation, the second for treatment.  Hypercalcemia likely 2/2 malignancy- appreciate endocrine recommendations (consider zoledronic acid)  Appreciate palliative care evaluation for pain control   pt has not had a bowel movement in 3 days- please start senna and Miralax   Plan for cycle 2 Carbo/Taxol tomorrow 5/29. Orders to be given to chemo RN in AM.      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     #Shortness of breath:  CTA negative for PE, showing left pleural effusion  CT A/P showing new ascites  s/p paracentesis per IR on 5/27 with removal of 1300 ccs   on supplemental O2 - 2L NC    #Fever: Currently afebrile   Pt had recent pneumonia last admission and was discharged on Levaquin. Pt also had frequent episodes of fever during her admission, with negative cultures, and were deemed to be 2/2 tumor fever.   Blood cx NGTD  Abx per ID  Currently not neutropenic  Continue to trend WBC with daily CBC with differential  Pt had leukocytosis on discharge due to administration of Zarxio, now downtrending    #Metastatic carcinoma of unknown primary- being treated as ovarian primary:  Details per HPI   Pt was due 5/26 for cycle two of Carbo/Taxol   Will hold off on inpatient chemotherapy for now given pt's acute clinical illness  Ca-125 tumor marker is improving, 598 from 1065  Imaging from 4/30 is showing bony mets in L3 vertebral body and right iliac spine, which may explain pt's back pain. Appreciate rad/onc consult: If treatment is given as inpt, transportation from [facility] to [facility] and back will need to be arranged, one visit for simulation, the second for treatment.  Hypercalcemia likely 2/2 malignancy- appreciate endocrine recommendations (consider zoledronic acid)  Appreciate palliative care evaluation for pain control   pt has not had a bowel movement in 3 days- please start senna and Miralax  Please start antiemetics as needed    Plan for cycle 2 Carbo/Taxol tomorrow 5/29. Orders to be given to chemo RN in AM.      Cesia Banks MD  Hematology Oncology Fellow, PGY-4  [facility] Pager: [phone]/ [facility] Pager: [phone]

## 2020-05-28 NOTE — CONSULT NOTE ADULT - SUBJECTIVE AND OBJECTIVE BOX
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 She reports fever started 2 days ago. She also c/o some nausea/ vomiting and dyspepsia. She denies chest pain,cough, shortness of breath, dysuria. She also reports  abdominal pain slightly worse than her baseline. Of note pt was recently admitted to [facility] 3/20 - 5/17 with a complicated hospital course. She had a PICC line placed on 4/3 for chemotherapy. Ms Masters also describes about a month of right thigh/posterior lower pelvic pain, 7/10. Denies change in GI/ function, leg weakness.    EXAM:  CT ABDOMEN AND PELVIS IC                        EXAM:  CT ANGIO CHEST (W)AW IC                          PROCEDURE DATE:  05/26/2020    COMPARISON: CT chest 4/30/2020. CT chest 4/16/2020. CT chest 4/1/2020.  FINDINGS:     Pulmonary Artery: The pulmonary artery is enlarged can occur in the clinical setting of pulmonary arterial hypertension. There is no main, central, lobar, or segmental pulmonary artery embolus. The subsegmental vessels are not well evaluated.    Tubes/Lines: None.    Lungs And Airways: There are pulmonary nodules. The largest nodule measures:  *  A 10 x 6 mm (series 2 image 42) in the right middle lobe.  *  A left upper lobe nodule measures 5 mm (series 2 image 44).  *  A 10 x 10 mmnodule is in the right middle lobe (series 2 image 48).    Bilateral lower lobe and lingular linear atelectasis/scarring.    Pleura: There is a small left pleural effusion and pleural nodularity. The nodularity is partially obscured by the left pleural effusion (series 2 image 43), however, is unchanged line for differences in technique. No pneumothorax.    Mediastinum: The upper paratracheal, lower paratracheal, right hilar, and subcarinal chest lymph nodes are unchanged. The calcified chest lymph nodes in the subcarinal station are unchanged. The visualized portion of the thyroid gland is unremarkable.   The esophagus is unremarkable.  Heart and Vasculature: The heart is normal in size. No pericardial effusion. The aorta is normal in caliber.  Bones And Soft Tissues: Degenerative change of the spine.  Abdomen and pelvis:  Liver: There are bilobar hypodense hepatic lesions, new since 4/30/2020.  No intrahepatic or extrahepatic biliary ductal dilatation.  Spleen: Unremarkable.  Gallbladder: Unremarkable.  Pancreas: Unremarkable.  Adrenal glands: Unremarkable.  Kidneys: The kidneys are unremarkable. No hydronephrosis or hydroureter.  Bowel: The abdominal ascites is new. There is nodularity of the omentum and mesentery. The mass centered in the pelvis measures 11.2 x 12.5 cm and is unchanged allowing for differences in technique (series 3 image 86). The mesenteric lymph nodes are unchanged.  The stomach is unremarkable. Small bowel is mildly dilated and the jejunum measures 3.3 cm. The distal jejunum and ileum are decompressed. The appendix is normal. No free air.  Vascular: The aorta is normal in caliber. The portal vein is patent. The hepatic veins are patent.  Bladder: The bladder is displaced by the pelvic mass.  Uterus and adnexa: The uterus is unremarkable. The pelvic mass likely arises from the adnexa.  Lymph nodes: The upper abdominal, aortocaval, and periaortic lymph nodes are unchanged. In addition, the right common iliac and external iliac lymph nodes are unchanged.    Skeletal: There are lytic lesions of the thoracic and lumbar spine. In addition, there are lytic lesions of the manubrium.    IMPRESSION:   Chest:  1.  No pulmonary embolus.  2.  The left pleural effusion partially obscures the left pleural nodularity.  3.  No change in the bilateral pulmonary nodules since 4/30/2020.    Abdomen and pelvis:  1.  Mesenteric nodularity and ascites are concerning for malignancy.  2.  New hypodense hepatic lesions are concerning for metastasis.  3.  The pelvic mass is unchanged line for differences in technique.  4.  Lymph nodes are likely malignant.  5.  Mildly distended loops of jejunum in the left upper quadrant    ED COURSE  VS : 112/72  144 18 O2 97% on room air T 100.3F  Labs : wbc 21 h/h 9/30 plt 409   AST 53  Covid 19 negative  Treatment : cefepime 2g ivpb x 1, vancomycin 1 g ivpb x 1, Morphine 4mg iv x1, Zofran 4mg ivp x 1 (26 May 2020 17:22)    Allergies  Intolerances      ROS: [ x ] Fever  [  ] Chills  [  ]Chest Pain [  ] SOB  [ x ]Cough [x  ] N/V  [  ] Diarrhea [  ]Constipation [  ]Other ROS: right thigh/lower posterior pelvic pain,7/10, some numbness in the upper thigh  [  ] ROS otherwise negative    PAST MEDICAL & SURGICAL HISTORY:  Metastatic cancer  No pertinent past medical history  No significant past surgical history    FAMILY HISTORY:    MEDICATIONS  (STANDING):  cholecalciferol 4000 Unit(s) Oral daily  diltiazem    Tablet 60 milliGRAM(s) Oral every 8 hours  enoxaparin Injectable 40 milliGRAM(s) SubCutaneous daily  meropenem  IVPB 1000 milliGRAM(s) IV Intermittent every 8 hours  morphine ER Tablet 60 milliGRAM(s) Oral two times a day  pantoprazole    Tablet 40 milliGRAM(s) Oral before breakfast  potassium acid phosphate/sodium acid phosphate tablet (K-PHOS No. 2) 1 Tablet(s) Oral four times a day with meals  sodium chloride 0.9%. 1000 milliLiter(s) (200 mL/Hr) IV Continuous <Continuous>  vancomycin  IVPB 1250 milliGRAM(s) IV Intermittent every 12 hours  zoledronic acid IVPB (ZOMETA) (Non - oncologic) 4 milliGRAM(s) IV Intermittent once    MEDICATIONS  (PRN):  aluminum hydroxide/magnesium hydroxide/simethicone Suspension 30 milliLiter(s) Oral every 6 hours PRN Dyspepsia  haloperidol    Injectable 0.5 milliGRAM(s) IV Push every 6 hours PRN nausea  morphine  - Injectable 6 milliGRAM(s) IV Push every 3 hours PRN Severe Pain (7 - 10)  ondansetron Injectable 4 milliGRAM(s) IV Push every 8 hours PRN Nausea and/or Vomiting  simethicone 80 milliGRAM(s) Chew every 6 hours PRN Gas      PHYSICAL EXAM  Vital Signs Last 24 Hrs- KPS 50   T(C): 36.9 (28 May 2020 13:05), Max: 37.2 (27 May 2020 23:24)  T(F): 98.4 (28 May 2020 13:05), Max: 99 (27 May 2020 23:24)  HR: 130 (28 May 2020 13:05) (124 - 131)  BP: 120/81 (28 May 2020 13:05) (91/57 - 120/81)  BP(mean): --  RR: 22 (28 May 2020 13:05) (18 - 22)  SpO2: 95% (28 May 2020 13:05) (93% - 97%)    General: Interactive, moderately uncomfortable, unable to sit up without pain.   HEENT: NC/AT; EOMI, PERRL, sclera nonicteric; external ears normal; no rhinorrhea or epistaxis; mucous membranes moist; oropharynx clear and without erythema  CV: NR, RR; no appreciable r/m/g  Lungs: Scattered rales and ronchi, decreased BS bilaterally  Abdomen: markedly distended abdomen, tender, obvious malignant ascites  MSK: moderate diffuse spine tenderness to palpation  Neuro: AAOx3; cranial nerves II-XII intact; strength 5/5 in upper and lower extremities; sensation to light touch intact bilaterally.  Psych: Full affect; mood congruent  Skin: no visible rashes on limited examination    IMAGING/LABS/PATHOLOGY: I have personally reviewed the relevant labs, pathology, and imaging as noted in the HPI.  In addition,                          7.8    16.49 )-----------( 388      ( 28 May 2020 05:43 )             26.5     05-28    134<L>  |  100  |  <4<L>  ----------------------------<  97  3.4<L>   |  24  |  0.39<L>    Ca    11.6<H>      28 May 2020 05:43    TPro  6.3  /  Alb  2.3<L>  /  TBili  0.6  /  DBili  x   /  AST  30  /  ALT  8<L>  /  AlkPhos  260<H>  05-28          ASSESSMENT/PLAN    RAMILA MASTERS is a 24y woman with diffuse metastatic cancer to bones, lungs, with malignant pleural effusion and ascites, with a one month history of right upper thigh/lower pelvic pain. Review of CT scan shows a lytic lesion in the right SI joint area, involving the right iliac tuberosity. She may benefit from palliative radiation to this area. If treatment is given as inpt, transportation from [facility] to [facility] and back will need to be arranged, one visit for simulation, the second for treatment.    I  discussed with Ms Masters the use of palliative radiation in this setting, namely to improve quality of life through the reduction of symptoms.  We talked about the risks, benefits, acute and long term side effects, as well as expected treatment outcomes.  She was given the opportunity to ask questions, which were answered to her apparent satisfaction.  Ms Masters provided written consent to proceed with radiation therapy. We will arrange for inpatient treatment.    Miguel Nguyen MD cell   Radiation Medicine Dep't. 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 She reports fever started 2 days ago. She also c/o some nausea/ vomiting and dyspepsia. She denies chest pain,cough, shortness of breath, dysuria. She also reports  abdominal pain slightly worse than her baseline. Of note pt was recently admitted to [facility] 3/20 - 5/17 with a complicated hospital course. She had a PICC line placed on 4/3 for chemotherapy. Ms Masters also describes about a month of right thigh/posterior lower pelvic pain, 7/10. Denies change in GI/ function, leg weakness.    EXAM:  CT ABDOMEN AND PELVIS IC                        EXAM:  CT ANGIO CHEST (W)AW IC                          PROCEDURE DATE:  05/26/2020    COMPARISON: CT chest 4/30/2020. CT chest 4/16/2020. CT chest 4/1/2020.  FINDINGS:     Pulmonary Artery: The pulmonary artery is enlarged can occur in the clinical setting of pulmonary arterial hypertension. There is no main, central, lobar, or segmental pulmonary artery embolus. The subsegmental vessels are not well evaluated.    Tubes/Lines: None.    Lungs And Airways: There are pulmonary nodules. The largest nodule measures:  *  A 10 x 6 mm (series 2 image 42) in the right middle lobe.  *  A left upper lobe nodule measures 5 mm (series 2 image 44).  *  A 10 x 10 mmnodule is in the right middle lobe (series 2 image 48).    Bilateral lower lobe and lingular linear atelectasis/scarring.    Pleura: There is a small left pleural effusion and pleural nodularity. The nodularity is partially obscured by the left pleural effusion (series 2 image 43), however, is unchanged line for differences in technique. No pneumothorax.    Mediastinum: The upper paratracheal, lower paratracheal, right hilar, and subcarinal chest lymph nodes are unchanged. The calcified chest lymph nodes in the subcarinal station are unchanged. The visualized portion of the thyroid gland is unremarkable.   The esophagus is unremarkable.  Heart and Vasculature: The heart is normal in size. No pericardial effusion. The aorta is normal in caliber.  Bones And Soft Tissues: Degenerative change of the spine.  Abdomen and pelvis:  Liver: There are bilobar hypodense hepatic lesions, new since 4/30/2020.  No intrahepatic or extrahepatic biliary ductal dilatation.  Spleen: Unremarkable.  Gallbladder: Unremarkable.  Pancreas: Unremarkable.  Adrenal glands: Unremarkable.  Kidneys: The kidneys are unremarkable. No hydronephrosis or hydroureter.  Bowel: The abdominal ascites is new. There is nodularity of the omentum and mesentery. The mass centered in the pelvis measures 11.2 x 12.5 cm and is unchanged allowing for differences in technique (series 3 image 86). The mesenteric lymph nodes are unchanged.  The stomach is unremarkable. Small bowel is mildly dilated and the jejunum measures 3.3 cm. The distal jejunum and ileum are decompressed. The appendix is normal. No free air.  Vascular: The aorta is normal in caliber. The portal vein is patent. The hepatic veins are patent.  Bladder: The bladder is displaced by the pelvic mass.  Uterus and adnexa: The uterus is unremarkable. The pelvic mass likely arises from the adnexa.  Lymph nodes: The upper abdominal, aortocaval, and periaortic lymph nodes are unchanged. In addition, the right common iliac and external iliac lymph nodes are unchanged.    Skeletal: There are lytic lesions of the thoracic and lumbar spine. In addition, there are lytic lesions of the manubrium.    IMPRESSION:   Chest:  1.  No pulmonary embolus.  2.  The left pleural effusion partially obscures the left pleural nodularity.  3.  No change in the bilateral pulmonary nodules since 4/30/2020.    Abdomen and pelvis:  1.  Mesenteric nodularity and ascites are concerning for malignancy.  2.  New hypodense hepatic lesions are concerning for metastasis.  3.  The pelvic mass is unchanged line for differences in technique.  4.  Lymph nodes are likely malignant.  5.  Mildly distended loops of jejunum in the left upper quadrant    ED COURSE  VS : 112/72  144 18 O2 97% on room air T 100.3F  Labs : wbc 21 h/h 9/30 plt 409   AST 53  Covid 19 negative  Treatment : cefepime 2g ivpb x 1, vancomycin 1 g ivpb x 1, Morphine 4mg iv x1, Zofran 4mg ivp x 1 (26 May 2020 17:22)    Allergies  Intolerances      ROS: [ x ] Fever  [  ] Chills  [  ]Chest Pain [  ] SOB  [ x ]Cough [x  ] N/V  [  ] Diarrhea [  ]Constipation [  ]Other ROS: right thigh/lower posterior pelvic pain,7/10, some numbness in the upper thigh  [  ] ROS otherwise negative    PAST MEDICAL & SURGICAL HISTORY:  Metastatic cancer  No pertinent past medical history  No significant past surgical history    FAMILY HISTORY:    MEDICATIONS  (STANDING):  cholecalciferol 4000 Unit(s) Oral daily  diltiazem    Tablet 60 milliGRAM(s) Oral every 8 hours  enoxaparin Injectable 40 milliGRAM(s) SubCutaneous daily  meropenem  IVPB 1000 milliGRAM(s) IV Intermittent every 8 hours  morphine ER Tablet 60 milliGRAM(s) Oral two times a day  pantoprazole    Tablet 40 milliGRAM(s) Oral before breakfast  potassium acid phosphate/sodium acid phosphate tablet (K-PHOS No. 2) 1 Tablet(s) Oral four times a day with meals  sodium chloride 0.9%. 1000 milliLiter(s) (200 mL/Hr) IV Continuous <Continuous>  vancomycin  IVPB 1250 milliGRAM(s) IV Intermittent every 12 hours  zoledronic acid IVPB (ZOMETA) (Non - oncologic) 4 milliGRAM(s) IV Intermittent once    MEDICATIONS  (PRN):  aluminum hydroxide/magnesium hydroxide/simethicone Suspension 30 milliLiter(s) Oral every 6 hours PRN Dyspepsia  haloperidol    Injectable 0.5 milliGRAM(s) IV Push every 6 hours PRN nausea  morphine  - Injectable 6 milliGRAM(s) IV Push every 3 hours PRN Severe Pain (7 - 10)  ondansetron Injectable 4 milliGRAM(s) IV Push every 8 hours PRN Nausea and/or Vomiting  simethicone 80 milliGRAM(s) Chew every 6 hours PRN Gas      PHYSICAL EXAM  Vital Signs Last 24 Hrs- KPS 50   T(C): 36.9 (28 May 2020 13:05), Max: 37.2 (27 May 2020 23:24)  T(F): 98.4 (28 May 2020 13:05), Max: 99 (27 May 2020 23:24)  HR: 130 (28 May 2020 13:05) (124 - 131)  BP: 120/81 (28 May 2020 13:05) (91/57 - 120/81)  BP(mean): --  RR: 22 (28 May 2020 13:05) (18 - 22)  SpO2: 95% (28 May 2020 13:05) (93% - 97%)    General: Interactive, moderately uncomfortable, unable to sit up without pain.   HEENT: NC/AT; EOMI, PERRL, sclera nonicteric; external ears normal; no rhinorrhea or epistaxis; mucous membranes moist; oropharynx clear and without erythema  CV: NR, RR; no appreciable r/m/g  Lungs: Scattered rales and ronchi, decreased BS bilaterally  Abdomen: markedly distended abdomen, tender, obvious malignant ascites  MSK: moderate diffuse spine tenderness to palpation  Neuro: AAOx3; cranial nerves II-XII intact; strength 5/5 in upper and lower extremities; sensation to light touch intact bilaterally.  Psych: Full affect; mood congruent  Skin: no visible rashes on limited examination    IMAGING/LABS/PATHOLOGY: I have personally reviewed the relevant labs, pathology, and imaging as noted in the HPI.  In addition,                          7.8    16.49 )-----------( 388      ( 28 May 2020 05:43 )             26.5     05-28    134<L>  |  100  |  <4<L>  ----------------------------<  97  3.4<L>   |  24  |  0.39<L>    Ca    11.6<H>      28 May 2020 05:43    TPro  6.3  /  Alb  2.3<L>  /  TBili  0.6  /  DBili  x   /  AST  30  /  ALT  8<L>  /  AlkPhos  260<H>  05-28          ASSESSMENT/PLAN    RAMILA MASTERS is a 24y woman with diffuse metastatic cancer to bones, lungs, with malignant pleural effusion and ascites, with a one month history of right upper thigh/lower pelvic pain. Review of CT scan shows a lytic lesion in the right SI joint area, involving the right iliac tuberosity. Findings were confirmed by phone with Dr Kev Landaverde.  Ms Masters may benefit from palliative radiation to this area. If treatment is given as inpt, transportation from [facility] to [facility] and back will need to be arranged, one visit for simulation, the second for treatment.    I discussed with Ms Masters the use of palliative radiation in this setting, namely to improve quality of life through the reduction of symptoms.  We talked about the risks, benefits, acute and long term side effects, as well as expected treatment outcomes.  She was given the opportunity to ask questions, which were answered to her apparent satisfaction.  Ms Masters provided written consent to proceed with radiation therapy. We will arrange for inpatient treatment.    Miguel Nguyen MD cell   Radiation Medicine Dep't. [phone]

## 2020-05-28 NOTE — PHYSICAL THERAPY INITIAL EVALUATION ADULT - ADDITIONAL COMMENTS
Imaging from 4/30 is showing bony mets in L3 vertebral body and right iliac spine, which may explain pt's back pain. Please consult radiation oncology in AM for possible (inpatient) radiation. "BONES: Lytic lesions posterior right iliac bone and L3 vertebra."    care coordination note: prior to admission patient resides with mother, sister and brother on the 5th floor of an elevator accessible apartment building with 5 outdoor steps. Pt was independent with ambulation with a walker and ADLs prior to admission.Pt declines caregiver. Social hx: as per care coordination note: prior to admission patient resides with mother, sister and brother on the 5th floor of an elevator accessible apartment building with 5 outdoor steps. Pt was independent with ambulation with a walker and ADLs prior to admission.Pt declines caregiver.

## 2020-05-28 NOTE — PROGRESS NOTE ADULT - ASSESSMENT
24 f with b/l adnexal masses and pelvic LAD, metastatic carcinoma of unknown primary , s/p CT guided biopsy 3/25, PICC line 4/3 to start chemo  pt intermittently febrile and leukocytosis previously,   was treated for potential UTI  Dced on levaquin  Now presenting with fever and leucocytosis  No obvious localization clinically  Chronic abd pain-unchanged  UA with only 4 wbc  CT chest abd pelvis as above no obvious new focus  ? PICC related BSI  ? UTI  ? tumor fever  ? Other occult foci  peritoneal fluid count/Cx not s/o any infection    Rec:  A) fever  follow Cx  follow clinically  Agree with empiric BS coverage as above  Follow enterococci sensitivity       B) Tumor,abd pain  workup for infectious etiology as above  will defer to primary team on other plan    Will tailor plan for ID issues  per course,results.Will defer to primary team on management of other issues.  Assessment, plan and recommendations as detailed above were discussed with the medical/primary  team.  Will Follow.  Beeper [phone] [facility] [id number].   Wknd/afterhours/No response-[phone] or Fellow on call

## 2020-05-28 NOTE — PROGRESS NOTE ADULT - PROBLEM SELECTOR PLAN 4
s/p para yesterday 1.3L removed bloody fluid c/w malignancy  f/u infectious workup from fluid studies

## 2020-05-28 NOTE — PROGRESS NOTE ADULT - SUBJECTIVE AND OBJECTIVE BOX
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 for fever, tachycardia, and tachypnea, along with N/V/dyspepsia. COVID-19 negative. Treatment started with cefepime and vancomycin. Hypercalcemia noted, along with ascites. Cultures drawn. Palliative care called to help with pain management.    INTERVAL EVENTS:  5/28: states pain somewhat better, but not flatus or BM x 2-3 days    ADVANCE DIRECTIVES:    DNR  MOLST  [ ]  Living Will  [ ]   DECISION MAKER(s):  [ ] Health Care Proxy(s)  [ ] Surrogate(s)  [ ] Guardian           Name(s): Phone Number(s):    BASELINE (I)ADL(s) (prior to admission):  [place]: [ ]Total  [ ] Moderate [ ]Dependent    Allergies    No Known Allergies    Intolerances    MEDICATIONS  (STANDING):  cholecalciferol 4000 Unit(s) Oral daily  diltiazem    Tablet 60 milliGRAM(s) Oral every 8 hours  enoxaparin Injectable 40 milliGRAM(s) SubCutaneous daily  meropenem  IVPB 1000 milliGRAM(s) IV Intermittent every 8 hours  morphine ER Tablet 60 milliGRAM(s) Oral two times a day  pantoprazole    Tablet 40 milliGRAM(s) Oral before breakfast  potassium acid phosphate/sodium acid phosphate tablet (K-PHOS No. 2) 1 Tablet(s) Oral four times a day with meals  sodium chloride 0.9%. 1000 milliLiter(s) (200 mL/Hr) IV Continuous <Continuous>  vancomycin  IVPB 1250 milliGRAM(s) IV Intermittent every 12 hours  zoledronic acid IVPB (ZOMETA) (Non - oncologic) 4 milliGRAM(s) IV Intermittent once    MEDICATIONS  (PRN):  aluminum hydroxide/magnesium hydroxide/simethicone Suspension 30 milliLiter(s) Oral every 6 hours PRN Dyspepsia  haloperidol    Injectable 0.5 milliGRAM(s) IV Push every 6 hours PRN nausea  morphine  - Injectable 6 milliGRAM(s) IV Push every 3 hours PRN Severe Pain (7 - 10)  ondansetron Injectable 4 milliGRAM(s) IV Push every 8 hours PRN Nausea and/or Vomiting  simethicone 80 milliGRAM(s) Chew every 6 hours PRN Gas      PRESENT SYMPTOMS: [ ]Unable to obtain due to poor mentation   Source if other than patient:  [ ]Family   [ ]Team     Pain: [x ]yes [ ]no  QOL impact -   Location -      back, abdomen              Aggravating factors - movement  Quality - aching  Radiation -  Timing- constant  Severity (0-10 scale): 6/10  Minimal acceptable level (0-10 scale): 3/10    CPOT:    https://[email or web address]/getattachment/[email or web address]/Critical-Care-Pain-Observation-Tool-(CPOT)      PAIN AD Score:     http://geriatrictoolkit.[email or web address]/cog/painad.pdf (press ctrl +  left click to view)    Dyspnea:                           [ ]Mild [ ]Moderate [ ]Severe  Anxiety:                             [ ]Mild [ ]Moderate [ ]Severe  Fatigue:                             [ ]Mild [ ]Moderate [ ]Severe  Nausea:                             [ ]Mild [ x]Moderate [ ]Severe  Loss of appetite:              [ ]Mild [ ]Moderate [ ]Severe  Constipation:                    [ ]Mild [ ]Moderate [ ]Severe    Other Symptoms:  [ x]All other review of systems negative     Palliative Performance Status Version 2:         %    http://npcrc.org/files/news/palliative_performance_scale_ppsv2.pdf    PHYSICAL EXAM:  Vital Signs Last 24 Hrs  T(C): 36.9 (28 May 2020 13:05), Max: 37.2 (27 May 2020 23:24)  T(F): 98.4 (28 May 2020 13:05), Max: 99 (27 May 2020 23:24)  HR: 130 (28 May 2020 13:05) (124 - 131)  BP: 120/81 (28 May 2020 13:05) (91/57 - 120/81)  BP(mean): --  RR: 22 (28 May 2020 13:05) (18 - 22)  SpO2: 95% (28 May 2020 13:05) (93% - 97%)    Limited exam for patient comfort  GENERAL:  [x ]Alert  [ ]Oriented x   [ ]Lethargic  [ ]Cachexia  [ ]Unarousable  [x ]Verbal  [ ]Non-Verbal  Behavioral:   [ ] Anxiety  [ ] Delirium [ ] Agitation [ ] Other  HEENT:  [x ]Normal   [ ]Dry mouth   [ ]ET Tube/Trach  [ ]Oral lesions  PULMONARY:   [ ]Clear [ ]Tachypnea  [ ]Audible excessive secretions   [ ]Rhonchi        [ ]Right [ ]Left [ ]Bilateral  [ ]Crackles        [ ]Right [ ]Left [ ]Bilateral  [ ]Wheezing     [ ]Right [ ]Left [ ]Bilateral  [ ]Diminished breath sounds [ ]right [ ]left [ ]bilateral  CARDIOVASCULAR:    [ ]Regular [ ]Irregular [ ]Tachy  [ ]Quirino [ ]Murmur [ ]Other  GASTROINTESTINAL:  [ ]Soft  [ ]Distended   [ ]+BS  [ ]Non tender [ ]Tender  [ ]PEG [ ]OGT/ NGT  Last BM:     GENITOURINARY:  [ ]Normal [ ] Incontinent   [ ]Oliguria/Anuria   [ ]Cifuentes  MUSCULOSKELETAL:   [ ]Normal   [ ]Weakness  [ ]Bed/Wheelchair bound [ ]Edema  NEUROLOGIC:   [x ]No focal deficits  [ ]Cognitive impairment  [ ]Dysphagia [ ]Dysarthria [ ]Paresis [ ]Other   SKIN:   [ ]Normal    [ ]Rash  [ ]Pressure ulcer(s)       Present on admission [ ]y [ ]n    CRITICAL CARE:  [ ] Shock Present  [ ]Septic [ ]Cardiogenic [ ]Neurologic [ ]Hypovolemic  [ ]  Vasopressors [ ]  Inotropes   [ ]Respiratory failure present [ ]Mechanical ventilation [ ]Non-invasive ventilatory support [ ]High flow  [ ]Acute  [ ]Chronic [ ]Hypoxic  [ ]Hypercarbic [ ]Other  [ ]Other organ failure     LABS: reviewed                                   7.8    16.49 )-----------( 388      ( 28 May 2020 05:43 )             26.5     05-28    134<L>  |  100  |  <4<L>  ----------------------------<  97  3.4<L>   |  24  |  0.39<L>    Ca    11.6<H>      28 May 2020 05:43    RADIOLOGY & ADDITIONAL STUDIES: CT A/P reviewed on sunrise    PROTEIN CALORIE MALNUTRITION PRESENT: [ ]mild [ ]moderate [ ]severe [ ]underweight [ ]morbid obesity  https://www.andeal.org/vault/2440/web/files/ONC/Table_Clinical%20Characteristics%20to%20Document%20Malnutrition-White%20JV%20et%20al%202012.pdf    Height (cm): 162.56 (05-26-20 @ 12:53), 164 (05-26-20 @ 12:04), 163 (05-22-20 @ 13:44)  Weight (kg): 73.5 (05-26-20 @ 21:00), 72.8 (05-26-20 @ 12:04), 73.936 (05-22-20 @ 13:41)  BMI (kg/m2): 27.8 (05-26-20 @ 21:00), 27.5 (05-26-20 @ 12:53), 27.1 (05-26-20 @ 12:04)    [ ]PPSV2 < or = to 30% [ ]significant weight loss  [ ]poor nutritional intake  [ ]anasarca     Albumin, Serum: 2.6 g/dL (05-26-20 @ 13:21)   [ ]Artificial Nutrition      REFERRALS:   [ ]Chaplaincy  [ ]Hospice  [ ]Child Life  [ ]Social Work  [ ]Case management [ ]Holistic Therapy     Goals of Care Document:     ______________  Alex Mack MD   of Geriatric and Palliative Medicine  [facility]     Please page the following number for clinical matters between the hours of 9AM and 5PM   from Monday through Friday : [phone]    After 5PM and on weekends, please page: [phone]. The Geriatric and Palliative Medicine consult service has 24/7 coverage for medical recommendations, including for symptom management needs.

## 2020-05-28 NOTE — CONSULT NOTE ADULT - SUBJECTIVE AND OBJECTIVE BOX
HPI:  24F with recent diagnosis of metastatic carcinoma of unknown primary (being treated as likely ovarian primary) s/p Carbo/Taxol cycle 1 on 5/5 who was sent in from  [facility] on account of fever,  tachycardia  and tachypnea.(26 May 2020 17:22)      Endocrine history:        PAST MEDICAL & SURGICAL HISTORY:  Metastatic cancer  No significant past surgical history      FAMILY HISTORY:      Social History:      Outpatient Medications:      MEDICATIONS  (STANDING):  cholecalciferol 4000 Unit(s) Oral daily  diltiazem    Tablet 60 milliGRAM(s) Oral every 8 hours  enoxaparin Injectable 40 milliGRAM(s) SubCutaneous daily  meropenem  IVPB 1000 milliGRAM(s) IV Intermittent every 8 hours  morphine ER Tablet 60 milliGRAM(s) Oral two times a day  pantoprazole    Tablet 40 milliGRAM(s) Oral before breakfast  potassium acid phosphate/sodium acid phosphate tablet (K-PHOS No. 2) 1 Tablet(s) Oral four times a day with meals  sodium chloride 0.9%. 1000 milliLiter(s) (200 mL/Hr) IV Continuous <Continuous>  vancomycin  IVPB 1250 milliGRAM(s) IV Intermittent every 12 hours  zoledronic acid IVPB (ZOMETA) (Non - oncologic) 4 milliGRAM(s) IV Intermittent once    MEDICATIONS  (PRN):  aluminum hydroxide/magnesium hydroxide/simethicone Suspension 30 milliLiter(s) Oral every 6 hours PRN Dyspepsia  haloperidol    Injectable 0.5 milliGRAM(s) IV Push every 6 hours PRN nausea  morphine  - Injectable 6 milliGRAM(s) IV Push every 3 hours PRN Severe Pain (7 - 10)  ondansetron Injectable 4 milliGRAM(s) IV Push every 8 hours PRN Nausea and/or Vomiting  simethicone 80 milliGRAM(s) Chew every 6 hours PRN Gas      Allergies  No Known Allergies        Review of Systems:  Constitutional: No fever, good appetite/po intake  Eyes: No blurry vision, diplopia  Neuro: No tremors  HEENT: No pain  Cardiovascular: No chest pain, palpitations  Respiratory: No SOB, no cough  GI: No nausea, vomiting,   : No dysuria, hematuria  Skin: no rash  Psych: no depression  Endocrine: no polyuria, polydipsia  Hem/lymph: no swelling  Osteoporosis: no fractures  ALL OTHER SYSTEMS REVIEWED AND NEGATIVE      PHYSICAL EXAM:  VITALS: T(C): 36.7 (05-28-20 @ 08:50)  T(F): 98.1 (05-28-20 @ 08:50), Max: 99 (05-27-20 @ 23:24)  HR: 129 (05-28-20 @ 08:50) (124 - 131)  BP: 116/78 (05-28-20 @ 08:50) (91/57 - 116/78)  RR:  (18 - 20)  SpO2:  (93% - 97%)  Wt(kg): --  GENERAL: NAD, well-groomed, well-developed  EYES: No proptosis, no lid lag, anicteric  HEENT:  Atraumatic, Normocephalic, moist mucous membranes  THYROID: Normal size, no palpable nodules  RESPIRATORY: Clear to auscultation bilaterally; No rales, rhonchi, wheezing, or rubs  CARDIOVASCULAR: Regular rate and rhythm; No murmurs; no peripheral edema  GI: Soft, nontender, non distended, normal bowel sounds  SKIN: Dry, intact, No rashes or lesions  NEURO: sensation intact, extraocular movements intact, no tremor, normal reflexes  PSYCH: reactive affect, euthymic mood  CUSHING'S SIGNS: no striae                              7.8    16.49 )-----------( 388      ( 28 May 2020 05:43 )             26.5       05-28    134<L>  |  100  |  <4<L>  ----------------------------<  97  3.4<L>   |  24  |  0.39<L>    EGFR if : 168  EGFR if non : 145    Ca    11.6<H>      05-28    TPro  6.3  /  Alb  2.3<L>  /  TBili  0.6  /  DBili  x   /  AST  30  /  ALT  8<L>  /  AlkPhos  260<H>  05-28      Thyroid Function Tests:  05-28 @ 08:02 TSH 1.73    Cortisol AM, Serum: 28.4 ug/dL (05.28.20 @ 08:02)    Intact PTH: 53 pg/mL (05.27.20 @ 00:08)    Vitamin D, 25-Hydroxy: <5.0 ng/mL (05.27.20 @ 00:08) HPI:  24F with recent diagnosis of metastatic carcinoma of unknown primary (being treated as likely ovarian primary) s/p Carbo/Taxol cycle 1 on 5/5 who was sent in from  [facility] on account of fever,  tachycardia  and tachypnea.(26 May 2020 17:22)      Endocrine history:  Patient is a 24 year old female with no prior history of hypercalcemia but with recent diagnosis of metastatic carcinoma of unknown primary (being treated as likely ovarian primary). Patient received last chemo earlier this month. No history of renal stones. Says broke bone when she was 2 years old. No recent fractures or trauma. Does not take calcium or Vitamin D or Vitamin A supplements. No thiazide use reported. Currently reports back pain and stated that she was told of some growth in her spine due to cancer. Per oncology note, imaging from 4/30 is showing bony mets in L3 vertebral body and right iliac spine. Patient does endorse polyuria but says its likely because she is receiving IV fluids. Endorse abdominal discomfort, nausea and vomiting. No family history of calcium disorders but stated mother had some thyroid disorder and underwent thyroid surgery.      PAST MEDICAL & SURGICAL HISTORY:  Metastatic cancer  No significant past surgical history      FAMILY HISTORY:  Family history of thyroid disorder requiring thyroid surgery in mother    Social History:  Denies tobacco, alcohol or illicit drug use.     Outpatient Medications:  · 	potassium phosphate-sodium phosphate 305 mg-700 mg oral tablet: 1 tab(s) orally 4 times a day (with meals and at bedtime)  · 	Cardizem 60 mg oral tablet: 1 tab(s) orally every 8 hours   · 	oxyCODONE 5 mg oral tablet: 1 tab(s) orally every 4 to 6 hours, As Needed  · 	Protonix 40 mg oral delayed release tablet: 1 tab(s) orally once a day   · 	Zofran 4 mg oral tablet: 1 tab(s) orally every 8 hours   · 	Levaquin 500 mg oral tablet: 1 tab(s) orally once a day   · 	simethicone 80 mg oral tablet, chewable: 1 tab(s) orally every 6 hours, As needed, Gas  · 	aluminum hydroxide-magnesium hydroxide 200 mg-200 mg/5 mL oral suspension: 30 milliliter(s) orally every 6 hours, As needed, Dyspepsia.   · 	morphine 12 to 24 hour extended release: Morphine Sulfate ER 30 1 tab in am and 1.5 tabs in pm      MEDICATIONS  (STANDING):  cholecalciferol 4000 Unit(s) Oral daily  diltiazem    Tablet 60 milliGRAM(s) Oral every 8 hours  enoxaparin Injectable 40 milliGRAM(s) SubCutaneous daily  meropenem  IVPB 1000 milliGRAM(s) IV Intermittent every 8 hours  morphine ER Tablet 60 milliGRAM(s) Oral two times a day  pantoprazole    Tablet 40 milliGRAM(s) Oral before breakfast  potassium acid phosphate/sodium acid phosphate tablet (K-PHOS No. 2) 1 Tablet(s) Oral four times a day with meals  sodium chloride 0.9%. 1000 milliLiter(s) (200 mL/Hr) IV Continuous <Continuous>  vancomycin  IVPB 1250 milliGRAM(s) IV Intermittent every 12 hours  zoledronic acid IVPB (ZOMETA) (Non - oncologic) 4 milliGRAM(s) IV Intermittent once    MEDICATIONS  (PRN):  aluminum hydroxide/magnesium hydroxide/simethicone Suspension 30 milliLiter(s) Oral every 6 hours PRN Dyspepsia  haloperidol    Injectable 0.5 milliGRAM(s) IV Push every 6 hours PRN nausea  morphine  - Injectable 6 milliGRAM(s) IV Push every 3 hours PRN Severe Pain (7 - 10)  ondansetron Injectable 4 milliGRAM(s) IV Push every 8 hours PRN Nausea and/or Vomiting  simethicone 80 milliGRAM(s) Chew every 6 hours PRN Gas      Allergies  No Known Allergies      Review of Systems:  Constitutional: + fever, + poor appetite/po intake,, + fatigue  Eyes: No blurry vision, diplopia  Neuro: No tremors  HEENT: No pain  Cardiovascular: No chest pain, no palpitations   Respiratory: + SOB (requiring supplemental oxygen), no cough  GI: + nausea, + vomiting, + abdominal pain  : No dysuria, hematuria  Skin: no rash  Musculoskeletal: + lower back pain, + generalized body aches  Endocrine: + polyuria no polydipsia  Hem/lymph: + right leg swelling  Osteoporosis: broken bone at age of 2 years   ALL OTHER SYSTEMS REVIEWED AND NEGATIVE      PHYSICAL EXAM:  VITALS: T(C): 36.7 (05-28-20 @ 08:50)  T(F): 98.1 (05-28-20 @ 08:50), Max: 99 (05-27-20 @ 23:24)  HR: 129 (05-28-20 @ 08:50) (124 - 131)  BP: 116/78 (05-28-20 @ 08:50) (91/57 - 116/78)  RR:  (18 - 20)  SpO2:  (93% - 97%)  Wt(kg): --  GENERAL: overweight, looks tired, well-groomed, well-developed  EYES: No proptosis, anicteric  HEENT:  Atraumatic, Normocephalic, moist mucous membranes  Neck: supple   RESPIRATORY: Clear to auscultation bilaterally; No rales, rhonchi, wheezing, or rubs  CARDIOVASCULAR: Regular rate and rhythm; No murmurs;  + right leg pitting edema   GI: Soft, nontender, non distended, normal bowel sounds  SKIN: Dry, intact, No rashes or lesions  NEURO: AAO x 3, no gross or focal deficit   PSYCH: reactive affect, euthymic mood                            7.8    16.49 )-----------( 388      ( 28 May 2020 05:43 )             26.5       05-28    134<L>  |  100  |  <4<L>  ----------------------------<  97  3.4<L>   |  24  |  0.39<L>    EGFR if : 168  EGFR if non : 145    Ca    11.6<H>      05-28    TPro  6.3  /  Alb  2.3<L>  /  TBili  0.6  /  DBili  x   /  AST  30  /  ALT  8<L>  /  AlkPhos  260<H>  05-28      Thyroid Function Tests:  05-28 @ 08:02 TSH 1.73    Cortisol AM, Serum: 28.4 ug/dL (05.28.20 @ 08:02)    Intact PTH: 53 pg/mL (05.27.20 @ 00:08)    Vitamin D, 25-Hydroxy: <5.0 ng/mL (05.27.20 @ 00:08)

## 2020-05-28 NOTE — CHART NOTE - NSCHARTNOTEFT_GEN_A_CORE
MARYANN VASQUEZ    Notified by RN patient with critical lab result: lionized Terry: 1.7, and pt c/o severe N/V with greenish emesis. 's and EKG: ST  with no T or ST changes.        Interventions taken     NS 150cc/Hr iv for 12hr  Ativan for severe N/V  Hold iv BB due to sinus tach  F/U Am labs.  cont assess and monitor  f/u with am team.                    Richi Abdi ANP-BC  Spectralink #[id number] MARYANN VASQUEZ    Notified by RN patient with critical lab result: ionized Terry: 1.7, and pt c/o severe N/V with greenish emesis. 's and EKG: ST  with no T or ST changes. No c/o cp, palpitation, or sob.         Interventions taken     NS 150cc/Hr iv for 12hr  Ativan for severe N/V  Hold iv BB due to sinus tach, also pt is on Cardizem.   F/U Am labs.  cont assess and monitor  f/u with am team.                    Richi Abdi ANP-BC  Spectralink #[id number]

## 2020-05-28 NOTE — PROGRESS NOTE ADULT - ASSESSMENT
24 year old female with metastatic adenocarcinoma (likely primary is ovarian) who presents to the hospital from [email or web address] due to tachycardia, fevers, and concerns for sepsis of unknown primary source currently. Nephrology consulted for management of hypercalcemia.     Problem/Recommendation - 1:  Problem: Hypercalcemia. Recommendation: Patient with hypercalcemia likely primary hyperparathyroidism given inappropriately normal PTH in the setting of hypercalcemia as well as lytic bone lesions from metastatic cancer. Would obtain 24hr urine sample measuring for calcium and creatinine. Decrease fluids to 100 cc/hr as patient appears to have increase in work of breathing however states she is not short of breath. Will likely need to start bisphosphonate therapy. Continue to trend calcium q8-12hrs. As patient is not symptomatic would recommend deferring other acute medical therapies for hypercalcemia at this time however will defer to primary team/oncology/endocrinology. Avoid nephrotoxic agents, renally dose all medications and continue to support BP.

## 2020-05-28 NOTE — PROGRESS NOTE ADULT - PROBLEM SELECTOR PLAN 5
palliative consulted - pain regimen adjusted  rad onc for palliative RT @ [facility], transport back and forth to be arranged

## 2020-05-29 NOTE — CONSULT NOTE ADULT - SUBJECTIVE AND OBJECTIVE BOX
In-House Cardiology Consult Note  ---------------------------------------------    HPI:  24F with recent diagnosis of metastatic carcinoma of unknown primary (being treated as likely ovarian primary) s/p Carbo/Taxol cycle 1 on 5/5 who was sent in from  [facility] on account of fever,  tachycardia  and tachypnea. She reports fever started 2 days ago. She also c/o some nausea/ vomiting and dyspepsia. She denies chest pain,cough, shortness of breath, dysuria. She also reports  abdominal pain slightly worse than her baseline. Of note pt was recently admitted to [facility] 3/20 - 5/17 with a complicated hospital course. She had a PICC line placed on 4/3 for chemotherapy.    ED COURSE  VS : 112/72  144 18 O2 97% on room air T 100.3F  Labs : wbc 21 h/h 9/30 plt 409   AST 53  Covid 19 negative  Treatment : cefepime 2g ivpb x 1, vancomycin 1 g ivpb x 1, Morphine 4mg iv x1, Zofran 4mg ivp x 1 (26 May 2020 17:22)    PMHx:   Metastatic cancer    PSHx:   No significant past surgical history    Allergies:  No Known Allergies    Current Medications:   aluminum hydroxide/magnesium hydroxide/simethicone Suspension 30 milliLiter(s) Oral every 6 hours PRN  cholecalciferol 4000 Unit(s) Oral daily  diltiazem    Tablet 60 milliGRAM(s) Oral every 8 hours  enoxaparin Injectable 40 milliGRAM(s) SubCutaneous daily  fluconAZOLE IVPB 200 milliGRAM(s) IV Intermittent every 24 hours  haloperidol    Injectable 0.5 milliGRAM(s) IV Push every 6 hours PRN  linezolid  IVPB 600 milliGRAM(s) IV Intermittent every 12 hours  LORazepam   Injectable 1 milliGRAM(s) IV Push once PRN  metoprolol tartrate Injectable 2.5 milliGRAM(s) IV Push every 6 hours PRN  morphine  - Injectable 6 milliGRAM(s) IV Push every 3 hours PRN  morphine ER Tablet 60 milliGRAM(s) Oral two times a day  ondansetron Injectable 4 milliGRAM(s) IV Push every 6 hours PRN  pantoprazole    Tablet 40 milliGRAM(s) Oral before breakfast  potassium acid phosphate/sodium acid phosphate tablet (K-PHOS No. 2) 1 Tablet(s) Oral four times a day with meals  senna 2 Tablet(s) Oral at bedtime  simethicone 80 milliGRAM(s) Chew every 6 hours PRN  sodium chloride 0.9%. 1000 milliLiter(s) IV Continuous <Continuous>    REVIEW OF SYSTEMS:  CONSTITUTIONAL: No weakness, fevers or chills  EYES/ENT: No visual changes;  No dysphagia  NECK: No pain or stiffness  RESPIRATORY: No cough, wheezing, hemoptysis; No shortness of breath  CARDIOVASCULAR: No chest pain or palpitations; No lower extremity edema  GASTROINTESTINAL: No abdominal or epigastric pain. No nausea, vomiting, or hematemesis; No diarrhea or constipation. No melena or hematochezia.  BACK: No back pain  GENITOURINARY: No dysuria, frequency or hematuria  NEUROLOGICAL: No numbness or weakness  SKIN: No itching, burning, rashes, or lesions   All other review of systems is negative unless indicated above.    Physical Exam:  T(F): 98.7 (05-29), Max: 100.6 (05-29)  HR: 134 (05-29) (134 - 163)  BP: 104/67 (05-29) (104/67 - 121/75)  RR: 20 (05-29)  SpO2: 95% (05-29)  GENERAL: No acute distress, well-developed  HEAD:  Atraumatic, Normocephalic  ENT: EOMI, PERRLA, conjunctiva and sclera clear, Neck supple, No JVD, moist mucosa  CHEST/LUNG: Clear to auscultation bilaterally; No wheeze, equal breath sounds bilaterally   HEART: Regular rate and rhythm; No murmurs, rubs, or gallops  ABDOMEN: Soft, Nontender, Nondistended; Bowel sounds present  EXTREMITIES:  No clubbing, cyanosis, or edema  PSYCH: Nl behavior, nl affect  NEUROLOGY: AAOx3, non-focal, cranial nerves intact  SKIN: Normal color, No rashes or lesions    CXR: Personally reviewed    Labs: Personally reviewed                        8.3    18.88 )-----------( 379      ( 29 May 2020 08:48 )             27.3     05-29    132<L>  |  98  |  4<L>  ----------------------------<  103<H>  3.4<L>   |  25  |  0.40<L>    Ca    11.8<H>      29 May 2020 08:48    TPro  x   /  Alb  2.2<L>  /  TBili  x   /  DBili  x   /  AST  x   /  ALT  x   /  AlkPhos  x   05-28                    Thyroid Stimulating Hormone, Serum: 1.73 uIU/mL (05-28 @ 08:02) In-House Cardiology Consult Note  ---------------------------------------------    HPI:  24F with recent diagnosis of metastatic carcinoma of unknown primary (being treated as likely ovarian primary) s/p Carbo/Taxol cycle 1 on 5/5 who was sent in from  [facility] on account of fever, tachycardia  and tachypnea. She reports fever started 2 days prior to presentation. She also c/o some nausea/ vomiting and dyspepsia. She denies chest pain,cough, shortness of breath, dysuria. She also reports  abdominal pain slightly worse than her baseline. Of note pt was recently admitted to [facility] 3/20 - 5/17 with a complicated hospital course. She had a PICC line placed on 4/3 for chemotherapy.          PMHx:   Metastatic cancer    PSHx:   No significant past surgical history    Allergies:  No Known Allergies    Current Medications:   aluminum hydroxide/magnesium hydroxide/simethicone Suspension 30 milliLiter(s) Oral every 6 hours PRN  cholecalciferol 4000 Unit(s) Oral daily  diltiazem    Tablet 60 milliGRAM(s) Oral every 8 hours  enoxaparin Injectable 40 milliGRAM(s) SubCutaneous daily  fluconAZOLE IVPB 200 milliGRAM(s) IV Intermittent every 24 hours  haloperidol    Injectable 0.5 milliGRAM(s) IV Push every 6 hours PRN  linezolid  IVPB 600 milliGRAM(s) IV Intermittent every 12 hours  LORazepam   Injectable 1 milliGRAM(s) IV Push once PRN  metoprolol tartrate Injectable 2.5 milliGRAM(s) IV Push every 6 hours PRN  morphine  - Injectable 6 milliGRAM(s) IV Push every 3 hours PRN  morphine ER Tablet 60 milliGRAM(s) Oral two times a day  ondansetron Injectable 4 milliGRAM(s) IV Push every 6 hours PRN  pantoprazole    Tablet 40 milliGRAM(s) Oral before breakfast  potassium acid phosphate/sodium acid phosphate tablet (K-PHOS No. 2) 1 Tablet(s) Oral four times a day with meals  senna 2 Tablet(s) Oral at bedtime  simethicone 80 milliGRAM(s) Chew every 6 hours PRN  sodium chloride 0.9%. 1000 milliLiter(s) IV Continuous <Continuous>    REVIEW OF SYSTEMS:  CONSTITUTIONAL: No weakness, fevers or chills  EYES/ENT: No visual changes;  No dysphagia  NECK: No pain or stiffness  RESPIRATORY: No cough, wheezing, hemoptysis; No shortness of breath  CARDIOVASCULAR: No chest pain or palpitations; No lower extremity edema  GASTROINTESTINAL: No abdominal or epigastric pain. No nausea, vomiting, or hematemesis; No diarrhea or constipation. No melena or hematochezia.  BACK: No back pain  GENITOURINARY: No dysuria, frequency or hematuria  NEUROLOGICAL: No numbness or weakness  SKIN: No itching, burning, rashes, or lesions   All other review of systems is negative unless indicated above.    Physical Exam:  T(F): 98.7 (05-29), Max: 100.6 (05-29)  HR: 134 (05-29) (134 - 163)  BP: 104/67 (05-29) (104/67 - 121/75)  RR: 20 (05-29)  SpO2: 95% (05-29)  GENERAL: No acute distress, well-developed  HEAD:  Atraumatic, Normocephalic  ENT: EOMI, PERRLA, conjunctiva and sclera clear, Neck supple, No JVD, moist mucosa  CHEST/LUNG: Clear to auscultation bilaterally; No wheeze, equal breath sounds bilaterally   HEART: Regular rate and rhythm; No murmurs, rubs, or gallops  ABDOMEN: Soft, Nontender, Nondistended; Bowel sounds present  EXTREMITIES:  No clubbing, cyanosis, or edema  PSYCH: Nl behavior, nl affect  NEUROLOGY: AAOx3, non-focal, cranial nerves intact  SKIN: Normal color, No rashes or lesions    CXR: Personally reviewed    Labs: Personally reviewed                        8.3    18.88 )-----------( 379      ( 29 May 2020 08:48 )             27.3     05-29    132<L>  |  98  |  4<L>  ----------------------------<  103<H>  3.4<L>   |  25  |  0.40<L>    Ca    11.8<H>      29 May 2020 08:48    TPro  x   /  Alb  2.2<L>  /  TBili  x   /  DBili  x   /  AST  x   /  ALT  x   /  AlkPhos  x   05-28                    Thyroid Stimulating Hormone, Serum: 1.73 uIU/mL (05-28 @ 08:02) In-House Cardiology Consult Note  ---------------------------------------------    HPI:  23yo woman recent diagnosis of metastatic carcinoma of unknown primary (being treated as likely ovarian primary) s/p Carbo/Taxol cycle 1 on 5/5 who was sent in from  [facility] on account of fever, tachycardia  and tachypnea. She reports fever started 2 days prior to presentation. She also c/o some nausea/ vomiting and dyspepsia. Of note patient was recently admitted to [facility] 3/20 - 5/17 with a complicated hospital course, including fever in context of LLL pneumonia and parainfluenza. She received Levaquin and was discharged on it.  She had a PICC line placed on 4/3 for chemotherapy and received several cycles.     Admitted again for aforementioned symptoms. Found to be febrile, possibly in setting of tumor fever vs underlying infection. Has had ongoing emesis and retching, concerning for gastric outlet obstruction. Just had CT AP as work up for it. On broad spectrum ABx, linezolid and fluconazole. Cardiology consulted for sinus tachycardia with HR up to 160s.     Patient was seen and examined at bedside; had abdominal pain and was lethargic. Admitted to having large amounts of emesis several times the day before.    PMHx:   Metastatic cancer    PSHx:   No significant past surgical history    Allergies:  No Known Allergies    Current Medications:   aluminum hydroxide/magnesium hydroxide/simethicone Suspension 30 milliLiter(s) Oral every 6 hours PRN  cholecalciferol 4000 Unit(s) Oral daily  diltiazem    Tablet 60 milliGRAM(s) Oral every 8 hours  enoxaparin Injectable 40 milliGRAM(s) SubCutaneous daily  fluconAZOLE IVPB 200 milliGRAM(s) IV Intermittent every 24 hours  haloperidol    Injectable 0.5 milliGRAM(s) IV Push every 6 hours PRN  linezolid  IVPB 600 milliGRAM(s) IV Intermittent every 12 hours  LORazepam   Injectable 1 milliGRAM(s) IV Push once PRN  metoprolol tartrate Injectable 2.5 milliGRAM(s) IV Push every 6 hours PRN  morphine  - Injectable 6 milliGRAM(s) IV Push every 3 hours PRN  morphine ER Tablet 60 milliGRAM(s) Oral two times a day  ondansetron Injectable 4 milliGRAM(s) IV Push every 6 hours PRN  pantoprazole    Tablet 40 milliGRAM(s) Oral before breakfast  potassium acid phosphate/sodium acid phosphate tablet (K-PHOS No. 2) 1 Tablet(s) Oral four times a day with meals  senna 2 Tablet(s) Oral at bedtime  simethicone 80 milliGRAM(s) Chew every 6 hours PRN  sodium chloride 0.9%. 1000 milliLiter(s) IV Continuous <Continuous>    REVIEW OF SYSTEMS:  CONSTITUTIONAL: No weakness, fevers or chills  EYES/ENT: No visual changes;  No dysphagia  NECK: No pain or stiffness  RESPIRATORY: No cough, wheezing, hemoptysis; No shortness of breath  CARDIOVASCULAR: No chest pain or palpitations; No lower extremity edema  GASTROINTESTINAL: Nausea and vomiting  GENITOURINARY: No dysuria, frequency or hematuria  NEUROLOGICAL: No numbness or weakness  SKIN: No itching, burning, rashes, or lesions   All other review of systems is negative unless indicated above.    Physical Exam:  T(F): 98.7 (05-29), Max: 100.6 (05-29)  HR: 134 (05-29) (134 - 163)  BP: 104/67 (05-29) (104/67 - 121/75)  RR: 20 (05-29)  SpO2: 95% (05-29)  GENERAL: No acute distress, well-developed; lethargic  HEAD:  Atraumatic, Normocephalic  ENT: EOMI, PERRLA, conjunctiva and sclera clear, Neck supple, No JVD, moist mucosa  CHEST/LUNG: Clear to auscultation bilaterally; No wheeze, equal breath sounds bilaterally   HEART: Regular rate and rhythm; No murmurs, rubs, or gallops  ABDOMEN: Large and distended; nontender to palpation  EXTREMITIES:  No clubbing, cyanosis, or edema  PSYCH: Nl behavior, nl affect  NEUROLOGY: AAOx3, non-focal, cranial nerves intact  SKIN: Normal color, No rashes or lesions    CXR: Personally reviewed    Labs: Personally reviewed                        8.3    18.88 )-----------( 379      ( 29 May 2020 08:48 )             27.3     05-29    132<L>  |  98  |  4<L>  ----------------------------<  103<H>  3.4<L>   |  25  |  0.40<L>    Ca    11.8<H>      29 May 2020 08:48    TPro  x   /  Alb  2.2<L>  /  TBili  x   /  DBili  x   /  AST  x   /  ALT  x   /  AlkPhos  x   05-28    Thyroid Stimulating Hormone, Serum: 1.73 uIU/mL (05-28 @ 08:02)

## 2020-05-29 NOTE — PROGRESS NOTE ADULT - PROBLEM SELECTOR PLAN 3
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 at this time) s/p Carbo/Taxol cycle 1 on 5/5   - was scheduled for cycle 2 which is now being held in light of ongoing infectious process - may wish to dose if no infectious source identified  - follows with Dr Kristina Miles at [facility]  - f/u onc - elevated CEA/Ca-125  - rad onc treatment at [facility]  postoponed to Monday as pt is not stable for transfer today

## 2020-05-29 NOTE — PROGRESS NOTE ADULT - PROBLEM SELECTOR PLAN 1
- unclear source at this time - pulmonary vs PICC vs UTI (VRE and candida)  - CTA chest and CT abd negative for occult infection  - PICC line is possible source - d/w ID can leav ein place based on culture results right now  -abx changed to fluconazole and zyvox  - follow up bld cx, urine cx

## 2020-05-29 NOTE — PROGRESS NOTE ADULT - SUBJECTIVE AND OBJECTIVE BOX
INTERVAL History: Patient had severe bilious emesis overnight. No fevers overnight or this morning. Tachycardic to 160's, EKG without significant changes, getting transferred to [facility].     Allergies    No Known Allergies    Intolerances        MEDICATIONS  (STANDING):  cholecalciferol 4000 Unit(s) Oral daily  diltiazem    Tablet 60 milliGRAM(s) Oral every 8 hours  enoxaparin Injectable 40 milliGRAM(s) SubCutaneous daily  meropenem  IVPB 1000 milliGRAM(s) IV Intermittent every 8 hours  morphine ER Tablet 60 milliGRAM(s) Oral two times a day  pantoprazole    Tablet 40 milliGRAM(s) Oral before breakfast  potassium acid phosphate/sodium acid phosphate tablet (K-PHOS No. 2) 1 Tablet(s) Oral four times a day with meals  senna 2 Tablet(s) Oral at bedtime  sodium chloride 0.9%. 1000 milliLiter(s) (100 mL/Hr) IV Continuous <Continuous>  sodium chloride 0.9%. 1000 milliLiter(s) (150 mL/Hr) IV Continuous <Continuous>  vancomycin  IVPB 1250 milliGRAM(s) IV Intermittent every 12 hours    MEDICATIONS  (PRN):  aluminum hydroxide/magnesium hydroxide/simethicone Suspension 30 milliLiter(s) Oral every 6 hours PRN Dyspepsia  haloperidol    Injectable 0.5 milliGRAM(s) IV Push every 6 hours PRN nausea  morphine  - Injectable 6 milliGRAM(s) IV Push every 3 hours PRN Severe Pain (7 - 10)  ondansetron Injectable 4 milliGRAM(s) IV Push every 8 hours PRN Nausea and/or Vomiting  simethicone 80 milliGRAM(s) Chew every 6 hours PRN Gas      Vital Signs Last 24 Hrs  T(C): 37.2 (29 May 2020 07:37), Max: 37.6 (28 May 2020 20:17)  T(F): 99 (29 May 2020 07:37), Max: 99.7 (28 May 2020 20:17)  HR: 158 (29 May 2020 07:37) (129 - 163)  BP: 116/80 (29 May 2020 07:37) (107/68 - 125/81)  BP(mean): --  RR: 21 (29 May 2020 07:37) (20 - 22)  SpO2: 95% (29 May 2020 07:37) (93% - 97%)    PHYSICAL EXAM:          LABS:                        7.8    16.49 )-----------( 388      ( 28 May 2020 05:43 )             26.5     05-28    132<L>  |  98  |  <4<L>  ----------------------------<  92  3.5   |  23  |  0.34<L>    Ca    12.1<H>      28 May 2020 18:41    TPro  x   /  Alb  2.2<L>  /  TBili  x   /  DBili  x   /  AST  x   /  ALT  x   /  AlkPhos  x   05-28            RADIOLOGY & ADDITIONAL STUDIES:    PATHOLOGY: INTERVAL History: Patient had severe bilious emesis overnight. No fevers overnight or this morning. Tachycardic to 160's, EKG without significant changes, getting transferred to [facility].     Allergies    No Known Allergies    Intolerances        MEDICATIONS  (STANDING):  cholecalciferol 4000 Unit(s) Oral daily  diltiazem    Tablet 60 milliGRAM(s) Oral every 8 hours  enoxaparin Injectable 40 milliGRAM(s) SubCutaneous daily  meropenem  IVPB 1000 milliGRAM(s) IV Intermittent every 8 hours  morphine ER Tablet 60 milliGRAM(s) Oral two times a day  pantoprazole    Tablet 40 milliGRAM(s) Oral before breakfast  potassium acid phosphate/sodium acid phosphate tablet (K-PHOS No. 2) 1 Tablet(s) Oral four times a day with meals  senna 2 Tablet(s) Oral at bedtime  sodium chloride 0.9%. 1000 milliLiter(s) (100 mL/Hr) IV Continuous <Continuous>  sodium chloride 0.9%. 1000 milliLiter(s) (150 mL/Hr) IV Continuous <Continuous>  vancomycin  IVPB 1250 milliGRAM(s) IV Intermittent every 12 hours    MEDICATIONS  (PRN):  aluminum hydroxide/magnesium hydroxide/simethicone Suspension 30 milliLiter(s) Oral every 6 hours PRN Dyspepsia  haloperidol    Injectable 0.5 milliGRAM(s) IV Push every 6 hours PRN nausea  morphine  - Injectable 6 milliGRAM(s) IV Push every 3 hours PRN Severe Pain (7 - 10)  ondansetron Injectable 4 milliGRAM(s) IV Push every 8 hours PRN Nausea and/or Vomiting  simethicone 80 milliGRAM(s) Chew every 6 hours PRN Gas      Vital Signs Last 24 Hrs  T(C): 37.2 (29 May 2020 07:37), Max: 37.6 (28 May 2020 20:17)  T(F): 99 (29 May 2020 07:37), Max: 99.7 (28 May 2020 20:17)  HR: 158 (29 May 2020 07:37) (129 - 163)  BP: 116/80 (29 May 2020 07:37) (107/68 - 125/81)  BP(mean): --  RR: 21 (29 May 2020 07:37) (20 - 22)  SpO2: 95% (29 May 2020 07:37) (93% - 97%)    PHYSICAL EXAM:  Gen:  appears tired.  Mildly tachypneic.   HEENT:  MM-dry, No icterus  Neck:  Supple  Lungs:  Distant BS  CVS:  S1 S2 tachy  Abd:  Distended  Ext:  + LE edema      LABS:                        7.8    16.49 )-----------( 388      ( 28 May 2020 05:43 )             26.5     05-28    132<L>  |  98  |  <4<L>  ----------------------------<  92  3.5   |  23  |  0.34<L>    Ca    12.1<H>      28 May 2020 18:41    TPro  x   /  Alb  2.2<L>  /  TBili  x   /  DBili  x   /  AST  x   /  ALT  x   /  AlkPhos  x   05-28            RADIOLOGY & ADDITIONAL STUDIES:    PATHOLOGY:

## 2020-05-29 NOTE — CONSULT NOTE ADULT - REASON FOR ADMISSION
fever, shortness of breath

## 2020-05-29 NOTE — PROGRESS NOTE ADULT - PROBLEM SELECTOR PLAN 4
- ionized Ca elevated  - PTH inappropriately normal in this setting but Vit D is low  - Start vit d 4000 unit daily  - endo & nephrology consulted - f/u recs - IVF for now though should lower rate, zometa given hold off on calcitonin for now, con't to trend

## 2020-05-29 NOTE — CHART NOTE - NSCHARTNOTEFT_GEN_A_CORE
discussed with floor staff by telephone- patient with emesis, tachycardia up to 160 (going on telemetry) , hypercalcemia- will postpone simulation for radiation at [facility], which was scheduled for this morning, will re-evaluate Monday  To discuss further, please call my cell at ,  Miguel Nguyen MD    [facility]. office

## 2020-05-29 NOTE — PROGRESS NOTE ADULT - SUBJECTIVE AND OBJECTIVE BOX
Patient is a 24y old  Female who presents with a chief complaint of fever, shortness of breath (29 May 2020 08:33)    SUBJECTIVE / OVERNIGHT EVENTS: pt with bilious vomiting overnight and tachycardia, moved to tele floor. Pt denies any abdominal pain, but vomiting persists.     MEDICATIONS  (STANDING):  cholecalciferol 4000 Unit(s) Oral daily  diltiazem    Tablet 60 milliGRAM(s) Oral every 8 hours  enoxaparin Injectable 40 milliGRAM(s) SubCutaneous daily  fluconAZOLE IVPB 200 milliGRAM(s) IV Intermittent every 24 hours  linezolid  IVPB 600 milliGRAM(s) IV Intermittent every 12 hours  morphine ER Tablet 60 milliGRAM(s) Oral two times a day  pantoprazole    Tablet 40 milliGRAM(s) Oral before breakfast  potassium acid phosphate/sodium acid phosphate tablet (K-PHOS No. 2) 1 Tablet(s) Oral four times a day with meals  senna 2 Tablet(s) Oral at bedtime  sodium chloride 0.9%. 1000 milliLiter(s) (100 mL/Hr) IV Continuous <Continuous>  sodium chloride 0.9%. 1000 milliLiter(s) (150 mL/Hr) IV Continuous <Continuous>    MEDICATIONS  (PRN):  aluminum hydroxide/magnesium hydroxide/simethicone Suspension 30 milliLiter(s) Oral every 6 hours PRN Dyspepsia  haloperidol    Injectable 0.5 milliGRAM(s) IV Push every 6 hours PRN nausea  LORazepam   Injectable 1 milliGRAM(s) IV Push once PRN Nausea and/or Vomiting  morphine  - Injectable 6 milliGRAM(s) IV Push every 3 hours PRN Severe Pain (7 - 10)  ondansetron Injectable 4 milliGRAM(s) IV Push every 8 hours PRN Nausea and/or Vomiting  simethicone 80 milliGRAM(s) Chew every 6 hours PRN Gas      Vital Signs Last 24 Hrs  T(C): 38.1 (29 May 2020 08:58), Max: 38.1 (29 May 2020 08:58)  T(F): 100.6 (29 May 2020 08:58), Max: 100.6 (29 May 2020 08:58)  HR: 134 (29 May 2020 08:58) (130 - 163)  BP: 121/75 (29 May 2020 08:58) (107/68 - 125/81)  BP(mean): --  RR: 20 (29 May 2020 08:58) (20 - 22)  SpO2: 95% (29 May 2020 08:58) (93% - 95%)  CAPILLARY BLOOD GLUCOSE        I&O's Summary    28 May 2020 07:01  -  29 May 2020 07:00  --------------------------------------------------------  IN: 3600 mL / OUT: 250 mL / NET: 3350 mL        PHYSICAL EXAM:  GENERAL: NAD  EYES:  conjunctiva and sclera clear  NECK: Supple, No JVD  CHEST/LUNG: Clear to auscultation bilaterally; No wheeze  HEART: +S1/S2, tachy  ABDOMEN: Soft, Nontender, distended  EXTREMITIES:  trace LE edema   PSYCH: AAOx3      LABS:                        8.3    18.88 )-----------( 379      ( 29 May 2020 08:48 )             27.3     05-29    132<L>  |  98  |  4<L>  ----------------------------<  103<H>  3.4<L>   |  25  |  0.40<L>    Ca    11.8<H>      29 May 2020 08:48    TPro  x   /  Alb  2.2<L>  /  TBili  x   /  DBili  x   /  AST  x   /  ALT  x   /  AlkPhos  x   05-28

## 2020-05-29 NOTE — PROGRESS NOTE ADULT - ATTENDING COMMENTS
Patient interviewed/examined.  Agree with findings, PE, A/P as above.    Chemo held today due to fever, tachycardia requiring tele monitoring.    Will re-evaluate candidacy for C2 chemo on Monday.    Rad-Onc will re-evaluate on Monday as well for planned RT-sim and Tx.    Pain appears better controlled with medical management.    When more stable, would plan to get MRI Brain w/wo contrast to evaluate for metastatic disease given her persistent nausea.  This does not have to be ordered right now.      Carl Jama MD

## 2020-05-29 NOTE — PROGRESS NOTE ADULT - SUBJECTIVE AND OBJECTIVE BOX
Patient is a 24y old  Female who presents with a chief complaint of fever, shortness of breath (29 May 2020 10:49)    Being followed by ID for leucocytosis ? UTI    Interval history:low grade temps  continued abd pain  also n/v  No other acute events      ROS:  No cough,SOB,CP    says occ dysuria though not clear on history  No HA  No joint or limb pain  No other complaints      Antimicrobials:    fluconAZOLE IVPB 200 milliGRAM(s) IV Intermittent every 24 hours  linezolid  IVPB 600 milliGRAM(s) IV Intermittent every 12 hours      other medications reviewed    Vital Signs Last 24 Hrs  T(C): 36.9 (05-29-20 @ 11:15), Max: 38.1 (05-29-20 @ 08:58)  T(F): 98.5 (05-29-20 @ 11:15), Max: 100.6 (05-29-20 @ 08:58)  HR: 150 (05-29-20 @ 11:15) (130 - 163)  BP: 108/74 (05-29-20 @ 11:15) (107/68 - 125/81)  BP(mean): --  RR: 20 (05-29-20 @ 11:15) (20 - 22)  SpO2: 94% (05-29-20 @ 11:15) (93% - 95%)    Physical Exam:  HEENT PERRLA EOMI    No oral exudate or erythema    Chest Good AE,CTA    CVS RRR S1 S2 WNl No murmur or rub or gallop    Abd soft BS normal Lower half tenderness ? mass  ascites/FF    R PICC  site no erythema tenderness or discharge    CNS AAO X 3 no focal    Lab Data:                          8.3    18.88 )-----------( 379      ( 29 May 2020 08:48 )             27.3     WBC Count: 18.88 (05-29-20 @ 08:48)  WBC Count: 16.49 (05-28-20 @ 05:43)  WBC Count: 21.89 (05-26-20 @ 13:21)  WBC Count: 22.63 (05-26-20 @ 11:37)    05-29    132<L>  |  98  |  4<L>  ----------------------------<  103<H>  3.4<L>   |  25  |  0.40<L>    Ca    11.8<H>      29 May 2020 08:48    TPro  x   /  Alb  2.2<L>  /  TBili  x   /  DBili  x   /  AST  x   /  ALT  x   /  AlkPhos  x   05-28        Culture - Fungal, Body Fluid (collected 27 May 2020 20:59)  Source: .Body Fluid Peritoneal Fluid  Preliminary Report (28 May 2020 09:09):    Testing in progress    Culture - Body Fluid with Gram Stain (collected 27 May 2020 20:59)  Source: .Body Fluid Peritoneal Fluid  Gram Stain (27 May 2020 23:13):    Rare polymorphonuclear leukocytes per low power field    No organisms seen per oil power field  Preliminary Report (28 May 2020 17:20):    No growth to date.    Culture - Urine (collected 26 May 2020 21:20)  Source: .Urine Clean Catch (Midstream)  Final Report (28 May 2020 20:30):    50,000 - 99,000 CFU/mL Enterococcus faecium (vancomycin resistant)    50,000 - 99,000 CFU/mL Presumptive Candida albicans "Susceptibilities not    performed"  Organism: Enterococcus faecium (vancomycin resistant) (28 May 2020 20:30)  Organism: Enterococcus faecium (vancomycin resistant) (28 May 2020 20:30)      -  Ampicillin: R >8 Predicts results to ampicillin/sulbactam, amoxacillin-clavulanate and  piperacillin-tazobactam.      -  Ciprofloxacin: R >2      -  Daptomycin: S 4      -  Levofloxacin: R >4      -  Linezolid: S <=1      -  Nitrofurantoin: R >64 Should not be used to treat pyelonephritis.      -  Tetra/Doxy: S <=4      -  Vancomycin: R >16      Method Type: CANDY    Culture - Blood (collected 26 May 2020 18:14)  Source: .Blood PICC/PERC Double Lumen BROWN  Preliminary Report (27 May 2020 19:45):    No growth to date.    Culture - Blood (collected 26 May 2020 18:14)  Source: .Blood PICC/PERC Double Lumen BLUE  Preliminary Report (27 May 2020 19:45):    No growth to date.    Culture - Blood (collected 26 May 2020 18:14)  Source: .Blood Blood-Peripheral  Preliminary Report (27 May 2020 19:44):    No growth to date.    Culture - Blood (collected 26 May 2020 18:14)  Source: .Blood Blood-Peripheral  Preliminary Report (27 May 2020 19:44):    No growth to date.              Vancomycin Level, Trough: 5.2 ug/mL (05-28-20 @ 05:43)        < from: CT Abdomen and Pelvis w/ IV Cont (05.26.20 @ 15:59) >  IMPRESSION:   Chest:  1.  No pulmonary embolus.  2.  The left pleural effusion partially obscures the left pleural nodularity.  3.  No change in the bilateral pulmonary nodules since 4/30/2020.    Abdomen and pelvis:  1.  Mesenteric nodularity and ascites are concerning for malignancy.  2.  New hypodense hepatic lesions are concerning for metastasis.  3.  The pelvic mass is unchanged line for differences in technique.  4.  Lymph nodes are likely malignant.  5.  Mildly distended loops of jejunum in the left upper quadrant          < end of copied text >    Imaging studies(films) independently reviewed.Findings as detailed in report above

## 2020-05-29 NOTE — PROGRESS NOTE ADULT - ASSESSMENT
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     #Tachycardia  HR has been 120-130, now 160 in the setting of severe nausea and bilious emesis  Receiving IV fluids  Would touch base with Cardiology as they were involved in care last admission- had started diltiazem and BB  Recent echo WNL     #Nausea/Vomiting  Unclear etiology  Please obtain abdominal xray to r/o obstruction   Please start antiemetics   Pt's QTC is WNL- can given Zofran ATC, and Reglan/ Ativan PRN     #Shortness of breath:  CTA negative for PE, showing left pleural effusion  CT A/P showing new ascites  s/p paracentesis per IR on 5/27 with removal of 1300 ccs   on supplemental O2 - 2L NC    #Fever: Currently afebrile   Pt had recent pneumonia last admission and was discharged on Levaquin. Pt also had frequent episodes of fever during her admission, with negative cultures, and were deemed to be 2/2 tumor fever.   Blood cx NGTD  Abx per ID  Currently not neutropenic  Continue to trend WBC with daily CBC with differential  Pt had leukocytosis on discharge due to administration of Zarxio, now downtrending    #Metastatic carcinoma of unknown primary- being treated as ovarian primary:  Details per HPI   Pt was due 5/26 for cycle two of Carbo/Taxol   Will hold off on inpatient chemotherapy for now given pt's acute clinical illness  Ca-125 tumor marker is improving, 598 from 1065  Rad/onc to reassess on Monday   Hypercalcemia likely 2/2 malignancy- appreciate endocrine recommendations (consider zoledronic acid)  Appreciate palliative care evaluation for pain control   Continue bowel regimen   Holding Cycle 2 of chemotherapy given pt is unstable today.      Cesia Bansk MD  Hematology Oncology Fellow, PGY-4  [facility] Pager: [phone]/ [facility] Pager: [phone] 24F w/ recent diagnosis of metastatic carcinoma of unknown primary (being treated as likely ovarian primary) s/p Carbo/Taxol cycle 1 on 5/5 discharged last week, presented to treatment room today at [facility] found to be febrile to 101, tachycardic to 150 and tachypneic sent into the ER for further workup.     #Tachycardia  HR has been 120-130, now 160 in the setting of severe nausea and bilious emesis, sinus rhythm   Receiving IV fluids  Would touch base with Cardiology as they were involved in care last admission- had started diltiazem for vasospasm  Recent echo WNL     #Nausea/Vomiting  Unclear etiology  Please obtain abdominal xray to r/o obstruction   Please start antiemetics   Pt's QTC is WNL- can given Zofran ATC, and Reglan/ Ativan PRN     #Shortness of breath:  CTA negative for PE, showing left pleural effusion  CT A/P showing new ascites  s/p paracentesis per IR on 5/27 with removal of 1300 ccs   on supplemental O2 - 2L NC    #Fever: Currently afebrile   Pt had recent pneumonia last admission and was discharged on Levaquin. Pt also had frequent episodes of fever during her admission, with negative cultures, and were deemed to be 2/2 tumor fever.   Blood cx NGTD  Abx per ID  Currently not neutropenic  Continue to trend WBC with daily CBC with differential  Pt had leukocytosis on discharge due to administration of Zarxio, now downtrending    #Metastatic carcinoma of unknown primary- being treated as ovarian primary:  Details per HPI   Pt was due 5/26 for cycle two of Carbo/Taxol   Will hold off on inpatient chemotherapy for now given pt's acute clinical illness  Ca-125 tumor marker is improving, 598 from 1065  Rad/onc to reassess on Monday   Hypercalcemia likely 2/2 malignancy- appreciate endocrine recommendations (consider zoledronic acid)  Appreciate palliative care evaluation for pain control   Continue bowel regimen   Holding Cycle 2 of chemotherapy given pt is unstable today.      Cesia Banks MD  Hematology Oncology Fellow, PGY-4  [facility] Pager: [phone]/ [facility] Pager: [phone] 24F w/ recent diagnosis of metastatic carcinoma of unknown primary (being treated as likely ovarian primary) s/p Carbo/Taxol cycle 1 on 5/5 discharged last week, presented to treatment room today at [facility] found to be febrile to 101, tachycardic to 150 and tachypneic sent into the ER for further workup.     #Tachycardia  HR has been 120-130, now 160 in the setting of severe nausea and bilious emesis, sinus rhythm   Receiving IV fluids  Would touch base with Cardiology as they were involved in care last admission- had started diltiazem for vasospasm  Recent echo WNL     #Nausea/Vomiting  Unclear etiology  R/o obstruction- Ct A/P pending  If vomiting continues, would obtain MRI head w/w/o contrast (pt has never had one) once she is stable   Please start antiemetics   Pt's QTC is WNL- can given Zofran/ Ativan PRN     #Shortness of breath:  CTA negative for PE, showing left pleural effusion  CT A/P showing new ascites  s/p paracentesis per IR on 5/27 with removal of 1300 ccs   on supplemental O2 - 2L NC    #Fever: Febrile this morning to 100.6  Pt had recent pneumonia last admission and was discharged on Levaquin. Pt also had frequent episodes of fever during her admission, with negative cultures, and were deemed to be 2/2 tumor fever.   Blood cx NGTD  Abx per ID- broadened to linezolid and fluconazole   Currently not neutropenic  Continue to trend WBC with daily CBC with differential  Received Zarxio on discharge last admission    #Metastatic carcinoma of unknown primary- being treated as ovarian primary:  Details per HPI   Pt was due 5/26 for cycle two of Carbo/Taxol   Ca-125 tumor marker is improving, 598 from 1065  Rad/onc to reassess on Monday for radiation  Hypercalcemia likely 2/2 malignancy- appreciate endocrine recommendations   Appreciate palliative care evaluation for pain control   Continue bowel regimen- may need to add Dulcolax   Holding Cycle 2 of chemotherapy given pt is unstable today, reassess Monday    Cesia Banks MD  Hematology Oncology Fellow, PGY-4  [facility] Pager: [phone]/ [facility] Pager: [phone]

## 2020-05-29 NOTE — PROGRESS NOTE ADULT - SUBJECTIVE AND OBJECTIVE BOX
HPI: 24F with PMH of metastatic carcinoma of unknown primary (treated as ovarian primary) s/p carbo/taxol (5/5/20) here from [facility] for fever, tachycardia, and tachypnea, along with N/V/dyspepsia. COVID-19 negative. Treatment started with cefepime and vancomycin. Hypercalcemia noted, along with ascites. Cultures drawn. Palliative care called to help with pain management.    INTERVAL EVENTS:  5/28: states pain somewhat better, but not flatus or BM x 2-3 days  5/29: states having nausea, pain as well, but no PRN/24 hours    ADVANCE DIRECTIVES:    DNR  MOLST  [ ]  Living Will  [ ]   DECISION MAKER(s):  [ ] Health Care Proxy(s)  [ ] Surrogate(s)  [ ] Guardian           Name(s): Phone Number(s):    BASELINE (I)ADL(s) (prior to admission):  [place]rule: [ ]Total  [ ] Moderate [ ]Dependent    Allergies    No Known Allergies    Intolerances    MEDICATIONS  (STANDING):  cholecalciferol 4000 Unit(s) Oral daily  diltiazem    Tablet 60 milliGRAM(s) Oral every 8 hours  enoxaparin Injectable 40 milliGRAM(s) SubCutaneous daily  fluconAZOLE IVPB 200 milliGRAM(s) IV Intermittent every 24 hours  linezolid  IVPB 600 milliGRAM(s) IV Intermittent every 12 hours  morphine ER Tablet 60 milliGRAM(s) Oral two times a day  pantoprazole    Tablet 40 milliGRAM(s) Oral before breakfast  potassium acid phosphate/sodium acid phosphate tablet (K-PHOS No. 2) 1 Tablet(s) Oral four times a day with meals  senna 2 Tablet(s) Oral at bedtime  sodium chloride 0.9%. 1000 milliLiter(s) (100 mL/Hr) IV Continuous <Continuous>    MEDICATIONS  (PRN):  aluminum hydroxide/magnesium hydroxide/simethicone Suspension 30 milliLiter(s) Oral every 6 hours PRN Dyspepsia  haloperidol    Injectable 0.5 milliGRAM(s) IV Push every 6 hours PRN nausea  LORazepam   Injectable 1 milliGRAM(s) IV Push once PRN Nausea and/or Vomiting  morphine  - Injectable 6 milliGRAM(s) IV Push every 3 hours PRN Severe Pain (7 - 10)  ondansetron Injectable 4 milliGRAM(s) IV Push every 6 hours PRN Nausea and/or Vomiting  simethicone 80 milliGRAM(s) Chew every 6 hours PRN Gas      PRESENT SYMPTOMS: [ ]Unable to obtain due to poor mentation   Source if other than patient:  [ ]Family   [ ]Team     Pain: [x ]yes [ ]no  QOL impact -   Location -      back, abdomen              Aggravating factors - movement  Quality - aching  Radiation -  Timing- constant  Severity (0-10 scale): 6/10  Minimal acceptable level (0-10 scale): 3/10    CPOT:    https://www.[email or web address]/getattachment/dkt44r37-8f4g-[email or web address]-[email or web address]/Critical-Care-Pain-Observation-Tool-(CPOT)      PAIN AD Score:     http://geriatrictoolkit.[facility]/cog/painad.pdf (press ctrl +  left click to view)    Dyspnea:                           [ ]Mild [ ]Moderate [ ]Severe  Anxiety:                             [ ]Mild [ ]Moderate [ ]Severe  Fatigue:                             [ ]Mild [ ]Moderate [ ]Severe  Nausea:                             [ ]Mild [ x]Moderate [ ]Severe  Loss of appetite:              [ ]Mild [ ]Moderate [ ]Severe  Constipation:                    [ ]Mild [ ]Moderate [ ]Severe    Other Symptoms:  [ x]All other review of systems negative     Palliative Performance Status Version 2:         %    http://npcrc.org/files/news/palliative_performance_scale_ppsv2.pdf    PHYSICAL EXAM:  Vital Signs Last 24 Hrs  T(C): 37.1 (29 May 2020 12:28), Max: 38.1 (29 May 2020 08:58)  T(F): 98.7 (29 May 2020 12:28), Max: 100.6 (29 May 2020 08:58)  HR: 144 (29 May 2020 14:20) (134 - 163)  BP: 112/74 (29 May 2020 12:28) (107/68 - 125/81)  BP(mean): --  RR: 20 (29 May 2020 12:28) (20 - 22)  SpO2: 95% (29 May 2020 12:28) (93% - 95%)    Limited exam for patient comfort  GENERAL:  [x ]Alert  [ ]Oriented x   [ ]Lethargic  [ ]Cachexia  [ ]Unarousable  [x ]Verbal  [ ]Non-Verbal  Behavioral:   [ ] Anxiety  [ ] Delirium [ ] Agitation [ ] Other  HEENT:  [x ]Normal   [ ]Dry mouth   [ ]ET Tube/Trach  [ ]Oral lesions  PULMONARY:   [ ]Clear [ ]Tachypnea  [ ]Audible excessive secretions   [ ]Rhonchi        [ ]Right [ ]Left [ ]Bilateral  [ ]Crackles        [ ]Right [ ]Left [ ]Bilateral  [ ]Wheezing     [ ]Right [ ]Left [ ]Bilateral  [ ]Diminished breath sounds [ ]right [ ]left [ ]bilateral  CARDIOVASCULAR:    [ ]Regular [ ]Irregular [ ]Tachy  [ ]Quirino [ ]Murmur [ ]Other  GASTROINTESTINAL:  [ ]Soft  [ ]Distended   [ ]+BS  [ ]Non tender [ ]Tender  [ ]PEG [ ]OGT/ NGT  Last BM:     GENITOURINARY:  [ ]Normal [ ] Incontinent   [ ]Oliguria/Anuria   [ ]Cifuentes  MUSCULOSKELETAL:   [ ]Normal   [ ]Weakness  [ ]Bed/Wheelchair bound [ ]Edema  NEUROLOGIC:   [x ]No focal deficits  [ ]Cognitive impairment  [ ]Dysphagia [ ]Dysarthria [ ]Paresis [ ]Other   SKIN:   [ ]Normal    [ ]Rash  [ ]Pressure ulcer(s)       Present on admission [ ]y [ ]n    CRITICAL CARE:  [ ] Shock Present  [ ]Septic [ ]Cardiogenic [ ]Neurologic [ ]Hypovolemic  [ ]  Vasopressors [ ]  Inotropes   [ ]Respiratory failure present [ ]Mechanical ventilation [ ]Non-invasive ventilatory support [ ]High flow  [ ]Acute  [ ]Chronic [ ]Hypoxic  [ ]Hypercarbic [ ]Other  [ ]Other organ failure     LABS: reviewed                                   8.3    18.88 )-----------( 379      ( 29 May 2020 08:48 )             27.3     05-29    132<L>  |  98  |  4<L>  ----------------------------<  103<H>  3.4<L>   |  25  |  0.40<L>    Ca    11.8<H>      29 May 2020 08:48        RADIOLOGY & ADDITIONAL STUDIES: CT A/P reviewed on sunrise    PROTEIN CALORIE MALNUTRITION PRESENT: [ ]mild [ ]moderate [ ]severe [ ]underweight [ ]morbid obesity  https://www.andeal.org/vault/5053/web/files/ONC/Table_Clinical%20Characteristics%20to%20Document%20Malnutrition-White%20JV%20et%20al%202012.pdf    Height (cm): 162.56 (05-26-20 @ 12:53), 164 (05-26-20 @ 12:04), 163 (05-22-20 @ 13:44)  Weight (kg): 73.5 (05-26-20 @ 21:00), 72.8 (05-26-20 @ 12:04), 73.936 (05-22-20 @ 13:41)  BMI (kg/m2): 27.8 (05-26-20 @ 21:00), 27.5 (05-26-20 @ 12:53), 27.1 (05-26-20 @ 12:04)    [ ]PPSV2 < or = to 30% [ ]significant weight loss  [ ]poor nutritional intake  [ ]anasarca     Albumin, Serum: 2.6 g/dL (05-26-20 @ 13:21)   [ ]Artificial Nutrition      REFERRALS:   [ ]Chaplaincy  [ ]Hospice  [ ]Child Life  [ ]Social Work  [ ]Case management [ ]Holistic Therapy     Goals of Care Document:     ______________  Alex Mack MD   of Geriatric and Palliative Medicine  [facility]     Please page the following number for clinical matters between the hours of 9AM and 5PM   from Monday through Friday : [phone]    After 5PM and on weekends, please page: [phone]. The Geriatric and Palliative Medicine consult service has 24/7 coverage for medical recommendations, including for symptom management needs.

## 2020-05-29 NOTE — PROGRESS NOTE ADULT - ASSESSMENT
24F with PMH of metastatic carcinoma of unknown primary (treated as ovarian primary) s/p carbo/taxol (5/5/20) here from [facility] for fever, tachycardia, and tachypnea, along with N/V/dyspepsia. COVID-19 negative. Treatment started with cefepime and vancomycin. Hypercalcemia noted, along with ascites. Cultures drawn. Palliative care called to help with pain management.

## 2020-05-29 NOTE — CONSULT NOTE ADULT - ASSESSMENT
23yo woman recent diagnosis of metastatic carcinoma of unknown primary (being treated as likely ovarian primary) s/p Carbo/Taxol cycle 1 on 5/5 who was sent in from  [facility] on account of fever, tachycardia  and tachypnea. She reports fever started 2 days prior to presentation. She also c/o some nausea/ vomiting and dyspepsia. Of note patient was recently admitted to [facility] 3/20 - 5/17 with a complicated hospital course, including fever in context of LLL pneumonia and parainfluenza. She received Levaquin and was discharged on it.  She had a PICC line placed on 4/3 for chemotherapy and received several cycles.     Admitted again for aforementioned symptoms. Found to be febrile, possibly in setting of tumor fever vs underlying infection. Has had ongoing emesis and retching, concerning for gastric outlet obstruction. Just had CT AP as work up for it. On broad spectrum ABx, linezolid and fluconazole. Cardiology consulted for sinus tachycardia with HR up to 160s.     Patient was seen and examined at bedside; had abdominal pain and was lethargic. Admitted to having large amounts of emesis several times the day before.    #Sinus Tachycardia  -Etiology is likely multifactorial, in the setting of possible tumor fever, underlying infection and cancer related pain.  -Echo as of 4/27 was with normal LV systolic function. Would repeat echo again.  - 23yo woman recent diagnosis of metastatic carcinoma of unknown primary (being treated as likely ovarian primary) s/p Carbo/Taxol cycle 1 on 5/5 who was sent in from  [facility] on account of fever, tachycardia and tachypnea.     #Sinus Tachycardia  -CT chest with IV contrast was negative on 5/26 for PE. No signs of clinical tamponade.   -Etiology is likely multifactorial, in the setting of possible tumor fever, underlying infection and cancer related pain.  -Echo as of 4/27 was with normal LV systolic function. Would repeat echo again.  -Continue ABx and IVF administration. Liberalize HR goal to <120 in setting of malignancy, infection and intravascular depletion from ongoing emesis.  -Primary team started Cardizem; okay to continue. Lopressor PRN q6 for HR >120.  Case to be discussed with cardiology attending in the AM.    Iva Tobias MD PGY-4  Cardiology Fellow  All Cardiology service information can be found 24/7 on [email or web address], password: [email or web address]  Note is preliminary until signed by the attending.

## 2020-05-29 NOTE — PROGRESS NOTE ADULT - PROBLEM SELECTOR PLAN 1
- c/w MS Contin 60mg and morphine 6mg IV Q3 for now  - if starts having worsening pain, will discuss with her re: methadone  - on linezolid, but all opioids can interact with this medication (morphine, dilaudid, oxycodone, methadone); monitor for adverse effects, including patient getting more tired and VS changes; suggest increase VS to Q4 while simultaneously on opioids and linezolid

## 2020-05-29 NOTE — PROGRESS NOTE ADULT - ASSESSMENT
24F with recent diagnosis of metastatic carcinoma of unknown primary (being treated as likely ovarian primary) s/p Carbo/Taxol cycle 1 on 5/5 who was sent in from  [facility] on account of fever, tachycardia  and tachypnea. Suspicion for Sepsis 2/2 PNA. Hypercalcemia related to bone mets. Pain of metastatic disease. Malignant ascites s/p paracentesis 5/27.

## 2020-05-29 NOTE — CHART NOTE - NSCHARTNOTEFT_GEN_A_CORE
PT transferred to 3D on tele   Sinus Tach over night into am,   PT denies c/p sob palpitation   Noted n/v in setting of hypecalcemia   above discussed with Dr Monroe and Dr Lopez/ Dr Nguyen and Hematology   Reported to unit NP   Department of Medicine   SEAN TAPIAP-c [id number]

## 2020-05-29 NOTE — PROGRESS NOTE ADULT - ASSESSMENT
24 f with b/l adnexal masses and pelvic LAD, metastatic carcinoma of unknown primary , s/p CT guided biopsy 3/25, PICC line 4/3 to start chemo  pt intermittently febrile and leukocytosis previously,   was treated for potential UTI  Dced on levaquin  Now presenting with fever and leucocytosis  No obvious localization clinically  Chronic abd pain-unchanged  UA with only 4 wbc  CT chest abd pelvis as above no obvious new focus  Blood cx clear  Peritoneal fluid analysis and Cx not s/o infection  Urine Cx with VRE and candida-unclear if she has colonization or real pathogens  also has nausea and vomiting     Rec:  A) fever  follow Cx  follow clinically  zyvox + diflucan empirically for possible UTI-if no response in 2-3 days would DC  Monitor for any signs of adverse reactions as on multiple medications which could potentiate serotonin syndrome-options for antimicronials are limitedare limited  Dc reglan-use alternatives         B) Tumor,abd pain  workup for infectious etiology as above  will defer to primary team on other plan    Will tailor plan for ID issues  per course,results.Will defer to primary team on management of other issues.  Assessment, plan and recommendations as detailed above were discussed with the medical/primary  team NP.  prognosis guarded  Infectious Diseases Service will cover over weekend.  Please call [phone] if issues

## 2020-05-29 NOTE — PROGRESS NOTE ADULT - PROBLEM SELECTOR PLAN 6
palliative consulted - pain regimen adjusted  rad onc for palliative RT @ [facility], transport back and forth to be arranged

## 2020-05-29 NOTE — CONSULT NOTE ADULT - ATTENDING COMMENTS
Hypercalcemia- likely primary hyperparathyroidism  needs [facility] work up - 24 hour urine Ca/creat   low 25 hydroxy Vit d- defer to endocrine- ? low dose Vit D daily   spep/sife   increase fluids to 200cc/hr   no need for calcitonin at this time   will need 4 D CT of parathyroid glands as outpatient
Ms. Masters was evaluated at the bedside.  Her case was discussed with Dr. Tobias.  The etiology of the sinus tachycardia is likely multifactorial and includes malignancy, infection, fever, anemia, hypoxemia, pain and possible hypovolemia in the setting of decreased PO intake.  Ideally the underlying etiologies would be treatable.  Repeat TTE to evaluate LVF on diltiazem.  Consider brain imaging to evaluate for metastatic disease.
Agree with assessment and plan as above by Dr. Allred. Reviewed all pertinent labs, glucose values, and imaging studies. Modifications made as indicated above. Likely non-PTH mediated from malignancy. PTHrP pending. Follow-up 1,25 Vit D. Give Zometa x 1. Monitor calcium which should start to decrease in the next 2 days.     Sanju Rouse D.O  [phone]
Patient interviewed during rounds.  PE not performed secondary to covid pandemic.    Agree with history, ROS, Assessment and Plan as above.    Infectious work-up to evaluate fever.  Will be due for C2 chemo when infectious concerns have been addressed.      Carl Jama MD

## 2020-05-30 NOTE — CHART NOTE - NSCHARTNOTEFT_GEN_A_CORE
NP note -  picc occlusion    called by RN for occlusion of one lumen of R brachial double lumen picc. changed dressing, and administered cathflow at 6:45pm. pt tolerated it well. will f/u as protocol.     NP. Reese Merino  [id number]

## 2020-05-30 NOTE — PROGRESS NOTE ADULT - PROBLEM SELECTOR PLAN 5
- ionized Ca elevated  - PTH inappropriately normal in this setting but Vit D is low  - Start vit d 4000 unit daily  - endo & nephrology consulted - f/u recs   - Ca downtrending

## 2020-05-30 NOTE — PROGRESS NOTE ADULT - SUBJECTIVE AND OBJECTIVE BOX
Patient is a 24y old  Female who presents with a chief complaint of fever, shortness of breath (30 May 2020 08:46)    SUBJECTIVE / OVERNIGHT EVENTS: tachycardia improved, feeling better this morning. Up in bed and eating breakfast. Pt states that she has not had any bowel movements yet. No abdominal pain. Nausea improved.     MEDICATIONS  (STANDING):  cholecalciferol 4000 Unit(s) Oral daily  diltiazem    Tablet 60 milliGRAM(s) Oral every 8 hours  enoxaparin Injectable 40 milliGRAM(s) SubCutaneous daily  fluconAZOLE IVPB 200 milliGRAM(s) IV Intermittent every 24 hours  linezolid  IVPB 600 milliGRAM(s) IV Intermittent every 12 hours  morphine ER Tablet 60 milliGRAM(s) Oral two times a day  pantoprazole    Tablet 40 milliGRAM(s) Oral before breakfast  potassium acid phosphate/sodium acid phosphate tablet (K-PHOS No. 2) 1 Tablet(s) Oral four times a day with meals  potassium chloride   Solution 20 milliEquivalent(s) Oral once  senna 2 Tablet(s) Oral at bedtime  sodium chloride 0.9%. 1000 milliLiter(s) (100 mL/Hr) IV Continuous <Continuous>    MEDICATIONS  (PRN):  aluminum hydroxide/magnesium hydroxide/simethicone Suspension 30 milliLiter(s) Oral every 6 hours PRN Dyspepsia  haloperidol    Injectable 0.5 milliGRAM(s) IV Push every 6 hours PRN nausea  LORazepam   Injectable 1 milliGRAM(s) IV Push once PRN Nausea and/or Vomiting  metoprolol tartrate Injectable 2.5 milliGRAM(s) IV Push every 6 hours PRN tachycardia  morphine  - Injectable 6 milliGRAM(s) IV Push every 3 hours PRN Severe Pain (7 - 10)  ondansetron Injectable 4 milliGRAM(s) IV Push every 6 hours PRN Nausea and/or Vomiting  simethicone 80 milliGRAM(s) Chew every 6 hours PRN Gas      Vital Signs Last 24 Hrs  T(C): 37.1 (30 May 2020 04:07), Max: 37.1 (29 May 2020 12:28)  T(F): 98.7 (30 May 2020 04:07), Max: 98.7 (29 May 2020 12:28)  HR: 124 (30 May 2020 04:07) (121 - 157)  BP: 109/69 (30 May 2020 04:07) (95/56 - 112/74)  BP(mean): --  RR: 18 (30 May 2020 04:07) (18 - 20)  SpO2: 92% (30 May 2020 04:07) (92% - 95%)  CAPILLARY BLOOD GLUCOSE        I&O's Summary    29 May 2020 07:01  -  30 May 2020 07:00  --------------------------------------------------------  IN: 1760 mL / OUT: 200 mL / NET: 1560 mL    PHYSICAL EXAM:  GENERAL: NAD  EYES: conjunctiva and sclera clear  NECK: Supple, No JVD  CHEST/LUNG: Clear to auscultation bilaterally; No wheeze  HEART: +S1/S2, reg   ABDOMEN: Soft, distended, non-tender   EXTREMITIES: trace LE edema   PSYCH: AAOx3    LABS:                        8.0    16.60 )-----------( 351      ( 30 May 2020 05:03 )             27.8     05-30    131<L>  |  98  |  6<L>  ----------------------------<  91  3.4<L>   |  23  |  0.37<L>    Ca    10.7<H>      30 May 2020 05:03

## 2020-05-30 NOTE — PROGRESS NOTE ADULT - ASSESSMENT
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     #Tachycardia  HR has been 120-130, now 160 in the setting of severe nausea and bilious emesis, sinus rhythm   Receiving IV fluids  Would touch base with Cardiology as they were involved in care last admission- had started diltiazem for vasospasm  Recent echo WNL     #Nausea/Vomiting  Unclear etiology  R/o obstruction- Ct A/P pending  If vomiting continues, would obtain MRI head w/w/o contrast (pt has never had one) once she is stable   Please start antiemetics   Pt's QTC is WNL- can given Zofran/ Ativan PRN     #Shortness of breath:  CTA negative for PE, showing left pleural effusion  CT A/P showing new ascites  s/p paracentesis per IR on 5/27 with removal of 1300 ccs   on supplemental O2 - 2L NC    #Fever: Febrile this morning to 100.6  Pt had recent pneumonia last admission and was discharged on Levaquin. Pt also had frequent episodes of fever during her admission, with negative cultures, and were deemed to be 2/2 tumor fever.   Blood cx NGTD  Abx per ID- broadened to linezolid and fluconazole   Currently not neutropenic  Continue to trend WBC with daily CBC with differential  Received Zarxio on discharge last admission    #Metastatic carcinoma of unknown primary- being treated as ovarian primary:  Details per HPI   Pt was due 5/26 for cycle two of Carbo/Taxol   Ca-125 tumor marker is improving, 598 from 1065  Rad/onc to reassess on Monday for radiation  Hypercalcemia likely 2/2 malignancy- appreciate endocrine recommendations   Appreciate palliative care evaluation for pain control   Continue bowel regimen- may need to add Dulcolax   Holding Cycle 2 of chemotherapy given pt is unstable today, reassess Monday 24F w/ recent diagnosis of metastatic carcinoma of unknown primary (being treated as likely ovarian primary) s/p Carbo/Taxol cycle 1 on 5/5 discharged last week, presented to treatment room today at [facility] found to be febrile to 101, tachycardic to 150 and tachypneic sent into the ER for further workup.     #Tachycardia  Evalauted by cardio. On tele now. HR slightly improved. Remains in sinus rhythm. Recent echo WNL     #Nausea/Vomiting  CT A/P reviewed. No obstruction. Symptoms likely 2/2 abd distension from disease/malignant ascites.   Continue anti emetics  Advance diet as tolerated  Aggressive bowel regimen for constipation   Plan for C# 2next week    #Shortness of breath:  CTA negative for PE, showing left pleural effusion  CT A/P showing new ascites  s/p paracentesis per IR on 5/27 with removal of 1300 ccs   on supplemental O2 - 2L NC    #Fever: Improved 5/30/2020  Pt had recent pneumonia last admission and was discharged on Levaquin. Pt also had frequent episodes of fever during her admission, with negative cultures, and were deemed to be 2/2 tumor fever.   Blood cx NGTD  Abx per ID- broadened to linezolid and fluconazole   Currently not neutropenic  Continue to trend WBC with daily CBC with differential  Received Zarxio on discharge last admission    #Metastatic carcinoma of unknown primary- being treated as ovarian primary:  Details per HPI   Pt was due 5/26 for cycle two of Carbo/Taxol   Ca-125 tumor marker is improving, 598 from 1065  Rad/onc to reassess on Monday for radiation  Hypercalcemia likely 2/2 malignancy- appreciate endocrine recommendations   Appreciate palliative care evaluation for pain control   C# 2 monday

## 2020-05-30 NOTE — PROGRESS NOTE ADULT - ATTENDING COMMENTS
Patient interviewed/examined.  Agree with findings, PE, A/P as above.    Chemo held today due to fever, tachycardia requiring tele monitoring.    Will re-evaluate candidacy for C2 chemo on Monday.    Rad-Onc will re-evaluate on Monday as well for planned RT-sim and Tx.    Pain appears better controlled with medical management.    When more stable, would plan to get MRI Brain w/wo contrast to evaluate for metastatic disease given her persistent nausea.  This does not have to be ordered right now.

## 2020-05-30 NOTE — PROGRESS NOTE ADULT - PROBLEM SELECTOR PLAN 4
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 at this time) s/p Carbo/Taxol cycle 1 on 5/5   - was scheduled for cycle 2 which is now being held in light of ongoing infectious process - may wish to dose if no infectious source identified  - follows with Dr Kristina Miles at [facility]  - f/u onc - elevated CEA/Ca-125  - rad onc treatment at [facility]  postponed to Monday if patient remains stable   - to be re-evaluated for C2 chemo on Monday

## 2020-05-30 NOTE — PROGRESS NOTE ADULT - PROBLEM SELECTOR PLAN 3
-improved today  -CT and x-ray without bowel obstruction  -continue anti-emetics   -eventual MRI brain w/wo contrast to eval for metastatic disease

## 2020-05-30 NOTE — PROGRESS NOTE ADULT - ASSESSMENT
24F with recent diagnosis of metastatic carcinoma of unknown primary (being treated as likely ovarian primary) s/p Carbo/Taxol cycle 1 on 5/5 who was sent in from  [facility] on account of fever, tachycardia  and tachypnea. Suspicion for Sepsis 2/2 PNA. Hypercalcemia related to bone mets. Pain of metastatic disease. Malignant ascites s/p paracentesis 5/27.

## 2020-05-30 NOTE — PROGRESS NOTE ADULT - SUBJECTIVE AND OBJECTIVE BOX
***DRAFT**  HYPERCA BETTER  CT A/P NO OBSTRUCTION, WORSENING ASCITIS, ?POD  INTERVAL HPI/OVERNIGHT EVENTS:  Patient S&E at bedside. No o/n events, patient resting comfortably. No complaints at this time. Patient denies fever, chills, dizziness, weakness, CP, palpitations, SOB, cough, N/V/D/C, dysuria, changes in bowel movements, LE edema.    VITAL SIGNS:  T(F): 98.7 (05-30-20 @ 04:07)  HR: 124 (05-30-20 @ 04:07)  BP: 109/69 (05-30-20 @ 04:07)  RR: 18 (05-30-20 @ 04:07)  SpO2: 92% (05-30-20 @ 04:07)  Wt(kg): --    PHYSICAL EXAM:    Constitutional: NAD  Eyes: EOMI, sclera non-icteric  Neck: supple, no masses, no JVD  Respiratory: CTA b/l, good air entry b/l  Cardiovascular: RRR, no M/R/G  Gastrointestinal: soft, NTND, no masses palpable, + BS, no hepatosplenomegaly  Extremities: no c/c/e  Neurological: AAOx3      MEDICATIONS  (STANDING):  cholecalciferol 4000 Unit(s) Oral daily  diltiazem    Tablet 60 milliGRAM(s) Oral every 8 hours  enoxaparin Injectable 40 milliGRAM(s) SubCutaneous daily  fluconAZOLE IVPB 200 milliGRAM(s) IV Intermittent every 24 hours  linezolid  IVPB 600 milliGRAM(s) IV Intermittent every 12 hours  morphine ER Tablet 60 milliGRAM(s) Oral two times a day  pantoprazole    Tablet 40 milliGRAM(s) Oral before breakfast  potassium acid phosphate/sodium acid phosphate tablet (K-PHOS No. 2) 1 Tablet(s) Oral four times a day with meals  potassium chloride   Solution 20 milliEquivalent(s) Oral once  senna 2 Tablet(s) Oral at bedtime  sodium chloride 0.9%. 1000 milliLiter(s) (100 mL/Hr) IV Continuous <Continuous>    MEDICATIONS  (PRN):  aluminum hydroxide/magnesium hydroxide/simethicone Suspension 30 milliLiter(s) Oral every 6 hours PRN Dyspepsia  haloperidol    Injectable 0.5 milliGRAM(s) IV Push every 6 hours PRN nausea  LORazepam   Injectable 1 milliGRAM(s) IV Push once PRN Nausea and/or Vomiting  metoprolol tartrate Injectable 2.5 milliGRAM(s) IV Push every 6 hours PRN tachycardia  morphine  - Injectable 6 milliGRAM(s) IV Push every 3 hours PRN Severe Pain (7 - 10)  ondansetron Injectable 4 milliGRAM(s) IV Push every 6 hours PRN Nausea and/or Vomiting  simethicone 80 milliGRAM(s) Chew every 6 hours PRN Gas      Allergies    No Known Allergies    Intolerances        LABS:                        8.0    16.60 )-----------( 351      ( 30 May 2020 05:03 )             27.8     05-30    131<L>  |  98  |  6<L>  ----------------------------<  91  3.4<L>   |  23  |  0.37<L>    Ca    10.7<H>      30 May 2020 05:03            RADIOLOGY & ADDITIONAL TESTS:  Studies reviewed.    ASSESSMENT & PLAN: INTERVAL HPI/OVERNIGHT EVENTS:  Patient S&E at bedside. Eating breakfast, reports N/V better since yesterday. No fever x 24 h, HR better. Reports constipation- taking bowel regimen.  CT results d/w her. Calcium better today    VITAL SIGNS:  T(F): 98.7 (05-30-20 @ 04:07)  HR: 124 (05-30-20 @ 04:07)  BP: 109/69 (05-30-20 @ 04:07)  RR: 18 (05-30-20 @ 04:07)  SpO2: 92% (05-30-20 @ 04:07)  Wt(kg): --    PHYSICAL EXAM:    Constitutional: NAD  Eyes: EOMI, sclera non-icteric  Neck: supple, no masses, no JVD  Respiratory: CTA b/l, good air entry b/l  Cardiovascular: tachy, no M/R/G  Gastrointestinal:  distended, no tenderness, + ascites  Extremities: + LE edema  Neurological: AAOx3      MEDICATIONS  (STANDING):  cholecalciferol 4000 Unit(s) Oral daily  diltiazem    Tablet 60 milliGRAM(s) Oral every 8 hours  enoxaparin Injectable 40 milliGRAM(s) SubCutaneous daily  fluconAZOLE IVPB 200 milliGRAM(s) IV Intermittent every 24 hours  linezolid  IVPB 600 milliGRAM(s) IV Intermittent every 12 hours  morphine ER Tablet 60 milliGRAM(s) Oral two times a day  pantoprazole    Tablet 40 milliGRAM(s) Oral before breakfast  potassium acid phosphate/sodium acid phosphate tablet (K-PHOS No. 2) 1 Tablet(s) Oral four times a day with meals  potassium chloride   Solution 20 milliEquivalent(s) Oral once  senna 2 Tablet(s) Oral at bedtime  sodium chloride 0.9%. 1000 milliLiter(s) (100 mL/Hr) IV Continuous <Continuous>    MEDICATIONS  (PRN):  aluminum hydroxide/magnesium hydroxide/simethicone Suspension 30 milliLiter(s) Oral every 6 hours PRN Dyspepsia  haloperidol    Injectable 0.5 milliGRAM(s) IV Push every 6 hours PRN nausea  LORazepam   Injectable 1 milliGRAM(s) IV Push once PRN Nausea and/or Vomiting  metoprolol tartrate Injectable 2.5 milliGRAM(s) IV Push every 6 hours PRN tachycardia  morphine  - Injectable 6 milliGRAM(s) IV Push every 3 hours PRN Severe Pain (7 - 10)  ondansetron Injectable 4 milliGRAM(s) IV Push every 6 hours PRN Nausea and/or Vomiting  simethicone 80 milliGRAM(s) Chew every 6 hours PRN Gas      Allergies    No Known Allergies    Intolerances        LABS:                        8.0    16.60 )-----------( 351      ( 30 May 2020 05:03 )             27.8     05-30    131<L>  |  98  |  6<L>  ----------------------------<  91  3.4<L>   |  23  |  0.37<L>    Ca    10.7<H>      30 May 2020 05:03            RADIOLOGY & ADDITIONAL TESTS:  Studies reviewed.    ASSESSMENT & PLAN:

## 2020-05-30 NOTE — PROGRESS NOTE ADULT - PROBLEM SELECTOR PLAN 1
- Urine culture positive for VRE, also growing candida   - CTA chest and CT abd negative for occult infection  - abx changed to fluconazole and zyvox (5/29)  -leukocytosis improving  -remains afebrile

## 2020-05-31 NOTE — PROGRESS NOTE ADULT - PROBLEM SELECTOR PLAN 3
-improved overall   -CT and x-ray without bowel obstruction  -continue anti-emetics   -eventual MRI brain w/wo contrast to eval for metastatic disease  -dulcolax added for bowel regimen  -low threshold for re-imaging if vomiting/symptoms worsen   -replete electrolytes as needed, IVF for hydration   -on haldol and lorazepam PRN nausea/vomiting, monitor QTc (has not required any doses over the past 24 hrs)

## 2020-05-31 NOTE — PROGRESS NOTE ADULT - PROBLEM SELECTOR PLAN 4
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 at this time) s/p Carbo/Taxol cycle 1 on 5/5   - was scheduled for cycle 2 which is now being held in light of ongoing infectious process - may wish to dose if no infectious source identified  - follows with Dr Kristina Miles at [facility]  - f/u onc - elevated CEA/Ca-125  - rad onc treatment at [facility]  postponed to Monday if patient remains stable   - to be re-evaluated for C2 chemo on Monday  - anemia noted, transfuse to keep Hb>7

## 2020-05-31 NOTE — PROGRESS NOTE ADULT - PROBLEM SELECTOR PLAN 5
- ionized Ca was elevated  - PTH inappropriately normal in this setting but Vit D is low  - Started vit d 4000 unit daily  - endo & nephrology consulted - f/u recs   - Ca downtrending

## 2020-05-31 NOTE — PROGRESS NOTE ADULT - ASSESSMENT
24F with recent diagnosis of metastatic carcinoma of unknown primary (being treated as likely ovarian primary) s/p Carbo/Taxol cycle 1 on 5/5 who was sent in from  [facility] on account of fever, tachycardia  and tachypnea. Suspicion for Sepsis 2/2 PNA. Hypercalcemia related to bone mets. Pain of metastatic disease. Malignant ascites s/p paracentesis 5/27.

## 2020-05-31 NOTE — PROGRESS NOTE ADULT - SUBJECTIVE AND OBJECTIVE BOX
Patient is a 24y old  Female who presents with a chief complaint of fever, shortness of breath (30 May 2020 10:47)    SUBJECTIVE / OVERNIGHT EVENTS: no acute events overnight. Pt states that she feels a bit better today. Abdominal discomfort is better.     MEDICATIONS  (STANDING):  bisacodyl 5 milliGRAM(s) Oral once  cholecalciferol 4000 Unit(s) Oral daily  diltiazem    Tablet 60 milliGRAM(s) Oral every 8 hours  enoxaparin Injectable 40 milliGRAM(s) SubCutaneous daily  fluconAZOLE IVPB 200 milliGRAM(s) IV Intermittent every 24 hours  linezolid  IVPB 600 milliGRAM(s) IV Intermittent every 12 hours  morphine ER Tablet 60 milliGRAM(s) Oral two times a day  pantoprazole    Tablet 40 milliGRAM(s) Oral before breakfast  potassium acid phosphate/sodium acid phosphate tablet (K-PHOS No. 2) 1 Tablet(s) Oral four times a day with meals  potassium chloride   Solution 20 milliEquivalent(s) Oral once  senna 2 Tablet(s) Oral at bedtime  sodium chloride 0.9%. 1000 milliLiter(s) (100 mL/Hr) IV Continuous <Continuous>    MEDICATIONS  (PRN):  aluminum hydroxide/magnesium hydroxide/simethicone Suspension 30 milliLiter(s) Oral every 6 hours PRN Dyspepsia  bisacodyl 5 milliGRAM(s) Oral every 12 hours PRN Constipation  haloperidol    Injectable 0.5 milliGRAM(s) IV Push every 6 hours PRN nausea  LORazepam   Injectable 1 milliGRAM(s) IV Push once PRN Nausea and/or Vomiting  metoprolol tartrate Injectable 2.5 milliGRAM(s) IV Push every 6 hours PRN tachycardia  morphine  - Injectable 6 milliGRAM(s) IV Push every 3 hours PRN Severe Pain (7 - 10)  ondansetron Injectable 4 milliGRAM(s) IV Push every 6 hours PRN Nausea and/or Vomiting  simethicone 80 milliGRAM(s) Chew every 6 hours PRN Gas      Vital Signs Last 24 Hrs  T(C): 37.3 (31 May 2020 04:23), Max: 37.3 (31 May 2020 04:23)  T(F): 99.1 (31 May 2020 04:23), Max: 99.1 (31 May 2020 04:23)  HR: 129 (31 May 2020 04:23) (114 - 129)  BP: 115/71 (31 May 2020 04:23) (105/82 - 115/71)  BP(mean): --  RR: 18 (31 May 2020 04:23) (18 - 18)  SpO2: 84% (31 May 2020 04:23) (84% - 95%)  CAPILLARY BLOOD GLUCOSE        I&O's Summary    30 May 2020 07:01  -  31 May 2020 07:00  --------------------------------------------------------  IN: 2100 mL / OUT: 500 mL / NET: 1600 mL    31 May 2020 07:01  -  31 May 2020 11:36  --------------------------------------------------------  IN: 240 mL / OUT: 0 mL / NET: 240 mL      PHYSICAL EXAM:  GENERAL: chronically ill appearing   EYES:  conjunctiva and sclera clear  NECK: Supple, No JVD  CHEST/LUNG: Clear to auscultation bilaterally; No wheeze  HEART: +S1/S2, reg   ABDOMEN: Soft, distended, non-tender   EXTREMITIES: no LE edema   PSYCH: AAOx3      LABS:                        7.4    19.65 )-----------( 356      ( 31 May 2020 09:18 )             25.3     05-31    132<L>  |  100  |  <4<L>  ----------------------------<  87  3.2<L>   |  22  |  <0.30<L>    Ca    9.6      31 May 2020 06:28

## 2020-06-01 NOTE — PROGRESS NOTE ADULT - PROBLEM SELECTOR PLAN 5
-improved overall   -CT and x-ray without bowel obstruction  -continue anti-emetics   - MRI brain w/wo contrast to eval for metastatic disease  -dulcolax added for bowel regimen  -low threshold for re-imaging if vomiting/symptoms worsen   -replete electrolytes as needed, K given today  -on haldol and lorazepam PRN nausea/vomiting, monitor QTc (has not required any doses over the past 24 hrs)

## 2020-06-01 NOTE — PROGRESS NOTE ADULT - SUBJECTIVE AND OBJECTIVE BOX
Chief Complaint: hypercalcemia     History: Patient see and examined at bedside. Reports appetite is better than admission. Does not report any nausea or vomiting.     MEDICATIONS  (STANDING):  cholecalciferol 4000 Unit(s) Oral daily  diltiazem    Tablet 60 milliGRAM(s) Oral every 8 hours  enoxaparin Injectable 40 milliGRAM(s) SubCutaneous daily  morphine ER Tablet 60 milliGRAM(s) Oral two times a day  pantoprazole    Tablet 40 milliGRAM(s) Oral before breakfast  potassium acid phosphate/sodium acid phosphate tablet (K-PHOS No. 2) 1 Tablet(s) Oral four times a day with meals  sodium chloride 0.9%. 1000 milliLiter(s) (100 mL/Hr) IV Continuous <Continuous>    MEDICATIONS  (PRN):  aluminum hydroxide/magnesium hydroxide/simethicone Suspension 30 milliLiter(s) Oral every 6 hours PRN Dyspepsia  bisacodyl 5 milliGRAM(s) Oral every 12 hours PRN Constipation  haloperidol    Injectable 0.5 milliGRAM(s) IV Push every 6 hours PRN nausea  metoprolol tartrate Injectable 2.5 milliGRAM(s) IV Push every 6 hours PRN tachycardia  morphine  - Injectable 6 milliGRAM(s) IV Push every 3 hours PRN Severe Pain (7 - 10)  ondansetron Injectable 4 milliGRAM(s) IV Push every 6 hours PRN Nausea and/or Vomiting  polyethylene glycol 3350 17 Gram(s) Oral daily PRN Constipation  simethicone 80 milliGRAM(s) Chew every 6 hours PRN Gas    PHYSICAL EXAM:  VITALS: T(C): 36.7 (06-01-20 @ 11:56)  T(F): 98 (06-01-20 @ 11:56), Max: 100.7 (05-31-20 @ 23:44)  HR: 117 (06-01-20 @ 11:56) (117 - 142)  BP: 99/63 (06-01-20 @ 11:56) (99/63 - 115/69)  RR:  (18 - 20)  SpO2:  (95% - 98%)  Wt(kg): 73.5kg   GENERAL: mild resp distress while talking , well-groomed, well-developed  RESPIRATORY: Clear to auscultation bilaterally; on NC   CARDIOVASCULAR: Tachycardia, Regular rhythm; No murmurs; + peripheral edema  GI: distended, firm, nontender   MUSCULOSKELETAL: Full range of motion, normal strength  NEURO: sensation intact, extraocular movements intact, no tremor  PSYCH: Alert and oriented x 3, reactive affect       06-01    131<L>  |  97  |  5<L>  ----------------------------<  94  3.3<L>   |  21<L>  |  0.42<L>    EGFR if : 164  EGFR if non : 141    Ca    10.0      06-01    Thyroid Function Tests:  05-28 @ 08:02 TSH 1.73

## 2020-06-01 NOTE — CHART NOTE - NSCHARTNOTEFT_GEN_A_CORE
Was informed by RN that patient note to have 17 beats of wide complex of tachycardia. Patient still fatigued, but asymptomatic. No history of wide complex tachycardia. Will recheck lytes. Patient receiving BB and diltiazem. House cardiology was informed. NO new recommendations noted. Still pending echo.     Lux Le NP

## 2020-06-01 NOTE — PROGRESS NOTE ADULT - SUBJECTIVE AND OBJECTIVE BOX
Oncology Follow-up    INTERVAL HPI/OVERNIGHT EVENTS:  Febrile overnight, pan cultured and given Zosyn x1; no fevers during the day so far, abx held.  Patient having more difficulty speaking today, ?dysarthria, weak. Does endorse back pain when moving the bed. Denies any headaches. Endorses abdominal pain.  Inattentive, speech trails off and patient had to be redirected multiple times.    Review of Systems:  ROS hx limited by encephalopathy.    VITAL SIGNS:  T(F): 98 (20 @ 11:56)  HR: 117 (20 @ 11:56)  BP: 99/63 (20 @ 11:56)  RR: 20 (20 @ 11:56)  SpO2: 98% (20 @ 11:56)    20 @ 07:  -  20 @ 07:00  --------------------------------------------------------  IN: 460 mL / OUT: 700 mL / NET: -240 mL    20 @ 07:01  -  20 @ 14:51  --------------------------------------------------------  IN: 480 mL / OUT: 100 mL / NET: 380 mL        PHYSICAL EXAM:    Constitutional: appears ill; drowsy, oriented x2 ([place]), mild tachypnea, but otherwise no acute distress  Eyes: PERRL, EOMI, sclera non-icteric  Neck: supple, no masses, no JVD, no lymphadenopathy  Cardiovascular: tachycardic, regular rate  Gastrointestinal: distended, no guarding  Extremities:  no edema  MSK: no obvious abnormalities, normal ROM, no lymphadenopathy  Neurological: moving all extremities, but with decreased effort; follows commands; cranial nerves grossly intact  Skin: Normal temperature, no rash, no echymoses, no petichiae  Psych: encephalopathic but calm    MEDICATIONS  (STANDING):  cholecalciferol 4000 Unit(s) Oral daily  diltiazem    Tablet 60 milliGRAM(s) Oral every 8 hours  enoxaparin Injectable 40 milliGRAM(s) SubCutaneous daily  morphine ER Tablet 60 milliGRAM(s) Oral two times a day  pantoprazole    Tablet 40 milliGRAM(s) Oral before breakfast  potassium acid phosphate/sodium acid phosphate tablet (K-PHOS No. 2) 1 Tablet(s) Oral four times a day with meals  sodium chloride 0.9%. 1000 milliLiter(s) (100 mL/Hr) IV Continuous <Continuous>    MEDICATIONS  (PRN):  aluminum hydroxide/magnesium hydroxide/simethicone Suspension 30 milliLiter(s) Oral every 6 hours PRN Dyspepsia  bisacodyl 5 milliGRAM(s) Oral every 12 hours PRN Constipation  haloperidol    Injectable 0.5 milliGRAM(s) IV Push every 6 hours PRN nausea  metoprolol tartrate Injectable 2.5 milliGRAM(s) IV Push every 6 hours PRN tachycardia  morphine  - Injectable 6 milliGRAM(s) IV Push every 3 hours PRN Severe Pain (7 - 10)  ondansetron Injectable 4 milliGRAM(s) IV Push every 6 hours PRN Nausea and/or Vomiting  polyethylene glycol 3350 17 Gram(s) Oral daily PRN Constipation  simethicone 80 milliGRAM(s) Chew every 6 hours PRN Gas      No Known Allergies      LABS:                        7.6    23.14 )-----------( 362      ( 2020 06:29 )             26.1     06-01    131<L>  |  97  |  5<L>  ----------------------------<  94  3.3<L>   |  21<L>  |  0.42<L>    Ca    10.0      2020 06:29< from: Xray Chest 1 View- PORTABLE-Urgent (20 @ 06:58) >      Urinalysis Basic - ( 2020 06:27 )    Color: Dark Yellow / Appearance: Turbid / S.027 / pH: x  Gluc: x / Ketone: Trace  / Bili: Negative / Urobili: Negative   Blood: x / Protein: 30 mg/dL / Nitrite: Negative   Leuk Esterase: Negative / RBC: 0 /hpf / WBC 0 /HPF   Sq Epi: x / Non Sq Epi: 0 /hpf / Bacteria: MODERATE    Bilirubin: Negative (20 @ 06:27)    RADIOLOGY & ADDITIONAL TESTS:  Studies reviewed.    EXAM:  XR CHEST PORTABLE URGENT 1V                            PROCEDURE DATE:  2020            INTERPRETATION:  A single chest x-ray was obtained on 2020.    Indication: Fever.    Impression:    Poor inspiratory effort. The heart is enlarged. Elevated right hemidiaphragm. Pulmonary metastases which are seen on CT scan that was performed on May 29, 2020 are not appreciated on the current chest x-ray. A PICC line was placed on the right and the tip is in the superior vena cava. No pleural effusion. No pneumothorax.    ISHMAEL GARCIA M.D., ATTENDING RADIOLOGIST  This document has been electronically signed. 2020  8:06AM    < end of copied text >

## 2020-06-01 NOTE — DIETITIAN INITIAL EVALUATION ADULT. - PERTINENT MEDS FT
aluminum hydroxide/magnesium hydroxide/simethicone Suspension 30 PRN  bisacodyl 5 PRN  cholecalciferol 4000  diltiazem    Tablet 60  enoxaparin Injectable 40  haloperidol    Injectable 0.5 PRN  metoprolol tartrate Injectable 2.5 PRN  morphine  - Injectable 6 PRN  morphine ER Tablet 60  ondansetron Injectable 4 PRN  pantoprazole    Tablet 40  polyethylene glycol 3350 17 PRN  potassium acid phosphate/sodium acid phosphate tablet (K-PHOS No. 2) 1  simethicone 80 PRN  sodium chloride 0.9%. 1000

## 2020-06-01 NOTE — PROGRESS NOTE ADULT - SUBJECTIVE AND OBJECTIVE BOX
Patient is a 24y old  Female who presents with a chief complaint of fever, shortness of breath (31 May 2020 11:36)    Being followed by ID for leucocytosis    Interval history:still with occnausea  for Radiation Rx today  denies urinary complaints   No acute events      ROS:  No cough,SOB,CP    No other complaints      Antimicrobials:  zyvox,diflucan Dced today     Other medications reviewed    Vital Signs Last 24 Hrs  T(C): 36.4 (06-01-20 @ 09:47), Max: 38.2 (05-31-20 @ 23:44)  T(F): 97.5 (06-01-20 @ 09:47), Max: 100.7 (05-31-20 @ 23:44)  HR: 125 (06-01-20 @ 09:47) (125 - 142)  BP: 110/70 (06-01-20 @ 09:47) (100/62 - 115/69)  BP(mean): --  RR: 20 (06-01-20 @ 09:47) (18 - 20)  SpO2: 96% (06-01-20 @ 09:47) (94% - 97%)    Physical Exam:      HEENT PERRLA EOMI    No oral exudate or erythema    Chest Good AE,CTA    CVS RRR S1 S2 WNl No murmur or rub or gallop    Abd soft BS normal Lower half tenderness ? mass  ascites/FF    R PICC  site no erythema tenderness or discharge    CNS AAO X 3 no focal    Lab Data:                          7.6    23.14 )-----------( 362      ( 01 Jun 2020 06:29 )             26.1     WBC Count: 23.14 (06-01-20 @ 06:29)  WBC Count: 19.65 (05-31-20 @ 09:18)  WBC Count: 18.58 (05-31-20 @ 06:28)  WBC Count: 16.60 (05-30-20 @ 05:03)  WBC Count: 18.88 (05-29-20 @ 08:48)  WBC Count: 16.49 (05-28-20 @ 05:43)  WBC Count: 21.89 (05-26-20 @ 13:21)  WBC Count: 22.63 (05-26-20 @ 11:37)      06-01    131<L>  |  97  |  5<L>  ----------------------------<  94  3.3<L>   |  21<L>  |  0.42<L>    Ca    10.0      01 Jun 2020 06:29          Culture - Fungal, Body Fluid (collected 27 May 2020 20:59)  Source: .Body Fluid Peritoneal Fluid  Preliminary Report (28 May 2020 09:09):    Testing in progress    Culture - Body Fluid with Gram Stain (collected 27 May 2020 20:59)  Source: .Body Fluid Peritoneal Fluid  Gram Stain (27 May 2020 23:13):    Rare polymorphonuclear leukocytes per low power field    No organisms seen per oil power field  Preliminary Report (28 May 2020 17:20):    No growth to date.          < from: Xray Chest 1 View- PORTABLE-Urgent (06.01.20 @ 06:58) >    Impression:    Poor inspiratory effort. The heart is enlarged. Elevated right hemidiaphragm. Pulmonary metastases which are seen on CT scan that was performed on May 29, 2020 are not appreciated on the current chest x-ray. A PICC line was placed on the right and the tip is in the superior vena cava. No pleural effusion. No pneumothorax.            < end of copied text >

## 2020-06-01 NOTE — CHART NOTE - NSCHARTNOTEFT_GEN_A_CORE
Assuming patient remains stable, the simulation that was cancelled on Friday at [facility], due to tachycardia/hypercalcemia/nausea/vomiting, can rescheduled for tomorrow (Tuesday) morning, with a single palliative fraction of radiation planned for Wednesday  Thanks, dr Nguyen   cell     Radiation Medicine Dep't.. [phone] Assuming patient remains stable, the simulation that was cancelled on Friday at [facility], due to tachycardia/hypercalcemia/nausea/vomiting, can rescheduled for today or tomorrow  morning, with a single palliative fraction of radiation planned for either Tuesday or Wednesday    Thanks, dr Nguyen   cell     Radiation Medicine Dep't.. [phone]

## 2020-06-01 NOTE — PROGRESS NOTE ADULT - ASSESSMENT
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 Suspicion for Sepsis 2/2 PNA. Hypercalcemia related to bone mets. Pain of metastatic disease. Malignant ascites s/p paracentesis 5/27. 24F with recent diagnosis of metastatic carcinoma of GI primary s/p Carbo/Taxol cycle 1 on 5/5 who was sent in from  [facility] on account of fever, tachycardia  and tachypnea. Suspicion for Sepsis 2/2 PNA. Hypercalcemia related to bone mets. Pain of metastatic disease. Malignant ascites s/p paracentesis 5/27.

## 2020-06-01 NOTE — DIETITIAN INITIAL EVALUATION ADULT. - OTHER INFO
Reason for admission : fever, shortness of breath  Diet PTA :   Intake :  Denies nausea/vomit :  Denies difficulty chewing /swallow :  Denies diarrhea/constipation:  Last BM :   NKFA  IBW +/- 10%= 120pounds  Ht: 64"  Ht taken from EMR heading  Usual Weight PTA:  BMI: 29.5  BMI calculated using wt from flow sheet  BMI calculated using ht from EMR heading  Education Provided :  pressure injury: no pressure injury  edema: none attempted to see pt x 2. first time was having difficulty understanding pt- she requested that I return at a later time due to her mouth sticking together at times which makes it difficult for her to be understood. When I returned for the second time pt had been taken to [facility] for radiation therapy. Therefore, interview is deferred.   Reason for admission : fever, shortness of breath  Diet PTA :   Intake :  nausea/vomit :?  difficulty chewing /swallow : ?  diarrhea/constipation:?  Last BM : 5/27  NKFA  IBW +/- 10%= 120pounds  Ht: 64"  Ht taken from EMR heading  Usual Weight PTA:  BMI: 29.5  BMI calculated using wt from flow sheet  BMI calculated using ht from EMR heading  Education Provided : N/A  pressure injury: no pressure injury  edema: none  pt with 7% weight gain since 5/27 as per daily weights

## 2020-06-01 NOTE — PROGRESS NOTE ADULT - ASSESSMENT
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 Endocrine following for hypercalcemia workup.       Hypercalcemia  likely secondary to malignancy given recent diagnosis of mets CA  Corrected Calcium today 11.4, improved from 13.7 on admission   PTH wnl, however would anticipate lower PTH given Hypercalcemia. PTH likely higher than anticipated due to extremely low Vit D <0.5  Once Vit D supplemented anticipate decrease in PTH ( if PTH remain inappropriately normal will need outpt testing to rule out FHH)   Vit D 1,25 <0.5, therefore granulomatous and lymphoma unlikely cause   TSH wnl, Hyperthyroidism ruled out  Cortisol elevated, AI ruled out   Pending PTHrP   Please obtain Vit A    S/p Zoledronic Acid 5/28   Continue to monitor calcium q12h   Recommend DEXA scan as outpatient   Patient can follow up at Endocrinology Clinic  [address] [facility] [address] [place] [id number] [phone]      Vit D deficiency  Cautiously supplement Vit D 4000units daily   goal Vit D >30 24 F with recent diagnosis of 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 Endocrine following for hypercalcemia workup.       Hypercalcemia  likely secondary to malignancy given recent diagnosis of mets CA  Corrected Calcium today 11.4, improved from 13.7 on admission   PTH wnl, however would anticipate lower PTH given Hypercalcemia. PTH likely higher than anticipated due to extremely low Vit D <0.5  Once Vit D supplemented anticipate decrease in PTH ( if PTH remain inappropriately normal will need outpt testing to rule out FHH)   Vit D 1,25 <0.5, therefore granulomatous and lymphoma unlikely cause   TSH wnl, Hyperthyroidism ruled out  Cortisol elevated, AI ruled out   Pending PTHrP   S/p Zoledronic Acid 5/28   Continue to monitor calcium q12h   Recommend DEXA scan as outpatient   Patient can follow up at Endocrinology Clinic  [address] [place] [address] [address] [place] [address] [phone]    Vit D deficiency  Cautiously supplement Vit D 4000units daily   goal Vit D >30      As hypercalcemia due to Malignancy, further management per hem/onc. Will sign off. Reconsult as needed    Discussed Phuong Blackman MD  Endocrine Fellow   Pager

## 2020-06-01 NOTE — PROGRESS NOTE ADULT - PROBLEM SELECTOR PLAN 3
-sinus tachycardia with HR up to 160s previously   -CT angio neg for PE  -likely multifactorial: malignancy, pain, underlying infection   -started cardizem, will continue with holding parameters. Lopressor Q6hrs PRN for HR>120   -will repeat echo- ordered  -check BNP in AM  -lasix 20mg IV x 1 today (suspect some pulmonary edema from IVF and tachycardia) -sinus tachycardia with HR up to 160s previously   -CT angio neg for PE  -likely multifactorial: malignancy, pain, underlying infection   -started cardizem, will continue with holding parameters.   -will add lopressor 25mg bid with hopes to titrate down off cardizem. less BP lowering effects with lopressor  -cont Lopressor Q6hrs PRN for HR>120   -will repeat echo- ordered  -check BNP in AM  -lasix 20mg IV x 1 today (suspect some pulmonary edema from IVF and tachycardia)

## 2020-06-01 NOTE — PROGRESS NOTE ADULT - SUBJECTIVE AND OBJECTIVE BOX
[facility] Division of Hospital Medicine  Magui Merritt MD  Pager (M-F, 8A-5P): [phone]  Other Times:  [phone]    Patient is a 24y old  Female who presents with a chief complaint of fever, shortness of breath (2020 14:50)      SUBJECTIVE / OVERNIGHT EVENTS:    fever overnight, however per ID monitoring off antibiotics  went to [facility] for palliative radiation  saw pt in afternoon- she is feeling SOB and seems lethargic  denies CP    ADDITIONAL REVIEW OF SYSTEMS:    MEDICATIONS  (STANDING):  cholecalciferol 4000 Unit(s) Oral daily  diltiazem    Tablet 60 milliGRAM(s) Oral every 8 hours  enoxaparin Injectable 40 milliGRAM(s) SubCutaneous daily  morphine ER Tablet 60 milliGRAM(s) Oral two times a day  pantoprazole    Tablet 40 milliGRAM(s) Oral before breakfast  potassium acid phosphate/sodium acid phosphate tablet (K-PHOS No. 2) 1 Tablet(s) Oral four times a day with meals  sodium chloride 0.9%. 1000 milliLiter(s) (100 mL/Hr) IV Continuous <Continuous>    MEDICATIONS  (PRN):  aluminum hydroxide/magnesium hydroxide/simethicone Suspension 30 milliLiter(s) Oral every 6 hours PRN Dyspepsia  bisacodyl 5 milliGRAM(s) Oral every 12 hours PRN Constipation  haloperidol    Injectable 0.5 milliGRAM(s) IV Push every 6 hours PRN nausea  metoprolol tartrate Injectable 2.5 milliGRAM(s) IV Push every 6 hours PRN tachycardia  morphine  - Injectable 6 milliGRAM(s) IV Push every 3 hours PRN Severe Pain (7 - 10)  ondansetron Injectable 4 milliGRAM(s) IV Push every 6 hours PRN Nausea and/or Vomiting  polyethylene glycol 3350 17 Gram(s) Oral daily PRN Constipation  simethicone 80 milliGRAM(s) Chew every 6 hours PRN Gas      CAPILLARY BLOOD GLUCOSE        I&O's Summary    31 May 2020 07:01  -  2020 07:00  --------------------------------------------------------  IN: 460 mL / OUT: 700 mL / NET: -240 mL    2020 07:01  -  2020 16:30  --------------------------------------------------------  IN: 480 mL / OUT: 100 mL / NET: 380 mL        PHYSICAL EXAM:  Vital Signs Last 24 Hrs  T(C): 36.7 (2020 11:56), Max: 38.2 (31 May 2020 23:44)  T(F): 98 (2020 11:56), Max: 100.7 (31 May 2020 23:44)  HR: 117 (:) (117 - 142)  BP: 99/63 (2020 11:56) (99/63 - 115/69)  BP(mean): --  RR: 20 (2020 11:56) (18 - 20)  SpO2: 98% (2020 11:56) (95% - 98%)      LABS:                        7.6    23.14 )-----------( 362      ( 2020 06:29 )             26.1     06-01    131<L>  |  97  |  5<L>  ----------------------------<  94  3.3<L>   |  21<L>  |  0.42<L>    Ca    10.0      2020 06:29            Urinalysis Basic - ( 2020 06:27 )    Color: Dark Yellow / Appearance: Turbid / S.027 / pH: x  Gluc: x / Ketone: Trace  / Bili: Negative / Urobili: Negative   Blood: x / Protein: 30 mg/dL / Nitrite: Negative   Leuk Esterase: Negative / RBC: 0 /hpf / WBC 0 /HPF   Sq Epi: x / Non Sq Epi: 0 /hpf / Bacteria: MODERATE          RADIOLOGY & ADDITIONAL TESTS:  Results Reviewed:   Imaging Personally Reviewed:  Electrocardiogram Personally Reviewed:    COORDINATION OF CARE:  Care Discussed with Consultants/Other Providers [Y/N]:  Prior or Outpatient Records Reviewed [Y/N]: [facility] Division of Hospital Medicine  Magui Merritt MD  Pager (M-F, 8A-5P): [phone]  Other Times:  [phone]    Patient is a 24y old  Female who presents with a chief complaint of fever, shortness of breath (2020 14:50)      SUBJECTIVE / OVERNIGHT EVENTS:    fever overnight, however per ID monitoring off antibiotics  went to [facility] for palliative radiation  saw pt in afternoon- she is feeling SOB and seems lethargic  denies CP    ADDITIONAL REVIEW OF SYSTEMS:    MEDICATIONS  (STANDING):  cholecalciferol 4000 Unit(s) Oral daily  diltiazem    Tablet 60 milliGRAM(s) Oral every 8 hours  enoxaparin Injectable 40 milliGRAM(s) SubCutaneous daily  morphine ER Tablet 60 milliGRAM(s) Oral two times a day  pantoprazole    Tablet 40 milliGRAM(s) Oral before breakfast  potassium acid phosphate/sodium acid phosphate tablet (K-PHOS No. 2) 1 Tablet(s) Oral four times a day with meals  sodium chloride 0.9%. 1000 milliLiter(s) (100 mL/Hr) IV Continuous <Continuous>    MEDICATIONS  (PRN):  aluminum hydroxide/magnesium hydroxide/simethicone Suspension 30 milliLiter(s) Oral every 6 hours PRN Dyspepsia  bisacodyl 5 milliGRAM(s) Oral every 12 hours PRN Constipation  haloperidol    Injectable 0.5 milliGRAM(s) IV Push every 6 hours PRN nausea  metoprolol tartrate Injectable 2.5 milliGRAM(s) IV Push every 6 hours PRN tachycardia  morphine  - Injectable 6 milliGRAM(s) IV Push every 3 hours PRN Severe Pain (7 - 10)  ondansetron Injectable 4 milliGRAM(s) IV Push every 6 hours PRN Nausea and/or Vomiting  polyethylene glycol 3350 17 Gram(s) Oral daily PRN Constipation  simethicone 80 milliGRAM(s) Chew every 6 hours PRN Gas      CAPILLARY BLOOD GLUCOSE        I&O's Summary    31 May 2020 07:01  -  2020 07:00  --------------------------------------------------------  IN: 460 mL / OUT: 700 mL / NET: -240 mL    2020 07:01  -  2020 16:30  --------------------------------------------------------  IN: 480 mL / OUT: 100 mL / NET: 380 mL        PHYSICAL EXAM:  Vital Signs Last 24 Hrs  T(C): 36.7 (2020 11:56), Max: 38.2 (31 May 2020 23:44)  T(F): 98 (2020 11:56), Max: 100.7 (31 May 2020 23:44)  HR: 117 (:) (117 - 142)  BP: 99/63 (2020 11:56) (99/63 - 115/69)  BP(mean): --  RR: 20 (2020 11:56) (18 - 20)  SpO2: 98% (2020 11:56) (95% - 98%)      LABS:                        7.6    23.14 )-----------( 362      ( 2020 06:29 )             26.1     06-01    131<L>  |  97  |  5<L>  ----------------------------<  94  3.3<L>   |  21<L>  |  0.42<L>    Ca    10.0      2020 06:29            Urinalysis Basic - ( 2020 06:27 )    Color: Dark Yellow / Appearance: Turbid / S.027 / pH: x  Gluc: x / Ketone: Trace  / Bili: Negative / Urobili: Negative   Blood: x / Protein: 30 mg/dL / Nitrite: Negative   Leuk Esterase: Negative / RBC: 0 /hpf / WBC 0 /HPF   Sq Epi: x / Non Sq Epi: 0 /hpf / Bacteria: MODERATE          RADIOLOGY & ADDITIONAL TESTS:  Results Reviewed: CXR-- low lung volumes. +congestion, pleural eff  TELE: sinus tachy      COORDINATION OF CARE:  Care Discussed with Consultants/Other Providers [Y/N]: yes  Prior or Outpatient Records Reviewed [Y/N]: yes

## 2020-06-01 NOTE — PROGRESS NOTE ADULT - ATTENDING COMMENTS
24F w/ recent diagnosis of metastatic carcinoma of unknown primary s/p Carbo/Taxol cycle 1 on 5/5 discharged last week, presented to treatment room today at [facility] found to be febrile to 101, tachycardic to 150 and tachypneic sent into the ER for further workup. She is noted to have altered Mental Status, tachypnea and fever today. Recommend MRI w/wo contrast to rule out presence of CNS metastatic disease. Hypercalcemia improved / resolved; electrolytes grossly normal. Febrile overnight on 6/1/20 (non-neutropenic); pancultured, watching off of abx per ID as fever thought to be 2/2 malignancy. She is overdue for C2 carbo taxol. Was delayed last week due to fever/tachycardia. We were planning to give her chemo today but clinically she looks worse. I discussed with Dr Miles that pt is clinically worsening and chemo will not salvage her. Rec pall care for sx control/GOC. Will speak to brother Thom to update him and discuss GOC with him in view of change in clinical status

## 2020-06-01 NOTE — PROGRESS NOTE ADULT - ASSESSMENT
24F with PMH of metastatic carcinoma of unknown primary (treated as ovarian primary) s/p carbo/taxol (5/5/20) here from [facility] for fever, tachycardia, and tachypnea, along with N/V/dyspepsia. COVID-19 negative. Treatment started with cefepime and vancomycin. Hypercalcemia noted, along with ascites. Cultures drawn. Palliative care called to help with pain management.

## 2020-06-01 NOTE — PROGRESS NOTE ADULT - PROBLEM SELECTOR PLAN 4
- Urine culture positive for VRE, also growing candida   - CTA chest and CT abd negative for occult infection or PE  - abx changed to fluconazole and zyvox (5/29)  -per ID holding all abx today. no clear signs of infection- likely colonization

## 2020-06-01 NOTE — DIETITIAN INITIAL EVALUATION ADULT. - ADD RECOMMEND
change diet to Low Tyramine secondary to Zyvox. change diet to Low Tyramine secondary to Zyvox. follow for deferred interview. change diet to Low Tyramine secondary to Zyvox. continue Vit D3.  follow for deferred interview. placed pending verification.

## 2020-06-01 NOTE — PROGRESS NOTE ADULT - PROBLEM SELECTOR PLAN 5
- plan per oncology  - possible chemotherapy this admission per onc, possible RT at [facility]

## 2020-06-01 NOTE — DIETITIAN INITIAL EVALUATION ADULT. - PROBLEM SELECTOR PLAN 2
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

## 2020-06-01 NOTE — PROGRESS NOTE ADULT - PROBLEM SELECTOR PLAN 1
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 at this time) s/p Carbo/Taxol cycle 1 on 5/5   - was scheduled for cycle 2 which is now being held in light of ongoing infectious process - may wish to dose if no infectious source identified  - [place] with [place] Kristina [place] [facility]  - f/u onc - elevated CEA/Ca-125  - rad onc treatment at [facility]  postponed to Monday if patient remains stable   - to be re-evaluated for C2 chemo on Monday  - anemia noted, transfuse to keep Hb>7 - recent diagnosis of metastatic carcinoma now suspected neuroendocrine/GI in origin s/p Carbo/Taxol cycle 1 on 5/5   - was scheduled for cycle 2 which is now being held in light of ongoing infectious process - may wish to dose if no infectious source identified  - follows with Dr Kristina Miles at [facility]  - rad onc treatment at [facility]  today  - reevaluated by heme/onc for chemo today- will not offer at this time given poor functional status. want to set up family meeting with family  -pending MR brain to eval for mets  -cont pain control- but decrease given lethargy and AMS today  - anemia noted, transfuse to keep Hb>7

## 2020-06-01 NOTE — PROGRESS NOTE ADULT - PROBLEM SELECTOR PLAN 9
- appears to have worsened with IVF, concerned pt becoming volume overloaded vs siadh  -lasix 20mg iv x 1  - check urine lytes, osm

## 2020-06-01 NOTE — PROGRESS NOTE ADULT - ASSESSMENT
24 f with b/l adnexal masses and pelvic LAD, metastatic carcinoma of unknown primary , s/p CT guided biopsy 3/25, PICC line 4/3 to start chemo  pt intermittently febrile and leukocytosis previously,   was treated for potential UTI  Dced on levaquin  Now presenting with fever and leucocytosis  No obvious localization clinically  Chronic abd pain-unchanged  UA with only 4 wbc  CT chest abd pelvis as above no obvious new focus  Blood cx clear  Peritoneal fluid analysis and Cx not s/o infection  Urine Cx with VRE and candida-unclear if she has colonization or real pathogens  also has nausea and vomiting     Rec:  A) fever  resolved  still with leucocytosis'  no other localiozation  Persisted despite empiric VRE coverage and candidal RX-suggesting they represent colonization rather than pathogens  Agree with stopping abx and observing  ?Leucocytosis reactive to her tumor/other non infectious etiology  follow Cx  follow clinically        B) Tumor,abd pain  workup for infectious etiology as above  For RT  will defer to primary team on other plan    Will tailor plan for ID issues  per course,results.Will defer to primary team on management of other issues.  Assessment, plan and recommendations as detailed above were discussed with the medical/primary  team NP.  prognosis guarded    Will Follow.  Beeper [phone] [facility] [id number].   Wknd/afterhours/No response-[phone] or Fellow on call

## 2020-06-01 NOTE — CHART NOTE - NSCHARTNOTEFT_GEN_A_CORE
Medicine NP Fever Note    Notified by RN patient with temperature . Pt. seen and examined at bedside. NO complaints noted. Patient is alert, NAD. Denies HA, CP, SOB, cough, N/V, or abd pain. Abdomen is still distended. Had paracentesis performed on the 5/27. Recent cultures, chest x ray is from 5/26.     VITAL SIGNS:  T(C): 38.2 (05-31-20 @ 23:44), Max: 38.2 (05-31-20 @ 23:44)  HR: 127 (05-31-20 @ 23:44) (126 - 142)  BP: 100/62 (05-31-20 @ 23:44) (100/62 - 115/71)  RR: 18 (05-31-20 @ 23:44) (18 - 18)  SpO2: 97% (05-31-20 @ 23:44) (84% - 97%)  Wt(kg): --      LABORATORY:                          7.4    19.65 )-----------( 356      ( 31 May 2020 09:18 )             25.3       05-31    132<L>  |  100  |  <4<L>  ----------------------------<  87  3.2<L>   |  22  |  <0.30<L>    Ca    9.6      31 May 2020 06:28        MEDICATIONS:    MICROBIOLOGY:     MEDICATIONS  (STANDING):  cholecalciferol 4000 Unit(s) Oral daily  diltiazem    Tablet 60 milliGRAM(s) Oral every 8 hours  enoxaparin Injectable 40 milliGRAM(s) SubCutaneous daily  fluconAZOLE IVPB 200 milliGRAM(s) IV Intermittent every 24 hours  linezolid  IVPB 600 milliGRAM(s) IV Intermittent every 12 hours  morphine ER Tablet 60 milliGRAM(s) Oral two times a day  pantoprazole    Tablet 40 milliGRAM(s) Oral before breakfast  piperacillin/tazobactam IVPB. 3.375 Gram(s) IV Intermittent once  potassium acid phosphate/sodium acid phosphate tablet (K-PHOS No. 2) 1 Tablet(s) Oral four times a day with meals  sodium chloride 0.9%. 1000 milliLiter(s) (100 mL/Hr) IV Continuous <Continuous>        RADIOLOGY:          PHYSICAL EXAM:    Constitutional: AOx3. NAD.    Respiratory: clear lungs bilaterally. No wheezing, rhonchi, or crackles.    Cardiovascular: S1 S2. No murmurs.    Gastrointestinal: BS X4 active. soft. nontender.    Extremities/Vascular: +2 pulses bilaterally. No BLE edema.      ASSESSMENT/PLAN:   HPI:  24F with recent diagnosis of metastatic carcinoma of unknown primary (being treated as likely ovarian primary) s/p Carbo/Taxol cycle 1 on 5/5 who was sent in from  [facility] on account of fever,  tachycardia  and tachypnea. She reports fever started 2 days ago. She also c/o some nausea/ vomiting and dyspepsia. She denies chest pain,cough, shortness of breath, dysuria. She also reports  abdominal pain slightly worse than her baseline. Of note pt was recently admitted to [facility] 3/20 - 5/17 with a complicated hospital course. She had a PICC line placed on 4/3 for chemotherapy.    ED COURSE  VS : 112/72  144 18 O2 97% on room air T 100.3F  Labs : wbc 21 h/h 9/30 plt 409   AST 53  Covid 19 negative  Treatment : cefepime 2g ivpb x 1, vancomycin 1 g ivpb x 1, Morphine 4mg iv x1, Zofran 4mg ivp x 1 (26 May 2020 17:22)          - Tylenol and cooling measures prn for pyrexia  - BC x2, UA/UC  - CXR  - COVID 19 PCR   - Discussed with [facility]  Dr. Reyna and will add Zosyn to current regimen   -Dr. Reyna recommends to re tap patient. Will inform day team  - F/U primary team in AM      Follow up with day team  in AM    Dario Le NP   Department of Medicine  Spectralink [phone]

## 2020-06-01 NOTE — PROGRESS NOTE ADULT - SUBJECTIVE AND OBJECTIVE BOX
HPI: 24F with PMH of metastatic carcinoma of unknown primary (treated as ovarian primary) s/p carbo/taxol (5/5/20) here from [facility] for fever, tachycardia, and tachypnea, along with N/V/dyspepsia. COVID-19 negative. Treatment started with cefepime and vancomycin. Hypercalcemia noted, along with ascites. Cultures drawn. Palliative care called to help with pain management.    INTERVAL EVENTS:  5/28: states pain somewhat better, but not flatus or BM x 2-3 days  5/29: states having nausea, pain as well, but no PRN/24 hours  6/1: patient dysarthric today, denies pain, but unable to clearly express thoughts    ADVANCE DIRECTIVES:    DNR  MOLST  [ ]  Living Will  [ ]   DECISION MAKER(s):  [ ] Health Care Proxy(s)  [ ] Surrogate(s)  [ ] Guardian           Name(s): Phone Number(s):    BASELINE (I)ADL(s) (prior to admission):  [place]: [ ]Total  [ ] Moderate [ ]Dependent    Allergies    No Known Allergies    Intolerances    MEDICATIONS  (STANDING):  cholecalciferol 4000 Unit(s) Oral daily  diltiazem    Tablet 60 milliGRAM(s) Oral every 8 hours  enoxaparin Injectable 40 milliGRAM(s) SubCutaneous daily  morphine ER Tablet 60 milliGRAM(s) Oral two times a day  pantoprazole    Tablet 40 milliGRAM(s) Oral before breakfast  potassium acid phosphate/sodium acid phosphate tablet (K-PHOS No. 2) 1 Tablet(s) Oral four times a day with meals  sodium chloride 0.9%. 1000 milliLiter(s) (100 mL/Hr) IV Continuous <Continuous>    MEDICATIONS  (PRN):  aluminum hydroxide/magnesium hydroxide/simethicone Suspension 30 milliLiter(s) Oral every 6 hours PRN Dyspepsia  bisacodyl 5 milliGRAM(s) Oral every 12 hours PRN Constipation  haloperidol    Injectable 0.5 milliGRAM(s) IV Push every 6 hours PRN nausea  metoprolol tartrate Injectable 2.5 milliGRAM(s) IV Push every 6 hours PRN tachycardia  morphine  - Injectable 6 milliGRAM(s) IV Push every 3 hours PRN Severe Pain (7 - 10)  ondansetron Injectable 4 milliGRAM(s) IV Push every 6 hours PRN Nausea and/or Vomiting  polyethylene glycol 3350 17 Gram(s) Oral daily PRN Constipation  simethicone 80 milliGRAM(s) Chew every 6 hours PRN Gas    PRESENT SYMPTOMS: [ ]Unable to obtain due to poor mentation   Source if other than patient:  [ ]Family   [ ]Team     Pain: [x ]yes [ ]no  QOL impact -   Location -      back, abdomen              Aggravating factors - movement  Quality - aching  Radiation -  Timing- constant  Severity (0-10 scale): 6/10  Minimal acceptable level (0-10 scale): 3/10    CPOT:    https://www.[facility].org/getattachment/wxy94s08-3u0l-5g8z-3t7r-9450i1197t8w/Critical-Care-Pain-Observation-Tool-(CPOT)      PAIN AD Score:     http://geriatrictoolkit.[facility]/cog/painad.pdf (press ctrl +  left click to view)    Dyspnea:                           [ ]Mild [ ]Moderate [ ]Severe  Anxiety:                             [ ]Mild [ ]Moderate [ ]Severe  Fatigue:                             [ ]Mild [ ]Moderate [ ]Severe  Nausea:                             [ ]Mild [ x]Moderate [ ]Severe  Loss of appetite:              [ ]Mild [ ]Moderate [ ]Severe  Constipation:                    [ ]Mild [ ]Moderate [ ]Severe    Other Symptoms:  [ x]All other review of systems negative     Palliative Performance Status Version 2:         %    http://npcrc.org/files/news/palliative_performance_scale_ppsv2.pdf    PHYSICAL EXAM:  Vital Signs Last 24 Hrs  T(C): 36.7 (01 Jun 2020 11:56), Max: 38.2 (31 May 2020 23:44)  T(F): 98 (01 Jun 2020 11:56), Max: 100.7 (31 May 2020 23:44)  HR: 117 (01 Jun 2020 11:56) (117 - 142)  BP: 99/63 (01 Jun 2020 11:56) (99/63 - 115/69)  BP(mean): --  RR: 20 (01 Jun 2020 11:56) (18 - 20)  SpO2: 98% (01 Jun 2020 11:56) (95% - 98%)    Limited exam for patient comfort  GENERAL:  [x ]Alert  [ ]Oriented x   [ ]Lethargic  [ ]Cachexia  [ ]Unarousable  [x ]Verbal  [ ]Non-Verbal  Behavioral:   [ ] Anxiety  [ ] Delirium [ ] Agitation [ ] Other  HEENT:  [x ]Normal   [ ]Dry mouth   [ ]ET Tube/Trach  [ ]Oral lesions  PULMONARY:   [ ]Clear [ ]Tachypnea  [ ]Audible excessive secretions   [ ]Rhonchi        [ ]Right [ ]Left [ ]Bilateral  [ ]Crackles        [ ]Right [ ]Left [ ]Bilateral  [ ]Wheezing     [ ]Right [ ]Left [ ]Bilateral  [ ]Diminished breath sounds [ ]right [ ]left [ ]bilateral  CARDIOVASCULAR:    [ ]Regular [ ]Irregular [ ]Tachy  [ ]Quirino [ ]Murmur [ ]Other  GASTROINTESTINAL:  [ ]Soft  [ ]Distended   [ ]+BS  [ ]Non tender [ ]Tender  [ ]PEG [ ]OGT/ NGT  Last BM:     GENITOURINARY:  [ ]Normal [ ] Incontinent   [ ]Oliguria/Anuria   [ ]Cifuentes  MUSCULOSKELETAL:   [ ]Normal   [ ]Weakness  [ ]Bed/Wheelchair bound [ ]Edema  NEUROLOGIC:   [x ]No focal deficits  [ ]Cognitive impairment  [ ]Dysphagia [ ]Dysarthria [ ]Paresis [ ]Other   SKIN:   [ ]Normal    [ ]Rash  [ ]Pressure ulcer(s)       Present on admission [ ]y [ ]n    CRITICAL CARE:  [ ] Shock Present  [ ]Septic [ ]Cardiogenic [ ]Neurologic [ ]Hypovolemic  [ ]  Vasopressors [ ]  Inotropes   [ ]Respiratory failure present [ ]Mechanical ventilation [ ]Non-invasive ventilatory support [ ]High flow  [ ]Acute  [ ]Chronic [ ]Hypoxic  [ ]Hypercarbic [ ]Other  [ ]Other organ failure     LABS: reviewed                                   7.6    23.14 )-----------( 362      ( 01 Jun 2020 06:29 )             26.1     06-01    131<L>  |  97  |  5<L>  ----------------------------<  94  3.3<L>   |  21<L>  |  0.42<L>    Ca    10.0      01 Jun 2020 06:29      RADIOLOGY & ADDITIONAL STUDIES: CT A/P reviewed on sunrise    PROTEIN CALORIE MALNUTRITION PRESENT: [ ]mild [ ]moderate [ ]severe [ ]underweight [ ]morbid obesity  https://www.andeal.org/vault/2727/web/files/ONC/Table_Clinical%20Characteristics%20to%20Document%20Malnutrition-White%20JV%20et%20al%202012.pdf    Height (cm): 162.56 (05-26-20 @ 12:53), 164 (05-26-20 @ 12:04), 163 (05-22-20 @ 13:44)  Weight (kg): 73.5 (05-26-20 @ 21:00), 72.8 (05-26-20 @ 12:04), 73.936 (05-22-20 @ 13:41)  BMI (kg/m2): 27.8 (05-26-20 @ 21:00), 27.5 (05-26-20 @ 12:53), 27.1 (05-26-20 @ 12:04)    [ ]PPSV2 < or = to 30% [ ]significant weight loss  [ ]poor nutritional intake  [ ]anasarca     Albumin, Serum: 2.6 g/dL (05-26-20 @ 13:21)   [ ]Artificial Nutrition      REFERRALS:   [ ]Chaplaincy  [ ]Hospice  [ ]Child Life  [ ]Social Work  [ ]Case management [ ]Holistic Therapy     Goals of Care Document:     ______________  Alex Mack MD   of Geriatric and Palliative Medicine  [facility]     Please page the following number for clinical matters between the hours of 9AM and 5PM   from Monday through Friday : [phone]    After 5PM and on weekends, please page: [phone]. The Geriatric and Palliative Medicine consult service has 24/7 coverage for medical recommendations, including for symptom management needs.

## 2020-06-01 NOTE — PROGRESS NOTE ADULT - ASSESSMENT
24F w/ recent diagnosis of metastatic carcinoma of unknown primary s/p Carbo/Taxol cycle 1 on 5/5 discharged last week, presented to treatment room today at [facility] found to be febrile to 101, tachycardic to 150 and tachypneic sent into the ER for further workup.    #Altered Mental Status  Recommend MRI w/wo contrast to rule out presence of CNS metastatic disease  ?medication induced in setting of pain medication  Hypercalcemia improved / resolved; electrolytes grossly normal    #Fever: Improved 5/30/2020  Pt had recent pneumonia last admission and was discharged on Levaquin. Pt also had frequent episodes of fever during her admission, with negative cultures, and were deemed to be 2/2 tumor fever.   Blood cx NGTD  Abx per ID- broadened to linezolid and fluconazole  Febrile overnight on 6/1/20 (non-neutropenic); pancultured, watching off of abx per ID  Continue to trend WBC with daily CBC with differential  Received Zarxio on discharge last admission    #Metastatic carcinoma of unknown primary- being treated as ovarian primary:  Details per HPI   Pt was due 5/26 for cycle two of Carbo/Taxol   Ca-125 tumor marker is improving, 598 from 1065  Seen by rad/onc on 6/1/20 for simulation  Hypercalcemia likely 2/2 malignancy- appreciate endocrine recommendations; improved  Appreciate palliative care evaluation for pain control   Planned for C2 on Monday; however, given worsening encephalopathy, will hold for now    #Tachycardia  Evaluated by cardiology. On tele now. HR slightly improved. Remains in sinus rhythm. Recent echo WNL     #Nausea/Vomiting  CT A/P reviewed. No obstruction. Symptoms likely 2/2 abd distension from disease/malignant ascites.   Continue anti emetics  Advance diet as tolerated  Aggressive bowel regimen for constipation    #Shortness of breath:  CTA 5/26/20 negative for PE, showing left pleural effusion  CT A/P showing new ascites  s/p paracentesis per IR on 5/27 with removal of 1300 ccs   on supplemental O2 - 2L NC    Discussed with primary team. Will continue to follow. Please call back with any questions.    Luis Peng MD, MPH  Hematology / Oncology Fellow, PGY5  p

## 2020-06-02 PROBLEM — C79.9 SECONDARY MALIGNANT NEOPLASM OF UNSPECIFIED SITE: Chronic | Status: ACTIVE | Noted: 2020-01-01

## 2020-06-02 NOTE — PROGRESS NOTE ADULT - PROBLEM SELECTOR PLAN 9
improving   iv diuresis today given worsening abdominal distention/ascites/volume overload improving   iv diuresis today given worsening abdominal distention/ascites/volume overload  replete other lytes- hypomagnesemia, hypophosphatemia

## 2020-06-02 NOTE — PROGRESS NOTE ADULT - SUBJECTIVE AND OBJECTIVE BOX
[facility] Division of Hospital Medicine  Magui Merritt MD  Pager (M-F, 8A-5P): [phone]  Other Times:  [phone]    Patient is a 24y old  Female who presents with a chief complaint of fever, shortness of breath (2020 15:46)      SUBJECTIVE / OVERNIGHT EVENTS:      WCT overnight 15-20 beats. remains tachycardic 130-150s.   a little more awake today but still overall lethargic.   worsening abdominal distention and sob now requiring 3L O2.   received 20 IV lasix yesterday and 40 IV lasix today.    does report some sob. denies cp, cough, fever.   continues to have lower back pain.    ADDITIONAL REVIEW OF SYSTEMS:    MEDICATIONS  (STANDING):  cholecalciferol 4000 Unit(s) Oral daily  diltiazem    Tablet 60 milliGRAM(s) Oral every 8 hours  enoxaparin Injectable 40 milliGRAM(s) SubCutaneous daily  metoprolol tartrate 25 milliGRAM(s) Oral every 12 hours  morphine ER Tablet 30 milliGRAM(s) Oral two times a day  pantoprazole    Tablet 40 milliGRAM(s) Oral before breakfast  piperacillin/tazobactam IVPB.. 3.375 Gram(s) IV Intermittent every 8 hours  polyethylene glycol 3350 17 Gram(s) Oral daily  potassium acid phosphate/sodium acid phosphate tablet (K-PHOS No. 2) 1 Tablet(s) Oral four times a day with meals    MEDICATIONS  (PRN):  aluminum hydroxide/magnesium hydroxide/simethicone Suspension 30 milliLiter(s) Oral every 6 hours PRN Dyspepsia  bisacodyl 5 milliGRAM(s) Oral every 12 hours PRN Constipation  haloperidol    Injectable 0.5 milliGRAM(s) IV Push every 6 hours PRN nausea  metoprolol tartrate Injectable 2.5 milliGRAM(s) IV Push every 6 hours PRN tachycardia  morphine  - Injectable 4 milliGRAM(s) IV Push every 4 hours PRN Severe Pain (7 - 10)  ondansetron Injectable 4 milliGRAM(s) IV Push every 6 hours PRN Nausea and/or Vomiting  simethicone 80 milliGRAM(s) Chew every 6 hours PRN Gas      CAPILLARY BLOOD GLUCOSE        I&O's Summary    2020 07:01  -  2020 07:00  --------------------------------------------------------  IN: 480 mL / OUT: 200 mL / NET: 280 mL    2020 07:01  -  2020 16:06  --------------------------------------------------------  IN: 240 mL / OUT: 400 mL / NET: -160 mL        PHYSICAL EXAM:  Vital Signs Last 24 Hrs  T(C): 36.8 (2020 12:27), Max: 37.2 (2020 20:13)  T(F): 98.2 (2020 12:), Max: 98.9 (2020 20:13)  HR: 112 (2020 12:) (112 - 140)  BP: 96/58 (2020 12:27) (96/58 - 111/63)  BP(mean): 48 (2020 04:15) (48 - 48)  RR: 20 (2020 12:27) (18 - 23)  SpO2: 93% (2020 12:27) (90% - 98%)  GENERAL: chronically ill appearing   EYES:  conjunctiva and sclera clear  NECK: Supple, No JVD  CHEST/LUNG: shallow breathing  HEART: tachycardic  ABDOMEN: more distended, non-tender   EXTREMITIES: no LE edema   PSYCH: AAOx3 but lethargic    LABS:                        7.3    29.93 )-----------( 321      ( 2020 07:11 )             25.0     06-02    131<L>  |  97  |  7   ----------------------------<  88  3.8   |  21<L>  |  0.53    Ca    9.6      2020 07:11  Phos  2.0     06-02  Mg     1.8     06-02    TPro  6.2  /  Alb  2.0<L>  /  TBili  1.1  /  DBili  0.7<H>  /  AST  78<H>  /  ALT  19  /  AlkPhos  383<H>  06-02          Urinalysis Basic - ( 2020 06:27 )    Color: Dark Yellow / Appearance: Turbid / S.027 / pH: x  Gluc: x / Ketone: Trace  / Bili: Negative / Urobili: Negative   Blood: x / Protein: 30 mg/dL / Nitrite: Negative   Leuk Esterase: Negative / RBC: 0 /hpf / WBC 0 /HPF   Sq Epi: x / Non Sq Epi: 0 /hpf / Bacteria: MODERATE        Culture - Urine (collected 2020 08:55)  Source: .Urine Clean Catch (Midstream)  Final Report (2020 07:17):    No growth    Culture - Blood (collected 2020 04:20)  Source: .Blood Blood-Peripheral  Preliminary Report (2020 05:03):    No growth to date.    Culture - Blood (collected 2020 04:20)  Source: .Blood Blood-Peripheral  Preliminary Report (2020 05:03):    No growth to date.        RADIOLOGY & ADDITIONAL TESTS:  Results Reviewed: yes=-- CXR poor lung volumes.     COORDINATION OF CARE:  Care Discussed with Consultants/Other Providers [Y/N]: yes- heme/onc, palliative and IR  Prior or Outpatient Records Reviewed [Y/N]:

## 2020-06-02 NOTE — PROGRESS NOTE ADULT - ATTENDING COMMENTS
prognosis poor. family meeting on Thursday 11am  to IR for therapeutic paracentesis tomorrow. prognosis poor. family meeting on Thursday 11am  to IR for therapeutic paracentesis tomorrow. check coags in AM

## 2020-06-02 NOTE — PROGRESS NOTE ADULT - PROBLEM SELECTOR PLAN 6
-improved overall   -CT and x-ray without bowel obstruction  -continue anti-emetics   - MRI brain w/wo contrast to eval for metastatic disease  -dulcolax added for bowel regimen  -low threshold for re-imaging if vomiting/symptoms worsen   -replete electrolytes as needed  -haldol and lorazepam PRN nausea/vomiting, monitor QTc (will check ekg in am)

## 2020-06-02 NOTE — PROGRESS NOTE ADULT - SUBJECTIVE AND OBJECTIVE BOX
Patient seen and examined at bedside.    Overnight Events: Cards asked to re-eval for continued sinus tachycardia. Pt seen/examined at bedside. She is bradyphrenic and hypophonic. Pt denies any CP. She reports dyspnea, which has been chronic for some time; no acute worsening recently. She denies any palpitations. She does report pain that is worsened w/ palpation and movement. She also reports anxiety related to her diagnosis and constant medical testing/treatment. Her WBC has been worsening recently and she has developed worsening encephalopathy.    Review Of Systems:  REVIEW OF SYSTEMS:  CONSTITUTIONAL: +Weakness  EYES/ENT: No visual changes;  No dysphagia  NECK: No pain or stiffness  RESPIRATORY: +Dyspnea  CARDIOVASCULAR: No chest pain or palpitations; No lower extremity edema  GASTROINTESTINAL: No abdominal or epigastric pain. No nausea, vomiting, or hematemesis; No diarrhea or constipation. No melena or hematochezia.  BACK: No back pain  GENITOURINARY: No dysuria, frequency or hematuria  NEUROLOGICAL: No numbness or weakness  SKIN: No itching, burning, rashes, or lesions   All other review of systems is negative unless indicated above.       Current Meds:  aluminum hydroxide/magnesium hydroxide/simethicone Suspension 30 milliLiter(s) Oral every 6 hours PRN  bisacodyl 5 milliGRAM(s) Oral every 12 hours PRN  cholecalciferol 4000 Unit(s) Oral daily  diltiazem    Tablet 60 milliGRAM(s) Oral every 8 hours  enoxaparin Injectable 40 milliGRAM(s) SubCutaneous daily  haloperidol    Injectable 0.5 milliGRAM(s) IV Push every 6 hours PRN  metoprolol tartrate 25 milliGRAM(s) Oral every 12 hours  metoprolol tartrate Injectable 2.5 milliGRAM(s) IV Push every 6 hours PRN  morphine  - Injectable 4 milliGRAM(s) IV Push every 4 hours PRN  morphine ER Tablet 30 milliGRAM(s) Oral two times a day  ondansetron Injectable 4 milliGRAM(s) IV Push every 6 hours PRN  pantoprazole    Tablet 40 milliGRAM(s) Oral before breakfast  piperacillin/tazobactam IVPB.. 3.375 Gram(s) IV Intermittent every 8 hours  polyethylene glycol 3350 17 Gram(s) Oral daily PRN  potassium acid phosphate/sodium acid phosphate tablet (K-PHOS No. 2) 1 Tablet(s) Oral four times a day with meals  simethicone 80 milliGRAM(s) Chew every 6 hours PRN      Vitals:  T(F): 98.8 (06-02), Max: 98.9 (06-01)  HR: 128 (06-02) (117 - 140)  BP: 104/66 (06-02) (97/58 - 111/63)  RR: 23 (06-02)  SpO2: 90% (06-02)  I&O's Summary    01 Jun 2020 07:01  -  02 Jun 2020 07:00  --------------------------------------------------------  IN: 480 mL / OUT: 200 mL / NET: 280 mL    02 Jun 2020 07:01  -  02 Jun 2020 10:56  --------------------------------------------------------  IN: 120 mL / OUT: 0 mL / NET: 120 mL        Physical Exam:  Gen: Ill appearing pt.  HEENT: Bitemporal wasting.  Neck: No JVP elev.  CV: Normal S1, S2. Tachycardic w/ reg rhythm. No MRG.  Chest: CTAB. No WRR.  Abd: +BSx4. Soft. Nondistended.  Ext: No LE edema.  Skin: No cyanosis.  Psych: Fully oriented. Bradyphrenic.  Neuro: No gross focal deficits.                          7.3    29.93 )-----------( 321      ( 02 Jun 2020 07:11 )             25.0     06-02    131<L>  |  97  |  7   ----------------------------<  88  3.8   |  21<L>  |  0.53    Ca    9.6      02 Jun 2020 07:11  Phos  2.0     06-02  Mg     1.8     06-02    TPro  6.2  /  Alb  2.0<L>  /  TBili  1.1  /  DBili  0.7<H>  /  AST  78<H>  /  ALT  19  /  AlkPhos  383<H>  06-02          Serum Pro-Brain Natriuretic Peptide: 295 pg/mL (06-02 @ 07:11)    Echo: < from: TTE with Doppler (w/Cont) (04.27.20 @ 05:34) >  Normal left ventricular systolic function. No segmental  wall motion abnormalities.  Trace pericardial effusion.    Interpretation of Telemetry: NSR w/ -120. Patient seen and examined at bedside.    Overnight Events: Cards asked to re-eval for continued sinus tachycardia. Pt seen/examined at bedside. She is bradyphrenic and hypophonic. Pt denies any CP. She reports dyspnea, which has been chronic for some time; no acute worsening recently. She denies any palpitations. She does report pain that is worsened w/ palpation and movement. She also reports anxiety related to her diagnosis and constant medical testing/treatment. Her WBC has been worsening recently and she has developed worsening encephalopathy.    REVIEW OF SYSTEMS:  CONSTITUTIONAL: +Weakness  EYES/ENT: No visual changes;  No dysphagia  NECK: No pain or stiffness  RESPIRATORY: +Dyspnea  CARDIOVASCULAR: No chest pain or palpitations; No lower extremity edema  GASTROINTESTINAL: No abdominal or epigastric pain. No nausea, vomiting, or hematemesis; No diarrhea or constipation. No melena or hematochezia.  BACK: No back pain  GENITOURINARY: No dysuria, frequency or hematuria  NEUROLOGICAL: No numbness or weakness  SKIN: No itching, burning, rashes, or lesions   All other review of systems is negative unless indicated above.       Current Meds:  aluminum hydroxide/magnesium hydroxide/simethicone Suspension 30 milliLiter(s) Oral every 6 hours PRN  bisacodyl 5 milliGRAM(s) Oral every 12 hours PRN  cholecalciferol 4000 Unit(s) Oral daily  diltiazem    Tablet 60 milliGRAM(s) Oral every 8 hours  enoxaparin Injectable 40 milliGRAM(s) SubCutaneous daily  haloperidol    Injectable 0.5 milliGRAM(s) IV Push every 6 hours PRN  metoprolol tartrate 25 milliGRAM(s) Oral every 12 hours  metoprolol tartrate Injectable 2.5 milliGRAM(s) IV Push every 6 hours PRN  morphine  - Injectable 4 milliGRAM(s) IV Push every 4 hours PRN  morphine ER Tablet 30 milliGRAM(s) Oral two times a day  ondansetron Injectable 4 milliGRAM(s) IV Push every 6 hours PRN  pantoprazole    Tablet 40 milliGRAM(s) Oral before breakfast  piperacillin/tazobactam IVPB.. 3.375 Gram(s) IV Intermittent every 8 hours  polyethylene glycol 3350 17 Gram(s) Oral daily PRN  potassium acid phosphate/sodium acid phosphate tablet (K-PHOS No. 2) 1 Tablet(s) Oral four times a day with meals  simethicone 80 milliGRAM(s) Chew every 6 hours PRN      Vitals:  T(F): 98.8 (06-02), Max: 98.9 (06-01)  HR: 128 (06-02) (117 - 140)  BP: 104/66 (06-02) (97/58 - 111/63)  RR: 23 (06-02)  SpO2: 90% (06-02)  I&O's Summary    01 Jun 2020 07:01  -  02 Jun 2020 07:00  --------------------------------------------------------  IN: 480 mL / OUT: 200 mL / NET: 280 mL    02 Jun 2020 07:01  -  02 Jun 2020 10:56  --------------------------------------------------------  IN: 120 mL / OUT: 0 mL / NET: 120 mL        Physical Exam:  Gen: Ill appearing pt.  HEENT: Bitemporal wasting.  Neck: No JVP elev.  CV: Normal S1, S2. Tachycardic w/ reg rhythm. No MRG.  Chest: CTAB. No WRR.  Abd: +BSx4. Soft. Nondistended.  Ext: No LE edema.  Skin: No cyanosis.  Psych: Fully oriented. Bradyphrenic.  Neuro: No gross focal deficits.                          7.3    29.93 )-----------( 321      ( 02 Jun 2020 07:11 )             25.0     06-02    131<L>  |  97  |  7   ----------------------------<  88  3.8   |  21<L>  |  0.53    Ca    9.6      02 Jun 2020 07:11  Phos  2.0     06-02  Mg     1.8     06-02    TPro  6.2  /  Alb  2.0<L>  /  TBili  1.1  /  DBili  0.7<H>  /  AST  78<H>  /  ALT  19  /  AlkPhos  383<H>  06-02          Serum Pro-Brain Natriuretic Peptide: 295 pg/mL (06-02 @ 07:11)    Echo: < from: TTE with Doppler (w/Cont) (04.27.20 @ 05:34) >  Normal left ventricular systolic function. No segmental  wall motion abnormalities.  Trace pericardial effusion.    Interpretation of Telemetry: NSR w/ -120.

## 2020-06-02 NOTE — PROGRESS NOTE ADULT - SUBJECTIVE AND OBJECTIVE BOX
Oncology Follow-up    INTERVAL HPI/OVERNIGHT EVENTS:  Afebrile overnight, but with rising leukocytosis. Re-started on pip/tazo. Hypoxic to 90% this AM on RA, with worsening tachycardia and blood pressure bordering on hypotension. She was put on oxygen supplementatation via nasal canula with improvement of O2Sat%, but continued to desat in afternoon to mid-80s when it was held, with noticeable worsening of tachypnea.  She reports feeling less confused since morphine was decreased yesterday afternoon due to concern for possible drug-induced encephalopathy, but does endorse having continuing back pain.  Feeling thirsty, eating intermittently.  ROS otherwise grossly negative.    Review of Systems: ROS otherwise negative.    VITAL SIGNS:  T(F): 98.2 (20 @ 12:27)  HR: 112 (20 @ 12:27)  BP: 96/58 (20 @ 12:27)  RR: 20 (20 @ 12:27)  SpO2: 93% (20 @ 12:27)    20 @ 07:01  -  20 @ 07:00  --------------------------------------------------------  IN: 480 mL / OUT: 200 mL / NET: 280 mL    20 @ 07:01  -  20 @ 15:26  --------------------------------------------------------  IN: 240 mL / OUT: 400 mL / NET: -160 mL        PHYSICAL EXAM:    Constitutional: drowsy, but oriented x2, more alert appearing than yesterday, improved attention; mild tachypnea, worsens when off of nasal canula for 1 min. Ill appearing, pale.  Eyes: PERRL, EOMI, sclera non-icteric  Neck: supple, no masses, no JVD, no lymphadenopathy  Respiratory: CTA b/l, decreased breath sounds, increased respiratory effort  Cardiovascular: tachycardic, regular rhythm  Gastrointestinal: distended, mild tenderness to palpation without guarding  Extremities: bilateral +2 non-pitting edema  MSK: no obvious abnormalities, normal ROM, no lymphadenopathy  Neurological: strength, effort, and attention improved compared to 20; continues to have some dysarthria  Skin: Normal temperature, no rash, no ecchymoses, no petechiae  Psych: normal affect, calm    MEDICATIONS  (STANDING):  cholecalciferol 4000 Unit(s) Oral daily  diltiazem    Tablet 60 milliGRAM(s) Oral every 8 hours  enoxaparin Injectable 40 milliGRAM(s) SubCutaneous daily  metoprolol tartrate 25 milliGRAM(s) Oral every 12 hours  morphine ER Tablet 30 milliGRAM(s) Oral two times a day  pantoprazole    Tablet 40 milliGRAM(s) Oral before breakfast  piperacillin/tazobactam IVPB.. 3.375 Gram(s) IV Intermittent every 8 hours  polyethylene glycol 3350 17 Gram(s) Oral daily  potassium acid phosphate/sodium acid phosphate tablet (K-PHOS No. 2) 1 Tablet(s) Oral four times a day with meals  potassium chloride    Tablet ER 20 milliEquivalent(s) Oral once    MEDICATIONS  (PRN):  aluminum hydroxide/magnesium hydroxide/simethicone Suspension 30 milliLiter(s) Oral every 6 hours PRN Dyspepsia  bisacodyl 5 milliGRAM(s) Oral every 12 hours PRN Constipation  haloperidol    Injectable 0.5 milliGRAM(s) IV Push every 6 hours PRN nausea  metoprolol tartrate Injectable 2.5 milliGRAM(s) IV Push every 6 hours PRN tachycardia  morphine  - Injectable 4 milliGRAM(s) IV Push every 4 hours PRN Severe Pain (7 - 10)  ondansetron Injectable 4 milliGRAM(s) IV Push every 6 hours PRN Nausea and/or Vomiting  simethicone 80 milliGRAM(s) Chew every 6 hours PRN Gas      No Known Allergies      LABS:                        7.3    29.93 )-----------( 321      ( 2020 07:11 )             25.0     06-02    131<L>  |  97  |  7   ----------------------------<  88  3.8   |  21<L>  |  0.53    Ca    9.6      2020 07:11  Phos  2.0       Mg     1.8         TPro  6.2  /  Alb  2.0<L>  /  TBili  1.1  /  DBili  0.7<H>  /  AST  78<H>  /  ALT  19  /  AlkPhos  383<H>         Urinalysis Basic - ( 2020 06:27 )    Color: Dark Yellow / Appearance: Turbid / S.027 / pH: x  Gluc: x / Ketone: Trace  / Bili: Negative / Urobili: Negative   Blood: x / Protein: 30 mg/dL / Nitrite: Negative   Leuk Esterase: Negative / RBC: 0 /hpf / WBC 0 /HPF   Sq Epi: x / Non Sq Epi: 0 /hpf / Bacteria: MODERATE    Culture - Blood (20 @ 04:20)    Specimen Source: .Blood Blood-Peripheral    Culture Results:   No growth to date.    Culture - Urine (20 @ 08:55)    Specimen Source: .Urine Clean Catch (Midstream)    Culture Results:   No growth    Bilirubin Direct, Serum: 0.7 <H> (20 @ 07:11)    RADIOLOGY & ADDITIONAL TESTS:  Studies reviewed.    < from: US Abdomen Limited (20 @ 10:58) >    EXAM:  US ABDOMEN LIMITED                            PROCEDURE DATE:  2020            INTERPRETATION:  CLINICAL INFORMATION: Metastatic ovarian cancer. Increasing shortness of breath, evaluate ascites.    COMPARISON: CT abdomen pelvis dated 2020.    TECHNIQUE: Sonographic survey of the abdomen.    FINDINGS:  Sonographic survey of the abdomen demonstrates the presence of moderate ascites. Fluid is present in all 4 quadrants with the largest amount in the right lower quadrant. Thin septations and loculations are identified within the fluid.    IMPRESSION:   Moderate ascites.    MARITZA WATSON M.D., ATTENDING RADIOLOGIST  This document has been electronically signed. 2020 11:25AM    < end of copied text >    < from: MR Head No Cont (20 @ 18:50) >    EXAM:  MR BRAIN                          PROCEDURE DATE:  2020      INTERPRETATION:  INDICATIONS:  Metastatic ovarian carcinoma    TECHNIQUE:  Multiplanar imaging was performed using T1 weighted, T2 weighted and FLAIR sequences.  Diffusion weighted and susceptibility sensitive images were also obtained.      COMPARISON EXAMINATION:  None.      FINDINGS: The study was performed without contrast as hCG levels were elevated. Several sequences could not be performed. The patient couldnot tolerate additional images. The available images are degraded by motion artifact    VENTRICLES AND SULCI:  Normal.  INTRA-AXIAL:  Although the FLAIR sequence is degraded by motion, there is the suggestion of scattered foci of white matter T2 hyperintensity. No mass effect is seen. No diffusion restriction is seen to suggest acute ischemia although these images are degraded as well. No abnormal susceptibility is identified.  EXTRA-AXIAL:  No mass or collection seen.  VISUALIZED SINUSES:  Left sphenoid polyp or retention cyst  VISUALIZED MASTOIDS:  Clear.  CALVARIUM:  Decreased signal is seen within the diploic space diffusely. Decreased marrow signal is seen within the visualized cervical spine.  MISCELLANEOUS:  None.    IMPRESSION:    Incomplete, limited exam.    No intracranial mass effect.    Diffusely decreased marrow signal which may represent red marrow conversion or an infiltrative process.    Repeat imaging advised as clinically warranted.    FARHEEN MURRY M.D., ATTENDING RADIOLOGIST  This document has been electronically signed. 2020  9:37AM    < end of copied text >

## 2020-06-02 NOTE — PROGRESS NOTE ADULT - PROBLEM SELECTOR PLAN 5
- plan per oncology  - family meeting 11:30 on 6/4 - plan per oncology  - family meeting 1100 on 6/4

## 2020-06-02 NOTE — PROGRESS NOTE ADULT - ASSESSMENT
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    #Sinus tach  -ECG from earlier in admission reviewed and c/w sinus tach  -Tele reveals sinus tach w/ -120  -Can obtain updated ECG to verify current rhythm  -Sinus tach is typically reactive and much less commonly indicative of a primary cardiac issue  -Common etiologies of sinus tach incl pain, anxiety, dyspnea, sepsis  -PE has already been r/o earlier in admission and pt has been on DVT ppx since that time  -TTE done earlier in admission w/ normal LV structure; low suspicion that repeat will be high yield now  -Pt w/ worsening encephalopathy and rising WBC suggesting SIRS  -Would conduct broad eval for underlying cause and adequately tx underlying issues incl pain  -While it may be tempting to mask acute sinus tach w/ BB/CCB, addressing underlying issues will be imperative    #Will d/w attg  #Call w/ any further Qs    Oliver Eller MD  Cardiology Fellow  [phone]  All Cardiology service information can be found 24/7 on [email or web address], password: [email or web address] w/ recent dx of metastatic CUP (suspected ovarian) currently on chemo p/w fevers, tachycardia, and tachypnea.    #Sinus tach  -ECG from earlier in admission reviewed and c/w sinus tach  -Tele reveals sinus tach w/ -120  -Can obtain updated ECG to verify current rhythm  -Sinus tach is typically reactive and much less commonly indicative of a primary cardiac issue  -Common etiologies of sinus tach incl pain, anxiety, dyspnea, sepsis  -PE has already been r/o earlier in admission and pt has been on DVT ppx since that time  -TTE done earlier in admission w/ normal LV structure; low suspicion that repeat will be high yield now  -Pt w/ worsening encephalopathy and rising WBC suggesting SIRS  -Would conduct broad eval for underlying cause and adequately tx underlying issues incl pain  -While it may be tempting to mask acute sinus tach w/ BB/CCB, addressing underlying issues will be imperative    #Hx of CP and abnml ECG  -Some thought that 5-FU could cause vasospasm  -C/w dilt 60mg to address possible vasospasm    #Will d/w attg  #Call w/ any further Qs    Oliver Eller MD  Cardiology Fellow  [phone]  All Cardiology service information can be found 24/7 on [email or web address], password: [email or web address] w/ recent dx of metastatic CUP (suspected ovarian) currently on chemo p/w fevers, tachycardia, and tachypnea.    #Sinus tach  -ECG from earlier in admission reviewed and c/w sinus tach  -Tele reveals sinus tach w/ -120  -Can obtain updated ECG to verify current rhythm  -Addendum: in light of sinus tach and new NSVT in context of 5-FU use, which can cause cardiotoxicity, repeat TTE is reasonable  -Otherwise, sinus tach is typically reactive and much less commonly indicative of a primary cardiac issue  -Common etiologies of sinus tach incl pain, anxiety, dyspnea, sepsis  -PE has already been r/o earlier in admission and pt has been on DVT ppx since that time  -Pt w/ worsening encephalopathy and rising WBC suggesting SIRS  -Would conduct broad eval for underlying cause and adequately tx underlying issues incl pain  -While it may be tempting to mask acute sinus tach w/ BB/CCB, addressing underlying issues will be imperative    #NSVT  -F/u TTE, as above  -C/w BB metop 25mg BID    #Hx of CP and abnml ECG  -Some thought that 5-FU could cause vasospasm  -C/w dilt 60mg to address possible vasospasm    #Will d/w attg  #Call w/ any further Qs    Oliver Eller MD  Cardiology Fellow  [phone]  All Cardiology service information can be found 24/7 on [email or web address], password: [email or web address]

## 2020-06-02 NOTE — PROGRESS NOTE ADULT - ASSESSMENT
24F with recent diagnosis of metastatic carcinoma of GI primary s/p Carbo/Taxol cycle 1 on 5/5 who was sent in from  [facility] on account of fever, tachycardia  and tachypnea. Suspicion for Sepsis 2/2 PNA. Hypercalcemia related to bone mets. Pain of metastatic disease. Malignant ascites s/p paracentesis 5/27.

## 2020-06-02 NOTE — PROGRESS NOTE ADULT - SUBJECTIVE AND OBJECTIVE BOX
24F with recent diagnosis of metastatic carcinoma of GI primary s/p Carbo/Taxol cycle 1 on 5/5 who was sent in from  [facility] on account of fever, tachycardia  and tachypnea. Suspicion for Sepsis 2/2 PNA. Hypercalcemia related to bone mets. Pain of metastatic disease. Malignant ascites s/p paracentesis 5/27.       ICU Vital Signs Last 24 Hrs  T(C): 37.1 (02 Jun 2020 04:15), Max: 37.2 (01 Jun 2020 20:13)  T(F): 98.8 (02 Jun 2020 04:15), Max: 98.9 (01 Jun 2020 20:13)  HR: 128 (02 Jun 2020 08:34) (117 - 140)  BP: 104/66 (02 Jun 2020 08:34) (97/58 - 111/63)  BP(mean): 48 (02 Jun 2020 04:15) (48 - 48)  RR: 23 (02 Jun 2020 08:34) (18 - 23)  SpO2: 90% (02 Jun 2020 04:15) (90% - 98%)    Notified by RN transort is here ffor transport to [facility] for radiation therapy. Transport cancelled.  Patient with persistent tachycardia 128-130's with tachypneic , currently on 4 L NC.   Overnight events of 17 beats wide complex beats Electrolytes repleated.        Patient given Cardizem 10 mg IV x 1 and Lasix 40 mg IV x 1 given      Patient seen and examined with Dr. Merritt    Plan:  ABG ordered   Cardiology called for follow up 2/2 tachycardia  US Abdomen ordered to reeval ascites - spoke with US will do bedside   awaiting TTE - will be done at bedside today     Low threshold for ICU consult 2/2 soft BP     Will continue to monitor       Phuong Uriarte NP  Internal Medicine

## 2020-06-02 NOTE — PROGRESS NOTE ADULT - ASSESSMENT
24F with PMH of metastatic carcinoma of unknown primary (treated as ovarian primary) s/p carbo/taxol (5/5/20) here from [facility] for fever, tachycardia, and tachypnea, along with N/V/dyspepsia. COVID-19 negative. Treatment started with cefepime and vancomycin. Hypercalcemia noted, along with ascites. Cultures drawn. Palliative care called to help with pain management.

## 2020-06-02 NOTE — PROGRESS NOTE ADULT - ATTENDING COMMENTS
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 MRI performed without contrast (hCG elevated, though likely 2/2 malignancy); limited by motion artifact, but no large lesions identified, no mass effect seen. Will hold off on repeating for now, see if encephalopathy improves. Overall her performance and nutritional status is poor. She hasn't shown response to FOLFOX or carbo/taxol. RT was cancelled today due to worsening hypoxia/tachycardiac. She was seen today with primary attending and palliative teams. All teams agree that she is a poor candidate for chemo.  Plan for family meeting on Thursday 6/4/20 at 11:00 AM to discuss GOC.

## 2020-06-02 NOTE — PROGRESS NOTE ADULT - ASSESSMENT
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    #Altered Mental Status  Improved after decrease in morphine dose; continue to monitor  MRI performed without contrast (hCG elevated, though likely 2/2 malignancy); limited by motion artifact, but no large lesions identified, no mass effect seen. Will hold off on repeating for now, see if encephalopathy improves.  Hypercalcemia improved / resolved; electrolytes grossly normal    #Fever: Improved 5/30/2020  Pt had recent pneumonia last admission and was discharged on Levaquin. Pt also had frequent episodes of fever during her admission, with negative cultures, and were deemed to be 2/2 tumor fever.   Blood cx NGTD  Afebrile overnight; on Zosyn; f/u pancultures  Continue to trend WBC with daily CBC with differential  Received Zarxio on discharge last admission    #Metastatic neuroendocrine carcinoma of unknown primary- being treated as GI primary (with mets to ovaries):  Details per HPI   Pt was due 5/26 for cycle two of Carbo/Taxol   Ca-125 tumor marker is improving, 598 from 1065  Seen by rad/onc on 6/1/20 for simulation; plan for radiation tx on 6/3/20  Hypercalcemia likely 2/2 malignancy- appreciate endocrine recommendations; improved  Appreciate palliative care evaluation for pain control   Planned for C2 on 6/1/20; however, given worsening encephalopathy, will hold for now; worsening oxygenation and functional status. Plan for family meeting on Thursday 6/4/20 at 11:00 AM (palliative and primary team notified)    #Tachycardia  Evaluated by cardiology. On tele now. Continues to be tachycardic.  Remains in sinus rhythm but had run of NSVT on 6/1/20   Recent echo WNL    #Nausea/Vomiting  CT A/P reviewed. No obstruction. Symptoms likely 2/2 abd distension from disease/malignant ascites.  Continue anti emetics  Advance diet as tolerated  Aggressive bowel regimen for constipation    #Shortness of breath:  CTA 5/26/20 negative for PE, showing left pleural effusion  CT A/P showing new ascites  s/p paracentesis per IR on 5/27 with removal of 1300 ccs; plan for paracentesis on 6/3/20; may be able to help with breathing  on supplemental O2 - 3L NC    Discussed with primary team, palliative care, and with family (Mother, Jaqueline). Will continue to follow. Please call back with any questions.    Luis Peng MD, MPH  Hematology / Oncology Fellow, PGY5  p

## 2020-06-02 NOTE — PROGRESS NOTE ADULT - SUBJECTIVE AND OBJECTIVE BOX
HPI: 24F with PMH of metastatic carcinoma of unknown primary (treated as ovarian primary) s/p carbo/taxol (5/5/20) here from [facility] for fever, tachycardia, and tachypnea, along with N/V/dyspepsia. COVID-19 negative. Treatment started with cefepime and vancomycin. Hypercalcemia noted, along with ascites. Cultures drawn. Palliative care called to help with pain management.    INTERVAL EVENTS:  5/28: states pain somewhat better, but not flatus or BM x 2-3 days  5/29: states having nausea, pain as well, but no PRN/24 hours  6/1: patient dysarthric today, denies pain, but unable to clearly express thoughts  6/2: more alert today, states having 4/10 back pain    ADVANCE DIRECTIVES:    DNR  MOLST  [ ]  Living Will  [ ]   DECISION MAKER(s):  [ ] Health Care Proxy(s)  [ ] Surrogate(s)  [ ] Guardian           Name(s): Phone Number(s):    BASELINE (I)ADL(s) (prior to admission):  [place]: [ ]Total  [ ] Moderate [ ]Dependent    Allergies    No Known Allergies    Intolerances    MEDICATIONS  (STANDING):  cholecalciferol 4000 Unit(s) Oral daily  diltiazem    Tablet 60 milliGRAM(s) Oral every 8 hours  enoxaparin Injectable 40 milliGRAM(s) SubCutaneous daily  metoprolol tartrate 25 milliGRAM(s) Oral every 12 hours  morphine ER Tablet 30 milliGRAM(s) Oral two times a day  pantoprazole    Tablet 40 milliGRAM(s) Oral before breakfast  piperacillin/tazobactam IVPB.. 3.375 Gram(s) IV Intermittent every 8 hours  polyethylene glycol 3350 17 Gram(s) Oral daily  potassium acid phosphate/sodium acid phosphate tablet (K-PHOS No. 2) 1 Tablet(s) Oral four times a day with meals    MEDICATIONS  (PRN):  aluminum hydroxide/magnesium hydroxide/simethicone Suspension 30 milliLiter(s) Oral every 6 hours PRN Dyspepsia  bisacodyl 5 milliGRAM(s) Oral every 12 hours PRN Constipation  haloperidol    Injectable 0.5 milliGRAM(s) IV Push every 6 hours PRN nausea  metoprolol tartrate Injectable 2.5 milliGRAM(s) IV Push every 6 hours PRN tachycardia  morphine  - Injectable 4 milliGRAM(s) IV Push every 4 hours PRN Severe Pain (7 - 10)  ondansetron Injectable 4 milliGRAM(s) IV Push every 6 hours PRN Nausea and/or Vomiting  simethicone 80 milliGRAM(s) Chew every 6 hours PRN Gas      PRESENT SYMPTOMS: [ ]Unable to obtain due to poor mentation   Source if other than patient:  [ ]Family   [ ]Team     Pain: [x ]yes [ ]no  QOL impact -   Location -      back, abdomen              Aggravating factors - movement  Quality - aching  Radiation -  Timing- constant  Severity (0-10 scale): 6/10  Minimal acceptable level (0-10 scale): 3/10    CPOT:    https://www.[facility].org/getattachment/[email or web address]-7j1p-7t1f-0698l8437p0x/Critical-Care-Pain-Observation-Tool-(CPOT)      PAIN AD Score:     http://geriatrictoolkit.[facility]/cog/painad.pdf (press ctrl +  left click to view)    Dyspnea:                           [ ]Mild [ ]Moderate [ ]Severe  Anxiety:                             [ ]Mild [ ]Moderate [ ]Severe  Fatigue:                             [ ]Mild [ ]Moderate [ ]Severe  Nausea:                             [ ]Mild [ x]Moderate [ ]Severe  Loss of appetite:              [ ]Mild [ ]Moderate [ ]Severe  Constipation:                    [ ]Mild [ ]Moderate [ ]Severe    Other Symptoms:  [ x]All other review of systems negative     Palliative Performance Status Version 2:         %    http://[facility].org/files/news/palliative_performance_scale_ppsv2.pdf    PHYSICAL EXAM:  Vital Signs Last 24 Hrs  T(C): 36.8 (02 Jun 2020 12:27), Max: 37.2 (01 Jun 2020 20:13)  T(F): 98.2 (02 Jun 2020 12:27), Max: 98.9 (01 Jun 2020 20:13)  HR: 112 (02 Jun 2020 12:27) (112 - 140)  BP: 96/58 (02 Jun 2020 12:27) (96/58 - 111/63)  BP(mean): 48 (02 Jun 2020 04:15) (48 - 48)  RR: 20 (02 Jun 2020 12:27) (18 - 23)  SpO2: 93% (02 Jun 2020 12:27) (90% - 98%)    Limited exam for patient comfort  GENERAL:  [x ]Alert  [ ]Oriented x   [ ]Lethargic  [ ]Cachexia  [ ]Unarousable  [x ]Verbal  [ ]Non-Verbal  Behavioral:   [ ] Anxiety  [ ] Delirium [ ] Agitation [ ] Other  HEENT:  [x ]Normal   [ ]Dry mouth   [ ]ET Tube/Trach  [ ]Oral lesions  PULMONARY:   [ ]Clear [ ]Tachypnea  [ ]Audible excessive secretions   [ ]Rhonchi        [ ]Right [ ]Left [ ]Bilateral  [ ]Crackles        [ ]Right [ ]Left [ ]Bilateral  [ ]Wheezing     [ ]Right [ ]Left [ ]Bilateral  [ ]Diminished breath sounds [ ]right [ ]left [ ]bilateral  CARDIOVASCULAR:    [ ]Regular [ ]Irregular [ ]Tachy  [ ]Quirino [ ]Murmur [ ]Other  GASTROINTESTINAL:  [ ]Soft  [ ]Distended   [ ]+BS  [ ]Non tender [ ]Tender  [ ]PEG [ ]OGT/ NGT  Last BM:     GENITOURINARY:  [ ]Normal [ ] Incontinent   [ ]Oliguria/Anuria   [ ]Cifuentes  MUSCULOSKELETAL:   [ ]Normal   [ ]Weakness  [ ]Bed/Wheelchair bound [ ]Edema  NEUROLOGIC:   [x ]No focal deficits  [ ]Cognitive impairment  [ ]Dysphagia [ ]Dysarthria [ ]Paresis [ ]Other   SKIN:   [ ]Normal    [ ]Rash  [ ]Pressure ulcer(s)       Present on admission [ ]y [ ]n    CRITICAL CARE:  [ ] Shock Present  [ ]Septic [ ]Cardiogenic [ ]Neurologic [ ]Hypovolemic  [ ]  Vasopressors [ ]  Inotropes   [ ]Respiratory failure present [ ]Mechanical ventilation [ ]Non-invasive ventilatory support [ ]High flow  [ ]Acute  [ ]Chronic [ ]Hypoxic  [ ]Hypercarbic [ ]Other  [ ]Other organ failure     LABS: reviewed                                   7.3    29.93 )-----------( 321      ( 02 Jun 2020 07:11 )             25.0     06-02    131<L>  |  97  |  7   ----------------------------<  88  3.8   |  21<L>  |  0.53    Ca    9.6      02 Jun 2020 07:11  Phos  2.0     06-02  Mg     1.8     06-02        RADIOLOGY & ADDITIONAL STUDIES: MRI head 6/1 reviewed    PROTEIN CALORIE MALNUTRITION PRESENT: [ ]mild [ ]moderate [ ]severe [ ]underweight [ ]morbid obesity  https://www.andeal.org/vault/2440/web/files/ONC/Table_Clinical%20Characteristics%20to%20Document%20Malnutrition-White%20JV%20et%20al%202012.pdf    Height (cm): 162.56 (05-26-20 @ 12:53), 164 (05-26-20 @ 12:04), 163 (05-22-20 @ 13:44)  Weight (kg): 73.5 (05-26-20 @ 21:00), 72.8 (05-26-20 @ 12:04), 73.936 (05-22-20 @ 13:41)  BMI (kg/m2): 27.8 (05-26-20 @ 21:00), 27.5 (05-26-20 @ 12:53), 27.1 (05-26-20 @ 12:04)    [ ]PPSV2 < or = to 30% [ ]significant weight loss  [ ]poor nutritional intake  [ ]anasarca     Albumin, Serum: 2.6 g/dL (05-26-20 @ 13:21)   [ ]Artificial Nutrition      REFERRALS:   [ ]Chaplaincy  [ ]Hospice  [ ]Child Life  [ ]Social Work  [ ]Case management [ ]Holistic Therapy     Goals of Care Document:     ______________  Alex Mack MD   of Geriatric and Palliative Medicine  [facility]     Please page the following number for clinical matters between the hours of 9AM and 5PM   from Monday through Friday : [phone]    After 5PM and on weekends, please page: [phone]. The Geriatric and Palliative Medicine consult service has 24/7 coverage for medical recommendations, including for symptom management needs.

## 2020-06-02 NOTE — PROGRESS NOTE ADULT - SUBJECTIVE AND OBJECTIVE BOX
Patient is a 24y old  Female who presents with a chief complaint of fever, shortness of breath (01 Jun 2020 16:29)    Being followed by ID for leucocytosis    Interval history:for RT today  some abd pain   started on zosyn today am   No other acute events      ROS:  No cough,SOB,CP  Occasional nausea  No vomiting/D    No urinary complaints  No HA  No joint or limb pain  No other complaints      Antimicrobials:    piperacillin/tazobactam IVPB. 3.375 Gram(s) IV Intermittent once  piperacillin/tazobactam IVPB.. 3.375 Gram(s) IV Intermittent every 8 hours      other medications reviewed    Vital Signs Last 24 Hrs  T(C): 37.1 (06-02-20 @ 04:15), Max: 37.2 (06-01-20 @ 20:13)  T(F): 98.8 (06-02-20 @ 04:15), Max: 98.9 (06-01-20 @ 20:13)  HR: 128 (06-02-20 @ 08:34) (117 - 140)  BP: 104/66 (06-02-20 @ 08:34) (97/58 - 111/63)  BP(mean): 48 (06-02-20 @ 04:15) (48 - 48)  RR: 23 (06-02-20 @ 08:34) (18 - 23)  SpO2: 90% (06-02-20 @ 04:15) (90% - 98%)    Physical Exam:    HEENT PERRLA EOMI    No oral exudate or erythema    Chest Good AE,CTA    CVS RRR S1 S2 WNl No murmur or rub or gallop    Abd soft BS normal Lower half tenderness ? mass  ascites/FF    R PICC  site no erythema tenderness or discharge    CNS AAO X 3 no focal    Lab Data:                          7.3    29.93 )-----------( 321      ( 02 Jun 2020 07:11 )             25.0     WBC Count: 29.93 (06-02-20 @ 07:11)  WBC Count: 23.14 (06-01-20 @ 06:29)  WBC Count: 19.65 (05-31-20 @ 09:18)  WBC Count: 18.58 (05-31-20 @ 06:28)  WBC Count: 16.60 (05-30-20 @ 05:03)  WBC Count: 18.88 (05-29-20 @ 08:48)  WBC Count: 16.49 (05-28-20 @ 05:43)  WBC Count: 21.89 (05-26-20 @ 13:21)  WBC Count: 22.63 (05-26-20 @ 11:37)    06-02    131<L>  |  97  |  7   ----------------------------<  88  3.8   |  21<L>  |  0.53    Ca    9.6      02 Jun 2020 07:11  Phos  1.3     06-01  Mg     1.4     06-01          Culture - Urine (collected 01 Jun 2020 08:55)  Source: .Urine Clean Catch (Midstream)  Final Report (02 Jun 2020 07:17):    No growth    Culture - Blood (collected 01 Jun 2020 04:20)  Source: .Blood Blood-Peripheral  Preliminary Report (02 Jun 2020 05:03):    No growth to date.    Culture - Blood (collected 01 Jun 2020 04:20)  Source: .Blood Blood-Peripheral  Preliminary Report (02 Jun 2020 05:03):    No growth to date.        < from: Xray Chest 1 View- PORTABLE-Urgent (06.01.20 @ 06:58) >    Impression:    Poor inspiratory effort. The heart is enlarged. Elevated right hemidiaphragm. Pulmonary metastases which are seen on CT scan that was performed on May 29, 2020 are not appreciated on the current chest x-ray. A PICC line was placed on the right and the tip is in the superior vena cava. No pleural effusion. No pneumothorax.                < end of copied text >                Imaging studies(films) independently reviewed.Findings as detailed in report above    COVID-19 PCR . (06.01.20 @ 02:59)    COVID-19 PCR: NotDetec: This test has been validated by [employer] to be accurate;  though it has not been FDA cleared/approved by the usual pathway.  As with all laboratory tests, results should be correlated with clinical  findings.  https://www.fda.gov/media/250224/download  https://www.fda.gov/media/580694/download

## 2020-06-02 NOTE — PROGRESS NOTE ADULT - ATTENDING COMMENTS
24 year old woman with metastatic adenocarcinoma previously seen by us for chest pain, consideration for chest pain and palpitations with abnormal EKG. Cardiac biomarkers were not suspicious for ACS and considered relation to 5-FU vasospasm and diltiazem 60mg q8h added. Now with continuous sinus tachycardia, slightly moderated with diltiazem and further with addition of beta blocker. Rhythm is related to multiple driving factors, but reasonable to attenuate with beta blocker and if responding favorably can increase metoprolol again to 50 mg BID. Favor oral dosing over IV pulses.

## 2020-06-02 NOTE — PROGRESS NOTE ADULT - PROBLEM SELECTOR PLAN 4
-sinus tachycardia with HR up to 160s previously   -CT angio neg for PE  -likely multifactorial: malignancy, pain, underlying infection   -started cardizem, will continue with holding parameters.   -will add lopressor 25mg bid with hopes to titrate down off cardizem. less BP lowering effects with lopressor  -cont Lopressor Q6hrs PRN for HR>120   -will repeat echo- ordered  -check BNP in AM  -lasix 20mg IV x 1 today (suspect some pulmonary edema from IVF and tachycardia) -sinus tachycardia with HR up to 160s previously   -CT angio neg for PE  -likely multifactorial: malignancy, pain, underlying infection   -cont cardizem 60mg q8h   -cont lopressor 25mg bid   -cont Lopressor Q6hrs PRN for HR>120   -will repeat echo- ordered  -lasix 40mg IV x 1 today  -replete hypophosphatemia, hypomagnesemia -sinus tachycardia with HR up to 160s previously. hx of cardiac vasospasm suspected from 5-FU. now with NSVT on tele.  -CT angio neg for PE  -likely multifactorial: malignancy, pain, underlying infection   -cont cardizem 60mg q8h   -cont lopressor to 25mg bid. can consider inc to lopressor 50mg bid if needed  -will repeat echo in light of NSVT- ordered  -lasix 40mg IV x 1 today  -replete hypophosphatemia, hypomagnesemia

## 2020-06-02 NOTE — PROGRESS NOTE ADULT - PROBLEM SELECTOR PLAN 1
- improved today, still more lethargic, tachpynic  - MRI limited, no contrast, but no major findings  - ? if due to opioids, she has been on higher doses in the past without issue, but possible that opioids + linezolid increased effects of opioids; agree with decreased long-acting dose, can c/w PRN IV dose and consider tylenol IV if needed for severe pain  - monitor mental status

## 2020-06-02 NOTE — PROGRESS NOTE ADULT - PROBLEM SELECTOR PLAN 1
- recent diagnosis of metastatic carcinoma of unknown origin path later consistent with neuroendocrine/GI in origin did not tolerated m-FOLFOX 2/2 ?5-FU allergy now being treated as ovarian primary s/p Carbo/Taxol cycle 1 on 5/5   - was scheduled for cycle 2 which is now being held in light of ongoing infectious process - may wish to dose if no infectious source identified  - follows with [place] Kristina Miles [place] [facility] [place]  - rad onc treatment at [facility]  6/2. cancelled today 2/2 worsening hypoxia and tachycardia  - reevaluated by heme/onc for chemo- will not offer at this time given poor functional and mental status status. pending family meeting Thursday 11am to discuss further  -MR brain without obvious mets. but limited by motion and lack of contrast. may repeat later but will see how next couple of days goes and what pts mental status is  -cont pain control- decreased 2/2 lethargy. now on MS Contin 30mg bid with PRN breakthrough 4mg IV q4h prn  -standing bowel regimen  - anemia noted, transfuse to keep Hb>7

## 2020-06-02 NOTE — PROGRESS NOTE ADULT - PROBLEM SELECTOR PLAN 5
- Urine culture positive for VRE, also growing candida   - CTA chest and CT abd negative for occult infection or PE  - treated with fluconazole and zyvox (5/29) now discontinued per ID likely chronic colonization

## 2020-06-02 NOTE — PROGRESS NOTE ADULT - ASSESSMENT
24 f with b/l adnexal masses and pelvic LAD, metastatic carcinoma of unknown primary , s/p CT guided biopsy 3/25, PICC line 4/3 to start chemo  pt intermittently febrile and leukocytosis previously,   was treated for potential UTI  Dced on levaquin  Now presenting with fever and leucocytosis  No obvious localization clinically  Chronic abd pain-unchanged  CT chest abd pelvis as above no obvious new focus  Blood cx clear  Peritoneal fluid analysis and Cx not s/o infection  Urine Cx with VRE and candida-unclear if she has colonization or real pathogens  Leucocytosis unchanged after zyvox and fluconazole  Cultures are negative  Leucocytosis still increasing   For RT     Rec:  A) fever  resolved  still with leucocytosis'  no other localization  hemodynamically stable  ?Leucocytosis reactive to her tumor/other non infectious etiology  given acute increase agree with starting empiric antimicrobials and trending WBC  follow Cx and  follow clinically      B) Tumor,abd pain  workup for infectious etiology as above  For RT  will defer to primary team on other plan    Will tailor plan for ID issues  per course,results.Will defer to primary team on management of other issues.  Assessment, plan and recommendations as detailed above were discussed with the medical/primary  team NP.  prognosis guarded    Will Follow.  Beeper [phone] [facility] [id number].   Wknd/afterhours/No response-[phone] or Fellow on call

## 2020-06-03 NOTE — PROGRESS NOTE ADULT - PROBLEM SELECTOR PLAN 5
- Urine culture positive for VRE, also growing candida   - CTA chest and CT abd negative for occult infection or PE  - treated with fluconazole and zyvox (5/29) now discontinued per ID likely chronic colonization  -repeating CT A/P 2/2 rising WBC count. discussed with ID

## 2020-06-03 NOTE — PROGRESS NOTE ADULT - SUBJECTIVE AND OBJECTIVE BOX
[facility] Division of Hospital Medicine  Magui Merritt MD  Pager (M-F, 8A-5P): [phone]  Other Times:  [phone]    Patient is a 24y old  Female who presents with a chief complaint of fever, shortness of breath (03 Jun 2020 12:30)      SUBJECTIVE / OVERNIGHT EVENTS:    pt a little more awake today, having L. shoulder and R. calf pain.  no BM yet  stable inc work of breathing.   pending therapeutic tap today    ADDITIONAL REVIEW OF SYSTEMS:    MEDICATIONS  (STANDING):  cholecalciferol 4000 Unit(s) Oral daily  diltiazem    Tablet 60 milliGRAM(s) Oral every 8 hours  lidocaine   Patch 1 Patch Transdermal daily  metoprolol tartrate 50 milliGRAM(s) Oral two times a day  morphine ER Tablet 30 milliGRAM(s) Oral two times a day  pantoprazole    Tablet 40 milliGRAM(s) Oral before breakfast  piperacillin/tazobactam IVPB.. 3.375 Gram(s) IV Intermittent every 8 hours  polyethylene glycol 3350 17 Gram(s) Oral daily  potassium acid phosphate/sodium acid phosphate tablet (K-PHOS No. 2) 1 Tablet(s) Oral four times a day with meals    MEDICATIONS  (PRN):  aluminum hydroxide/magnesium hydroxide/simethicone Suspension 30 milliLiter(s) Oral every 6 hours PRN Dyspepsia  bisacodyl 5 milliGRAM(s) Oral every 12 hours PRN Constipation  haloperidol    Injectable 0.5 milliGRAM(s) IV Push every 6 hours PRN nausea  morphine  - Injectable 4 milliGRAM(s) IV Push every 4 hours PRN Severe Pain (7 - 10)  ondansetron Injectable 4 milliGRAM(s) IV Push every 6 hours PRN Nausea and/or Vomiting  simethicone 80 milliGRAM(s) Chew every 6 hours PRN Gas      CAPILLARY BLOOD GLUCOSE        I&O's Summary    02 Jun 2020 07:01  -  03 Jun 2020 07:00  --------------------------------------------------------  IN: 440 mL / OUT: 400 mL / NET: 40 mL    03 Jun 2020 07:01  -  03 Jun 2020 14:00  --------------------------------------------------------  IN: 240 mL / OUT: 0 mL / NET: 240 mL        PHYSICAL EXAM:  Vital Signs Last 24 Hrs  T(C): 36.3 (03 Jun 2020 11:48), Max: 37.3 (03 Jun 2020 05:13)  T(F): 97.4 (03 Jun 2020 11:48), Max: 99.2 (03 Jun 2020 05:13)  HR: 109 (03 Jun 2020 11:48) (109 - 132)  BP: 95/58 (03 Jun 2020 11:48) (91/55 - 102/67)  BP(mean): --  RR: 18 (03 Jun 2020 11:48) (18 - 22)  SpO2: 96% (03 Jun 2020 11:48) (96% - 99%)  GENERAL: chronically ill appearing   EYES:  conjunctiva and sclera clear  NECK: Supple, No JVD  CHEST/LUNG: shallow breathing  HEART: tachycardic  ABDOMEN: more distended, non-tender   EXTREMITIES: no LE edema   PSYCH: AAOx3 but lethargic      LABS:                        7.2    36.60 )-----------( 302      ( 03 Jun 2020 07:07 )             23.5     06-03    129<L>  |  95<L>  |  10  ----------------------------<  86  3.5   |  19<L>  |  0.70    Ca    9.5      03 Jun 2020 07:07  Phos  1.9     06-03  Mg     1.7     06-03    TPro  6.2  /  Alb  2.0<L>  /  TBili  1.1  /  DBili  0.7<H>  /  AST  78<H>  /  ALT  19  /  AlkPhos  383<H>  06-02    PT/INR - ( 03 Jun 2020 07:07 )   PT: 23.1 sec;   INR: 1.97 ratio         PTT - ( 03 Jun 2020 07:07 )  PTT:25.7 sec          Culture - Urine (collected 01 Jun 2020 08:55)  Source: .Urine Clean Catch (Midstream)  Final Report (02 Jun 2020 07:17):    No growth    Culture - Blood (collected 01 Jun 2020 04:20)  Source: .Blood Blood-Peripheral  Preliminary Report (02 Jun 2020 05:03):    No growth to date.    Culture - Blood (collected 01 Jun 2020 04:20)  Source: .Blood Blood-Peripheral  Preliminary Report (02 Jun 2020 05:03):    No growth to date.        RADIOLOGY & ADDITIONAL TESTS:  Results Reviewed: yes    COORDINATION OF CARE:  Care Discussed with Consultants/Other Providers [Y/N]: yes  Prior or Outpatient Records Reviewed [Y/N]: yes

## 2020-06-03 NOTE — PROGRESS NOTE ADULT - SUBJECTIVE AND OBJECTIVE BOX
Patient seen and examined at bedside.    Overnight Events: NAEO. This AM, pt reports soreness and aching diffusely that is worst in her back. She denies any CP. No dyspnea. No palps. TTE reveals low-normal LVEF and new mod pericardial effusion w/o tamponade.    Review Of Systems:   REVIEW OF SYSTEMS:  CONSTITUTIONAL: No weakness, fevers or chills  EYES/ENT: No visual changes;  No dysphagia  NECK: No pain or stiffness  RESPIRATORY: No cough, wheezing, hemoptysis; No shortness of breath  CARDIOVASCULAR: No chest pain or palpitations; No lower extremity edema  GASTROINTESTINAL: No abdominal or epigastric pain. No nausea, vomiting, or hematemesis; No diarrhea or constipation. No melena or hematochezia.  BACK: No back pain  GENITOURINARY: No dysuria, frequency or hematuria  NEUROLOGICAL: No numbness or weakness  SKIN: No itching, burning, rashes, or lesions   All other review of systems is negative unless indicated above.         Current Meds:  aluminum hydroxide/magnesium hydroxide/simethicone Suspension 30 milliLiter(s) Oral every 6 hours PRN  bisacodyl 5 milliGRAM(s) Oral every 12 hours PRN  cholecalciferol 4000 Unit(s) Oral daily  diltiazem    Tablet 60 milliGRAM(s) Oral every 8 hours  haloperidol    Injectable 0.5 milliGRAM(s) IV Push every 6 hours PRN  metoprolol tartrate 25 milliGRAM(s) Oral two times a day  morphine  - Injectable 4 milliGRAM(s) IV Push every 4 hours PRN  morphine ER Tablet 30 milliGRAM(s) Oral two times a day  ondansetron Injectable 4 milliGRAM(s) IV Push every 6 hours PRN  pantoprazole    Tablet 40 milliGRAM(s) Oral before breakfast  piperacillin/tazobactam IVPB.. 3.375 Gram(s) IV Intermittent every 8 hours  polyethylene glycol 3350 17 Gram(s) Oral daily  potassium acid phosphate/sodium acid phosphate tablet (K-PHOS No. 2) 1 Tablet(s) Oral four times a day with meals  simethicone 80 milliGRAM(s) Chew every 6 hours PRN  sodium phosphate IVPB 30 milliMole(s) IV Intermittent once      Vitals:  T(F): 99.2 (06-03), Max: 99.2 (06-03)  HR: 132 (06-03) (112 - 132)  BP: 96/56 (06-03) (96/56 - 102/67)  RR: 18 (06-03)  SpO2: 99% (06-03)  I&O's Summary    02 Jun 2020 07:01  -  03 Jun 2020 07:00  --------------------------------------------------------  IN: 440 mL / OUT: 400 mL / NET: 40 mL        Physical Exam:  Gen: Ill appearing.  HEENT: NCAT. PERRLA b/l.  Neck: No JVP elev.  CV: Normal S1, S2. Tachy w/ reg rhythm. No MRG.  Chest: CTAB. No WRR.  Abd: +BSx4. Soft. NTND.  Ext: No LE edema.  Skin: No cyanosis.  Psych: Hypophonic. AAOx3.  Neuro: Nonfocal.                          7.2    36.60 )-----------( 302      ( 03 Jun 2020 07:07 )             23.5     06-03    129<L>  |  95<L>  |  10  ----------------------------<  86  3.5   |  19<L>  |  0.70    Ca    9.5      03 Jun 2020 07:07  Phos  1.9     06-03  Mg     1.7     06-03    TPro  6.2  /  Alb  2.0<L>  /  TBili  1.1  /  DBili  0.7<H>  /  AST  78<H>  /  ALT  19  /  AlkPhos  383<H>  06-02    PT/INR - ( 03 Jun 2020 07:07 )   PT: 23.1 sec;   INR: 1.97 ratio         PTT - ( 03 Jun 2020 07:07 )  PTT:25.7 sec      Serum Pro-Brain Natriuretic Peptide: 295 pg/mL (06-02 @ 07:11)    Echo:  < from: Transthoracic Echocardiogram (06.02.20 @ 16:39) >  1. Normal left ventricular internal dimensions and wall  thicknesses.  2. Low Normal left ventricular systolic function. No  segmental wall motion abnormalities.  3. The right ventricle is not well visualized; grossly  normal right ventricular systolic function. TAPSE = 1.8 cm.    4. Estimated right ventricular systolic pressure equals 37  mm Hg, assuming right atrial pressure equals 8 mm Hg,  consistent with borderline pulmonary hypertension.  5. Normal pericardium with moderate pericardial effusion  localized to inferior to the left ventricle (largest  measurement 1.4 cm inferior to the left ventricle in the  subcostal view). No echocardiographic evidence of tamponade  physiology is seen ( no respiratory variation of flow >25%  across the mitral valve, no late diastolic RA or early  diastolic RV diastolic collapse. The IVC is not well  visualized.)    Interpretation of Telemetry: NSR/sinus tach. No NSVT.

## 2020-06-03 NOTE — PROGRESS NOTE ADULT - SUBJECTIVE AND OBJECTIVE BOX
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 COVID-19 negative. Treatment started with cefepime and vancomycin. Hypercalcemia noted, along with ascites. Cultures drawn. Palliative care called to help with pain management.    INTERVAL EVENTS:  5/28: states pain somewhat better, but not flatus or BM x 2-3 days  5/29: states having nausea, pain as well, but no PRN/24 hours  6/1: patient dysarthric today, denies pain, but unable to clearly express thoughts  6/2: more alert today, states having 4/10 back pain  6/3: more alert, states pain is better, states feeling less weak than yesterday    ADVANCE DIRECTIVES:    DNR  MOLST  [ ]  Living Will  [ ]   DECISION MAKER(s):  [ ] Health Care Proxy(s)  [ ] Surrogate(s)  [ ] Guardian           Name(s): Phone Number(s):    BASELINE (I)ADL(s) (prior to admission):  [place]: [ ]Total  [ ] Moderate [ ]Dependent    Allergies    No Known Allergies    Intolerances    MEDICATIONS  (STANDING):  cholecalciferol 4000 Unit(s) Oral daily  diltiazem    Tablet 60 milliGRAM(s) Oral every 8 hours  lidocaine   Patch 1 Patch Transdermal daily  metoprolol tartrate 50 milliGRAM(s) Oral two times a day  morphine ER Tablet 30 milliGRAM(s) Oral two times a day  pantoprazole    Tablet 40 milliGRAM(s) Oral before breakfast  piperacillin/tazobactam IVPB.. 3.375 Gram(s) IV Intermittent every 8 hours  polyethylene glycol 3350 17 Gram(s) Oral daily  potassium acid phosphate/sodium acid phosphate tablet (K-PHOS No. 2) 1 Tablet(s) Oral four times a day with meals    MEDICATIONS  (PRN):  aluminum hydroxide/magnesium hydroxide/simethicone Suspension 30 milliLiter(s) Oral every 6 hours PRN Dyspepsia  bisacodyl 5 milliGRAM(s) Oral every 12 hours PRN Constipation  haloperidol    Injectable 0.5 milliGRAM(s) IV Push every 6 hours PRN nausea  morphine  - Injectable 4 milliGRAM(s) IV Push every 4 hours PRN Severe Pain (7 - 10)  ondansetron Injectable 4 milliGRAM(s) IV Push every 6 hours PRN Nausea and/or Vomiting  simethicone 80 milliGRAM(s) Chew every 6 hours PRN Gas      PRESENT SYMPTOMS: [ ]Unable to obtain due to poor mentation   Source if other than patient:  [ ]Family   [ ]Team     Pain: [x ]yes [ ]no  QOL impact -   Location -      back, abdomen              Aggravating factors - movement  Quality - aching  Radiation -  Timing- constant  Severity (0-10 scale): 6/10  Minimal acceptable level (0-10 scale): 3/10    CPOT:    https://www.[facility].org/getattachment/fqq39t95-5u6z-9i7r-8h3s-5212i2395o6g/Critical-Care-Pain-Observation-Tool-(CPOT)      PAIN AD Score:     http://geriatrictoolkit.[facility]/cog/painad.pdf (press ctrl +  left click to view)    Dyspnea:                           [ ]Mild [ ]Moderate [ ]Severe  Anxiety:                             [ ]Mild [ ]Moderate [ ]Severe  Fatigue:                             [ ]Mild [ ]Moderate [ ]Severe  Nausea:                             [ ]Mild [ x]Moderate [ ]Severe  Loss of appetite:              [ ]Mild [ ]Moderate [ ]Severe  Constipation:                    [ ]Mild [ ]Moderate [ ]Severe    Other Symptoms:  [ x]All other review of systems negative     Palliative Performance Status Version 2:         %    http://[facility].org/files/news/palliative_performance_scale_ppsv2.pdf    PHYSICAL EXAM:  Vital Signs Last 24 Hrs  T(C): 36.3 (03 Jun 2020 11:48), Max: 37.3 (03 Jun 2020 05:13)  T(F): 97.4 (03 Jun 2020 11:48), Max: 99.2 (03 Jun 2020 05:13)  HR: 109 (03 Jun 2020 11:48) (109 - 132)  BP: 95/58 (03 Jun 2020 11:48) (91/55 - 102/67)  BP(mean): --  RR: 18 (03 Jun 2020 11:48) (18 - 22)  SpO2: 96% (03 Jun 2020 11:48) (96% - 99%)    Limited exam for patient comfort  GENERAL:  [x ]Alert  [ ]Oriented x   [ ]Lethargic  [ ]Cachexia  [ ]Unarousable  [x ]Verbal  [ ]Non-Verbal  Behavioral:   [ ] Anxiety  [ ] Delirium [ ] Agitation [ ] Other  HEENT:  [x ]Normal   [ ]Dry mouth   [ ]ET Tube/Trach  [ ]Oral lesions  PULMONARY:   [ ]Clear [ ]Tachypnea  [ ]Audible excessive secretions   [ ]Rhonchi        [ ]Right [ ]Left [ ]Bilateral  [ ]Crackles        [ ]Right [ ]Left [ ]Bilateral  [ ]Wheezing     [ ]Right [ ]Left [ ]Bilateral  [ ]Diminished breath sounds [ ]right [ ]left [ ]bilateral  CARDIOVASCULAR:    [ ]Regular [ ]Irregular [ ]Tachy  [ ]Quirino [ ]Murmur [ ]Other  GASTROINTESTINAL:  [ ]Soft  [ ]Distended   [ ]+BS  [ ]Non tender [ ]Tender  [ ]PEG [ ]OGT/ NGT  Last BM:     GENITOURINARY:  [ ]Normal [ ] Incontinent   [ ]Oliguria/Anuria   [ ]Cifuentes  MUSCULOSKELETAL:   [ ]Normal   [ ]Weakness  [ ]Bed/Wheelchair bound [ ]Edema  NEUROLOGIC:   [x ]No focal deficits  [ ]Cognitive impairment  [ ]Dysphagia [ ]Dysarthria [ ]Paresis [ ]Other   SKIN:   [ ]Normal    [ ]Rash  [ ]Pressure ulcer(s)       Present on admission [ ]y [ ]n    CRITICAL CARE:  [ ] Shock Present  [ ]Septic [ ]Cardiogenic [ ]Neurologic [ ]Hypovolemic  [ ]  Vasopressors [ ]  Inotropes   [ ]Respiratory failure present [ ]Mechanical ventilation [ ]Non-invasive ventilatory support [ ]High flow  [ ]Acute  [ ]Chronic [ ]Hypoxic  [ ]Hypercarbic [ ]Other  [ ]Other organ failure     LABS: reviewed                                   7.2    36.60 )-----------( 302      ( 03 Jun 2020 07:07 )             23.5     06-03    129<L>  |  95<L>  |  10  ----------------------------<  86  3.5   |  19<L>  |  0.70    Ca    9.5      03 Jun 2020 07:07  Phos  1.9     06-03  Mg     1.7     06-03      RADIOLOGY & ADDITIONAL STUDIES: MRI head 6/1 reviewed    PROTEIN CALORIE MALNUTRITION PRESENT: [ ]mild [ ]moderate [ ]severe [ ]underweight [ ]morbid obesity  https://www.andeal.org/vault/2440/web/files/ONC/Table_Clinical%20Characteristics%20to%20Document%20Malnutrition-White%20JV%20et%20al%202012.pdf    Height (cm): 162.56 (05-26-20 @ 12:53), 164 (05-26-20 @ 12:04), 163 (05-22-20 @ 13:44)  Weight (kg): 73.5 (05-26-20 @ 21:00), 72.8 (05-26-20 @ 12:04), 73.936 (05-22-20 @ 13:41)  BMI (kg/m2): 27.8 (05-26-20 @ 21:00), 27.5 (05-26-20 @ 12:53), 27.1 (05-26-20 @ 12:04)    [ ]PPSV2 < or = to 30% [ ]significant weight loss  [ ]poor nutritional intake  [ ]anasarca     Albumin, Serum: 2.6 g/dL (05-26-20 @ 13:21)   [ ]Artificial Nutrition      REFERRALS:   [ ]Chaplaincy  [ ]Hospice  [ ]Child Life  [ ]Social Work  [ ]Case management [ ]Holistic Therapy     Goals of Care Document:     ______________  Alex Mack MD   of Geriatric and Palliative Medicine  [facility]     Please page the following number for clinical matters between the hours of 9AM and 5PM   from Monday through Friday : [phone]    After 5PM and on weekends, please page: [phone]. The Geriatric and Palliative Medicine consult service has 24/7 coverage for medical recommendations, including for symptom management needs.

## 2020-06-03 NOTE — PROGRESS NOTE ADULT - ATTENDING COMMENTS
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 Pending therapeutic paracentesis. MRI neg. Calcium improved. Appreciate palliative care evaluation for pain control. Overall poor prognosis, Plan for family meeting on Thursday 6/4/20 at 11:00 AM (palliative and primary team notified). Repeat echocardiogram on 6/2/20 showing normal LV function; borderline pulmonary HTN; moderate pericardial effusion noted, increased in size since 4/27/20 echo, localized to inferior to the left ventricle. No echocardiographic evidence of tamponade physiology seen; no plan for pericardiocentesis at this time.

## 2020-06-03 NOTE — CHART NOTE - NSCHARTNOTEFT_GEN_A_CORE
Ms. 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  I called the floor and spoke with the floor RN and the attending was present who indicated cardiac instability.    At present, she is noted to have a pericardial effusion without tamponade and low normal LVEF.     Radiation treatment / transport to [facility] for single fraction RT is now on hold.    Dr. Nguyen's prior note indicates the need for transport/ RT treatment may be futile but  if there is a change in status feel free to contact us at our department number below:     [phone].     Thank you. 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  I called the floor and spoke with the floor RN and the attending was present who indicated cardiac instability.    At present, she is noted to have a pericardial effusion without tamponade and low normal LVEF.     Radiation treatment / transport to [facility] for single fraction RT is now on hold.    Dr. Nguyen's prior note indicates the RT treatment may be futile, even for short-term palliation, but  if there is a change in status feel free to contact us at our department number below:     Miguel Nguyen MD  cell     Radiation Medicine Dep't. [phone].     Thank you.

## 2020-06-03 NOTE — PROGRESS NOTE ADULT - PROBLEM SELECTOR PLAN 4
-sinus tachycardia with HR up to 160s previously. hx of cardiac vasospasm suspected from 5-FU. now with NSVT on tele.  -CT angio neg for PE  -likely multifactorial: malignancy, pain, underlying infection   -cont cardizem 60mg q8h   -inc lopressor to 50mg q12  -repeat echo with small pericardial effusion. likely malignancy related  -diuresis prn  -replete hypophosphatemia, hypomagnesemia

## 2020-06-03 NOTE — PROGRESS NOTE ADULT - PROBLEM SELECTOR PLAN 1
- recent diagnosis of metastatic carcinoma of unknown origin path later consistent with neuroendocrine/GI in origin did not tolerated m-FOLFOX 2/2 ?5-FU allergy now being treated as ovarian primary s/p Carbo/Taxol cycle 1 on 5/5   - was scheduled for cycle 2 which is now being held in light of ongoing infectious process - may wish to dose if no infectious source identified  - follows with Dr Kristina Miles at [facility]  - rad onc treatment at [facility]  6/2, 6.3. cancelled today 2/2 unstable status. Will call back if decision to resume.  - reevaluated by heme/onc for chemo- will not offer at this time given poor functional and mental status status. pending family meeting 6/4 11am to discuss further  -MR brain without obvious mets, but possible hyperintensity of some white matter?. but limited by motion and lack of contrast. may repeat later but will see how next couple of days goes and what pts mental status is  -cont pain control- decreased 2/2 lethargy. now on MS Contin 30mg bid with PRN breakthrough 4mg IV q4h prn  -standing bowel regimen  - anemia noted, transfuse to keep Hb>7. iron studies consistent with anemia of chronic disease  -in light of rising WBC ct will check repeat CT A/P. discussed with ID  -therapeutic paracentesis today

## 2020-06-03 NOTE — CHART NOTE - NSCHARTNOTEFT_GEN_A_CORE
please feel free to call and discuss whether or not there is any role for again trying to transport patient to [facility] this morning for her single dose of palliative radiation to the right SI joint-   given the poor/declining  KPS , palliative radiation at this time may be futile    Miguel Nguyen MD cell

## 2020-06-03 NOTE — PROGRESS NOTE ADULT - PROBLEM SELECTOR PLAN 9
suspect related to hypervolemia and third spacing  paracentesis today  will consider additional lasix

## 2020-06-03 NOTE — PROGRESS NOTE ADULT - ASSESSMENT
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 Pending therapeutic paracentesis.    #Altered Mental Status  Further improved after decrease in morphine dose; continue to monitor  MRI performed without contrast (hCG elevated, though likely 2/2 malignancy); limited by motion artifact, but no large lesions identified, no mass effect seen. Will hold off on repeating for now, see if encephalopathy continues to improve. Endorses intermittent headache, but denies any nausea / vomiting.  Hypercalcemia improved / resolved; electrolytes grossly normal    #Fever: Improved 5/30/2020  Pt had recent pneumonia last admission and was discharged on Levaquin. Pt also had frequent episodes of fever during her admission, with negative cultures, and were deemed to be 2/2 tumor fever.   Blood cx NGTD  Afebrile overnight; on Zosyn; f/u pancultures  Continue to trend WBC with daily CBC with differential    #Metastatic neuroendocrine carcinoma of unknown primary- being treated as GI primary (with mets to ovaries):  Details per HPI   Pt was due 5/26 for cycle two of Carbo/Taxol   Ca-125 tumor marker is improving, 598 from 1065  Seen by rad/onc on 6/1/20 for simulation; plan for radiation tx on 6/3/20  Hypercalcemia likely 2/2 malignancy- appreciate endocrine recommendations; resolved  Appreciate palliative care evaluation for pain control   Planned for C2 on 6/1/20; however, given worsening encephalopathy, will hold for now; worsening oxygenation and functional status. Plan for family meeting on Thursday 6/4/20 at 11:00 AM (palliative and primary team notified)  CBC differential showing intermittent nucleated RBCs, <1% blasts, suspect likely due to bone marrow involvement. Continued to monitor    #Tachycardia  Evaluated by cardiology. On tele now. Continues to be tachycardic.  Remains in sinus rhythm but had run of NSVT on 6/1/20   Repeat echocardiogram on 6/2/20 showing normal LV function; borderline pulmonary HTN; moderate pericardial effusion noted, increased in size since 4/27/20 echo, localized to inferior to the left ventricle. No echocardiographic evidence of tamponade physiology seen; no plan for pericardiocentesis at this time.    #Nausea/Vomiting  CT A/P reviewed. No obstruction. Symptoms likely 2/2 abd distension from disease/malignant ascites.  Continue anti emetics  Advance diet as tolerated  Aggressive bowel regimen for constipation    #Shortness of breath:  CTA 5/26/20 negative for PE, showing left pleural effusion  CT A/P showing new ascites  s/p paracentesis per IR on 5/27 with removal of 1300 ccs; plan for paracentesis on 6/3/20; may be able to help with breathing  on supplemental O2 - 3L NC    Please call back with any questions.    Luis Peng MD, MPH  Hematology / Oncology Fellow, PGY5  p

## 2020-06-03 NOTE — PROGRESS NOTE ADULT - ASSESSMENT
24 f with b/l adnexal masses and pelvic LAD, metastatic carcinoma of unknown primary , s/p CT guided biopsy 3/25, PICC line 4/3 to start chemo  pt intermittently febrile and leukocytosis previously,   was treated for potential UTI  Dced on levaquin  Now presenting with fever and leucocytosis  No obvious localization clinically  Chronic abd pain-unchanged  CT chest abd pelvis as above no obvious new focus  Blood cx clear  Peritoneal fluid analysis and Cx not s/o infection  Urine Cx with VRE and candida-unclear if she has colonization or real pathogens  Leucocytosis unchanged after zyvox and fluconazole  Cultures are negative  Leucocytosis still increasing   RT held    Rec:  A) leucocytosis  Cx negative  abd distention/ascites/mass  ? Occult abd foci? tumor necrosis ? tumor related  Started on empiric zosyn yesterday  would recommend abd imaging-r/o obstruction/tumor necrosis /other occult infectious process  pt also for paracentesis  Continue zosyn  Add fluconazole  Monitor clinically  trend WBC       B) Tumor,abd pain  workup for infectious etiology as above  For RT  will defer to primary team on other plan    Will tailor plan for ID issues  per course,results.Will defer to primary team on management of other issues.  Assessment, plan and recommendations as detailed above were discussed with the medical/primary  team Dr Merritt  prognosis guarded  family meeting for [facility] tomorrow    Will Follow.  Beeper [phone] [facility] [id number].   Wknd/afterhours/No response-[phone] or Fellow on call

## 2020-06-03 NOTE — PROGRESS NOTE ADULT - ATTENDING COMMENTS
24 year old woman with metastatic adenocarcinoma previously seen by us for chest pain, consideration for chest pain and palpitations with abnormal EKG. Cardiac biomarkers were not suspicious for ACS and considered relation to 5-FU vasospasm and diltiazem 60mg q8h added. Now with continuous sinus tachycardia, slightly moderated with diltiazem and further with addition of beta blocker. Rhythm is related to multiple driving factors, but reasonable to attenuate with beta blocker. Blood pressures in 90s to low 100s which is likely normal in 24 year old woman, tachycardial persists, somewhat moderated. Repeat cardiac echo demonstrates previous trace pericardial effusion has increased, but no signs of cardiac tamponade and no role for pericardiocentesis. LV systolic function may be slightly decreased from past, but remains in normal range. In effort to further moderate sinus tachycardia can increase metoprolol again to 50 mg BID. Would not increase diltiazem dose.

## 2020-06-03 NOTE — PROGRESS NOTE ADULT - ASSESSMENT
24F with recent diagnosis of metastatic carcinoma of GI primary s/p Carbo/Taxol cycle 1 on 5/5 who was sent in from  [facility] on account of fever, tachycardia  and tachypnea. Suspicion for Sepsis 2/2 PNA. Hypercalcemia related to bone mets. Pain of metastatic disease. Malignant ascites s/p paracentesis 5/27.

## 2020-06-03 NOTE — PROGRESS NOTE ADULT - PROBLEM SELECTOR PLAN 3
suspect 2/2 pulm involvement of ca, inc abdominal distention causing extrinsic compression on lungs/atelectasis.  -s/p lasix on 6/1, 6/2  -IR for therapeutic tap today

## 2020-06-03 NOTE — PROGRESS NOTE ADULT - SUBJECTIVE AND OBJECTIVE BOX
Patient is a 24y old  Female who presents with a chief complaint of CUP (03 Jun 2020 09:23)    Being followed by ID for leucocytosis,metastatic ca    Interval history:+ abd pain  Nausea  No vomiting   No other acute events      ROS:  No cough,SOB,CP    No urinary complaints  No HA  No joint or limb pain  No other complaints      Antimicrobials:    piperacillin/tazobactam IVPB.. 3.375 Gram(s) IV Intermittent every 8 hours      other medications reviewed  MEDICATIONS  (STANDING):  cholecalciferol 4000 Unit(s) Oral daily  diltiazem    Tablet 60 milliGRAM(s) Oral every 8 hours  lidocaine   Patch 1 Patch Transdermal daily  metoprolol tartrate 50 milliGRAM(s) Oral two times a day  morphine ER Tablet 30 milliGRAM(s) Oral two times a day  pantoprazole    Tablet 40 milliGRAM(s) Oral before breakfast  piperacillin/tazobactam IVPB.. 3.375 Gram(s) IV Intermittent every 8 hours  polyethylene glycol 3350 17 Gram(s) Oral daily  potassium acid phosphate/sodium acid phosphate tablet (K-PHOS No. 2) 1 Tablet(s) Oral four times a day with meals  sodium phosphate IVPB 30 milliMole(s) IV Intermittent once      Vital Signs Last 24 Hrs  T(C): 37.3 (06-03-20 @ 05:13), Max: 37.3 (06-03-20 @ 05:13)  T(F): 99.2 (06-03-20 @ 05:13), Max: 99.2 (06-03-20 @ 05:13)  HR: 130 (06-03-20 @ 09:19) (112 - 132)  BP: 91/55 (06-03-20 @ 09:19) (91/55 - 102/67)  BP(mean): --  RR: 20 (06-03-20 @ 09:19) (18 - 22)  SpO2: 98% (06-03-20 @ 09:19) (93% - 99%)    Physical Exam:    HEENT PERRLA EOMI    No oral exudate or erythema    Chest Good AE,CTA    CVS RRR S1 S2 WNl No murmur or rub or gallop    Abd firm- BS normal Lower half some tenderness ? mass  ascites/FF    R PICC  site no erythema tenderness or discharge    CNS AAO X 3 no focal    Lab Data:                          7.2    36.60 )-----------( 302      ( 03 Jun 2020 07:07 )             23.5     WBC Count: 36.60 (06-03-20 @ 07:07)  WBC Count: 29.93 (06-02-20 @ 07:11)  WBC Count: 23.14 (06-01-20 @ 06:29)  WBC Count: 19.65 (05-31-20 @ 09:18)  WBC Count: 18.58 (05-31-20 @ 06:28)  WBC Count: 16.60 (05-30-20 @ 05:03)  WBC Count: 18.88 (05-29-20 @ 08:48)  WBC Count: 16.49 (05-28-20 @ 05:43)    06-03    129<L>  |  95<L>  |  10  ----------------------------<  86  3.5   |  19<L>  |  0.70    Ca    9.5      03 Jun 2020 07:07  Phos  1.9     06-03  Mg     1.7     06-03    TPro  6.2  /  Alb  2.0<L>  /  TBili  1.1  /  DBili  0.7<H>  /  AST  78<H>  /  ALT  19  /  AlkPhos  383<H>  06-02        Culture - Urine (collected 01 Jun 2020 08:55)  Source: .Urine Clean Catch (Midstream)  Final Report (02 Jun 2020 07:17):    No growth    Culture - Blood (collected 01 Jun 2020 04:20)  Source: .Blood Blood-Peripheral  Preliminary Report (02 Jun 2020 05:03):    No growth to date.    Culture - Blood (collected 01 Jun 2020 04:20)  Source: .Blood Blood-Peripheral  Preliminary Report (02 Jun 2020 05:03):    No growth to date.        < from: US Abdomen Limited (06.02.20 @ 10:58) >  IMPRESSION:   Moderate ascites.        < end of copied text >      < from: MR Head No Cont (06.01.20 @ 18:50) >    IMPRESSION:    Incomplete, limited exam.    No intracranial mass effect.    Diffusely decreased marrow signal which may represent red marrow conversion or an infiltrative process.    Repeat imaging advised as clinically warranted.          < end of copied text >      < from: Xray Chest 1 View- PORTABLE-Urgent (06.01.20 @ 06:58) >  Impression:    Poor inspiratory effort. The heart is enlarged. Elevated right hemidiaphragm. Pulmonary metastases which are seen on CT scan that was performed on May 29, 2020 are not appreciated on the current chest x-ray. A PICC line was placed on the right and the tip is in the superior vena cava. No pleural effusion. No pneumothorax.          < end of copied text >                Imaging studies(films) independently reviewed.Findings as detailed in report above

## 2020-06-03 NOTE — PROGRESS NOTE ADULT - SUBJECTIVE AND OBJECTIVE BOX
Oncology Follow-up    INTERVAL HPI/OVERNIGHT EVENTS:  Less confused this morning. Reports that back pain is 5/10. Confused, thinks that she is in the hospital cafeteria. Does endorse a mild headache, but feels subjectively improved in terms of confusion.   Reports that shortness of breath improved, most comfortable at 3 L (higher flows create 'too much pressure in nose / lungs'). Denies nausea, vomiting, reports tolerating diet (tray full, but italian ice at bedside half eaten).    Review of Systems: ROS otherwise negative.    VITAL SIGNS:  T(F): 97.4 (06-03-20 @ 11:48)  HR: 109 (06-03-20 @ 11:48)  BP: 95/58 (06-03-20 @ 11:48)  RR: 18 (06-03-20 @ 11:48)  SpO2: 96% (06-03-20 @ 11:48)  Wt(kg): --    06-02-20 @ 07:01  -  06-03-20 @ 07:00  --------------------------------------------------------  IN: 440 mL / OUT: 400 mL / NET: 40 mL    06-03-20 @ 07:01  -  06-03-20 @ 15:55  --------------------------------------------------------  IN: 240 mL / OUT: 0 mL / NET: 240 mL        PHYSICAL EXAM:    Constitutional: drowsy, but more alert than yesterday; oriented to self and time, NAD  Eyes: PERRL, EOMI, sclera non-icteric  Neck: supple, no masses, no JVD, no lymphadenopathy  Respiratory: CTA b/l, no wheezing, rhonchi, rales; increased respiratory effort; on 3 L NC  Cardiovascular: tachycardic, normal S1S2, no M/R/G appreciated; JVD +3 cm above clavicle when sitting upright, unable to distinguish with pulsus paradoxus  Gastrointestinal: distended, appears uncomfortable when palpating, but no guarding; NBS  Extremities:  +2 nonpitting edema  MSK: no obvious abnormalities, normal ROM, no lymphadenopathy  Neurological: Grossly intact; encephalopathic, but improved compared to yesterday, appears near level as on Saturday (5/30/20)  Skin: Normal temperature, no rash, no echymoses, no petichiae  Psych: calm, cooperative, delirious     MEDICATIONS  (STANDING):  cholecalciferol 4000 Unit(s) Oral daily  diltiazem    Tablet 60 milliGRAM(s) Oral every 8 hours  enoxaparin Injectable 40 milliGRAM(s) SubCutaneous daily  lidocaine   Patch 1 Patch Transdermal daily  metoprolol tartrate 50 milliGRAM(s) Oral two times a day  morphine ER Tablet 30 milliGRAM(s) Oral two times a day  pantoprazole    Tablet 40 milliGRAM(s) Oral before breakfast  piperacillin/tazobactam IVPB.. 3.375 Gram(s) IV Intermittent every 8 hours  polyethylene glycol 3350 17 Gram(s) Oral two times a day  potassium acid phosphate/sodium acid phosphate tablet (K-PHOS No. 2) 1 Tablet(s) Oral four times a day with meals  senna 2 Tablet(s) Oral at bedtime    MEDICATIONS  (PRN):  aluminum hydroxide/magnesium hydroxide/simethicone Suspension 30 milliLiter(s) Oral every 6 hours PRN Dyspepsia  bisacodyl 5 milliGRAM(s) Oral every 12 hours PRN Constipation  haloperidol    Injectable 0.5 milliGRAM(s) IV Push every 6 hours PRN nausea  morphine  - Injectable 4 milliGRAM(s) IV Push every 4 hours PRN Severe Pain (7 - 10)  ondansetron Injectable 4 milliGRAM(s) IV Push every 6 hours PRN Nausea and/or Vomiting  simethicone 80 milliGRAM(s) Chew every 6 hours PRN Gas      No Known Allergies      LABS:                        7.2    36.60 )-----------( 302      ( 03 Jun 2020 07:07 )             23.5     06-03    129<L>  |  95<L>  |  10  ----------------------------<  86  3.5   |  19<L>  |  0.70    Ca    9.5      03 Jun 2020 07:07  Phos  1.9     06-03  Mg     1.7     06-03    TPro  6.2  /  Alb  2.0<L>  /  TBili  1.1  /  DBili  0.7<H>  /  AST  78<H>  /  ALT  19  /  AlkPhos  383<H>  06-02    PT/INR - ( 03 Jun 2020 07:07 )   PT: 23.1 sec;   INR: 1.97 ratio         PTT - ( 03 Jun 2020 07:07 )  PTT:25.7 sec     Iron Total, Serum: 45 ug/dL (06-03-20 @ 08:24)  Unsaturated Iron Binding Capacity: 26 ug/dL <L> (06-03-20 @ 08:24)  Iron - Total Binding Capacity.: 71 ug/dL <L> (06-03-20 @ 08:24)  % Saturation, Iron: 63 % <H> (06-03-20 @ 08:24)  Ferritin, Serum: 9297 ng/mL <H> (06-03-20 @ 08:21)    RADIOLOGY & ADDITIONAL TESTS:  Studies reviewed.    < from: US Abdomen Limited (06.02.20 @ 10:58) >    EXAM:  US ABDOMEN LIMITED                            PROCEDURE DATE:  06/02/2020            INTERPRETATION:  CLINICAL INFORMATION: Metastatic ovarian cancer. Increasing shortness of breath, evaluate ascites.    COMPARISON: CT abdomen pelvis dated 5/29/2020.    TECHNIQUE: Sonographic survey of the abdomen.    FINDINGS:  Sonographic survey of the abdomen demonstrates the presence of moderate ascites. Fluid is present in all 4 quadrants with the largest amount in the right lower quadrant. Thin septations and loculations are identified within the fluid.    IMPRESSION:   Moderate ascites.    MARITZA WATSON M.D., ATTENDING RADIOLOGIST  This document has been electronically signed. Jun 2 2020 11:25AM    < end of copied text >    < from: Transthoracic Echocardiogram (06.02.20 @ 16:39) >    Patient name: MARYANN VASQUEZ  YOB: 1995   Age: 24 (F)   MR#: [id number]  Study Date: 6/2/2020  Location: [address] [place], [place] [id number]: [facility]  Study quality: Technically fair  Referring Physician: Magui Merritt MD  Blood Pressure: 96/58 mmHg  Height: 163 cm  Weight: 73 kg  BSA: 1.8 m2  Heart Rate: 120 mmHg  ------------------------------------------------------------------------  PROCEDURE: Transthoracic echocardiogram with 2-D, M-Mode  and complete spectral and color flow Doppler.  INDICATION: Disease of pericardium, unspecified (I31.9)  ------------------------------------------------------------------------  Dimensions:    Normal Values:  LA:     3.0    2.0 - 4.0 cm  Ao:     2.5    2.0 - 3.8 cm  SEPTUM:0.7    0.6 - 1.2 cm  PWT:    0.8    0.6 - 1.1 cm  LVIDd:  4.7    3.0 - 5.6 cm  LVIDs:  3.0    1.8 - 4.0 cm  Derived variables:  LVMI: 69 g/m2  RWT: 0.37  Fractional short: 35 %  EF (Visual Estimate): 50-55 %  Doppler Peak Velocity (m/sec): AoV=1.3  ------------------------------------------------------------------------  Observations:  Mitral Valve: Normal mitral valve. Minimal mitral  regurgitation.  Aortic Valve/Aorta: Normal trileaflet aortic valve. Peak  transaortic valve gradient equals 7 mmHg. Minimal aortic  regurgitation.  Peak left ventricular outflow tract  gradient equals 3 mm Hg.  Aortic Root: 2.5 cm.  Left Atrium: Normal left atrium.  LA volume index = 14  cc/m2.  Left Ventricle: Low Normal left ventricular systolic  function. No segmental wall motion abnormalities. Normal  left ventricular internal dimensions and wall thicknesses.  Indeterminate diastolic function.  Right Heart: Normal right atrium. The right ventricle is  not well visualized; grossly normal right ventricular  systolic function. TAPSE = 1.8 cm.  Normal tricuspid valve.  Minimal tricuspid regurgitation. Pulmonic valve not well  visualized, probably normal. Minimal pulmonic  regurgitation.  Pericardium/Pleura: Normal pericardium with moderate  pericardialeffusion localized to inferior to the left  ventricle (largest measurement 1.4 cm inferior to the left  ventricle in the subcostal view). No echocardiographic  evidence of tamponade is seen ( no respiratory variation of  flow >25% across the mitral valve, no late diastolic RA or  early diastolic RV diastolic collapse. The IVC is not well  visualized.)  Hemodynamic: Estimated right atrial pressure is 8 mm Hg.  Estimated right ventricular systolic pressure equals 37 mm  Hg, assuming right atrial pressure equals 8 mm Hg,  consistent with borderline pulmonary hypertension.  ------------------------------------------------------------------------  Conclusions:  1. Normal left ventricular internal dimensions and wall  thicknesses.  2. Low Normal left ventricular systolic function. No  segmental wall motion abnormalities.  3. The right ventricle is not well visualized; grossly  normal right ventricular systolic function. TAPSE = 1.8 cm.    4. Estimated right ventricular systolic pressure equals 37  mm Hg, assuming right atrial pressure equals 8 mm Hg,  consistent with borderline pulmonary hypertension.  5. Normal pericardium with moderate pericardial effusion  localized to inferior to the left ventricle (largest  measurement 1.4 cm inferior to the left ventricle in the  subcostal view). No echocardiographic evidence of tamponade  physiology is seen ( no respiratory variation of flow >25%  across the mitral valve, no late diastolic RA or early  diastolic RV diastolic collapse. The IVC is not well  visualized.)  *** Compared with echocardiogram of 4/27/2020, pericardial  effusion size has increased.  ------------------------------------------------------------------------  Confirmed on  6/2/2020 - 19:53:14 by Adam Banks M.D.  ------------------------------------------------------------------------    < end of copied text >

## 2020-06-03 NOTE — PROGRESS NOTE ADULT - SUBJECTIVE AND OBJECTIVE BOX
HPI:  24F with recent diagnosis of metastatic carcinoma of unknown primary (being treated as likely ovarian primary) s/p Carbo/Taxol cycle 1.  Patient presents for ultrasound guided therapeutic paracentesis.     ED COURSE  VS : 112/72  144 18 O2 97% on room air T 100.3F  Labs : wbc 21 h/h 9/30 plt 409   AST 53  COVID 19 negative  Treatment : cefepime 2g, IVIG x 1, vancomycin 1 g ivpb x 1, Morphine 4mg iv x1, Zofran 4mg ivp x 1 (26 May 2020 17:22)    NPO status: N/A  Anticoagulation: None   Antibiotics:     Allergies:  No Known Allergies    PAST MEDICAL & SURGICAL HISTORY:  Metastatic cancer  No significant past surgical history    Pertinent labs:                      7.2    36.60 )-----------( 302      ( 03 Jun 2020 07:07 )             23.5   06-03    129<L>  |  95<L>  |  10  ----------------------------<  86  3.5   |  19<L>  |  0.70    Ca    9.5      03 Jun 2020 07:07  Phos  1.9     06-03  Mg     1.7     06-03    TPro  6.2  /  Alb  2.0<L>  /  TBili  1.1  /  DBili  0.7<H>  /  AST  78<H>  /  ALT  19  /  AlkPhos  383<H>  06-02  PT/INR - ( 03 Jun 2020 07:07 )   PT: 23.1 sec;   INR: 1.97 ratio       PTT - ( 03 Jun 2020 07:07 )  PTT:25.7 sec    Consent: Procedure/risks/ Benefits explained. Informed consent obtained from the patient at bedside. Pt verbalizes understanding.

## 2020-06-03 NOTE — PROGRESS NOTE ADULT - PROBLEM SELECTOR PLAN 6
-improved overall   -CT and x-ray without bowel obstruction  -continue anti-emetics   - MRI brain w/wo contrast to eval for metastatic disease was unrevealing but poor quality and may need to be repeated  -low threshold for re-imaging if vomiting/symptoms worsen   -replete electrolytes as needed  -haldol and lorazepam PRN nausea/vomiting, monitor QTc (will check ekg in am)

## 2020-06-04 NOTE — PROGRESS NOTE ADULT - ASSESSMENT
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    #Metastatic neuroendocrine carcinoma of unknown primary- being treated as GI primary (with mets to ovaries):  Details per HPI   Pt was due 5/26 for cycle two of Carbo/Taxol, but held due to worsening clinical status  Seen by rad onc for simulation on 6/1/20, but plan for radiation tx on 6/3/20 deferred given she was too hemodynamically unstable for transportation (tachycardia, hypotension)  Hypercalcemia likely 2/2 malignancy- appreciate endocrine recommendations; resolved  Leukocytosis, immature cells in blood count differential (including 0.9% blasts), nucleated RBCs likely 2/2 known malignancy, high suspicion for bone marrow involvement  Given patient's clinical decline, had family meeting on 6/4/20: would not offer further chemotherapy, explained that it would likely quicken clinical decline and worsen symptoms. Recommended transitioning to comfort-care only, with option for hospice care. The patient is worried about her symptoms being unable to be managed at home if she were to go home with hospice; for the time being, since she requires IV pain medications, would be a candidate for inpatient hospice; palliative care team evaluation and recommendations appreciated.  Patient and family to discuss between themselves how to proceed; also discussed DNR/DNI status, pending response from family  Poor prognosis    #Altered Mental Status, multifactorial  Improved s/p morphine decrease, but still encephalopathic  MRI performed without contrast (hCG elevated, though likely 2/2 malignancy); limited by motion artifact, but no large lesions identified, no mass effect seen. Will hold off on repeating for now, see if encephalopathy continues to improve. Endorses intermittent headache, but denies any nausea / vomiting.  Hypercalcemia improved / resolved; electrolytes grossly normal    #Fever, SIRS: Improved 5/30/2020  Pt had recent pneumonia last admission and was discharged on Levaquin. Pt also had frequent episodes of fever during her admission, with negative cultures, and were deemed to be 2/2 tumor fever.   Blood cx NGTD; on Zosyn  Worsening lactate, leukocytosis suspect 2/2 malignancy given lack of response to abx coverage  Continue to trend WBC with daily CBC with differential    #Tachycardia  Evaluated by cardiology. On tele now. Continues to be tachycardic, and hypotensive  Repeat echocardiogram on 6/2/20 showing normal LV function; borderline pulmonary HTN; moderate pericardial effusion noted, increased in size since 4/27/20 echo, localized to inferior to the left ventricle. No echocardiographic evidence of tamponade physiology seen; no plan for pericardiocentesis at this time.  Cardiac management per primary team and cardiology    #Nausea/Vomiting  CT A/P reviewed. No obstruction. Symptoms likely 2/2 abd distension from disease/malignant ascites.  Continue anti emetics  Advance diet as tolerated  Aggressive bowel regimen for constipation    #Shortness of breath:  CTA 5/26/20 negative for PE, showing left pleural effusion  CT A/P showing new ascites  s/p paracentesis on 6/3/20, but limited improvement, continues to be tachypneic  on supplemental O2 - 3L NC    Discussed with patient, patient's family at the bedside as above, primary team, primary oncologist, and palliative care team. Poor prognosis. Please call back with any questions.    Luis Peng MD, MPH  Hematology / Oncology Fellow, PGY5  p

## 2020-06-04 NOTE — PROGRESS NOTE ADULT - SUBJECTIVE AND OBJECTIVE BOX
Patient is a 24y old  Female who presents with a chief complaint of fever, shortness of breath (04 Jun 2020 07:59)    Being followed by ID for leucocytosis,     Interval history:hypotensive last night  abd pain  family meeting on GO today  No other acute events      ROS:  No cough,SOB,CP  No N/V/D-constipated   + abd pain  No urinary complaints  No HA  No joint or limb pain  No other complaints      Antimicrobials:    fluconAZOLE   Tablet 200 milliGRAM(s) Oral daily  piperacillin/tazobactam IVPB.. 3.375 Gram(s) IV Intermittent every 8 hours      other medications reviewed    Vital Signs Last 24 Hrs  T(C): 36.4 (06-04-20 @ 09:24), Max: 36.9 (06-03-20 @ 21:18)  T(F): 97.5 (06-04-20 @ 09:24), Max: 98.4 (06-03-20 @ 21:18)  HR: 109 (06-04-20 @ 09:24) (109 - 127)  BP: 91/51 (06-04-20 @ 09:24) (80/40 - 105/67)  BP(mean): --  RR: 18 (06-04-20 @ 09:24) (18 - 23)  SpO2: 96% (06-04-20 @ 09:24) (95% - 97%)    Physical Exam:  HEENT PERRLA EOMI    No oral exudate or erythema    Chest Good AE,CTA    CVS RRR S1 S2 WNl No murmur or rub or gallop    Abd firm- BS normal Lower half some tenderness ? mass  ascites/FF    R PICC  site no erythema tenderness or discharge    CNS AAO X 3 no focalLab Data:                          7.5    41.56 )-----------( 267      ( 04 Jun 2020 05:12 )             24.9     WBC Count: 41.56 (06-04-20 @ 05:12)  WBC Count: 36.60 (06-03-20 @ 07:07)  WBC Count: 29.93 (06-02-20 @ 07:11)  WBC Count: 23.14 (06-01-20 @ 06:29)  WBC Count: 19.65 (05-31-20 @ 09:18)  WBC Count: 18.58 (05-31-20 @ 06:28)  WBC Count: 16.60 (05-30-20 @ 05:03)  WBC Count: 18.88 (05-29-20 @ 08:48)    06-04    129<L>  |  93<L>  |  10  ----------------------------<  76  3.6   |  20<L>  |  0.87    Ca    9.4      04 Jun 2020 05:12  Phos  3.3     06-04  Mg     1.8     06-04    TPro  6.3  /  Alb  2.1<L>  /  TBili  1.6<H>  /  DBili  x   /  AST  90<H>  /  ALT  22  /  AlkPhos  491<H>  06-04        Culture - Urine (collected 01 Jun 2020 08:55)  Source: .Urine Clean Catch (Midstream)  Final Report (02 Jun 2020 07:17):    No growth    Culture - Blood (collected 01 Jun 2020 04:20)  Source: .Blood Blood-Peripheral  Preliminary Report (02 Jun 2020 05:03):    No growth to date.    Culture - Blood (collected 01 Jun 2020 04:20)  Source: .Blood Blood-Peripheral  Preliminary Report (02 Jun 2020 05:03):    No growth to date.                      Imaging studies(films) independently reviewed.Findings as detailed in report above Patient is a 24y old  Female who presents with a chief complaint of fever, shortness of breath (04 Jun 2020 07:59)    Being followed by ID for leucocytosis,     Interval history:hypotensive last night  abd pain  family meeting on GO today  No other acute events      ROS:  No cough,SOB,CP  No N/V/D-constipated   + abd pain  No urinary complaints  No HA  No joint or limb pain  No other complaints      Antimicrobials:    fluconAZOLE   Tablet 200 milliGRAM(s) Oral daily  piperacillin/tazobactam IVPB.. 3.375 Gram(s) IV Intermittent every 8 hours      other medications reviewed    Vital Signs Last 24 Hrs  T(C): 36.4 (06-04-20 @ 09:24), Max: 36.9 (06-03-20 @ 21:18)  T(F): 97.5 (06-04-20 @ 09:24), Max: 98.4 (06-03-20 @ 21:18)  HR: 109 (06-04-20 @ 09:24) (109 - 127)  BP: 91/51 (06-04-20 @ 09:24) (80/40 - 105/67)  BP(mean): --  RR: 18 (06-04-20 @ 09:24) (18 - 23)  SpO2: 96% (06-04-20 @ 09:24) (95% - 97%)    Physical Exam:  HEENT PERRLA EOMI    No oral exudate or erythema    Chest Good AE,CTA    CVS RRR S1 S2 WNl No murmur or rub or gallop    Abd firm- BS normal Lower half some tenderness ? mass  ascites/FF    R PICC  site no erythema tenderness or discharge    CNS AAO X 3 no focalLab Data:                          7.5    41.56 )-----------( 267      ( 04 Jun 2020 05:12 )             24.9     WBC Count: 41.56 (06-04-20 @ 05:12)  WBC Count: 36.60 (06-03-20 @ 07:07)  WBC Count: 29.93 (06-02-20 @ 07:11)  WBC Count: 23.14 (06-01-20 @ 06:29)  WBC Count: 19.65 (05-31-20 @ 09:18)  WBC Count: 18.58 (05-31-20 @ 06:28)  WBC Count: 16.60 (05-30-20 @ 05:03)  WBC Count: 18.88 (05-29-20 @ 08:48)    06-04    129<L>  |  93<L>  |  10  ----------------------------<  76  3.6   |  20<L>  |  0.87    Ca    9.4      04 Jun 2020 05:12  Phos  3.3     06-04  Mg     1.8     06-04    TPro  6.3  /  Alb  2.1<L>  /  TBili  1.6<H>  /  DBili  x   /  AST  90<H>  /  ALT  22  /  AlkPhos  491<H>  06-04        Culture - Urine (collected 01 Jun 2020 08:55)  Source: .Urine Clean Catch (Midstream)  Final Report (02 Jun 2020 07:17):    No growth    Culture - Blood (collected 01 Jun 2020 04:20)  Source: .Blood Blood-Peripheral  Preliminary Report (02 Jun 2020 05:03):    No growth to date.    Culture - Blood (collected 01 Jun 2020 04:20)  Source: .Blood Blood-Peripheral  Preliminary Report (02 Jun 2020 05:03):    No growth to date.          < from: CT Abdomen and Pelvis w/ IV Cont (06.03.20 @ 17:22) >    IMPRESSION:     Extensive metastatic disease as at recent prior imaging.    Moderate ascites with loculation, also unchanged.    No evidence of an infectious source in the abdomen/pelvis.      < end of copied text >              Imaging studies(films) independently reviewed.Findings as detailed in report above

## 2020-06-04 NOTE — PROGRESS NOTE ADULT - ASSESSMENT
24F with PMH of metastatic carcinoma of unknown primary (treated as ovarian primary) s/p carbo/taxol (5/5/20) here from [facility] for fever, tachycardia, and tachypnea, along with N/V/dyspepsia. COVID-19 negative. Treatment started with cefepime and vancomycin. Hypercalcemia noted, along with ascites. Cultures drawn. Palliative care called to help with pain management.

## 2020-06-04 NOTE — PROGRESS NOTE ADULT - PROBLEM SELECTOR PLAN 6
-improved overall   -CT and x-ray without bowel obstruction  -continue anti-emetics IV  - MRI brain w/wo contrast to eval for metastatic disease was unrevealing   -replete electrolytes as needed  -haldol and lorazepam PRN nausea/vomiting, monitor QTc (will check ekg in am)

## 2020-06-04 NOTE — PROGRESS NOTE ADULT - ASSESSMENT
24 f with b/l adnexal masses and pelvic LAD, metastatic carcinoma of unknown primary , s/p CT guided biopsy 3/25, PICC line 4/3 to start chemo  pt intermittently febrile and leukocytosis previously,   was treated for potential UTI  Dced on levaquin  Now presenting with fever and leucocytosis  No obvious localization clinically  Chronic abd pain-unchanged  CT chest abd pelvis as above no obvious new focus  Blood cx clear  Peritoneal fluid analysis and Cx not s/o infection  Urine Cx with VRE and candida-unclear if she has colonization or real pathogens  Leucocytosis unchanged after zyvox and fluconazole  repeat urine cx and blood cx are negative  Leucocytosis still increasing   RT held      Rec:  A) leucocytosis  Cx negative  abd distention/ascites/mass  ? Occult abd foci? tumor necrosis ? tumor related  ? Non infectious etiology   CT as above   Continue empiric zosyn and fluconazole though unclear if leucocytosis is from infectious etiology  Monitor clinically  Follow Cx  GOC disussion  prognosis is poor      B) Tumor,abd pain  workup for infectious etiology as above  GOC discussion   will defer to primary team on other plan    Will tailor plan for ID issues  per course,results.Will defer to primary team on management of other issues.  Assessment, plan and recommendations as detailed above were discussed with the medical/primary  team Dr Merritt  prognosis guarded      Will Follow.  Beeper [phone] [facility] [id number].   Wknd/afterhours/No response-[phone] or Fellow on call

## 2020-06-04 NOTE — PROGRESS NOTE ADULT - ATTENDING COMMENTS
24 year old woman with metastatic adenocarcinoma previously seen by us for chest pain and palpitations with abnormal EKG. Cardiac biomarkers were not suspicious for ACS and considered relation to 5-FU vasospasm and diltiazem 60mg q8h added. Now with continuous sinus tachycardia, slightly moderated with diltiazem and further with addition of beta blocker. Rhythm is related to multiple driving factors, but reasonable to attenuate with beta blocker and somewhat further improved with increase to 50 mg BID. Blood pressures in 90s to low 100s which is likely normal in 24 year old woman. Repeat cardiac echo demonstrates previous trace pericardial effusion has increased, but no signs of cardiac tamponade and no role for pericardiocentesis. LV systolic function may be slightly decreased from past, but remains in normal range.    Anticipate no further changes in cardiac management, please call if we can be of further assistance.

## 2020-06-04 NOTE — PROGRESS NOTE ADULT - PROBLEM SELECTOR PLAN 4
-sinus tachycardia with HR up to 160s previously. hx of cardiac vasospasm suspected from 5-FU. sporadic NSVT on tele.  -CT angio neg for PE  -likely multifactorial: malignancy, pain, underlying infection   -inc lopressor to 50mg q12  -dc cardizem. pt not tolerating at this time given hypotension  -repeat echo with enlarging pericardial effusion. likely malignancy related, no signs of tamponade physiology. per cards, no role for pericardiocentesis at this time  -diuresis prn  -replete lytes

## 2020-06-04 NOTE — CHART NOTE - NSCHARTNOTEFT_GEN_A_CORE
Medicine PA Episodic Note    Notified by RN of pt. having systolic BP in 80s. Pt. was seen and examined at bedside, alert and oriented, resting, slightly diaphoretic although in NAD. Pt. denies any acute complaints.    Vital Signs Last 24 Hrs  T(C): 36.7 (04 Jun 2020 01:10), Max: 37.3 (03 Jun 2020 05:13)  T(F): 98 (04 Jun 2020 01:10), Max: 99.2 (03 Jun 2020 05:13)  HR: 127 (04 Jun 2020 01:10) (109 - 132)  BP: 93/57 (04 Jun 2020 01:10) (84/51 - 105/67)  BP(mean): --  RR: 23 (04 Jun 2020 01:10) (18 - 23)  SpO2: 95% (04 Jun 2020 01:10) (95% - 99%)      Labs:                          7.2    36.60 )-----------( 302      ( 03 Jun 2020 07:07 )             23.5     06-03    129<L>  |  95<L>  |  10  ----------------------------<  86  3.5   |  19<L>  |  0.70    Ca    9.5      03 Jun 2020 07:07  Phos  1.9     06-03  Mg     1.7     06-03    TPro  6.2  /  Alb  2.0<L>  /  TBili  1.1  /  DBili  0.7<H>  /  AST  78<H>  /  ALT  19  /  AlkPhos  383<H>  06-02        Radiology:    < from: CT Abdomen and Pelvis w/ IV Cont (06.03.20 @ 17:22) >      EXAM:  CT ABDOMEN AND PELVIS IC                            PROCEDURE DATE:  06/03/2020            INTERPRETATION:  CLINICAL INFORMATION: Metastatic carcinoma of unknown primary. Rising leukocytosis.    COMPARISON: CT abdomen/pelvis from 5/29/2020.    PROCEDURE:   CT of the Abdomen and Pelvis was performed with intravenous contrast.   Intravenous contrast: 90 ml Omnipaque 350. 10 ml discarded.  Oral contrast: None.  Sagittal and coronal reformats were performed.    FINDINGS:  LOWER CHEST: Bibasilar pulmonary metastatic disease, left pleural nodularity and loculated left pleural effusion without significant change.    LIVER: Bilobar hepatic metastatic disease as at recent prior imaging 4 days earlier.  BILE DUCTS: Normal caliber.  GALLBLADDER: Distended gallbladder.  SPLEEN: Within normal limits.  PANCREAS: Within normal limits.  ADRENALS: Within normal limits.  KIDNEYS/URETERS: Within normal limits.    BLADDER: Within normal limits.  REPRODUCTIVE ORGANS: A 10.4 x 13.5 cm complex right adnexal lesion as at recent prior imaging.    BOWEL: Dilated colon without a transition point as a prior imaging. No bowel obstruction.  PERITONEUM: Peritoneal carcinomatosis with moderate loculated ascites, unchanged.  VESSELS: Within normal limits.  RETROPERITONEUM/LYMPH NODES: Retroperitoneal and pelvic lymphadenopathy.  ABDOMINAL WALL: Anasarca.  BONES: Within normal limits.    IMPRESSION:     Extensive metastatic disease as at recent prior imaging.    Moderate ascites with loculation, also unchanged.    No evidence of an infectious source in the abdomen/pelvis.                  KAREN TONY M.D., RADIOLOGY RESIDENT  This document has been electronically signed.  RUFINA CONTRERAS M.D., ATTENDING RADIOLOGIST  This document has been electronically signed. [place]  3 2020  6:48PM        Assessment & Plan:  24F w/ recent Dx of metastatic carcinoma of GI primary s/p Carbo/Taxol cycle 1 on 5/5 who was sent in from [facility] on account of fever, tachycardia  and tachypnea. Suspicion for Sepsis 2/2 PNA. Hypercalcemia related to bone mets. Pain of metastatic disease. Malignant ascites s/p paracentesis 05/27 and 06/03 (1500 cc).      Plan:  #Hypotension  - Case d/w Hospitalist  - Albumin 25% 50 ml x 1 dose  - Pt. has malignant ascites which       Follow up with Attending in AM.    Amish Busch PA-C  Department of Medicine  [facility] Medicine PA Episodic Note    Notified by RN of pt. having systolic BP in 80s. Pt. was seen and examined at bedside, alert and oriented, resting, slightly diaphoretic although in NAD. Pt. denied any acute complaints. Case was d/w HIC and Albumin 25% 50 ml ordered, however, pt.'s BP improved to SBP of 105 so Albumin was held and regular Lopressor dose was given for tachycardia. Later notified by RN of pt. having manual BP of 80/40. Pt. was seen and examined at bedside, alert and oriented, acutely c/o lower back pain (likely 2/2 bony METS) and slight difficulty breathing. Otherwise, pt. denies lightheadedness, dizziness, CP, palpitations, NVD.     Vital Signs Last 24 Hrs  T(C): 36.7 (04 Jun 2020 01:10), Max: 37.3 (03 Jun 2020 05:13)  T(F): 98 (04 Jun 2020 01:10), Max: 99.2 (03 Jun 2020 05:13)  HR: 127 (04 Jun 2020 01:10) (109 - 132)  BP: 93/57 (04 Jun 2020 01:10) (84/51 - 105/67)  BP(mean): --  RR: 23 (04 Jun 2020 01:10) (18 - 23)  SpO2: 95% (04 Jun 2020 01:10) (95% - 99%)      Labs:                          7.2    36.60 )-----------( 302      ( 03 Jun 2020 07:07 )             23.5     06-03    129<L>  |  95<L>  |  10  ----------------------------<  86  3.5   |  19<L>  |  0.70    Ca    9.5      03 Jun 2020 07:07  Phos  1.9     06-03  Mg     1.7     06-03    TPro  6.2  /  Alb  2.0<L>  /  TBili  1.1  /  DBili  0.7<H>  /  AST  78<H>  /  ALT  19  /  AlkPhos  383<H>  06-02        Radiology:    < from: CT Abdomen and Pelvis w/ IV Cont (06.03.20 @ 17:22) >      EXAM:  CT ABDOMEN AND PELVIS IC                            PROCEDURE DATE:  06/03/2020            INTERPRETATION:  CLINICAL INFORMATION: Metastatic carcinoma of unknown primary. Rising leukocytosis.    COMPARISON: CT abdomen/pelvis from 5/29/2020.    PROCEDURE:   CT of the Abdomen and Pelvis was performed with intravenous contrast.   Intravenous contrast: 90 ml Omnipaque 350. 10 ml discarded.  Oral contrast: None.  Sagittal and coronal reformats were performed.    FINDINGS:  LOWER CHEST: Bibasilar pulmonary metastatic disease, left pleural nodularity and loculated left pleural effusion without significant change.    LIVER: Bilobar hepatic metastatic disease as at recent prior imaging 4 days earlier.  BILE DUCTS: Normal caliber.  GALLBLADDER: Distended gallbladder.  SPLEEN: Within normal limits.  PANCREAS: Within normal limits.  ADRENALS: Within normal limits.  KIDNEYS/URETERS: Within normal limits.    BLADDER: Within normal limits.  REPRODUCTIVE ORGANS: A 10.4 x 13.5 cm complex right adnexal lesion as at recent prior imaging.    BOWEL: Dilated colon without a transition point as a prior imaging. No bowel obstruction.  PERITONEUM: Peritoneal carcinomatosis with moderate loculated ascites, unchanged.  VESSELS: Within normal limits.  RETROPERITONEUM/LYMPH NODES: Retroperitoneal and pelvic lymphadenopathy.  ABDOMINAL WALL: Anasarca.  BONES: Within normal limits.    IMPRESSION:     Extensive metastatic disease as at recent prior imaging.    Moderate ascites with loculation, also unchanged.    No evidence of an infectious source in the abdomen/pelvis.                  KAREN TONY M.D., RADIOLOGY RESIDENT  This document has been electronically signed.  RUFINA CONTRERAS M.D., ATTENDING RADIOLOGIST  This document has been electronically signed. [place]  3 2020  6:48PM        Assessment & Plan:  24F w/ recent Dx of metastatic carcinoma of GI primary s/p Carbo/Taxol cycle 1 on 5/5 who was sent in from [facility] on account of fever, tachycardia  and tachypnea. Suspicion for Sepsis 2/2 PNA. Hypercalcemia related to bone mets. Pain of metastatic disease. Malignant ascites s/p paracentesis 05/27 and 06/03 (1500 cc).      Plan:  #Hypotension likely 2/2 metastatic carcinoma  - Pt. is currently hemodynamically stable  - Case d/w Hospitalist  - Albumin 25% 50 ml x 1 dose  - Pt. has malignant ascites which wouldn't warrant IVF as pt. is on 4.5 L NC, and no change in ascites s/p 1500cc drained on 06/03/20  - Palliative following, pt. has poor prognosis, GOC meeting to be done today  - Monitor VS closely    #Tachycardia likely 2/2 metastatic carcinoma  - C/W Lopressor, cardizem  - C/W tele monitoring and continuous pulse oximetry  - Monitor VS closely    #Elevated Lactate  - Continue to trend  - Will endorse above to primary team in AM    Follow up with Attending in AM.    Amish Busch PA-C  Department of Medicine  [facility] [address]

## 2020-06-04 NOTE — PROGRESS NOTE ADULT - PROBLEM SELECTOR PROBLEM 8
Ascites, malignant
Hyponatremia
Hyponatremia
Prophylactic measure
Prophylactic measure
Pain of metastatic malignancy
Prophylactic measure

## 2020-06-04 NOTE — PROGRESS NOTE ADULT - PROBLEM SELECTOR PLAN 1
- had anxiety after family meeting today  - discussed with family about balance between mental alertness and symptom control, and that symptom management is priority  - will start ativan 0.5mg IV Q4 PRN anxiety

## 2020-06-04 NOTE — PROGRESS NOTE ADULT - PROBLEM SELECTOR PLAN 3
suspect 2/2 pulm involvement of ca, inc abdominal distention causing extrinsic compression on lungs/atelectasis.  -s/p lasix on 6/1, 6/2  -s/p tap with IR on 6/03. 1.5L removed  -supplemental o2 and lasix prn as pt tolerates

## 2020-06-04 NOTE — PROVIDER CONTACT NOTE (CRITICAL VALUE NOTIFICATION) - ACTION/TREATMENT ORDERED:
Provider notified. No orders at this time.
Notified N.P. Provider will call back to advise. Will continue to monitor patient.
PA ALi made aware. No intervention at this time. Continue to monitor and maintain safety.
PA Ali aware. No intervention at this time, continue to trend WBC. Continue to monitor and maintain safety.
PA Ali made aware. No intervention at this time. Continue to monitor and maintain safety.

## 2020-06-04 NOTE — PROGRESS NOTE ADULT - PROBLEM SELECTOR PLAN 3
- c/w MS Contin 30mg BID and morphine 2mg IV PRN for moderate and 4mg IV PRN for severe pain, both Q4  - currently full code, limited in ability to uptitrate dose despite pain in setting of hypotension  - will give tylenol 1g Q6 ATC for next 24 hours

## 2020-06-04 NOTE — PROGRESS NOTE ADULT - SUBJECTIVE AND OBJECTIVE BOX
[facility] Division of Hospital Medicine  Magui Merritt MD  Pager (M-F, 4Y-0N): [phone]  Other Times:  [phone]    Patient is a 24y old  Female who presents with a chief complaint of fever, shortness of breath (04 Jun 2020 13:19)      SUBJECTIVE / OVERNIGHT EVENTS:    hypotensive overnight. given albumin  remains tachycardic  pt remains in pain-- mainly lower back    family meeting held today. details below.       ADDITIONAL REVIEW OF SYSTEMS:    MEDICATIONS  (STANDING):  acetaminophen  IVPB .. 1000 milliGRAM(s) IV Intermittent <User Schedule>  acetaminophen  IVPB .. 1000 milliGRAM(s) IV Intermittent <User Schedule>  cholecalciferol 4000 Unit(s) Oral daily  enoxaparin Injectable 40 milliGRAM(s) SubCutaneous daily  fluconAZOLE   Tablet 200 milliGRAM(s) Oral daily  lidocaine   Patch 1 Patch Transdermal daily  metoprolol tartrate 50 milliGRAM(s) Oral every 12 hours  morphine ER Tablet 30 milliGRAM(s) Oral two times a day  pantoprazole    Tablet 40 milliGRAM(s) Oral before breakfast  piperacillin/tazobactam IVPB.. 3.375 Gram(s) IV Intermittent every 8 hours  polyethylene glycol 3350 17 Gram(s) Oral two times a day  potassium acid phosphate/sodium acid phosphate tablet (K-PHOS No. 2) 1 Tablet(s) Oral four times a day with meals  senna 2 Tablet(s) Oral at bedtime    MEDICATIONS  (PRN):  aluminum hydroxide/magnesium hydroxide/simethicone Suspension 30 milliLiter(s) Oral every 6 hours PRN Dyspepsia  bisacodyl 5 milliGRAM(s) Oral every 12 hours PRN Constipation  haloperidol    Injectable 0.5 milliGRAM(s) IV Push every 6 hours PRN nausea  LORazepam   Injectable 0.5 milliGRAM(s) IV Push every 4 hours PRN Anxiety  morphine  - Injectable 4 milliGRAM(s) IV Push every 4 hours PRN Severe Pain (7 - 10)  morphine  - Injectable 2 milliGRAM(s) IV Push every 4 hours PRN Moderate Pain (4 - 6)  ondansetron Injectable 4 milliGRAM(s) IV Push every 6 hours PRN Nausea and/or Vomiting  simethicone 80 milliGRAM(s) Chew every 6 hours PRN Gas      CAPILLARY BLOOD GLUCOSE        I&O's Summary    03 Jun 2020 07:01  -  04 Jun 2020 07:00  --------------------------------------------------------  IN: 240 mL / OUT: 0 mL / NET: 240 mL    04 Jun 2020 07:01  -  04 Jun 2020 13:57  --------------------------------------------------------  IN: 1160 mL / OUT: 0 mL / NET: 1160 mL        PHYSICAL EXAM:  Vital Signs Last 24 Hrs  T(C): 36.4 (04 Jun 2020 11:56), Max: 36.9 (03 Jun 2020 21:18)  T(F): 97.5 (04 Jun 2020 11:56), Max: 98.4 (03 Jun 2020 21:18)  HR: 110 (04 Jun 2020 11:56) (109 - 127)  BP: 94/58 (04 Jun 2020 11:56) (80/40 - 105/67)  BP(mean): --  RR: 18 (04 Jun 2020 11:56) (18 - 23)  SpO2: 95% (04 Jun 2020 11:56) (95% - 97%)  GENERAL: chronically ill appearing   EYES:  conjunctiva and sclera clear  NECK: Supple, No JVD  CHEST/LUNG: shallow breathing  HEART: tachycardic  ABDOMEN: more distended, non-tender   EXTREMITIES: no LE edema   PSYCH: AAOx3 but lethargic    LABS:                        7.5    41.56 )-----------( 267      ( 04 Jun 2020 05:12 )             24.9     06-04    129<L>  |  93<L>  |  10  ----------------------------<  76  3.6   |  20<L>  |  0.87    Ca    9.4      04 Jun 2020 05:12  Phos  3.3     06-04  Mg     1.8     06-04    TPro  6.3  /  Alb  2.1<L>  /  TBili  1.6<H>  /  DBili  x   /  AST  90<H>  /  ALT  22  /  AlkPhos  491<H>  06-04    PT/INR - ( 03 Jun 2020 07:07 )   PT: 23.1 sec;   INR: 1.97 ratio         PTT - ( 03 Jun 2020 07:07 )  PTT:25.7 sec            RADIOLOGY & ADDITIONAL TESTS:  Results Reviewed: yes    COORDINATION OF CARE:  Care Discussed with Consultants/Other Providers [Y/N]: yes  Prior or Outpatient Records Reviewed [Y/N]: yes

## 2020-06-04 NOTE — PROVIDER CONTACT NOTE (OTHER) - ASSESSMENT
PT AOx3, no c/o dizziness however is complaining of lower back pain, 8/10. States she has had this pain the past. Manual BP 86/40,  on tele. Temp 98.4F, RR 20, O2 sat 95% on 5L NC. PT due to receive metoprolol and cardizem.

## 2020-06-04 NOTE — PROGRESS NOTE ADULT - PROBLEM SELECTOR PLAN 9
suspect related to hypervolemia and third spacing  stable with paracentesis  will consider additional lasix with albumin as needed

## 2020-06-04 NOTE — PROGRESS NOTE ADULT - ASSESSMENT
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    #Sinus tach  -Suspect reactive to mult ongoing medical issues incl malignancy and discomfort  -Tx of sinus tach is to address the underlying cause  -C/w metop 50mg PO BID to address HR and slight declined in LVEF    #Moderate pericardial effusion  -Unclear etiology, although malignancy on ddx  -No clinical/imaging e/o tamponade  -C/w serial VS  -C/w tx of malignancy  -No clear indication for pericardiocentesis currently    #NSVT  -TTE reviewed; normal LV structure although low-normal LVEF (in context of tachycardia)  -Can incr BB as above  -No further runs of NSVT on tele    #Hx of CP and abnml ECG  -Some thought that 5-FU could cause vasospasm  -C/w dilt to address possible vasospasm    #Prognosis appears guarded overall    Oliver Eller MD  Cardiology Fellow  [phone]  All Cardiology service information can be found 24/7 on [email or web address], password: [email or web address]

## 2020-06-04 NOTE — PROGRESS NOTE ADULT - PROBLEM SELECTOR PLAN 1
- recent diagnosis of metastatic carcinoma of unknown origin path later consistent with neuroendocrine/GI in origin did not tolerated m-FOLFOX 2/2 ?5-FU allergy now being treated as ovarian primary s/p Carbo/Taxol cycle 1 on 5/5   - follows with Dr Kristina Miles at [facility] [place]  -oncology no longer offering chemo given poor functional status and likelihood it would hasten death and worsen quality of life. focusing on comfort at this time. discussed at length with pt and her family  - rad onc treatment at [facility] on 6/1 however has been unable to tolerate transport since. no plans to pursue further  -MR brain without obvious mets, but possible hyperintensity of some white matter?. but limited by motion and lack of contrast.   -cont pain control- decreased 2/2 lethargy. now on MS Contin 30mg bid with PRN breakthrough 4mg IV q4h prn. IV tylenol as well.   -we are limited in pain control 2/2 low BPs now  -standing bowel regimen  - anemia noted, transfuse to keep Hb>7. iron studies consistent with anemia of chronic disease  -rising leukocytosis likely 2/2 bone marrow involvement of CA  -CT AP on 6/3 with no infectious source-- all malignancy  -s/p therapeutic tap 6/03, 1.5L removed.  -rising lactate likely 2/2 underlying malignancy causing tissue ischemia as well

## 2020-06-04 NOTE — PROGRESS NOTE ADULT - ATTENDING COMMENTS
prognosis very poor. heme/onc no longer offering chemotherapy. extensive family meeting held today with pts mother, aunt and sister. palliative, oncology and medical team present. family and pt understanding of poor prognosis and fact that palliative chemo at this point is harmful and not beneficial. they are interested in hospice care, but indecisive of home vs inpatient hospice. code status also discussed, and pt needs time to think and discuss with her family. she needs time to digest all of the information presented to her. time in family meeting spent 50 mins. prognosis very poor. heme/onc no longer offering chemotherapy. extensive family meeting held today with pts mother, aunt and sister. palliative, oncology and medical team present. family and pt understanding of poor prognosis and fact that palliative chemo at this point is harmful and not beneficial. they are interested in hospice care, but indecisive of home vs inpatient hospice. code status also discussed, and pt needs time to think and discuss with her family. she needs time to digest all of the information presented to her. talked to pt about PCU transfer -- cannot decide at this time. will fu in AM. time in family meeting spent 50 mins.

## 2020-06-04 NOTE — PROVIDER CONTACT NOTE (OTHER) - ACTION/TREATMENT ORDERED:
AMIE Wellington made aware. IV tylenol ordered for pain. Metoprolol dose to be lowered to 25mg, and hold cardizem at this time. Albumin ordered as well. Continue to monitor and maintain safety.

## 2020-06-04 NOTE — PROGRESS NOTE ADULT - SUBJECTIVE AND OBJECTIVE BOX
HPI: 24F with PMH of metastatic carcinoma of unknown primary (treated as ovarian primary) s/p carbo/taxol (5/5/20) here from [facility] for fever, tachycardia, and tachypnea, along with N/V/dyspepsia. COVID-19 negative. Treatment started with cefepime and vancomycin. Hypercalcemia noted, along with ascites. Cultures drawn. Palliative care called to help with pain management.    INTERVAL EVENTS:  5/28: states pain somewhat better, but not flatus or BM x 2-3 days  5/29: states having nausea, pain as well, but no PRN/24 hours  6/1: patient dysarthric today, denies pain, but unable to clearly express thoughts  6/2: more alert today, states having 4/10 back pain  6/3: more alert, states pain is better, states feeling less weak than yesterday  6/4: remains tired but alert    ADVANCE DIRECTIVES:    DNR  MOLST  [ ]  Living Will  [ ]   DECISION MAKER(s):  [ ] Health Care Proxy(s)  [ ] Surrogate(s)  [ ] Guardian           Name(s): Phone Number(s):    BASELINE (I)ADL(s) (prior to admission):  [place]: [ ]Total  [ ] Moderate [ ]Dependent    Allergies    No Known Allergies    Intolerances    MEDICATIONS  (STANDING):  acetaminophen  IVPB .. 1000 milliGRAM(s) IV Intermittent <User Schedule>  acetaminophen  IVPB .. 1000 milliGRAM(s) IV Intermittent <User Schedule>  cholecalciferol 4000 Unit(s) Oral daily  enoxaparin Injectable 40 milliGRAM(s) SubCutaneous daily  fluconAZOLE   Tablet 200 milliGRAM(s) Oral daily  lidocaine   Patch 1 Patch Transdermal daily  LORazepam   Injectable 0.5 milliGRAM(s) IV Push once  metoprolol tartrate 50 milliGRAM(s) Oral every 12 hours  morphine ER Tablet 30 milliGRAM(s) Oral two times a day  pantoprazole    Tablet 40 milliGRAM(s) Oral before breakfast  piperacillin/tazobactam IVPB.. 3.375 Gram(s) IV Intermittent every 8 hours  polyethylene glycol 3350 17 Gram(s) Oral two times a day  potassium acid phosphate/sodium acid phosphate tablet (K-PHOS No. 2) 1 Tablet(s) Oral four times a day with meals  senna 2 Tablet(s) Oral at bedtime    MEDICATIONS  (PRN):  aluminum hydroxide/magnesium hydroxide/simethicone Suspension 30 milliLiter(s) Oral every 6 hours PRN Dyspepsia  bisacodyl 5 milliGRAM(s) Oral every 12 hours PRN Constipation  haloperidol    Injectable 0.5 milliGRAM(s) IV Push every 6 hours PRN nausea  LORazepam   Injectable 0.5 milliGRAM(s) IV Push every 4 hours PRN Anxiety  morphine  - Injectable 4 milliGRAM(s) IV Push every 4 hours PRN Severe Pain (7 - 10)  morphine  - Injectable 2 milliGRAM(s) IV Push every 4 hours PRN Moderate Pain (4 - 6)  ondansetron Injectable 4 milliGRAM(s) IV Push every 6 hours PRN Nausea and/or Vomiting  simethicone 80 milliGRAM(s) Chew every 6 hours PRN Gas    PRESENT SYMPTOMS: [ ]Unable to obtain due to poor mentation   Source if other than patient:  [ ]Family   [ ]Team     Pain: [x ]yes [ ]no  QOL impact -   Location -      back, abdomen              Aggravating factors - movement  Quality - aching  Radiation -  Timing- constant  Severity (0-10 scale): 6/10  Minimal acceptable level (0-10 scale): 3/10    CPOT:    https://www.[facility].org/getattachment/[email or web address]/Critical-Care-Pain-Observation-Tool-(CPOT)      PAIN AD Score:     http://geriatrictoolkit.[email or web address]/painad.pdf (press ctrl +  left click to view)    Dyspnea:                           [ ]Mild [ ]Moderate [ ]Severe  Anxiety:                             [ ]Mild [ ]Moderate [ ]Severe  Fatigue:                             [ ]Mild [ ]Moderate [ ]Severe  Nausea:                             [ ]Mild [ x]Moderate [ ]Severe  Loss of appetite:              [ ]Mild [ ]Moderate [ ]Severe  Constipation:                    [ ]Mild [ ]Moderate [ ]Severe    Other Symptoms:  [ x]All other review of systems negative     Palliative Performance Status Version 2:         %    http://npcrc.org/files/news/palliative_performance_scale_ppsv2.pdf    PHYSICAL EXAM:  Vital Signs Last 24 Hrs  T(C): 36.4 (04 Jun 2020 11:56), Max: 36.9 (03 Jun 2020 21:18)  T(F): 97.5 (04 Jun 2020 11:56), Max: 98.4 (03 Jun 2020 21:18)  HR: 110 (04 Jun 2020 11:56) (109 - 127)  BP: 94/58 (04 Jun 2020 11:56) (80/40 - 105/67)  BP(mean): --  RR: 18 (04 Jun 2020 11:56) (18 - 23)  SpO2: 95% (04 Jun 2020 11:56) (95% - 97%)    Limited exam for patient comfort after family meeting  GENERAL:  [x ]Alert  [ ]Oriented x   [ ]Lethargic  [ ]Cachexia  [ ]Unarousable  [x ]Verbal  [ ]Non-Verbal  Behavioral:   [ ] Anxiety  [ ] Delirium [ ] Agitation [ ] Other  HEENT:  [x ]Normal   [ ]Dry mouth   [ ]ET Tube/Trach  [ ]Oral lesions  PULMONARY:   [ ]Clear [ X]Tachypnea  [ ]Audible excessive secretions   [ ]Rhonchi        [ ]Right [ ]Left [ ]Bilateral  [ ]Crackles        [ ]Right [ ]Left [ ]Bilateral  [ ]Wheezing     [ ]Right [ ]Left [ ]Bilateral  [ ]Diminished breath sounds [ ]right [ ]left [ ]bilateral  CARDIOVASCULAR:    [ ]Regular [ ]Irregular [ ]Tachy  [ ]Quirino [ ]Murmur [ ]Other  GASTROINTESTINAL:  [ ]Soft  [ ]Distended   [ ]+BS  [ ]Non tender [ ]Tender  [ ]PEG [ ]OGT/ NGT  Last BM:     GENITOURINARY:  [ ]Normal [ ] Incontinent   [ ]Oliguria/Anuria   [ ]Cifuentes  MUSCULOSKELETAL:   [ ]Normal   [ ]Weakness  [ ]Bed/Wheelchair bound [ ]Edema  NEUROLOGIC:   [x ]No focal deficits  [ ]Cognitive impairment  [ ]Dysphagia [ ]Dysarthria [ ]Paresis [ ]Other   SKIN:   [ ]Normal    [ ]Rash  [ ]Pressure ulcer(s)       Present on admission [ ]y [ ]n    CRITICAL CARE:  [ ] Shock Present  [ ]Septic [ ]Cardiogenic [ ]Neurologic [ ]Hypovolemic  [ ]  Vasopressors [ ]  Inotropes   [ ]Respiratory failure present [ ]Mechanical ventilation [ ]Non-invasive ventilatory support [ ]High flow  [ ]Acute  [ ]Chronic [ ]Hypoxic  [ ]Hypercarbic [ ]Other  [ ]Other organ failure     LABS: reviewed                                   7.5    41.56 )-----------( 267      ( 04 Jun 2020 05:12 )             24.9     06-04    129<L>  |  93<L>  |  10  ----------------------------<  76  3.6   |  20<L>  |  0.87    Ca    9.4      04 Jun 2020 05:12  Phos  3.3     06-04  Mg     1.8     06-04        RADIOLOGY & ADDITIONAL STUDIES: MRI head 6/1 reviewed    PROTEIN CALORIE MALNUTRITION PRESENT: [ ]mild [ ]moderate [ ]severe [ ]underweight [ ]morbid obesity  https://www.andeal.org/vault/2440/web/files/ONC/Table_Clinical%20Characteristics%20to%20Document%20Malnutrition-White%20JV%20et%20al%202012.pdf    Height (cm): 162.56 (05-26-20 @ 12:53), 164 (05-26-20 @ 12:04), 163 (05-22-20 @ 13:44)  Weight (kg): 73.5 (05-26-20 @ 21:00), 72.8 (05-26-20 @ 12:04), 73.936 (05-22-20 @ 13:41)  BMI (kg/m2): 27.8 (05-26-20 @ 21:00), 27.5 (05-26-20 @ 12:53), 27.1 (05-26-20 @ 12:04)    [ ]PPSV2 < or = to 30% [ ]significant weight loss  [ ]poor nutritional intake  [ ]anasarca     Albumin, Serum: 2.6 g/dL (05-26-20 @ 13:21)   [ ]Artificial Nutrition      REFERRALS:   [ ]Chaplaincy  [ ]Hospice  [ ]Child Life  [ ]Social Work  [ ]Case management [ ]Holistic Therapy     Goals of Care Document:     ______________  Alex Mack MD   of Geriatric and Palliative Medicine  [facility]     Please page the following number for clinical matters between the hours of 9AM and 5PM   from Monday through Friday : [phone]    After 5PM and on weekends, please page: [phone]. The Geriatric and Palliative Medicine consult service has 24/7 coverage for medical recommendations, including for symptom management needs.

## 2020-06-04 NOTE — PROGRESS NOTE ADULT - SUBJECTIVE AND OBJECTIVE BOX
Patient seen and examined at bedside.    Overnight Events: NAEO. This AM, pt reports continued pain/discomfort. She reports continued dyspnea. As per review of EMR, functional status has declined and may not be a candidate for further malignancy tx.    Review Of Systems:   REVIEW OF SYSTEMS:  CONSTITUTIONAL: +Weakness  EYES/ENT: No visual changes;  No dysphagia  NECK: No pain or stiffness  RESPIRATORY: +Dyspnea  CARDIOVASCULAR: +Chest discomfort  GASTROINTESTINAL: No abdominal or epigastric pain. No nausea, vomiting, or hematemesis; No diarrhea or constipation. No melena or hematochezia.  BACK: No back pain  GENITOURINARY: No dysuria, frequency or hematuria  NEUROLOGICAL: No numbness or weakness  SKIN: No itching, burning, rashes, or lesions   All other review of systems is negative unless indicated above.    Current Meds:  aluminum hydroxide/magnesium hydroxide/simethicone Suspension 30 milliLiter(s) Oral every 6 hours PRN  bisacodyl 5 milliGRAM(s) Oral every 12 hours PRN  cholecalciferol 4000 Unit(s) Oral daily  diltiazem    Tablet 60 milliGRAM(s) Oral every 8 hours  enoxaparin Injectable 40 milliGRAM(s) SubCutaneous daily  haloperidol    Injectable 0.5 milliGRAM(s) IV Push every 6 hours PRN  lidocaine   Patch 1 Patch Transdermal daily  metoprolol tartrate 50 milliGRAM(s) Oral two times a day  morphine  - Injectable 4 milliGRAM(s) IV Push every 4 hours PRN  morphine ER Tablet 30 milliGRAM(s) Oral two times a day  ondansetron Injectable 4 milliGRAM(s) IV Push every 6 hours PRN  pantoprazole    Tablet 40 milliGRAM(s) Oral before breakfast  piperacillin/tazobactam IVPB.. 3.375 Gram(s) IV Intermittent every 8 hours  polyethylene glycol 3350 17 Gram(s) Oral two times a day  potassium acid phosphate/sodium acid phosphate tablet (K-PHOS No. 2) 1 Tablet(s) Oral four times a day with meals  senna 2 Tablet(s) Oral at bedtime  simethicone 80 milliGRAM(s) Chew every 6 hours PRN      Vitals:  T(F): 97.9 (06-04), Max: 98.4 (06-03)  HR: 117 (06-04) (109 - 130)  BP: 91/54 (06-04) (80/40 - 105/67)  RR: 20 (06-04)  SpO2: 96% (06-04)  I&O's Summary    03 Jun 2020 07:01  -  04 Jun 2020 07:00  --------------------------------------------------------  IN: 240 mL / OUT: 0 mL / NET: 240 mL        Physical Exam:  Gen: Ill appearing pt  HEENT: NCAT.   Neck: No JVP elev.  CV: Normal S1, S2. Tachy w/ reg rhythm. No MRG. +Point tenderness on chest that reproduces CP.  Chest: Dyspneic. CTAB but shallow breaths.  Abd: +BSx4. Soft. NTND.  Ext: No LE edema.  Skin: No cyanosis.  Psych: AAOx3.  Neuro: Nonfocal.                          7.5    41.56 )-----------( 267      ( 04 Jun 2020 05:12 )             24.9     06-04    129<L>  |  93<L>  |  10  ----------------------------<  76  3.6   |  20<L>  |  0.87    Ca    9.4      04 Jun 2020 05:12  Phos  3.3     06-04  Mg     1.8     06-04    TPro  6.3  /  Alb  2.1<L>  /  TBili  1.6<H>  /  DBili  x   /  AST  90<H>  /  ALT  22  /  AlkPhos  491<H>  06-04    PT/INR - ( 03 Jun 2020 07:07 )   PT: 23.1 sec;   INR: 1.97 ratio         PTT - ( 03 Jun 2020 07:07 )  PTT:25.7 sec      Serum Pro-Brain Natriuretic Peptide: 295 pg/mL (06-02 @ 07:11)    Interpretation of Telemetry: Sinus tach Patient seen and examined at bedside.    Overnight Events: NAEO. This AM, pt reports continued pain/discomfort. She reports continued dyspnea. As per review of EMR, functional status has declined and may not be a candidate for further malignancy tx.    REVIEW OF SYSTEMS:  CONSTITUTIONAL: +Weakness  EYES/ENT: No visual changes;  No dysphagia  NECK: No pain or stiffness  RESPIRATORY: +Dyspnea  CARDIOVASCULAR: +Chest discomfort  GASTROINTESTINAL: No abdominal or epigastric pain. No nausea, vomiting, or hematemesis; No diarrhea or constipation. No melena or hematochezia.  BACK: No back pain  GENITOURINARY: No dysuria, frequency or hematuria  NEUROLOGICAL: No numbness or weakness  SKIN: No itching, burning, rashes, or lesions   All other review of systems is negative unless indicated above.    Current Meds:  aluminum hydroxide/magnesium hydroxide/simethicone Suspension 30 milliLiter(s) Oral every 6 hours PRN  bisacodyl 5 milliGRAM(s) Oral every 12 hours PRN  cholecalciferol 4000 Unit(s) Oral daily  diltiazem    Tablet 60 milliGRAM(s) Oral every 8 hours  enoxaparin Injectable 40 milliGRAM(s) SubCutaneous daily  haloperidol    Injectable 0.5 milliGRAM(s) IV Push every 6 hours PRN  lidocaine   Patch 1 Patch Transdermal daily  metoprolol tartrate 50 milliGRAM(s) Oral two times a day  morphine  - Injectable 4 milliGRAM(s) IV Push every 4 hours PRN  morphine ER Tablet 30 milliGRAM(s) Oral two times a day  ondansetron Injectable 4 milliGRAM(s) IV Push every 6 hours PRN  pantoprazole    Tablet 40 milliGRAM(s) Oral before breakfast  piperacillin/tazobactam IVPB.. 3.375 Gram(s) IV Intermittent every 8 hours  polyethylene glycol 3350 17 Gram(s) Oral two times a day  potassium acid phosphate/sodium acid phosphate tablet (K-PHOS No. 2) 1 Tablet(s) Oral four times a day with meals  senna 2 Tablet(s) Oral at bedtime  simethicone 80 milliGRAM(s) Chew every 6 hours PRN      Vitals:  T(F): 97.9 (06-04), Max: 98.4 (06-03)  HR: 117 (06-04) (109 - 130)  BP: 91/54 (06-04) (80/40 - 105/67)  RR: 20 (06-04)  SpO2: 96% (06-04)  I&O's Summary    03 Jun 2020 07:01  -  04 Jun 2020 07:00  --------------------------------------------------------  IN: 240 mL / OUT: 0 mL / NET: 240 mL    Physical Exam:  Gen: Ill appearing pt  HEENT: NCAT.   Neck: No JVP elev.  CV: Normal S1, S2. Tachy w/ reg rhythm. No MRG. +Point tenderness on chest that reproduces CP.  Chest: Dyspneic. CTAB but shallow breaths.  Abd: +BSx4. Soft. NTND.  Ext: No LE edema.  Skin: No cyanosis.  Psych: AAOx3.  Neuro: Nonfocal.                          7.5    41.56 )-----------( 267      ( 04 Jun 2020 05:12 )             24.9     06-04    129<L>  |  93<L>  |  10  ----------------------------<  76  3.6   |  20<L>  |  0.87    Ca    9.4      04 Jun 2020 05:12  Phos  3.3     06-04  Mg     1.8     06-04    TPro  6.3  /  Alb  2.1<L>  /  TBili  1.6<H>  /  DBili  x   /  AST  90<H>  /  ALT  22  /  AlkPhos  491<H>  06-04    PT/INR - ( 03 Jun 2020 07:07 )   PT: 23.1 sec;   INR: 1.97 ratio         PTT - ( 03 Jun 2020 07:07 )  PTT:25.7 sec      Serum Pro-Brain Natriuretic Peptide: 295 pg/mL (06-02 @ 07:11)    Interpretation of Telemetry: Sinus tach

## 2020-06-04 NOTE — PROGRESS NOTE ADULT - ASSESSMENT
24F with recent diagnosis of metastatic carcinoma of GI primary s/p Carbo/Taxol cycle 1 on 5/5 who was sent in from  [facility] on account of fever, tachycardia  and tachypnea. Suspicion for Sepsis 2/2 PNA. Hypercalcemia related to bone mets. Pain of metastatic disease. Malignant ascites s/p paracentesis 5/27.

## 2020-06-04 NOTE — PROVIDER CONTACT NOTE (CRITICAL VALUE NOTIFICATION) - ASSESSMENT
/78, , 98.6 temp, 95% on 2L NC
AxOx4. Afebrile overnight. Receiving zosyn for UTI.
Pt AOx3
Pt AOx3, resting in bed.
pt aox3

## 2020-06-04 NOTE — PROGRESS NOTE ADULT - ATTENDING COMMENTS
Agree with above. Family meeting took place today with pt, her mother, aunt, cousin, palliative care team, hospitalist. Discussed status of disease, previous chemotherapy, current medical status and prognosis. No further DMT to be offered. recommended comfort care, likely in an in patient setting. Discussed advanced directives and recommended DNR/DNI. Pt and family to discuss.   Emotional support provided.

## 2020-06-04 NOTE — PROGRESS NOTE ADULT - NSHPATTENDINGPLANDISCUSS_GEN_ALL_CORE
Call Cardiology Fellow or Attending as listed on [email or web address] password: [email or web address].
Call Cardiology Fellow or Attending as listed on [email or web address] password: [email or web address].
Call Cardiology Fellow or Attending as listed on [email or web address] password: [email or web address].
medical, palliative care team
heme/onc, I.D, palliative care.
heme/onc, palliative, NP , RN, IR.
heme/onc, medicine NP, RN.

## 2020-06-04 NOTE — CHART NOTE - NSCHARTNOTEFT_GEN_A_CORE
I reviewed the palliative care notes/discussion/conclusions  I understand that no further attempt will be made to transport to [facility] for radiation treatment  please feel free to call and discuss if necessary  Miguel Nguyen MD cell

## 2020-06-04 NOTE — PROGRESS NOTE ADULT - PROBLEM SELECTOR PLAN 8
- c/w IV hydration  - check urine lytes, osm  - likely SIADH
- c/w IV hydration  - check urine lytes, osm  - likely SIADH
Lovenox for DVT ppx
Lovenox for DVT ppx
s/p para 1.3L removed bloody fluid c/w malignancy on 5/27  -no growth to date from fluid cultures  -called IR today-- will repeat paracentesis in AM given worsening abdominal distention and respiratory distress. therapeutic purposes only.
s/p para 1.3L removed bloody fluid c/w malignancy on 5/27  -no growth to date from fluid cultures  -called IR today-- will repeat paracentesis today given worsening abdominal distention and respiratory distress. therapeutic purposes only.
s/p para 1.3L removed bloody fluid c/w malignancy on 5/27  -no growth to date from fluid cultures  -s/p repeat paracentesis 1.5L removed on 6/3
palliative consulted - pain regimen adjusted  rad onc for palliative RT @ [facility] as above
Lovenox for DVT ppx

## 2020-06-04 NOTE — PROGRESS NOTE ADULT - SUBJECTIVE AND OBJECTIVE BOX
Oncology Follow-up    INTERVAL HPI/OVERNIGHT EVENTS:  More tachypneic today, continues to endorse back pain, but otherwise symptoms stable.  More hypotensive today.  s/p paracentesis on 6/3/20    Had family meeting with patient's mother (Jaqueline), aunt (Brittney), and sister present, as well primary team, primary oncologist (Dr. Miles), and palliative care present (see assessment). Translation to Palauan for patient's mother provided by author (Dr. Peng) and Brittney.    Review of Systems: ROS otherwise negative    VITAL SIGNS:  T(F): 97.5 (06-04-20 @ 11:56)  HR: 110 (06-04-20 @ 11:56)  BP: 94/58 (06-04-20 @ 11:56)  RR: 18 (06-04-20 @ 11:56)  SpO2: 95% (06-04-20 @ 11:56)  Wt(kg): --    06-03-20 @ 07:01  -  06-04-20 @ 07:00  --------------------------------------------------------  IN: 240 mL / OUT: 0 mL / NET: 240 mL    06-04-20 @ 07:01  -  06-04-20 @ 12:53  --------------------------------------------------------  IN: 1160 mL / OUT: 0 mL / NET: 1160 mL        PHYSICAL EXAM:    Constitutional: continues to be drowsy, slightly more alert, but appears very ill; participating in conversation, but winded when she speaks  Eyes: PERRL, EOMI, sclera non-icteric  Neck: supple, no masses, no JVD, no lymphadenopathy  Respiratory: CTA b/l, no wheezing, rhonchi, rales; increased respiratory effort; on 3 L NC  Cardiovascular: tachycardic, normal S1S2, no M/R/G appreciated; JVD +3 cm above clavicle when sitting upright, unable to distinguish with pulsus paradoxus  Gastrointestinal: distended, appears uncomfortable when palpating, but no guarding; NBS  Extremities:  +2 pitting edema  MSK: no obvious abnormalities, normal ROM, no lymphadenopathy  Neurological: Grossly intact; encephalopathic, but stable compared to yesterday, appears near level as on Saturday (5/30/20)  Skin: Normal temperature, no rash, no echymoses, no petichiae  Psych: calm, cooperative, delirious     MEDICATIONS  (STANDING):  cholecalciferol 4000 Unit(s) Oral daily  enoxaparin Injectable 40 milliGRAM(s) SubCutaneous daily  fluconAZOLE   Tablet 200 milliGRAM(s) Oral daily  lidocaine   Patch 1 Patch Transdermal daily  metoprolol tartrate 50 milliGRAM(s) Oral every 12 hours  morphine ER Tablet 30 milliGRAM(s) Oral two times a day  pantoprazole    Tablet 40 milliGRAM(s) Oral before breakfast  piperacillin/tazobactam IVPB.. 3.375 Gram(s) IV Intermittent every 8 hours  polyethylene glycol 3350 17 Gram(s) Oral two times a day  potassium acid phosphate/sodium acid phosphate tablet (K-PHOS No. 2) 1 Tablet(s) Oral four times a day with meals  senna 2 Tablet(s) Oral at bedtime    MEDICATIONS  (PRN):  aluminum hydroxide/magnesium hydroxide/simethicone Suspension 30 milliLiter(s) Oral every 6 hours PRN Dyspepsia  bisacodyl 5 milliGRAM(s) Oral every 12 hours PRN Constipation  haloperidol    Injectable 0.5 milliGRAM(s) IV Push every 6 hours PRN nausea  morphine  - Injectable 4 milliGRAM(s) IV Push every 4 hours PRN Severe Pain (7 - 10)  morphine  - Injectable 2 milliGRAM(s) IV Push every 4 hours PRN Moderate Pain (4 - 6)  ondansetron Injectable 4 milliGRAM(s) IV Push every 6 hours PRN Nausea and/or Vomiting  simethicone 80 milliGRAM(s) Chew every 6 hours PRN Gas      No Known Allergies      LABS:                        7.5    41.56 )-----------( 267      ( 04 Jun 2020 05:12 )             24.9     06-04    129<L>  |  93<L>  |  10  ----------------------------<  76  3.6   |  20<L>  |  0.87    Ca    9.4      04 Jun 2020 05:12  Phos  3.3     06-04  Mg     1.8     06-04    TPro  6.3  /  Alb  2.1<L>  /  TBili  1.6<H>  /  DBili  x   /  AST  90<H>  /  ALT  22  /  AlkPhos  491<H>  06-04    PT/INR - ( 03 Jun 2020 07:07 )   PT: 23.1 sec;   INR: 1.97 ratio         PTT - ( 03 Jun 2020 07:07 )  PTT:25.7 sec       Iron Total, Serum: 45 ug/dL (06-03-20 @ 08:24)  Unsaturated Iron Binding Capacity: 26 ug/dL <L> (06-03-20 @ 08:24)  Iron - Total Binding Capacity.: 71 ug/dL <L> (06-03-20 @ 08:24)  % Saturation, Iron: 63 % <H> (06-03-20 @ 08:24)  Ferritin, Serum: 9297 ng/mL <H> (06-03-20 @ 08:21)    RADIOLOGY & ADDITIONAL TESTS:  Studies reviewed.    < from: CT Abdomen and Pelvis w/ IV Cont (06.03.20 @ 17:22) >    EXAM:  CT ABDOMEN AND PELVIS IC                            PROCEDURE DATE:  06/03/2020            INTERPRETATION:  CLINICAL INFORMATION: Metastatic carcinoma of unknown primary. Rising leukocytosis.    COMPARISON: CT abdomen/pelvis from 5/29/2020.    PROCEDURE:   CT of the Abdomen and Pelvis was performed with intravenous contrast.   Intravenous contrast: 90 ml Omnipaque 350. 10 ml discarded.  Oral contrast: None.  Sagittal and coronal reformats were performed.    FINDINGS:  LOWER CHEST: Bibasilar pulmonary metastatic disease, left pleural nodularity and loculated left pleural effusion without significant change.    LIVER: Bilobar hepatic metastatic disease as at recent prior imaging 4 days earlier.  BILE DUCTS: Normal caliber.  GALLBLADDER: Distended gallbladder.  SPLEEN: Within normal limits.  PANCREAS: Within normal limits.  ADRENALS: Within normal limits.  KIDNEYS/URETERS: Within normal limits.    BLADDER: Within normal limits.  REPRODUCTIVE ORGANS: A 10.4 x 13.5 cm complex right adnexal lesion as at recent prior imaging.    BOWEL: Dilated colon without a transition point as a prior imaging. No bowel obstruction.  PERITONEUM: Peritoneal carcinomatosis with moderate loculated ascites, unchanged.  VESSELS: Within normal limits.  RETROPERITONEUM/LYMPH NODES: Retroperitoneal and pelvic lymphadenopathy.  ABDOMINAL WALL: Anasarca.  BONES: Within normal limits.    IMPRESSION:     Extensive metastatic disease as at recent prior imaging.    Moderate ascites with loculation, also unchanged.    No evidence of an infectious source in the abdomen/pelvis.    KAREN TONY M.D., RADIOLOGY RESIDENT  This document has been electronically signed.  RUFINA CONTRERAS M.D., ATTENDING RADIOLOGIST  This document has been electronically signed. [place]  3 2020  6:48PM    < end of copied text >

## 2020-06-04 NOTE — GOALS OF CARE CONVERSATION - ADVANCED CARE PLANNING - CONVERSATION DETAILS
Extensive family meeting held with patient, and family and staff as noted above. Discussed current clinical situation in terms of her malignancy, and oncology endorsed that no further palliative treatment at this point would provide benefit without increased burden. Decision made for no further palliative DDT. Discussed patient's pain and symptoms, and that palliative care would continue to manage her symptoms. Also briefly discussed disposition, including hospice at home vs inpatient, but that it was too early to decide on that disposition at this time, though suspicions were that she may require inpatient hospice. Lastly, discussed ACP, medical recommendation provided, and family wanted time to process the above information and decide amongst themselves.

## 2020-06-05 PROBLEM — C80.1 PRIMARY CANCER OF UNKNOWN SITE: Status: ACTIVE | Noted: 2020-01-01

## 2020-06-05 PROBLEM — G89.3 PAIN OF METASTATIC MALIGNANCY: Status: ACTIVE | Noted: 2020-01-01

## 2020-06-05 PROBLEM — R11.0 CHEMOTHERAPY-INDUCED NAUSEA: Status: ACTIVE | Noted: 2020-01-01

## 2020-06-05 PROBLEM — K59.00 CONSTIPATION, UNSPECIFIED CONSTIPATION TYPE: Status: ACTIVE | Noted: 2020-01-01

## 2020-06-05 PROBLEM — R50.9 FEVER: Status: ACTIVE | Noted: 2020-01-01

## 2020-06-05 PROBLEM — Z78.9 NO SIGNIFICANT FAMILY HISTORY: Status: ACTIVE | Noted: 2020-01-01

## 2020-06-05 NOTE — PROGRESS NOTE ADULT - REASON FOR ADMISSION
fever, shortness of breath
CUP
Fevers
fever, shortness of breath

## 2020-06-05 NOTE — PHYSICAL EXAM
[Restricted in physically strenuous activity but ambulatory and able to carry out work of a light or sedentary nature] : Status 1- Restricted in physically strenuous activity but ambulatory and able to carry out work of a light or sedentary nature, e.g., light house work, office work [Normal] : affect appropriate [de-identified] : no respiratory distress

## 2020-06-05 NOTE — PROGRESS NOTE ADULT - PROBLEM SELECTOR PROBLEM 1
Anxiety
Dysarthria
Generalized abdominal pain
Generalized abdominal pain
Hypercalcemia
Hypercalcemia
Metastatic cancer
Respiratory distress
Sepsis, due to unspecified organism, unspecified whether acute organ dysfunction present
VRE (vancomycin-resistant Enterococci) infection
VRE (vancomycin-resistant Enterococci) infection
Metastatic cancer

## 2020-06-05 NOTE — HISTORY OF PRESENT ILLNESS
[Disease: _____________________] : Disease: [unfilled] [AJCC Stage: ____] : AJCC Stage: [unfilled] [de-identified] : 24 F previously healthy admitted to [facility] on 3/20/20 with abdominal pain x 2 months. CT A/P on admission noted b/l large pelvic masses with metastatic disease involving lung, liver, LN, bone and omental seeding. A R iliac bone bx was performed and c/w poorly differentiated carcinoma of unknown origin. IHC stains suggestive of neuroendocrine features, Ki 40%, possible GI primary given CDX2 positivity. Hospital course complicated by parainfluenza infection, persistent fevers, hypoxia, tachycardia, significant malignancy related pain, poor PS, N/V, UTI, pleural effusions s/p chest tube placement. Patient received 2 cycles FOLFOX while in the hospital. 2nd cycle was complicated by development of coronary vasospasm, likely 2/2 5FU. Treatment was d/c. CT A/P post 2 cycles showed POD. Therefore, decision made to switch to Carboplatin AUC 5/Taxol 175 mg/m2 which the pt received on 5/5/20. Clinically seemed to stabilize,  and CEA noted to be decreasing. Was d/c home on 5/17/20. \par \par 5/26/20: sent to [facility] ER with fever, tachycardia, tachypnea  [de-identified] : poorly differentiated carcinoma  [de-identified] : Febrile till 101, P 160. c/o severe back pain, not relieved by pain meds. + SOB Visibly dyspneic.

## 2020-06-05 NOTE — PROGRESS NOTE ADULT - SUBJECTIVE AND OBJECTIVE BOX
GAP TEAM PALLIATIVE CARE UNIT PROGRESS NOTE:      [  ] Patient on hospice program.    INDICATION FOR PALLIATIVE CARE UNIT SERVICES: management of dyspnea and pain at EOL    INTERVAL HPI/OVERNIGHT EVENTS: overnight patient required x2 doses of IV morphine, unable to take oral pills well this morning therefore conversion of long acting to infusion completed.  60mg oral= 20mg IV + 8mg from BTD morphine; 28/24=1.2mg/hr  . Will start on 1mg/hr and continue 4mg q1h prn for pain/dyspnea.     DNR on chart: Yes    Allergies    No Known Allergies    Intolerances    MEDICATIONS  (STANDING):  enoxaparin Injectable 40 milliGRAM(s) SubCutaneous daily  lidocaine   Patch 1 Patch Transdermal daily  morphine  Infusion 1 mG/Hr (1 mL/Hr) IV Continuous <Continuous>  pantoprazole  Injectable 40 milliGRAM(s) IV Push daily  piperacillin/tazobactam IVPB.. 3.375 Gram(s) IV Intermittent every 8 hours  sodium chloride 0.9%. 1000 milliLiter(s) (10 mL/Hr) IV Continuous <Continuous>    MEDICATIONS  (PRN):  aluminum hydroxide/magnesium hydroxide/simethicone Suspension 30 milliLiter(s) Oral every 6 hours PRN Dyspepsia  bisacodyl Suppository 10 milliGRAM(s) Rectal daily PRN Constipation  haloperidol    Injectable 0.5 milliGRAM(s) IV Push every 6 hours PRN nausea  LORazepam   Injectable 0.5 milliGRAM(s) IV Push every 1 hour PRN Anxiety  metoprolol tartrate Injectable 5 milliGRAM(s) IV Push every 12 hours PRN HR > 110  morphine  - Injectable 4 milliGRAM(s) IV Push every 1 hour PRN pain  morphine  - Injectable 4 milliGRAM(s) IV Push every 1 hour PRN dyspnea  ondansetron Injectable 4 milliGRAM(s) IV Push every 6 hours PRN Nausea and/or Vomiting  simethicone 80 milliGRAM(s) Chew every 6 hours PRN Gas    ITEMS UNCHECKED ARE NOT PRESENT    PRESENT SYMPTOMS: [ ]Unable to obtain due to poor mentation   Source if other than patient:  [ ]Family   [ ]Team     Pain: [ x] yes [ ] no  QOL impact - debilitating  Location -          abdomen         Aggravating factors -palpation  Quality -aching  Radiation -diffuse abdomen  Timing-comes/goes  Severity (0-10 scale): 8  Minimal acceptable level (0-10 scale): 5    Dyspnea:                           [ ]Mild [ ]Moderate [ ]Severe  Anxiety:                             [ ]Mild [ ]Moderate [ ]Severe  Fatigue:                             [ ]Mild [ ]Moderate [ ]Severe  Nausea:                             [ ]Mild [ ]Moderate [ ]Severe  Loss of appetite:              [ ]Mild [ ]Moderate [ ]Severe  Constipation:                    [ ]Mild [ ]Moderate [ ]Severe    PAINAD Score:    [email or web address] (Ctrl +  left click to view)  		  Other Symptoms:  [ ]All other review of systems negative     Palliative Performance Status Version 2:       30  %         http://npcrc.org/files/news/palliative_performance_scale_ppsv2.pdf  PHYSICAL EXAM:  Vital Signs Last 24 Hrs  T(C): 36.4 (05 Jun 2020 07:50), Max: 36.9 (04 Jun 2020 16:50)  T(F): 97.5 (05 Jun 2020 07:50), Max: 98.4 (04 Jun 2020 16:50)  HR: 131 (05 Jun 2020 07:50) (114 - 134)  BP: 91/56 (05 Jun 2020 07:50) (73/44 - 95/58)  BP(mean): --  RR: 34 (05 Jun 2020 07:50) (18 - 36)  SpO2: 95% (05 Jun 2020 07:50) (94% - 97%) I&O's Summary    04 Jun 2020 07:01  -  05 Jun 2020 07:00  --------------------------------------------------------  IN: 1360 mL / OUT: 550 mL / NET: 810 mL    GENERAL:  [ ]Alert  [x ]Oriented x 1-2  [x ]Lethargic  [ ]Cachexia  [ ]Unarousable  [ ]Verbal  [ ]Non-Verbal  Behavioral:   [ ] Anxiety  [ ] Delirium [ ] Agitation [ ] Other  HEENT:  [ ]Normal   [x ]Dry mouth   [ ]ET Tube/Trach  [ ]Oral lesions  PULMONARY:   [ ]Clear [ ]Tachypnea  [ ]Audible excessive secretions   [ ]Rhonchi        [ ]Right [ ]Left [ ]Bilateral  [x ]Crackles        [ ]Right [ ]Left [ ]Bilateral  [ ]Wheezing     [ ]Right [ ]Left [ ]Bilateral  [ ]Diminshed BS [ ]Right [ ]Left [ ]Bilateral    CARDIOVASCULAR:    [ ]Regular [ ]Irregular [x ]Tachy  [ ]Quirino [ ]Murmur [ ]Other  GASTROINTESTINAL:  [ ]Soft  [ x]Distended   [ ]+BS  [ ]Non tender [ x]Tender  [ ]PEG [ ]OGT/ NGT   Last BM:   6/5    GENITOURINARY:  [ ]Normal [x ] Incontinent   [ ]Oliguria/Anuria   [ ]Cifuentes  MUSCULOSKELETAL:   [ ]Normal   [ x]Weakness  [ ]Bed/Wheelchair bound [ x]Edema  NEUROLOGIC: decreasing level of consciousness, following some commands  [ x]No focal deficits  [ ] Cognitive impairment  [ ] Dysphagia [ ]Dysarthria [ ] Paresis [ ]Other   SKIN: ecchymoses  [ ]Normal  [ ]Rash     [ ]Pressure ulcer(s)  [ ]y [ ]n  Present on admission      CRITICAL CARE:  [ ] Shock Present  [ ]Septic [ ]Cardiogenic [ ]Neurologic [ ]Hypovolemic  [ ]  Vasopressors [ ]  Inotropes   [ ] Respiratory failure present [ ] Mechanical Ventilation [ ] Non-invasive ventilatory support [ ] High-Flow  [ ] Acute  [ ] Chronic [ ] Hypoxic  [ ] Hypercarbic [ ] Other  [ ] Other organ failure     LABS:                        7.5    41.56 )-----------( 267      ( 04 Jun 2020 05:12 )             24.9   06-04    129<L>  |  93<L>  |  10  ----------------------------<  76  3.6   |  20<L>  |  0.87    Ca    9.4      04 Jun 2020 05:12  Phos  3.3     06-04  Mg     1.8     06-04    TPro  6.3  /  Alb  2.1<L>  /  TBili  1.6<H>  /  DBili  x   /  AST  90<H>  /  ALT  22  /  AlkPhos  491<H>  06-04        RADIOLOGY & ADDITIONAL STUDIES: no new imaging studies    PROTEIN CALORIE MALNUTRITION: [ ] mild [x ] moderate [ ] severe  [ ] underweight [ ] morbid obesity    https://www.andeal.org/vault/6580/web/files/ONC/Table_Clinical%20Characteristics%20to%20Document%20Malnutrition-White%20JV%20et%20al%202012.pdf    Height (cm): 162.56 (05-26-20 @ 12:53), 164 (05-26-20 @ 12:04), 163 (05-22-20 @ 13:44)  Weight (kg): 73.5 (05-26-20 @ 21:00), 72.8 (05-26-20 @ 12:04), 73.936 (05-22-20 @ 13:41)  BMI (kg/m2): 27.8 (05-26-20 @ 21:00), 27.5 (05-26-20 @ 12:53), 27.1 (05-26-20 @ 12:04)    [x ] PPSV2 < or = 30% [ ] significant weight loss [x ] poor nutritional intake [ ] anasarca Albumin, Serum: 2.1 g/dL (06-04-20 @ 05:12)    Artificial Nutrition [ ]     REFERRALS:   [ ]Chaplaincy  [ ] Hospice  [ ]Child Life  [ x]Social Work  [ ]Case management [ ]Holistic Therapy [ ] Physical Therapy [ ] Dietary   Goals of Care Document: RADHA Mack (06-04-20 @ 13:24)  Goals of Care Conversation:   Participants:  · Participants  Patient; Family; Staff  · Relative  mother, aunt, sister  · Provider  Dr. Merritt (medicine), Cecy Miles and Danish (oncology), Dr. Mack (palliative)  ·   [facility]    Advance Directives:  · Does patient have Advance Directive  No  · Do you want to complete the HCP and name a Nir Care Agent  No  · Caregiver:  declines    Conversation Discussion:  · Conversation  MOLST Discussed; Treatment Options  · Conversation Details  Extensive family meeting held with patient, and family and staff as noted above. Discussed current clinical situation in terms of her malignancy, and oncology endorsed that no further palliative treatment at this point would provide benefit without increased burden. Decision made for no further palliative DDT. Discussed patient's pain and symptoms, and that palliative care would continue to manage her symptoms. Also briefly discussed disposition, including hospice at home vs inpatient, but that it was too early to decide on that disposition at this time, though suspicions were that she may require inpatient hospice. Lastly, discussed ACP, medical recommendation provided, and family wanted time to process the above information and decide amongst themselves.    Location of Discussion:   Duration of Advanced Care Planning Meeting:  · Time spent (in minutes)  55      Electronic Signatures:  Alex Mack)  (Signed 04-Jun-2020 13:28)  	Authored: Goals of Care Conversation, Location of Discussion      Last Updated: 04-Jun-2020 13:28 by Alex Mack)

## 2020-06-05 NOTE — PROVIDER CONTACT NOTE (OTHER) - BACKGROUND
Pt. abd distended Vomiting Green liquid
24 year-old female with recent diagnosis of metastatic carcinoma of unknown primary (being treated as likely ovarian primary) s/p Carbo/Taxol cycle 1
PT admitted for tachycardia. PMH of metastatic ca, ams, vit D deficiency
Pt admitted for fever 101, tachycardic to 150 and tachypneic. Recent diagnosis of metastatic carcinoma
Pt has DNR order

## 2020-06-05 NOTE — PROGRESS NOTE ADULT - PROBLEM SELECTOR PLAN 1
conversion of morphine ER 30mgBID to morphine infusion, 1mg/hr   continue morphine 4mg q1h prn for dyspnea, monitor usage and adjust drip as appropriate.  DNI established

## 2020-06-05 NOTE — REVIEW OF SYSTEMS
[Fatigue] : fatigue [Shortness Of Breath] : shortness of breath [SOB on Exertion] : shortness of breath during exertion [Abdominal Pain] : abdominal pain [Joint Pain] : joint pain [Joint Stiffness] : joint stiffness [Muscle Weakness] : muscle weakness [Negative] : Allergic/Immunologic

## 2020-06-05 NOTE — PHYSICAL EXAM
[Ambulatory and capable of all self care but unable to carry out any work activities] : Status 2- Ambulatory and capable of all self care but unable to carry out any work activities. Up and about more than 50% of waking hours [Normal] : affect appropriate [de-identified] : ill appearing  [de-identified] : visibly tachypneic, using accessory muscles  [de-identified] : shirley, RRR, S1S2 [de-identified] : distended, soft

## 2020-06-05 NOTE — PROGRESS NOTE ADULT - PROBLEM SELECTOR PROBLEM 2
AMS (altered mental status)
Altered mental status, unspecified altered mental status type
Anxiety
Generalized abdominal pain
Metastatic cancer
Metastatic cancer
Slow transit constipation
Slow transit constipation
Tachycardia
Tachycardia
Vomiting
Vitamin D deficiency
AMS (altered mental status)

## 2020-06-05 NOTE — REASON FOR VISIT
[Initial Consultation] : an initial consultation [Family Member] : family member [FreeTextEntry2] : Carcinoma of unknown primary

## 2020-06-05 NOTE — PROGRESS NOTE ADULT - PROBLEM SELECTOR PROBLEM 7
Encounter for palliative care
Hypercalcemia
Hyponatremia
Hyponatremia
Pain of metastatic malignancy
Pain of metastatic malignancy
Ascites, malignant
Hyponatremia

## 2020-06-05 NOTE — HISTORY OF PRESENT ILLNESS
[Disease: _____________________] : Disease: [unfilled] [AJCC Stage: ____] : AJCC Stage: [unfilled] [Therapy: ___] : Therapy: [unfilled] [Cycle: ___] : Cycle: [unfilled] [Day: ___] : Day: [unfilled] [de-identified] : 24 F previously healthy admitted to [facility] on 3/20/20 with abdominal pain x 2 months. CT A/P on admission noted b/l large pelvic masses with metastatic disease involving lung, liver, LN, bone and omental seeding. A R iliac bone bx was performed and c/w poorly differentiated carcinoma of unknown origin. IHC stains suggestive of neuroendocrine features, Ki 40%, possible GI primary given CDX2 positivity. Hospital course complicated by parainfluenza infection, persistent fevers, hypoxia, tachycardia, significant malignancy related pain, poor PS, N/V, UTI, pleural effusions s/p chest tube placement. Patient received 2 cycles FOLFOX while in the hospital. 2nd cycle was complicated by development of coronary vasospasm, likely 2/2 5FU. Treatment was d/c. CT A/P post 2 cycles showed POD. Therefore, decision made to switch to Carboplatin AUC 5/Taxol 175 mg/m2 which the pt received on 5/5/20. Clinically seemed to stabilize,  and CEA noted to be decreasing. Was d/c home on 5/17/20. \par \par  [de-identified] : poorly differentiated carcinoma  [de-identified] : Breathing ok, not requiring O2. occasional SOB. COVID neg x 5 while in the hospital. \par has chronic back pain and R LE pain. Needs help with showering. Able to perform some activities independently. Ambulating with and without a walker. \par Appetite has improved compared with while in the hospital \par Has been having nausea and occasional vomiting. this has resolved with Zofran BID \par + constipated, has not been taking colace and senna. Has miralax at home \par picked up MS contin yesterday but could not get oxycodone due to PA needed. Was off MS Contin for 3 days due to not getting a script \par has been taking Cardizem since hospital d/c. \par has 4 more days of Levaquin\par no fevers since coming home \par Lost 13 lbs during hospitalization.

## 2020-06-05 NOTE — PROGRESS NOTE ADULT - ASSESSMENT
24 f with b/l adnexal masses and pelvic LAD, metastatic carcinoma of unknown primary , s/p CT guided biopsy 3/25, PICC line 4/3 to start chemo  CT chest abd pelvis as above no obvious new focus  Blood cx clear  Peritoneal fluid analysis and Cx not s/o infection  Urine Cx with VRE and candida-unclear if she has colonization or real pathogens  Leucocytosis unchanged after zyvox and fluconazole  repeat urine cx and blood cx are negative  Leucocytosis still increasing   RT held  now for palliation       Rec:  A) leucocytosis  Cx negative  abd distention/ascites/mass  ? Occult abd foci? tumor necrosis ? tumor related  ? Non infectious etiology   will defer to palliative care team on abx-tailor per GOC       B) Tumor,abd pain  workup for infectious etiology as above  will defer to palliative care team on other plan    Will tailor plan for ID issues  per course,results.Will defer to primary team on management of other issues.  Assessment, plan and recommendations as detailed above were discussed with the PCU team   prognosis guarded  ID will follow as needed,please call [phone] if any questions or issues.

## 2020-06-05 NOTE — DISCHARGE NOTE FOR THE EXPIRED PATIENT - HOSPITAL COURSE
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    Patient continued to decline and no further DMT was offered by Oncology. After family meeting, patient was made DNR/DNI and transferred to PCU for ongoing symptom management/comfort care. Patient  comfortably on 20, family notified. 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    Patient continued to decline and no further DMT was offered by Oncology. After family meeting, patient was made DNR/DNI and transferred to PCU for ongoing symptom management/comfort care. Patient  comfortably on 20, family notified. Family declined autopsy

## 2020-06-05 NOTE — PROGRESS NOTE ADULT - PROBLEM SELECTOR PLAN 7
- c/w IV hydration  - check urine lytes, osm
- c/w IV hydration  - check urine lytes, osm  - likely SIADH
- ionized Ca was elevated  - PTH inappropriately normal in this setting but Vit D is low  - Started vit d 4000 unit daily  - endo & nephrology consulted -appreciate recs  - Ca downtrended
- ionized Ca was elevated  - PTH inappropriately normal in this setting but Vit D is low  - Started vit d 4000 unit daily  - endo & nephrology consulted -appreciate recs  - Ca downtrended
- ionized Ca was elevated  - PTH inappropriately normal in this setting but Vit D is low  -cont vit d 4000 unit daily  - Ca downtrended
continue care in PCU  patient appears to be entering dying proces. will d/c antibiotics at this time as unlikely to provide clinical benefit. discussed visitation with family and discussed life expectancy of hours/days likely.
palliative consulted - pain regimen adjusted  rad onc for palliative RT @ [facility] as above
palliative consulted - pain regimen adjusted  rad onc for palliative RT @ [facility] as above
s/p para 1.3L removed bloody fluid c/w malignancy  -no growth to date from fluid cultures  -may need to repeat tap this admission. will discuss with IR tomorrow.
- c/w IV hydration  - check urine lytes, osm

## 2020-06-05 NOTE — PROGRESS NOTE ADULT - PROBLEM SELECTOR PLAN 3
- etiology unclear, likely multifactorial from malignancy, opioids, but patient is much more alert now compared to earlier this week, able to speak more coherently  -unlikely to improve given patient entering dying process

## 2020-06-05 NOTE — PROGRESS NOTE ADULT - SUBJECTIVE AND OBJECTIVE BOX
Patient is a 24y old  Female who presents with a chief complaint of fever, shortness of breath (04 Jun 2020 13:57)    Being followed by ID for leucocytosis    Interval history:transferred to palliative care unit  comforatble  On morphine PRN   No acute events      ROS:  denies pain -unable to provide ROS       Antimicrobials:    piperacillin/tazobactam IVPB.. 3.375 Gram(s) IV Intermittent every 8 hours    Other medications reviewed    Vital Signs Last 24 Hrs  T(C): 36.4 (06-05-20 @ 07:50), Max: 36.9 (06-04-20 @ 16:50)  T(F): 97.5 (06-05-20 @ 07:50), Max: 98.4 (06-04-20 @ 16:50)  HR: 131 (06-05-20 @ 07:50) (114 - 134)  BP: 91/56 (06-05-20 @ 07:50) (73/44 - 95/58)  BP(mean): --  RR: 34 (06-05-20 @ 07:50) (18 - 36)  SpO2: 95% (06-05-20 @ 07:50) (94% - 97%)    Physical Exam:    HEENT PERRLA EOMI    No oral exudate or erythema    Chest Good AE,CTA    CVS RRR S1 S2 WNl No murmur or rub or gallop    Abd firm- BS normal Lower half some tenderness ? mass  ascites/FF    R PICC  site no erythema tenderness or discharge    CNS AAO X 3 no focal    Lab Data:    No new labs                              7.5    41.56 )-----------( 267      ( 04 Jun 2020 05:12 )             24.9       06-04    129<L>  |  93<L>  |  10  ----------------------------<  76  3.6   |  20<L>  |  0.87    Ca    9.4      04 Jun 2020 05:12  Phos  3.3     06-04  Mg     1.8     06-04    TPro  6.3  /  Alb  2.1<L>  /  TBili  1.6<H>  /  DBili  x   /  AST  90<H>  /  ALT  22  /  AlkPhos  491<H>  06-04        Culture - Urine (collected 01 Jun 2020 08:55)  Source: .Urine Clean Catch (Midstream)  Final Report (02 Jun 2020 07:17):    No growth    Culture - Blood (collected 01 Jun 2020 04:20)  Source: .Blood Blood-Peripheral  Preliminary Report (02 Jun 2020 05:03):    No growth to date.    Culture - Blood (collected 01 Jun 2020 04:20)  Source: .Blood Blood-Peripheral  Preliminary Report (02 Jun 2020 05:03):    No growth to date.        < from: CT Abdomen and Pelvis w/ IV Cont (06.03.20 @ 17:22) >  IMPRESSION:     Extensive metastatic disease as at recent prior imaging.    Moderate ascites with loculation, also unchanged.    No evidence of an infectious source in the abdomen/pelvis.          < end of copied text >

## 2020-06-05 NOTE — PROGRESS NOTE ADULT - PROBLEM SELECTOR PROBLEM 3
Acute respiratory failure with hypoxemia
Altered mental status, unspecified altered mental status type
Generalized abdominal pain
Hypercalcemia
Metastatic cancer
Slow transit constipation
Vomiting
Vomiting
Tachycardia

## 2020-06-05 NOTE — REVIEW OF SYSTEMS
[Fatigue] : fatigue [Recent Change In Weight] : ~T recent weight change [SOB on Exertion] : shortness of breath during exertion [Constipation] : constipation [Difficulty Walking] : difficulty walking [Negative] : Allergic/Immunologic

## 2020-06-05 NOTE — PROGRESS NOTE ADULT - PROBLEM SELECTOR PLAN 6
DNR/DNI, MOLST completed on 6/4 after discussion with patient mother, sister and aunt. patient without capacity at this time for medical decision making    > 46 mins spent DNR/DNI, MOLST completed on 6/4 after discussion with patient mother, sister and aunt. patient without capacity at this time for medical decision making    > 46 mins spent today addressing plan of care, transitioning focus to comfort based and regarding symptom management

## 2020-06-05 NOTE — PROVIDER CONTACT NOTE (OTHER) - RECOMMENDATIONS
Pt pronounced dead at 2254, brother Thom Noble called and made aware . Unable to reach pts mother. Pt pronounced dead at 2254, brother Thom Noble called and made aware . As per pts brother, family declined autopsy. Mother made aware as well.

## 2020-06-05 NOTE — PROGRESS NOTE ADULT - PROBLEM SELECTOR PLAN 2
- c/w MS Contin 60mg and morphine 6mg IV Q3 for now; 1 PRN in 24 hours
- c/w MS Contin 60mg and morphine 6mg IV Q3 for now; 2 PRN in 24 hours, both yesterday
- c/w MS Contin 60mg and morphine 6mg IV Q3 for now; no PRN in 2 days  - if starts having worsening pain, will discuss with her re: methadone  - per ID, suggest observing off abx; doubt above dysarthria is related to opioids, especially as no PRNs used in last 2 days
- etiology unclear, likely multifactorial from malignancy, opioids, but patient is much more alert now compared to earlier this week, able to speak more coherently  - continue to monitor
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
- unclear if obstruction, waiting CT A/P ordered today
- would consider miralax BID for constipation; no signs of obstruction on recent CT 5/28  - consider steroids for nausea if fine with oncology; helped on last admission
-continued vomiting overnight, abd is distended and nontender  -urgent XRAy abdomen ordered, r/o obstruction  -if xray is neg, will need CT a/p with contrast  -continue with IV fluids and anti-emetics
-sinus tachycardia with HR up to 160s previously   -CT angio neg for PE  -likely multifactorial: malignancy, pain, underlying infection   -started cardizem, will continue. Lopressor Q6hrs PRN for HR>120   -will repeat echo
-sinus tachycardia with HR up to 160s yesterday  -CT angio neg for PE  -likely multifactorial: malignancy, pain, underlying infection   -started cardizem, will continue. Lopressor Q6hrs PRN for HR>120   -will repeat echo
ativan PRN for anxiety  - discussed with family about balance between mental alertness and symptom control, and that symptom management is priority
new lethargy and dysarthria on 6/01  -MR head to eval for mets to brain limited by lack of contrast and motion (will hold off on repeat for now as pt somewhat improved today on decreased morphine)  -cont lower dose morphine er 30mg bid and decreased PRN dosing too. only to give if pt is awake and not sedated  -can use tylenol or nsaids otherwise for acute pain  - rising wbc count-- started back on IV antibiotics with zosyn however this may all be tumor related. cont to fu GI recs
new lethargy and dysarthria on 6/01, improving  -MR head to eval for mets to brain limited by lack of contrast and motion (will hold off on repeat for now as pt somewhat improved today on decreased morphine)  -cont lower dose morphine er 30mg bid and decreased PRN dosing too. only to give if pt is awake and not sedated  -can use tylenol or nsaids otherwise for acute pain  - rising wbc count-- started back on IV antibiotics with zosyn however this may all be tumor related. cont to fu GI recs
new lethargy and dysarthria on 6/01, improving  -MR head to eval for mets to brain limited by lack of contrast and motion (will hold off on repeat for now as pt somewhat improved today on decreased morphine)  -cont lower dose morphine er 30mg bid and decreased PRN dosing too. only to give if pt is awake and not sedated  -can use tylenol otherwise for acute pain  - rising wbc count-- started back on IV antibiotics with zosyn however this is likely all be tumor related. cont to fu ID recs
new lethargy and dysarthria today  -MR head to eval for mets to brain  -dec morphine er to 30mg bid and will dec PRN dosing too. only to give if pt is awake and not sedated  -can use tylenol or nsaids otherwise for acute pain

## 2020-06-06 LAB
CULTURE RESULTS: SIGNIFICANT CHANGE UP
CULTURE RESULTS: SIGNIFICANT CHANGE UP
SPECIMEN SOURCE: SIGNIFICANT CHANGE UP
SPECIMEN SOURCE: SIGNIFICANT CHANGE UP

## 2020-06-16 ENCOUNTER — APPOINTMENT (OUTPATIENT)
Age: 25
End: 2020-06-16

## 2020-06-27 LAB
CULTURE RESULTS: SIGNIFICANT CHANGE UP
SPECIMEN SOURCE: SIGNIFICANT CHANGE UP

## 2020-07-07 ENCOUNTER — APPOINTMENT (OUTPATIENT)
Age: 25
End: 2020-07-07

## 2020-10-29 PROCEDURE — C1729: CPT

## 2020-10-29 PROCEDURE — 97110 THERAPEUTIC EXERCISES: CPT

## 2020-10-29 PROCEDURE — 82570 ASSAY OF URINE CREATININE: CPT

## 2020-10-29 PROCEDURE — 82042 OTHER SOURCE ALBUMIN QUAN EA: CPT

## 2020-10-29 PROCEDURE — 76856 US EXAM PELVIC COMPLETE: CPT

## 2020-10-29 PROCEDURE — 87102 FUNGUS ISOLATION CULTURE: CPT

## 2020-10-29 PROCEDURE — 82746 ASSAY OF FOLIC ACID SERUM: CPT

## 2020-10-29 PROCEDURE — 76705 ECHO EXAM OF ABDOMEN: CPT

## 2020-10-29 PROCEDURE — C8929: CPT

## 2020-10-29 PROCEDURE — 88360 TUMOR IMMUNOHISTOCHEM/MANUAL: CPT

## 2020-10-29 PROCEDURE — P9016: CPT

## 2020-10-29 PROCEDURE — 36430 TRANSFUSION BLD/BLD COMPNT: CPT

## 2020-10-29 PROCEDURE — 87581 M.PNEUMON DNA AMP PROBE: CPT

## 2020-10-29 PROCEDURE — 84443 ASSAY THYROID STIM HORMONE: CPT

## 2020-10-29 PROCEDURE — 86300 IMMUNOASSAY TUMOR CA 15-3: CPT

## 2020-10-29 PROCEDURE — 88173 CYTOPATH EVAL FNA REPORT: CPT

## 2020-10-29 PROCEDURE — 85027 COMPLETE CBC AUTOMATED: CPT

## 2020-10-29 PROCEDURE — 93306 TTE W/DOPPLER COMPLETE: CPT

## 2020-10-29 PROCEDURE — 86923 COMPATIBILITY TEST ELECTRIC: CPT

## 2020-10-29 PROCEDURE — 81001 URINALYSIS AUTO W/SCOPE: CPT

## 2020-10-29 PROCEDURE — 82607 VITAMIN B-12: CPT

## 2020-10-29 PROCEDURE — 71045 X-RAY EXAM CHEST 1 VIEW: CPT

## 2020-10-29 PROCEDURE — 84300 ASSAY OF URINE SODIUM: CPT

## 2020-10-29 PROCEDURE — 88305 TISSUE EXAM BY PATHOLOGIST: CPT

## 2020-10-29 PROCEDURE — 36569 INSJ PICC 5 YR+ W/O IMAGING: CPT

## 2020-10-29 PROCEDURE — 87040 BLOOD CULTURE FOR BACTERIA: CPT

## 2020-10-29 PROCEDURE — 97530 THERAPEUTIC ACTIVITIES: CPT

## 2020-10-29 PROCEDURE — 87633 RESP VIRUS 12-25 TARGETS: CPT

## 2020-10-29 PROCEDURE — 77012 CT SCAN FOR NEEDLE BIOPSY: CPT

## 2020-10-29 PROCEDURE — 87186 SC STD MICRODIL/AGAR DIL: CPT

## 2020-10-29 PROCEDURE — 76830 TRANSVAGINAL US NON-OB: CPT

## 2020-10-29 PROCEDURE — 87798 DETECT AGENT NOS DNA AMP: CPT

## 2020-10-29 PROCEDURE — 83520 IMMUNOASSAY QUANT NOS NONAB: CPT

## 2020-10-29 PROCEDURE — 32557 INSERT CATH PLEURA W/ IMAGE: CPT

## 2020-10-29 PROCEDURE — U0003: CPT

## 2020-10-29 PROCEDURE — 84157 ASSAY OF PROTEIN OTHER: CPT

## 2020-10-29 PROCEDURE — 85610 PROTHROMBIN TIME: CPT

## 2020-10-29 PROCEDURE — 82248 BILIRUBIN DIRECT: CPT

## 2020-10-29 PROCEDURE — 84702 CHORIONIC GONADOTROPIN TEST: CPT

## 2020-10-29 PROCEDURE — 87486 CHLMYD PNEUM DNA AMP PROBE: CPT

## 2020-10-29 PROCEDURE — 82728 ASSAY OF FERRITIN: CPT

## 2020-10-29 PROCEDURE — 88185 FLOWCYTOMETRY/TC ADD-ON: CPT

## 2020-10-29 PROCEDURE — 87205 SMEAR GRAM STAIN: CPT

## 2020-10-29 PROCEDURE — 93005 ELECTROCARDIOGRAM TRACING: CPT

## 2020-10-29 PROCEDURE — 82378 CARCINOEMBRYONIC ANTIGEN: CPT

## 2020-10-29 PROCEDURE — 97162 PT EVAL MOD COMPLEX 30 MIN: CPT

## 2020-10-29 PROCEDURE — 87075 CULTR BACTERIA EXCEPT BLOOD: CPT

## 2020-10-29 PROCEDURE — 87086 URINE CULTURE/COLONY COUNT: CPT

## 2020-10-29 PROCEDURE — 83550 IRON BINDING TEST: CPT

## 2020-10-29 PROCEDURE — 96375 TX/PRO/DX INJ NEW DRUG ADDON: CPT | Mod: XU

## 2020-10-29 PROCEDURE — 80202 ASSAY OF VANCOMYCIN: CPT

## 2020-10-29 PROCEDURE — 80048 BASIC METABOLIC PNL TOTAL CA: CPT

## 2020-10-29 PROCEDURE — 86301 IMMUNOASSAY TUMOR CA 19-9: CPT

## 2020-10-29 PROCEDURE — 86901 BLOOD TYPING SEROLOGIC RH(D): CPT

## 2020-10-29 PROCEDURE — 81003 URINALYSIS AUTO W/O SCOPE: CPT

## 2020-10-29 PROCEDURE — 82272 OCCULT BLD FECES 1-3 TESTS: CPT

## 2020-10-29 PROCEDURE — 84550 ASSAY OF BLOOD/URIC ACID: CPT

## 2020-10-29 PROCEDURE — 86140 C-REACTIVE PROTEIN: CPT

## 2020-10-29 PROCEDURE — 83930 ASSAY OF BLOOD OSMOLALITY: CPT

## 2020-10-29 PROCEDURE — 85384 FIBRINOGEN ACTIVITY: CPT

## 2020-10-29 PROCEDURE — 86900 BLOOD TYPING SEROLOGIC ABO: CPT

## 2020-10-29 PROCEDURE — 82553 CREATINE MB FRACTION: CPT

## 2020-10-29 PROCEDURE — 86304 IMMUNOASSAY TUMOR CA 125: CPT

## 2020-10-29 PROCEDURE — 82330 ASSAY OF CALCIUM: CPT

## 2020-10-29 PROCEDURE — 82140 ASSAY OF AMMONIA: CPT

## 2020-10-29 PROCEDURE — 82945 GLUCOSE OTHER FLUID: CPT

## 2020-10-29 PROCEDURE — 20220 BONE BIOPSY TROCAR/NDL SUPFC: CPT

## 2020-10-29 PROCEDURE — 85730 THROMBOPLASTIN TIME PARTIAL: CPT

## 2020-10-29 PROCEDURE — 84466 ASSAY OF TRANSFERRIN: CPT

## 2020-10-29 PROCEDURE — 93970 EXTREMITY STUDY: CPT

## 2020-10-29 PROCEDURE — 83986 ASSAY PH BODY FLUID NOS: CPT

## 2020-10-29 PROCEDURE — 99285 EMERGENCY DEPT VISIT HI MDM: CPT | Mod: 25

## 2020-10-29 PROCEDURE — 81207 BCR/ABL1 GENE MINOR BP: CPT

## 2020-10-29 PROCEDURE — 71250 CT THORAX DX C-: CPT

## 2020-10-29 PROCEDURE — 83735 ASSAY OF MAGNESIUM: CPT

## 2020-10-29 PROCEDURE — 85652 RBC SED RATE AUTOMATED: CPT

## 2020-10-29 PROCEDURE — 88112 CYTOPATH CELL ENHANCE TECH: CPT

## 2020-10-29 PROCEDURE — 88172 CYTP DX EVAL FNA 1ST EA SITE: CPT

## 2020-10-29 PROCEDURE — 80053 COMPREHEN METABOLIC PANEL: CPT

## 2020-10-29 PROCEDURE — 96374 THER/PROPH/DIAG INJ IV PUSH: CPT | Mod: XU

## 2020-10-29 PROCEDURE — 85045 AUTOMATED RETICULOCYTE COUNT: CPT

## 2020-10-29 PROCEDURE — C1769: CPT

## 2020-10-29 PROCEDURE — 83615 LACTATE (LD) (LDH) ENZYME: CPT

## 2020-10-29 PROCEDURE — 84704 HCG FREE BETACHAIN TEST: CPT

## 2020-10-29 PROCEDURE — 85379 FIBRIN DEGRADATION QUANT: CPT

## 2020-10-29 PROCEDURE — 93975 VASCULAR STUDY: CPT

## 2020-10-29 PROCEDURE — 88342 IMHCHEM/IMCYTCHM 1ST ANTB: CPT

## 2020-10-29 PROCEDURE — 84100 ASSAY OF PHOSPHORUS: CPT

## 2020-10-29 PROCEDURE — 82105 ALPHA-FETOPROTEIN SERUM: CPT

## 2020-10-29 PROCEDURE — 97116 GAIT TRAINING THERAPY: CPT

## 2020-10-29 PROCEDURE — 88341 IMHCHEM/IMCYTCHM EA ADD ANTB: CPT

## 2020-10-29 PROCEDURE — 87635 SARS-COV-2 COVID-19 AMP PRB: CPT

## 2020-10-29 PROCEDURE — 84484 ASSAY OF TROPONIN QUANT: CPT

## 2020-10-29 PROCEDURE — 87070 CULTURE OTHR SPECIMN AEROBIC: CPT

## 2020-10-29 PROCEDURE — P9040: CPT

## 2020-10-29 PROCEDURE — 71260 CT THORAX DX C+: CPT

## 2020-10-29 PROCEDURE — 81206 BCR/ABL1 GENE MAJOR BP: CPT

## 2020-10-29 PROCEDURE — C1751: CPT

## 2020-10-29 PROCEDURE — 88184 FLOWCYTOMETRY/ TC 1 MARKER: CPT

## 2020-10-29 PROCEDURE — 82962 GLUCOSE BLOOD TEST: CPT

## 2020-10-29 PROCEDURE — 71275 CT ANGIOGRAPHY CHEST: CPT

## 2020-10-29 PROCEDURE — 83935 ASSAY OF URINE OSMOLALITY: CPT

## 2020-10-29 PROCEDURE — 84145 PROCALCITONIN (PCT): CPT

## 2020-10-29 PROCEDURE — 83020 HEMOGLOBIN ELECTROPHORESIS: CPT

## 2020-10-29 PROCEDURE — 87449 NOS EACH ORGANISM AG IA: CPT

## 2020-10-29 PROCEDURE — 84439 ASSAY OF FREE THYROXINE: CPT

## 2020-10-29 PROCEDURE — 88313 SPECIAL STAINS GROUP 2: CPT

## 2020-10-29 PROCEDURE — 82550 ASSAY OF CK (CPK): CPT

## 2020-10-29 PROCEDURE — 89051 BODY FLUID CELL COUNT: CPT

## 2020-10-29 PROCEDURE — 83540 ASSAY OF IRON: CPT

## 2020-10-29 PROCEDURE — 86850 RBC ANTIBODY SCREEN: CPT

## 2020-10-29 PROCEDURE — C1830: CPT

## 2020-10-29 PROCEDURE — 74177 CT ABD & PELVIS W/CONTRAST: CPT

## 2020-10-29 PROCEDURE — 87324 CLOSTRIDIUM AG IA: CPT

## 2020-10-29 PROCEDURE — 74018 RADEX ABDOMEN 1 VIEW: CPT

## 2021-07-29 NOTE — PROGRESS NOTE ADULT - ASSESSMENT
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 Has had bilateral pigtails for drainage of effusions.  ************  4/25-tachycardia o/n--seen by cards for echo-poor study by grossly normal LV EF None

## 2022-02-08 NOTE — ED PROVIDER NOTE - OBJECTIVE STATEMENT
Patient has viewed normal thyroid results, please renew levothyroxine for 1 year.
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  Reports fever started 2 days ago.  No nausea, vomiting, chest pain, shortness of breath, dysuria.  + abdominal pain; not worse than compared to prior.  Patient had a PICC line placed on 4/3 for chemotherapy.

## 2022-02-14 NOTE — PROGRESS NOTE ADULT - PROVIDER SPECIALTY LIST ADULT
Heme/Onc Melolabial Interpolation Flap Text: A decision was made to reconstruct the defect utilizing an interpolation axial flap and a staged reconstruction.  A telfa template was made of the defect.  This telfa template was then used to outline the melolabial interpolation flap.  The donor area for the pedicle flap was then injected with anesthesia.  The flap was excised through the skin and subcutaneous tissue down to the layer of the underlying musculature.  The pedicle flap was carefully excised within this deep plane to maintain its blood supply.  The edges of the donor site were undermined.   The donor site was closed in a primary fashion.  The pedicle was then rotated into position and sutured.  Once the tube was sutured into place, adequate blood supply was confirmed with blanching and refill.  The pedicle was then wrapped with xeroform gauze and dressed appropriately with a telfa and gauze bandage to ensure continued blood supply and protect the attached pedicle.

## 2022-02-19 NOTE — ED ADULT NURSE NOTE - TEMPLATE
Pt to the ER today for shortness of breath since December. Patient states he was in the ER in December and the doctor told him he \"has Covid pneumonia but I was never positive for Covid. \"  Pt states he went to his PCP who told him to go back to the ER for possible pneumonia. Pt presents to the ER short of breath on activity. Pt. A&ox4. Respirations even but fast. Skin warm and dry. Heart rate 110s. Pt O2 on room air 90%. Pt placed on 2L NC.    PMH: pneumonia.  DM Abdominal Pain, N/V/D

## 2022-05-06 NOTE — DIETITIAN INITIAL EVALUATION ADULT. - OBTAIN DAILY WEIGHT
Discharge teaching and instructions for condition explained to patient. AVS reviewed. Went over prescriptions with patient. Patient voiced understanding regarding prescriptions, follow up appointments and care of self at home. Pt discharged to home in stable condition per daughter's care.      Marquis Pizarro RN  05/06/22 0634 yes

## 2022-05-31 NOTE — RAPID RESPONSE TEAM SUMMARY - NSSITUATIONBACKGROUNDRRT_GEN_ALL_CORE
24y woman no PMHx who initially p/w abdominal pain, found to have metastatic carcinoma of unclear primary source (possibly of GI origin), b/l pleural effusion s/p b/l chest tube placement during this admission. RRT called for sudden CP and palpitation, concern for ACS. Upon arrival, pt is mentating well, hemodynamically stable, sat 100% on NC. EKG showed sinus tachycardia with diffuse ST elevation, nonspecific distribution. Cardiac enzymes stent. CXR showed clear lungs. Cardiology consulted, will f/u rec. Pt will be transfer to telemetry for further monitoring. 1.99 24y woman no PMHx who initially p/w abdominal pain, found to have metastatic carcinoma of unclear primary source (possibly of GI origin) during this admission, admit to oncology service for chemo. Found to have b/l pleural effusion s/p b/l chest tube placement. RRT called for sudden CP and palpitation, concern for ACS. Upon arrival, pt is mentating well, hemodynamically stable, sat 100% on NC. EKG showed sinus tachycardia with diffuse ST elevation, nonspecific distribution. Cardiac enzymes stent. CXR showed clear lungs. Cardiology consulted, will f/u rec. Pt will be transfer to telemetry for further monitoring.

## 2022-10-07 NOTE — PROVIDER CONTACT NOTE (OTHER) - BACKGROUND
Screening Questions  BLUE  KIND OF PREP RED  LOCATION [review exclusion criteria] GREEN  SEDATION TYPE        Y Are you active on mychart?     Destiny Patel MD    Ordering/Referring Provider?        MEDICA/UCARE What type of coverage do you have?      N Have you had a positive covid test in the last 90 days?     19.7 1. BMI  [BMI 40+ - review exclusion criteria]    Y  2. Are you able to give consent for your medical care? [IF NO,RN REVIEW]        N  3. Are you taking any prescription pain medications on a routine schedule?      NA  3a. EXTENDED PREP What kind of prescription?     N 4. Do you have any chemical dependencies such as alcohol, street drugs, or methadone?    Y, PTSD 5. Do you have any history of post-traumatic stress syndrome, severe anxiety or history of psychosis?      **If yes 3- 5 , please schedule with MAC sedation.**          IF YES TO ANY 6 - 10 - HOSPITAL SETTING ONLY.     N 6.   Do you need assistance transferring?     N 7.   Have you had a heart or lung transplant?    N 8.   Are you currently on dialysis?   N 9.   Do you use daily home oxygen?   N 10. Do you take nitroglycerin?   10a. NA If yes, how often?     11. [FEMALES]  N Are you currently pregnant?    11a. NA If yes, how many weeks? [ Greater than 12 weeks, OR NEEDED]    N 12. Do you have Pulmonary Hypertension? *NEED PAC APPT AT [facility]*     N 13. [review exclusion criteria]  Do you have any implantable devices in your body (pacemaker, defib, LVAD)?    N 14. In the past 6 months, have you had any heart related issues including cardiomyopathy or heart attack?     14a. NA If yes, did it require cardiac stenting if so when?     N 15. Have you had a stroke or Transient ischemic attack (TIA - aka  mini stroke ) within 6 months?      N 16. Do you have mod to severe Obstructive Sleep Apnea?  [Hospital only - Ok at [facility]]    N 17. Do you have SEVERE AND UNCONTROLLED asthma? *NEED PAC APPT AT [facility]*     N 18. Are you currently taking  "any blood thinners?     18a. If yes, inform patient to \"follow up w/ ordering provider for bridging instructions.\"    N 19. Do you take the medication Phentermine?    19a. If yes, \"Hold for 7 days before procedure.  Please consult your prescribing provider if you have questions about holding this medication.\"     N  20. Do you have chronic kidney disease?      N  21. Do you have a diagnosis of diabetes?     N  22. On a regular basis do you go 3-5 days between bowel movements?      23. Preferred LOCAL Pharmacy for Pre Prescription    [ LIST ONLY ONE PHARMACY]     [id number] - [place], [place] - [address]      - CLOSING REMINDERS -    Informed patient they will need an adult    Cannot take any type of public or medical transportation alone    Conscious Sedation- Needs  for 6 hours after the procedure       MAC/General-Needs  for 24 hours after procedure    Pre-Procedure Covid test to be completed [[facility] PCR Testing Required]    Confirmed Nurse will call to complete assessment       - SCHEDULING DETAILS -     WILLIAM  Surgeon    11/30  Date of Procedure  Lower Endoscopy [Colonoscopy]  Type of Procedure Scheduled   [facility] PREP-If you answer yes to questions #8, #20, #21Which Colonoscopy Prep was Sent?     MAC Sedation Type     Y, PCP PAC / Pre-op Required         Additional comments:  Only wanted to schedule a colonoscopy procedure at this time. Did not want to schedule the EGD.          " Pt was last pan-cultured for blood X 2 on 5/1, for cult. urine on 5/2, for UA on 5/2 & last CT Chest was 4/30.  Pt is pending CXR for 5/4.

## 2023-04-05 NOTE — PROGRESS NOTE ADULT - PROBLEM SELECTOR PLAN 6
Goal Outcome Evaluation:      Afib RVR at beginning of shift, improved with metoprolol. Patient alert and oriented, 3L NC, IV abx continued, assist to bsc. Incentive spirometer/flutter valve encouraged. No complains of pain. All needs met.             - will continue to follow

## 2024-06-15 NOTE — PROGRESS NOTE ADULT - NSREFPHYEXREFTO_GEN_ALL_CORE
dizziness
Inpatient Physical Exam

## 2025-07-11 NOTE — ED ADULT NURSE NOTE - NSFALLRSKASSESASSIST_ED_ALL_ED
Dragon, the [employer] 
no